# Patient Record
Sex: FEMALE | Race: WHITE | NOT HISPANIC OR LATINO | Employment: FULL TIME | URBAN - METROPOLITAN AREA
[De-identification: names, ages, dates, MRNs, and addresses within clinical notes are randomized per-mention and may not be internally consistent; named-entity substitution may affect disease eponyms.]

---

## 2017-01-19 RX ORDER — ZOLPIDEM TARTRATE 10 MG/1
TABLET ORAL
Qty: 30 TABLET | Refills: 0 | Status: CANCELLED | OUTPATIENT
Start: 2017-01-19

## 2017-01-20 ENCOUNTER — OFFICE VISIT (OUTPATIENT)
Dept: GASTROENTEROLOGY | Facility: CLINIC | Age: 62
End: 2017-01-20

## 2017-01-20 ENCOUNTER — OFFICE VISIT (OUTPATIENT)
Dept: FAMILY MEDICINE CLINIC | Facility: CLINIC | Age: 62
End: 2017-01-20

## 2017-01-20 VITALS
BODY MASS INDEX: 32.17 KG/M2 | SYSTOLIC BLOOD PRESSURE: 118 MMHG | WEIGHT: 200.2 LBS | DIASTOLIC BLOOD PRESSURE: 78 MMHG | HEIGHT: 66 IN

## 2017-01-20 VITALS
SYSTOLIC BLOOD PRESSURE: 124 MMHG | BODY MASS INDEX: 31.86 KG/M2 | WEIGHT: 203 LBS | HEIGHT: 67 IN | OXYGEN SATURATION: 98 % | HEART RATE: 72 BPM | DIASTOLIC BLOOD PRESSURE: 78 MMHG

## 2017-01-20 DIAGNOSIS — R14.0 BLOATING: ICD-10-CM

## 2017-01-20 DIAGNOSIS — I10 BENIGN ESSENTIAL HTN: Primary | ICD-10-CM

## 2017-01-20 DIAGNOSIS — F34.1 DYSTHYMIA: ICD-10-CM

## 2017-01-20 DIAGNOSIS — E78.2 MIXED HYPERLIPIDEMIA: ICD-10-CM

## 2017-01-20 DIAGNOSIS — I25.10 CHRONIC CORONARY ARTERY DISEASE: ICD-10-CM

## 2017-01-20 DIAGNOSIS — R60.0 PEDAL EDEMA: ICD-10-CM

## 2017-01-20 DIAGNOSIS — F51.01 PRIMARY INSOMNIA: ICD-10-CM

## 2017-01-20 DIAGNOSIS — Z86.19 HISTORY OF SEPSIS: ICD-10-CM

## 2017-01-20 DIAGNOSIS — K59.1 FUNCTIONAL DIARRHEA: ICD-10-CM

## 2017-01-20 DIAGNOSIS — R10.9 ABDOMINAL CRAMPING: ICD-10-CM

## 2017-01-20 DIAGNOSIS — K59.1 FUNCTIONAL DIARRHEA: Primary | ICD-10-CM

## 2017-01-20 DIAGNOSIS — Z87.19 HISTORY OF COLITIS: ICD-10-CM

## 2017-01-20 LAB
ALBUMIN SERPL-MCNC: 4.4 G/DL (ref 3.5–5.2)
ALBUMIN/GLOB SERPL: 1.9 G/DL
ALP SERPL-CCNC: 77 U/L (ref 39–117)
ALT SERPL-CCNC: 29 U/L (ref 1–33)
AST SERPL-CCNC: 22 U/L (ref 1–32)
BILIRUB SERPL-MCNC: 0.4 MG/DL (ref 0.1–1.2)
BUN SERPL-MCNC: 17 MG/DL (ref 8–23)
BUN/CREAT SERPL: 22.7 (ref 7–25)
CALCIUM SERPL-MCNC: 9.9 MG/DL (ref 8.6–10.5)
CHLORIDE SERPL-SCNC: 100 MMOL/L (ref 98–107)
CO2 SERPL-SCNC: 30.9 MMOL/L (ref 22–29)
CREAT SERPL-MCNC: 0.75 MG/DL (ref 0.57–1)
GLOBULIN SER CALC-MCNC: 2.3 GM/DL
GLUCOSE SERPL-MCNC: 84 MG/DL (ref 65–99)
POTASSIUM SERPL-SCNC: 4.7 MMOL/L (ref 3.5–5.2)
PROT SERPL-MCNC: 6.7 G/DL (ref 6–8.5)
SODIUM SERPL-SCNC: 144 MMOL/L (ref 136–145)

## 2017-01-20 PROCEDURE — 99214 OFFICE O/P EST MOD 30 MIN: CPT | Performed by: INTERNAL MEDICINE

## 2017-01-20 PROCEDURE — 99214 OFFICE O/P EST MOD 30 MIN: CPT | Performed by: FAMILY MEDICINE

## 2017-01-20 RX ORDER — HYOSCYAMINE SULFATE 0.125 MG
0.12 TABLET ORAL EVERY 6 HOURS PRN
Qty: 120 TABLET | Refills: 3 | Status: SHIPPED | OUTPATIENT
Start: 2017-01-20 | End: 2017-01-20 | Stop reason: SDUPTHER

## 2017-01-20 RX ORDER — ROSUVASTATIN CALCIUM 20 MG/1
20 TABLET, COATED ORAL DAILY
Qty: 30 TABLET | Refills: 5 | Status: SHIPPED | OUTPATIENT
Start: 2017-01-20 | End: 2017-01-20 | Stop reason: SDUPTHER

## 2017-01-20 RX ORDER — RAMIPRIL 10 MG/1
10 CAPSULE ORAL DAILY
Qty: 30 CAPSULE | Refills: 5 | Status: SHIPPED | OUTPATIENT
Start: 2017-01-20 | End: 2017-08-31 | Stop reason: SDUPTHER

## 2017-01-20 RX ORDER — RANITIDINE 150 MG/1
150 CAPSULE ORAL 2 TIMES DAILY PRN
Qty: 60 CAPSULE | Refills: 3 | Status: SHIPPED | OUTPATIENT
Start: 2017-01-20 | End: 2017-01-20 | Stop reason: SDUPTHER

## 2017-01-20 RX ORDER — FUROSEMIDE 40 MG/1
40 TABLET ORAL DAILY
Qty: 30 TABLET | Refills: 5 | Status: SHIPPED | OUTPATIENT
Start: 2017-01-20 | End: 2017-02-09 | Stop reason: SDUPTHER

## 2017-01-20 RX ORDER — NEBIVOLOL 5 MG/1
5 TABLET ORAL DAILY
Qty: 30 TABLET | Refills: 5 | Status: SHIPPED | OUTPATIENT
Start: 2017-01-20 | End: 2017-08-23 | Stop reason: SDUPTHER

## 2017-01-20 RX ORDER — ROSUVASTATIN CALCIUM 20 MG/1
TABLET, COATED ORAL
Qty: 90 TABLET | Refills: 5 | Status: SHIPPED | OUTPATIENT
Start: 2017-01-20 | End: 2017-05-04

## 2017-01-20 RX ORDER — ZOLPIDEM TARTRATE 10 MG/1
5 TABLET ORAL NIGHTLY PRN
Qty: 30 TABLET | Refills: 2 | Status: SHIPPED | OUTPATIENT
Start: 2017-01-20 | End: 2017-08-31 | Stop reason: SDUPTHER

## 2017-01-20 RX ORDER — POTASSIUM CHLORIDE 1500 MG/1
20 TABLET, FILM COATED, EXTENDED RELEASE ORAL DAILY
Qty: 30 TABLET | Refills: 1 | Status: SHIPPED | OUTPATIENT
Start: 2017-01-20 | End: 2017-01-20 | Stop reason: SDUPTHER

## 2017-01-20 RX ORDER — POTASSIUM CHLORIDE 1500 MG/1
TABLET, FILM COATED, EXTENDED RELEASE ORAL
Qty: 90 TABLET | Refills: 1 | Status: SHIPPED | OUTPATIENT
Start: 2017-01-20 | End: 2017-09-08 | Stop reason: SDUPTHER

## 2017-01-20 RX ORDER — BUPROPION HYDROCHLORIDE 300 MG/1
300 TABLET ORAL DAILY
Qty: 30 TABLET | Refills: 11 | Status: SHIPPED | OUTPATIENT
Start: 2017-01-20 | End: 2018-04-17 | Stop reason: SDUPTHER

## 2017-01-20 NOTE — MR AVS SNAPSHOT
Barbara Tran   1/20/2017 3:30 PM   Office Visit    Dept Phone:  [phone]   Encounter #:  [id number]    Provider:  Medina Guillen MD   Department:  [facility]                Your Full Care Plan              Today's Medication Changes          These changes are accurate as of: 1/20/17  4:21 PM.  If you have any questions, ask your nurse or doctor.               New Medication(s)Ordered:     hyoscyamine 0.125 MG tablet   Commonly known as:  ANASPAZ,LEVSIN   Take 1 tablet by mouth Every 6 (Six) Hours As Needed for cramping.   Started by:  Medina Guillen MD       ranitidine 150 MG capsule   Commonly known as:  ZANTAC   Take 1 capsule by mouth 2 (Two) Times a Day As Needed for indigestion or heartburn.   Started by:  Medina Guillen MD         Medication(s)that have changed:     rosuvastatin 20 MG tablet   Commonly known as:  CRESTOR   TAKE 1 TABLET BY MOUTH DAILY   What changed:  See the new instructions.   Changed by:  Millicent Ace MD       zolpidem 10 MG tablet   Commonly known as:  AMBIEN   Take 0.5 tablets by mouth At Night As Needed for sleep.   What changed:  reasons to take this   Changed by:  Millicent Ace MD         Stop taking medication(s)listed here:     NITROSTAT 0.4 MG SL tablet   Generic drug:  nitroglycerin   Stopped by:  Millicent Ace MD           trimethoprim-polymyxin b 08638-1.1 UNIT/ML-% ophthalmic solution   Commonly known as:  POLYTRIM   Stopped by:  Millicent Ace MD                Where to Get Your Medications      These medications were sent to [facility] [id number] [place] [address] [address] [place] [address] - [phone]  - [phone] [address] [address]     Phone:  [phone]     buPROPion  MG 24 hr tablet    furosemide 40 MG tablet    hyoscyamine 0.125 MG tablet    nebivolol 5 MG tablet    potassium chloride ER 20 MEQ tablet controlled-release  ER tablet    ramipril 10 MG capsule    ranitidine 150 MG capsule    rosuvastatin 20 MG tablet         You can get these medications from any pharmacy     Bring a paper prescription for each of these medications     zolpidem 10 MG tablet                  Your Updated Medication List          This list is accurate as of: 1/20/17  4:21 PM.  Always use your most recent med list.                aspirin 81 MG tablet       buPROPion  MG 24 hr tablet   Commonly known as:  WELLBUTRIN XL   Take 1 tablet by mouth Daily.       calcium carbonate 600 MG tablet   Commonly known as:  OS-SHAHNAZ       fish oil 1200 MG capsule capsule       furosemide 40 MG tablet   Commonly known as:  LASIX   Take 1 tablet by mouth Daily.       hyoscyamine 0.125 MG tablet   Commonly known as:  ANASPAZ,LEVSIN   Take 1 tablet by mouth Every 6 (Six) Hours As Needed for cramping.       nebivolol 5 MG tablet   Commonly known as:  BYSTOLIC   Take 1 tablet by mouth Daily.       potassium chloride ER 20 MEQ tablet controlled-release ER tablet   Commonly known as:  K-TAB   TAKE 1 TABLET BY MOUTH DAILY       ramipril 10 MG capsule   Commonly known as:  ALTACE   Take 1 capsule by mouth Daily.       ranitidine 150 MG capsule   Commonly known as:  ZANTAC   Take 1 capsule by mouth 2 (Two) Times a Day As Needed for indigestion or heartburn.       rosuvastatin 20 MG tablet   Commonly known as:  CRESTOR   TAKE 1 TABLET BY MOUTH DAILY       saccharomyces boulardii 250 MG capsule   Commonly known as:  FLORASTOR   Take 1 capsule by mouth 2 (Two) Times a Day.       SYNTHROID 50 MCG tablet   Generic drug:  levothyroxine       zolpidem 10 MG tablet   Commonly known as:  AMBIEN   Take 0.5 tablets by mouth At Night As Needed for sleep.               You Were Diagnosed With        Codes Comments    Functional diarrhea    -  Primary ICD-10-CM: K59.1  ICD-9-CM: 564.5     Bloating     ICD-10-CM: R14.0  ICD-9-CM: 787.3     History of colitis     ICD-10-CM: Z87.19  ICD-9-CM:  "V12.79     Abdominal cramping     ICD-10-CM: R10.9  ICD-9-CM: 789.00       Instructions     None    Patient Instructions History      Upcoming Appointments     Visit Type Date Time Department    OFFICE VISIT 1/20/2017  8:00 AM [facility]    FOLLOW UP 1/20/2017  3:30 PM [facility]      MyChart Signup     Our records indicate that you have an active [employer] account.    You can view your After Visit Summary by going to [facility] and logging in with your [employer] username and password.  If you don't have a [employer] username and password but a parent or guardian has access to your record, the parent or guardian should login with their own [employer] username and password and access your record to view the After Visit Summary.    If you have questions, you can email [email or web address] or call [phone] to talk to our [employer] staff.  Remember, [employer] is NOT to be used for urgent needs.  For medical emergencies, dial 911.               Other Info from Your Visit           Allergies     Levofloxacin Allergy Itching    Codeine      lightheaded    Morphine  Itching    Morphine And Related        Reason for Visit     Follow-up     Diarrhea     Abdominal Pain           Vital Signs     Blood Pressure Height Weight Body Mass Index Smoking Status       118/78 66\" (167.6 cm) 200 lb 3.2 oz (90.8 kg) 32.31 kg/m2 Never Smoker       Problems and Diagnoses Noted     Functional diarrhea    -  Primary    Abdominal bloating        History of colitis        Abdominal cramping            "

## 2017-01-20 NOTE — PROGRESS NOTES
"Chief Complaint   Patient presents with   • Follow-up   • Diarrhea   • Abdominal Pain     Subjective     Abdominal Pain   Associated symptoms include diarrhea. Pertinent negatives include no dysuria, frequency or nausea.     Barbara Tran is a 61 y.o. female who presents for follow up of diarrhea. She was hospitalized 10/20 to 10/25/2016. CT scan with moderate to severe pan colitis.  Stool studies negative except for fecal lactoferrin.  She completed flagyl and omnicef.  She was doing better but still with flares of increased stools, bloating, and cramping.  I performed her colonoscopy 12/19/16 with normal R and L biopsies. Unfortunately she did get a UTI following the procedure.  She was treated and has improved.  Earlier this month she got gastroenteritis from her grandson. Vomiting and diarrhea that lasted 2-3 days and then resolved.   She is having some discomfort after eating-- feels cramps after certain foods-- \"world war 3\" in her abdomen with cramping pain, bloating and discomfort.  This leads to about 3-4 loose BMs.  This happens 1-2 times per week.  She is having no nausea, vomiting, or weight loss.  She is back to work.  Also dyspepsia with lemon, tomatoes, etc which she has been avoiding      Past Medical History   Diagnosis Date   • Allergic 13     codeine, morphine, levaquin   • Arthritis l5 years or so ago   • Coronary artery disease    • Coronary artery disease 2005 stent   • Disease of thyroid gland    • Hyperlipidemia    • Hypertension    • Hypertension since 2005   • Hypothyroidism since 2012   • Osteopenia last few years       Social History     Social History   • Marital status: Single     Spouse name: N/A   • Number of children: N/A   • Years of education: N/A     Social History Main Topics   • Smoking status: Never Smoker   • Smokeless tobacco: None      Comment: N/A   • Alcohol use Yes      Comment: rarely   • Drug use: No   • Sexual activity: No     Other Topics Concern   • None     Social " History Narrative         Current Outpatient Prescriptions:   •  aspirin 81 MG tablet, Take 81 mg by mouth Daily., Disp: , Rfl:   •  buPROPion XL (WELLBUTRIN XL) 300 MG 24 hr tablet, Take 1 tablet by mouth Daily., Disp: 30 tablet, Rfl: 11  •  calcium carbonate (OS-SHAHNAZ) 600 MG tablet, Take 600 mg by mouth 2 (Two) Times a Day With Meals., Disp: , Rfl:   •  furosemide (LASIX) 40 MG tablet, Take 1 tablet by mouth Daily., Disp: 30 tablet, Rfl: 5  •  hyoscyamine (ANASPAZ,LEVSIN) 0.125 MG tablet, Take 1 tablet by mouth Every 6 (Six) Hours As Needed for cramping., Disp: 120 tablet, Rfl: 3  •  levothyroxine (SYNTHROID) 50 MCG tablet, Take 50 mcg by mouth Daily., Disp: , Rfl:   •  nebivolol (BYSTOLIC) 5 MG tablet, Take 1 tablet by mouth Daily., Disp: 30 tablet, Rfl: 5  •  Omega-3 Fatty Acids (FISH OIL) 1200 MG capsule capsule, Take 2 capsules by mouth Daily., Disp: , Rfl:   •  potassium chloride ER (K-TAB) 20 MEQ tablet controlled-release ER tablet, TAKE 1 TABLET BY MOUTH DAILY, Disp: 90 tablet, Rfl: 1  •  ramipril (ALTACE) 10 MG capsule, Take 1 capsule by mouth Daily., Disp: 30 capsule, Rfl: 5  •  ranitidine (ZANTAC) 150 MG capsule, Take 1 capsule by mouth 2 (Two) Times a Day As Needed for indigestion or heartburn., Disp: 60 capsule, Rfl: 3  •  rosuvastatin (CRESTOR) 20 MG tablet, TAKE 1 TABLET BY MOUTH DAILY, Disp: 90 tablet, Rfl: 5  •  saccharomyces boulardii (FLORASTOR) 250 MG capsule, Take 1 capsule by mouth 2 (Two) Times a Day., Disp: 60 capsule, Rfl: 1  •  zolpidem (AMBIEN) 10 MG tablet, Take 0.5 tablets by mouth At Night As Needed for sleep., Disp: 30 tablet, Rfl: 2    Review of Systems   Constitutional: Negative for activity change, appetite change and fatigue.   HENT: Negative for sore throat and trouble swallowing.    Eyes: Negative.    Respiratory: Negative.    Cardiovascular: Negative.    Gastrointestinal: Positive for abdominal pain and diarrhea. Negative for abdominal distention, blood in stool and nausea.    Endocrine: Negative for cold intolerance and heat intolerance.   Genitourinary: Negative for difficulty urinating, dysuria and frequency.   Musculoskeletal: Negative for back pain.   Skin: Negative.    Hematological: Negative for adenopathy. Does not bruise/bleed easily.   All other systems reviewed and are negative.      Objective   Vitals:    01/20/17 1549   BP: 118/78         Physical Exam:  Constitutional:    Alert, cooperative, in no acute distress, appears stated age   Eyes:            Lids and lashes normal, conjunctivae and sclerae normal, no   icterus   Ears, nose, mouth and throat:   Normal appearance of external ears and nose   Respiratory:     Clear to auscultation, respirations regular, even and                   unlabored    Cardiovascular:    Regular rhythm and normal rate, normal S1 and S2, no            murmur, palpable distal pulses, trace lower extremity edema   Gastrointestinal:    Soft, non-distended, non-tender to palpation, no guarding, no rebound tenderness, normal bowel sounds, no palpable masses or organomegaly, rectal exam deferred   Musculoskeletal:   Normal gait and station, no atrophy, no tenderness to palpation, normal digits and nails   Skin:   Normal color, no bleeding, bruising, rashes or lesions   Psychiatric:  Judgement and insight: normal   Orientation to person, place and time: normal   Mood and affect: normal       WBC   Date Value Ref Range Status   10/25/2016 5.79 4.50 - 10.70 10*3/mm3 Final   07/21/2016 7.25 4.50 - 10.70 10*3/mm3 Final     RBC   Date Value Ref Range Status   10/25/2016 4.11 3.90 - 5.20 10*6/mm3 Final   07/21/2016 4.80 3.90 - 5.20 10*6/mm3 Final     HEMOGLOBIN   Date Value Ref Range Status   10/25/2016 12.8 11.9 - 15.5 g/dL Final   07/21/2016 14.6 11.9 - 15.5 g/dL Final     HEMATOCRIT   Date Value Ref Range Status   10/25/2016 37.7 35.6 - 45.5 % Final   07/21/2016 44.9 35.6 - 45.5 % Final     MCV   Date Value Ref Range Status   10/25/2016 91.7 80.5 - 98.2  fL Final   07/21/2016 93.5 80.5 - 98.2 fL Final     MCH   Date Value Ref Range Status   10/25/2016 31.1 26.9 - 32.0 pg Final   07/21/2016 30.4 26.9 - 32.7 pg Final     MCHC   Date Value Ref Range Status   10/25/2016 34.0 32.4 - 36.3 g/dL Final   07/21/2016 32.5 32.4 - 36.3 g/dL Final     RDW   Date Value Ref Range Status   10/25/2016 12.9 11.7 - 13.0 % Final   07/21/2016 12.7 11.7 - 13.0 % Final     RDW-SD   Date Value Ref Range Status   10/25/2016 43.2 37.0 - 54.0 fl Final     MPV   Date Value Ref Range Status   10/25/2016 10.7 6.0 - 12.0 fL Final     PLATELETS   Date Value Ref Range Status   10/25/2016 236 140 - 500 10*3/mm3 Final   07/21/2016 292 140 - 500 10*3/mm3 Final     NEUTROPHIL %   Date Value Ref Range Status   10/25/2016 52.8 42.7 - 76.0 % Final     NEUTROPHIL REL %   Date Value Ref Range Status   07/21/2016 59.4 42.7 - 76.0 % Final     LYMPHOCYTE %   Date Value Ref Range Status   10/25/2016 35.1 19.6 - 45.3 % Final     LYMPHOCYTE REL %   Date Value Ref Range Status   07/21/2016 31.9 19.6 - 45.3 % Final     MONOCYTE %   Date Value Ref Range Status   10/25/2016 6.7 5.0 - 12.0 % Final     MONOCYTE REL %   Date Value Ref Range Status   07/21/2016 5.4 5.0 - 12.0 % Final     EOSINOPHIL %   Date Value Ref Range Status   10/25/2016 4.0 0.3 - 6.2 % Final     EOSINOPHIL REL %   Date Value Ref Range Status   07/21/2016 3.0 0.3 - 6.2 % Final     BASOPHIL %   Date Value Ref Range Status   10/25/2016 0.7 0.0 - 1.5 % Final     BASOPHIL REL %   Date Value Ref Range Status   07/21/2016 0.3 0.0 - 1.5 % Final     IMMATURE GRANS %   Date Value Ref Range Status   10/25/2016 0.7 (H) 0.0 - 0.5 % Final     NEUTROPHILS, ABSOLUTE   Date Value Ref Range Status   10/25/2016 3.06 1.90 - 8.10 10*3/mm3 Final     NEUTROPHILS ABSOLUTE   Date Value Ref Range Status   07/21/2016 4.31 1.90 - 8.10 10*3/mm3 Final     LYMPHOCYTES, ABSOLUTE   Date Value Ref Range Status   10/25/2016 2.03 0.90 - 4.80 10*3/mm3 Final     LYMPHOCYTES ABSOLUTE  "  Date Value Ref Range Status   07/21/2016 2.31 0.90 - 4.80 10*3/mm3 Final     MONOCYTES, ABSOLUTE   Date Value Ref Range Status   10/25/2016 0.39 0.20 - 1.20 10*3/mm3 Final     MONOCYTES ABSOLUTE   Date Value Ref Range Status   07/21/2016 0.39 0.20 - 1.20 10*3/mm3 Final     EOSINOPHILS, ABSOLUTE   Date Value Ref Range Status   10/25/2016 0.23 0.00 - 0.70 10*3/mm3 Final     EOSINOPHILS ABSOLUTE   Date Value Ref Range Status   07/21/2016 0.22 0.00 - 0.70 10*3/mm3 Final     BASOPHILS, ABSOLUTE   Date Value Ref Range Status   10/25/2016 0.04 0.00 - 0.20 10*3/mm3 Final     BASOPHILS ABSOLUTE   Date Value Ref Range Status   07/21/2016 0.02 0.00 - 0.20 10*3/mm3 Final     IMMATURE GRANS, ABSOLUTE   Date Value Ref Range Status   10/25/2016 0.04 (H) 0.00 - 0.03 10*3/mm3 Final     NRBC   Date Value Ref Range Status   12/22/2014 CANCELED       Comment:     Result canceled by the ancillary       Lab Results   Component Value Date    GLUCOSE 106 (H) 10/25/2016    BUN 8 10/25/2016    CREATININE 0.56 (L) 10/25/2016    EGFRIFNONA 109 10/25/2016    EGFRIFAFRI 80 07/21/2016    BCR 14.3 10/25/2016    CO2 24.0 10/25/2016    CALCIUM 8.4 (L) 10/25/2016    PROTENTOTREF 6.9 07/21/2016    ALBUMIN 3.60 10/20/2016    LABIL2 1.4 10/20/2016    AST 17 10/20/2016    ALT 19 10/20/2016     10/20/16 CT abd  IMPRESSION:  1. Moderate to severe pancolitis, no obstruction, perforation or  abscess. Follow-up to complete resolution is recommended.   2. Postcholecystectomy, mild intra and extrahepatic biliary ductal  dilatation.   3. Small hiatal hernia. 7 x 2.4 cm epigastric hernia contains  mesenteric fat.    12/19/16 path  Final Diagnosis   1. \"RIGHT COLON BIOPSY\":  BENIGN COLONIC MUCOSAL BIOPSIES -  NO SIGNIFICANT HISTOLOGIC ABNORMALITY.      2. \"LEFT COLON BIOPSY\":  BENIGN COLONIC MUCOSAL BIOPSIES -   NO SIGNIFICANT HISTOLOGIC ABNORMALITY.   FEW SMALL MUCOSAL LYMPHOID AGGREGATES PRESENT - NON-SPECIFIC.          Assessment/Plan    1)diarrhea: remains " with intermittent occasional flares. Suspect post infectious IBD given reassuring colonoscopy.  2)bloating: most like post infectious vs intestinal bacterial dysbiosis following antibiotic tx.  She is on florastor  3)abnormal CT colon: mod-severe pan colitis; resolved on recent colonoscopy      Plan:  To start levsin for cramps, diarrhea  Zantac for dyspepsia with acidic foods  Trial of enteragam for post infectious IBD  Follow up with nutritionist re: liz Jimenez was seen today for follow-up, diarrhea and abdominal pain.    Diagnoses and all orders for this visit:    Functional diarrhea  -     hyoscyamine (ANASPAZ,LEVSIN) 0.125 MG tablet; Take 1 tablet by mouth Every 6 (Six) Hours As Needed for cramping.    Bloating  -     ranitidine (ZANTAC) 150 MG capsule; Take 1 capsule by mouth 2 (Two) Times a Day As Needed for indigestion or heartburn.    History of colitis    Abdominal cramping  -     hyoscyamine (ANASPAZ,LEVSIN) 0.125 MG tablet; Take 1 tablet by mouth Every 6 (Six) Hours As Needed for cramping.  -     ranitidine (ZANTAC) 150 MG capsule; Take 1 capsule by mouth 2 (Two) Times a Day As Needed for indigestion or heartburn.

## 2017-01-20 NOTE — PATIENT INSTRUCTIONS
Start the levsin and zantac as needed    Trial of enteragam    Work with nutritionist on your diet    For any additional questions, concerns or changes to your condition after today's office visit please contact the office at [phone].

## 2017-01-20 NOTE — PROGRESS NOTES
"Vitals:    01/20/17 0808   BP: 124/78   Pulse: 72   SpO2: 98%     Social History   Substance Use Topics   • Smoking status: Never Smoker   • Smokeless tobacco: None      Comment: N/A   • Alcohol use Yes      Comment: rarely       Subjective   Barbara Tran is a 61 y.o. female  is here for follow-up of hypertension. She is not exercising much after her abd issues and sepsis and is adherent to a low-salt diet. Patient denies chest pain and dyspnea. Cardiovascular risk factors: hypertension and personal hx of CAD. Use of agents associated with hypertension: none. History of target organ damage: prior coronary revascularization. She is compliant with meds.     She has also had an URI and an episode of conjunctivitis. Also had a recent UTI that was \"god awful\". AND she got an episode of GE due to her grandson.     Abdominal Pain   This is a recurrent problem. The current episode started 1 to 4 weeks ago. The onset quality is gradual. The problem occurs daily. The most recent episode lasted 1 days. The problem has been waxing and waning. The pain is located in the generalized abdominal region. The pain is at a severity of 5/10. The quality of the pain is aching and cramping. The abdominal pain radiates to the LUQ, RUQ and epigastric region. Associated symptoms include anorexia, arthralgias, belching, flatus and nausea. Pertinent negatives include no constipation, diarrhea, dysuria, fever, frequency, headaches, hematochezia, hematuria, melena, myalgias, vomiting or weight loss. The pain is aggravated by eating. The pain is relieved by being still and certain positions. Prior diagnostic workup includes CT scan and lower endoscopy.        The following portions of the patient's history were reviewed and updated as appropriate: allergies, current medications, past social history and problem list.    Review of Systems   Constitutional: Negative for activity change, appetite change, chills, fatigue, fever, unexpected weight " change and weight loss.   HENT: Negative for congestion, ear pain, hearing loss, nosebleeds, rhinorrhea and sore throat.    Eyes: Negative for pain, redness and visual disturbance.   Respiratory: Negative for cough, shortness of breath and wheezing.    Cardiovascular: Negative for chest pain, palpitations and leg swelling.   Gastrointestinal: Positive for abdominal pain, anorexia, flatus and nausea. Negative for blood in stool, constipation, diarrhea, hematochezia, melena and vomiting.        Working on this with Dr. Guillen. Still with pain   Endocrine: Negative for cold intolerance and heat intolerance.   Genitourinary: Negative for difficulty urinating, dysuria, frequency, hematuria, pelvic pain, urgency and vaginal discharge.   Musculoskeletal: Positive for arthralgias. Negative for back pain, joint swelling and myalgias.   Skin: Negative for rash and wound.   Neurological: Negative for dizziness, weakness, numbness and headaches.   Hematological: Does not bruise/bleed easily.   Psychiatric/Behavioral: Positive for dysphoric mood (Probably related to her recent health setups. ). Negative for sleep disturbance and suicidal ideas. The patient is not nervous/anxious.        Objective   Physical Exam   Constitutional: She is oriented to person, place, and time. Vital signs are normal. She appears well-developed and well-nourished. No distress.   HENT:   Head: Normocephalic.   Cardiovascular: Normal rate, regular rhythm and normal heart sounds.    Pulmonary/Chest: Effort normal and breath sounds normal.   Musculoskeletal: She exhibits edema.   Neurological: She is alert and oriented to person, place, and time. Gait normal.   Psychiatric: She has a normal mood and affect. Her behavior is normal. Judgment and thought content normal.   Vitals reviewed.      Assessment/Plan   Problem List Items Addressed This Visit        Cardiovascular and Mediastinum    Benign essential HTN - Primary     Hypertension is  unchanged.  Continue current treatment regimen.  Blood pressure will be reassessed at the next regular appointment.         Relevant Medications    furosemide (LASIX) 40 MG tablet    nebivolol (BYSTOLIC) 5 MG tablet    ramipril (ALTACE) 10 MG capsule    Other Relevant Orders    Comprehensive Metabolic Panel    HLD (hyperlipidemia)    Relevant Medications    rosuvastatin (CRESTOR) 20 MG tablet    Chronic coronary artery disease    Relevant Medications    nebivolol (BYSTOLIC) 5 MG tablet       Other    Dysthymia    Relevant Medications    buPROPion XL (WELLBUTRIN XL) 300 MG 24 hr tablet    zolpidem (AMBIEN) 10 MG tablet    Pedal edema     Persistent and needs the lasix to control         Relevant Medications    furosemide (LASIX) 40 MG tablet    potassium chloride ER (K-TAB) 20 MEQ tablet controlled-release ER tablet    Other Relevant Orders    Comprehensive Metabolic Panel    History of sepsis    Primary insomnia    Relevant Medications    zolpidem (AMBIEN) 10 MG tablet          Current Outpatient Prescriptions:   •  aspirin 81 MG tablet, Take 81 mg by mouth Daily., Disp: , Rfl:   •  buPROPion XL (WELLBUTRIN XL) 300 MG 24 hr tablet, Take 1 tablet by mouth Daily., Disp: 30 tablet, Rfl: 11  •  calcium carbonate (OS-SHAHNAZ) 600 MG tablet, Take 600 mg by mouth 2 (Two) Times a Day With Meals., Disp: , Rfl:   •  furosemide (LASIX) 40 MG tablet, Take 1 tablet by mouth Daily., Disp: 30 tablet, Rfl: 5  •  levothyroxine (SYNTHROID) 50 MCG tablet, Take 50 mcg by mouth Daily., Disp: , Rfl:   •  nebivolol (BYSTOLIC) 5 MG tablet, Take 1 tablet by mouth Daily., Disp: 30 tablet, Rfl: 5  •  Omega-3 Fatty Acids (FISH OIL) 1200 MG capsule capsule, Take 2 capsules by mouth Daily., Disp: , Rfl:   •  potassium chloride ER (K-TAB) 20 MEQ tablet controlled-release ER tablet, Take 1 tablet by mouth Daily., Disp: 30 tablet, Rfl: 1  •  ramipril (ALTACE) 10 MG capsule, Take 1 capsule by mouth Daily., Disp: 30 capsule, Rfl: 5  •  rosuvastatin  (CRESTOR) 20 MG tablet, Take 1 tablet by mouth Daily., Disp: 30 tablet, Rfl: 5  •  saccharomyces boulardii (FLORASTOR) 250 MG capsule, Take 1 capsule by mouth 2 (Two) Times a Day., Disp: 60 capsule, Rfl: 1  •  zolpidem (AMBIEN) 10 MG tablet, Take 0.5 tablets by mouth At Night As Needed for sleep., Disp: 30 tablet, Rfl: 2     No meds are keeping weight on her. She is still losing hair.

## 2017-01-20 NOTE — MR AVS SNAPSHOT
Barbara Tran   1/20/2017 8:00 AM   Office Visit    Provider:  Millicent Ace MD   Department:  [facility] PRIMARY CARE   Dept Phone:  [phone]                Your Full Care Plan              Today's Medication Changes          These changes are accurate as of: 1/20/17  8:58 AM.  If you have any questions, ask your nurse or doctor.               Medication(s)that have changed:     potassium chloride ER 20 MEQ tablet controlled-release ER tablet   Commonly known as:  K-TAB   Take 1 tablet by mouth Daily.   What changed:  See the new instructions.   Changed by:  Millicent Ace MD       zolpidem 10 MG tablet   Commonly known as:  AMBIEN   Take 0.5 tablets by mouth At Night As Needed for sleep.   What changed:  reasons to take this   Changed by:  Millicent Ace MD         Stop taking medication(s)listed here:     NITROSTAT 0.4 MG SL tablet   Generic drug:  nitroglycerin   Stopped by:  Millicent Ace MD           trimethoprim-polymyxin b 98626-4.1 UNIT/ML-% ophthalmic solution   Commonly known as:  POLYTRIM   Stopped by:  Millicent Ace MD                Where to Get Your Medications      These medications were sent to [facility] [address] [place] [address] [address] [place] [address] [phone] [facility] [phone] [address] [address]     Phone:  [phone]     buPROPion  MG 24 hr tablet    furosemide 40 MG tablet    nebivolol 5 MG tablet    potassium chloride ER 20 MEQ tablet controlled-release ER tablet    ramipril 10 MG capsule    rosuvastatin 20 MG tablet         You can get these medications from any pharmacy     Bring a paper prescription for each of these medications     zolpidem 10 MG tablet                  Your Updated Medication List          This list is accurate as of: 1/20/17  8:58 AM.  Always use your most recent med list.                aspirin 81 MG tablet       buPROPion  MG  24 hr tablet   Commonly known as:  WELLBUTRIN XL   Take 1 tablet by mouth Daily.       calcium carbonate 600 MG tablet   Commonly known as:  OS-SHAHNAZ       fish oil 1200 MG capsule capsule       furosemide 40 MG tablet   Commonly known as:  LASIX   Take 1 tablet by mouth Daily.       nebivolol 5 MG tablet   Commonly known as:  BYSTOLIC   Take 1 tablet by mouth Daily.       potassium chloride ER 20 MEQ tablet controlled-release ER tablet   Commonly known as:  K-TAB   Take 1 tablet by mouth Daily.       ramipril 10 MG capsule   Commonly known as:  ALTACE   Take 1 capsule by mouth Daily.       rosuvastatin 20 MG tablet   Commonly known as:  CRESTOR   Take 1 tablet by mouth Daily.       saccharomyces boulardii 250 MG capsule   Commonly known as:  FLORASTOR   Take 1 capsule by mouth 2 (Two) Times a Day.       SYNTHROID 50 MCG tablet   Generic drug:  levothyroxine       zolpidem 10 MG tablet   Commonly known as:  AMBIEN   Take 0.5 tablets by mouth At Night As Needed for sleep.               We Performed the Following     Comprehensive Metabolic Panel       You Were Diagnosed With        Codes Comments    Benign essential HTN    -  Primary ICD-10-CM: I10  ICD-9-CM: 401.1     Mixed hyperlipidemia     ICD-10-CM: E78.2  ICD-9-CM: 272.2     History of sepsis     ICD-10-CM: Z86.19  ICD-9-CM: V12.09     Pedal edema     ICD-10-CM: R60.0  ICD-9-CM: 782.3     Chronic coronary artery disease     ICD-10-CM: I25.10  ICD-9-CM: 414.00     Dysthymia     ICD-10-CM: F34.1  ICD-9-CM: 300.4     Primary insomnia     ICD-10-CM: F51.01  ICD-9-CM: 307.42       Instructions     None    Patient Instructions History      [employer] Signup     Our records indicate that you have an active [employer] account.    You can view your After Visit Summary by going to [employer] and logging in with your [employer] username and password.  If you don't have a [employer] username and password but a parent or guardian has access to your record,  "the parent or guardian should login with their own [employer] username and password and access your record to view the After Visit Summary.    If you have questions, you can email [email or web address] or call [phone] to talk to our [employer] staff.  Remember, [employer] is NOT to be used for urgent needs.  For medical emergencies, dial 911.               Other Info from Your Visit           Your Appointments     Jan 20, 2017  3:30 PM EST   Follow Up with Medina Guillen MD   [facility] (--)    [address]  [facility] [id number] [phone]           Arrive 15 minutes prior to appointment.              Allergies     Levofloxacin Allergy Itching    Codeine      lightheaded    Morphine  Itching    Morphine And Related        Reason for Visit     Hypertension     Med Refill           Vital Signs     Blood Pressure Pulse Height Weight Oxygen Saturation Body Mass Index    124/78 72 67\" (170.2 cm) 203 lb (92.1 kg) 98% 31.79 kg/m2    Smoking Status                   Never Smoker           Problems and Diagnoses Noted     Benign essential HTN    Heart disease due to blocked artery    Mood problem    Personal history of sepsis    High cholesterol or triglycerides    Pedal edema    Difficulty falling or staying asleep      No Longer an Issue     Accumulation of fluid in tissues      "

## 2017-01-20 NOTE — ASSESSMENT & PLAN NOTE
Hypertension is unchanged.  Continue current treatment regimen.  Blood pressure will be reassessed at the next regular appointment.

## 2017-01-26 RX ORDER — RANITIDINE 150 MG/1
CAPSULE ORAL
Qty: 180 CAPSULE | Refills: 0 | Status: SHIPPED | OUTPATIENT
Start: 2017-01-26 | End: 2017-09-08

## 2017-01-26 RX ORDER — HYOSCYAMINE SULFATE 0.125 MG
TABLET ORAL
Qty: 360 TABLET | Refills: 0 | Status: SHIPPED | OUTPATIENT
Start: 2017-01-26 | End: 2017-09-08

## 2017-02-01 ENCOUNTER — APPOINTMENT (OUTPATIENT)
Dept: WOMENS IMAGING | Facility: HOSPITAL | Age: 62
End: 2017-02-01

## 2017-02-01 PROCEDURE — 77063 BREAST TOMOSYNTHESIS BI: CPT | Performed by: RADIOLOGY

## 2017-02-01 PROCEDURE — 77067 SCR MAMMO BI INCL CAD: CPT | Performed by: RADIOLOGY

## 2017-02-03 ENCOUNTER — RESULTS ENCOUNTER (OUTPATIENT)
Dept: GASTROENTEROLOGY | Facility: CLINIC | Age: 62
End: 2017-02-03

## 2017-02-03 ENCOUNTER — TELEPHONE (OUTPATIENT)
Dept: GASTROENTEROLOGY | Facility: CLINIC | Age: 62
End: 2017-02-03

## 2017-02-03 DIAGNOSIS — R19.7 DIARRHEA OF PRESUMED INFECTIOUS ORIGIN: ICD-10-CM

## 2017-02-03 DIAGNOSIS — R19.7 DIARRHEA OF PRESUMED INFECTIOUS ORIGIN: Primary | ICD-10-CM

## 2017-02-03 NOTE — TELEPHONE ENCOUNTER
----- Message from [place] sent at 2/3/2017  8:32 AM EST -----  Regarding: PT CALLED  Contact: [phone]   PT CALLED COMPLAINING ABOUT UNCONTROLLABLE DIARRHEA, PT DONT KNOW WHAT TO DO.

## 2017-02-03 NOTE — TELEPHONE ENCOUNTER
Pt called and advised per  Dr Guillen that she is ordering stool studies for her to do as soon as she can .  Advised the stool kit would be at the  with her name on it.  Also advised if symptoms get worse to go to ER.  Pt verb understanding and states she will  the kit today.

## 2017-02-03 NOTE — TELEPHONE ENCOUNTER
I am ordering stool studies for her as well-- please have her come in for that as soon as she can and to ER if she gets worse.

## 2017-02-03 NOTE — TELEPHONE ENCOUNTER
Called pt and pt reports that she is having lots of cramping and diarrhea.  Pt states it started yesterday.  Pt states she had 5 very soft stools and this am she has had 4 very watery stools so far. Pt denies a fever and chills.  Pt states she did try the enteragam and it has not helped. Pt is taking the zantac but did not  the hyoscyamine .  She states she did not know it was there.  Recommended to pt that she get the hyoscyamine from her pharmacy. Pt has no appetite and is eating very little.  She tried to eat a piece of toast and it is making her nauseated.  Encouraged pt to drink plenty of fluids.  Also advised would send message to Dr Guillen and in the meantime if symptoms worsen to seek medical attn.   Pt verb understanding.

## 2017-02-06 ENCOUNTER — TELEPHONE (OUTPATIENT)
Dept: GASTROENTEROLOGY | Facility: CLINIC | Age: 62
End: 2017-02-06

## 2017-02-06 NOTE — TELEPHONE ENCOUNTER
Call to pt.  She reports is having 10-12 diarrhea stools/day over weekend.  T 100.2 on Saturday.  Reports has taken Imodium x2 which does help for period of time.  Reports had discomfort/pain under rib cage - ranks between 3 and 8.  States has baseline discomfort at all times of 2 to 3.  Taking Hyoscyamine with some relief.  States has very poor appetite.  States has not completed stool kit.  Advise pt to obtain stool samples as ordered and that will send message to Dr Guillen.  Pt verb understanding.

## 2017-02-06 NOTE — TELEPHONE ENCOUNTER
----- Message from [place] Mari sent at 2/6/2017 10:04 AM EST -----  Regarding: PT CALLED COMPLANING   Contact: [phone]   PT CALLED COMPLAINING OF FEELING NAUSEA, NOT BEING ABLE TO DIGESTIVE FOOD.

## 2017-02-06 NOTE — TELEPHONE ENCOUNTER
Call to pt. Advise per Dr Guillen that she needs to complete the stool studies ASAP or go to the ER.  Pt verb understanding.

## 2017-02-09 DIAGNOSIS — R60.0 PEDAL EDEMA: ICD-10-CM

## 2017-02-09 RX ORDER — FUROSEMIDE 40 MG/1
40 TABLET ORAL DAILY
Qty: 30 TABLET | Refills: 5 | Status: SHIPPED | OUTPATIENT
Start: 2017-02-09 | End: 2017-09-08 | Stop reason: SDUPTHER

## 2017-05-04 ENCOUNTER — OFFICE VISIT (OUTPATIENT)
Dept: FAMILY MEDICINE CLINIC | Facility: CLINIC | Age: 62
End: 2017-05-04

## 2017-05-04 VITALS
WEIGHT: 211 LBS | SYSTOLIC BLOOD PRESSURE: 120 MMHG | OXYGEN SATURATION: 98 % | HEART RATE: 63 BPM | DIASTOLIC BLOOD PRESSURE: 80 MMHG | HEIGHT: 66 IN | BODY MASS INDEX: 33.91 KG/M2 | TEMPERATURE: 98.2 F

## 2017-05-04 DIAGNOSIS — J02.9 SORE THROAT: Primary | ICD-10-CM

## 2017-05-04 DIAGNOSIS — J02.9 PHARYNGITIS, UNSPECIFIED ETIOLOGY: ICD-10-CM

## 2017-05-04 LAB
EXPIRATION DATE: NORMAL
INTERNAL CONTROL: NORMAL
Lab: NORMAL
S PYO AG THROAT QL: NEGATIVE

## 2017-05-04 PROCEDURE — 99213 OFFICE O/P EST LOW 20 MIN: CPT | Performed by: NURSE PRACTITIONER

## 2017-05-04 PROCEDURE — 87880 STREP A ASSAY W/OPTIC: CPT | Performed by: NURSE PRACTITIONER

## 2017-05-07 LAB
BACTERIA SPEC RESP CULT: NORMAL
BACTERIA SPEC RESP CULT: NORMAL

## 2017-07-23 DIAGNOSIS — Z95.5 HISTORY OF CORONARY ARTERY STENT PLACEMENT: ICD-10-CM

## 2017-07-24 RX ORDER — ROSUVASTATIN CALCIUM 20 MG/1
TABLET, COATED ORAL
Qty: 90 TABLET | Refills: 1 | Status: SHIPPED | OUTPATIENT
Start: 2017-07-24 | End: 2018-02-19 | Stop reason: SDUPTHER

## 2017-07-31 DIAGNOSIS — F32.A DEPRESSION: ICD-10-CM

## 2017-07-31 RX ORDER — BUPROPION HYDROCHLORIDE 300 MG/1
TABLET ORAL
Qty: 90 TABLET | Refills: 0 | Status: SHIPPED | OUTPATIENT
Start: 2017-07-31 | End: 2017-09-08 | Stop reason: SDUPTHER

## 2017-08-07 DIAGNOSIS — R60.0 PEDAL EDEMA: ICD-10-CM

## 2017-08-07 RX ORDER — POTASSIUM CHLORIDE 1500 MG/1
TABLET, FILM COATED, EXTENDED RELEASE ORAL
Qty: 90 TABLET | Refills: 0 | Status: SHIPPED | OUTPATIENT
Start: 2017-08-07 | End: 2017-09-08 | Stop reason: SDUPTHER

## 2017-08-23 DIAGNOSIS — I25.10 CHRONIC CORONARY ARTERY DISEASE: ICD-10-CM

## 2017-08-24 RX ORDER — NEBIVOLOL HYDROCHLORIDE 5 MG/1
TABLET ORAL
Qty: 30 TABLET | Refills: 0 | Status: SHIPPED | OUTPATIENT
Start: 2017-08-24 | End: 2017-09-08 | Stop reason: SDUPTHER

## 2017-08-29 DIAGNOSIS — I10 BENIGN ESSENTIAL HTN: ICD-10-CM

## 2017-08-29 RX ORDER — RAMIPRIL 10 MG/1
CAPSULE ORAL
Qty: 30 CAPSULE | Refills: 0 | Status: CANCELLED | OUTPATIENT
Start: 2017-08-29

## 2017-08-31 ENCOUNTER — TELEPHONE (OUTPATIENT)
Dept: FAMILY MEDICINE CLINIC | Facility: CLINIC | Age: 62
End: 2017-08-31

## 2017-08-31 DIAGNOSIS — F51.01 PRIMARY INSOMNIA: ICD-10-CM

## 2017-08-31 DIAGNOSIS — I10 BENIGN ESSENTIAL HTN: ICD-10-CM

## 2017-08-31 RX ORDER — ZOLPIDEM TARTRATE 10 MG/1
5 TABLET ORAL NIGHTLY PRN
Qty: 30 TABLET | Refills: 1 | OUTPATIENT
Start: 2017-08-31 | End: 2018-05-23 | Stop reason: SDUPTHER

## 2017-08-31 RX ORDER — RAMIPRIL 10 MG/1
10 CAPSULE ORAL DAILY
Qty: 30 CAPSULE | Refills: 0 | Status: SHIPPED | OUTPATIENT
Start: 2017-08-31 | End: 2017-09-08 | Stop reason: SDUPTHER

## 2017-09-08 ENCOUNTER — OFFICE VISIT (OUTPATIENT)
Dept: FAMILY MEDICINE CLINIC | Facility: CLINIC | Age: 62
End: 2017-09-08

## 2017-09-08 VITALS
SYSTOLIC BLOOD PRESSURE: 124 MMHG | DIASTOLIC BLOOD PRESSURE: 80 MMHG | OXYGEN SATURATION: 99 % | HEART RATE: 77 BPM | HEIGHT: 66 IN | WEIGHT: 203 LBS | BODY MASS INDEX: 32.62 KG/M2 | RESPIRATION RATE: 16 BRPM

## 2017-09-08 DIAGNOSIS — I10 BENIGN ESSENTIAL HTN: Primary | ICD-10-CM

## 2017-09-08 DIAGNOSIS — I25.10 CHRONIC CORONARY ARTERY DISEASE: ICD-10-CM

## 2017-09-08 DIAGNOSIS — N30.90 CYSTITIS: ICD-10-CM

## 2017-09-08 DIAGNOSIS — R60.0 PEDAL EDEMA: ICD-10-CM

## 2017-09-08 PROCEDURE — 99214 OFFICE O/P EST MOD 30 MIN: CPT | Performed by: FAMILY MEDICINE

## 2017-09-08 RX ORDER — NEBIVOLOL 5 MG/1
5 TABLET ORAL DAILY
Qty: 30 TABLET | Refills: 5 | Status: SHIPPED | OUTPATIENT
Start: 2017-09-08 | End: 2018-03-17 | Stop reason: SDUPTHER

## 2017-09-08 RX ORDER — FUROSEMIDE 40 MG/1
40 TABLET ORAL DAILY
Qty: 30 TABLET | Refills: 5 | Status: SHIPPED | OUTPATIENT
Start: 2017-09-08 | End: 2019-10-25 | Stop reason: SDUPTHER

## 2017-09-08 RX ORDER — LANOLIN ALCOHOL/MO/W.PET/CERES
1000 CREAM (GRAM) TOPICAL DAILY
COMMUNITY
End: 2018-09-21

## 2017-09-08 RX ORDER — LEVOTHYROXINE SODIUM 0.05 MG/1
50 TABLET ORAL DAILY
COMMUNITY
Start: 2017-05-24 | End: 2018-04-17 | Stop reason: SDUPTHER

## 2017-09-08 RX ORDER — POTASSIUM CHLORIDE 1500 MG/1
20 TABLET, FILM COATED, EXTENDED RELEASE ORAL DAILY
Qty: 30 TABLET | Refills: 11 | Status: SHIPPED | OUTPATIENT
Start: 2017-09-08 | End: 2018-08-15 | Stop reason: SDUPTHER

## 2017-09-08 RX ORDER — RAMIPRIL 10 MG/1
10 CAPSULE ORAL DAILY
Qty: 30 CAPSULE | Refills: 5 | Status: SHIPPED | OUTPATIENT
Start: 2017-09-08 | End: 2018-03-28 | Stop reason: SDUPTHER

## 2017-09-08 NOTE — PROGRESS NOTES
Barbara Tran is a 62 y.o. female.  Seen 09/08/2017    Assessment/Plan   Problem List Items Addressed This Visit        Cardiovascular and Mediastinum    Benign essential HTN - Primary    Relevant Medications    ramipril (ALTACE) 10 MG capsule    nebivolol (BYSTOLIC) 5 MG tablet    furosemide (LASIX) 40 MG tablet    Chronic coronary artery disease    Overview     Cardiac Catheterization 1/21/2015 Patent proximal LAD stent from 2005 with origin of septal  at LAD stent around 80% which is unchanged from prior cardiac cath, normal LV systolic function     Drug Eluting Stent Placement 3/22/2005 3.0 x 13 mm Cypher stent to distal LAD with adjuvant balloon angioplasty to the second septal  @ [facility]              Relevant Medications    nebivolol (BYSTOLIC) 5 MG tablet       Other    Pedal edema    Relevant Medications    furosemide (LASIX) 40 MG tablet    potassium chloride ER (K-TAB) 20 MEQ tablet controlled-release ER tablet      Other Visit Diagnoses     Cystitis        Second episode in nine months. If becomes recurrent may need to consider topical estrogen which we discussed.              Return in about 6 months (around 3/8/2018).  Patient Instructions   Let me know of any further episodes of UTIs      Subjective     Chief Complaint   Patient presents with   • Urinary Tract Infection     Social History   Substance Use Topics   • Smoking status: Never Smoker   • Smokeless tobacco: None      Comment: N/A   • Alcohol use Yes      Comment: rarely       History of Present Illness     Had a UTI and went to [facility] and was placed on Augmentin which is tearing up her stomach. She has taken five doses.     Patient is here for follow-up of hypertension. She is not exercising and is adherent to a low-salt diet. Patient does check BP occasionally.. Patient denies chest pain and dyspnea. Cardiovascular risk factors: dyslipidemia, hypertension and obesity (BMI >= 30 kg/m2). Use of agents associated with  "hypertension: none. History of target organ damage: none. She is compliant with meds.     The following portions of the patient's history were reviewed and updated as appropriate:PMHroutine: Social history , Allergies, Current Medications, Active Problem List and Health Maintenance    Review of Systems   Constitutional: Negative for activity change, appetite change, chills, fatigue, fever and unexpected weight change.   HENT: Negative for congestion, ear pain, hearing loss, nosebleeds, rhinorrhea and sore throat.    Eyes: Negative for pain, redness and visual disturbance.   Respiratory: Negative for cough, shortness of breath and wheezing.    Cardiovascular: Negative for chest pain, palpitations and leg swelling.   Gastrointestinal: Negative for abdominal pain, blood in stool, constipation, diarrhea, nausea and vomiting.   Endocrine: Negative for cold intolerance and heat intolerance.   Genitourinary: Negative for difficulty urinating, dysuria, frequency, hematuria, pelvic pain, urgency and vaginal discharge.   Musculoskeletal: Negative for arthralgias, back pain and joint swelling.   Skin: Negative for rash and wound.   Neurological: Negative for dizziness, weakness, numbness and headaches.   Hematological: Does not bruise/bleed easily.   Psychiatric/Behavioral: Negative for dysphoric mood, sleep disturbance and suicidal ideas. The patient is not nervous/anxious.        Objective   Vitals:    09/08/17 1136   BP: 124/80   Pulse: 77   Resp: 16   SpO2: 99%   Weight: 203 lb (92.1 kg)   Height: 66\" (167.6 cm)     Body mass index is 32.77 kg/(m^2).  Physical Exam   Constitutional: She is oriented to person, place, and time. Vital signs are normal. She appears well-developed and well-nourished. No distress.   HENT:   Head: Normocephalic.   Cardiovascular: Normal rate, regular rhythm and normal heart sounds.    Pulmonary/Chest: Effort normal and breath sounds normal.   Neurological: She is alert and oriented to person, " place, and time. Gait normal.   Psychiatric: She has a normal mood and affect. Her behavior is normal. Judgment and thought content normal.   Vitals reviewed.

## 2017-11-03 DIAGNOSIS — F32.A DEPRESSION: ICD-10-CM

## 2017-11-03 RX ORDER — BUPROPION HYDROCHLORIDE 300 MG/1
TABLET ORAL
Qty: 90 TABLET | Refills: 1 | Status: SHIPPED | OUTPATIENT
Start: 2017-11-03 | End: 2018-04-17

## 2018-02-19 DIAGNOSIS — Z95.5 HISTORY OF CORONARY ARTERY STENT PLACEMENT: ICD-10-CM

## 2018-02-19 RX ORDER — ROSUVASTATIN CALCIUM 20 MG/1
20 TABLET, COATED ORAL DAILY
Qty: 90 TABLET | Refills: 1 | Status: SHIPPED | OUTPATIENT
Start: 2018-02-19 | End: 2018-04-17 | Stop reason: SDUPTHER

## 2018-03-13 ENCOUNTER — APPOINTMENT (OUTPATIENT)
Dept: WOMENS IMAGING | Facility: HOSPITAL | Age: 63
End: 2018-03-13

## 2018-03-13 PROCEDURE — 77063 BREAST TOMOSYNTHESIS BI: CPT | Performed by: RADIOLOGY

## 2018-03-13 PROCEDURE — 77067 SCR MAMMO BI INCL CAD: CPT | Performed by: RADIOLOGY

## 2018-03-17 DIAGNOSIS — I25.10 CHRONIC CORONARY ARTERY DISEASE: ICD-10-CM

## 2018-03-19 RX ORDER — NEBIVOLOL HYDROCHLORIDE 5 MG/1
TABLET ORAL
Qty: 30 TABLET | Refills: 0 | Status: SHIPPED | OUTPATIENT
Start: 2018-03-19 | End: 2018-04-17 | Stop reason: SDUPTHER

## 2018-03-28 DIAGNOSIS — I10 BENIGN ESSENTIAL HTN: ICD-10-CM

## 2018-03-28 RX ORDER — RAMIPRIL 10 MG/1
CAPSULE ORAL
Qty: 30 CAPSULE | Refills: 1 | Status: SHIPPED | OUTPATIENT
Start: 2018-03-28 | End: 2018-04-17 | Stop reason: SDUPTHER

## 2018-04-12 DIAGNOSIS — I10 BENIGN ESSENTIAL HTN: Primary | ICD-10-CM

## 2018-04-12 DIAGNOSIS — E78.2 MIXED HYPERLIPIDEMIA: ICD-10-CM

## 2018-04-12 DIAGNOSIS — E03.9 HYPOTHYROIDISM, UNSPECIFIED TYPE: ICD-10-CM

## 2018-04-13 ENCOUNTER — LAB (OUTPATIENT)
Dept: FAMILY MEDICINE CLINIC | Facility: CLINIC | Age: 63
End: 2018-04-13

## 2018-04-13 DIAGNOSIS — E03.9 HYPOTHYROIDISM, UNSPECIFIED TYPE: ICD-10-CM

## 2018-04-13 DIAGNOSIS — E78.2 MIXED HYPERLIPIDEMIA: ICD-10-CM

## 2018-04-13 DIAGNOSIS — I10 BENIGN ESSENTIAL HTN: ICD-10-CM

## 2018-04-13 LAB
ALBUMIN SERPL-MCNC: 4.3 G/DL (ref 3.5–5.2)
ALBUMIN/GLOB SERPL: 1.8 G/DL
ALP SERPL-CCNC: 70 U/L (ref 39–117)
ALT SERPL-CCNC: 24 U/L (ref 1–33)
AST SERPL-CCNC: 21 U/L (ref 1–32)
BASOPHILS # BLD AUTO: 0.04 10*3/MM3 (ref 0–0.2)
BASOPHILS NFR BLD AUTO: 0.6 % (ref 0–1.5)
BILIRUB SERPL-MCNC: 0.4 MG/DL (ref 0.1–1.2)
BUN SERPL-MCNC: 17 MG/DL (ref 8–23)
BUN/CREAT SERPL: 21.5 (ref 7–25)
CALCIUM SERPL-MCNC: 9.3 MG/DL (ref 8.6–10.5)
CHLORIDE SERPL-SCNC: 97 MMOL/L (ref 98–107)
CHOLEST SERPL-MCNC: 162 MG/DL (ref 0–200)
CO2 SERPL-SCNC: 26.3 MMOL/L (ref 22–29)
CREAT SERPL-MCNC: 0.79 MG/DL (ref 0.57–1)
EOSINOPHIL # BLD AUTO: 0.33 10*3/MM3 (ref 0–0.7)
EOSINOPHIL NFR BLD AUTO: 4.7 % (ref 0.3–6.2)
ERYTHROCYTE [DISTWIDTH] IN BLOOD BY AUTOMATED COUNT: 13 % (ref 11.7–13)
GFR SERPLBLD CREATININE-BSD FMLA CKD-EPI: 73 ML/MIN/1.73
GFR SERPLBLD CREATININE-BSD FMLA CKD-EPI: 89 ML/MIN/1.73
GLOBULIN SER CALC-MCNC: 2.4 GM/DL
GLUCOSE SERPL-MCNC: 99 MG/DL (ref 65–99)
HCT VFR BLD AUTO: 46.1 % (ref 35.6–45.5)
HDLC SERPL-MCNC: 57 MG/DL (ref 40–60)
HGB BLD-MCNC: 14.2 G/DL (ref 11.9–15.5)
IMM GRANULOCYTES # BLD: 0 10*3/MM3 (ref 0–0.03)
IMM GRANULOCYTES NFR BLD: 0 % (ref 0–0.5)
LDLC SERPL CALC-MCNC: 83 MG/DL (ref 0–100)
LDLC/HDLC SERPL: 1.46 {RATIO}
LYMPHOCYTES # BLD AUTO: 2.49 10*3/MM3 (ref 0.9–4.8)
LYMPHOCYTES NFR BLD AUTO: 35.4 % (ref 19.6–45.3)
MCH RBC QN AUTO: 30 PG (ref 26.9–32)
MCHC RBC AUTO-ENTMCNC: 30.8 G/DL (ref 32.4–36.3)
MCV RBC AUTO: 97.3 FL (ref 80.5–98.2)
MONOCYTES # BLD AUTO: 0.38 10*3/MM3 (ref 0.2–1.2)
MONOCYTES NFR BLD AUTO: 5.4 % (ref 5–12)
NEUTROPHILS # BLD AUTO: 3.79 10*3/MM3 (ref 1.9–8.1)
NEUTROPHILS NFR BLD AUTO: 53.9 % (ref 42.7–76)
PLATELET # BLD AUTO: 280 10*3/MM3 (ref 140–500)
POTASSIUM SERPL-SCNC: 4.7 MMOL/L (ref 3.5–5.2)
PROT SERPL-MCNC: 6.7 G/DL (ref 6–8.5)
RBC # BLD AUTO: 4.74 10*6/MM3 (ref 3.9–5.2)
SODIUM SERPL-SCNC: 137 MMOL/L (ref 136–145)
TRIGL SERPL-MCNC: 108 MG/DL (ref 0–150)
TSH SERPL DL<=0.005 MIU/L-ACNC: 3.32 MIU/ML (ref 0.27–4.2)
VLDLC SERPL CALC-MCNC: 21.6 MG/DL (ref 5–40)
WBC # BLD AUTO: 7.03 10*3/MM3 (ref 4.5–10.7)

## 2018-04-17 ENCOUNTER — TELEPHONE (OUTPATIENT)
Dept: FAMILY MEDICINE CLINIC | Facility: CLINIC | Age: 63
End: 2018-04-17

## 2018-04-17 ENCOUNTER — OFFICE VISIT (OUTPATIENT)
Dept: FAMILY MEDICINE CLINIC | Facility: CLINIC | Age: 63
End: 2018-04-17

## 2018-04-17 VITALS
DIASTOLIC BLOOD PRESSURE: 62 MMHG | HEART RATE: 62 BPM | HEIGHT: 66 IN | SYSTOLIC BLOOD PRESSURE: 114 MMHG | TEMPERATURE: 97.9 F

## 2018-04-17 DIAGNOSIS — I10 BENIGN ESSENTIAL HTN: Primary | ICD-10-CM

## 2018-04-17 DIAGNOSIS — E78.2 MIXED HYPERLIPIDEMIA: ICD-10-CM

## 2018-04-17 DIAGNOSIS — R26.89 BALANCE PROBLEMS: ICD-10-CM

## 2018-04-17 DIAGNOSIS — Z95.5 HISTORY OF CORONARY ARTERY STENT PLACEMENT: ICD-10-CM

## 2018-04-17 DIAGNOSIS — F34.1 DYSTHYMIA: ICD-10-CM

## 2018-04-17 DIAGNOSIS — I25.10 CHRONIC CORONARY ARTERY DISEASE: ICD-10-CM

## 2018-04-17 DIAGNOSIS — G89.29 CHRONIC PAIN OF RIGHT KNEE: ICD-10-CM

## 2018-04-17 DIAGNOSIS — M25.561 CHRONIC PAIN OF RIGHT KNEE: ICD-10-CM

## 2018-04-17 DIAGNOSIS — Z00.00 MEDICARE ANNUAL WELLNESS VISIT, INITIAL: ICD-10-CM

## 2018-04-17 DIAGNOSIS — R26.89 BALANCE DISORDER: ICD-10-CM

## 2018-04-17 LAB
Lab: NORMAL
SPECIMEN STATUS: NORMAL

## 2018-04-17 PROCEDURE — 99213 OFFICE O/P EST LOW 20 MIN: CPT | Performed by: FAMILY MEDICINE

## 2018-04-17 PROCEDURE — G0402 INITIAL PREVENTIVE EXAM: HCPCS | Performed by: FAMILY MEDICINE

## 2018-04-17 PROCEDURE — 90670 PCV13 VACCINE IM: CPT | Performed by: FAMILY MEDICINE

## 2018-04-17 PROCEDURE — G0009 ADMIN PNEUMOCOCCAL VACCINE: HCPCS | Performed by: FAMILY MEDICINE

## 2018-04-17 RX ORDER — RAMIPRIL 10 MG/1
10 CAPSULE ORAL DAILY
Qty: 30 CAPSULE | Refills: 5 | Status: SHIPPED | OUTPATIENT
Start: 2018-04-17 | End: 2018-09-21 | Stop reason: SDUPTHER

## 2018-04-17 RX ORDER — LEVOTHYROXINE SODIUM 0.05 MG/1
50 TABLET ORAL
COMMUNITY
Start: 2017-11-28 | End: 2018-09-21 | Stop reason: SDUPTHER

## 2018-04-17 RX ORDER — BUPROPION HYDROCHLORIDE 300 MG/1
300 TABLET ORAL DAILY
Qty: 30 TABLET | Refills: 11 | Status: SHIPPED | OUTPATIENT
Start: 2018-04-17 | End: 2018-07-28 | Stop reason: SDUPTHER

## 2018-04-17 RX ORDER — NEBIVOLOL HYDROCHLORIDE 5 MG/1
TABLET ORAL
Qty: 15 TABLET | Refills: 0 | Status: SHIPPED | OUTPATIENT
Start: 2018-04-17 | End: 2018-04-17 | Stop reason: SDUPTHER

## 2018-04-17 RX ORDER — NEBIVOLOL 2.5 MG/1
5 TABLET ORAL DAILY
Qty: 30 TABLET | Refills: 11 | Status: SHIPPED | OUTPATIENT
Start: 2018-04-17 | End: 2018-04-18 | Stop reason: SDUPTHER

## 2018-04-17 RX ORDER — ROSUVASTATIN CALCIUM 20 MG/1
20 TABLET, COATED ORAL DAILY
Qty: 30 TABLET | Refills: 5 | Status: SHIPPED | OUTPATIENT
Start: 2018-04-17 | End: 2018-09-21 | Stop reason: SDUPTHER

## 2018-04-17 NOTE — TELEPHONE ENCOUNTER
Lab has been added per [facility].    ----- Message from Millicent Ace MD sent at 4/17/2018 11:43 AM EDT -----  Regarding: Add Hep C to labs

## 2018-04-17 NOTE — TELEPHONE ENCOUNTER
Please, call pt and schedule an apt in order to have further refills. Giving 15 days supply for her Bystolic

## 2018-04-17 NOTE — PROGRESS NOTES
QUICK REFERENCE INFORMATION:  The ABCs of the Annual Wellness Visit    Subsequent Medicare Wellness Visit    HEALTH RISK ASSESSMENT    1955    Recent Hospitalizations:  No hospitalization(s) within the last year..    Current Medical Providers:  Patient Care Team:  Millicent Ace MD as PCP - General (Family Medicine)  Kin Patel MD as Consulting Physician (Orthopedic Surgery)  Bere Beckford MD as Consulting Physician (Cardiology)  Patrizia Page MD as Consulting Physician (Ophthalmology)    Smoking Status:  History   Smoking Status   • Never Smoker   Smokeless Tobacco   • Not on file     Comment: N/A       Alcohol Consumption:  History   Alcohol Use   • Yes     Comment: rarely       Depression Screen:   PHQ-2/PHQ-9 Depression Screening 4/17/2018   Little interest or pleasure in doing things 1   Feeling down, depressed, or hopeless 1   Trouble falling or staying asleep, or sleeping too much 0   Feeling tired or having little energy 3   Poor appetite or overeating 0   Feeling bad about yourself - or that you are a failure or have let yourself or your family down 0   Trouble concentrating on things, such as reading the newspaper or watching television 0   Moving or speaking so slowly that other people could have noticed. Or the opposite - being so fidgety or restless that you have been moving around a lot more than usual 0   Thoughts that you would be better off dead, or of hurting yourself in some way 0   Total Score 5   If you checked off any problems, how difficult have these problems made it for you to do your work, take care of things at home, or get along with other people? Not difficult at all       Health Habits and Functional and Cognitive Screening:  Functional & Cognitive Status 4/17/2018   Do you have difficulty preparing food and eating? No   Do you have difficulty bathing yourself, getting dressed or grooming yourself? No   Do you have difficulty using the toilet? No   Do you have  difficulty moving around from place to place? Yes   Do you have trouble with steps or getting out of a bed or a chair? Yes   In the past year have you fallen or experienced a near fall? Yes   Current Diet Well Balanced Diet   Dental Exam Up to date   Eye Exam Up to date   Exercise (times per week) 0 times per week   Current Exercise Activities Include None   Do you need help using the phone?  No   Are you deaf or do you have serious difficulty hearing?  No   Do you need help with transportation? No   Do you need help shopping? No   Do you need help preparing meals?  No   Do you need help with housework?  No   Do you need help with laundry? No   Do you need help taking your medications? No   Do you need help managing money? No   Do you ever drive or ride in a car without wearing a seat belt? No   Have you felt unusual stress, anger or loneliness in the last month? No   Who do you live with? Alone   If you need help, do you have trouble finding someone available to you? No   Have you been bothered in the last four weeks by sexual problems? No   Do you have difficulty concentrating, remembering or making decisions? No       Health Habits  Current Diet: Well Balanced Diet  Dental Exam: Up to date  Eye Exam: Up to date  Exercise (times per week): 0 times per week  Current Exercise Activities Include: None  Does the patient have evidence of cognitive impairment? No    Aspirin use counseling: Taking ASA appropriately as indicated      Recent Lab Results:  CMP:  Lab Results   Component Value Date    GLU 99 04/13/2018    BUN 17 04/13/2018    CREATININE 0.79 04/13/2018    EGFRIFNONA 73 04/13/2018    EGFRIFAFRI 89 04/13/2018    BCR 21.5 04/13/2018     04/13/2018    K 4.7 04/13/2018    CO2 26.3 04/13/2018    CALCIUM 9.3 04/13/2018    PROTENTOTREF 6.7 04/13/2018    ALBUMIN 4.30 04/13/2018    LABGLOBREF 2.4 04/13/2018    LABIL2 1.8 04/13/2018    BILITOT 0.4 04/13/2018    ALKPHOS 70 04/13/2018    AST 21 04/13/2018    ALT 24  04/13/2018     Lipid Panel:  Lab Results   Component Value Date    TRIG 108 04/13/2018    HDL 57 04/13/2018    VLDL 21.6 04/13/2018    LDLHDL 1.46 04/13/2018     Lab Results   Component Value Date    LDL 83 04/13/2018      HbA1c:       Visual Acuity:  No exam data present    Age-appropriate Screening Schedule:  Refer to the list below for future screening recommendations based on patient's age, sex and/or medical conditions. Orders for these recommended tests are listed in the plan section. The patient has been provided with a written plan.    Health Maintenance   Topic Date Due   • TDAP/TD VACCINES (1 - Tdap) 01/24/1972   • ZOSTER VACCINE  01/28/2016   • DXA SCAN  01/20/2017   • PNEUMOCOCCAL VACCINES (65+ LOW/MEDIUM RISK) (1 of 2 - PCV13) 01/24/2018   • INFLUENZA VACCINE  08/01/2018   • LIPID PANEL  04/13/2019   • MAMMOGRAM  03/15/2020   • COLONOSCOPY  12/19/2026        Subjective   History of Present Illness    Barbara Tran is a 63 y.o. female who presents for an Subsequent Wellness Visit.  HPI    She is lightheaded in the am sometimes, she continues to have knee problems on the left. She has pain there with no relief from advil, tylenol or injections -- nothing has been helpful. Not bone on bone. She thinks this may be part of her balance issues. She is considering seeing another orthopedist. Seeing Dr. Patel.     She did have some concerns that the crestor might be a contributor to her knee pain. Discussed myalgias vs arthralgias.     The following portions of the patient's history were reviewed and updated as appropriate: allergies, current medications, past medical history, past social history, past surgical history and problem list.    Outpatient Medications Prior to Visit   Medication Sig Dispense Refill   • aspirin 81 MG tablet Take 81 mg by mouth Daily.     • calcium carbonate (OS-SHAHNAZ) 600 MG tablet Take 600 mg by mouth 2 (Two) Times a Day With Meals.     • furosemide (LASIX) 40 MG tablet Take 1 tablet  by mouth Daily. 30 tablet 5   • Omega-3 Fatty Acids (FISH OIL) 1200 MG capsule capsule Take 2 capsules by mouth Daily.     • potassium chloride ER (K-TAB) 20 MEQ tablet controlled-release ER tablet Take 1 tablet by mouth Daily. 30 tablet 11   • saccharomyces boulardii (FLORASTOR) 250 MG capsule Take 1 capsule by mouth 2 (Two) Times a Day. 60 capsule 1   • vitamin B-12 (CYANOCOBALAMIN) 1000 MCG tablet Take 1,000 mcg by mouth Daily.     • zolpidem (AMBIEN) 10 MG tablet Take 0.5 tablets by mouth At Night As Needed for Sleep. 30 tablet 1   • buPROPion XL (WELLBUTRIN XL) 300 MG 24 hr tablet Take 1 tablet by mouth Daily. 30 tablet 11   • buPROPion XL (WELLBUTRIN XL) 300 MG 24 hr tablet TAKE 1 TABLET BY MOUTH EVERY MORNING 90 tablet 1   • BYSTOLIC 5 MG tablet TAKE 1 TABLET BY MOUTH EVERY DAY 15 tablet 0   • ramipril (ALTACE) 10 MG capsule TAKE ONE CAPSULE BY MOUTH EVERY DAY 30 capsule 1   • rosuvastatin (CRESTOR) 20 MG tablet Take 1 tablet by mouth Daily. 90 tablet 1   • levothyroxine (SYNTHROID, LEVOTHROID) 50 MCG tablet Take 50 mcg by mouth Daily.       No facility-administered medications prior to visit.        Patient Active Problem List   Diagnosis   • Benign essential HTN   • Cervical pain   • Dysthymia   • Pedal edema   • HLD (hyperlipidemia)   • Simple obesity   • Goiter, non-toxic   • Chronic pain of right knee   • Chronic coronary artery disease   • Pancolitis   • Hypothyroidism   • History of sepsis   • Primary insomnia   • Balance problems       Advance Care Planning:  has an advance directive - a copy HAS NOT been provided. Have asked the patient to send this to us to add to record.    Identification of Risk Factors:  Risk factors include: weight , inactivity and polypharmacy.    Review of Systems   Constitutional: Negative for activity change, appetite change, chills, fatigue, fever and unexpected weight change.   HENT: Negative for congestion, ear pain, hearing loss, nosebleeds, rhinorrhea and sore throat.   "  Eyes: Negative for pain, redness and visual disturbance.   Respiratory: Negative for cough, shortness of breath and wheezing.    Cardiovascular: Negative for chest pain, palpitations and leg swelling.   Gastrointestinal: Negative for abdominal pain, blood in stool, constipation, diarrhea, nausea and vomiting.   Endocrine: Negative for cold intolerance and heat intolerance.   Genitourinary: Negative for difficulty urinating, dysuria, frequency, hematuria, pelvic pain, urgency and vaginal discharge.   Musculoskeletal: Positive for arthralgias, gait problem and joint swelling. Negative for back pain.   Skin: Negative for rash and wound.   Neurological: Positive for dizziness (Morning lightheadedness) and headaches. Negative for weakness and numbness.   Hematological: Does not bruise/bleed easily.   Psychiatric/Behavioral: Negative for dysphoric mood, sleep disturbance and suicidal ideas. The patient is not nervous/anxious.        Compared to one year ago, the patient feels her physical health is worse with her knee. She tried to use a bicycle and that made her knee more painful.  Compared to one year ago, the patient feels her mental health is the same.    Objective     Physical Exam   Constitutional: She is oriented to person, place, and time. Vital signs are normal. She appears well-developed and well-nourished. No distress.   HENT:   Head: Normocephalic.   Cardiovascular: Normal rate, regular rhythm and normal heart sounds.    Pulmonary/Chest: Effort normal and breath sounds normal.   Neurological: She is alert and oriented to person, place, and time. Gait normal.   Psychiatric: She has a normal mood and affect. Her behavior is normal. Judgment and thought content normal.   Vitals reviewed.      Vitals:    04/17/18 1046   BP: 114/62   Pulse: 62   Temp: 97.9 °F (36.6 °C)   Height: 167.6 cm (65.98\")       There is no height or weight on file to calculate BMI.  The BMI is above average; BMI management plan is " completed.Patient's There is no height or weight on file to calculate BMI. BMI is above normal parameters. Follow-up plan includes:  discussed difficulty with knee and exercising. Recommend a second opinion.        Assessment/Plan   Patient Self-Management and Personalized Health Advice  The patient has been provided with information about: exercise and preventive services including:   · Advance directive, Pneumococcal vaccine .    Visit Diagnoses:  Problem List Items Addressed This Visit        Cardiovascular and Mediastinum    Benign essential HTN - Primary    Current Assessment & Plan     [facility] 4/18/2018  BP is controlled well. She will recheck in six months. She will reduce her bystolic to 5 mg daily to see if this helps the morning lightheadedness         Relevant Medications    nebivolol (BYSTOLIC) 2.5 MG tablet    ramipril (ALTACE) 10 MG capsule    Chronic coronary artery disease    Overview     Cardiac Catheterization 1/21/2015 Patent proximal LAD stent from 2005 with origin of septal  at LAD stent around 80% which is unchanged from prior cardiac cath, normal LV systolic function     Drug Eluting Stent Placement 3/22/2005 3.0 x 13 mm Cypher stent to distal LAD with adjuvant balloon angioplasty to the second septal  @ [facility]              Relevant Medications    nebivolol (BYSTOLIC) 2.5 MG tablet    HLD (hyperlipidemia)    Current Assessment & Plan     [facility] 4/18/2018 Patient has been on hyperlipidemia medications for some time.  She is doing fine with that and labs are stable            Relevant Medications    rosuvastatin (CRESTOR) 20 MG tablet       Musculoskeletal and Integument    Chronic pain of right knee    Current Assessment & Plan     [facility] 4/18/2018  Recommend second opinion re this            Other    Balance problems    Current Assessment & Plan     [facility] 4/18/2018  Would like to try some PT for this         Dysthymia    Relevant Medications    buPROPion XL (WELLBUTRIN  XL) 300 MG 24 hr tablet      Other Visit Diagnoses     Medicare annual wellness visit, initial        History of coronary artery stent placement        Relevant Medications    rosuvastatin (CRESTOR) 20 MG tablet    Balance disorder        Relevant Orders    Ambulatory Referral to Physical Therapy Evaluate and treat, Vestibular          Orders Placed This Encounter   Procedures   • Pneumococcal Conjugate Vaccine 13-Valent All   • Ambulatory Referral to Physical Therapy Evaluate and treat, Vestibular     Referral Priority:   Routine     Referral Type:   Therapy     Referral Reason:   Specialty Services Required     Requested Specialty:   Physical Therapy     Number of Visits Requested:   1       Outpatient Encounter Prescriptions as of 4/17/2018   Medication Sig Dispense Refill   • aspirin 81 MG tablet Take 81 mg by mouth Daily.     • buPROPion XL (WELLBUTRIN XL) 300 MG 24 hr tablet Take 1 tablet by mouth Daily. 30 tablet 11   • calcium carbonate (OS-SHAHNAZ) 600 MG tablet Take 600 mg by mouth 2 (Two) Times a Day With Meals.     • furosemide (LASIX) 40 MG tablet Take 1 tablet by mouth Daily. 30 tablet 5   • levothyroxine (SYNTHROID) 50 MCG tablet Take 50 mcg by mouth.     • nebivolol (BYSTOLIC) 2.5 MG tablet Take 2 tablets by mouth Daily. 30 tablet 11   • Omega-3 Fatty Acids (FISH OIL) 1200 MG capsule capsule Take 2 capsules by mouth Daily.     • potassium chloride ER (K-TAB) 20 MEQ tablet controlled-release ER tablet Take 1 tablet by mouth Daily. 30 tablet 11   • ramipril (ALTACE) 10 MG capsule Take 1 capsule by mouth Daily. 30 capsule 5   • rosuvastatin (CRESTOR) 20 MG tablet Take 1 tablet by mouth Daily. 30 tablet 5   • saccharomyces boulardii (FLORASTOR) 250 MG capsule Take 1 capsule by mouth 2 (Two) Times a Day. 60 capsule 1   • vitamin B-12 (CYANOCOBALAMIN) 1000 MCG tablet Take 1,000 mcg by mouth Daily.     • zolpidem (AMBIEN) 10 MG tablet Take 0.5 tablets by mouth At Night As Needed for Sleep. 30 tablet 1   •  [DISCONTINUED] buPROPion XL (WELLBUTRIN XL) 300 MG 24 hr tablet Take 1 tablet by mouth Daily. 30 tablet 11   • [DISCONTINUED] buPROPion XL (WELLBUTRIN XL) 300 MG 24 hr tablet TAKE 1 TABLET BY MOUTH EVERY MORNING 90 tablet 1   • [DISCONTINUED] BYSTOLIC 5 MG tablet TAKE 1 TABLET BY MOUTH EVERY DAY 15 tablet 0   • [DISCONTINUED] ramipril (ALTACE) 10 MG capsule TAKE ONE CAPSULE BY MOUTH EVERY DAY 30 capsule 1   • [DISCONTINUED] rosuvastatin (CRESTOR) 20 MG tablet Take 1 tablet by mouth Daily. 90 tablet 1   • [DISCONTINUED] BYSTOLIC 5 MG tablet TAKE 1 TABLET BY MOUTH EVERY DAY 30 tablet 0   • [DISCONTINUED] levothyroxine (SYNTHROID, LEVOTHROID) 50 MCG tablet Take 50 mcg by mouth Daily.       No facility-administered encounter medications on file as of 4/17/2018.        Reviewed use of high risk medication in the elderly: yes  Reviewed for potential of harmful drug interactions in the elderly: yes    Follow Up:  No Follow-up on file.     An After Visit Summary and PPPS with all of these plans were given to the patient.

## 2018-04-18 ENCOUNTER — TELEPHONE (OUTPATIENT)
Dept: FAMILY MEDICINE CLINIC | Facility: CLINIC | Age: 63
End: 2018-04-18

## 2018-04-18 DIAGNOSIS — I25.10 CHRONIC CORONARY ARTERY DISEASE: ICD-10-CM

## 2018-04-18 LAB — HCV AB S/CO SERPL IA: <0.1 S/CO RATIO (ref 0–0.9)

## 2018-04-18 RX ORDER — NEBIVOLOL 5 MG/1
5 TABLET ORAL DAILY
Qty: 30 TABLET | Refills: 5 | Status: SHIPPED | OUTPATIENT
Start: 2018-04-18 | End: 2018-05-16 | Stop reason: DRUGHIGH

## 2018-04-18 RX ORDER — NEBIVOLOL HYDROCHLORIDE 5 MG/1
TABLET ORAL
Qty: 30 TABLET | Refills: 0 | OUTPATIENT
Start: 2018-04-18

## 2018-04-18 NOTE — TELEPHONE ENCOUNTER
Lmtc,     Patient left a VM stating that her medications were entered in as generic. She asked for a call back regarding this.

## 2018-04-18 NOTE — ASSESSMENT & PLAN NOTE
Aileen 4/18/2018 Patient has been on hyperlipidemia medications for some time.  She is doing fine with that and labs are stable

## 2018-04-18 NOTE — ASSESSMENT & PLAN NOTE
Aileen 4/18/2018  BP is controlled well. She will recheck in six months. She will reduce her bystolic to 5 mg daily to see if this helps the morning lightheadedness

## 2018-05-15 DIAGNOSIS — I25.10 CHRONIC CORONARY ARTERY DISEASE: ICD-10-CM

## 2018-05-16 ENCOUNTER — TELEPHONE (OUTPATIENT)
Dept: FAMILY MEDICINE CLINIC | Facility: CLINIC | Age: 63
End: 2018-05-16

## 2018-05-16 RX ORDER — NEBIVOLOL 2.5 MG/1
2.5 TABLET ORAL DAILY
Qty: 30 TABLET | Refills: 0 | Status: SHIPPED | OUTPATIENT
Start: 2018-05-16 | End: 2018-05-25 | Stop reason: DRUGHIGH

## 2018-05-16 RX ORDER — NEBIVOLOL HYDROCHLORIDE 5 MG/1
TABLET ORAL
Qty: 90 TABLET | Refills: 0 | Status: SHIPPED | OUTPATIENT
Start: 2018-05-16 | End: 2018-05-16 | Stop reason: DRUGHIGH

## 2018-05-16 NOTE — TELEPHONE ENCOUNTER
Spoke with patient. She is now stating that shes already been cutting in half, the pill crumbles and its a waste of money. Patient states that even cutting in half does nothing. She still gets dizzy. Per patient, either change the medicine or stop messing with the mgs. Please advise.     ----- Message from Millicent Ace MD sent at 5/16/2018 10:31 AM EDT -----  She is correct but I think my note is in error. We were going to 1/2 the dose, so if she was taking 5 mg daily we need to reduce to 2.5mg.   ----- Message -----  From: Surendra Harper MA  Sent: 5/16/2018   9:17 AM  To: Millicent Ace MD        ----- Message -----  From: Dia Nur  Sent: 5/16/2018   8:33 AM  To: Surendra Harper MA    Patient called has questions about Bystolic prescription. Sent to wrong pharmacy, chart said 5mg but patient states she thought Dr. Ace was going to lower dosage.  Call back number [phone]

## 2018-05-23 DIAGNOSIS — F51.01 PRIMARY INSOMNIA: ICD-10-CM

## 2018-05-24 ENCOUNTER — TELEPHONE (OUTPATIENT)
Dept: FAMILY MEDICINE CLINIC | Facility: CLINIC | Age: 63
End: 2018-05-24

## 2018-05-24 RX ORDER — ZOLPIDEM TARTRATE 10 MG/1
TABLET ORAL
Qty: 30 TABLET | Refills: 1 | Status: SHIPPED | OUTPATIENT
Start: 2018-05-24 | End: 2018-07-23 | Stop reason: SDUPTHER

## 2018-05-24 NOTE — TELEPHONE ENCOUNTER
----- Message from Dia Nur sent at 5/24/2018 11:28 AM EDT -----  Patient of Dr. Ace was to try Bystolic 2.5mg for a couple of weeks, to see if it helped the dizziness, patient noticed no change so she is wanting to go back to the 5mg, but prescription was called in for the 2.5mg, asking to have 5mg called to [facility] [phone] call back number for patient [phone]

## 2018-05-25 RX ORDER — NEBIVOLOL 5 MG/1
5 TABLET ORAL DAILY
Qty: 30 TABLET | Refills: 2 | Status: SHIPPED | OUTPATIENT
Start: 2018-05-25 | End: 2018-08-07 | Stop reason: SDUPTHER

## 2018-07-23 DIAGNOSIS — F51.01 PRIMARY INSOMNIA: ICD-10-CM

## 2018-07-23 RX ORDER — ZOLPIDEM TARTRATE 10 MG/1
TABLET ORAL
Qty: 30 TABLET | Refills: 1 | Status: SHIPPED | OUTPATIENT
Start: 2018-07-23 | End: 2018-09-27 | Stop reason: SDUPTHER

## 2018-07-28 DIAGNOSIS — F34.1 DYSTHYMIA: ICD-10-CM

## 2018-07-30 RX ORDER — BUPROPION HYDROCHLORIDE 300 MG/1
TABLET ORAL
Qty: 30 TABLET | Refills: 0 | Status: SHIPPED | OUTPATIENT
Start: 2018-07-30 | End: 2018-09-04 | Stop reason: SDUPTHER

## 2018-08-07 RX ORDER — NEBIVOLOL HYDROCHLORIDE 5 MG/1
TABLET ORAL
Qty: 30 TABLET | Refills: 1 | Status: SHIPPED | OUTPATIENT
Start: 2018-08-07 | End: 2018-09-21 | Stop reason: SDUPTHER

## 2018-08-10 ENCOUNTER — TELEPHONE (OUTPATIENT)
Dept: FAMILY MEDICINE CLINIC | Facility: CLINIC | Age: 63
End: 2018-08-10

## 2018-08-10 NOTE — TELEPHONE ENCOUNTER
Patient states that she is having tingling that goes down the back of her left leg.     She also has tingling in the finger tips of her right hand. This started today.     She also states that she has SOB that is present when she stands and she has a hx of cardiac problems. She was advised to go to the ER.

## 2018-08-15 DIAGNOSIS — R60.0 PEDAL EDEMA: ICD-10-CM

## 2018-08-15 RX ORDER — POTASSIUM CHLORIDE 1500 MG/1
20 TABLET, FILM COATED, EXTENDED RELEASE ORAL DAILY
Qty: 30 TABLET | Refills: 11 | Status: SHIPPED | OUTPATIENT
Start: 2018-08-15 | End: 2018-09-21

## 2018-08-30 PROBLEM — I50.32 CHRONIC DIASTOLIC CONGESTIVE HEART FAILURE: Status: ACTIVE | Noted: 2018-08-30

## 2018-09-04 DIAGNOSIS — F34.1 DYSTHYMIA: ICD-10-CM

## 2018-09-05 RX ORDER — BUPROPION HYDROCHLORIDE 300 MG/1
TABLET ORAL
Qty: 30 TABLET | Refills: 0 | Status: SHIPPED | OUTPATIENT
Start: 2018-09-05 | End: 2018-09-21 | Stop reason: SDUPTHER

## 2018-09-21 ENCOUNTER — OFFICE VISIT (OUTPATIENT)
Dept: FAMILY MEDICINE CLINIC | Facility: CLINIC | Age: 63
End: 2018-09-21

## 2018-09-21 VITALS
DIASTOLIC BLOOD PRESSURE: 66 MMHG | OXYGEN SATURATION: 99 % | WEIGHT: 201 LBS | HEART RATE: 61 BPM | HEIGHT: 66 IN | BODY MASS INDEX: 32.3 KG/M2 | RESPIRATION RATE: 16 BRPM | SYSTOLIC BLOOD PRESSURE: 112 MMHG

## 2018-09-21 DIAGNOSIS — R60.0 PEDAL EDEMA: ICD-10-CM

## 2018-09-21 DIAGNOSIS — I50.32 CHRONIC DIASTOLIC CONGESTIVE HEART FAILURE (HCC): ICD-10-CM

## 2018-09-21 DIAGNOSIS — F34.1 DYSTHYMIA: ICD-10-CM

## 2018-09-21 DIAGNOSIS — I10 BENIGN ESSENTIAL HTN: Primary | ICD-10-CM

## 2018-09-21 DIAGNOSIS — Z95.5 HISTORY OF CORONARY ARTERY STENT PLACEMENT: ICD-10-CM

## 2018-09-21 DIAGNOSIS — G47.62 CRAMP IN LOWER EXTREMITY ASSOCIATED WITH SLEEP: ICD-10-CM

## 2018-09-21 PROBLEM — I07.1 TRICUSPID VALVE REGURGITATION: Status: ACTIVE | Noted: 2018-09-21

## 2018-09-21 PROBLEM — I37.1 PULMONIC VALVE REGURGITATION: Status: ACTIVE | Noted: 2018-09-21

## 2018-09-21 PROCEDURE — 90662 IIV NO PRSV INCREASED AG IM: CPT | Performed by: FAMILY MEDICINE

## 2018-09-21 PROCEDURE — 99213 OFFICE O/P EST LOW 20 MIN: CPT | Performed by: FAMILY MEDICINE

## 2018-09-21 PROCEDURE — G0008 ADMIN INFLUENZA VIRUS VAC: HCPCS | Performed by: FAMILY MEDICINE

## 2018-09-21 RX ORDER — RAMIPRIL 2.5 MG/1
2.5 CAPSULE ORAL DAILY
Qty: 30 CAPSULE | Refills: 5 | Status: SHIPPED | OUTPATIENT
Start: 2018-09-21 | End: 2019-03-15 | Stop reason: SDUPTHER

## 2018-09-21 RX ORDER — LEVOTHYROXINE SODIUM 0.05 MG/1
50 TABLET ORAL DAILY
Qty: 30 TABLET | Refills: 4 | Status: CANCELLED | OUTPATIENT
Start: 2018-09-21

## 2018-09-21 RX ORDER — FUROSEMIDE 40 MG/1
40 TABLET ORAL DAILY
Qty: 30 TABLET | Refills: 5 | Status: CANCELLED | OUTPATIENT
Start: 2018-09-21

## 2018-09-21 RX ORDER — BUPROPION HYDROCHLORIDE 300 MG/1
300 TABLET ORAL DAILY
Qty: 30 TABLET | Refills: 12 | Status: SHIPPED | OUTPATIENT
Start: 2018-09-21 | End: 2019-03-15 | Stop reason: SDUPTHER

## 2018-09-21 RX ORDER — LEVOTHYROXINE SODIUM 0.05 MG/1
50 TABLET ORAL DAILY
Qty: 30 TABLET | Refills: 3 | Status: CANCELLED | OUTPATIENT
Start: 2018-09-21

## 2018-09-21 RX ORDER — ROSUVASTATIN CALCIUM 20 MG/1
20 TABLET, COATED ORAL DAILY
Qty: 30 TABLET | Refills: 5 | Status: SHIPPED | OUTPATIENT
Start: 2018-09-21 | End: 2019-01-16 | Stop reason: SDUPTHER

## 2018-09-21 RX ORDER — NEBIVOLOL 5 MG/1
5 TABLET ORAL DAILY
Qty: 30 TABLET | Refills: 5 | Status: SHIPPED | OUTPATIENT
Start: 2018-09-21 | End: 2019-12-16 | Stop reason: SDUPTHER

## 2018-09-21 RX ORDER — SPIRONOLACTONE 25 MG/1
25 TABLET ORAL DAILY
COMMUNITY
End: 2018-09-26 | Stop reason: SDUPTHER

## 2018-09-21 RX ORDER — LEVOTHYROXINE SODIUM 0.05 MG/1
50 TABLET ORAL DAILY
COMMUNITY
End: 2018-09-26 | Stop reason: SDUPTHER

## 2018-09-21 NOTE — ASSESSMENT & PLAN NOTE
Aileen 9/21/2018  Patient is undergoing education with her cardiologist and being actively monitored

## 2018-09-21 NOTE — ASSESSMENT & PLAN NOTE
Aileen 9/21/2018  BP is controlled well. She will recheck in six months. She will continue present meds.

## 2018-09-21 NOTE — PROGRESS NOTES
Problem List Items Addressed This Visit        Cardiovascular and Mediastinum    Benign essential HTN - Primary    Current Assessment & Plan     [facility] 9/21/2018  BP is controlled well. She will recheck in six months. She will continue present meds.            Relevant Medications    spironolactone (ALDACTONE) 25 MG tablet    nebivolol (BYSTOLIC) 5 MG tablet    ramipril (ALTACE) 2.5 MG capsule    Chronic diastolic congestive heart failure (CMS/HCC)    Overview     OVERVIEW:  Noted 8/30/2018           Current Assessment & Plan     [facility] 9/21/2018  Patient is undergoing education with her cardiologist and being actively monitored         Relevant Medications    nebivolol (BYSTOLIC) 5 MG tablet       Other    Dysthymia    Current Assessment & Plan     [facility] 9/21/2018  We talked about trying a two week drug holiday with the welbutrin if it is not working as well.          Relevant Medications    buPROPion XL (WELLBUTRIN XL) 300 MG 24 hr tablet    Pedal edema    Current Assessment & Plan     9/21/2018 Better since on new medications for diastolic failure but still present.            Other Visit Diagnoses     History of coronary artery stent placement        Relevant Medications    rosuvastatin (CRESTOR) 20 MG tablet    Cramp in lower extremity associated with sleep        Discussed stretching before sleep             Return in about 6 months (around 3/21/2019).    Barbara Tran is a 63 y.o. female being seen in our office today for Hypertension                 She  reports that she has never smoked. She does not have any smokeless tobacco history on file. She reports that she drinks alcohol. She reports that she does not use drugs.             HPI She was in the hospital with diastolic CHF and is a heart failure education area. She is doing better but still with a lot of fluid. She is having some cramping now in her muscles and this began over the last 2-3 weeks prob related to her medications. Had it before  but worse lately.               The following portions of the patient's history were reviewed and updated as appropriate:PMHroutine: Social history , Past Medical History, Allergies, Current Medications, Active Problem List and Health Maintenance            Review of Systems   Constitutional: Positive for fatigue. Negative for activity change, appetite change, chills, fever and unexpected weight change.   HENT: Negative for congestion, ear pain, hearing loss, nosebleeds, rhinorrhea and sore throat.    Eyes: Negative for pain, redness and visual disturbance.   Respiratory: Positive for shortness of breath. Negative for cough and wheezing.    Cardiovascular: Negative for chest pain, palpitations and leg swelling.   Gastrointestinal: Negative for abdominal pain, blood in stool, constipation, diarrhea, nausea and vomiting.   Endocrine: Negative for cold intolerance and heat intolerance.   Genitourinary: Negative for difficulty urinating, dysuria, frequency, hematuria, pelvic pain, urgency and vaginal discharge.   Musculoskeletal: Positive for joint swelling. Negative for arthralgias and back pain.   Skin: Negative for rash and wound.   Neurological: Negative for dizziness, weakness, numbness and headaches.   Hematological: Does not bruise/bleed easily.   Psychiatric/Behavioral: Negative for dysphoric mood, sleep disturbance and suicidal ideas. The patient is not nervous/anxious.                  BP Readings from Last 1 Encounters:   09/21/18 112/66     Wt Readings from Last 3 Encounters:   09/21/18 91.2 kg (201 lb)   09/08/17 92.1 kg (203 lb)   05/04/17 95.7 kg (211 lb)   Body mass index is 32.44 kg/m².                 Physical Exam   Constitutional: She is oriented to person, place, and time. Vital signs are normal. She appears well-developed and well-nourished. No distress.   HENT:   Head: Normocephalic.   Cardiovascular: Normal rate, regular rhythm and normal heart sounds.    Pulmonary/Chest: Effort normal and breath  sounds normal.   Neurological: She is alert and oriented to person, place, and time. Gait normal.   Psychiatric: She has a normal mood and affect. Her behavior is normal. Judgment and thought content normal.   Vitals reviewed.

## 2018-09-21 NOTE — ASSESSMENT & PLAN NOTE
Aileen 9/21/2018  We talked about trying a two week drug holiday with the welbutrin if it is not working as well.

## 2018-09-26 DIAGNOSIS — F51.01 PRIMARY INSOMNIA: ICD-10-CM

## 2018-09-26 RX ORDER — LEVOTHYROXINE SODIUM 50 MCG
50 TABLET ORAL DAILY
Qty: 30 TABLET | Refills: 5 | Status: SHIPPED | OUTPATIENT
Start: 2018-09-26 | End: 2019-03-15 | Stop reason: SDUPTHER

## 2018-09-26 RX ORDER — SPIRONOLACTONE 25 MG/1
25 TABLET ORAL DAILY
Qty: 30 TABLET | Refills: 5 | Status: SHIPPED | OUTPATIENT
Start: 2018-09-26 | End: 2018-09-26 | Stop reason: SDUPTHER

## 2018-09-26 RX ORDER — LEVOTHYROXINE SODIUM 50 MCG
50 TABLET ORAL DAILY
Qty: 30 TABLET | Refills: 5 | Status: SHIPPED | OUTPATIENT
Start: 2018-09-26 | End: 2018-09-26 | Stop reason: SDUPTHER

## 2018-09-26 RX ORDER — SPIRONOLACTONE 25 MG/1
25 TABLET ORAL DAILY
Qty: 30 TABLET | Refills: 5 | Status: SHIPPED | OUTPATIENT
Start: 2018-09-26 | End: 2019-12-14 | Stop reason: SDUPTHER

## 2018-09-26 RX ORDER — ZOLPIDEM TARTRATE 10 MG/1
TABLET ORAL
Qty: 30 TABLET | Refills: 1 | Status: CANCELLED
Start: 2018-09-26

## 2018-09-27 DIAGNOSIS — F51.01 PRIMARY INSOMNIA: ICD-10-CM

## 2018-09-27 RX ORDER — ZOLPIDEM TARTRATE 10 MG/1
TABLET ORAL
Qty: 30 TABLET | Refills: 5 | Status: SHIPPED | OUTPATIENT
Start: 2018-09-27 | End: 2019-04-21 | Stop reason: SDUPTHER

## 2019-01-16 DIAGNOSIS — Z95.5 HISTORY OF CORONARY ARTERY STENT PLACEMENT: ICD-10-CM

## 2019-01-16 RX ORDER — ROSUVASTATIN CALCIUM 20 MG/1
20 TABLET, COATED ORAL DAILY
Qty: 30 TABLET | Refills: 2 | Status: SHIPPED | OUTPATIENT
Start: 2019-01-16 | End: 2019-03-15 | Stop reason: SDUPTHER

## 2019-03-15 ENCOUNTER — OFFICE VISIT (OUTPATIENT)
Dept: FAMILY MEDICINE CLINIC | Facility: CLINIC | Age: 64
End: 2019-03-15

## 2019-03-15 VITALS
RESPIRATION RATE: 16 BRPM | DIASTOLIC BLOOD PRESSURE: 70 MMHG | HEART RATE: 60 BPM | SYSTOLIC BLOOD PRESSURE: 114 MMHG | OXYGEN SATURATION: 99 % | WEIGHT: 210 LBS | HEIGHT: 66 IN | BODY MASS INDEX: 33.75 KG/M2

## 2019-03-15 DIAGNOSIS — M25.50 MULTIPLE JOINT PAIN: ICD-10-CM

## 2019-03-15 DIAGNOSIS — E03.9 HYPOTHYROIDISM, UNSPECIFIED TYPE: ICD-10-CM

## 2019-03-15 DIAGNOSIS — I10 BENIGN ESSENTIAL HTN: Primary | ICD-10-CM

## 2019-03-15 DIAGNOSIS — I50.32 CHRONIC DIASTOLIC CONGESTIVE HEART FAILURE (HCC): ICD-10-CM

## 2019-03-15 DIAGNOSIS — F34.1 DYSTHYMIA: ICD-10-CM

## 2019-03-15 DIAGNOSIS — Z95.5 HISTORY OF CORONARY ARTERY STENT PLACEMENT: ICD-10-CM

## 2019-03-15 DIAGNOSIS — E78.2 MIXED HYPERLIPIDEMIA: ICD-10-CM

## 2019-03-15 PROCEDURE — 99214 OFFICE O/P EST MOD 30 MIN: CPT | Performed by: FAMILY MEDICINE

## 2019-03-15 RX ORDER — EZETIMIBE 10 MG/1
10 TABLET ORAL DAILY
COMMUNITY
Start: 2019-03-06 | End: 2019-09-16

## 2019-03-15 RX ORDER — LEVOTHYROXINE SODIUM 50 MCG
50 TABLET ORAL DAILY
Qty: 30 TABLET | Refills: 5 | Status: SHIPPED | OUTPATIENT
Start: 2019-03-15 | End: 2019-12-16 | Stop reason: SDUPTHER

## 2019-03-15 RX ORDER — BUPROPION HYDROCHLORIDE 300 MG/1
300 TABLET ORAL DAILY
Qty: 30 TABLET | Refills: 12 | Status: SHIPPED | OUTPATIENT
Start: 2019-03-15 | End: 2019-09-10 | Stop reason: SDUPTHER

## 2019-03-15 RX ORDER — ROSUVASTATIN CALCIUM 20 MG/1
20 TABLET, COATED ORAL DAILY
Qty: 30 TABLET | Refills: 2 | Status: SHIPPED | OUTPATIENT
Start: 2019-03-15 | End: 2019-12-16 | Stop reason: SDUPTHER

## 2019-03-15 RX ORDER — RAMIPRIL 2.5 MG/1
2.5 CAPSULE ORAL DAILY
Qty: 30 CAPSULE | Refills: 5 | Status: SHIPPED | OUTPATIENT
Start: 2019-03-15 | End: 2019-12-16 | Stop reason: SDUPTHER

## 2019-03-15 NOTE — PROGRESS NOTES
Problem List Items Addressed This Visit        Cardiovascular and Mediastinum    Benign essential HTN - Primary    Current Assessment & Plan     [facility] 3/15/2019  BP is controlled well. She will recheck in six months. She will continue present meds.            Relevant Medications    ramipril (ALTACE) 2.5 MG capsule    Chronic diastolic congestive heart failure (CMS/HCC)    Overview     OVERVIEW:  Noted 8/30/2018           Current Assessment & Plan     [facility]  She is followed by [facility] for this.          HLD (hyperlipidemia)    Current Assessment & Plan     [facility] 3/15/2019  Labs done in December. She stopped the crestor in Dec. Because of things she had read on line. But she is taking the Zetia. She was having some leg cramps. We discussed the difference in effectiveness between the two medications and I recommended staying on the crestor.          Relevant Medications    ezetimibe (ZETIA) 10 MG tablet       Endocrine    Hypothyroidism    Current Assessment & Plan     [facility] 3/15/2019  She had normal TSH in Dec at [facility]         Relevant Medications    SYNTHROID 50 MCG tablet       Other    Dysthymia    Relevant Medications    buPROPion XL (WELLBUTRIN XL) 300 MG 24 hr tablet      Other Visit Diagnoses     Multiple joint pain        Relevant Orders    Ambulatory Referral to Orthopedic Surgery         Return in about 6 months (around 9/15/2019) for yearly Medicare Exam.  Patient Instructions   Consider resuming Crestor.     Barbara Tran is a 64 y.o. female being seen in our office today for Hypertension                 She  reports that  has never smoked. she has never used smokeless tobacco. She reports that she drinks alcohol. She reports that she does not use drugs.             HPI When she stands in the showroom or walks for a period of time she says the entire leg (all the way around) goes numb. Comes and goes but comes on slowly. She has never fallen with it. She is careful  though. Denies back pain. If she goes up and down steps she feels a band sensation with either leg, it feels like it is catching in her groin. She was seeing an ortho a long time ago for her left knee. (probably five years ago).              The following portions of the patient's history were reviewed and updated as appropriate:PMHroutine: Social history , Allergies, Current Medications, Active Problem List and Health Maintenance            Review of Systems   Constitutional: Positive for fatigue. Negative for activity change, appetite change, chills, fever and unexpected weight change.   HENT: Negative for congestion, ear pain, hearing loss, nosebleeds, rhinorrhea and sore throat.    Eyes: Positive for discharge and itching. Negative for pain, redness and visual disturbance.   Respiratory: Negative for cough, shortness of breath and wheezing.    Cardiovascular: Positive for leg swelling. Negative for chest pain and palpitations.   Gastrointestinal: Negative for abdominal pain, blood in stool, constipation, diarrhea, nausea and vomiting.   Endocrine: Negative for cold intolerance and heat intolerance.   Genitourinary: Negative for difficulty urinating, dysuria, frequency, hematuria, pelvic pain, urgency and vaginal discharge.   Musculoskeletal: Positive for arthralgias and joint swelling. Negative for back pain.   Skin: Negative for rash and wound.   Neurological: Negative for dizziness, weakness, numbness and headaches.   Hematological: Does not bruise/bleed easily.   Psychiatric/Behavioral: Negative for dysphoric mood, sleep disturbance and suicidal ideas. The patient is nervous/anxious.                 BP Readings from Last 1 Encounters:   03/15/19 114/70     Wt Readings from Last 3 Encounters:   03/15/19 95.3 kg (210 lb)   09/21/18 91.2 kg (201 lb)   09/08/17 92.1 kg (203 lb)   Body mass index is 33.89 kg/m².             Physical Exam   Constitutional: She is oriented to person, place, and time. Vital signs are  normal. She appears well-developed and well-nourished. No distress.   HENT:   Head: Normocephalic.   Cardiovascular: Normal rate, regular rhythm and normal heart sounds.   Pulmonary/Chest: Effort normal and breath sounds normal.   Neurological: She is alert and oriented to person, place, and time. Gait normal.   Psychiatric: She has a normal mood and affect. Her behavior is normal. Judgment and thought content normal.   Vitals reviewed.        No visits with results within 1 Month(s) from this visit.   Latest known visit with results is:   Lab on 04/13/2018   Component Date Value Ref Range Status   • Glucose 04/13/2018 99  65 - 99 mg/dL Final   • BUN 04/13/2018 17  8 - 23 mg/dL Final   • Creatinine 04/13/2018 0.79  0.57 - 1.00 mg/dL Final   • eGFR Non  Am 04/13/2018 73  >60 mL/min/1.73 Final   • eGFR African Am 04/13/2018 89  >60 mL/min/1.73 Final   • BUN/Creatinine Ratio 04/13/2018 21.5  7.0 - 25.0 Final   • Sodium 04/13/2018 137  136 - 145 mmol/L Final   • Potassium 04/13/2018 4.7  3.5 - 5.2 mmol/L Final   • Chloride 04/13/2018 97* 98 - 107 mmol/L Final   • Total CO2 04/13/2018 26.3  22.0 - 29.0 mmol/L Final   • Calcium 04/13/2018 9.3  8.6 - 10.5 mg/dL Final   • Total Protein 04/13/2018 6.7  6.0 - 8.5 g/dL Final   • Albumin 04/13/2018 4.30  3.50 - 5.20 g/dL Final   • Globulin 04/13/2018 2.4  gm/dL Final   • A/G Ratio 04/13/2018 1.8  g/dL Final   • Total Bilirubin 04/13/2018 0.4  0.1 - 1.2 mg/dL Final   • Alkaline Phosphatase 04/13/2018 70  39 - 117 U/L Final   • AST (SGOT) 04/13/2018 21  1 - 32 U/L Final   • ALT (SGPT) 04/13/2018 24  1 - 33 U/L Final   • Total Cholesterol 04/13/2018 162  0 - 200 mg/dL Final   • Triglycerides 04/13/2018 108  0 - 150 mg/dL Final   • HDL Cholesterol 04/13/2018 57  40 - 60 mg/dL Final   • VLDL Cholesterol 04/13/2018 21.6  5 - 40 mg/dL Final   • LDL Cholesterol  04/13/2018 83  0 - 100 mg/dL Final   • LDL/HDL Ratio 04/13/2018 1.46   Final   • WBC 04/13/2018 7.03  4.50 - 10.70  10*3/mm3 Final   • RBC 04/13/2018 4.74  3.90 - 5.20 10*6/mm3 Final   • Hemoglobin 04/13/2018 14.2  11.9 - 15.5 g/dL Final   • Hematocrit 04/13/2018 46.1* 35.6 - 45.5 % Final   • MCV 04/13/2018 97.3  80.5 - 98.2 fL Final   • MCH 04/13/2018 30.0  26.9 - 32.0 pg Final   • MCHC 04/13/2018 30.8* 32.4 - 36.3 g/dL Final   • RDW 04/13/2018 13.0  11.7 - 13.0 % Final   • Platelets 04/13/2018 280  140 - 500 10*3/mm3 Final   • Neutrophil Rel % 04/13/2018 53.9  42.7 - 76.0 % Final   • Lymphocyte Rel % 04/13/2018 35.4  19.6 - 45.3 % Final   • Monocyte Rel % 04/13/2018 5.4  5.0 - 12.0 % Final   • Eosinophil Rel % 04/13/2018 4.7  0.3 - 6.2 % Final   • Basophil Rel % 04/13/2018 0.6  0.0 - 1.5 % Final   • Neutrophils Absolute 04/13/2018 3.79  1.90 - 8.10 10*3/mm3 Final   • Lymphocytes Absolute 04/13/2018 2.49  0.90 - 4.80 10*3/mm3 Final   • Monocytes Absolute 04/13/2018 0.38  0.20 - 1.20 10*3/mm3 Final   • Eosinophils Absolute 04/13/2018 0.33  0.00 - 0.70 10*3/mm3 Final   • Basophils Absolute 04/13/2018 0.04  0.00 - 0.20 10*3/mm3 Final   • Immature Granulocyte Rel % 04/13/2018 0.0  0.0 - 0.5 % Final   • Immature Grans Absolute 04/13/2018 0.00  0.00 - 0.03 10*3/mm3 Final   • TSH 04/13/2018 3.320  0.270 - 4.200 mIU/mL Final   • Please note 04/13/2018 Comment   Final    Comment: We have received your request for additional testing or test  verification.  You will be notified if we are unable to process  your request.     • Specimen Status 04/13/2018 Comment   Final    Comment: Written Authorization  Written Authorization  Written Authorization Received.  Authorization received from WRITTEN REQUEST 04-  Logged by Ace Brewster     • Hep C Virus Ab 04/13/2018 <0.1  0.0 - 0.9 s/co ratio Final    Comment:                                   Negative:     < 0.8                               Indeterminate: 0.8 - 0.9                                    Positive:     > 0.9   The CDC recommends that a positive HCV antibody result   be followed  up with a HCV Nucleic Acid Amplification   test ([id number]).

## 2019-03-15 NOTE — ASSESSMENT & PLAN NOTE
Aileen 3/15/2019  Labs done in December. She stopped the crestor in Dec. Because of things she had read on line. But she is taking the Zetia. She was having some leg cramps. We discussed the difference in effectiveness between the two medications and I recommended staying on the crestor.

## 2019-03-15 NOTE — ASSESSMENT & PLAN NOTE
Aileen 3/15/2019  BP is controlled well. She will recheck in six months. She will continue present meds.

## 2019-03-20 RX ORDER — LEVOTHYROXINE SODIUM 50 MCG
TABLET ORAL
Qty: 30 TABLET | Refills: 3 | Status: SHIPPED | OUTPATIENT
Start: 2019-03-20 | End: 2019-09-16 | Stop reason: SDUPTHER

## 2019-04-21 DIAGNOSIS — F51.01 PRIMARY INSOMNIA: ICD-10-CM

## 2019-04-22 RX ORDER — ZOLPIDEM TARTRATE 10 MG/1
TABLET ORAL
Qty: 30 TABLET | Refills: 2 | Status: SHIPPED | OUTPATIENT
Start: 2019-04-22 | End: 2019-07-21 | Stop reason: SDUPTHER

## 2019-05-30 ENCOUNTER — OFFICE VISIT (OUTPATIENT)
Dept: ORTHOPEDIC SURGERY | Facility: CLINIC | Age: 64
End: 2019-05-30

## 2019-05-30 VITALS — TEMPERATURE: 98.2 F | WEIGHT: 209 LBS | BODY MASS INDEX: 33.59 KG/M2 | HEIGHT: 66 IN

## 2019-05-30 DIAGNOSIS — M25.551 BILATERAL HIP PAIN: ICD-10-CM

## 2019-05-30 DIAGNOSIS — M25.562 CHRONIC PAIN OF BOTH KNEES: Primary | ICD-10-CM

## 2019-05-30 DIAGNOSIS — M25.552 BILATERAL HIP PAIN: ICD-10-CM

## 2019-05-30 DIAGNOSIS — M25.561 CHRONIC PAIN OF BOTH KNEES: Primary | ICD-10-CM

## 2019-05-30 DIAGNOSIS — G89.29 CHRONIC PAIN OF BOTH KNEES: Primary | ICD-10-CM

## 2019-05-30 PROCEDURE — 73562 X-RAY EXAM OF KNEE 3: CPT | Performed by: ORTHOPAEDIC SURGERY

## 2019-05-30 PROCEDURE — 99204 OFFICE O/P NEW MOD 45 MIN: CPT | Performed by: ORTHOPAEDIC SURGERY

## 2019-05-30 PROCEDURE — 73521 X-RAY EXAM HIPS BI 2 VIEWS: CPT | Performed by: ORTHOPAEDIC SURGERY

## 2019-05-30 NOTE — PROGRESS NOTES
Patient: Barbara Tran  YOB: 1955 64 y.o. female  Medical Record Number: [id number]    Chief Complaints:   Chief Complaint   Patient presents with   • Left Knee - Establish Care   • Right Knee - Establish Care       History of Present Illness:Barbara Tran is a 64 y.o. female who presents with bilateral knee and right hip pain which are severe in nature she has an aching stabbing pain.  She has progressively drifted in the valgus and has noticed this is worsened over the years.  Pain limits her basic activities.  She feels physically she has a hard time performing basic activities and still tries to work as a .  She has had injections in the past without much relief.  She takes anti-inflammatories sparingly as recommended by her primary care physician.    Allergies:   Allergies   Allergen Reactions   • Codeine Dizziness     lightheaded   • Levofloxacin Itching   • Morphine Itching       Medications:   Current Outpatient Medications   Medication Sig Dispense Refill   • aspirin 81 MG tablet Take 81 mg by mouth Daily.     • buPROPion XL (WELLBUTRIN XL) 300 MG 24 hr tablet Take 1 tablet by mouth Daily. 30 tablet 12   • calcium carbonate (OS-SHAHNAZ) 600 MG tablet Take 600 mg by mouth 2 (Two) Times a Day With Meals.     • furosemide (LASIX) 40 MG tablet Take 1 tablet by mouth Daily. (Patient taking differently: Take 40 mg by mouth Daily. TAKING THE NAME BRAND) 30 tablet 5   • nebivolol (BYSTOLIC) 5 MG tablet Take 1 tablet by mouth Daily. 30 tablet 5   • Omega-3 Fatty Acids (FISH OIL) 1200 MG capsule capsule Take 2 capsules by mouth Daily.     • ramipril (ALTACE) 2.5 MG capsule Take 1 capsule by mouth Daily. 30 capsule 5   • rosuvastatin (CRESTOR) 20 MG tablet Take 1 tablet by mouth Daily. 30 tablet 2   • saccharomyces boulardii (FLORASTOR) 250 MG capsule Take 1 capsule by mouth 2 (Two) Times a Day. 60 capsule 1   • spironolactone (ALDACTONE) 25 MG tablet Take 1 tablet by mouth Daily. (Patient taking  "differently: Take 12.5 mg by mouth Daily.) 30 tablet 5   • SYNTHROID 50 MCG tablet Take 1 tablet by mouth Daily. 30 tablet 5   • SYNTHROID 50 MCG tablet TAKE 1 TABLET BY MOUTH EVERY DAY 30 tablet 3   • zolpidem (AMBIEN) 10 MG tablet TAKE 1 TABLET BY MOUTH EVERYDAY AT BEDTIME 30 tablet 2   • ezetimibe (ZETIA) 10 MG tablet Take 10 mg by mouth Daily.       No current facility-administered medications for this visit.          The following portions of the patient's history were reviewed and updated as appropriate: allergies, current medications, past family history, past medical history, past social history, past surgical history and problem list.    Review of Systems:   A 14 point review of systems was performed. All systems negative except pertinent positives/negative listed in HPI above    Physical Exam:   Vitals:    05/30/19 0817   Temp: 98.2 °F (36.8 °C)   TempSrc: Temporal   Weight: 94.8 kg (209 lb)   Height: 167.6 cm (66\")       General: A and O x 3, ASA, NAD    SCLERA:    Normal    DENTITION:   Normal  Knee:  bilateral    ALIGNMENT:     Valgus      GAIT:    Antalgic    SKIN:    No abnormality    RANGE OF MOTION:   0  -  115   DEG    STRENGTH:   4  / 5    LIGAMENTS:    No varus / valgus instability.   Negative  Lachman.    MENISCUS:     Negative   Arnaldo       DISTAL PULSES:    Paplable    DISTAL SENSATION :   Intact    LYMPHATICS:     No   lymphadenopathy    OTHER:          - Positive  Trace  effusion      - Crepitance with ROM   Right hip shows mild pain with flexion internal rotation.  She has a significant Trendelenburg gait with weakness of both hip musculature       Radiology:  Xrays 3views both knees (ap,lateral, sunrise) were ordered and reviewed for evaluation of knee pain demonstrating advanced valgus osteoarthritis with bone on bone articulation, subchondral cysts, and periarticular osteophytes. There are no previous films for comparision.    Xrays 2 views - hip -  (AP bilateral hips and lateral) " were ordered and reviewed for evaluation of bilateral hip pain which demonstrates - right hip advanced OA - bone on bone articulation, subchondral cysts, and periarticular. Left hip shows mild OA.There are no previous films for comparision.    Assessment/Plan: Bilateral Knee OA, right hip OA. Will need TKAs and right STEFANY. Had log discussion about surgery, will send to PT for Prehab and she will call when ready to proceed.  She will need medical clearance prior to proceeding      Aleks Roy MD  5/30/2019

## 2019-07-21 DIAGNOSIS — F51.01 PRIMARY INSOMNIA: ICD-10-CM

## 2019-07-22 DIAGNOSIS — F51.01 PRIMARY INSOMNIA: ICD-10-CM

## 2019-07-22 RX ORDER — ZOLPIDEM TARTRATE 10 MG/1
TABLET ORAL
Qty: 30 TABLET | Refills: 2 | Status: CANCELLED | OUTPATIENT
Start: 2019-07-22

## 2019-07-22 RX ORDER — ZOLPIDEM TARTRATE 10 MG/1
TABLET ORAL
Qty: 30 TABLET | Refills: 2 | Status: SHIPPED | OUTPATIENT
Start: 2019-07-22 | End: 2019-10-25 | Stop reason: SDUPTHER

## 2019-09-10 DIAGNOSIS — F34.1 DYSTHYMIA: ICD-10-CM

## 2019-09-10 RX ORDER — BUPROPION HYDROCHLORIDE 300 MG/1
300 TABLET ORAL DAILY
Qty: 30 TABLET | Refills: 12 | Status: SHIPPED | OUTPATIENT
Start: 2019-09-10 | End: 2019-10-04 | Stop reason: SDUPTHER

## 2019-09-16 ENCOUNTER — OFFICE VISIT (OUTPATIENT)
Dept: FAMILY MEDICINE CLINIC | Facility: CLINIC | Age: 64
End: 2019-09-16

## 2019-09-16 VITALS
DIASTOLIC BLOOD PRESSURE: 70 MMHG | HEART RATE: 57 BPM | RESPIRATION RATE: 14 BRPM | OXYGEN SATURATION: 98 % | HEIGHT: 66 IN | WEIGHT: 205.9 LBS | SYSTOLIC BLOOD PRESSURE: 128 MMHG | BODY MASS INDEX: 33.09 KG/M2

## 2019-09-16 DIAGNOSIS — Z23 ENCOUNTER FOR IMMUNIZATION: ICD-10-CM

## 2019-09-16 DIAGNOSIS — I10 BENIGN ESSENTIAL HTN: ICD-10-CM

## 2019-09-16 DIAGNOSIS — Z00.00 MEDICARE ANNUAL WELLNESS VISIT, INITIAL: Primary | ICD-10-CM

## 2019-09-16 PROCEDURE — G0009 ADMIN PNEUMOCOCCAL VACCINE: HCPCS | Performed by: FAMILY MEDICINE

## 2019-09-16 PROCEDURE — G0008 ADMIN INFLUENZA VIRUS VAC: HCPCS | Performed by: FAMILY MEDICINE

## 2019-09-16 PROCEDURE — G0438 PPPS, INITIAL VISIT: HCPCS | Performed by: FAMILY MEDICINE

## 2019-09-16 PROCEDURE — 90653 IIV ADJUVANT VACCINE IM: CPT | Performed by: FAMILY MEDICINE

## 2019-09-16 PROCEDURE — 90670 PCV13 VACCINE IM: CPT | Performed by: FAMILY MEDICINE

## 2019-09-16 NOTE — PROGRESS NOTES
QUICK REFERENCE INFORMATION:  The ABCs of the Annual Wellness Visit     Subjective   History of Present Illness    Barbara Tran is a 64 y.o. female who presents for a Subsequent Wellness Visit.  HPI    HEALTH RISK ASSESSMENT    1955    Recent Hospitalizations:  No hospitalization(s) within the last year..    Current Medical Providers:  Patient Care Team:  Millicent Ace MD as PCP - General (Family Medicine)  Consuelo Zuniga APRN as PCP - Claims Attributed  Kin Patel MD as Consulting Physician (Orthopedic Surgery)  Bere Beckford MD as Consulting Physician (Cardiology)  Patrizia Page MD as Consulting Physician (Ophthalmology)    Smoking Status:  Social History     Tobacco Use   Smoking Status Never Smoker   Smokeless Tobacco Never Used   Tobacco Comment    N/A       Alcohol Consumption:  Social History     Substance and Sexual Activity   Alcohol Use Yes    Comment: rarely     Fall Risk Screen:  JOSEADI Fall Risk Assessment was completed, and patient is at LOW risk for falls.Assessment completed on:9/16/2019  Depression Screen:   PHQ-2/PHQ-9 Depression Screening 9/16/2019   Little interest or pleasure in doing things 0   Feeling down, depressed, or hopeless 0   Total Score 0     Health Habits and Functional and Cognitive Screening:  Functional & Cognitive Status 9/16/2019   Do you have difficulty preparing food and eating? No   Do you have difficulty bathing yourself, getting dressed or grooming yourself? No   Do you have difficulty using the toilet? No   Do you have difficulty moving around from place to place? No   Do you have trouble with steps or getting out of a bed or a chair? No   Current Diet Well Balanced Diet   Dental Exam Up to date   Eye Exam Up to date   Exercise (times per week) 0 times per week   Current Exercise Activities Include None   Do you need help using the phone?  No   Are you deaf or do you have serious difficulty hearing?  No   Do you need help with  transportation? No   Do you need help shopping? No   Do you need help preparing meals?  No   Do you need help with housework?  No   Do you need help with laundry? No   Do you need help taking your medications? No   Do you need help managing money? No   Do you ever drive or ride in a car without wearing a seat belt? No   Have you felt unusual stress, anger or loneliness in the last month? No   Who do you live with? Child   If you need help, do you have trouble finding someone available to you? No   Have you been bothered in the last four weeks by sexual problems? No   Do you have difficulty concentrating, remembering or making decisions? No     Health Habits  Current Diet: Well Balanced Diet  Dental Exam: Up to date  Eye Exam: Up to date  Exercise (times per week): 0 times per week  Current Exercise Activities Include: None    Does the patient have evidence of cognitive impairment? No     Aspirin use counseling: Taking ASA appropriately as indicated    Recent Lab Results:  CMP:  Lab Results   Component Value Date     (H) 07/30/2019    BUN 16 07/30/2019    CREATININE 0.7 07/30/2019    EGFRIFNONA 73 04/13/2018    EGFRIFAFRI 89 04/13/2018    BCR 22.9 07/30/2019     07/30/2019    K 4.3 07/30/2019    CO2 25 07/30/2019    CALCIUM 9.1 07/30/2019    PROTENTOTREF 6.7 04/13/2018    ALBUMIN 4.1 06/19/2019    LABGLOBREF 2.4 04/13/2018    LABIL2 1.6 06/19/2019    BILITOT 0.3 06/19/2019    ALKPHOS 64 06/19/2019    AST 24 06/19/2019    ALT 24 06/19/2019     Lipid Panel:  Lab Results   Component Value Date    TRIG 80 06/19/2019    HDL 53 06/19/2019    VLDL 16 06/19/2019    LDLHDL 1.46 04/13/2018     Age-appropriate Screening Schedule:  Refer to the list below for future screening recommendations based on patient's age, sex and/or medical conditions. Orders for these recommended tests are listed in the plan section. The patient has been provided with a written plan.    Health Maintenance   Topic Date Due   • TDAP/TD  VACCINES (1 - Tdap) 01/24/1972   • PNEUMOCOCCAL VACCINES (65+ LOW/MEDIUM RISK) (2 of 2 - PPSV23) 04/17/2019   • INFLUENZA VACCINE  08/01/2019   • DXA SCAN  02/27/2020 (Originally 1/20/2017)   • ZOSTER VACCINE (1 of 2) 03/23/2020 (Originally 1/24/2003)   • MAMMOGRAM  03/15/2020   • LIPID PANEL  05/20/2020   • COLONOSCOPY  12/19/2026        The following portions of the patient's history were reviewed and updated as appropriate: allergies, current medications, past family history, past medical history, past social history, past surgical history and problem list.    Outpatient Medications Prior to Visit   Medication Sig Dispense Refill   • aspirin 81 MG tablet Take 81 mg by mouth Daily.     • buPROPion XL (WELLBUTRIN XL) 300 MG 24 hr tablet Take 1 tablet by mouth Daily. 30 tablet 12   • calcium carbonate (OS-SHAHNAZ) 600 MG tablet Take 600 mg by mouth 2 (Two) Times a Day With Meals.     • furosemide (LASIX) 40 MG tablet Take 1 tablet by mouth Daily. (Patient taking differently: Take 40 mg by mouth Daily. TAKING THE NAME BRAND) 30 tablet 5   • nebivolol (BYSTOLIC) 5 MG tablet Take 1 tablet by mouth Daily. 30 tablet 5   • Omega-3 Fatty Acids (FISH OIL) 1200 MG capsule capsule Take 2 capsules by mouth Daily.     • ramipril (ALTACE) 2.5 MG capsule Take 1 capsule by mouth Daily. 30 capsule 5   • rosuvastatin (CRESTOR) 20 MG tablet Take 1 tablet by mouth Daily. 30 tablet 2   • saccharomyces boulardii (FLORASTOR) 250 MG capsule Take 1 capsule by mouth 2 (Two) Times a Day. 60 capsule 1   • spironolactone (ALDACTONE) 25 MG tablet Take 1 tablet by mouth Daily. (Patient taking differently: Take 12.5 mg by mouth Daily.) 30 tablet 5   • SYNTHROID 50 MCG tablet Take 1 tablet by mouth Daily. 30 tablet 5   • zolpidem (AMBIEN) 10 MG tablet TAKE 1 TABLET BY MOUTH EVERYDAY AT BEDTIME 30 tablet 2   • ezetimibe (ZETIA) 10 MG tablet Take 10 mg by mouth Daily.     • SYNTHROID 50 MCG tablet TAKE 1 TABLET BY MOUTH EVERY DAY 30 tablet 3     No  facility-administered medications prior to visit.        Patient Active Problem List   Diagnosis   • Benign essential HTN   • Cervical pain   • Dysthymia   • Pedal edema   • HLD (hyperlipidemia)   • Simple obesity   • Goiter, non-toxic   • Chronic pain of left knee   • Chronic coronary artery disease   • Hypothyroidism   • History of sepsis   • Primary insomnia   • Balance problems   • Chronic diastolic congestive heart failure (CMS/HCC)   • S/P drug eluting coronary stent placement   • Pulmonic valve regurgitation   • Tricuspid valve regurgitation       Advance Care Planning:  Patient has an advance directive - a copy has not been provided. Have asked the patient to send this to us to add to record    Identification of Risk Factors:  Risk factors include: weight , cardiovascular risk and chronic pain from her knee which is keeping her from exercising. She went to [facility] and she did not care for him. She would like a second opinion on that.     Review of Systems   Constitutional: Negative for activity change, appetite change, chills, fatigue, fever and unexpected weight change.   HENT: Negative for congestion, ear pain, hearing loss, nosebleeds, rhinorrhea and sore throat.    Eyes: Negative for pain, redness and visual disturbance.   Respiratory: Negative for cough, shortness of breath and wheezing.    Cardiovascular: Positive for leg swelling. Negative for chest pain and palpitations.   Gastrointestinal: Negative for abdominal pain, blood in stool, constipation, diarrhea, nausea and vomiting.   Endocrine: Negative for cold intolerance and heat intolerance.   Genitourinary: Negative for difficulty urinating, dysuria, frequency, hematuria, pelvic pain, urgency and vaginal discharge.   Musculoskeletal: Positive for arthralgias, joint swelling and myalgias. Negative for back pain.   Skin: Negative for rash and wound.   Neurological: Negative for dizziness, weakness, numbness and headaches.   Hematological: Does not  "bruise/bleed easily.   Psychiatric/Behavioral: Negative for dysphoric mood, sleep disturbance and suicidal ideas. The patient is not nervous/anxious.      Compared to one year ago, the patient feels her physical health is worse due to her knees and her mental health is the same.    Objective     Vitals:    09/16/19 0909   BP: 128/70   Pulse: 57   Resp: 14   SpO2: 98%   Weight: 93.4 kg (205 lb 14.4 oz)   Height: 167.6 cm (66\")     Physical Exam   Constitutional: She is oriented to person, place, and time. Vital signs are normal. She appears well-developed and well-nourished. No distress.   HENT:   Head: Normocephalic.   Cardiovascular: Normal rate, regular rhythm and normal heart sounds.   Pulmonary/Chest: Effort normal and breath sounds normal.   Musculoskeletal: She exhibits edema.   Neurological: She is alert and oriented to person, place, and time. Gait normal.   Psychiatric: She has a normal mood and affect. Her behavior is normal. Judgment and thought content normal.   Vitals reviewed.      Patient's Body mass index is 33.23 kg/m². BMI is above normal parameters. Recommendations include: educational material.    Assessment/Plan   Patient Self-Management and Personalized Health Advice  The patient has been provided with information about:  Advance Directive Discussion  Cardiovascular risk  Immunizations Discussed/Encouraged (specific immunizations; Pneumococcal 23 )  Inactivity/Sedentary    Visit Diagnoses:  Problem List Items Addressed This Visit        Cardiovascular and Mediastinum    Benign essential HTN    Current Assessment & Plan     RODRIGUEcarmen 9/16/2019  BP is controlled well. She will recheck in six months. She will continue present meds.              Other Visit Diagnoses     Medicare annual wellness visit, initial    -  Primary    Encounter for immunization        Relevant Medications    pneumococcal conj. 13-valent (PREVNAR-13) vaccine 0.5 mL          Orders Placed This Encounter   Procedures   • Fluad " Tri 65yr+       Outpatient Encounter Medications as of 9/16/2019   Medication Sig Dispense Refill   • aspirin 81 MG tablet Take 81 mg by mouth Daily.     • buPROPion XL (WELLBUTRIN XL) 300 MG 24 hr tablet Take 1 tablet by mouth Daily. 30 tablet 12   • calcium carbonate (OS-SHAHNAZ) 600 MG tablet Take 600 mg by mouth 2 (Two) Times a Day With Meals.     • furosemide (LASIX) 40 MG tablet Take 1 tablet by mouth Daily. (Patient taking differently: Take 40 mg by mouth Daily. TAKING THE NAME BRAND) 30 tablet 5   • nebivolol (BYSTOLIC) 5 MG tablet Take 1 tablet by mouth Daily. 30 tablet 5   • Omega-3 Fatty Acids (FISH OIL) 1200 MG capsule capsule Take 2 capsules by mouth Daily.     • ramipril (ALTACE) 2.5 MG capsule Take 1 capsule by mouth Daily. 30 capsule 5   • rosuvastatin (CRESTOR) 20 MG tablet Take 1 tablet by mouth Daily. 30 tablet 2   • saccharomyces boulardii (FLORASTOR) 250 MG capsule Take 1 capsule by mouth 2 (Two) Times a Day. 60 capsule 1   • spironolactone (ALDACTONE) 25 MG tablet Take 1 tablet by mouth Daily. (Patient taking differently: Take 12.5 mg by mouth Daily.) 30 tablet 5   • SYNTHROID 50 MCG tablet Take 1 tablet by mouth Daily. 30 tablet 5   • zolpidem (AMBIEN) 10 MG tablet TAKE 1 TABLET BY MOUTH EVERYDAY AT BEDTIME 30 tablet 2   • [DISCONTINUED] ezetimibe (ZETIA) 10 MG tablet Take 10 mg by mouth Daily.     • [DISCONTINUED] SYNTHROID 50 MCG tablet TAKE 1 TABLET BY MOUTH EVERY DAY 30 tablet 3     Facility-Administered Encounter Medications as of 9/16/2019   Medication Dose Route Frequency Provider Last Rate Last Dose   • pneumococcal conj. 13-valent (PREVNAR-13) vaccine 0.5 mL  0.5 mL Intramuscular Once Millicent Ace MD           Current medication list contains high risk medications. No harmful drug interactions have been identified.  Plan of action none    Follow Up:  Return in about 6 months (around 3/16/2020).     An After Visit Summary and PPPS with all of these plans were given to the patient.

## 2019-09-16 NOTE — ASSESSMENT & PLAN NOTE
Aileen 9/16/2019  BP is controlled well. She will recheck in six months. She will continue present meds.

## 2019-10-04 DIAGNOSIS — F34.1 DYSTHYMIA: ICD-10-CM

## 2019-10-04 RX ORDER — BUPROPION HYDROCHLORIDE 300 MG/1
300 TABLET ORAL DAILY
Qty: 90 TABLET | Refills: 2 | Status: SHIPPED | OUTPATIENT
Start: 2019-10-04 | End: 2019-12-16 | Stop reason: SDUPTHER

## 2019-10-25 DIAGNOSIS — F51.01 PRIMARY INSOMNIA: ICD-10-CM

## 2019-10-25 DIAGNOSIS — R60.0 PEDAL EDEMA: ICD-10-CM

## 2019-10-25 RX ORDER — ZOLPIDEM TARTRATE 10 MG/1
TABLET ORAL
Qty: 30 TABLET | Refills: 2 | Status: SHIPPED | OUTPATIENT
Start: 2019-10-25 | End: 2019-10-25 | Stop reason: SDUPTHER

## 2019-10-28 RX ORDER — ZOLPIDEM TARTRATE 10 MG/1
10 TABLET ORAL
Qty: 30 TABLET | Refills: 2 | Status: SHIPPED | OUTPATIENT
Start: 2019-10-28 | End: 2019-12-16 | Stop reason: SDUPTHER

## 2019-10-28 RX ORDER — FUROSEMIDE 40 MG/1
40 TABLET ORAL DAILY
Qty: 30 TABLET | Refills: 5 | Status: SHIPPED | OUTPATIENT
Start: 2019-10-28 | End: 2019-11-13 | Stop reason: ALTCHOICE

## 2019-11-13 DIAGNOSIS — R60.0 PEDAL EDEMA: ICD-10-CM

## 2019-11-13 RX ORDER — FUROSEMIDE 40 MG/1
40 TABLET ORAL DAILY
Qty: 90 TABLET | Refills: 1 | Status: SHIPPED | OUTPATIENT
Start: 2019-11-13 | End: 2019-12-16 | Stop reason: SDUPTHER

## 2019-12-16 DIAGNOSIS — E03.9 HYPOTHYROIDISM, UNSPECIFIED TYPE: ICD-10-CM

## 2019-12-16 DIAGNOSIS — F51.01 PRIMARY INSOMNIA: ICD-10-CM

## 2019-12-16 DIAGNOSIS — Z95.5 HISTORY OF CORONARY ARTERY STENT PLACEMENT: ICD-10-CM

## 2019-12-16 DIAGNOSIS — F34.1 DYSTHYMIA: ICD-10-CM

## 2019-12-16 DIAGNOSIS — I10 BENIGN ESSENTIAL HTN: ICD-10-CM

## 2019-12-16 DIAGNOSIS — R60.0 PEDAL EDEMA: Primary | ICD-10-CM

## 2019-12-16 DIAGNOSIS — R60.0 PEDAL EDEMA: ICD-10-CM

## 2019-12-16 RX ORDER — ZOLPIDEM TARTRATE 10 MG/1
10 TABLET ORAL
Qty: 30 TABLET | Refills: 2 | Status: SHIPPED | OUTPATIENT
Start: 2019-12-16 | End: 2020-04-28 | Stop reason: SDUPTHER

## 2019-12-16 RX ORDER — FUROSEMIDE 40 MG/1
40 TABLET ORAL DAILY
Qty: 90 TABLET | Refills: 1 | Status: SHIPPED | OUTPATIENT
Start: 2019-12-16 | End: 2019-12-16 | Stop reason: SDUPTHER

## 2019-12-16 RX ORDER — LEVOTHYROXINE SODIUM 50 MCG
50 TABLET ORAL DAILY
Qty: 30 TABLET | Refills: 5 | Status: SHIPPED | OUTPATIENT
Start: 2019-12-16 | End: 2019-12-16 | Stop reason: SDUPTHER

## 2019-12-16 RX ORDER — RAMIPRIL 2.5 MG/1
2.5 CAPSULE ORAL DAILY
Qty: 30 CAPSULE | Refills: 5 | Status: SHIPPED | OUTPATIENT
Start: 2019-12-16 | End: 2019-12-16 | Stop reason: SDUPTHER

## 2019-12-16 RX ORDER — ROSUVASTATIN CALCIUM 20 MG/1
20 TABLET, COATED ORAL DAILY
Qty: 30 TABLET | Refills: 2 | Status: SHIPPED | OUTPATIENT
Start: 2019-12-16 | End: 2020-04-28

## 2019-12-16 RX ORDER — RAMIPRIL 2.5 MG/1
2.5 CAPSULE ORAL DAILY
Qty: 30 CAPSULE | Refills: 5 | Status: SHIPPED | OUTPATIENT
Start: 2019-12-16 | End: 2020-11-19 | Stop reason: SDUPTHER

## 2019-12-16 RX ORDER — BUPROPION HYDROCHLORIDE 300 MG/1
300 TABLET ORAL DAILY
Qty: 90 TABLET | Refills: 2 | Status: SHIPPED | OUTPATIENT
Start: 2019-12-16 | End: 2020-11-22 | Stop reason: SDUPTHER

## 2019-12-16 RX ORDER — ZOLPIDEM TARTRATE 10 MG/1
10 TABLET ORAL
Qty: 30 TABLET | Refills: 2 | Status: CANCELLED | OUTPATIENT
Start: 2019-12-16

## 2019-12-16 RX ORDER — FUROSEMIDE 40 MG/1
40 TABLET ORAL DAILY
Qty: 90 TABLET | Refills: 1 | Status: SHIPPED | OUTPATIENT
Start: 2019-12-16 | End: 2020-06-03 | Stop reason: SDUPTHER

## 2019-12-16 RX ORDER — SPIRONOLACTONE 25 MG/1
25 TABLET ORAL DAILY
Qty: 90 TABLET | Refills: 1 | Status: SHIPPED | OUTPATIENT
Start: 2019-12-16 | End: 2019-12-16 | Stop reason: SDUPTHER

## 2019-12-16 RX ORDER — BUPROPION HYDROCHLORIDE 300 MG/1
300 TABLET ORAL DAILY
Qty: 90 TABLET | Refills: 2 | Status: SHIPPED | OUTPATIENT
Start: 2019-12-16 | End: 2019-12-16 | Stop reason: SDUPTHER

## 2019-12-16 RX ORDER — LEVOTHYROXINE SODIUM 50 MCG
50 TABLET ORAL DAILY
Qty: 30 TABLET | Refills: 5 | Status: SHIPPED | OUTPATIENT
Start: 2019-12-16 | End: 2020-10-19

## 2019-12-16 RX ORDER — SPIRONOLACTONE 25 MG/1
25 TABLET ORAL DAILY
Qty: 90 TABLET | Refills: 1 | Status: SHIPPED | OUTPATIENT
Start: 2019-12-16 | End: 2020-11-23

## 2019-12-16 RX ORDER — SPIRONOLACTONE 25 MG/1
TABLET ORAL
Qty: 90 TABLET | Refills: 1 | Status: SHIPPED | OUTPATIENT
Start: 2019-12-16 | End: 2019-12-16 | Stop reason: SDUPTHER

## 2019-12-16 RX ORDER — ROSUVASTATIN CALCIUM 20 MG/1
20 TABLET, COATED ORAL DAILY
Qty: 30 TABLET | Refills: 2 | Status: SHIPPED | OUTPATIENT
Start: 2019-12-16 | End: 2019-12-16 | Stop reason: SDUPTHER

## 2019-12-16 RX ORDER — NEBIVOLOL 5 MG/1
5 TABLET ORAL DAILY
Qty: 30 TABLET | Refills: 5 | Status: SHIPPED | OUTPATIENT
Start: 2019-12-16 | End: 2020-02-14 | Stop reason: SDUPTHER

## 2020-01-06 ENCOUNTER — TELEPHONE (OUTPATIENT)
Dept: FAMILY MEDICINE CLINIC | Facility: CLINIC | Age: 65
End: 2020-01-06

## 2020-01-07 ENCOUNTER — TELEPHONE (OUTPATIENT)
Dept: FAMILY MEDICINE CLINIC | Facility: CLINIC | Age: 65
End: 2020-01-07

## 2020-01-07 NOTE — TELEPHONE ENCOUNTER
----- Message from Millicent Ace MD sent at 1/6/2020 12:18 PM EST -----  Regarding: FW: Non-Urgent Medical Question  Contact: [phone]  Do we have any samples we can give her?   ----- Message -----  From: Barbara Tran  Sent: 1/3/2020   8:46 AM EST  To: Millicent Ace MD  Subject: Non-Urgent Medical Question                      This is not urgent.  I would like to see if I could get samples of Bystolic 5mg. Dr. Mcgrath has given me sample before.    Also, I had DEXA scan on 12- at [facility].  This was requested by Dr. Bong Olivares.  I would like for Dr. Mcgrath to review this report and let me know what plan I need to follow.  I don't know if I need to make an appointment.  I would like to talk to someone about this.    Thank you for attention to this matter.

## 2020-02-04 ENCOUNTER — TELEPHONE (OUTPATIENT)
Dept: FAMILY MEDICINE CLINIC | Facility: CLINIC | Age: 65
End: 2020-02-04

## 2020-02-04 NOTE — TELEPHONE ENCOUNTER
PT called, states she spoke with her insurance company ([employer]) today. They're requesting PA's for the following medications:  · rosuvastatin (CRESTOR) 20 MG tablet.  · zolpidem (AMBIEN) 10 MG tablet.  · LASIX 40 MG tablet.    Confirmed Pharmacy: [facility]/pharmacy #[id number] [facility] [address] [place] [address] [phone] [facility] [phone] FX.    Please call Barbara with an update: [phone]

## 2020-02-04 NOTE — TELEPHONE ENCOUNTER
Prashant from [facility] needs pre auth, for theLASIX 40 MG tablet, please contact prashant at  [phone].      PHARMACY [facility]

## 2020-02-05 ENCOUNTER — TELEPHONE (OUTPATIENT)
Dept: FAMILY MEDICINE CLINIC | Facility: CLINIC | Age: 65
End: 2020-02-05

## 2020-02-05 NOTE — TELEPHONE ENCOUNTER
Pt is aware that she should be fine going forward.    [facility] and we don't have any and she is almost out.   Ketoconazole Pregnancy And Lactation Text: This medication is Pregnancy Category C and it isn't know if it is safe during pregnancy. It is also excreted in breast milk and breast feeding isn't recommended.

## 2020-02-05 NOTE — TELEPHONE ENCOUNTER
Amy called insurance and the only medicine that needed a pa was ambien which has been approved but I wasn't given a time frame of how many times it's approved.    Also the pharmacy is running the pt's script for Lasix wrong.     They will pay 30 for 30 and they pharmacy was running it as 60 for 30 days.    And no pa is needed for crestor.    Calling the pharmacy to let them know.

## 2020-02-05 NOTE — TELEPHONE ENCOUNTER
[employer] ins called to ask clinial questions in regards to zolpidem (AMBIEN) 10 MG tablet stated that the have Clinical questions.    [place] Rep Adrian: [phone]

## 2020-02-14 DIAGNOSIS — I10 BENIGN ESSENTIAL HTN: ICD-10-CM

## 2020-02-14 RX ORDER — NEBIVOLOL 5 MG/1
5 TABLET ORAL DAILY
Qty: 30 TABLET | Refills: 5 | Status: SHIPPED | OUTPATIENT
Start: 2020-02-14 | End: 2020-07-16 | Stop reason: SDUPTHER

## 2020-04-27 DIAGNOSIS — F51.01 PRIMARY INSOMNIA: ICD-10-CM

## 2020-04-27 DIAGNOSIS — Z95.5 HISTORY OF CORONARY ARTERY STENT PLACEMENT: ICD-10-CM

## 2020-04-28 RX ORDER — ZOLPIDEM TARTRATE 10 MG/1
10 TABLET ORAL
Qty: 30 TABLET | Refills: 2 | Status: SHIPPED | OUTPATIENT
Start: 2020-04-28 | End: 2020-07-28

## 2020-04-28 RX ORDER — ROSUVASTATIN CALCIUM 20 MG/1
TABLET, COATED ORAL
Qty: 30 TABLET | Refills: 2 | Status: SHIPPED | OUTPATIENT
Start: 2020-04-28 | End: 2020-05-26

## 2020-05-23 DIAGNOSIS — Z95.5 HISTORY OF CORONARY ARTERY STENT PLACEMENT: ICD-10-CM

## 2020-05-26 RX ORDER — ROSUVASTATIN CALCIUM 20 MG/1
TABLET, COATED ORAL
Qty: 30 TABLET | Refills: 1 | Status: SHIPPED | OUTPATIENT
Start: 2020-05-26 | End: 2020-07-30

## 2020-06-03 DIAGNOSIS — R60.0 PEDAL EDEMA: ICD-10-CM

## 2020-06-04 RX ORDER — FUROSEMIDE 40 MG/1
40 TABLET ORAL DAILY
Qty: 30 TABLET | Refills: 0 | Status: SHIPPED | OUTPATIENT
Start: 2020-06-04 | End: 2020-11-22 | Stop reason: SDUPTHER

## 2020-06-10 ENCOUNTER — TELEPHONE (OUTPATIENT)
Dept: FAMILY MEDICINE CLINIC | Facility: CLINIC | Age: 65
End: 2020-06-10

## 2020-06-10 NOTE — TELEPHONE ENCOUNTER
PT CALLED IN LETTING ME KNOW THAT SHE HAS A MYCHART VISIT SCHEDULED FOR TOMORROW WITH ANAYELI FELIX.  PT WANTS TO COME INTO THE OFFICE TO SEE  BUT AT THIS TIME ANAYELI IS NOT SEEING PTS IN OFFICE.  PT WANTS TO KNOW IF AN APPOINTMENT CAN BE MADE FOR HER TO SEE MARGIE FELIX ONCE SHE IS SEEING PTS IN THE OFFICE.    PT CALL BACK  [phone]

## 2020-07-16 ENCOUNTER — OFFICE VISIT (OUTPATIENT)
Dept: FAMILY MEDICINE CLINIC | Facility: CLINIC | Age: 65
End: 2020-07-16

## 2020-07-16 VITALS
HEIGHT: 66 IN | SYSTOLIC BLOOD PRESSURE: 122 MMHG | DIASTOLIC BLOOD PRESSURE: 84 MMHG | OXYGEN SATURATION: 100 % | HEART RATE: 54 BPM | BODY MASS INDEX: 34.23 KG/M2 | RESPIRATION RATE: 16 BRPM | WEIGHT: 213 LBS

## 2020-07-16 DIAGNOSIS — I10 BENIGN ESSENTIAL HTN: ICD-10-CM

## 2020-07-16 DIAGNOSIS — R26.89 BALANCE DISORDER: Primary | ICD-10-CM

## 2020-07-16 DIAGNOSIS — M81.0 AGE-RELATED OSTEOPOROSIS WITHOUT CURRENT PATHOLOGICAL FRACTURE: ICD-10-CM

## 2020-07-16 PROCEDURE — 99214 OFFICE O/P EST MOD 30 MIN: CPT | Performed by: FAMILY MEDICINE

## 2020-07-16 RX ORDER — NEBIVOLOL 10 MG/1
10 TABLET ORAL DAILY
Qty: 90 TABLET | Refills: 1 | Status: SHIPPED | OUTPATIENT
Start: 2020-07-16 | End: 2020-12-14 | Stop reason: SDUPTHER

## 2020-07-16 RX ORDER — IBANDRONATE SODIUM 150 MG/1
150 TABLET, FILM COATED ORAL
Qty: 3 TABLET | Refills: 3 | Status: SHIPPED | OUTPATIENT
Start: 2020-07-16 | End: 2020-12-14 | Stop reason: SDUPTHER

## 2020-07-16 NOTE — ASSESSMENT & PLAN NOTE
Aileen 7/16/2020   Recommend being measured for Jobst stockings and a prescription was given to her to that effect.

## 2020-07-16 NOTE — PROGRESS NOTES
ASSESSMENT AND PLAN     Problem List Items Addressed This Visit        Cardiovascular and Mediastinum    Benign essential HTN    Relevant Medications    nebivolol (BYSTOLIC) 10 MG tablet       Musculoskeletal and Integument    Osteoporosis    Relevant Medications    ibandronate (Boniva) 150 MG tablet      Other Visit Diagnoses     Balance disorder    -  Primary    Relevant Orders    Ambulatory Referral to Physical Therapy (balance training)        Return in about 6 months (around 1/16/2021).  Patient was given instructions and counseling regarding her condition or for health maintenance advice. Please see specific information pulled into the AVS if appropriate.        SUBJECTIVE   Barbara Tran is a 65 y.o. female being seen today for Foot Swelling; Balance Issues; and Foot Pain               Social History  She  reports that she has never smoked. She has never used smokeless tobacco. She reports that she drinks alcohol. She reports that she does not use drugs.    History of the Present Illness   HPI She is having some tingling with her swollen legs. Is interested in having Jobst stockings to help with her edema.    She had a bone density that was ordered by her endocrinologist last December and that has never been addressed.  She does have osteoporosis on the DEXA scan and I would recommend beginning Boniva for that.  Repeat DEXA in 2 years.    She complains of worsening balance over the last months to years.  She would be interested in doing some physical therapy to try to help with that.  Significant Past History  The following portions of the patient's history were reviewed and updated as appropriate:PMHroutine: Social history , Allergies, Current Medications, Active Problem List and Health Maintenance    Review of Systems   Constitutional: Negative for activity change, appetite change, chills, fatigue, fever and unexpected weight change.   HENT: Negative for congestion, ear pain, hearing loss, nosebleeds,  rhinorrhea and sore throat.    Eyes: Negative for pain, redness and visual disturbance.   Respiratory: Negative for cough, shortness of breath and wheezing.    Cardiovascular: Negative for chest pain, palpitations and leg swelling.   Gastrointestinal: Negative for abdominal pain, blood in stool, constipation, diarrhea, nausea and vomiting.   Endocrine: Negative for cold intolerance and heat intolerance.   Genitourinary: Negative for difficulty urinating, dysuria, frequency, hematuria, pelvic pain, urgency and vaginal discharge.   Musculoskeletal: Negative for arthralgias, back pain and joint swelling.   Skin: Negative for rash and wound.   Neurological: Negative for dizziness, weakness, numbness and headaches.   Hematological: Does not bruise/bleed easily.   Psychiatric/Behavioral: Negative for dysphoric mood, sleep disturbance and suicidal ideas. The patient is not nervous/anxious.    I have reviewed the ROS as documented by the MA. Millicent Ace MD      OBJECTIVE  Vital Signs          BP Readings from Last 1 Encounters:   07/16/20 122/84     Wt Readings from Last 3 Encounters:   07/16/20 96.6 kg (213 lb)   09/16/19 93.4 kg (205 lb 14.4 oz)   05/30/19 94.8 kg (209 lb)   Body mass index is 34.38 kg/m².     Physical Exam   Constitutional: She is oriented to person, place, and time. Vital signs are normal. She appears well-developed and well-nourished. No distress.   HENT:   Head: Normocephalic.   Cardiovascular: Normal rate, regular rhythm and normal heart sounds.   Pulmonary/Chest: Effort normal and breath sounds normal.   Musculoskeletal: She exhibits edema (1-2+ pitting edema LEs ryan).   Neurological: She is alert and oriented to person, place, and time. Gait normal.   Psychiatric: She has a normal mood and affect. Her behavior is normal. Judgment and thought content normal.   Vitals reviewed.    Data Reviewed

## 2020-07-23 DIAGNOSIS — I10 BENIGN ESSENTIAL HTN: Primary | ICD-10-CM

## 2020-07-23 DIAGNOSIS — R53.83 FATIGUE, UNSPECIFIED TYPE: ICD-10-CM

## 2020-07-23 DIAGNOSIS — E03.9 HYPOTHYROIDISM, UNSPECIFIED TYPE: ICD-10-CM

## 2020-07-23 DIAGNOSIS — E78.2 MIXED HYPERLIPIDEMIA: ICD-10-CM

## 2020-07-24 ENCOUNTER — RESULTS ENCOUNTER (OUTPATIENT)
Dept: FAMILY MEDICINE CLINIC | Facility: CLINIC | Age: 65
End: 2020-07-24

## 2020-07-24 DIAGNOSIS — R53.83 FATIGUE, UNSPECIFIED TYPE: ICD-10-CM

## 2020-07-24 DIAGNOSIS — E03.9 HYPOTHYROIDISM, UNSPECIFIED TYPE: ICD-10-CM

## 2020-07-24 DIAGNOSIS — I10 BENIGN ESSENTIAL HTN: ICD-10-CM

## 2020-07-24 DIAGNOSIS — E78.2 MIXED HYPERLIPIDEMIA: ICD-10-CM

## 2020-07-24 LAB
ALBUMIN SERPL-MCNC: 4.1 G/DL (ref 3.5–5.2)
ALBUMIN/GLOB SERPL: 1.8 G/DL
ALP SERPL-CCNC: 65 U/L (ref 39–117)
ALT SERPL-CCNC: 21 U/L (ref 1–33)
AST SERPL-CCNC: 22 U/L (ref 1–32)
BILIRUB SERPL-MCNC: 0.3 MG/DL (ref 0–1.2)
BUN SERPL-MCNC: 19 MG/DL (ref 8–23)
BUN/CREAT SERPL: 21.8 (ref 7–25)
CALCIUM SERPL-MCNC: 9.6 MG/DL (ref 8.6–10.5)
CHLORIDE SERPL-SCNC: 104 MMOL/L (ref 98–107)
CHOLEST SERPL-MCNC: 153 MG/DL (ref 0–200)
CHOLEST/HDLC SERPL: 2.73 {RATIO}
CO2 SERPL-SCNC: 27.5 MMOL/L (ref 22–29)
CREAT SERPL-MCNC: 0.87 MG/DL (ref 0.57–1)
GLOBULIN SER CALC-MCNC: 2.3 GM/DL
GLUCOSE SERPL-MCNC: 112 MG/DL (ref 65–99)
HDLC SERPL-MCNC: 56 MG/DL (ref 40–60)
LDLC SERPL CALC-MCNC: 82 MG/DL (ref 0–100)
POTASSIUM SERPL-SCNC: 5.2 MMOL/L (ref 3.5–5.2)
PROT SERPL-MCNC: 6.4 G/DL (ref 6–8.5)
SODIUM SERPL-SCNC: 142 MMOL/L (ref 136–145)
TRIGL SERPL-MCNC: 77 MG/DL (ref 0–150)
TSH SERPL DL<=0.005 MIU/L-ACNC: 3.27 UIU/ML (ref 0.27–4.2)
VLDLC SERPL CALC-MCNC: 15.4 MG/DL

## 2020-07-25 LAB
BASOPHILS # BLD AUTO: 0.04 10*3/MM3 (ref 0–0.2)
BASOPHILS NFR BLD AUTO: 0.5 % (ref 0–1.5)
EOSINOPHIL # BLD AUTO: 0.37 10*3/MM3 (ref 0–0.4)
EOSINOPHIL NFR BLD AUTO: 4.9 % (ref 0.3–6.2)
ERYTHROCYTE [DISTWIDTH] IN BLOOD BY AUTOMATED COUNT: 12.3 % (ref 12.3–15.4)
HCT VFR BLD AUTO: 42.9 % (ref 34–46.6)
HGB BLD-MCNC: 14.5 G/DL (ref 12–15.9)
IMM GRANULOCYTES # BLD AUTO: 0.01 10*3/MM3 (ref 0–0.05)
IMM GRANULOCYTES NFR BLD AUTO: 0.1 % (ref 0–0.5)
LYMPHOCYTES # BLD AUTO: 2.97 10*3/MM3 (ref 0.7–3.1)
LYMPHOCYTES NFR BLD AUTO: 39.2 % (ref 19.6–45.3)
MCH RBC QN AUTO: 31.1 PG (ref 26.6–33)
MCHC RBC AUTO-ENTMCNC: 33.8 G/DL (ref 31.5–35.7)
MCV RBC AUTO: 92.1 FL (ref 79–97)
MONOCYTES # BLD AUTO: 0.43 10*3/MM3 (ref 0.1–0.9)
MONOCYTES NFR BLD AUTO: 5.7 % (ref 5–12)
NEUTROPHILS # BLD AUTO: 3.75 10*3/MM3 (ref 1.7–7)
NEUTROPHILS NFR BLD AUTO: 49.6 % (ref 42.7–76)
NRBC BLD AUTO-RTO: 0 /100 WBC (ref 0–0.2)
PLATELET # BLD AUTO: 290 10*3/MM3 (ref 140–450)
RBC # BLD AUTO: 4.66 10*6/MM3 (ref 3.77–5.28)
WBC # BLD AUTO: 7.57 10*3/MM3 (ref 3.4–10.8)

## 2020-07-27 PROBLEM — R73.9 HYPERGLYCEMIA: Status: ACTIVE | Noted: 2020-07-27

## 2020-07-27 LAB
HBA1C MFR BLD: 5.1 % (ref 4.8–5.6)
Lab: NORMAL
SPECIMEN STATUS: NORMAL

## 2020-07-28 DIAGNOSIS — F51.01 PRIMARY INSOMNIA: ICD-10-CM

## 2020-07-28 RX ORDER — ZOLPIDEM TARTRATE 10 MG/1
TABLET ORAL
Qty: 30 TABLET | Refills: 2 | Status: SHIPPED | OUTPATIENT
Start: 2020-07-28 | End: 2020-10-30

## 2020-07-29 DIAGNOSIS — Z95.5 HISTORY OF CORONARY ARTERY STENT PLACEMENT: ICD-10-CM

## 2020-07-30 RX ORDER — ROSUVASTATIN CALCIUM 20 MG/1
TABLET, COATED ORAL
Qty: 90 TABLET | Refills: 0 | Status: SHIPPED | OUTPATIENT
Start: 2020-07-30 | End: 2020-08-20

## 2020-08-06 ENCOUNTER — APPOINTMENT (OUTPATIENT)
Dept: PHYSICAL THERAPY | Facility: HOSPITAL | Age: 65
End: 2020-08-06

## 2020-08-20 DIAGNOSIS — Z95.5 HISTORY OF CORONARY ARTERY STENT PLACEMENT: ICD-10-CM

## 2020-08-20 RX ORDER — ROSUVASTATIN CALCIUM 20 MG/1
TABLET, COATED ORAL
Qty: 30 TABLET | Refills: 1 | Status: SHIPPED | OUTPATIENT
Start: 2020-08-20 | End: 2020-10-19

## 2020-10-07 ENCOUNTER — FLU SHOT (OUTPATIENT)
Dept: FAMILY MEDICINE CLINIC | Facility: CLINIC | Age: 65
End: 2020-10-07

## 2020-10-07 DIAGNOSIS — Z23 NEED FOR INFLUENZA VACCINATION: ICD-10-CM

## 2020-10-07 PROCEDURE — G0008 ADMIN INFLUENZA VIRUS VAC: HCPCS | Performed by: FAMILY MEDICINE

## 2020-10-07 PROCEDURE — 90694 VACC AIIV4 NO PRSRV 0.5ML IM: CPT | Performed by: FAMILY MEDICINE

## 2020-10-17 DIAGNOSIS — Z95.5 HISTORY OF CORONARY ARTERY STENT PLACEMENT: ICD-10-CM

## 2020-10-17 DIAGNOSIS — E03.9 HYPOTHYROIDISM, UNSPECIFIED TYPE: ICD-10-CM

## 2020-10-19 RX ORDER — ROSUVASTATIN CALCIUM 20 MG/1
TABLET, COATED ORAL
Qty: 90 TABLET | Refills: 1 | Status: SHIPPED | OUTPATIENT
Start: 2020-10-19 | End: 2020-12-14 | Stop reason: SDUPTHER

## 2020-10-19 RX ORDER — LEVOTHYROXINE SODIUM 50 MCG
TABLET ORAL
Qty: 90 TABLET | Refills: 1 | Status: SHIPPED | OUTPATIENT
Start: 2020-10-19 | End: 2020-12-14 | Stop reason: SDUPTHER

## 2020-10-29 DIAGNOSIS — F51.01 PRIMARY INSOMNIA: ICD-10-CM

## 2020-10-30 RX ORDER — ZOLPIDEM TARTRATE 10 MG/1
TABLET ORAL
Qty: 30 TABLET | Refills: 2 | Status: SHIPPED | OUTPATIENT
Start: 2020-10-30 | End: 2021-02-01

## 2020-11-19 DIAGNOSIS — I10 BENIGN ESSENTIAL HTN: ICD-10-CM

## 2020-11-19 RX ORDER — RAMIPRIL 2.5 MG/1
2.5 CAPSULE ORAL DAILY
Qty: 90 CAPSULE | Refills: 1 | Status: SHIPPED | OUTPATIENT
Start: 2020-11-19 | End: 2020-12-14 | Stop reason: SDUPTHER

## 2020-11-22 DIAGNOSIS — I10 BENIGN ESSENTIAL HTN: ICD-10-CM

## 2020-11-22 DIAGNOSIS — R60.0 PEDAL EDEMA: ICD-10-CM

## 2020-11-22 DIAGNOSIS — F34.1 DYSTHYMIA: ICD-10-CM

## 2020-11-23 RX ORDER — FUROSEMIDE 40 MG/1
40 TABLET ORAL DAILY
Qty: 30 TABLET | Refills: 0 | Status: SHIPPED | OUTPATIENT
Start: 2020-11-23 | End: 2020-12-14 | Stop reason: SDUPTHER

## 2020-11-23 RX ORDER — SPIRONOLACTONE 25 MG/1
25 TABLET ORAL DAILY
Qty: 30 TABLET | Refills: 0 | Status: SHIPPED | OUTPATIENT
Start: 2020-11-23 | End: 2020-12-14 | Stop reason: SDUPTHER

## 2020-11-23 RX ORDER — SPIRONOLACTONE 25 MG/1
TABLET ORAL
Qty: 90 TABLET | Refills: 0 | Status: SHIPPED | OUTPATIENT
Start: 2020-11-23 | End: 2020-11-23

## 2020-11-23 RX ORDER — BUPROPION HYDROCHLORIDE 300 MG/1
300 TABLET ORAL DAILY
Qty: 30 TABLET | Refills: 0 | Status: SHIPPED | OUTPATIENT
Start: 2020-11-23 | End: 2020-12-14 | Stop reason: SDUPTHER

## 2020-12-14 ENCOUNTER — TELEMEDICINE (OUTPATIENT)
Dept: FAMILY MEDICINE CLINIC | Facility: CLINIC | Age: 65
End: 2020-12-14

## 2020-12-14 VITALS — HEIGHT: 66 IN | BODY MASS INDEX: 34.23 KG/M2 | WEIGHT: 213 LBS

## 2020-12-14 DIAGNOSIS — I50.32 CHRONIC DIASTOLIC CONGESTIVE HEART FAILURE (HCC): ICD-10-CM

## 2020-12-14 DIAGNOSIS — M81.0 AGE-RELATED OSTEOPOROSIS WITHOUT CURRENT PATHOLOGICAL FRACTURE: ICD-10-CM

## 2020-12-14 DIAGNOSIS — R60.0 PEDAL EDEMA: ICD-10-CM

## 2020-12-14 DIAGNOSIS — Z95.5 HISTORY OF CORONARY ARTERY STENT PLACEMENT: ICD-10-CM

## 2020-12-14 DIAGNOSIS — F34.1 DYSTHYMIA: ICD-10-CM

## 2020-12-14 DIAGNOSIS — I10 BENIGN ESSENTIAL HTN: ICD-10-CM

## 2020-12-14 DIAGNOSIS — R41.3 MEMORY LOSS: Primary | ICD-10-CM

## 2020-12-14 DIAGNOSIS — E03.9 HYPOTHYROIDISM, UNSPECIFIED TYPE: ICD-10-CM

## 2020-12-14 PROCEDURE — 99214 OFFICE O/P EST MOD 30 MIN: CPT | Performed by: FAMILY MEDICINE

## 2020-12-14 RX ORDER — NEBIVOLOL 10 MG/1
10 TABLET ORAL DAILY
Qty: 90 TABLET | Refills: 1 | Status: SHIPPED | OUTPATIENT
Start: 2020-12-14 | End: 2021-12-09 | Stop reason: SDUPTHER

## 2020-12-14 RX ORDER — LEVOTHYROXINE SODIUM 50 MCG
50 TABLET ORAL DAILY
Qty: 90 TABLET | Refills: 1 | Status: SHIPPED | OUTPATIENT
Start: 2020-12-14 | End: 2021-08-20

## 2020-12-14 RX ORDER — BUPROPION HYDROCHLORIDE 300 MG/1
300 TABLET ORAL DAILY
Qty: 90 TABLET | Refills: 3 | Status: SHIPPED | OUTPATIENT
Start: 2020-12-14 | End: 2021-05-04

## 2020-12-14 RX ORDER — RAMIPRIL 2.5 MG/1
2.5 CAPSULE ORAL DAILY
Qty: 90 CAPSULE | Refills: 1 | Status: SHIPPED | OUTPATIENT
Start: 2020-12-14 | End: 2021-11-15

## 2020-12-14 RX ORDER — IBANDRONATE SODIUM 150 MG/1
150 TABLET, FILM COATED ORAL
Qty: 3 TABLET | Refills: 3 | Status: SHIPPED | OUTPATIENT
Start: 2020-12-14 | End: 2021-12-29

## 2020-12-14 RX ORDER — SPIRONOLACTONE 25 MG/1
25 TABLET ORAL DAILY
Qty: 90 TABLET | Refills: 1 | Status: SHIPPED | OUTPATIENT
Start: 2020-12-14 | End: 2021-12-27

## 2020-12-14 RX ORDER — ROSUVASTATIN CALCIUM 20 MG/1
20 TABLET, COATED ORAL DAILY
Qty: 90 TABLET | Refills: 1 | Status: SHIPPED | OUTPATIENT
Start: 2020-12-14 | End: 2022-04-14

## 2020-12-14 RX ORDER — FUROSEMIDE 40 MG/1
40 TABLET ORAL DAILY
Qty: 90 TABLET | Refills: 3 | Status: SHIPPED | OUTPATIENT
Start: 2020-12-14 | End: 2021-08-10 | Stop reason: SDUPTHER

## 2020-12-14 NOTE — ASSESSMENT & PLAN NOTE
Aileen 12/14/2020   Patient is followed by Dr. Beckford for this and will see her in 2 days.  She sounds like she could have some exacerbation of her symptoms.

## 2020-12-14 NOTE — PROGRESS NOTES
Assessment and Plan:     Problem List Items Addressed This Visit     Benign essential HTN    Overview     [facility] 12/14/2020   Patient will have blood pressure checked by her cardiologist on Wednesday.  She will let me know if there are any med adjustments to be made.         Relevant Medications    nebivolol (BYSTOLIC) 10 MG tablet    ramipril (ALTACE) 2.5 MG capsule    spironolactone (ALDACTONE) 25 MG tablet    Lasix 40 MG tablet    Chronic diastolic congestive heart failure (CMS/HCC)    Overview     OVERVIEW:  Noted 8/30/2018           Current Assessment & Plan     [facility] 12/14/2020   Patient is followed by Dr. Beckford for this and will see her in 2 days.  She sounds like she could have some exacerbation of her symptoms.         Relevant Medications    nebivolol (BYSTOLIC) 10 MG tablet    Dysthymia    Overview     [facility] 12/14/2020   Stable, needs refill.  Patient will be getting neuropsych testing and does not need a PHQ-9 therefore, at this time.         Relevant Medications    buPROPion XL (WELLBUTRIN XL) 300 MG 24 hr tablet    Hypothyroidism    Overview     [facility] 12/14/2020   Lab Results   Component Value Date    TSH 2.620 12/11/2020              Relevant Medications    nebivolol (BYSTOLIC) 10 MG tablet    Synthroid 50 MCG tablet    Osteoporosis    Overview     [facility] 12/14/2020   Patient's next DEXA scan is due in February 2021.         Relevant Medications    ibandronate (Boniva) 150 MG tablet    Pedal edema    Overview     [facility] 12/14/2020   Slightly worse.  Patient is wearing her compression hose.         Relevant Medications    Lasix 40 MG tablet      Other Visit Diagnoses     Memory loss    -  Primary    Relevant Orders    NeuroPsych Testing    History of coronary artery stent placement        Relevant Medications    rosuvastatin (CRESTOR) 20 MG tablet        No follow-ups on file.  Patient was given instructions and counseling regarding her condition or for health maintenance advice. Please  see specific information pulled into the AVS if appropriate.        Barbara Tran is a 65 y.o. female being seen today for Memory Loss and sensitivity  in feet   History of the Present Illness     She is really worried about her memory. She has trouble finding words when she is talking, that is the main thing. She feels like she does stupid stuff sometimes. More forgetful. She sometimes, only when she is tired, never in the morning, will have 3-4 seconds of forgetting where she is going when she is driving. She thinks she has more shortness of breath and is more tired. Feet are swelling more. She is wearing her compression hose. Needs refills on all her medications. And she is seeing Berennao Beckford Wednesday. She had her blood drawn at [facility] but in looking at it, it did not include a BNP.      Social History  She  reports that she has never smoked. She has never used smokeless tobacco. She reports current alcohol use. She reports that she does not use drugs.    Review of Systems   Constitutional: Negative for fatigue, fever and unexpected weight change.   Respiratory: Negative for cough and shortness of breath.    Cardiovascular: Negative for chest pain.   Neurological:        Memory issue's      Psychiatric/Behavioral: Negative for dysphoric mood. The patient is not nervous/anxious.    I have reviewed the ROS as documented by the MA. Millicent Ace MD        Vital Signs          BP Readings from Last 1 Encounters:   07/16/20 122/84     Wt Readings from Last 3 Encounters:   12/14/20 96.6 kg (213 lb)   07/16/20 96.6 kg (213 lb)   09/16/19 93.4 kg (205 lb 14.4 oz)   Body mass index is 34.38 kg/m².     Physical Exam  Vitals signs reviewed.   Constitutional:       Appearance: Normal appearance. She is well-developed.   Neurological:      Mental Status: She is alert.   Psychiatric:         Behavior: Behavior normal.         Thought Content: Thought content normal.         Judgment: Judgment normal.               Patient  chose to receive care through a telehealth visit.  She consents to use a video/audio connection for her medical care today.

## 2021-02-01 DIAGNOSIS — F51.01 PRIMARY INSOMNIA: ICD-10-CM

## 2021-02-01 RX ORDER — ZOLPIDEM TARTRATE 10 MG/1
TABLET ORAL
Qty: 30 TABLET | Refills: 2 | Status: SHIPPED | OUTPATIENT
Start: 2021-02-01 | End: 2021-03-24 | Stop reason: SDUPTHER

## 2021-03-08 ENCOUNTER — TELEMEDICINE (OUTPATIENT)
Dept: FAMILY MEDICINE CLINIC | Facility: CLINIC | Age: 66
End: 2021-03-08

## 2021-03-08 DIAGNOSIS — R43.0 LOSS OF SMELL: ICD-10-CM

## 2021-03-08 DIAGNOSIS — R05.9 COUGH: ICD-10-CM

## 2021-03-08 DIAGNOSIS — R09.81 NASAL CONGESTION: ICD-10-CM

## 2021-03-08 DIAGNOSIS — R43.2 LOSS OF TASTE: Primary | ICD-10-CM

## 2021-03-08 PROCEDURE — 99213 OFFICE O/P EST LOW 20 MIN: CPT | Performed by: NURSE PRACTITIONER

## 2021-03-08 RX ORDER — MAGNESIUM OXIDE 400 MG/1
800 TABLET ORAL 2 TIMES DAILY
COMMUNITY

## 2021-03-08 NOTE — PROGRESS NOTES
Subjective   Barbara Tran is a 66 y.o. female.     History of Present Illness   Patient presents via tele-visit for nasal congestion and loss of taste and smell. She denies any fever, but reports cough and mild shortness of breath. She is not taking anything for her symptoms.  She states that there is a possibility due to her job that she may have been exposed to Covid.     The following portions of the patient's history were reviewed and updated as appropriate: allergies, current medications, past family history, past medical history, past social history, past surgical history and problem list.    Review of Systems   Constitutional: Negative for appetite change, chills, fatigue and fever.   HENT: Positive for congestion, postnasal drip, sore throat and voice change. Negative for ear pain, rhinorrhea, sinus pressure and sneezing.    Eyes: Negative for redness and itching.   Respiratory: Positive for cough. Negative for chest tightness and shortness of breath.    Cardiovascular: Negative for chest pain, palpitations and leg swelling.   Musculoskeletal: Negative for myalgias.   Allergic/Immunologic: Negative.    Neurological: Negative for dizziness and headache.       Objective   Physical Exam  Constitutional:       Appearance: She is well-developed.   HENT:      Head: Normocephalic and atraumatic.   Eyes:      Pupils: Pupils are equal, round, and reactive to light.   Pulmonary:      Effort: Pulmonary effort is normal.   Neurological:      Mental Status: She is alert and oriented to person, place, and time.   Psychiatric:         Behavior: Behavior normal.         Thought Content: Thought content normal.         Judgment: Judgment normal.         There were no vitals filed for this visit.  There is no height or weight on file to calculate BMI.    Procedures    Assessment/Plan   Problems Addressed this Visit     None      Visit Diagnoses     Loss of taste    -  Primary    Relevant Orders    COVID-19,[facility] ROUTINE,  NP/OP SWAB IN TRANSPORT MEDIA OR ESWAB 72 HR TAT - Swab, Oropharynx    Loss of smell        Relevant Orders    COVID-19,[facility] ROUTINE, NP/OP SWAB IN TRANSPORT MEDIA OR ESWAB 72 HR TAT - Swab, Oropharynx    Cough        Relevant Orders    COVID-19,[facility] ROUTINE, NP/OP SWAB IN TRANSPORT MEDIA OR ESWAB 72 HR TAT - Swab, Oropharynx    Nasal congestion        Relevant Orders    COVID-19,[facility] ROUTINE, NP/OP SWAB IN TRANSPORT MEDIA OR ESWAB 72 HR TAT - Swab, Oropharynx      Diagnoses       Codes Comments    Loss of taste    -  Primary ICD-10-CM: R43.2  ICD-9-CM: 781.1     Loss of smell     ICD-10-CM: R43.0  ICD-9-CM: 781.1     Cough     ICD-10-CM: R05  ICD-9-CM: 786.2     Nasal congestion     ICD-10-CM: R09.81  ICD-9-CM: 478.19         You have chosen to receive care through a telehealth visit.  Do you consent to use a video/audio connection for your medical care today? Yes  Covid test ordered.   Patient advised to quarantine for 7 days from the onset of symptoms.  Patient advised to go to ER for severe symptoms.  Tylenol OTC for symptoms.          Return if symptoms worsen or fail to improve.       Patient Instructions   I will call you with your test results.  Return if symptoms worsen or fail to improve.  Per CDC recommendations, you must self-quarantine at home for 7 days from the onset of symptoms EVEN with a negative covid test.   If your symptoms worsen or become severe, you should go to an emergency room.  May take over the counter tylenol as directed for your symptoms.  It is okay to take over the counter cold medicine, however, you should avoid any with decongestant due to your blood pressure issues.     COVID-19  COVID-19 is a respiratory infection that is caused by a virus called severe acute respiratory syndrome coronavirus 2 (SARS-CoV-2). The disease is also known as coronavirus disease or novel coronavirus. In some people, the virus may not cause any symptoms. In others, it may cause a serious  infection. The infection can get worse quickly and can lead to complications, such as:  · Pneumonia, or infection of the lungs.  · Acute respiratory distress syndrome, or ARDS. This is a condition in which fluid buildup in the lungs prevents the lungs from filling with air and passing oxygen into the blood.  · Acute respiratory failure. This is a condition in which there is not enough oxygen passing from the lungs to the body or when carbon dioxide is not passing from the lungs out of the body.  · Sepsis or septic shock. This is a serious bodily reaction to an infection.  · Blood-clotting problems.  · Secondary infections due to bacteria or fungus.  · Organ failure. This is when your body's organs stop working.  The virus that causes COVID-19 is contagious. This means that it can spread from person to person through droplets from coughs and sneezes (respiratory secretions).  What are the causes?  This illness is caused by a virus. You may catch the virus by:  · Breathing in droplets from an infected person. Droplets can be spread by a person breathing, speaking, singing, coughing, or sneezing.  · Touching something, like a table or a doorknob, that was exposed to the virus (contaminated) and then touching your mouth, nose, or eyes.  What increases the risk?  Risk for infection  You are more likely to be infected with this virus if you:  · Are within 6 ft (2 m) of a person with COVID-19.  · Provide care for or live with a person who is infected with COVID-19.  · Spend time in crowded indoor spaces or live in shared housing.  Risk for serious illness  You are more likely to become seriously ill from the virus if you:  · Are 50 years of age or older. The higher your age, the more you are at risk for serious illness.  · Live in a nursing home or long-term care facility.  · Have cancer.  · Have a long-term (chronic) disease such as:  ? Chronic lung disease, including chronic obstructive pulmonary disease or asthma.  ? A  "long-term disease that lowers your body's ability to fight infection (immunocompromised).  ? Heart disease, including:  § Heart failure.  § Coronary artery disease, a condition in which the arteries that lead to the heart become narrow or blocked.  § A disease that makes the heart muscle thick, weak, or stiff (cardiomyopathy).  ? Diabetes.  ? Chronic kidney disease.  ? Sickle cell disease, a condition in which red blood cells have an abnormal \"sickle\" shape.  ? Liver disease.  · Are obese.  What are the signs or symptoms?  Symptoms of this condition can range from mild to severe. Symptoms may appear any time from 2 to 14 days after being exposed to the virus. They include:  · A fever or chills.  · A cough.  · Difficulty breathing or feeling short of breath.  · Feeling tired.  · Headaches, body aches, or muscle aches.  · Runny or stuffy (congested) nose.  · A sore throat.  · New loss of taste or smell.  Some people may also have stomach problems, such as nausea, vomiting, or diarrhea.  Other people may not have any symptoms of COVID-19.  How is this diagnosed?  This condition may be diagnosed based on:  · Your signs and symptoms, especially if:  ? You provide care for or live with a person who was diagnosed with COVID-19.  ? You were exposed to a person who was diagnosed with COVID-19.  · A physical exam.  · Lab tests, which may include:  ? Using a swab to take a sample of fluid from the back of your nose and throat (nasopharyngeal fluid), from your nose, or from your throat.  ? Testing a sample of saliva from your mouth.  ? Testing a sample of coughed-up mucus from your lungs (sputum).  ? Blood tests.  · Imaging tests, which may include X-rays, CT scan, or ultrasound.  How is this treated?  Your health care provider will talk with you about ways to treat your symptoms. For most people, the infection is mild and can be managed at home with rest, fluids, and over-the-counter medicines. There are currently no " prescription medicines that have been approved for treatment of people with mild cases of COVID-19. Some medicines that treat other diseases are being used on a trial basis to see if they are effective for treating mild cases of COVID-19.  Treatment for a serious infection usually takes place in a hospital intensive care unit (ICU). It may include one or more of the following treatments. These treatments are given until your symptoms improve.  · Receiving fluids and medicines through an IV.  ? Some medicines have been approved for the treatment of people with serious infection who are being treated in the hospital. In addition, certain medicines that treat other diseases are being used on a trial basis to see if they are effective for treating severe cases of COVID-19.  · Supplemental oxygen. Extra oxygen is given through a tube in the nose, a face mask, or a sanchez.  · Positioning you to lie on your stomach (prone position). This makes it easier for oxygen to get into the lungs.  · Continuous positive airway pressure (CPAP) or bi-level positive airway pressure (BPAP) machine. This treatment uses mild air pressure to keep the airways open. A tube that is connected to a motor delivers oxygen to the body.  · Ventilator. This treatment moves air into and out of the lungs by using a tube that is placed in your windpipe.  · Tracheostomy. This is a procedure to create a hole in the neck so that a breathing tube can be inserted.  · Extracorporeal membrane oxygenation (ECMO). This procedure gives the lungs a chance to recover by taking over the functions of the heart and lungs. It supplies oxygen to the body and removes carbon dioxide.  Follow these instructions at home:  Lifestyle  · If you are sick, stay home except to get medical care. Your health care provider will tell you how long to stay home. Call your health care provider before you go for medical care.  · Rest at home as told by your health care provider.  · Do not  use any products that contain nicotine or tobacco, such as cigarettes, e-cigarettes, and chewing tobacco. If you need help quitting, ask your health care provider.  · Return to your normal activities as told by your health care provider. Ask your health care provider what activities are safe for you.  General instructions  · Take over-the-counter and prescription medicines only as told by your health care provider.  · Drink enough fluid to keep your urine pale yellow.  · Keep all follow-up visits as told by your health care provider. This is important.  How is this prevented?         Some vaccines to prevent the virus that causes COVID-19 have been authorized for emergency use. This means that the vaccines are still being tested, but they have been deemed safe for use and have been effective in preventing COVID-19 in trials. These vaccines may not be available to everyone right away. The vaccines will likely be given to certain groups at higher risk first until there is enough supply available for everyone. Until the vaccines are available for everyone, it is important to continue to take steps to protect yourself and others from this virus.  To protect yourself:   Take precautions to avoid infection.  · Stay away from people who are sick.  · Wash your hands often with soap and water for at least 20 seconds. If soap and water are not available, use an alcohol-based hand .  · Avoid touching your mouth, face, eyes, or nose.  · Avoid going out in public. Follow guidance from your state and local health authorities.  · If you must go out in public, wear a cloth face covering or face mask. Make sure your mask covers your nose and mouth.  · Avoid crowded indoor spaces. Stay at least 6 ft (2 m) away from others.  · Disinfect objects and surfaces that are frequently touched every day. This may include:  ? Counters and tables.  ? Doorknobs and light switches.  ? Sinks and faucets.  ? Electronics such as phones,  remote controls, keyboards, computers, and tablets.  To protect others:  If you have symptoms of COVID-19, take steps to prevent the virus from spreading to others.  · If you think you have a COVID-19 infection, contact your health care provider right away. Tell your health care team that you think you may have a COVID-19 infection.  · Stay home. Leave your house only to seek medical care. Do not use public transport, if possible.  · Do not travel while you are sick.  · Wash your hands often with soap and water for at least 20 seconds. If soap and water are not available, use alcohol-based hand .  · Stay away from other members of your household. Let healthy household members care for children and pets, if possible. If you have to care for children or pets, wash your hands often and wear a mask. If possible, stay in your own room, separate from others. Use a different bathroom.  · Make sure that all people in your household wash their hands well and often.  · Cough or sneeze into a tissue or your sleeve or elbow. Do not cough or sneeze into your hand or into the air.  · Wear a cloth face covering or face mask. Make sure your mask covers your nose and mouth.  Where to find more information  · Centers for Disease Control and Prevention: www.cdc.gov/coronavirus  · World Health Organization: www.who.int/health-topics/coronavirus  Contact a health care provider if:  · You have symptoms of COVID-19.  · You have been exposed to someone who has COVID-19, even if you do not have symptoms.  Get help right away if:  · You have trouble breathing.  · You have pain or pressure in your chest.  · You have confusion.  · You have bluish lips and fingernails.  · You have difficulty waking from sleep.  · You have symptoms that get worse.  These symptoms may represent a serious problem that is an emergency. Do not wait to see if the symptoms will go away. Get medical help right away. Call your local emergency services (770 in  the [place] Do not drive yourself to the hospital. Let the emergency medical personnel know if you think you have COVID-19.  Summary  · COVID-19 is a respiratory infection that is caused by a virus. It is also known as coronavirus disease or novel coronavirus. It can cause serious infections, such as pneumonia, acute respiratory distress syndrome, acute respiratory failure, or sepsis.  · The virus that causes COVID-19 is contagious. This means that it can spread from person to person through droplets from breathing, speaking, singing, coughing, or sneezing.  · You are more likely to develop a serious illness if you are 50 years of age or older, have a weak immune system, live in a nursing home, or have a chronic disease.  · There is no approved medicine to treat mild cases of COVID-19. Your health care provider will talk with you about ways to treat your symptoms.  · Take steps to protect yourself and others from infection. Wash your hands often and disinfect objects and surfaces that are frequently touched every day. Stay away from people who are sick, and wear a mask if you are sick.  This information is not intended to replace advice given to you by your health care provider. Make sure you discuss any questions you have with your health care provider.  Document Revised: 12/15/2020 Document Reviewed: 12/15/2020  [employer] Patient Education © 2021 [employer]

## 2021-03-08 NOTE — PATIENT INSTRUCTIONS
I will call you with your test results.  Return if symptoms worsen or fail to improve.  Per CDC recommendations, you must self-quarantine at home for 7 days from the onset of symptoms EVEN with a negative covid test.   If your symptoms worsen or become severe, you should go to an emergency room.  May take over the counter tylenol as directed for your symptoms.  It is okay to take over the counter cold medicine, however, you should avoid any with decongestant due to your blood pressure issues.     COVID-19  COVID-19 is a respiratory infection that is caused by a virus called severe acute respiratory syndrome coronavirus 2 (SARS-CoV-2). The disease is also known as coronavirus disease or novel coronavirus. In some people, the virus may not cause any symptoms. In others, it may cause a serious infection. The infection can get worse quickly and can lead to complications, such as:  · Pneumonia, or infection of the lungs.  · Acute respiratory distress syndrome, or ARDS. This is a condition in which fluid buildup in the lungs prevents the lungs from filling with air and passing oxygen into the blood.  · Acute respiratory failure. This is a condition in which there is not enough oxygen passing from the lungs to the body or when carbon dioxide is not passing from the lungs out of the body.  · Sepsis or septic shock. This is a serious bodily reaction to an infection.  · Blood-clotting problems.  · Secondary infections due to bacteria or fungus.  · Organ failure. This is when your body's organs stop working.  The virus that causes COVID-19 is contagious. This means that it can spread from person to person through droplets from coughs and sneezes (respiratory secretions).  What are the causes?  This illness is caused by a virus. You may catch the virus by:  · Breathing in droplets from an infected person. Droplets can be spread by a person breathing, speaking, singing, coughing, or sneezing.  · Touching something, like a table  "or a doorknob, that was exposed to the virus (contaminated) and then touching your mouth, nose, or eyes.  What increases the risk?  Risk for infection  You are more likely to be infected with this virus if you:  · Are within 6 ft (2 m) of a person with COVID-19.  · Provide care for or live with a person who is infected with COVID-19.  · Spend time in crowded indoor spaces or live in shared housing.  Risk for serious illness  You are more likely to become seriously ill from the virus if you:  · Are 50 years of age or older. The higher your age, the more you are at risk for serious illness.  · Live in a nursing home or long-term care facility.  · Have cancer.  · Have a long-term (chronic) disease such as:  ? Chronic lung disease, including chronic obstructive pulmonary disease or asthma.  ? A long-term disease that lowers your body's ability to fight infection (immunocompromised).  ? Heart disease, including:  § Heart failure.  § Coronary artery disease, a condition in which the arteries that lead to the heart become narrow or blocked.  § A disease that makes the heart muscle thick, weak, or stiff (cardiomyopathy).  ? Diabetes.  ? Chronic kidney disease.  ? Sickle cell disease, a condition in which red blood cells have an abnormal \"sickle\" shape.  ? Liver disease.  · Are obese.  What are the signs or symptoms?  Symptoms of this condition can range from mild to severe. Symptoms may appear any time from 2 to 14 days after being exposed to the virus. They include:  · A fever or chills.  · A cough.  · Difficulty breathing or feeling short of breath.  · Feeling tired.  · Headaches, body aches, or muscle aches.  · Runny or stuffy (congested) nose.  · A sore throat.  · New loss of taste or smell.  Some people may also have stomach problems, such as nausea, vomiting, or diarrhea.  Other people may not have any symptoms of COVID-19.  How is this diagnosed?  This condition may be diagnosed based on:  · Your signs and symptoms, " especially if:  ? You provide care for or live with a person who was diagnosed with COVID-19.  ? You were exposed to a person who was diagnosed with COVID-19.  · A physical exam.  · Lab tests, which may include:  ? Using a swab to take a sample of fluid from the back of your nose and throat (nasopharyngeal fluid), from your nose, or from your throat.  ? Testing a sample of saliva from your mouth.  ? Testing a sample of coughed-up mucus from your lungs (sputum).  ? Blood tests.  · Imaging tests, which may include X-rays, CT scan, or ultrasound.  How is this treated?  Your health care provider will talk with you about ways to treat your symptoms. For most people, the infection is mild and can be managed at home with rest, fluids, and over-the-counter medicines. There are currently no prescription medicines that have been approved for treatment of people with mild cases of COVID-19. Some medicines that treat other diseases are being used on a trial basis to see if they are effective for treating mild cases of COVID-19.  Treatment for a serious infection usually takes place in a hospital intensive care unit (ICU). It may include one or more of the following treatments. These treatments are given until your symptoms improve.  · Receiving fluids and medicines through an IV.  ? Some medicines have been approved for the treatment of people with serious infection who are being treated in the hospital. In addition, certain medicines that treat other diseases are being used on a trial basis to see if they are effective for treating severe cases of COVID-19.  · Supplemental oxygen. Extra oxygen is given through a tube in the nose, a face mask, or a sanchez.  · Positioning you to lie on your stomach (prone position). This makes it easier for oxygen to get into the lungs.  · Continuous positive airway pressure (CPAP) or bi-level positive airway pressure (BPAP) machine. This treatment uses mild air pressure to keep the airways open.  A tube that is connected to a motor delivers oxygen to the body.  · Ventilator. This treatment moves air into and out of the lungs by using a tube that is placed in your windpipe.  · Tracheostomy. This is a procedure to create a hole in the neck so that a breathing tube can be inserted.  · Extracorporeal membrane oxygenation (ECMO). This procedure gives the lungs a chance to recover by taking over the functions of the heart and lungs. It supplies oxygen to the body and removes carbon dioxide.  Follow these instructions at home:  Lifestyle  · If you are sick, stay home except to get medical care. Your health care provider will tell you how long to stay home. Call your health care provider before you go for medical care.  · Rest at home as told by your health care provider.  · Do not use any products that contain nicotine or tobacco, such as cigarettes, e-cigarettes, and chewing tobacco. If you need help quitting, ask your health care provider.  · Return to your normal activities as told by your health care provider. Ask your health care provider what activities are safe for you.  General instructions  · Take over-the-counter and prescription medicines only as told by your health care provider.  · Drink enough fluid to keep your urine pale yellow.  · Keep all follow-up visits as told by your health care provider. This is important.  How is this prevented?         Some vaccines to prevent the virus that causes COVID-19 have been authorized for emergency use. This means that the vaccines are still being tested, but they have been deemed safe for use and have been effective in preventing COVID-19 in trials. These vaccines may not be available to everyone right away. The vaccines will likely be given to certain groups at higher risk first until there is enough supply available for everyone. Until the vaccines are available for everyone, it is important to continue to take steps to protect yourself and others from this  virus.  To protect yourself:   Take precautions to avoid infection.  · Stay away from people who are sick.  · Wash your hands often with soap and water for at least 20 seconds. If soap and water are not available, use an alcohol-based hand .  · Avoid touching your mouth, face, eyes, or nose.  · Avoid going out in public. Follow guidance from your state and local health authorities.  · If you must go out in public, wear a cloth face covering or face mask. Make sure your mask covers your nose and mouth.  · Avoid crowded indoor spaces. Stay at least 6 ft (2 m) away from others.  · Disinfect objects and surfaces that are frequently touched every day. This may include:  ? Counters and tables.  ? Doorknobs and light switches.  ? Sinks and faucets.  ? Electronics such as phones, remote controls, keyboards, computers, and tablets.  To protect others:  If you have symptoms of COVID-19, take steps to prevent the virus from spreading to others.  · If you think you have a COVID-19 infection, contact your health care provider right away. Tell your health care team that you think you may have a COVID-19 infection.  · Stay home. Leave your house only to seek medical care. Do not use public transport, if possible.  · Do not travel while you are sick.  · Wash your hands often with soap and water for at least 20 seconds. If soap and water are not available, use alcohol-based hand .  · Stay away from other members of your household. Let healthy household members care for children and pets, if possible. If you have to care for children or pets, wash your hands often and wear a mask. If possible, stay in your own room, separate from others. Use a different bathroom.  · Make sure that all people in your household wash their hands well and often.  · Cough or sneeze into a tissue or your sleeve or elbow. Do not cough or sneeze into your hand or into the air.  · Wear a cloth face covering or face mask. Make sure your mask  covers your nose and mouth.  Where to find more information  · Centers for Disease Control and Prevention: www.cdc.gov/coronavirus  · World Health Organization: www.who.int/health-topics/coronavirus  Contact a health care provider if:  · You have symptoms of COVID-19.  · You have been exposed to someone who has COVID-19, even if you do not have symptoms.  Get help right away if:  · You have trouble breathing.  · You have pain or pressure in your chest.  · You have confusion.  · You have bluish lips and fingernails.  · You have difficulty waking from sleep.  · You have symptoms that get worse.  These symptoms may represent a serious problem that is an emergency. Do not wait to see if the symptoms will go away. Get medical help right away. Call your local emergency services (911 in the [place] Do not drive yourself to the hospital. Let the emergency medical personnel know if you think you have COVID-19.  Summary  · COVID-19 is a respiratory infection that is caused by a virus. It is also known as coronavirus disease or novel coronavirus. It can cause serious infections, such as pneumonia, acute respiratory distress syndrome, acute respiratory failure, or sepsis.  · The virus that causes COVID-19 is contagious. This means that it can spread from person to person through droplets from breathing, speaking, singing, coughing, or sneezing.  · You are more likely to develop a serious illness if you are 50 years of age or older, have a weak immune system, live in a nursing home, or have a chronic disease.  · There is no approved medicine to treat mild cases of COVID-19. Your health care provider will talk with you about ways to treat your symptoms.  · Take steps to protect yourself and others from infection. Wash your hands often and disinfect objects and surfaces that are frequently touched every day. Stay away from people who are sick, and wear a mask if you are sick.  This information is not intended to replace advice  given to you by your health care provider. Make sure you discuss any questions you have with your health care provider.  Document Revised: 12/15/2020 Document Reviewed: 12/15/2020  Elsevier Patient Education © 2021 [employer]vier Inc.

## 2021-03-09 ENCOUNTER — TELEPHONE (OUTPATIENT)
Dept: FAMILY MEDICINE CLINIC | Facility: CLINIC | Age: 66
End: 2021-03-09

## 2021-03-09 LAB — SARS-COV-2 RNA RESP QL NAA+PROBE: DETECTED

## 2021-03-09 NOTE — TELEPHONE ENCOUNTER
PT IS REQUESTING A CALL BACK ASAP TO DISCUSS WHAT SHE NEEDS TO DO ABOUT BEING POSITIVE FOR COVID. SHE IS LIVING WITH HER CHILD AND GRANDCHILDREN.

## 2021-03-09 NOTE — TELEPHONE ENCOUNTER
Spoke to Zara MCKEON. She is going to call patient and let her know that patient should quarantine away from family if she is in a common area, she should wear a mask. Pt's family will need to quarantine for 10days due to exposure. If symptoms worsen or become severe, patient should go to ER.

## 2021-03-09 NOTE — TELEPHONE ENCOUNTER
Patients cardiologist called requesting to have the infusion for covid ordered for patient they cannot order it.  Can you please take care of this

## 2021-03-10 ENCOUNTER — TRANSCRIBE ORDERS (OUTPATIENT)
Dept: ADMINISTRATIVE | Facility: HOSPITAL | Age: 66
End: 2021-03-10

## 2021-03-10 DIAGNOSIS — U07.1 CLINICAL DIAGNOSIS OF SEVERE ACUTE RESPIRATORY SYNDROME CORONAVIRUS 2 (SARS-COV-2) DISEASE: Primary | ICD-10-CM

## 2021-03-10 DIAGNOSIS — U07.1 COVID-19: Primary | ICD-10-CM

## 2021-03-10 RX ORDER — SODIUM CHLORIDE 9 MG/ML
50 INJECTION, SOLUTION INTRAVENOUS ONCE
Status: CANCELLED | OUTPATIENT
Start: 2021-03-10

## 2021-03-10 RX ORDER — DIPHENHYDRAMINE HYDROCHLORIDE 50 MG/ML
50 INJECTION INTRAMUSCULAR; INTRAVENOUS AS NEEDED
Status: CANCELLED | OUTPATIENT
Start: 2021-03-10

## 2021-03-10 RX ORDER — METHYLPREDNISOLONE SODIUM SUCCINATE 125 MG/2ML
125 INJECTION, POWDER, LYOPHILIZED, FOR SOLUTION INTRAMUSCULAR; INTRAVENOUS AS NEEDED
Status: CANCELLED | OUTPATIENT
Start: 2021-03-10

## 2021-03-10 RX ORDER — EPINEPHRINE 1 MG/ML
0.3 INJECTION, SOLUTION, CONCENTRATE INTRAVENOUS AS NEEDED
Status: CANCELLED | OUTPATIENT
Start: 2021-03-10

## 2021-03-10 NOTE — PROGRESS NOTES
[facility]  Clinical Pharmacy Department     Pharmacy Consult/Review: COVID-19 Monoclonal Antibody    Barbara Tran is a 66 y.o. female presenting with mild to moderate COVID-19 symptoms and has tested positive for SARS-CoV-2.    COVID-19 Monoclonal Antibody Ordered bamlanivimab  Ordering/Consulting Provider: Dia VILLELA  Date of Confirmed SARS-CoV-2: 3/8/21  Date of Symptom Onset : 3/6/21    Allergies  Allergies as of 03/10/2021 - Reviewed 03/08/2021   Allergen Reaction Noted   • Codeine Dizziness 01/28/2016   • Levofloxacin Itching 10/23/2016   • Morphine Itching 10/21/2016       Microbiology  Microbiology Results (last 10 days)     Procedure Component Value - Date/Time    COVID-19,[facility] ROUTINE, NP/OP SWAB IN TRANSPORT MEDIA OR ESWAB 72 HR TAT - Swab, Oropharynx [[id number]]  (Abnormal) Collected: 03/08/21 1604    Lab Status: Final result Specimen: Swab from Oropharynx Updated: 03/09/21 1309     SARS-CoV-2, [facility] Detected     Comment: This nucleic acid amplification test was developed and its performance  characteristics determined by [email or web address]. Nucleic acid  amplification tests include RT-PCR and TMA. This test has not been  FDA cleared or approved. This test has been authorized by FDA under  an Emergency Use Authorization (EUA). This test is only authorized  for the duration of time the declaration that circumstances exist  justifying the authorization of the emergency use of in vitro  diagnostic tests for detection of SARS-CoV-2 virus and/or diagnosis  of COVID-19 infection under section 564(b)(1) of the Act, 21 U.S.C.  360bbb-3(b) (1), unless the authorization is terminated or revoked  sooner.  When diagnostic testing is negative, the possibility of a false  negative result should be considered in the context of a patient's  recent exposures and the presence of clinical signs and symptoms  consistent with COVID-19. An individual without symptoms of COVID-19  and who is not shedding  SARS-CoV-2 virus would expect to have a  negative (not detected) result in this assay.         Narrative:      Performed at:  [address] [place] [address] [facility]  [address] [place] [address] [place]  [id number]  : Jakub Knight MD, Phone:  [phone]          Vitals/Labs/I&O  [unfilled]  There were no vitals filed for this visit.    Assessment/Plan:    Patient is not hospitalized due to COVID-19 infection and does not require oxygen therapy or an increase in baseline oxygen flow rate due to COVID-19.   All inclusions, exclusions, and monitoring requirements listed below have been reviewed.    1. Patient has tested positive for SARS-CoV-2.  2. Patient is within 10 days of symptom onset.  3. Patient is not hospitalized due to COVID-19 infection.  4. Patient is not requiring oxygen therapy or an increase in baseline oxygen flow rate.   5. Patient is at high risk for progressing to severe COVID-19 and/or hospitalization as defined by having met the following criteria: age>65 and CVD/HTN  6. Patient has no known hypersensitivity to any ingredient of the monoclonal antibody.  7. Ordering provider has documented that they obtained verbal consent and discussion of FDA EUA Fact Sheet for Patients and Caregivers (physical copy will be provided at infusion site).    Thank you for involving pharmacy in this patient's care. Please contact pharmacy with any questions or concerns.                           Kian Prabhakar [facility]  Clinical Pharmacist  03/10/21 15:08 EST

## 2021-03-11 ENCOUNTER — HOSPITAL ENCOUNTER (OUTPATIENT)
Dept: INFUSION THERAPY | Facility: HOSPITAL | Age: 66
Discharge: HOME | End: 2021-03-11
Admitting: NURSE PRACTITIONER

## 2021-03-11 VITALS
DIASTOLIC BLOOD PRESSURE: 57 MMHG | SYSTOLIC BLOOD PRESSURE: 117 MMHG | HEART RATE: 57 BPM | TEMPERATURE: 97.7 F | RESPIRATION RATE: 18 BRPM | WEIGHT: 212 LBS | OXYGEN SATURATION: 96 % | BODY MASS INDEX: 34.22 KG/M2

## 2021-03-11 DIAGNOSIS — U07.1 COVID-19 VIRUS DETECTED: Primary | ICD-10-CM

## 2021-03-11 PROCEDURE — 25010000006 BAMLANIVIMAB 700 MG/20ML SOLUTION 20 ML VIAL: Performed by: NURSE PRACTITIONER

## 2021-03-11 PROCEDURE — M0239 BAMLANIVIMAB-XXXX INFUSION: HCPCS | Performed by: NURSE PRACTITIONER

## 2021-03-11 PROCEDURE — 96365 THER/PROPH/DIAG IV INF INIT: CPT

## 2021-03-11 RX ORDER — METHYLPREDNISOLONE SODIUM SUCCINATE 125 MG/2ML
125 INJECTION, POWDER, LYOPHILIZED, FOR SOLUTION INTRAMUSCULAR; INTRAVENOUS AS NEEDED
Status: CANCELLED | OUTPATIENT
Start: 2021-03-11

## 2021-03-11 RX ORDER — SODIUM CHLORIDE 9 MG/ML
50 INJECTION, SOLUTION INTRAVENOUS ONCE
Status: COMPLETED | OUTPATIENT
Start: 2021-03-11 | End: 2021-03-11

## 2021-03-11 RX ORDER — DIPHENHYDRAMINE HYDROCHLORIDE 50 MG/ML
50 INJECTION INTRAMUSCULAR; INTRAVENOUS AS NEEDED
Status: DISCONTINUED | OUTPATIENT
Start: 2021-03-11 | End: 2021-03-13 | Stop reason: HOSPADM

## 2021-03-11 RX ORDER — SODIUM CHLORIDE 9 MG/ML
50 INJECTION, SOLUTION INTRAVENOUS ONCE
Status: CANCELLED | OUTPATIENT
Start: 2021-03-11

## 2021-03-11 RX ORDER — EPINEPHRINE 1 MG/ML
0.3 INJECTION, SOLUTION, CONCENTRATE INTRAVENOUS AS NEEDED
Status: CANCELLED | OUTPATIENT
Start: 2021-03-11

## 2021-03-11 RX ORDER — METHYLPREDNISOLONE SODIUM SUCCINATE 125 MG/2ML
125 INJECTION, POWDER, LYOPHILIZED, FOR SOLUTION INTRAMUSCULAR; INTRAVENOUS AS NEEDED
Status: DISCONTINUED | OUTPATIENT
Start: 2021-03-11 | End: 2021-03-13 | Stop reason: HOSPADM

## 2021-03-11 RX ORDER — EPINEPHRINE 1 MG/ML
0.3 INJECTION, SOLUTION, CONCENTRATE INTRAVENOUS AS NEEDED
Status: DISCONTINUED | OUTPATIENT
Start: 2021-03-11 | End: 2021-03-13 | Stop reason: HOSPADM

## 2021-03-11 RX ORDER — DIPHENHYDRAMINE HYDROCHLORIDE 50 MG/ML
50 INJECTION INTRAMUSCULAR; INTRAVENOUS AS NEEDED
Status: CANCELLED | OUTPATIENT
Start: 2021-03-11

## 2021-03-11 RX ADMIN — SODIUM CHLORIDE 700 MG: 9 INJECTION, SOLUTION INTRAVENOUS at 09:02

## 2021-03-11 RX ADMIN — SODIUM CHLORIDE 263 ML/HR: 9 INJECTION, SOLUTION INTRAVENOUS at 09:02

## 2021-03-11 NOTE — PROGRESS NOTES
Patient provided with Fact Sheet for Patients, Parents and Caregivers Emergency Use Authorization (EUA) of [place] for Coronavirus Disease 2019 (COVID-19) form.    Reviewed and patient verbalized understanding.  Appropriate PPE worn during the care of the patient.  Advised patient not to receive Covid vaccine for 90 days.    Tolerated well. Monitored 1 hour post without incident. VSS. D/C'd per wheelchair.

## 2021-03-16 ENCOUNTER — BULK ORDERING (OUTPATIENT)
Dept: CASE MANAGEMENT | Facility: OTHER | Age: 66
End: 2021-03-16

## 2021-03-16 DIAGNOSIS — Z23 IMMUNIZATION DUE: ICD-10-CM

## 2021-03-24 DIAGNOSIS — F51.01 PRIMARY INSOMNIA: ICD-10-CM

## 2021-03-24 RX ORDER — ZOLPIDEM TARTRATE 10 MG/1
10 TABLET ORAL
Qty: 30 TABLET | Refills: 2 | Status: SHIPPED | OUTPATIENT
Start: 2021-03-24 | End: 2021-03-25 | Stop reason: DRUGHIGH

## 2021-03-25 DIAGNOSIS — F51.01 PRIMARY INSOMNIA: Primary | ICD-10-CM

## 2021-03-25 RX ORDER — ZOLPIDEM TARTRATE 5 MG/1
5 TABLET ORAL NIGHTLY PRN
Qty: 30 TABLET | Refills: 2 | Status: SHIPPED | OUTPATIENT
Start: 2021-03-25 | End: 2021-07-06

## 2021-03-25 NOTE — TELEPHONE ENCOUNTER
"Please call and inform her that Princess wants a letter stating it is \"safe\" to take Ambien 10 mg. At her age, I cannot do that because it is not good medical practice. Because she has tolerated the med, I've refilled it however, it is not safe for a woman of any age to take 10 mg of [place] and it gets worse as you age. I would be willing to drop her dose to 5 mg and send a letter in that case if they still need it.      "

## 2021-05-04 DIAGNOSIS — F34.1 DYSTHYMIA: ICD-10-CM

## 2021-05-04 RX ORDER — BUPROPION HYDROCHLORIDE 300 MG/1
TABLET ORAL
Qty: 90 TABLET | Refills: 0 | Status: SHIPPED | OUTPATIENT
Start: 2021-05-04 | End: 2021-08-02

## 2021-06-04 ENCOUNTER — TELEPHONE (OUTPATIENT)
Dept: FAMILY MEDICINE CLINIC | Facility: CLINIC | Age: 66
End: 2021-06-04

## 2021-06-04 NOTE — TELEPHONE ENCOUNTER
Caller: Barbara Tran    Relationship: Self    Best call back number [phone]    Who are you requesting to speak with (clinical staff, provider,  specific staff member): CLINICAL STAFF     What was the call regarding: PATIENT CALLED AND STATED SHE HAS BEEN EXPERIENCING SOB FOR SEVERAL WEEKS NOW. PATIENT CONTACTED CARDIO YESTERDAY AND WAS INSTRUCTED TO GO TO ER. PATIENT IS AT [facility] WITH MULTIPLE BLOOD CLOTS IN HER LUNGS THAT THEY BELIEVE WAS CAUSED BY COVID     Do you require a callback: YES

## 2021-06-08 ENCOUNTER — APPOINTMENT (OUTPATIENT)
Dept: VACCINE CLINIC | Facility: HOSPITAL | Age: 66
End: 2021-06-08

## 2021-06-21 ENCOUNTER — OFFICE VISIT (OUTPATIENT)
Dept: FAMILY MEDICINE CLINIC | Facility: CLINIC | Age: 66
End: 2021-06-21

## 2021-06-21 VITALS
OXYGEN SATURATION: 98 % | DIASTOLIC BLOOD PRESSURE: 78 MMHG | BODY MASS INDEX: 33.59 KG/M2 | RESPIRATION RATE: 18 BRPM | SYSTOLIC BLOOD PRESSURE: 132 MMHG | HEART RATE: 66 BPM | WEIGHT: 209 LBS | HEIGHT: 66 IN

## 2021-06-21 DIAGNOSIS — U09.9 POST-COVID SYNDROME: Primary | ICD-10-CM

## 2021-06-21 PROBLEM — I26.99 ACUTE PULMONARY EMBOLISM WITHOUT ACUTE COR PULMONALE: Status: ACTIVE | Noted: 2021-06-03

## 2021-06-21 PROCEDURE — 99214 OFFICE O/P EST MOD 30 MIN: CPT | Performed by: FAMILY MEDICINE

## 2021-06-21 RX ORDER — BENZONATATE 200 MG/1
200 CAPSULE ORAL 3 TIMES DAILY PRN
Qty: 42 CAPSULE | Refills: 0 | Status: SHIPPED | OUTPATIENT
Start: 2021-06-21 | End: 2021-07-01

## 2021-06-21 RX ORDER — TRAMADOL HYDROCHLORIDE 50 MG/1
50 TABLET ORAL EVERY 6 HOURS PRN
Qty: 30 TABLET | Refills: 0 | Status: SHIPPED | OUTPATIENT
Start: 2021-06-21 | End: 2021-10-08

## 2021-06-21 NOTE — PROGRESS NOTES
"Assessment and Plan     Problem List Items Addressed This Visit     None      Visit Diagnoses     Post-COVID syndrome    -  Primary    Relevant Medications    traMADol (ULTRAM) 50 MG tablet    benzonatate (TESSALON) 200 MG capsule    Other Relevant Orders    Ambulatory Referral to Pulmonology        Would like to send her to a \"long-haulers\" COVID clinic once I understand where to find one.    Patient was given instructions and counseling regarding her condition or for health maintenance advice. Please see specific information pulled into the AVS if appropriate.        Barbara is a 66 y.o. being seen today for  Hospital Follow Up Visit   Subjective   History of the Present Illness   Persistent fatigue, no taste and smell yet from onset of COVID symptoms in early March. HA persists. Went to ER with shortness of breath and was diagnosed with Post-covid extension of clots from COVID.     Using tylenol for the HA, not really helping much.   Social History  She  reports that she has never smoked. She has never used smokeless tobacco. She reports previous alcohol use of about 3.0 standard drinks of alcohol per week. She reports that she does not use drugs.  Objective   Vital Signs        BP Readings from Last 1 Encounters:   06/21/21 132/78     Wt Readings from Last 3 Encounters:   06/21/21 94.8 kg (209 lb)   03/11/21 96.2 kg (212 lb)   12/14/20 96.6 kg (213 lb)   Body mass index is 33.73 kg/m².     Physical Exam  Vitals reviewed.   Constitutional:       Appearance: Normal appearance. She is well-developed and normal weight. She is not ill-appearing or toxic-appearing.   Cardiovascular:      Rate and Rhythm: Normal rate and regular rhythm.      Heart sounds: Normal heart sounds.   Pulmonary:      Effort: Pulmonary effort is normal.      Breath sounds: Normal breath sounds.   Neurological:      Mental Status: She is alert.   Psychiatric:         Behavior: Behavior normal.         Thought Content: Thought content normal.       "   Judgment: Judgment normal.

## 2021-06-23 ENCOUNTER — IMMUNIZATION (OUTPATIENT)
Dept: VACCINE CLINIC | Facility: HOSPITAL | Age: 66
End: 2021-06-23

## 2021-06-23 PROCEDURE — 0001A: CPT | Performed by: INTERNAL MEDICINE

## 2021-06-23 PROCEDURE — 91300 HC SARSCOV02 VAC 30MCG/0.3ML IM: CPT | Performed by: INTERNAL MEDICINE

## 2021-07-03 DIAGNOSIS — F51.01 PRIMARY INSOMNIA: ICD-10-CM

## 2021-07-06 RX ORDER — ZOLPIDEM TARTRATE 5 MG/1
5 TABLET ORAL NIGHTLY PRN
Qty: 30 TABLET | Refills: 2 | Status: SHIPPED | OUTPATIENT
Start: 2021-07-06 | End: 2021-10-02 | Stop reason: SDUPTHER

## 2021-07-14 ENCOUNTER — IMMUNIZATION (OUTPATIENT)
Dept: VACCINE CLINIC | Facility: HOSPITAL | Age: 66
End: 2021-07-14

## 2021-07-14 PROCEDURE — 91300 HC SARSCOV02 VAC 30MCG/0.3ML IM: CPT | Performed by: INTERNAL MEDICINE

## 2021-07-14 PROCEDURE — 0002A: CPT | Performed by: INTERNAL MEDICINE

## 2021-08-02 DIAGNOSIS — F34.1 DYSTHYMIA: ICD-10-CM

## 2021-08-02 RX ORDER — BUPROPION HYDROCHLORIDE 300 MG/1
TABLET ORAL
Qty: 90 TABLET | Refills: 2 | Status: SHIPPED | OUTPATIENT
Start: 2021-08-02 | End: 2022-03-28

## 2021-08-04 ENCOUNTER — TELEPHONE (OUTPATIENT)
Dept: FAMILY MEDICINE CLINIC | Facility: CLINIC | Age: 66
End: 2021-08-04

## 2021-08-04 NOTE — TELEPHONE ENCOUNTER
Caller: Barbara Tran    Relationship: Self    Best call back number: [phone]     What is the best time to reach you: ANY TIME    Who are you requesting to speak with (clinical staff, provider,  specific staff member): [facility]    What was the call regarding: PATIENT WENT ON MYCHART AND TRIED TO SEND A PICTURE OF HER  INSURANCE CARD TO [facility], BUT SHE SAID IT IS NOT GOING THROUGH.

## 2021-08-09 ENCOUNTER — TELEPHONE (OUTPATIENT)
Dept: FAMILY MEDICINE CLINIC | Facility: CLINIC | Age: 66
End: 2021-08-09

## 2021-08-09 NOTE — TELEPHONE ENCOUNTER
SIDDHARTH FROM [employer] IS CALLING IN STATING THAT THE PATIENT NEEDS A PRIOR AUTH ON HER Lasix 40 MG tablet.  PATIENT HAS BEEN SPEAKING TO [facility] REGARDING THIS AND WANTS TO KNOW IF THE PRIOR AUTH IS READY.

## 2021-08-10 ENCOUNTER — TELEPHONE (OUTPATIENT)
Dept: FAMILY MEDICINE CLINIC | Facility: CLINIC | Age: 66
End: 2021-08-10

## 2021-08-10 DIAGNOSIS — R60.0 PEDAL EDEMA: ICD-10-CM

## 2021-08-10 RX ORDER — FUROSEMIDE 40 MG/1
40 TABLET ORAL DAILY
Qty: 90 TABLET | Refills: 3 | Status: SHIPPED | OUTPATIENT
Start: 2021-08-10 | End: 2022-03-10

## 2021-08-10 NOTE — TELEPHONE ENCOUNTER
Caller: TranBarbara    Relationship: Self    Best call back number: [phone]    What medications are you currently taking:   Current Outpatient Medications on File Prior to Visit   Medication Sig Dispense Refill   • apixaban (Eliquis) 2.5 MG tablet tablet      • aspirin 81 MG tablet Take 81 mg by mouth Daily.     • buPROPion XL (WELLBUTRIN XL) 300 MG 24 hr tablet TAKE 1 TABLET BY MOUTH EVERY DAY 90 tablet 2   • calcium carbonate (OS-SHAHNAZ) 600 MG tablet Take 600 mg by mouth 2 (Two) Times a Day With Meals.     • ibandronate (Boniva) 150 MG tablet Take 1 tablet by mouth Every 30 (Thirty) Days. 3 tablet 3   • Lasix 40 MG tablet Take 1 tablet by mouth Daily. 90 tablet 3   • magnesium oxide (MAG-OX) 400 MG tablet Take 800 mg by mouth 2 (two) times a day.     • nebivolol (BYSTOLIC) 10 MG tablet Take 1 tablet by mouth Daily. 90 tablet 1   • ramipril (ALTACE) 2.5 MG capsule Take 1 capsule by mouth Daily. 90 capsule 1   • rosuvastatin (CRESTOR) 20 MG tablet Take 1 tablet by mouth Daily. 90 tablet 1   • saccharomyces boulardii (FLORASTOR) 250 MG capsule Take 1 capsule by mouth 2 (Two) Times a Day. 60 capsule 1   • spironolactone (ALDACTONE) 25 MG tablet Take 1 tablet by mouth Daily. 90 tablet 1   • Synthroid 50 MCG tablet Take 1 tablet by mouth Daily. 90 tablet 1   • traMADol (ULTRAM) 50 MG tablet Take 1 tablet by mouth Every 6 (Six) Hours As Needed for Moderate Pain . 30 tablet 0   • zolpidem (AMBIEN) 5 MG tablet TAKE 1 TABLET BY MOUTH AT NIGHT AS NEEDED FOR SLEEP. 30 tablet 2     No current facility-administered medications on file prior to visit.      Which medication are you concerned about: Lasix 40 MG tablet    What are your concerns: PATIENT SAID THAT PHARMACIST SENT OVER PRIOR AUTHORIZATION PAPERWORK FOR LASIX. SHE ASKED FOR DR. FELIX TO FILL OUT PAPERWORK AND RETURN TODAY.

## 2021-08-20 DIAGNOSIS — E03.9 HYPOTHYROIDISM, UNSPECIFIED TYPE: ICD-10-CM

## 2021-08-20 RX ORDER — LEVOTHYROXINE SODIUM 50 MCG
TABLET ORAL
Qty: 90 TABLET | Refills: 1 | Status: SHIPPED | OUTPATIENT
Start: 2021-08-20 | End: 2021-12-29

## 2021-09-08 ENCOUNTER — TRANSCRIBE ORDERS (OUTPATIENT)
Dept: ADMINISTRATIVE | Facility: HOSPITAL | Age: 66
End: 2021-09-08

## 2021-09-08 DIAGNOSIS — R06.00 DYSPNEA, UNSPECIFIED TYPE: ICD-10-CM

## 2021-09-08 DIAGNOSIS — U09.9 POST-COVID SYNDROME: Primary | ICD-10-CM

## 2021-09-09 ENCOUNTER — TRANSCRIBE ORDERS (OUTPATIENT)
Dept: SLEEP MEDICINE | Facility: HOSPITAL | Age: 66
End: 2021-09-09

## 2021-09-09 ENCOUNTER — LAB (OUTPATIENT)
Dept: LAB | Facility: HOSPITAL | Age: 66
End: 2021-09-09

## 2021-09-09 ENCOUNTER — TRANSCRIBE ORDERS (OUTPATIENT)
Dept: ADMINISTRATIVE | Facility: HOSPITAL | Age: 66
End: 2021-09-09

## 2021-09-09 ENCOUNTER — TELEPHONE (OUTPATIENT)
Dept: FAMILY MEDICINE CLINIC | Facility: CLINIC | Age: 66
End: 2021-09-09

## 2021-09-09 DIAGNOSIS — Z01.818 PRE-OP EXAM: Primary | ICD-10-CM

## 2021-09-09 DIAGNOSIS — Z01.818 OTHER SPECIFIED PRE-OPERATIVE EXAMINATION: Primary | ICD-10-CM

## 2021-09-09 LAB — SARS-COV-2 ORF1AB RESP QL NAA+PROBE: NOT DETECTED

## 2021-09-09 PROCEDURE — U0004 COV-19 TEST NON-CDC HGH THRU: HCPCS | Performed by: INTERNAL MEDICINE

## 2021-09-09 PROCEDURE — U0005 INFEC AGEN DETEC AMPLI PROBE: HCPCS | Performed by: INTERNAL MEDICINE

## 2021-09-09 PROCEDURE — C9803 HOPD COVID-19 SPEC COLLECT: HCPCS | Performed by: INTERNAL MEDICINE

## 2021-09-09 NOTE — TELEPHONE ENCOUNTER
"Patient is requesting a covid test but has no symptoms, no exposure. She stated that she needs the test for clearance before a VQ lung scan ordered by Dr. Rodriguez (pulmonologist), originally scheduled for this morning (9/9) but will be rescheduled urgently if the patient tests negative. She stated that neither [facility] or [facility] will do the scan without a negative covid test. She has attempted to get tested at other sites and stated that she had been told there was a 3 week wait for an appointment. Patient stated that \"she would be in the hospital by then\"     Please advise if we can schedule this patient for a test. If not, please call patient and share alternative options. [phone]    "

## 2021-09-10 ENCOUNTER — HOSPITAL ENCOUNTER (OUTPATIENT)
Dept: CT IMAGING | Facility: HOSPITAL | Age: 66
Discharge: HOME | End: 2021-09-10
Admitting: INTERNAL MEDICINE

## 2021-09-10 DIAGNOSIS — U09.9 POST-COVID SYNDROME: ICD-10-CM

## 2021-09-10 PROCEDURE — 71250 CT THORAX DX C-: CPT

## 2021-09-15 ENCOUNTER — HOSPITAL ENCOUNTER (OUTPATIENT)
Dept: CARDIOLOGY | Facility: HOSPITAL | Age: 66
Discharge: HOME | End: 2021-09-15
Admitting: INTERNAL MEDICINE

## 2021-09-15 VITALS — BODY MASS INDEX: 33.59 KG/M2 | HEIGHT: 66 IN | WEIGHT: 209 LBS

## 2021-09-15 DIAGNOSIS — R06.00 DYSPNEA, UNSPECIFIED TYPE: ICD-10-CM

## 2021-09-15 LAB
AORTIC DIMENSIONLESS INDEX: 0.6 (DI)
BH CV ECHO MEAS - AI DEC SLOPE: 134 CM/SEC^2
BH CV ECHO MEAS - AI MAX PG: 59.3 MMHG
BH CV ECHO MEAS - AI MAX VEL: 385 CM/SEC
BH CV ECHO MEAS - AI P1/2T: 841.5 MSEC
BH CV ECHO MEAS - AO MAX PG (FULL): 3.4 MMHG
BH CV ECHO MEAS - AO MAX PG: 5.7 MMHG
BH CV ECHO MEAS - AO MEAN PG (FULL): 2 MMHG
BH CV ECHO MEAS - AO MEAN PG: 3 MMHG
BH CV ECHO MEAS - AO ROOT AREA (BSA CORRECTED): 1.5
BH CV ECHO MEAS - AO ROOT AREA: 7.5 CM^2
BH CV ECHO MEAS - AO ROOT DIAM: 3.1 CM
BH CV ECHO MEAS - AO V2 MAX: 119 CM/SEC
BH CV ECHO MEAS - AO V2 MEAN: 82.6 CM/SEC
BH CV ECHO MEAS - AO V2 VTI: 25.7 CM
BH CV ECHO MEAS - ASC AORTA: 2.8 CM
BH CV ECHO MEAS - AVA(I,A): 2 CM^2
BH CV ECHO MEAS - AVA(I,D): 2 CM^2
BH CV ECHO MEAS - AVA(V,A): 2 CM^2
BH CV ECHO MEAS - AVA(V,D): 2 CM^2
BH CV ECHO MEAS - BSA(HAYCOCK): 2.1 M^2
BH CV ECHO MEAS - BSA: 2 M^2
BH CV ECHO MEAS - BZI_BMI: 33.7 KILOGRAMS/M^2
BH CV ECHO MEAS - BZI_METRIC_HEIGHT: 167.6 CM
BH CV ECHO MEAS - BZI_METRIC_WEIGHT: 94.8 KG
BH CV ECHO MEAS - EDV(CUBED): 50.7 ML
BH CV ECHO MEAS - EDV(MOD-SP2): 69 ML
BH CV ECHO MEAS - EDV(MOD-SP4): 86 ML
BH CV ECHO MEAS - EDV(TEICH): 58.1 ML
BH CV ECHO MEAS - EF(CUBED): 65.3 %
BH CV ECHO MEAS - EF(MOD-BP): 51.4 %
BH CV ECHO MEAS - EF(MOD-SP2): 52.2 %
BH CV ECHO MEAS - EF(MOD-SP4): 48.8 %
BH CV ECHO MEAS - EF(TEICH): 57.7 %
BH CV ECHO MEAS - ESV(CUBED): 17.6 ML
BH CV ECHO MEAS - ESV(MOD-SP2): 33 ML
BH CV ECHO MEAS - ESV(MOD-SP4): 44 ML
BH CV ECHO MEAS - ESV(TEICH): 24.6 ML
BH CV ECHO MEAS - FS: 29.7 %
BH CV ECHO MEAS - IVS/LVPW: 1.1
BH CV ECHO MEAS - IVSD: 1 CM
BH CV ECHO MEAS - LAT PEAK E' VEL: 8.5 CM/SEC
BH CV ECHO MEAS - LV DIASTOLIC VOL/BSA (35-75): 42.2 ML/M^2
BH CV ECHO MEAS - LV MASS(C)D: 108.5 GRAMS
BH CV ECHO MEAS - LV MASS(C)DI: 53.2 GRAMS/M^2
BH CV ECHO MEAS - LV MAX PG: 2.3 MMHG
BH CV ECHO MEAS - LV MEAN PG: 1 MMHG
BH CV ECHO MEAS - LV SYSTOLIC VOL/BSA (12-30): 21.6 ML/M^2
BH CV ECHO MEAS - LV V1 MAX: 75.7 CM/SEC
BH CV ECHO MEAS - LV V1 MEAN: 48.6 CM/SEC
BH CV ECHO MEAS - LV V1 VTI: 16.1 CM
BH CV ECHO MEAS - LVIDD: 3.7 CM
BH CV ECHO MEAS - LVIDS: 2.6 CM
BH CV ECHO MEAS - LVLD AP2: 7.4 CM
BH CV ECHO MEAS - LVLD AP4: 7.1 CM
BH CV ECHO MEAS - LVLS AP2: 6.1 CM
BH CV ECHO MEAS - LVLS AP4: 6.3 CM
BH CV ECHO MEAS - LVOT AREA (M): 3.1 CM^2
BH CV ECHO MEAS - LVOT AREA: 3.1 CM^2
BH CV ECHO MEAS - LVOT DIAM: 2 CM
BH CV ECHO MEAS - LVPWD: 0.95 CM
BH CV ECHO MEAS - MED PEAK E' VEL: 6.3 CM/SEC
BH CV ECHO MEAS - MV A DUR: 0.14 SEC
BH CV ECHO MEAS - MV A MAX VEL: 81 CM/SEC
BH CV ECHO MEAS - MV DEC SLOPE: 145 CM/SEC^2
BH CV ECHO MEAS - MV DEC TIME: 290 SEC
BH CV ECHO MEAS - MV E MAX VEL: 44.6 CM/SEC
BH CV ECHO MEAS - MV E/A: 0.55
BH CV ECHO MEAS - MV MAX PG: 2.7 MMHG
BH CV ECHO MEAS - MV MEAN PG: 1 MMHG
BH CV ECHO MEAS - MV P1/2T MAX VEL: 50.5 CM/SEC
BH CV ECHO MEAS - MV P1/2T: 102 MSEC
BH CV ECHO MEAS - MV V2 MAX: 81.9 CM/SEC
BH CV ECHO MEAS - MV V2 MEAN: 41.8 CM/SEC
BH CV ECHO MEAS - MV V2 VTI: 23.8 CM
BH CV ECHO MEAS - MVA P1/2T LCG: 4.4 CM^2
BH CV ECHO MEAS - MVA(P1/2T): 2.2 CM^2
BH CV ECHO MEAS - MVA(VTI): 2.1 CM^2
BH CV ECHO MEAS - RAP SYSTOLE: 3 MMHG
BH CV ECHO MEAS - SI(AO): 95.2 ML/M^2
BH CV ECHO MEAS - SI(CUBED): 16.2 ML/M^2
BH CV ECHO MEAS - SI(LVOT): 24.8 ML/M^2
BH CV ECHO MEAS - SI(MOD-SP2): 17.7 ML/M^2
BH CV ECHO MEAS - SI(MOD-SP4): 20.6 ML/M^2
BH CV ECHO MEAS - SI(TEICH): 16.4 ML/M^2
BH CV ECHO MEAS - SV(AO): 194 ML
BH CV ECHO MEAS - SV(CUBED): 33.1 ML
BH CV ECHO MEAS - SV(LVOT): 50.6 ML
BH CV ECHO MEAS - SV(MOD-SP2): 36 ML
BH CV ECHO MEAS - SV(MOD-SP4): 42 ML
BH CV ECHO MEAS - SV(TEICH): 33.5 ML
BH CV ECHO MEAS - TAPSE (>1.6): 2.1 CM
BH CV ECHO MEASUREMENTS AVERAGE E/E' RATIO: 6.03
BH CV XLRA - RV BASE: 2.2 CM
BH CV XLRA - RV LENGTH: 6.5 CM
BH CV XLRA - RV MID: 1.8 CM
BH CV XLRA - TDI S': 8.7 CM/SEC
LEFT ATRIUM VOLUME INDEX: 19 ML/M2
LV EF 2D ECHO EST: 51 %
MAXIMAL PREDICTED HEART RATE: 152 BPM
STRESS TARGET HR: 129 BPM

## 2021-09-15 PROCEDURE — 93306 TTE W/DOPPLER COMPLETE: CPT

## 2021-09-15 PROCEDURE — 93306 TTE W/DOPPLER COMPLETE: CPT | Performed by: INTERNAL MEDICINE

## 2021-09-20 ENCOUNTER — TRANSCRIBE ORDERS (OUTPATIENT)
Dept: ADMINISTRATIVE | Facility: HOSPITAL | Age: 66
End: 2021-09-20

## 2021-09-20 DIAGNOSIS — R91.1 PULMONARY NODULE: Primary | ICD-10-CM

## 2021-10-02 DIAGNOSIS — F51.01 PRIMARY INSOMNIA: ICD-10-CM

## 2021-10-04 RX ORDER — ZOLPIDEM TARTRATE 5 MG/1
5 TABLET ORAL NIGHTLY PRN
Qty: 30 TABLET | Refills: 2 | Status: SHIPPED | OUTPATIENT
Start: 2021-10-04 | End: 2021-12-27 | Stop reason: SDUPTHER

## 2021-10-08 ENCOUNTER — OFFICE VISIT (OUTPATIENT)
Dept: FAMILY MEDICINE CLINIC | Facility: CLINIC | Age: 66
End: 2021-10-08

## 2021-10-08 VITALS
HEIGHT: 66 IN | OXYGEN SATURATION: 98 % | RESPIRATION RATE: 18 BRPM | BODY MASS INDEX: 35.2 KG/M2 | DIASTOLIC BLOOD PRESSURE: 70 MMHG | HEART RATE: 62 BPM | SYSTOLIC BLOOD PRESSURE: 128 MMHG | WEIGHT: 219 LBS

## 2021-10-08 DIAGNOSIS — E78.2 MIXED HYPERLIPIDEMIA: ICD-10-CM

## 2021-10-08 DIAGNOSIS — Z23 NEED FOR VACCINATION: Primary | ICD-10-CM

## 2021-10-08 PROBLEM — M85.80 OSTEOPENIA: Status: ACTIVE | Noted: 2021-08-30

## 2021-10-08 PROCEDURE — 99213 OFFICE O/P EST LOW 20 MIN: CPT | Performed by: FAMILY MEDICINE

## 2021-10-08 PROCEDURE — 90732 PPSV23 VACC 2 YRS+ SUBQ/IM: CPT | Performed by: FAMILY MEDICINE

## 2021-10-08 PROCEDURE — G0008 ADMIN INFLUENZA VIRUS VAC: HCPCS | Performed by: FAMILY MEDICINE

## 2021-10-08 PROCEDURE — 90662 IIV NO PRSV INCREASED AG IM: CPT | Performed by: FAMILY MEDICINE

## 2021-10-08 PROCEDURE — G0009 ADMIN PNEUMOCOCCAL VACCINE: HCPCS | Performed by: FAMILY MEDICINE

## 2021-10-08 NOTE — PROGRESS NOTES
Assessment and Plan     Problem List Items Addressed This Visit        Cardiac and Vasculature    HLD (hyperlipidemia)    Overview     Aileen 10/8/2021   It makes no sense that this patient's cholesterol suddenly skyrocketed when she is taking 20 mg of Crestor and her lipids on that medication have been good for some time.  She is now taking 40 mg.  She has gained a little bit of weight but no more than 10 pounds.  Will repeat labs in a couple of weeks.         Relevant Orders    Lipid Panel With LDL / HDL Ratio      Other Visit Diagnoses     Need for vaccination    -  Primary    Relevant Orders    Fluzone High-Dose 65+yrs (7480-5897) (Completed)    Pneumococcal Polysaccharide Vaccine 23-Valent (PPSV23) Greater Than or Equal To 1yo Subcutaneous / IM (Completed)        No follow-ups on file.    Patient was given instructions and counseling regarding her condition or for health maintenance advice. Please see specific information pulled into the AVS if appropriate.        Barbara is a 66 y.o. being seen today for  Hypertension   Subjective   History of the Present Illness   Answers for HPI/ROS submitted by the patient on 10/5/2021  Please describe your symptoms.: Flu shot, pneumonia shot, shingles shot, Get my cholesterol checked.  Very high 265 it's never been this high.  I had labs drawn on August 19, 2021  Have you had these symptoms before?: Yes  How long have you been having these symptoms?: Greater than 2 weeks  Please list any medications you are currently taking for this condition.: Bystolic  Please describe any probable cause for these symptoms. : Not sure  What is the primary reason for your visit?: Other  Social History  She  reports that she has never smoked. She has never used smokeless tobacco. She reports previous alcohol use of about 3.0 standard drinks of alcohol per week. She reports that she does not use drugs.  Objective   Vital Signs        BP Readings from Last 1 Encounters:   10/08/21 128/70      Wt Readings from Last 3 Encounters:   10/08/21 99.3 kg (219 lb)   09/15/21 94.8 kg (209 lb)   06/21/21 94.8 kg (209 lb)   Body mass index is 35.35 kg/m².     Physical Exam  Vitals reviewed.   Constitutional:       Appearance: Normal appearance. She is well-developed and normal weight.   Cardiovascular:      Rate and Rhythm: Normal rate and regular rhythm.      Heart sounds: Normal heart sounds.   Pulmonary:      Effort: Pulmonary effort is normal.      Breath sounds: Normal breath sounds.   Musculoskeletal:      Right lower leg: Edema present.      Left lower leg: Edema present.   Neurological:      Mental Status: She is alert.   Psychiatric:         Behavior: Behavior normal.         Thought Content: Thought content normal.         Judgment: Judgment normal.                 I spent 20 minutes caring for Barbara on this date of service. This time includes time spent by me in the following activities:reviewing tests, counseling and educating the patient/family/caregiver, ordering medications, tests, or procedures, documenting information in the medical record and independently interpreting results and communicating that information with the patient/family/caregiver

## 2021-10-26 ENCOUNTER — TELEPHONE (OUTPATIENT)
Dept: FAMILY MEDICINE CLINIC | Facility: CLINIC | Age: 66
End: 2021-10-26

## 2021-10-26 NOTE — TELEPHONE ENCOUNTER
Oh willie. Tell her she can cut her statin and 1/2 and we can fill it at that dose when she needs it again. Tell her I'm sorry I didn't realize she'd done them again.

## 2021-10-26 NOTE — TELEPHONE ENCOUNTER
Caller: TranBarbara    Relationship: Self    Best call back number: [phone]    What medications are you currently taking:   Current Outpatient Medications on File Prior to Visit   Medication Sig Dispense Refill   • apixaban (Eliquis) 2.5 MG tablet tablet      • aspirin 81 MG tablet Take 81 mg by mouth Daily.     • buPROPion XL (WELLBUTRIN XL) 300 MG 24 hr tablet TAKE 1 TABLET BY MOUTH EVERY DAY 90 tablet 2   • calcium carbonate (OS-SHAHNAZ) 600 MG tablet Take 600 mg by mouth 2 (Two) Times a Day With Meals.     • ibandronate (Boniva) 150 MG tablet Take 1 tablet by mouth Every 30 (Thirty) Days. 3 tablet 3   • Lasix 40 MG tablet Take 1 tablet by mouth Daily. 90 tablet 3   • magnesium oxide (MAG-OX) 400 MG tablet Take 800 mg by mouth 2 (two) times a day.     • nebivolol (BYSTOLIC) 10 MG tablet Take 1 tablet by mouth Daily. 90 tablet 1   • ramipril (ALTACE) 2.5 MG capsule Take 1 capsule by mouth Daily. 90 capsule 1   • rosuvastatin (CRESTOR) 20 MG tablet Take 1 tablet by mouth Daily. 90 tablet 1   • saccharomyces boulardii (FLORASTOR) 250 MG capsule Take 1 capsule by mouth 2 (Two) Times a Day. 60 capsule 1   • spironolactone (ALDACTONE) 25 MG tablet Take 1 tablet by mouth Daily. 90 tablet 1   • Synthroid 50 MCG tablet TAKE 1 TABLET BY MOUTH EVERY DAY 90 tablet 1   • zolpidem (AMBIEN) 5 MG tablet Take 1 tablet by mouth At Night As Needed for Sleep. 30 tablet 2     No current facility-administered medications on file prior to visit.          When did you start taking these medications: CRESTOR, YEARS    Which medication are you concerned about: CRESTOR    Who prescribed you this medication: ELVIRA    What are your concerns:KHALIF KEENE    How long have you had these concerns: COUPLE OF WEEKS     PATIENT WOULD LIKE TO KNOW IF SHE NEEDS TO MAKE ANOTHER OFFICE VISIT WITH HER PCP AND WOULD LIKE CRESTOR DOSAGE LOWERED IF POSSIBLE. HAVING SIDE EFFECTS: JOINT PAIN SEEMS WORSE ON HIGHER DOSE. PATIENT IS WONDERING IF  WORSENED JOINT PAIN COULD BE FROM COVID SHE HAD IN MARCH OS 2021

## 2021-11-15 DIAGNOSIS — I10 BENIGN ESSENTIAL HTN: ICD-10-CM

## 2021-11-15 RX ORDER — RAMIPRIL 2.5 MG/1
CAPSULE ORAL
Qty: 90 CAPSULE | Refills: 1 | Status: SHIPPED | OUTPATIENT
Start: 2021-11-15 | End: 2022-03-28

## 2021-12-09 DIAGNOSIS — I10 BENIGN ESSENTIAL HTN: ICD-10-CM

## 2021-12-09 RX ORDER — NEBIVOLOL 10 MG/1
10 TABLET ORAL DAILY
Qty: 90 TABLET | Refills: 1 | Status: SHIPPED | OUTPATIENT
Start: 2021-12-09 | End: 2022-04-01 | Stop reason: ALTCHOICE

## 2021-12-27 DIAGNOSIS — F51.01 PRIMARY INSOMNIA: ICD-10-CM

## 2021-12-27 DIAGNOSIS — I10 BENIGN ESSENTIAL HTN: ICD-10-CM

## 2021-12-27 RX ORDER — SPIRONOLACTONE 25 MG/1
TABLET ORAL
Qty: 90 TABLET | Refills: 1 | Status: SHIPPED | OUTPATIENT
Start: 2021-12-27 | End: 2022-12-14

## 2021-12-27 RX ORDER — ZOLPIDEM TARTRATE 5 MG/1
5 TABLET ORAL NIGHTLY PRN
Qty: 30 TABLET | Refills: 2 | Status: CANCELLED | OUTPATIENT
Start: 2021-12-27

## 2021-12-27 RX ORDER — ZOLPIDEM TARTRATE 5 MG/1
5 TABLET ORAL NIGHTLY PRN
Qty: 30 TABLET | Refills: 2 | Status: SHIPPED | OUTPATIENT
Start: 2021-12-27 | End: 2022-01-31 | Stop reason: SDUPTHER

## 2021-12-29 DIAGNOSIS — E03.9 HYPOTHYROIDISM, UNSPECIFIED TYPE: ICD-10-CM

## 2021-12-29 DIAGNOSIS — M81.0 AGE-RELATED OSTEOPOROSIS WITHOUT CURRENT PATHOLOGICAL FRACTURE: ICD-10-CM

## 2021-12-29 RX ORDER — LEVOTHYROXINE SODIUM 50 MCG
TABLET ORAL
Qty: 90 TABLET | Refills: 1 | Status: SHIPPED | OUTPATIENT
Start: 2021-12-29 | End: 2022-08-26 | Stop reason: SDUPTHER

## 2021-12-29 RX ORDER — IBANDRONATE SODIUM 150 MG/1
TABLET, FILM COATED ORAL
Qty: 3 TABLET | Refills: 3 | Status: SHIPPED | OUTPATIENT
Start: 2021-12-29 | End: 2023-02-06

## 2022-01-14 ENCOUNTER — IMMUNIZATION (OUTPATIENT)
Dept: FAMILY MEDICINE CLINIC | Facility: CLINIC | Age: 67
End: 2022-01-14

## 2022-01-14 DIAGNOSIS — Z23 NEED FOR VACCINATION: Primary | ICD-10-CM

## 2022-01-14 PROCEDURE — 91300 COVID-19 (PFIZER): CPT | Performed by: FAMILY MEDICINE

## 2022-01-14 PROCEDURE — 0004A COVID-19 (PFIZER): CPT | Performed by: FAMILY MEDICINE

## 2022-01-31 DIAGNOSIS — F51.01 PRIMARY INSOMNIA: ICD-10-CM

## 2022-02-02 RX ORDER — ZOLPIDEM TARTRATE 5 MG/1
5 TABLET ORAL NIGHTLY PRN
Qty: 30 TABLET | Refills: 2 | Status: SHIPPED | OUTPATIENT
Start: 2022-02-02 | End: 2022-05-10 | Stop reason: SDUPTHER

## 2022-02-14 DIAGNOSIS — M25.569 KNEE PAIN, UNSPECIFIED CHRONICITY, UNSPECIFIED LATERALITY: Primary | ICD-10-CM

## 2022-03-10 DIAGNOSIS — R60.0 PEDAL EDEMA: ICD-10-CM

## 2022-03-10 RX ORDER — FUROSEMIDE 40 MG/1
TABLET ORAL
Qty: 30 TABLET | Refills: 0 | Status: SHIPPED | OUTPATIENT
Start: 2022-03-10 | End: 2022-04-01 | Stop reason: SDUPTHER

## 2022-03-14 ENCOUNTER — APPOINTMENT (OUTPATIENT)
Dept: CT IMAGING | Facility: HOSPITAL | Age: 67
End: 2022-03-14

## 2022-03-28 DIAGNOSIS — F34.1 DYSTHYMIA: ICD-10-CM

## 2022-03-28 DIAGNOSIS — I10 BENIGN ESSENTIAL HTN: ICD-10-CM

## 2022-03-28 RX ORDER — RAMIPRIL 2.5 MG/1
CAPSULE ORAL
Qty: 30 CAPSULE | Refills: 0 | Status: SHIPPED | OUTPATIENT
Start: 2022-03-28 | End: 2022-06-09

## 2022-03-28 RX ORDER — BUPROPION HYDROCHLORIDE 300 MG/1
TABLET ORAL
Qty: 90 TABLET | Refills: 2 | Status: SHIPPED | OUTPATIENT
Start: 2022-03-28 | End: 2022-05-10 | Stop reason: SDUPTHER

## 2022-03-29 ENCOUNTER — HOSPITAL ENCOUNTER (OUTPATIENT)
Dept: CT IMAGING | Facility: HOSPITAL | Age: 67
Discharge: HOME | End: 2022-03-29
Admitting: INTERNAL MEDICINE

## 2022-03-29 DIAGNOSIS — R91.1 PULMONARY NODULE: ICD-10-CM

## 2022-03-29 PROCEDURE — 71250 CT THORAX DX C-: CPT

## 2022-03-31 DIAGNOSIS — I10 BENIGN ESSENTIAL HTN: ICD-10-CM

## 2022-03-31 RX ORDER — NEBIVOLOL HYDROCHLORIDE 10 MG/1
10 TABLET ORAL DAILY
Qty: 90 TABLET | Refills: 1 | Status: SHIPPED | OUTPATIENT
Start: 2022-03-31 | End: 2022-04-01 | Stop reason: ALTCHOICE

## 2022-04-01 ENCOUNTER — TRANSCRIBE ORDERS (OUTPATIENT)
Dept: ADMINISTRATIVE | Facility: HOSPITAL | Age: 67
End: 2022-04-01

## 2022-04-01 DIAGNOSIS — R60.0 PEDAL EDEMA: ICD-10-CM

## 2022-04-01 DIAGNOSIS — R91.8 PULMONARY NODULES: Primary | ICD-10-CM

## 2022-04-01 RX ORDER — NEBIVOLOL HYDROCHLORIDE 10 MG/1
10 TABLET ORAL DAILY
Qty: 90 TABLET | Refills: 1 | Status: SHIPPED | OUTPATIENT
Start: 2022-04-01 | End: 2022-05-10

## 2022-04-04 RX ORDER — FUROSEMIDE 40 MG/1
40 TABLET ORAL DAILY
Qty: 30 TABLET | Refills: 0 | Status: SHIPPED | OUTPATIENT
Start: 2022-04-04 | End: 2022-04-14

## 2022-04-11 RX ORDER — FUROSEMIDE 40 MG/1
40 TABLET ORAL 2 TIMES DAILY
Qty: 30 TABLET | Refills: 0 | Status: SHIPPED | OUTPATIENT
Start: 2022-04-11 | End: 2022-04-14

## 2022-04-14 ENCOUNTER — OFFICE VISIT (OUTPATIENT)
Dept: CARDIOLOGY | Facility: CLINIC | Age: 67
End: 2022-04-14

## 2022-04-14 VITALS
SYSTOLIC BLOOD PRESSURE: 110 MMHG | BODY MASS INDEX: 34.87 KG/M2 | HEIGHT: 66 IN | DIASTOLIC BLOOD PRESSURE: 88 MMHG | WEIGHT: 217 LBS | HEART RATE: 63 BPM

## 2022-04-14 DIAGNOSIS — E78.2 MIXED HYPERLIPIDEMIA: ICD-10-CM

## 2022-04-14 DIAGNOSIS — Z95.5 S/P DRUG ELUTING CORONARY STENT PLACEMENT: ICD-10-CM

## 2022-04-14 DIAGNOSIS — I25.10 CHRONIC CORONARY ARTERY DISEASE: Primary | ICD-10-CM

## 2022-04-14 DIAGNOSIS — I50.32 CHRONIC DIASTOLIC CONGESTIVE HEART FAILURE: ICD-10-CM

## 2022-04-14 PROCEDURE — 99204 OFFICE O/P NEW MOD 45 MIN: CPT | Performed by: INTERNAL MEDICINE

## 2022-04-14 PROCEDURE — 93000 ELECTROCARDIOGRAM COMPLETE: CPT | Performed by: INTERNAL MEDICINE

## 2022-04-14 RX ORDER — PREDNISOLONE ACETATE 10 MG/ML
SUSPENSION/ DROPS OPHTHALMIC
COMMUNITY
Start: 2022-03-28 | End: 2022-06-09

## 2022-04-14 RX ORDER — KETOROLAC TROMETHAMINE 5 MG/ML
SOLUTION OPHTHALMIC
COMMUNITY
Start: 2022-03-28 | End: 2022-06-09

## 2022-04-14 RX ORDER — BUMETANIDE 2 MG/1
2 TABLET ORAL 2 TIMES DAILY
Qty: 60 TABLET | Refills: 11 | Status: SHIPPED | OUTPATIENT
Start: 2022-04-14 | End: 2022-05-10

## 2022-04-14 RX ORDER — ROSUVASTATIN CALCIUM 40 MG/1
1 TABLET, COATED ORAL DAILY
COMMUNITY
Start: 2021-12-29 | End: 2022-06-27 | Stop reason: SDUPTHER

## 2022-04-14 NOTE — PROGRESS NOTES
Date of Office Visit: 2022  Encounter Provider: Lashay Leon MD  Place of Service: [facility] CARDIOLOGY  Patient Name: Barbara Tran  :1955      Patient ID:  Barbara Tran is a 67 y.o. female is here for CAD and CHF.           History of Present Illness    She has a history of CHF, hyperlipidemia, hypertension, DVT with pulmonary embolism in 2021, CAD with LAD stent done 2015.    She received a proximal LAD stent in .  Last cardiac catheterization done in  showed proximal patent LAD stent patent, 80% septal  stenosis unchanged from prior cath, normal left circumflex and RCA, normal left ventricular systolic function.    She had COVID-19 pneumonia 3/8/2021.  She then presented 6/3/2021 with acute short windedness and was diagnosed with bilateral acute pulmonary embolism without acute cor pulmonale.  She was started on Eliquis.  Echo done 9/15/2021 showed ejection fraction of 46-50% with moderate to severe septal hypokinesis, grade 1 diastolic dysfunction, normal right ventricular size and function with normal saline contrast today, no pericardial effusion and mild left atrial enlargement.  She has followed with Dr. Rodriguez for pulmonary nodules.    Her mom, dad, sister and brothers x2 of all had heart disease.  Her sister also has diabetes.  She is single, has 3 children, works as an , has never smoked, has tea and coffee, occasional alcohol and no drugs.    She has had over the past 6 months an increase in lower extremity edema, fluid retention, exertional dyspnea.  She has no chest pain or pressure and she has no orthopnea or PND but she says she just feels like she is holding water all the time and she is short winded.  She has some mild dizziness but has had no syncope.  She is taking her medications as directed without difficulty.  She is still on Eliquis at this point.  She has not had any other recent medication  changes.  She does not feel her heart racing or skipping.  She has no fevers, chills or cough.  Labs done 2/22/2022 show hemoglobin A1c 6.1, normal CMP with potassium of 4.5, normal CBC, elevated free T4 1.75, normal T3 total, normal TSH, triglycerides 89, total cholesterol 161, HDL 54, LDL 87.    Past Medical History:   Diagnosis Date   • Allergic 2015    codeine, morphine, levaquin   • Arthritis l5 years or so ago   • Cataract 6-9 months ago    Need surgery   • CHF (congestive heart failure) (HCC)    • Coronary artery disease 2005 stent   • COVID-19 virus detected 3/10/2021   • Deep vein thrombosis (HCC) 6-3-21    Pulmonary embolism   • Depression    • Disease of thyroid gland    • Headache Covid 3-6-21    Has continued   • Hyperlipidemia    • Hypertension since 2005   • Hypothyroidism since 2012   • Osteopenia last few years   • Pulmonary embolism (HCC) 6-3-21    From Covid         Past Surgical History:   Procedure Laterality Date   • CHOLECYSTECTOMY  1995   • COLONOSCOPY N/A 12/19/2016    Procedure: COLONOSCOPY WITH COLD BIOPSIES;  Surgeon: Medina Guillen MD;  Location: [facility] ENDOSCOPY;  Service:    • CORONARY STENT PLACEMENT  2005    LAD 80% blockage   • SUBTOTAL HYSTERECTOMY  2009   • TONSILLECTOMY  1965   • TUBAL ABDOMINAL LIGATION  1985       Current Outpatient Medications on File Prior to Visit   Medication Sig Dispense Refill   • apixaban (ELIQUIS) 2.5 MG tablet tablet 2.5 mg Every 12 (Twelve) Hours.     • aspirin 81 MG tablet Take 81 mg by mouth Daily.     • buPROPion XL (WELLBUTRIN XL) 300 MG 24 hr tablet TAKE 1 TABLET BY MOUTH EVERY DAY 90 tablet 2   • Bystolic 10 MG tablet Take 1 tablet by mouth Daily. 90 tablet 1   • calcium carbonate (OS-SHAHNAZ) 600 MG tablet Take 600 mg by mouth 2 (Two) Times a Day With Meals.     • ibandronate (BONIVA) 150 MG tablet TAKE 1 TABLET BY MOUTH EVERY 30 DAYS. 3 tablet 3   • ketorolac (ACULAR) 0.5 % ophthalmic solution PLEASE SEE ATTACHED FOR DETAILED DIRECTIONS     •  magnesium oxide (MAG-OX) 400 MG tablet Take 800 mg by mouth 2 (two) times a day.     • prednisoLONE acetate (PRED FORTE) 1 % ophthalmic suspension PLEASE SEE ATTACHED FOR DETAILED DIRECTIONS     • ramipril (ALTACE) 2.5 MG capsule TAKE 1 CAPSULE BY MOUTH EVERY DAY 30 capsule 0   • rosuvastatin (CRESTOR) 40 MG tablet Take 1 tablet by mouth Daily.     • saccharomyces boulardii (FLORASTOR) 250 MG capsule Take 1 capsule by mouth 2 (Two) Times a Day. 60 capsule 1   • spironolactone (ALDACTONE) 25 MG tablet TAKE 1 TABLET BY MOUTH EVERY DAY (Patient taking differently: 25 mg 2 (Two) Times a Day.) 90 tablet 1   • Synthroid 50 MCG tablet TAKE 1 TABLET BY MOUTH EVERY DAY 90 tablet 1   • zolpidem (AMBIEN) 5 MG tablet Take 1 tablet by mouth At Night As Needed for Sleep. 30 tablet 2   • [DISCONTINUED] furosemide (Lasix) 40 MG tablet Take 1 tablet by mouth 2 (Two) Times a Day for 90 days. 30 tablet 0   • [DISCONTINUED] Lasix 40 MG tablet Take 1 tablet by mouth Daily. 30 tablet 0   • [DISCONTINUED] rosuvastatin (CRESTOR) 20 MG tablet Take 1 tablet by mouth Daily. 90 tablet 1     No current facility-administered medications on file prior to visit.       Social History     Socioeconomic History   • Marital status: Single   Tobacco Use   • Smoking status: Never Smoker   • Smokeless tobacco: Never Used   • Tobacco comment: N/A   Substance and Sexual Activity   • Alcohol use: Not Currently     Alcohol/week: 3.0 standard drinks     Types: 2 Glasses of wine, 1 Cans of beer per week     Comment: rarely   • Drug use: No   • Sexual activity: Not Currently           ROS    Procedures    ECG 12 Lead    Date/Time: 4/14/2022 10:09 AM  Performed by: Lashay Leon MD  Authorized by: Lashay Leon MD   Comparison: not compared with previous ECG   Previous ECG: no previous ECG available  Rhythm: sinus rhythm  QRS axis: left    Clinical impression: non-specific ECG                Objective:      Vitals:    04/14/22 0943 04/14/22 0953  "  BP: 112/88 110/88   BP Location: Right arm Left arm   Pulse: 63    Weight: 98.4 kg (217 lb)    Height: 167.6 cm (66\")      Body mass index is 35.02 kg/m².    Vitals reviewed.   Constitutional:       General: Not in acute distress.     Appearance: Well-developed. Not diaphoretic.   Eyes:      General: No scleral icterus.     Conjunctiva/sclera: Conjunctivae normal.   HENT:      Head: Normocephalic and atraumatic.   Neck:      Thyroid: No thyromegaly.      Vascular: No carotid bruit or JVD.      Lymphadenopathy: No cervical adenopathy.   Pulmonary:      Effort: Pulmonary effort is normal. No respiratory distress.      Breath sounds: Normal breath sounds. No wheezing. No rhonchi. No rales.   Chest:      Chest wall: Not tender to palpatation.   Cardiovascular:      Normal rate. Regular rhythm.      Murmurs: There is no murmur.      No gallop.   Pulses:     Intact distal pulses.   Edema:     Peripheral edema present.     Ankle: bilateral 2+ edema of the ankle.     Feet: bilateral 2+ edema of the feet.  Abdominal:      General: Bowel sounds are normal. There is no distension or abdominal bruit.      Palpations: Abdomen is soft. There is no abdominal mass.      Tenderness: There is no abdominal tenderness.   Musculoskeletal:         General: No deformity.      Extremities: No clubbing present.     Cervical back: Neck supple. Skin:     General: Skin is warm and dry. There is no cyanosis.      Coloration: Skin is not pale.      Findings: No rash.   Neurological:      Mental Status: Alert and oriented to person, place, and time.      Cranial Nerves: No cranial nerve deficit.   Psychiatric:         Judgment: Judgment normal.         Lab Review:       Assessment:      Diagnosis Plan   1. Chronic coronary artery disease  Adult Transthoracic Echo Complete W/ Cont if Necessary Per Protocol    Stress Test With Myocardial Perfusion One Day   2. Chronic diastolic congestive heart failure (HCC)  Adult Transthoracic Echo Complete W/ " Cont if Necessary Per Protocol    Stress Test With Myocardial Perfusion One Day   3. Mixed hyperlipidemia     4. S/P drug eluting coronary stent placement  Adult Transthoracic Echo Complete W/ Cont if Necessary Per Protocol    Stress Test With Myocardial Perfusion One Day     1. Congestive heart failure with lower extremity edema and exertional dyspnea.  Set up nuclear perfusion study and repeat echocardiogram.  Her last echocardiogram did see a decrease in systolic function which was new but mild.  She has had increasing edema and dyspnea since that time.  2. CAD, history of LAD stent, last work-up was in 2015, cardiac catheterization showing patent stent.  3. COVID-19 infection 3/2021.  4. History of DVT and pulmonary embolism June 2021, on Eliquis until the end of June 2022.   5. Hyperlipidemia, on rosuvastatin.   6. Hypertension, goal <120/80.        Plan:     She recently had her Lasix increased to 40 twice daily but has had no improvement in diuresis with that.  We will try Bumex 2 mg twice daily instead to see if she diuresis better with this.  In addition, I would like to try Entresto 24/26 twice daily instead of Altace..  She just refilled her Bystolic so we will let her finish that prescription and then switch her to carvedilol 3.125 twice daily.  Advised her to decrease her spironolactone to 12.5 mg daily as she has high normal potassium.  Would have her see Glenna in 1 month.    Eventually, I think she would benefit from an SGLT2 inhibitor but would like to make the other changes first.

## 2022-05-04 ENCOUNTER — HOSPITAL ENCOUNTER (OUTPATIENT)
Dept: CARDIOLOGY | Facility: HOSPITAL | Age: 67
Discharge: HOME | End: 2022-05-04

## 2022-05-04 ENCOUNTER — TELEPHONE (OUTPATIENT)
Dept: CARDIOLOGY | Facility: CLINIC | Age: 67
End: 2022-05-04

## 2022-05-04 VITALS
HEART RATE: 72 BPM | OXYGEN SATURATION: 97 % | SYSTOLIC BLOOD PRESSURE: 110 MMHG | DIASTOLIC BLOOD PRESSURE: 70 MMHG | WEIGHT: 217 LBS | BODY MASS INDEX: 34.87 KG/M2 | HEIGHT: 66 IN

## 2022-05-04 DIAGNOSIS — I25.10 CHRONIC CORONARY ARTERY DISEASE: ICD-10-CM

## 2022-05-04 DIAGNOSIS — I50.32 CHRONIC DIASTOLIC CONGESTIVE HEART FAILURE: ICD-10-CM

## 2022-05-04 DIAGNOSIS — Z95.5 S/P DRUG ELUTING CORONARY STENT PLACEMENT: ICD-10-CM

## 2022-05-04 LAB
ASCENDING AORTA: 3.5 CM
BH CV ECHO MEAS - ACS: 1.87 CM
BH CV ECHO MEAS - AI P1/2T: 586.7 MSEC
BH CV ECHO MEAS - AO MAX PG: 6.9 MMHG
BH CV ECHO MEAS - AO MEAN PG: 4.3 MMHG
BH CV ECHO MEAS - AO ROOT DIAM: 3.3 CM
BH CV ECHO MEAS - AO V2 MAX: 131.7 CM/SEC
BH CV ECHO MEAS - AO V2 VTI: 30.8 CM
BH CV ECHO MEAS - AVA(I,D): 1.54 CM2
BH CV ECHO MEAS - EDV(CUBED): 46.6 ML
BH CV ECHO MEAS - EDV(MOD-SP2): 66 ML
BH CV ECHO MEAS - EDV(MOD-SP4): 83 ML
BH CV ECHO MEAS - EF(MOD-BP): 54.1 %
BH CV ECHO MEAS - EF(MOD-SP2): 50 %
BH CV ECHO MEAS - EF(MOD-SP4): 61.4 %
BH CV ECHO MEAS - ESV(CUBED): 11.7 ML
BH CV ECHO MEAS - ESV(MOD-SP2): 33 ML
BH CV ECHO MEAS - ESV(MOD-SP4): 32 ML
BH CV ECHO MEAS - FS: 36.9 %
BH CV ECHO MEAS - IVS/LVPW: 0.86 CM
BH CV ECHO MEAS - IVSD: 0.9 CM
BH CV ECHO MEAS - LAT PEAK E' VEL: 7.7 CM/SEC
BH CV ECHO MEAS - LV DIASTOLIC VOL/BSA (35-75): 40.1 CM2
BH CV ECHO MEAS - LV MASS(C)D: 103.3 GRAMS
BH CV ECHO MEAS - LV MAX PG: 2.25 MMHG
BH CV ECHO MEAS - LV MEAN PG: 1.25 MMHG
BH CV ECHO MEAS - LV SYSTOLIC VOL/BSA (12-30): 15.5 CM2
BH CV ECHO MEAS - LV V1 MAX: 75 CM/SEC
BH CV ECHO MEAS - LV V1 VTI: 17.1 CM
BH CV ECHO MEAS - LVIDD: 3.6 CM
BH CV ECHO MEAS - LVIDS: 2.27 CM
BH CV ECHO MEAS - LVOT AREA: 2.8 CM2
BH CV ECHO MEAS - LVOT DIAM: 1.88 CM
BH CV ECHO MEAS - LVPWD: 1.05 CM
BH CV ECHO MEAS - MED PEAK E' VEL: 5.4 CM/SEC
BH CV ECHO MEAS - MV A DUR: 0.09 SEC
BH CV ECHO MEAS - MV A MAX VEL: 78.8 CM/SEC
BH CV ECHO MEAS - MV DEC SLOPE: 308.9 CM/SEC2
BH CV ECHO MEAS - MV DEC TIME: 0.24 MSEC
BH CV ECHO MEAS - MV E MAX VEL: 53.6 CM/SEC
BH CV ECHO MEAS - MV E/A: 0.68
BH CV ECHO MEAS - MV MAX PG: 4.1 MMHG
BH CV ECHO MEAS - MV MEAN PG: 1.36 MMHG
BH CV ECHO MEAS - MV P1/2T: 65.9 MSEC
BH CV ECHO MEAS - MV V2 VTI: 25.2 CM
BH CV ECHO MEAS - MVA(P1/2T): 3.3 CM2
BH CV ECHO MEAS - MVA(VTI): 1.88 CM2
BH CV ECHO MEAS - PA ACC TIME: 0.08 SEC
BH CV ECHO MEAS - PA PR(ACCEL): 41 MMHG
BH CV ECHO MEAS - PA V2 MAX: 76 CM/SEC
BH CV ECHO MEAS - PULM A REVS DUR: 0.09 SEC
BH CV ECHO MEAS - PULM A REVS VEL: 46.3 CM/SEC
BH CV ECHO MEAS - PULM DIAS VEL: 30.8 CM/SEC
BH CV ECHO MEAS - PULM S/D: 2.33
BH CV ECHO MEAS - PULM SYS VEL: 72 CM/SEC
BH CV ECHO MEAS - QP/QS: 0.87
BH CV ECHO MEAS - RAP SYSTOLE: 3 MMHG
BH CV ECHO MEAS - RV MAX PG: 1.63 MMHG
BH CV ECHO MEAS - RV V1 MAX: 63.8 CM/SEC
BH CV ECHO MEAS - RV V1 VTI: 14.4 CM
BH CV ECHO MEAS - RVOT DIAM: 1.91 CM
BH CV ECHO MEAS - RVSP: 13.6 MMHG
BH CV ECHO MEAS - SI(MOD-SP2): 15.9 ML/M2
BH CV ECHO MEAS - SI(MOD-SP4): 24.6 ML/M2
BH CV ECHO MEAS - SV(LVOT): 47.3 ML
BH CV ECHO MEAS - SV(MOD-SP2): 33 ML
BH CV ECHO MEAS - SV(MOD-SP4): 51 ML
BH CV ECHO MEAS - SV(RVOT): 41 ML
BH CV ECHO MEAS - TAPSE (>1.6): 2.08 CM
BH CV ECHO MEAS - TR MAX PG: 10.6 MMHG
BH CV ECHO MEAS - TR MAX VEL: 162.9 CM/SEC
BH CV ECHO MEASUREMENTS AVERAGE E/E' RATIO: 8.18
BH CV XLRA - RV BASE: 2.7 CM
BH CV XLRA - RV LENGTH: 7.2 CM
BH CV XLRA - RV MID: 2.5 CM
BH CV XLRA - TDI S': 7.6 CM/SEC
LEFT ATRIUM VOLUME INDEX: 17 ML/M2
MAXIMAL PREDICTED HEART RATE: 151 BPM
SINUS: 3.1 CM
STJ: 3.1 CM
STRESS TARGET HR: 128 BPM

## 2022-05-04 PROCEDURE — 93016 CV STRESS TEST SUPVJ ONLY: CPT | Performed by: INTERNAL MEDICINE

## 2022-05-04 PROCEDURE — 93018 CV STRESS TEST I&R ONLY: CPT | Performed by: INTERNAL MEDICINE

## 2022-05-04 PROCEDURE — 93306 TTE W/DOPPLER COMPLETE: CPT | Performed by: INTERNAL MEDICINE

## 2022-05-04 PROCEDURE — 25010000002 PERFLUTREN (DEFINITY) 8.476 MG IN SODIUM CHLORIDE (PF) 0.9 % 10 ML INJECTION: Performed by: INTERNAL MEDICINE

## 2022-05-04 PROCEDURE — 25010000002 AMINOPHYLLINE PER 250 MG: Performed by: INTERNAL MEDICINE

## 2022-05-04 PROCEDURE — 93017 CV STRESS TEST TRACING ONLY: CPT

## 2022-05-04 PROCEDURE — 25010000002 REGADENOSON 0.4 MG/5ML SOLUTION: Performed by: INTERNAL MEDICINE

## 2022-05-04 PROCEDURE — 78452 HT MUSCLE IMAGE SPECT MULT: CPT

## 2022-05-04 PROCEDURE — 93306 TTE W/DOPPLER COMPLETE: CPT

## 2022-05-04 PROCEDURE — 78452 HT MUSCLE IMAGE SPECT MULT: CPT | Performed by: INTERNAL MEDICINE

## 2022-05-04 PROCEDURE — 0 TECHNETIUM TETROFOSMIN KIT: Performed by: INTERNAL MEDICINE

## 2022-05-04 PROCEDURE — A9502 TC99M TETROFOSMIN: HCPCS | Performed by: INTERNAL MEDICINE

## 2022-05-04 RX ORDER — AMINOPHYLLINE DIHYDRATE 25 MG/ML
125 INJECTION, SOLUTION INTRAVENOUS ONCE
Status: COMPLETED | OUTPATIENT
Start: 2022-05-04 | End: 2022-05-04

## 2022-05-04 RX ADMIN — TETROFOSMIN 1 DOSE: 1.38 INJECTION, POWDER, LYOPHILIZED, FOR SOLUTION INTRAVENOUS at 14:35

## 2022-05-04 RX ADMIN — PERFLUTREN 1.5 ML: 6.52 INJECTION, SUSPENSION INTRAVENOUS at 13:07

## 2022-05-04 RX ADMIN — TETROFOSMIN 1 DOSE: 1.38 INJECTION, POWDER, LYOPHILIZED, FOR SOLUTION INTRAVENOUS at 13:30

## 2022-05-04 RX ADMIN — REGADENOSON 0.4 MG: 0.08 INJECTION, SOLUTION INTRAVENOUS at 14:35

## 2022-05-04 RX ADMIN — AMINOPHYLLINE 125 MG: 25 INJECTION, SOLUTION INTRAVENOUS at 14:40

## 2022-05-04 NOTE — TELEPHONE ENCOUNTER
Reviewed results with Barbara Tran.  Patient's questions were answered and she verbalized understanding.    Thank you,  Destiny Warren RN  Triage Nurse [facility]

## 2022-05-06 LAB
BH CV NUCLEAR PRIOR STUDY: 2
BH CV REST NUCLEAR ISOTOPE DOSE: 10.9 MCI
BH CV STRESS BP STAGE 1: NORMAL
BH CV STRESS BP STAGE 2: NORMAL
BH CV STRESS COMMENTS STAGE 1: NORMAL
BH CV STRESS COMMENTS STAGE 2: NORMAL
BH CV STRESS DOSE REGADENOSON STAGE 1: 0.4
BH CV STRESS DURATION MIN STAGE 1: 0
BH CV STRESS DURATION SEC STAGE 1: 10
BH CV STRESS HR STAGE 1: 87
BH CV STRESS NUCLEAR ISOTOPE DOSE: 33.5 MCI
BH CV STRESS PROTOCOL 1: NORMAL
BH CV STRESS RECOVERY BP: NORMAL MMHG
BH CV STRESS RECOVERY HR: 63 BPM
BH CV STRESS STAGE 1: 1
LV EF NUC BP: 55 %
MAXIMAL PREDICTED HEART RATE: 151 BPM
PERCENT MAX PREDICTED HR: 57.62 %
STRESS BASELINE BP: NORMAL MMHG
STRESS BASELINE HR: 62 BPM
STRESS PERCENT HR: 68 %
STRESS POST EXERCISE DUR SEC: 10 SEC
STRESS POST PEAK BP: NORMAL MMHG
STRESS POST PEAK HR: 87 BPM
STRESS TARGET HR: 128 BPM

## 2022-05-09 ENCOUNTER — TELEPHONE (OUTPATIENT)
Dept: CARDIOLOGY | Facility: CLINIC | Age: 67
End: 2022-05-09

## 2022-05-09 DIAGNOSIS — I50.32 CHRONIC DIASTOLIC CONGESTIVE HEART FAILURE: Primary | ICD-10-CM

## 2022-05-09 NOTE — TELEPHONE ENCOUNTER
Notified pt of results. She verbalized understanding.     Dr. Leon,    She asked what her next step should be considering she still has the SOA and swelling.    Thank you,    Bindu Dowell RN  Triage [facility]

## 2022-05-09 NOTE — TELEPHONE ENCOUNTER
I left her a message to call back. I want to confirm her medications; per Dr. Leon's office note on 4/14/22, she should be on:  12.5 mg spironolactone daily  24/26 mg Entresto BID  3.125 mg carvedilol BID  2 mg bumetanide BID.     Also, she should have labs drawn with these medication changes. I put order in for BMP.     Thanks!  MANOHAR Traore

## 2022-05-10 DIAGNOSIS — F34.1 DYSTHYMIA: ICD-10-CM

## 2022-05-10 DIAGNOSIS — F51.01 PRIMARY INSOMNIA: ICD-10-CM

## 2022-05-10 RX ORDER — ZOLPIDEM TARTRATE 5 MG/1
5 TABLET ORAL NIGHTLY PRN
Qty: 30 TABLET | Refills: 2 | Status: SHIPPED | OUTPATIENT
Start: 2022-05-10 | End: 2022-08-15

## 2022-05-10 RX ORDER — CARVEDILOL 3.12 MG/1
3.12 TABLET ORAL 2 TIMES DAILY
Qty: 60 TABLET | Refills: 11 | Status: SHIPPED | OUTPATIENT
Start: 2022-05-10 | End: 2022-12-21

## 2022-05-10 RX ORDER — BUPROPION HYDROCHLORIDE 300 MG/1
300 TABLET ORAL DAILY
Qty: 30 TABLET | Refills: 0 | Status: SHIPPED | OUTPATIENT
Start: 2022-05-10 | End: 2022-08-26 | Stop reason: SDUPTHER

## 2022-05-10 RX ORDER — BUMETANIDE 2 MG/1
4 TABLET ORAL DAILY
Qty: 60 TABLET | Refills: 11
Start: 2022-05-10 | End: 2022-10-13 | Stop reason: SDUPTHER

## 2022-05-10 NOTE — TELEPHONE ENCOUNTER
I spoke with her. Swelling is a little better on 2 mg bumetanide BID but urine output has only increased slightly. She is going to try taking 4 mg bumetanide daily to see if urine output increases.     She is also going to switch from ramipril to Entresto 24/26 BID and switch from Bystolic to 3.125 mg BID.     She will make this change prior to getting labs drawn.     She sees Glenna Thomas on 5/17/22.      MANOHAR Traore

## 2022-05-10 NOTE — TELEPHONE ENCOUNTER
Dinorah,    Called and spoke with patient. She is on these medications:    Bystolic 10 mg daily  Ramipril 2.5 mg daily  Spironolactone 12.5 mg daily  Bumetanide 2 mg BID    She said she wanted to finish out the Bystolic before starting the Entresto. Once she stops the Bystolic she is also going to stop the ramipril. She has never heard of the carvedilol and has not ever picked it up from a pharmacy.    Do you still want her to have labs drawn? Does she need to be seen?    Thank you,    Bindu Dowell, RN  Triage [facility] [place]

## 2022-05-27 ENCOUNTER — TRANSCRIBE ORDERS (OUTPATIENT)
Dept: SLEEP MEDICINE | Facility: HOSPITAL | Age: 67
End: 2022-05-27

## 2022-05-27 DIAGNOSIS — G47.33 OSA (OBSTRUCTIVE SLEEP APNEA): Primary | ICD-10-CM

## 2022-06-01 ENCOUNTER — LAB (OUTPATIENT)
Dept: LAB | Facility: HOSPITAL | Age: 67
End: 2022-06-01

## 2022-06-01 LAB — SARS-COV-2 ORF1AB RESP QL NAA+PROBE: NOT DETECTED

## 2022-06-01 PROCEDURE — U0005 INFEC AGEN DETEC AMPLI PROBE: HCPCS | Performed by: INTERNAL MEDICINE

## 2022-06-01 PROCEDURE — C9803 HOPD COVID-19 SPEC COLLECT: HCPCS | Performed by: INTERNAL MEDICINE

## 2022-06-01 PROCEDURE — U0004 COV-19 TEST NON-CDC HGH THRU: HCPCS | Performed by: INTERNAL MEDICINE

## 2022-06-03 ENCOUNTER — HOSPITAL ENCOUNTER (OUTPATIENT)
Dept: SLEEP MEDICINE | Facility: HOSPITAL | Age: 67
Discharge: HOME | End: 2022-06-03
Admitting: INTERNAL MEDICINE

## 2022-06-03 VITALS — WEIGHT: 217 LBS | HEIGHT: 66 IN | BODY MASS INDEX: 34.87 KG/M2

## 2022-06-03 DIAGNOSIS — G47.33 OSA (OBSTRUCTIVE SLEEP APNEA): ICD-10-CM

## 2022-06-03 PROCEDURE — 95810 POLYSOM 6/> YRS 4/> PARAM: CPT

## 2022-06-09 ENCOUNTER — OFFICE VISIT (OUTPATIENT)
Dept: CARDIOLOGY | Facility: CLINIC | Age: 67
End: 2022-06-09

## 2022-06-09 VITALS
HEIGHT: 66 IN | WEIGHT: 218 LBS | BODY MASS INDEX: 35.03 KG/M2 | DIASTOLIC BLOOD PRESSURE: 72 MMHG | SYSTOLIC BLOOD PRESSURE: 90 MMHG | HEART RATE: 85 BPM

## 2022-06-09 DIAGNOSIS — Z86.711 HISTORY OF PULMONARY EMBOLISM: ICD-10-CM

## 2022-06-09 DIAGNOSIS — R06.02 SHORTNESS OF BREATH: ICD-10-CM

## 2022-06-09 DIAGNOSIS — R60.0 LOWER EXTREMITY EDEMA: ICD-10-CM

## 2022-06-09 DIAGNOSIS — U07.1 COVID-19 VIRUS DETECTED: ICD-10-CM

## 2022-06-09 DIAGNOSIS — I51.89 DIASTOLIC DYSFUNCTION: Primary | ICD-10-CM

## 2022-06-09 DIAGNOSIS — I25.10 CORONARY ARTERY DISEASE INVOLVING NATIVE CORONARY ARTERY OF NATIVE HEART WITHOUT ANGINA PECTORIS: ICD-10-CM

## 2022-06-09 DIAGNOSIS — I10 ESSENTIAL HYPERTENSION: ICD-10-CM

## 2022-06-09 DIAGNOSIS — E78.2 MIXED HYPERLIPIDEMIA: ICD-10-CM

## 2022-06-09 PROBLEM — I26.99 ACUTE PULMONARY EMBOLISM WITHOUT ACUTE COR PULMONALE (HCC): Status: RESOLVED | Noted: 2021-06-03 | Resolved: 2022-06-09

## 2022-06-09 PROCEDURE — 93000 ELECTROCARDIOGRAM COMPLETE: CPT | Performed by: NURSE PRACTITIONER

## 2022-06-09 PROCEDURE — 99214 OFFICE O/P EST MOD 30 MIN: CPT | Performed by: NURSE PRACTITIONER

## 2022-06-09 RX ORDER — RAMIPRIL 2.5 MG/1
2.5 CAPSULE ORAL DAILY
Qty: 90 CAPSULE | Refills: 3 | Status: SHIPPED | OUTPATIENT
Start: 2022-06-09 | End: 2022-12-21

## 2022-06-09 RX ORDER — SACUBITRIL AND VALSARTAN 49; 51 MG/1; MG/1
TABLET, FILM COATED ORAL
COMMUNITY
Start: 2022-05-23 | End: 2022-06-09

## 2022-06-09 RX ORDER — RAMIPRIL 2.5 MG/1
2.5 CAPSULE ORAL DAILY
Qty: 90 CAPSULE | Refills: 3 | Status: SHIPPED | OUTPATIENT
Start: 2022-06-09 | End: 2022-06-09 | Stop reason: SDUPTHER

## 2022-06-09 NOTE — PROGRESS NOTES
Date of Office Visit: 2022  Encounter Provider: MANOHAR Avendano  Place of Service: [facility] CARDIOLOGY  Patient Name: Barbara Tran  :1955  Primary Cardiologist: Dr. Lashay Leon     Chief Complaint   Patient presents with   • Coronary Artery Disease   • Follow-up   :     HPI: Barbara Tran is a pleasant 67 y.o. female who presents today for follow-up on medication changes, shortness of breath, and lower extremity edema.  She is a new patient to me and I have reviewed her medical records.    She has been diagnosed with hypertension, hyperlipidemia, and obesity.  She reports that her mom, dad, sister, and brother's x2 all had heart disease.  She has known pulmonary nodules and follows with Dr. Anoml Rodriguez.    In , she was diagnosed with coronary artery disease and underwent stent placement to the proximal LAD.  In , cardiac catheterization showed patent LAD stent, 80% septal  stenosis unchanged from prior cath, normal left main, normal left circumflex, normal RCA, normal LVEF.    In 2021, she had COVID-19 pneumonia.  In 2021, she presented to [facility] with acute shortness of breath and was diagnosed with bilateral acute pulmonary embolism without cor pulmonale per CTA.  She was started on apixaban.    In 2021, echocardiogram showed LVEF 46-50%, moderate to severe septal hypokinesis, and grade 1 diastolic dysfunction.    In 2022, she saw Dr. Leon for the first time for evaluation of lower extremity edema, exertional dyspnea, and fluid retention.  Her furosemide had recently been increased to 80 mg daily with no improvement.  Dr. Leon stopped the furosemide and changed her to bumetanide 2 mg daily.  She stopped her ramipril and started her on sacubitril/valsartan  1 tablet twice per day.  She stopped her nebivolol and started her on carvedilol.  Her spironolactone was decreased from 25 to 12.5  mg daily for high normal potassium.  She was recommended follow-up with me in 1 month.    She presents today for follow-up visit on medication management.  She has not noticed any change in her shortness of breath or lower extremity edema.  The sacubitril/valsartan is quite expensive on a monthly basis for her.  Her blood pressure is 90/72 and she is feeling lightheaded.  She follows a low-sodium diet.  She is due for repeat BMP today.  She denies chest pain, palpitations, or syncope.      Past Medical History:   Diagnosis Date   • Allergic 2015    codeine, morphine, levaquin   • Arthritis l5 years or so ago   • Cataract 6-9 months ago    Need surgery   • CHF (congestive heart failure) (HCC)    • Coronary artery disease 2005 stent   • COVID-19 virus detected 3/10/2021   • Deep vein thrombosis (HCC) 6-3-21    Pulmonary embolism   • Depression    • Disease of thyroid gland    • Headache Covid 3-6-21    Has continued   • History of pulmonary embolism 6/1/2021   • Hyperlipidemia    • Hypertension since 2005   • Hypothyroidism since 2012   • Osteopenia last few years   • Pulmonary embolism (HCC) 6-3-21    From Covid       Past Surgical History:   Procedure Laterality Date   • CHOLECYSTECTOMY  1995   • COLONOSCOPY N/A 12/19/2016    Procedure: COLONOSCOPY WITH COLD BIOPSIES;  Surgeon: Medina Guillen MD;  Location: [facility] ENDOSCOPY;  Service:    • CORONARY STENT PLACEMENT  2005    LAD 80% blockage   • SUBTOTAL HYSTERECTOMY  2009   • TONSILLECTOMY  1965   • TUBAL ABDOMINAL LIGATION  1985       Social History     Socioeconomic History   • Marital status: Single   Tobacco Use   • Smoking status: Never Smoker   • Smokeless tobacco: Never Used   • Tobacco comment: caffien use coffee daily tea occ   Vaping Use   • Vaping Use: Never used   Substance and Sexual Activity   • Alcohol use: Yes     Comment: 1-2 monthly socially   • Drug use: No   • Sexual activity: Defer       Family History   Problem Relation Age of Onset   • Heart  disease Mother            • Heart disease Father         deseased   • No Known Problems Maternal Aunt    • No Known Problems Maternal Uncle    • No Known Problems Paternal Aunt    • No Known Problems Paternal Uncle    • No Known Problems Maternal Grandmother    • No Known Problems Maternal Grandfather    • No Known Problems Paternal Grandmother    • No Known Problems Paternal Grandfather    • Diabetes Sister    • Heart disease Sister 69        open heart-bypass   • Heart disease Brother         open heart-bypass   • Cancer Brother         Ladder and Rodney   • Heart disease Brother 62        deseased   • Arthritis Son    • Celiac disease Neg Hx    • Cirrhosis Neg Hx    • Colon cancer Neg Hx    • Colon polyps Neg Hx    • Crohn's disease Neg Hx    • Cystic fibrosis Neg Hx    • Esophageal cancer Neg Hx    • Hemochromatosis Neg Hx    • Inflammatory bowel disease Neg Hx    • Irritable bowel syndrome Neg Hx    • Liver cancer Neg Hx    • Liver disease Neg Hx    • Rectal cancer Neg Hx    • Stomach cancer Neg Hx    • Ulcerative colitis Neg Hx    • Dante's disease Neg Hx    • Alcohol abuse Neg Hx    • Pancreatitis Neg Hx        The following portion of the patient's history were reviewed and updated as appropriate: past medical history, past surgical history, past social history, past family history, allergies, current medications, and problem list.    Review of Systems   Cardiovascular: Positive for dyspnea on exertion and leg swelling.   Respiratory: Positive for shortness of breath.    Neurological: Positive for dizziness and light-headedness.       Allergies   Allergen Reactions   • Codeine Dizziness     lightheaded   • Levofloxacin Itching   • Morphine Itching         Current Outpatient Medications:   •  apixaban (ELIQUIS) 2.5 MG tablet tablet, 2.5 mg Every 12 (Twelve) Hours., Disp: , Rfl:   •  aspirin 81 MG tablet, Take 81 mg by mouth Daily., Disp: , Rfl:   •  bumetanide (BUMEX) 2 MG tablet, Take 2 tablets by  "mouth Daily., Disp: 60 tablet, Rfl: 11  •  buPROPion XL (WELLBUTRIN XL) 300 MG 24 hr tablet, Take 1 tablet by mouth Daily., Disp: 30 tablet, Rfl: 0  •  calcium carbonate (OS-SHAHNAZ) 600 MG tablet, Take 600 mg by mouth 2 (Two) Times a Day With Meals., Disp: , Rfl:   •  carvedilol (COREG) 3.125 MG tablet, Take 1 tablet by mouth 2 (Two) Times a Day., Disp: 60 tablet, Rfl: 11  •  ibandronate (BONIVA) 150 MG tablet, TAKE 1 TABLET BY MOUTH EVERY 30 DAYS., Disp: 3 tablet, Rfl: 3  •  magnesium oxide (MAG-OX) 400 MG tablet, Take 800 mg by mouth 2 (two) times a day., Disp: , Rfl:   •  rosuvastatin (CRESTOR) 40 MG tablet, Take 1 tablet by mouth Daily., Disp: , Rfl:   •  saccharomyces boulardii (FLORASTOR) 250 MG capsule, Take 1 capsule by mouth 2 (Two) Times a Day., Disp: 60 capsule, Rfl: 1  •  sacubitril-valsartan (Entresto) 49-51 MG tablet, , Disp: , Rfl:   •  spironolactone (ALDACTONE) 25 MG tablet, TAKE 1 TABLET BY MOUTH EVERY DAY (Patient taking differently: 25 mg 2 (Two) Times a Day.), Disp: 90 tablet, Rfl: 1  •  Synthroid 50 MCG tablet, TAKE 1 TABLET BY MOUTH EVERY DAY, Disp: 90 tablet, Rfl: 1  •  zolpidem (AMBIEN) 5 MG tablet, Take 1 tablet by mouth At Night As Needed for Sleep., Disp: 30 tablet, Rfl: 2        Objective:     Vitals:    06/09/22 0802 06/09/22 0816   BP: 94/70 90/72   BP Location: Right arm Left arm   Pulse: 85    Weight: 98.9 kg (218 lb)    Height: 167.6 cm (66\")      Body mass index is 35.19 kg/m².    PHYSICAL EXAM:    Vitals Reviewed.   General Appearance: No acute distress, well developed and well nourished. Obese.   Eyes: Conjunctiva and lids: No erythema, swelling, or discharge. Sclera non-icteric. Glasses.   HENT: Atraumatic, normocephalic. External eyes, ears, and nose normal. No hearing loss noted. Mucous membranes normal. Lips not cyanotic. Neck supple with no tenderness. Wearing mask.   Respiratory: No signs of respiratory distress. Respiration rhythm and depth normal.   Clear to auscultation. No " rales, crackles, rhonchi, or wheezing auscultated.   Cardiovascular:  Jugular Venous Pressure: Normal  Heart Rate and Rhythm: Normal, Heart Sounds: Normal S1 and S2. No S3 or S4 noted.  Murmurs: No murmurs noted. No rubs, thrills, or gallops.   Lower Extremities: Bilateral nonpitting lower extremity edema noted.  Gastrointestinal:  Abdomen soft, non-distended, non-tender.    Musculoskeletal: Normal movement of extremities.  Skin and Nails: General appearance normal. No pallor, cyanosis, diaphoresis. Skin temperature normal. No clubbing of fingernails.   Psychiatric: Patient alert and oriented to person, place, and time. Speech and behavior appropriate. Normal mood and affect.       ECG 12 Lead    Date/Time: 6/9/2022 8:12 AM  Performed by: Glenna Thomas APRN  Authorized by: Glenna Thomas APRN   Comparison: compared with previous ECG from 4/14/2022  Similar to previous ECG  Rhythm: sinus rhythm  Rate: normal  BPM: 85  Conduction: conduction normal  ST Segments: ST segments normal  T Waves: T waves normal  QRS axis: normal  Other: no other findings    Clinical impression: normal ECG              Assessment:       Diagnosis Plan   1. Diastolic dysfunction  Basic Metabolic Panel   2. Shortness of breath     3. Lower extremity edema     4. Coronary artery disease involving native coronary artery of native heart without angina pectoris     5. COVID-19 virus detected     6. History of pulmonary embolism     7. Mixed hyperlipidemia     8. Essential hypertension            Plan:       1.  Diastolic Dysfunction: Grade 1 noted on recent echocardiogram.  Her echocardiogram in 2021 showed a mildly reduced LVEF which normalized.  She has a history of heart failure.  She recently saw Dr. Leon for shortness of breath and lower extremity edema.  Her furosemide 80 mg daily was changed to bumetanide 2 mg twice daily.  Her ramipril was changed to sacubitril/valsartan and nebivolol changed to carvedilol.  Her spironolactone  was decreased because of possible hyperkalemia.    Unfortunately, with these changes she has not noticed any difference with her shortness of breath or lower extremity edema.  I would have thought with these medication changes that she would be feeling better.  Dr. Leon noted that she had +2 pitting edema on her last examination and I appreciated nonpitting lower extremity edema.  I recommended a repeat BMP.  The sacubitril/valsartan is very expensive for her so since is not effective in management of her symptoms and because of low blood pressure, I recommended switching back to the ramipril.  We will continue with the bumetanide, carvedilol, and spironolactone.    2.  Shortness of Breath: Possibly not related to heart failure.  I recommended that she discuss with Dr. Anmol Rodriguez her pulmonologist.    3.  Lower Extremity Edema: She was experiencing edema on furosemide 80 mg daily and now bumetanide 2 mg daily.  She did not have an additional diuretic effect from the sacubitril/valsartan.  This may be venous insufficiency and from her weight.  I have recommended a low-sodium diet and she would benefit from leg elevation and compression stockings.    4.  Coronary Artery Disease: History of LAD stent placement.  Continue medications for secondary prevention.    5.  History of COVID-19 infection in March 2021.    6.  History of DVT and pulmonary embolism in June 2021.  She was recommended to stop her apixaban at the end of June 2022.    7.  Hyperlipidemia: On rosuvastatin.    8.  Hypertension: Goal BP less than 120/80 recommended.    9.  I recommended follow-up with Dr. Leon in 6 months.      As always, it has been a pleasure to participate in your patient's care. Thank you.       Sincerely,         MANOHAR Dolan  [facility] Cardiology      · Dictated utilizing Dragon Dictation  · COVID-19 Precautions - Patient was compliant in wearing a mask. When I saw the patient, I used  appropriate personal protective equipment (PPE) including mask and eye shield (standard procedure).  Additionally, I used gown and gloves if indicated.  Hand hygiene was completed before and after seeing the patient.  · I spent 30 minutes reviewing her medical records/testing/previous office notes/labs, face-to-face interaction with patient, physical examination, formulating the plan of care, and discussion of plan of care with patient.

## 2022-06-21 ENCOUNTER — IMMUNIZATION (OUTPATIENT)
Dept: FAMILY MEDICINE CLINIC | Facility: CLINIC | Age: 67
End: 2022-06-21

## 2022-06-21 DIAGNOSIS — Z23 NEED FOR VACCINATION: Primary | ICD-10-CM

## 2022-06-21 PROCEDURE — 91305 COVID-19 (PFIZER) 12+ YRS: CPT | Performed by: FAMILY MEDICINE

## 2022-06-21 PROCEDURE — 0054A COVID-19 (PFIZER) 12+ YRS: CPT | Performed by: FAMILY MEDICINE

## 2022-06-22 LAB
BUN SERPL-MCNC: 20 MG/DL (ref 8–27)
BUN/CREAT SERPL: 22 (ref 12–28)
CALCIUM SERPL-MCNC: 9.4 MG/DL (ref 8.7–10.3)
CHLORIDE SERPL-SCNC: 96 MMOL/L (ref 96–106)
CO2 SERPL-SCNC: 20 MMOL/L (ref 20–29)
CREAT SERPL-MCNC: 0.93 MG/DL (ref 0.57–1)
EGFRCR SERPLBLD CKD-EPI 2021: 67 ML/MIN/1.73
GLUCOSE SERPL-MCNC: 97 MG/DL (ref 65–99)
POTASSIUM SERPL-SCNC: 4.9 MMOL/L (ref 3.5–5.2)
SODIUM SERPL-SCNC: 136 MMOL/L (ref 134–144)

## 2022-06-22 NOTE — PROGRESS NOTES
Please call patient.  BMP is normal.  Continue current medication regimen.  Follow-up with Dr. Leon as scheduled.  Thank you

## 2022-06-27 RX ORDER — ROSUVASTATIN CALCIUM 40 MG/1
40 TABLET, COATED ORAL DAILY
Qty: 30 TABLET | Refills: 1 | Status: SHIPPED | OUTPATIENT
Start: 2022-06-27 | End: 2022-08-26 | Stop reason: SDUPTHER

## 2022-08-15 DIAGNOSIS — F51.01 PRIMARY INSOMNIA: ICD-10-CM

## 2022-08-15 RX ORDER — ZOLPIDEM TARTRATE 5 MG/1
5 TABLET ORAL NIGHTLY PRN
Qty: 30 TABLET | Refills: 0 | Status: SHIPPED | OUTPATIENT
Start: 2022-08-15 | End: 2022-09-14

## 2022-08-15 RX ORDER — ZOLPIDEM TARTRATE 5 MG/1
5 TABLET ORAL NIGHTLY PRN
Qty: 30 TABLET | Refills: 2 | OUTPATIENT
Start: 2022-08-15

## 2022-08-26 ENCOUNTER — OFFICE VISIT (OUTPATIENT)
Dept: FAMILY MEDICINE CLINIC | Facility: CLINIC | Age: 67
End: 2022-08-26

## 2022-08-26 VITALS
DIASTOLIC BLOOD PRESSURE: 76 MMHG | HEIGHT: 66 IN | BODY MASS INDEX: 34.07 KG/M2 | WEIGHT: 212 LBS | OXYGEN SATURATION: 98 % | RESPIRATION RATE: 16 BRPM | SYSTOLIC BLOOD PRESSURE: 116 MMHG | HEART RATE: 82 BPM

## 2022-08-26 DIAGNOSIS — I10 BENIGN ESSENTIAL HTN: ICD-10-CM

## 2022-08-26 DIAGNOSIS — F34.1 DYSTHYMIA: ICD-10-CM

## 2022-08-26 DIAGNOSIS — Z12.31 ENCOUNTER FOR SCREENING MAMMOGRAM FOR MALIGNANT NEOPLASM OF BREAST: ICD-10-CM

## 2022-08-26 DIAGNOSIS — E03.9 HYPOTHYROIDISM, UNSPECIFIED TYPE: ICD-10-CM

## 2022-08-26 DIAGNOSIS — E78.2 MIXED HYPERLIPIDEMIA: ICD-10-CM

## 2022-08-26 DIAGNOSIS — M65.342 TRIGGER RING FINGER OF LEFT HAND: ICD-10-CM

## 2022-08-26 DIAGNOSIS — R26.89 BALANCE PROBLEM: ICD-10-CM

## 2022-08-26 DIAGNOSIS — Z00.00 MEDICARE ANNUAL WELLNESS VISIT, SUBSEQUENT: Primary | ICD-10-CM

## 2022-08-26 PROBLEM — U07.1 COVID-19 VIRUS DETECTED: Status: RESOLVED | Noted: 2021-03-10 | Resolved: 2022-08-26

## 2022-08-26 PROCEDURE — G0439 PPPS, SUBSEQ VISIT: HCPCS | Performed by: FAMILY MEDICINE

## 2022-08-26 PROCEDURE — 1160F RVW MEDS BY RX/DR IN RCRD: CPT | Performed by: FAMILY MEDICINE

## 2022-08-26 PROCEDURE — 1170F FXNL STATUS ASSESSED: CPT | Performed by: FAMILY MEDICINE

## 2022-08-26 RX ORDER — FUROSEMIDE 40 MG/1
TABLET ORAL
COMMUNITY
Start: 2022-06-21 | End: 2022-12-21

## 2022-08-26 RX ORDER — LEVOTHYROXINE SODIUM 50 MCG
50 TABLET ORAL DAILY
Qty: 90 TABLET | Refills: 3 | Status: SHIPPED | OUTPATIENT
Start: 2022-08-26 | End: 2023-02-27 | Stop reason: SDUPTHER

## 2022-08-26 RX ORDER — BUPROPION HYDROCHLORIDE 300 MG/1
300 TABLET ORAL DAILY
Qty: 90 TABLET | Refills: 3 | Status: SHIPPED | OUTPATIENT
Start: 2022-08-26 | End: 2023-02-08 | Stop reason: SDUPTHER

## 2022-08-26 RX ORDER — ROSUVASTATIN CALCIUM 40 MG/1
40 TABLET, COATED ORAL DAILY
Qty: 90 TABLET | Refills: 1 | Status: ON HOLD | OUTPATIENT
Start: 2022-08-26 | End: 2022-12-29 | Stop reason: SDUPTHER

## 2022-08-26 NOTE — PROGRESS NOTES
"Subsequent Medicare Wellness Visit    Chief Complaint   Patient presents with   • Annual Exam      Subjective    History of Present Illness:  Barbara Tran is a 67 y.o. female who presents for a Subsequent Medicare Wellness Visit.    Compared to one year ago, the patient feels her physical health is worse with her balance and her mental health is the same.  Patient has not fallen but she feels a little unsteady.  We discussed physical therapy but she declined that for now however she is beginning her own exercise regimen which I think will help.  We also discussed using some free weights while standing which might help some.  Recent Hospitalizations:  She was not admitted to the hospital during the last year.     Current Medical Providers:  Patient Care Team:  Millicent Ace MD as PCP - General (Family Medicine)  Kin Patel MD as Consulting Physician (Orthopedic Surgery)  Bere Beckford MD as Consulting Physician (Cardiology)  Patrizia Page MD as Consulting Physician (Ophthalmology)  No opioid medication identified on active medication list. I have reviewed chart for other potential  high risk medication/s and harmful drug interactions in the elderly.  Aspirin is on active medication list. Aspirin use is indicated based on review of current medical condition/s. Benefits of this medication outweigh potential harm.     Advance Care Planning   Advance Directive is not on file.  ACP discussion was held with the patient during this visit. Patient has an advance directive (not in EMR), copy requested.  Objective    /76   Pulse 82   Resp 16   Ht 167.6 cm (66\")   Wt 96.2 kg (212 lb)   SpO2 98%   BMI 34.22 kg/m²   Body mass index is 34.22 kg/m².  BMI Readings from Last 1 Encounters:   08/26/22 34.22 kg/m²   BMI is above normal parameters. Recommendations include: educational material and exercise counseling Just started pilates.    Does the patient have evidence of cognitive impairment? " Yes  Physical Exam  Vitals reviewed.   Constitutional:       Appearance: Normal appearance. She is well-developed and normal weight.      Comments: Mask in place    Cardiovascular:      Rate and Rhythm: Normal rate and regular rhythm.      Heart sounds: Normal heart sounds.   Pulmonary:      Effort: Pulmonary effort is normal.      Breath sounds: Normal breath sounds.   Neurological:      Mental Status: She is alert.   Psychiatric:         Behavior: Behavior normal.         Thought Content: Thought content normal.         Judgment: Judgment normal.       LABS:COMPREHENSIVE METABOLIC PANEL (08/25/2022 08:35)  CBC AND DIFFERENTIAL (08/25/2022 08:35)  T4, FREE (08/25/2022 08:35)  TSH (08/25/2022 08:35)  VITAMIN D 25 HYDROXY (08/25/2022 08:35)  THYROID PEROXIDASE ANTIBODY (08/25/2022 08:35)  LIPID PANEL (08/25/2022 08:35)  HEMOGLOBIN A1C (08/25/2022 08:35)  HEALTH RISK ASSESSMENT  Smoking Status:  Social History     Tobacco Use   Smoking Status Never Smoker   Smokeless Tobacco Never Used   Tobacco Comment    caffien use coffee daily tea occ     Alcohol Consumption:  Social History     Substance and Sexual Activity   Alcohol Use Yes    Comment: 1-2 monthly socially     Fall Risk Screen:  STEADI Fall Risk Assessment was completed, and patient is at LOW risk for falls.Assessment completed on:8/26/2022  Depression Screening:  PHQ-2/PHQ-9 Depression Screening 8/26/2022   Retired PHQ-9 Total Score -   Retired Total Score -   Little Interest or Pleasure in Doing Things 0-->not at all   Feeling Down, Depressed or Hopeless 0-->not at all   PHQ-9: Brief Depression Severity Measure Score 0     Health Habits and Functional and Cognitive Screening:  Functional & Cognitive Status 8/26/2022   Do you have difficulty preparing food and eating? No   Do you have difficulty bathing yourself, getting dressed or grooming yourself? No   Do you have difficulty using the toilet? No   Do you have difficulty moving around from place to place? No    Do you have trouble with steps or getting out of a bed or a chair? No   Current Diet Well Balanced Diet   Dental Exam Up to date   Eye Exam Up to date   Exercise (times per week) 0 times per week   Current Exercises Include Walking;House Cleaning   Current Exercise Activities Include -   Do you need help using the phone?  No   Are you deaf or do you have serious difficulty hearing?  No   Do you need help with transportation? No   Do you need help shopping? No   Do you need help preparing meals?  No   Do you need help with housework?  No   Do you need help with laundry? No   Do you need help taking your medications? No   Do you need help managing money? No   Do you ever drive or ride in a car without wearing a seat belt? No   Have you felt unusual stress, anger or loneliness in the last month? No   Who do you live with? Child   If you need help, do you have trouble finding someone available to you? No   Have you been bothered in the last four weeks by sexual problems? No   Do you have difficulty concentrating, remembering or making decisions? No       Health Habits  Current Diet: Well Balanced Diet  Dental Exam: Up to date  Eye Exam: Up to date  Exercise (times per week): 0 times per week  Current Exercises Include: Walking, House Cleaning  Age-appropriate Screening Schedule:  Refer to the list below for future screening recommendations based on patient's age, sex and/or medical conditions. Orders for these recommended tests are listed in the plan section. The patient has been provided with a written plan.  Health Maintenance   Topic Date Due   • TDAP/TD VACCINES (1 - Tdap) Never done   • ZOSTER VACCINE (1 of 2) Never done   • MAMMOGRAM  03/15/2020   • INFLUENZA VACCINE  10/01/2022   • DXA SCAN  08/16/2023   • LIPID PANEL  08/25/2023          Assessment & Plan   CMS Preventative Services Quick Reference  Risk Factors Identified During Encounter  Cardiovascular Disease -- f/u with cards  Immunizations  Discussed/Encouraged (specific Immunizations; Shingrix  Inactivity/Sedentary -- just started pilates  Obesity/Overweight -- starting to exercise and monitoring intake  The above risks/problems have been discussed with the patient.  Follow up actions/plans if indicated are seen below in the Assessment/Plan Section.  Pertinent information has been shared with the patient in the After Visit Summary.  Patient Instructions    Problem List Items Addressed This Visit        Cardiac and Vasculature    Benign essential HTN    Overview     [facility] 8/26/2022  BP is controlled well. She will recheck in six months. She will continue present meds. Prescription is done by her cardiologist.           Relevant Medications    Lasix 40 MG tablet    HLD (hyperlipidemia)    Overview     [facility] 8/26/2022    Patient has been on hyperlipidemia medications for some time.  She is doing fine with that and labs are stable.          Relevant Medications    rosuvastatin (CRESTOR) 40 MG tablet       Endocrine and Metabolic    Hypothyroidism    Overview     [facility] 8/26/2022   Lab Results   Component Value Date    TSH 2.7 02/22/2022              Relevant Medications    Synthroid 50 MCG tablet       Mental Health    Dysthymia    Overview     [facility] 8/26/2022  Stable         Relevant Medications    buPROPion XL (WELLBUTRIN XL) 300 MG 24 hr tablet       Symptoms and Signs    Balance problem    Overview     [facility] 8/26/2022  Discussed PT. Not wanting to do that right now but discussed free weight use to improve. Pilates may help.             Other Visit Diagnoses     Medicare annual wellness visit, subsequent    -  Primary    Encounter for screening mammogram for malignant neoplasm of breast        Relevant Orders    Mammo Screening Bilateral With CAD    Trigger ring finger of left hand        Relevant Orders    Ambulatory Referral to Hand Surgery             Follow Up:   Return in about 1 year (around 8/26/2023) for yearly Medicare Exam, lab  with next visit.   An After Visit Summary and PPPS were given/sent to the patient.

## 2022-09-13 DIAGNOSIS — F51.01 PRIMARY INSOMNIA: ICD-10-CM

## 2022-09-14 ENCOUNTER — APPOINTMENT (OUTPATIENT)
Dept: WOMENS IMAGING | Facility: HOSPITAL | Age: 67
End: 2022-09-14

## 2022-09-14 PROCEDURE — 77063 BREAST TOMOSYNTHESIS BI: CPT | Performed by: RADIOLOGY

## 2022-09-14 PROCEDURE — 77067 SCR MAMMO BI INCL CAD: CPT | Performed by: RADIOLOGY

## 2022-09-14 RX ORDER — ZOLPIDEM TARTRATE 5 MG/1
5 TABLET ORAL NIGHTLY PRN
Qty: 30 TABLET | Refills: 0 | Status: SHIPPED | OUTPATIENT
Start: 2022-09-14 | End: 2022-10-13 | Stop reason: SDUPTHER

## 2022-10-13 DIAGNOSIS — F51.01 PRIMARY INSOMNIA: ICD-10-CM

## 2022-10-13 RX ORDER — ZOLPIDEM TARTRATE 5 MG/1
5 TABLET ORAL NIGHTLY PRN
Qty: 30 TABLET | Refills: 0 | Status: SHIPPED | OUTPATIENT
Start: 2022-10-13 | End: 2022-11-14

## 2022-10-13 RX ORDER — BUMETANIDE 2 MG/1
4 TABLET ORAL DAILY
Qty: 60 TABLET | Refills: 1 | Status: ON HOLD
Start: 2022-10-13 | End: 2022-12-29 | Stop reason: SDUPTHER

## 2022-11-13 DIAGNOSIS — F51.01 PRIMARY INSOMNIA: ICD-10-CM

## 2022-11-14 RX ORDER — ZOLPIDEM TARTRATE 5 MG/1
5 TABLET ORAL NIGHTLY PRN
Qty: 30 TABLET | Refills: 0 | Status: SHIPPED | OUTPATIENT
Start: 2022-11-14 | End: 2022-12-14

## 2022-12-05 ENCOUNTER — FLU SHOT (OUTPATIENT)
Dept: FAMILY MEDICINE CLINIC | Facility: CLINIC | Age: 67
End: 2022-12-05

## 2022-12-05 DIAGNOSIS — Z23 NEED FOR VACCINATION: Primary | ICD-10-CM

## 2022-12-05 PROCEDURE — 90662 IIV NO PRSV INCREASED AG IM: CPT | Performed by: FAMILY MEDICINE

## 2022-12-05 PROCEDURE — G0008 ADMIN INFLUENZA VIRUS VAC: HCPCS | Performed by: FAMILY MEDICINE

## 2022-12-13 DIAGNOSIS — I10 BENIGN ESSENTIAL HTN: ICD-10-CM

## 2022-12-13 DIAGNOSIS — F51.01 PRIMARY INSOMNIA: ICD-10-CM

## 2022-12-14 RX ORDER — SPIRONOLACTONE 25 MG/1
TABLET ORAL
Qty: 90 TABLET | Refills: 1 | Status: SHIPPED | OUTPATIENT
Start: 2022-12-14 | End: 2022-12-21 | Stop reason: SDUPTHER

## 2022-12-14 RX ORDER — ZOLPIDEM TARTRATE 5 MG/1
5 TABLET ORAL NIGHTLY PRN
Qty: 30 TABLET | Refills: 0 | Status: SHIPPED | OUTPATIENT
Start: 2022-12-14 | End: 2023-01-18 | Stop reason: SDUPTHER

## 2022-12-14 NOTE — TELEPHONE ENCOUNTER
Last office visit: 8/26/22    Next office visit: none scheduled    Last refill:   Zolpidem : 11/14/22  Spironolactone : 12/27/21

## 2022-12-21 ENCOUNTER — OFFICE VISIT (OUTPATIENT)
Dept: CARDIOLOGY | Facility: CLINIC | Age: 67
End: 2022-12-21

## 2022-12-21 VITALS
DIASTOLIC BLOOD PRESSURE: 70 MMHG | SYSTOLIC BLOOD PRESSURE: 120 MMHG | HEART RATE: 69 BPM | WEIGHT: 216 LBS | HEIGHT: 66 IN | BODY MASS INDEX: 34.72 KG/M2

## 2022-12-21 DIAGNOSIS — I37.1 NONRHEUMATIC PULMONARY VALVE INSUFFICIENCY: ICD-10-CM

## 2022-12-21 DIAGNOSIS — I25.10 CHRONIC CORONARY ARTERY DISEASE: ICD-10-CM

## 2022-12-21 DIAGNOSIS — E78.2 MIXED HYPERLIPIDEMIA: ICD-10-CM

## 2022-12-21 DIAGNOSIS — I10 BENIGN ESSENTIAL HTN: ICD-10-CM

## 2022-12-21 DIAGNOSIS — Z95.5 S/P DRUG ELUTING CORONARY STENT PLACEMENT: ICD-10-CM

## 2022-12-21 DIAGNOSIS — I50.32 CHRONIC DIASTOLIC CONGESTIVE HEART FAILURE: Primary | ICD-10-CM

## 2022-12-21 PROCEDURE — 99214 OFFICE O/P EST MOD 30 MIN: CPT | Performed by: INTERNAL MEDICINE

## 2022-12-21 PROCEDURE — 93000 ELECTROCARDIOGRAM COMPLETE: CPT | Performed by: INTERNAL MEDICINE

## 2022-12-21 RX ORDER — SPIRONOLACTONE 25 MG/1
25 TABLET ORAL DAILY
Qty: 90 TABLET | Refills: 1 | Status: SHIPPED | OUTPATIENT
Start: 2022-12-21 | End: 2023-01-09

## 2022-12-21 RX ORDER — METOPROLOL SUCCINATE 25 MG/1
12.5 TABLET, EXTENDED RELEASE ORAL NIGHTLY
Qty: 45 TABLET | Refills: 3 | Status: SHIPPED | OUTPATIENT
Start: 2022-12-21 | End: 2023-01-09

## 2022-12-21 NOTE — PROGRESS NOTES
Date of Office Visit: 2022  Encounter Provider: Lashay Leon MD  Place of Service: [facility] CARDIOLOGY  Patient Name: Barbara Tran  :1955      Patient ID:  Barbara Tran is a 67 y.o. female is here for  followup for CAD and CHF.        History of Present Illness    She has a history of HFpEF, hyperlipidemia, hypertension, DVT with pulmonary embolism in 2021, CAD with LAD stent done 2015.     She received a proximal LAD stent in .  Last cardiac catheterization done in  showed proximal patent LAD stent patent, 80% septal  stenosis unchanged from prior cath, normal left circumflex and RCA, normal left ventricular systolic function.     She had COVID-19 pneumonia 3/8/2021.  She then presented 6/3/2021 with acute short windedness and was diagnosed with bilateral acute pulmonary embolism without acute cor pulmonale.  She was started on Eliquis.  Echo done 9/15/2021 showed ejection fraction of 46-50% with moderate to severe septal hypokinesis, grade 1 diastolic dysfunction, normal right ventricular size and function with normal saline contrast today, no pericardial effusion and mild left atrial enlargement.  She has followed with Dr. Rodriguez for pulmonary nodules.     Her mom, dad, sister and brothers x2 of all had heart disease.  Her sister also has diabetes.  She is single, has 3 children, works as an , has never smoked, has tea and coffee, occasional alcohol and no drugs.    Labs in 2022 showed normal BMP.  Labs done 22 showed triglycerides 80, total cholesterol 165, HDL 58, LDL 91, VLDL 16, normal CMP, normal CBC, normal TSH and free T4 and hemoglobin A1c 5.7%.  Echo done 2022 shows ejection fraction 54% with grade 1 diastolic dysfunction, normal RVSP, no significant valvular abnormalities.  Stress nuclear perfusion study on 2022 showed no ischemia.    She has moderate fatigue.  She still has lower  extremity edema.  She is sedentary because of her knees.  She does not feel her heart pounding or racing.  She has no orthopnea or PND.  She does have exertional dyspnea but is fairly deconditioned.  She did have a sleep study done spring 2022 which revealed mild obstructive sleep apnea.  She has had no exertional chest tightness or pressure.  She stopped Entresto because of expense.  Her spironolactone was decreased due to hyperkalemia.  She just generally does not feel well.  She has had no syncope but has occasional balance issues and a spinning sensation in her head which sounds like vertigo.  She follows with Dr. Vargas for endocrinology.     Past Medical History:   Diagnosis Date   • Allergic 2015    codeine, morphine, levaquin   • Arthritis l5 years or so ago   • Cataract 6-9 months ago    Need surgery   • CHF (congestive heart failure) (HCC)    • Coronary artery disease 2005 stent   • COVID-19 virus detected 03/10/2021   • Deep vein thrombosis (HCC) 06/03/2021    Pulmonary embolism   • Depression    • Disease of thyroid gland    • Headache Covid 3-6-21    Has continued   • History of pulmonary embolism 06/01/2021   • Hyperlipidemia    • Hypertension since 2005   • Hypothyroidism since 2012   • Osteopenia last few years   • Pulmonary embolism (HCC) 06/03/2021    From Covid         Past Surgical History:   Procedure Laterality Date   • CHOLECYSTECTOMY  1995   • COLONOSCOPY N/A 12/19/2016    Procedure: COLONOSCOPY WITH COLD BIOPSIES;  Surgeon: Medina Guillen MD;  Location: [facility] ENDOSCOPY;  Service:    • CORONARY STENT PLACEMENT  2005    LAD 80% blockage   • SUBTOTAL HYSTERECTOMY  2009   • TONSILLECTOMY  1965   • TUBAL ABDOMINAL LIGATION  1985       Current Outpatient Medications on File Prior to Visit   Medication Sig Dispense Refill   • aspirin 81 MG tablet Take 81 mg by mouth Daily.     • bumetanide (BUMEX) 2 MG tablet Take 2 tablets by mouth Daily. (Patient taking differently: Take 2 mg by mouth Daily.)  60 tablet 1   • buPROPion XL (WELLBUTRIN XL) 300 MG 24 hr tablet Take 1 tablet by mouth Daily. 90 tablet 3   • calcium carbonate (OS-SHAHNAZ) 600 MG tablet Take 600 mg by mouth 2 (Two) Times a Day With Meals.     • ibandronate (BONIVA) 150 MG tablet TAKE 1 TABLET BY MOUTH EVERY 30 DAYS. 3 tablet 3   • magnesium oxide (MAG-OX) 400 MG tablet Take 800 mg by mouth 2 (two) times a day.     • rosuvastatin (CRESTOR) 40 MG tablet Take 1 tablet by mouth Daily. (Patient taking differently: Take 20 mg by mouth Daily.) 90 tablet 1   • saccharomyces boulardii (FLORASTOR) 250 MG capsule Take 1 capsule by mouth 2 (Two) Times a Day. (Patient taking differently: Take 250 mg by mouth Daily.) 60 capsule 1   • Synthroid 50 MCG tablet Take 1 tablet by mouth Daily. 90 tablet 3   • zolpidem (AMBIEN) 5 MG tablet TAKE 1 TABLET BY MOUTH AT NIGHT AS NEEDED FOR SLEEP. 30 tablet 0   • [DISCONTINUED] carvedilol (COREG) 3.125 MG tablet Take 1 tablet by mouth 2 (Two) Times a Day. 60 tablet 11   • [DISCONTINUED] ramipril (Altace) 2.5 MG capsule Take 1 capsule by mouth Daily. 90 capsule 3   • [DISCONTINUED] spironolactone (ALDACTONE) 25 MG tablet TAKE 1 TABLET BY MOUTH EVERY DAY (Patient taking differently: 12.5 mg.) 90 tablet 1   • [DISCONTINUED] Lasix 40 MG tablet        No current facility-administered medications on file prior to visit.       Social History     Socioeconomic History   • Marital status: Single   Tobacco Use   • Smoking status: Never   • Smokeless tobacco: Never   • Tobacco comments:     N/A   Vaping Use   • Vaping Use: Never used   Substance and Sexual Activity   • Alcohol use: Yes     Alcohol/week: 2.0 standard drinks     Types: 1 Glasses of wine, 1 Cans of beer per week     Comment: socially   • Drug use: Never   • Sexual activity: Not Currently     Birth control/protection: None           ROS    Procedures    ECG 12 Lead    Date/Time: 12/21/2022 8:29 AM  Performed by: Lashay Leon MD  Authorized by: Lashay Leon  "MD   Comparison: compared with previous ECG   Comparison to previous ECG: New t wave inversion  Rhythm: sinus rhythm  T inversion: aVF, V3 and V4  T flattening: V5 and V6    Clinical impression: abnormal EKG                Objective:      Vitals:    12/21/22 0810   BP: 120/70   Pulse: 69   Weight: 98 kg (216 lb)   Height: 167.6 cm (66\")     Body mass index is 34.86 kg/m².    Vitals reviewed.   Constitutional:       General: Not in acute distress.     Appearance: Well-developed. Obese. Not diaphoretic.   Eyes:      General: No scleral icterus.     Conjunctiva/sclera: Conjunctivae normal.   HENT:      Head: Normocephalic and atraumatic.   Neck:      Thyroid: No thyromegaly.      Vascular: No carotid bruit or JVD.      Lymphadenopathy: No cervical adenopathy.   Pulmonary:      Effort: Pulmonary effort is normal. No respiratory distress.      Breath sounds: Normal breath sounds. No wheezing. No rhonchi. No rales.   Chest:      Chest wall: Not tender to palpatation.   Cardiovascular:      Normal rate. Regular rhythm.      Murmurs: There is no murmur.      No gallop.   Pulses:     Intact distal pulses.   Edema:     Peripheral edema absent.   Abdominal:      General: Bowel sounds are normal. There is no distension or abdominal bruit.      Palpations: Abdomen is soft. There is no abdominal mass.      Tenderness: There is no abdominal tenderness.   Musculoskeletal:         General: No deformity.      Extremities: No clubbing present.     Cervical back: Neck supple. Skin:     General: Skin is warm and dry. There is no cyanosis.      Coloration: Skin is not pale.      Findings: No rash.   Neurological:      Mental Status: Alert and oriented to person, place, and time.      Cranial Nerves: No cranial nerve deficit.   Psychiatric:         Judgment: Judgment normal.         Lab Review:       Assessment:      Diagnosis Plan   1. Chronic coronary artery disease  Basic Metabolic Panel    Magnesium    proBNP      2. Chronic diastolic " congestive heart failure (HCC)  Basic Metabolic Panel    Magnesium    proBNP      3. Benign essential HTN  Basic Metabolic Panel    Magnesium    proBNP    spironolactone (ALDACTONE) 25 MG tablet      4. Nonrheumatic pulmonary valve insufficiency        5. S/P drug eluting coronary stent placement        6. Mixed hyperlipidemia          1. Chronic HFpEF, nonischemic stress nuclear perfusion study 5/2022 and echocardiogram showing normal ejection fraction 5/2022.  Still appears to have volume overload.  In addition, she has significant fatigue.  We will stop ramipril, stop carvedilol, try metoprolol 12.5 mg nightly, increase spironolactone to 25 mg in the morning and maintain Bumex 2 mg daily.  2. CAD, history of LAD stent, last work-up was in 2015, cardiac catheterization showing patent stent.  3. COVID-19 infection 3/2021.  4. History of DVT and pulmonary embolism June 2021, on Eliquis until the end of June 2022.   5. Hyperlipidemia, on rosuvastatin.   6. Hypertension, goal <120/80.      Plan:       Check labs the day before her visit in 2 weeks with Dinorah, medication changes as noted above.  I think she has chronic HFpEF and I am hoping to find the right medication commendation will helpful.  She may likely need to go back on Entresto or try an SGLT2 inhibitor.

## 2022-12-26 ENCOUNTER — APPOINTMENT (OUTPATIENT)
Dept: CT IMAGING | Facility: HOSPITAL | Age: 67
End: 2022-12-26
Payer: MEDICARE

## 2022-12-26 ENCOUNTER — APPOINTMENT (OUTPATIENT)
Dept: GENERAL RADIOLOGY | Facility: HOSPITAL | Age: 67
End: 2022-12-26
Payer: MEDICARE

## 2022-12-26 ENCOUNTER — HOSPITAL ENCOUNTER (OUTPATIENT)
Facility: HOSPITAL | Age: 67
Setting detail: OBSERVATION
Discharge: HOME HEALTH | End: 2022-12-29
Attending: EMERGENCY MEDICINE | Admitting: INTERNAL MEDICINE
Payer: MEDICARE
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
## 2022-12-26 DIAGNOSIS — E78.2 MIXED HYPERLIPIDEMIA: ICD-10-CM

## 2022-12-26 DIAGNOSIS — M25.551 ACUTE RIGHT HIP PAIN: Primary | ICD-10-CM

## 2022-12-26 LAB
ANION GAP SERPL CALCULATED.3IONS-SCNC: 8.9 MMOL/L (ref 5–15)
BASOPHILS # BLD AUTO: 0.05 10*3/MM3 (ref 0–0.2)
BASOPHILS NFR BLD AUTO: 0.6 % (ref 0–1.5)
BUN SERPL-MCNC: 18 MG/DL (ref 8–23)
BUN/CREAT SERPL: 25.7 (ref 7–25)
CALCIUM SPEC-SCNC: 8.6 MG/DL (ref 8.6–10.5)
CHLORIDE SERPL-SCNC: 105 MMOL/L (ref 98–107)
CO2 SERPL-SCNC: 25.1 MMOL/L (ref 22–29)
CREAT SERPL-MCNC: 0.7 MG/DL (ref 0.57–1)
DEPRECATED RDW RBC AUTO: 40.1 FL (ref 37–54)
EGFRCR SERPLBLD CKD-EPI 2021: 93.8 ML/MIN/1.73
EOSINOPHIL # BLD AUTO: 0.21 10*3/MM3 (ref 0–0.4)
EOSINOPHIL NFR BLD AUTO: 2.5 % (ref 0.3–6.2)
ERYTHROCYTE [DISTWIDTH] IN BLOOD BY AUTOMATED COUNT: 12.2 % (ref 12.3–15.4)
GLUCOSE SERPL-MCNC: 99 MG/DL (ref 65–99)
HCT VFR BLD AUTO: 43.2 % (ref 34–46.6)
HGB BLD-MCNC: 14.1 G/DL (ref 12–15.9)
IMM GRANULOCYTES # BLD AUTO: 0.02 10*3/MM3 (ref 0–0.05)
IMM GRANULOCYTES NFR BLD AUTO: 0.2 % (ref 0–0.5)
LYMPHOCYTES # BLD AUTO: 2.02 10*3/MM3 (ref 0.7–3.1)
LYMPHOCYTES NFR BLD AUTO: 24 % (ref 19.6–45.3)
MCH RBC QN AUTO: 29.7 PG (ref 26.6–33)
MCHC RBC AUTO-ENTMCNC: 32.6 G/DL (ref 31.5–35.7)
MCV RBC AUTO: 90.9 FL (ref 79–97)
MONOCYTES # BLD AUTO: 0.38 10*3/MM3 (ref 0.1–0.9)
MONOCYTES NFR BLD AUTO: 4.5 % (ref 5–12)
NEUTROPHILS NFR BLD AUTO: 5.73 10*3/MM3 (ref 1.7–7)
NEUTROPHILS NFR BLD AUTO: 68.2 % (ref 42.7–76)
NRBC BLD AUTO-RTO: 0 /100 WBC (ref 0–0.2)
PLATELET # BLD AUTO: 292 10*3/MM3 (ref 140–450)
PMV BLD AUTO: 9.9 FL (ref 6–12)
POTASSIUM SERPL-SCNC: 3.6 MMOL/L (ref 3.5–5.2)
RBC # BLD AUTO: 4.75 10*6/MM3 (ref 3.77–5.28)
SODIUM SERPL-SCNC: 139 MMOL/L (ref 136–145)
WBC NRBC COR # BLD: 8.41 10*3/MM3 (ref 3.4–10.8)

## 2022-12-26 PROCEDURE — 25010000002 FENTANYL CITRATE (PF) 50 MCG/ML SOLUTION: Performed by: EMERGENCY MEDICINE

## 2022-12-26 PROCEDURE — 73523 X-RAY EXAM HIPS BI 5/> VIEWS: CPT

## 2022-12-26 PROCEDURE — 25010000002 KETOROLAC TROMETHAMINE PER 15 MG: Performed by: EMERGENCY MEDICINE

## 2022-12-26 PROCEDURE — 99284 EMERGENCY DEPT VISIT MOD MDM: CPT

## 2022-12-26 PROCEDURE — 72192 CT PELVIS W/O DYE: CPT

## 2022-12-26 PROCEDURE — G0378 HOSPITAL OBSERVATION PER HR: HCPCS

## 2022-12-26 PROCEDURE — 85025 COMPLETE CBC W/AUTO DIFF WBC: CPT | Performed by: EMERGENCY MEDICINE

## 2022-12-26 PROCEDURE — 96375 TX/PRO/DX INJ NEW DRUG ADDON: CPT

## 2022-12-26 PROCEDURE — 80048 BASIC METABOLIC PNL TOTAL CA: CPT | Performed by: EMERGENCY MEDICINE

## 2022-12-26 PROCEDURE — 73552 X-RAY EXAM OF FEMUR 2/>: CPT

## 2022-12-26 PROCEDURE — 96374 THER/PROPH/DIAG INJ IV PUSH: CPT

## 2022-12-26 PROCEDURE — 72110 X-RAY EXAM L-2 SPINE 4/>VWS: CPT

## 2022-12-26 RX ORDER — FENTANYL CITRATE 50 UG/ML
50 INJECTION, SOLUTION INTRAMUSCULAR; INTRAVENOUS ONCE
Status: COMPLETED | OUTPATIENT
Start: 2022-12-26 | End: 2022-12-26

## 2022-12-26 RX ORDER — SODIUM CHLORIDE 9 MG/ML
40 INJECTION, SOLUTION INTRAVENOUS AS NEEDED
Status: DISCONTINUED | OUTPATIENT
Start: 2022-12-26 | End: 2022-12-30 | Stop reason: HOSPADM

## 2022-12-26 RX ORDER — ACETAMINOPHEN 500 MG
1000 TABLET ORAL EVERY 8 HOURS
Status: DISCONTINUED | OUTPATIENT
Start: 2022-12-27 | End: 2022-12-30 | Stop reason: HOSPADM

## 2022-12-26 RX ORDER — SODIUM CHLORIDE 0.9 % (FLUSH) 0.9 %
10 SYRINGE (ML) INJECTION AS NEEDED
Status: DISCONTINUED | OUTPATIENT
Start: 2022-12-26 | End: 2022-12-30 | Stop reason: HOSPADM

## 2022-12-26 RX ORDER — TRAMADOL HYDROCHLORIDE 50 MG/1
50 TABLET ORAL EVERY 6 HOURS PRN
Status: DISCONTINUED | OUTPATIENT
Start: 2022-12-26 | End: 2022-12-30 | Stop reason: HOSPADM

## 2022-12-26 RX ORDER — CHOLECALCIFEROL (VITAMIN D3) 125 MCG
5 CAPSULE ORAL NIGHTLY PRN
Status: DISCONTINUED | OUTPATIENT
Start: 2022-12-26 | End: 2022-12-30 | Stop reason: HOSPADM

## 2022-12-26 RX ORDER — TRAMADOL HYDROCHLORIDE 50 MG/1
50 TABLET ORAL ONCE
Status: COMPLETED | OUTPATIENT
Start: 2022-12-26 | End: 2022-12-26

## 2022-12-26 RX ORDER — ONDANSETRON 2 MG/ML
4 INJECTION INTRAMUSCULAR; INTRAVENOUS EVERY 6 HOURS PRN
Status: DISCONTINUED | OUTPATIENT
Start: 2022-12-26 | End: 2022-12-30 | Stop reason: HOSPADM

## 2022-12-26 RX ORDER — SODIUM CHLORIDE 0.9 % (FLUSH) 0.9 %
10 SYRINGE (ML) INJECTION EVERY 12 HOURS SCHEDULED
Status: DISCONTINUED | OUTPATIENT
Start: 2022-12-26 | End: 2022-12-30 | Stop reason: HOSPADM

## 2022-12-26 RX ORDER — KETOROLAC TROMETHAMINE 15 MG/ML
15 INJECTION, SOLUTION INTRAMUSCULAR; INTRAVENOUS ONCE
Status: COMPLETED | OUTPATIENT
Start: 2022-12-26 | End: 2022-12-26

## 2022-12-26 RX ORDER — ONDANSETRON 4 MG/1
4 TABLET, FILM COATED ORAL EVERY 6 HOURS PRN
Status: DISCONTINUED | OUTPATIENT
Start: 2022-12-26 | End: 2022-12-30 | Stop reason: HOSPADM

## 2022-12-26 RX ORDER — ACETAMINOPHEN 500 MG
1000 TABLET ORAL ONCE
Status: COMPLETED | OUTPATIENT
Start: 2022-12-26 | End: 2022-12-26

## 2022-12-26 RX ADMIN — KETOROLAC TROMETHAMINE 15 MG: 15 INJECTION, SOLUTION INTRAMUSCULAR; INTRAVENOUS at 18:13

## 2022-12-26 RX ADMIN — ACETAMINOPHEN 1000 MG: 500 TABLET, FILM COATED ORAL at 20:32

## 2022-12-26 RX ADMIN — TRAMADOL HYDROCHLORIDE 50 MG: 50 TABLET, COATED ORAL at 20:32

## 2022-12-26 RX ADMIN — FENTANYL CITRATE 50 MCG: 50 INJECTION, SOLUTION INTRAMUSCULAR; INTRAVENOUS at 18:30

## 2022-12-26 RX ADMIN — Medication 10 ML: at 22:50

## 2022-12-26 NOTE — ED PROVIDER NOTES
EMERGENCY DEPARTMENT ENCOUNTER    Room Number:  38/38  Date seen:  12/26/2022  PCP: Millicent Ace MD  Historian: Patient      HPI:  Chief Complaint: Fall, bilateral hip pain  A complete HPI/ROS/PMH/PSH/SH/FH are unobtainable due to: Nothing  Context: Barbara Tran is a 67 y.o. female who presents to the ED c/o bilateral hip pain after a fall prior to arrival.  Patient reports that she slipped on ice.  She asked believes that she landed on her left hip but that her right hip hyperextended under her when she fell.  She denies head trauma or loss of conscious.  She denies neck pain.  The pain is maximal in the right hip, less painful in the left hip.  She denies acute knee pain however she has chronic left knee pain.  She takes no anticoagulation.  She has a history of osteoporosis.            PAST MEDICAL HISTORY  Active Ambulatory Problems     Diagnosis Date Noted   • Benign essential HTN 01/28/2016   • Cervical pain 01/28/2016   • Dysthymia 01/28/2016   • Pedal edema 01/28/2016   • HLD (hyperlipidemia) 01/28/2016   • Goiter, non-toxic 01/28/2016   • Chronic pain of left knee 02/11/2015   • Chronic coronary artery disease 07/21/2016   • Hypothyroidism 10/21/2016   • History of sepsis 10/22/2016   • Primary insomnia 01/20/2017   • Chronic diastolic congestive heart failure (HCC) 08/30/2018   • S/P drug eluting coronary stent placement 09/21/2018   • Pulmonic valve regurgitation 09/21/2018   • Tricuspid valve regurgitation 09/21/2018   • Osteoporosis 12/31/2019   • Hyperglycemia 07/27/2020   • Osteopenia 08/30/2021   • History of pulmonary embolism 06/2021   • Balance problem 08/26/2022     Resolved Ambulatory Problems     Diagnosis Date Noted   • Simple obesity 01/28/2016   • Pain in radius 01/28/2016   • Pancolitis (HCC) 10/20/2016   • Localized edema 10/24/2016   • Balance problems 04/17/2018   • COVID-19 virus detected 03/10/2021   • Acute pulmonary embolism without acute cor pulmonale (HCC) 06/03/2021      Past Medical History:   Diagnosis Date   • Allergic    • Arthritis l5 years or so ago   • Cataract 6-9 months ago   • CHF (congestive heart failure) (HCC)    • Coronary artery disease 2005 stent   • Deep vein thrombosis (HCC) 2021   • Depression    • Disease of thyroid gland    • Headache Covid 3-6-21   • Hyperlipidemia    • Hypertension since    • Pulmonary embolism (HCC) 2021         PAST SURGICAL HISTORY  Past Surgical History:   Procedure Laterality Date   • CHOLECYSTECTOMY     • COLONOSCOPY N/A 2016    Procedure: COLONOSCOPY WITH COLD BIOPSIES;  Surgeon: Medina Guillen MD;  Location: [facility] ENDOSCOPY;  Service:    • CORONARY STENT PLACEMENT      LAD 80% blockage   • SUBTOTAL HYSTERECTOMY     • TONSILLECTOMY  1965   • TUBAL ABDOMINAL LIGATION           FAMILY HISTORY  Family History   Problem Relation Age of Onset   • Heart disease Mother            • Heart disease Father            • No Known Problems Maternal Aunt    • No Known Problems Maternal Uncle    • No Known Problems Paternal Aunt    • No Known Problems Paternal Uncle    • No Known Problems Maternal Grandmother    • No Known Problems Maternal Grandfather    • No Known Problems Paternal Grandmother    • No Known Problems Paternal Grandfather    • Diabetes Sister    • Heart disease Sister            • Heart disease Brother         open heart-bypass   • Cancer Brother         Ladder and Rodney   • Heart disease Brother 62        deseased   • Heart attack Brother            • Arthritis Son    • Celiac disease Neg Hx    • Cirrhosis Neg Hx    • Colon cancer Neg Hx    • Colon polyps Neg Hx    • Crohn's disease Neg Hx    • Cystic fibrosis Neg Hx    • Esophageal cancer Neg Hx    • Hemochromatosis Neg Hx    • Inflammatory bowel disease Neg Hx    • Irritable bowel syndrome Neg Hx    • Liver cancer Neg Hx    • Liver disease Neg Hx    • Rectal cancer Neg Hx    • Stomach cancer Neg Hx    •  Ulcerative colitis Neg Hx    • Dante's disease Neg Hx    • Alcohol abuse Neg Hx    • Pancreatitis Neg Hx          SOCIAL HISTORY  Social History     Socioeconomic History   • Marital status: Single   Tobacco Use   • Smoking status: Never   • Smokeless tobacco: Never   • Tobacco comments:     N/A   Vaping Use   • Vaping Use: Never used   Substance and Sexual Activity   • Alcohol use: Yes     Alcohol/week: 2.0 standard drinks     Types: 1 Glasses of wine, 1 Cans of beer per week     Comment: socially   • Drug use: Never   • Sexual activity: Not Currently     Birth control/protection: None         ALLERGIES  Codeine, Levofloxacin, and Morphine        REVIEW OF SYSTEMS  Review of Systems   Review of all 14 systems is negative other than stated in the HPI above.      PHYSICAL EXAM  ED Triage Vitals   Temp Heart Rate Resp BP SpO2   12/26/22 1726 12/26/22 1726 12/26/22 1726 12/26/22 1726 12/26/22 1726   98.4 °F (36.9 °C) 85 18 138/80 98 %      Temp src Heart Rate Source Patient Position BP Location FiO2 (%)   -- 12/26/22 1756 12/26/22 1756 12/26/22 1756 --    Monitor Lying Right arm        Physical Exam      GENERAL: Awake and alert, no acute distress  HENT: nares patent, no scalp hematoma  EYES: no scleral icterus, pupils 4 mm reactive bilaterally, EOMI  CV: regular rhythm, normal rate  RESPIRATORY: normal effort  ABDOMEN: soft, nondistended, nontender throughout  MUSCULOSKELETAL: no deformity.  There is pain with passive internal/external rotation of the right hip.  There is no limb length discrepancy of the lower extremities.  2+ DP and PT pulses bilateral lower extremities.  There is point tenderness over the right hip anteriorly and also over the right greater trochanter.  There is minimal point tenderness over the left greater trochanter.  No significant point tenderness over the knees bilaterally.  No knee effusion bilaterally.  There is no midline C or T-spine tenderness.  There is minimal lower L-spine tenderness  without step-off.  No point tenderness throughout bilateral clavicles or bilateral upper extremities.  NEURO: alert, moves all extremities, follows commands, cranial nerves II through XII grossly intact, speech fluent and clear  PSYCH:  calm, cooperative  SKIN: warm, dry, no lacerations or abrasions    Vital signs and nursing notes reviewed.          LAB RESULTS  Recent Results (from the past 24 hour(s))   CBC Auto Differential    Collection Time: 12/26/22  6:08 PM    Specimen: Blood   Result Value Ref Range    WBC 8.41 3.40 - 10.80 10*3/mm3    RBC 4.75 3.77 - 5.28 10*6/mm3    Hemoglobin 14.1 12.0 - 15.9 g/dL    Hematocrit 43.2 34.0 - 46.6 %    MCV 90.9 79.0 - 97.0 fL    MCH 29.7 26.6 - 33.0 pg    MCHC 32.6 31.5 - 35.7 g/dL    RDW 12.2 (L) 12.3 - 15.4 %    RDW-SD 40.1 37.0 - 54.0 fl    MPV 9.9 6.0 - 12.0 fL    Platelets 292 140 - 450 10*3/mm3    Neutrophil % 68.2 42.7 - 76.0 %    Lymphocyte % 24.0 19.6 - 45.3 %    Monocyte % 4.5 (L) 5.0 - 12.0 %    Eosinophil % 2.5 0.3 - 6.2 %    Basophil % 0.6 0.0 - 1.5 %    Immature Grans % 0.2 0.0 - 0.5 %    Neutrophils, Absolute 5.73 1.70 - 7.00 10*3/mm3    Lymphocytes, Absolute 2.02 0.70 - 3.10 10*3/mm3    Monocytes, Absolute 0.38 0.10 - 0.90 10*3/mm3    Eosinophils, Absolute 0.21 0.00 - 0.40 10*3/mm3    Basophils, Absolute 0.05 0.00 - 0.20 10*3/mm3    Immature Grans, Absolute 0.02 0.00 - 0.05 10*3/mm3    nRBC 0.0 0.0 - 0.2 /100 WBC   Basic Metabolic Panel    Collection Time: 12/26/22  8:01 PM    Specimen: Blood   Result Value Ref Range    Glucose 99 65 - 99 mg/dL    BUN 18 8 - 23 mg/dL    Creatinine 0.70 0.57 - 1.00 mg/dL    Sodium 139 136 - 145 mmol/L    Potassium 3.6 3.5 - 5.2 mmol/L    Chloride 105 98 - 107 mmol/L    CO2 25.1 22.0 - 29.0 mmol/L    Calcium 8.6 8.6 - 10.5 mg/dL    BUN/Creatinine Ratio 25.7 (H) 7.0 - 25.0    Anion Gap 8.9 5.0 - 15.0 mmol/L    eGFR 93.8 >60.0 mL/min/1.73       Ordered the above labs and reviewed the results.        RADIOLOGY  XR Femur 2 View  Right    Result Date: 12/26/2022  2 VIEWS RIGHT FEMUR  HISTORY: Right hip pain  COMPARISON: None available.  FINDINGS: No acute fracture or subluxation of the right femur is seen. There are tricompartmental degenerative changes of the right knee. There is no suprapatellar effusion.      No acute fracture or subluxation identified.  This report was finalized on 12/26/2022 9:29 PM by Dr. Mary Flores M.D.      CT Pelvis Without Contrast    Addendum Date: 12/26/2022    Images were further reviewed. There is some irregularity of the anteromedial right femoral head. Subchondral fracture is not excluded.  FINDINGS were discussed with Dr. Solorio at 9:30 PM.  This report was finalized on 12/26/2022 9:31 PM by Dr. Mary Flores M.D.      Result Date: 12/26/2022  CT OF THE BONY PELVIS  HISTORY: Bilateral hip pain  COMPARISON: 10/20/2016  TECHNIQUE: Axial CT imaging was obtained through the pelvis. Coronal and sagittal reformatted images were obtained.  FINDINGS: No acute fracture or subluxation of the bony pelvis or either hip is seen. There is some soft tissue stranding which is seen adjacent to the right hip, which could reflect some hemorrhage, although no organized hematoma is seen. Urinary bladder appears mildly distended. There is colonic diverticulosis. Small area of fat necrosis is noted within the left hemipelvis. There is no intrapelvic hematoma. Patient does appear to have some significant canal stenosis at L4-L5, as well as some neural foraminal narrowing on the right, with additional canal narrowing and neural foraminal narrowing is suspected at L5-S1. Full assessment is limited due to technique.      No acute fracture or subluxation is identified. There is some potential soft tissue stranding seen adjacent to the right hip, without organized hematoma. If symptoms persist, consideration for MRI is suggested.  Radiation dose reduction techniques were utilized, including automated exposure control and  exposure modulation based on body size.  This report was finalized on 12/26/2022 8:05 PM by Dr. Mary Flores M.D.      XR Spine Lumbar Complete 4+VW    Result Date: 12/26/2022  4 VIEWS LUMBAR SPINE  HISTORY: Back pain after a fall  COMPARISON: None available.  FINDINGS: No acute fracture or subluxation of the lumbar spine is seen. There is retrolisthesis of L5 on S1. There is anterolisthesis of L4 on L5. Intervertebral disc space narrowing is most significant at L5-S1.      No acute osseous findings.  This report was finalized on 12/26/2022 7:14 PM by Dr. Mary Flores M.D.      XR Hips Bilateral With or Without Pelvis 5 View    Result Date: 12/26/2022  AP VIEW THE PELVIS PLUS 2 VIEWS VIEWS BILATERAL HIPS  HISTORY: Bilateral hip pain  COMPARISON: None available  FINDINGS: No acute fracture or subluxation of the bony pelvis or either hip is seen. There are asymmetric degenerative changes involving the right hip, associated with severe joint space narrowing and osteophyte formation.      No acute fracture or subluxation identified.  This report was finalized on 12/26/2022 7:10 PM by Dr. Mary Flores M.D.        Ordered the above noted radiological studies. Reviewed by me in PACS.            PROCEDURES  Procedures              MEDICATIONS GIVEN IN ER  Medications   sodium chloride 0.9 % flush 10 mL (has no administration in time range)   sodium chloride 0.9 % flush 10 mL (has no administration in time range)   sodium chloride 0.9 % flush 10 mL (has no administration in time range)   sodium chloride 0.9 % infusion 40 mL (has no administration in time range)   traMADol (ULTRAM) tablet 50 mg (has no administration in time range)   melatonin tablet 5 mg (has no administration in time range)   ondansetron (ZOFRAN) tablet 4 mg (has no administration in time range)     Or   ondansetron (ZOFRAN) injection 4 mg (has no administration in time range)   acetaminophen (TYLENOL) tablet 1,000 mg (has no administration  in time range)   ketorolac (TORADOL) injection 15 mg (15 mg Intravenous Given 12/26/22 1813)   fentaNYL citrate (PF) (SUBLIMAZE) injection 50 mcg (50 mcg Intravenous Given 12/26/22 1830)   acetaminophen (TYLENOL) tablet 1,000 mg (1,000 mg Oral Given 12/26/22 2032)   traMADol (ULTRAM) tablet 50 mg (50 mg Oral Given 12/26/22 2032)                   MEDICAL DECISION MAKING, PROGRESS, and CONSULTS    All labs have been independently reviewed by me.  All radiology studies have been reviewed by me and I have also reviewed the radiology report.   EKG's independently viewed and interpreted by me.  Discussion below represents my analysis of pertinent findings related to patient's condition, differential diagnosis, treatment plan and final disposition.      - Shared decision making: Patient and family informed of the plan for admission for further diagnostic evaluation and for further pain management given patient's inability to walk since this injury today.  They are agreeable with plan.      Orders placed during this visit:  Orders Placed This Encounter   Procedures   • XR Hips Bilateral With or Without Pelvis 5 View   • XR Spine Lumbar Complete 4+VW   • CT Pelvis Without Contrast   • XR Femur 2 View Right   • Basic Metabolic Panel   • CBC Auto Differential   • Basic Metabolic Panel   • Magnesium   • Phosphorus   • Diet: Regular/House Diet; Texture: Regular Texture (IDDSI 7); Fluid Consistency: Thin (IDDSI 0)   • Vital Signs   • Intake & Output   • Weigh Patient   • Oral Care   • Saline Lock & Maintain IV Access   • Place Sequential Compression Device   • Maintain Sequential Compression Device   • Activity - Strict Bed Rest   • Place Sequential Compression Device   • Maintain Sequential Compression Device   • [facility] (on-call MD unless specified) Details   • Inpatient Orthopedic Surgery Consult   • Oxygen Therapy- Nasal Cannula; Titrate for SPO2: 90% - 95%   • Insert Peripheral IV   • Insert Peripheral IV   • Initiate  Observation Status   • CBC & Differential   • CBC & Differential           Differential diagnosis:    Femoral neck fracture  Intertrochanteric femur fracture  Pelvic fracture  Lumbar spine fracture  Lumbar radiculopathy  Distal femur fracture        Independent interpretation of labs, radiology studies, and discussions with consultants:  ED Course as of 12/26/22 2157   Mon Dec 26, 2022   1826 Patient did not have adequate pain relief from Toradol.  I ordered 50 mics of fentanyl IV. [JR]   2041 Discussed with Dr. Ash [facility], who agrees to bed. [JR]   2155 Plain films of patient's bilateral hips and lumbar spine were without evidence of acute fracture or malalignment to explain her pain.  I was concerned for possible occult fracture therefore proceeded with a CT pelvis without contrast.  This was initially interpreted as without evidence of acute fracture but perhaps some mild soft tissue stranding around the right hip.  Plain films of the right femur also negative for acute fracture.  The radiologist, Dr. Flores, then call me and said that on second look at the CT pelvis that she has concern for a subchondral femoral head fracture.  I relayed these findings to Dr. Ash who will consult orthopedics for further evaluation. [JR]      ED Course User Index  [JR] Victorino Solorio MD             I wore an N95 mask, face shield, and gloves during this patient encounter.  Patient also wearing a surgical mask.  Hand hygeine performed before and after seeing the patient.    DIAGNOSIS  Final diagnoses:   Acute right hip pain   Fall, initial encounter         DISPOSITION  ADMIT            Latest Documented Vital Signs:  As of 21:57 EST  BP- 146/94 HR- 80 Temp- 98.4 °F (36.9 °C) O2 sat- 99%              --    Please note that portions of this were completed with a voice recognition program.       Note Disclaimer: At [facility], we believe that sharing information builds trust and better relationships. You are  receiving this note because you are receiving care at [facility] or recently visited. It is possible you will see health information before a provider has talked with you about it. This kind of information can be easy to misunderstand. To help you fully understand what it means for your health, we urge you to discuss this note with your provider.           Victorino Solorio MD  12/26/22 5094

## 2022-12-26 NOTE — ED TRIAGE NOTES
Pt came from home for slip/fall outside on ice. C/o right hip pain with limited mobility. Pt did not hit head, no LOC, no thinners.     Pt and RN wearing mask upon triage.

## 2022-12-27 ENCOUNTER — APPOINTMENT (OUTPATIENT)
Dept: MRI IMAGING | Facility: HOSPITAL | Age: 67
End: 2022-12-27
Payer: MEDICARE

## 2022-12-27 LAB
ANION GAP SERPL CALCULATED.3IONS-SCNC: 10.1 MMOL/L (ref 5–15)
BASOPHILS # BLD AUTO: 0.05 10*3/MM3 (ref 0–0.2)
BASOPHILS NFR BLD AUTO: 0.6 % (ref 0–1.5)
BUN SERPL-MCNC: 21 MG/DL (ref 8–23)
BUN/CREAT SERPL: 23.6 (ref 7–25)
CALCIUM SPEC-SCNC: 9.1 MG/DL (ref 8.6–10.5)
CHLORIDE SERPL-SCNC: 98 MMOL/L (ref 98–107)
CO2 SERPL-SCNC: 28.9 MMOL/L (ref 22–29)
CREAT SERPL-MCNC: 0.89 MG/DL (ref 0.57–1)
DEPRECATED RDW RBC AUTO: 41.9 FL (ref 37–54)
EGFRCR SERPLBLD CKD-EPI 2021: 70.3 ML/MIN/1.73
EOSINOPHIL # BLD AUTO: 0.34 10*3/MM3 (ref 0–0.4)
EOSINOPHIL NFR BLD AUTO: 4.2 % (ref 0.3–6.2)
ERYTHROCYTE [DISTWIDTH] IN BLOOD BY AUTOMATED COUNT: 12.3 % (ref 12.3–15.4)
GLUCOSE SERPL-MCNC: 129 MG/DL (ref 65–99)
HCT VFR BLD AUTO: 43.1 % (ref 34–46.6)
HGB BLD-MCNC: 13.9 G/DL (ref 12–15.9)
IMM GRANULOCYTES # BLD AUTO: 0.02 10*3/MM3 (ref 0–0.05)
IMM GRANULOCYTES NFR BLD AUTO: 0.2 % (ref 0–0.5)
LYMPHOCYTES # BLD AUTO: 2.63 10*3/MM3 (ref 0.7–3.1)
LYMPHOCYTES NFR BLD AUTO: 32.6 % (ref 19.6–45.3)
MAGNESIUM SERPL-MCNC: 2.2 MG/DL (ref 1.6–2.4)
MCH RBC QN AUTO: 29.7 PG (ref 26.6–33)
MCHC RBC AUTO-ENTMCNC: 32.3 G/DL (ref 31.5–35.7)
MCV RBC AUTO: 92.1 FL (ref 79–97)
MONOCYTES # BLD AUTO: 0.45 10*3/MM3 (ref 0.1–0.9)
MONOCYTES NFR BLD AUTO: 5.6 % (ref 5–12)
NEUTROPHILS NFR BLD AUTO: 4.57 10*3/MM3 (ref 1.7–7)
NEUTROPHILS NFR BLD AUTO: 56.8 % (ref 42.7–76)
NRBC BLD AUTO-RTO: 0 /100 WBC (ref 0–0.2)
PHOSPHATE SERPL-MCNC: 4.5 MG/DL (ref 2.5–4.5)
PLATELET # BLD AUTO: 299 10*3/MM3 (ref 140–450)
PMV BLD AUTO: 9.8 FL (ref 6–12)
POTASSIUM SERPL-SCNC: 3.7 MMOL/L (ref 3.5–5.2)
RBC # BLD AUTO: 4.68 10*6/MM3 (ref 3.77–5.28)
SODIUM SERPL-SCNC: 137 MMOL/L (ref 136–145)
WBC NRBC COR # BLD: 8.06 10*3/MM3 (ref 3.4–10.8)

## 2022-12-27 PROCEDURE — 73721 MRI JNT OF LWR EXTRE W/O DYE: CPT

## 2022-12-27 PROCEDURE — 84100 ASSAY OF PHOSPHORUS: CPT | Performed by: STUDENT IN AN ORGANIZED HEALTH CARE EDUCATION/TRAINING PROGRAM

## 2022-12-27 PROCEDURE — G0378 HOSPITAL OBSERVATION PER HR: HCPCS

## 2022-12-27 PROCEDURE — 83735 ASSAY OF MAGNESIUM: CPT | Performed by: STUDENT IN AN ORGANIZED HEALTH CARE EDUCATION/TRAINING PROGRAM

## 2022-12-27 PROCEDURE — 36415 COLL VENOUS BLD VENIPUNCTURE: CPT | Performed by: STUDENT IN AN ORGANIZED HEALTH CARE EDUCATION/TRAINING PROGRAM

## 2022-12-27 PROCEDURE — 80048 BASIC METABOLIC PNL TOTAL CA: CPT | Performed by: STUDENT IN AN ORGANIZED HEALTH CARE EDUCATION/TRAINING PROGRAM

## 2022-12-27 PROCEDURE — 85025 COMPLETE CBC W/AUTO DIFF WBC: CPT | Performed by: STUDENT IN AN ORGANIZED HEALTH CARE EDUCATION/TRAINING PROGRAM

## 2022-12-27 RX ORDER — ZOLPIDEM TARTRATE 5 MG/1
5 TABLET ORAL NIGHTLY PRN
Status: DISCONTINUED | OUTPATIENT
Start: 2022-12-27 | End: 2022-12-30 | Stop reason: HOSPADM

## 2022-12-27 RX ORDER — ROSUVASTATIN CALCIUM 20 MG/1
20 TABLET, COATED ORAL DAILY
Status: DISCONTINUED | OUTPATIENT
Start: 2022-12-27 | End: 2022-12-30 | Stop reason: HOSPADM

## 2022-12-27 RX ORDER — BUMETANIDE 2 MG/1
2 TABLET ORAL DAILY
Status: DISCONTINUED | OUTPATIENT
Start: 2022-12-27 | End: 2022-12-30 | Stop reason: HOSPADM

## 2022-12-27 RX ORDER — METOPROLOL SUCCINATE 25 MG/1
12.5 TABLET, EXTENDED RELEASE ORAL NIGHTLY
Status: DISCONTINUED | OUTPATIENT
Start: 2022-12-27 | End: 2022-12-30 | Stop reason: HOSPADM

## 2022-12-27 RX ORDER — SACCHAROMYCES BOULARDII 250 MG
250 CAPSULE ORAL DAILY
Status: DISCONTINUED | OUTPATIENT
Start: 2022-12-27 | End: 2022-12-30 | Stop reason: HOSPADM

## 2022-12-27 RX ORDER — LEVOTHYROXINE SODIUM 0.05 MG/1
50 TABLET ORAL
Status: DISCONTINUED | OUTPATIENT
Start: 2022-12-27 | End: 2022-12-30 | Stop reason: HOSPADM

## 2022-12-27 RX ORDER — SPIRONOLACTONE 25 MG/1
25 TABLET ORAL DAILY
Status: DISCONTINUED | OUTPATIENT
Start: 2022-12-27 | End: 2022-12-30 | Stop reason: HOSPADM

## 2022-12-27 RX ORDER — BUPROPION HYDROCHLORIDE 300 MG/1
300 TABLET ORAL DAILY
Status: DISCONTINUED | OUTPATIENT
Start: 2022-12-27 | End: 2022-12-30 | Stop reason: HOSPADM

## 2022-12-27 RX ORDER — CALCIUM CARBONATE 500(1250)
500 TABLET ORAL 2 TIMES DAILY
Status: DISCONTINUED | OUTPATIENT
Start: 2022-12-27 | End: 2022-12-30 | Stop reason: HOSPADM

## 2022-12-27 RX ORDER — MAGNESIUM OXIDE 400 MG/1
800 TABLET ORAL 2 TIMES DAILY
Status: DISCONTINUED | OUTPATIENT
Start: 2022-12-27 | End: 2022-12-30 | Stop reason: HOSPADM

## 2022-12-27 RX ADMIN — TRAMADOL HYDROCHLORIDE 50 MG: 50 TABLET, COATED ORAL at 02:00

## 2022-12-27 RX ADMIN — MAGNESIUM OXIDE 400 MG (241.3 MG MAGNESIUM) TABLET 800 MG: TABLET at 23:30

## 2022-12-27 RX ADMIN — Medication 10 ML: at 10:00

## 2022-12-27 RX ADMIN — LEVOTHYROXINE SODIUM 50 MCG: 0.05 TABLET ORAL at 05:37

## 2022-12-27 RX ADMIN — METOPROLOL SUCCINATE 12.5 MG: 25 TABLET, EXTENDED RELEASE ORAL at 02:38

## 2022-12-27 RX ADMIN — ROSUVASTATIN CALCIUM 20 MG: 20 TABLET, FILM COATED ORAL at 11:18

## 2022-12-27 RX ADMIN — METOPROLOL SUCCINATE 12.5 MG: 25 TABLET, EXTENDED RELEASE ORAL at 23:30

## 2022-12-27 RX ADMIN — ACETAMINOPHEN 1000 MG: 500 TABLET, FILM COATED ORAL at 15:04

## 2022-12-27 RX ADMIN — Medication 250 MG: at 11:18

## 2022-12-27 RX ADMIN — TRAMADOL HYDROCHLORIDE 50 MG: 50 TABLET, COATED ORAL at 22:16

## 2022-12-27 RX ADMIN — SPIRONOLACTONE 25 MG: 25 TABLET ORAL at 10:00

## 2022-12-27 RX ADMIN — ACETAMINOPHEN 1000 MG: 500 TABLET, FILM COATED ORAL at 22:14

## 2022-12-27 RX ADMIN — BUMETANIDE 2 MG: 2 TABLET ORAL at 11:18

## 2022-12-27 RX ADMIN — TRAMADOL HYDROCHLORIDE 50 MG: 50 TABLET, COATED ORAL at 10:00

## 2022-12-27 RX ADMIN — TRAMADOL HYDROCHLORIDE 50 MG: 50 TABLET, COATED ORAL at 16:23

## 2022-12-27 RX ADMIN — Medication 10 ML: at 22:15

## 2022-12-27 RX ADMIN — MAGNESIUM OXIDE 400 MG (241.3 MG MAGNESIUM) TABLET 800 MG: TABLET at 11:18

## 2022-12-27 RX ADMIN — BUPROPION HYDROCHLORIDE 300 MG: 300 TABLET, EXTENDED RELEASE ORAL at 11:18

## 2022-12-27 RX ADMIN — Medication 500 MG: at 23:30

## 2022-12-27 RX ADMIN — ACETAMINOPHEN 1000 MG: 500 TABLET, FILM COATED ORAL at 05:37

## 2022-12-27 RX ADMIN — MAGNESIUM OXIDE 400 MG (241.3 MG MAGNESIUM) TABLET 800 MG: TABLET at 02:38

## 2022-12-27 RX ADMIN — ZOLPIDEM TARTRATE 5 MG: 5 TABLET ORAL at 22:14

## 2022-12-27 RX ADMIN — Medication 500 MG: at 11:18

## 2022-12-27 NOTE — H&P
Patient Name:  Barbara Tran  YOB: 1955  MRN:  [id number]  Admit Date:  12/26/2022  Patient Care Team:  Millicent Ace MD as PCP - General (Family Medicine)  Kin Patel MD as Consulting Physician (Orthopedic Surgery)  Bere Beckford MD as Consulting Physician (Cardiology)  Patrizia Page MD as Consulting Physician (Ophthalmology)      Subjective   History Present Illness     Chief Complaint   Patient presents with   • Fall   • Hip Pain     History of Present Illness   Mrs. Tran is a 67 year old female with history of hypertension, hyperlipidemia, hypothyroidism, CHF who presents to the emergency room after fall and complaint of hip pain.  Patient states she was outside and slipped on the ice, and fell more onto her left side but felt like her right hip got hyperextended underneath of her and has had increasing pain in her right hip and unable to bear weight on the right side.  She denies any other injury or trauma, no loss of consciousness and did not hit her head.  She denies any back pain, no numbness or tingling down her legs.  She is currently not on any anticoagulation.  History of osteoporosis.  In the emergency room patient's glucose 99, sodium 139, potassium 3.6, creatinine .70, BUN 18.  White blood cell count 8.4, hemoglobin 14.1, hematocrit 43.2, platelets 292.  CT of bony pelvis shows no acute fracture or subluxation there is some potential soft tissue stranding seen adjacent to the right hip without organized hematoma, consideration for MRI suggested.  X-ray of the right femur shows no acute fracture or subluxation.  ED provider was notified by radiologist that even though the read was negative for acute fracture there is some concern for a subchondral femoral head fracture.    Review of Systems   Constitutional: Negative for appetite change and fever.   HENT: Negative for nosebleeds and trouble swallowing.    Eyes: Negative for photophobia, redness and visual  disturbance.   Respiratory: Negative for cough, chest tightness, shortness of breath and wheezing.    Cardiovascular: Negative for chest pain, palpitations and leg swelling.   Gastrointestinal: Negative for abdominal distention, abdominal pain, nausea and vomiting.   Endocrine: Negative.    Genitourinary: Negative.    Musculoskeletal: Negative for gait problem and joint swelling.        Pain in both hips since fall just prior to arrival, right hip pain worse than left   Skin: Negative.    Neurological: Negative for dizziness, seizures, speech difficulty, light-headedness and headaches.   Hematological: Negative.    Psychiatric/Behavioral: Negative for behavioral problems and confusion.        Personal History     Past Medical History:   Diagnosis Date   • Allergic 2015    codeine, morphine, levaquin   • Arthritis l5 years or so ago   • Cataract 6-9 months ago    Need surgery   • CHF (congestive heart failure) (HCC)    • Coronary artery disease  stent   • COVID-19 virus detected 03/10/2021   • Deep vein thrombosis (HCC) 2021    Pulmonary embolism   • Depression    • Disease of thyroid gland    • Headache Covid 3    Has continued   • History of pulmonary embolism 2021   • Hyperlipidemia    • Hypertension since    • Hypothyroidism since    • Osteopenia last few years   • Pulmonary embolism (HCC) 2021    From Covid     Past Surgical History:   Procedure Laterality Date   • CHOLECYSTECTOMY     • COLONOSCOPY N/A 2016    Procedure: COLONOSCOPY WITH COLD BIOPSIES;  Surgeon: Medina Guillen MD;  Location: [facility] ENDOSCOPY;  Service:    • CORONARY STENT PLACEMENT      LAD 80% blockage   • SUBTOTAL HYSTERECTOMY     • TONSILLECTOMY  1965   • TUBAL ABDOMINAL LIGATION       Family History   Problem Relation Age of Onset   • Heart disease Mother            • Heart disease Father            • No Known Problems Maternal Aunt    • No Known Problems Maternal  Uncle    • No Known Problems Paternal Aunt    • No Known Problems Paternal Uncle    • No Known Problems Maternal Grandmother    • No Known Problems Maternal Grandfather    • No Known Problems Paternal Grandmother    • No Known Problems Paternal Grandfather    • Diabetes Sister    • Heart disease Sister            • Heart disease Brother         open heart-bypass   • Cancer Brother         Ladder and Rodney   • Heart disease Brother 62        deseased   • Heart attack Brother            • Arthritis Son    • Celiac disease Neg Hx    • Cirrhosis Neg Hx    • Colon cancer Neg Hx    • Colon polyps Neg Hx    • Crohn's disease Neg Hx    • Cystic fibrosis Neg Hx    • Esophageal cancer Neg Hx    • Hemochromatosis Neg Hx    • Inflammatory bowel disease Neg Hx    • Irritable bowel syndrome Neg Hx    • Liver cancer Neg Hx    • Liver disease Neg Hx    • Rectal cancer Neg Hx    • Stomach cancer Neg Hx    • Ulcerative colitis Neg Hx    • Dante's disease Neg Hx    • Alcohol abuse Neg Hx    • Pancreatitis Neg Hx      Social History     Tobacco Use   • Smoking status: Never   • Smokeless tobacco: Never   • Tobacco comments:     N/A   Vaping Use   • Vaping Use: Never used   Substance Use Topics   • Alcohol use: Yes     Alcohol/week: 2.0 standard drinks     Types: 1 Glasses of wine, 1 Cans of beer per week     Comment: socially   • Drug use: Never     No current facility-administered medications on file prior to encounter.     Current Outpatient Medications on File Prior to Encounter   Medication Sig Dispense Refill   • aspirin 81 MG tablet Take 81 mg by mouth Daily.     • bumetanide (BUMEX) 2 MG tablet Take 2 tablets by mouth Daily. (Patient taking differently: Take 2 mg by mouth Daily. 1, 2mg tablet once a day) 60 tablet 1   • buPROPion XL (WELLBUTRIN XL) 300 MG 24 hr tablet Take 1 tablet by mouth Daily. 90 tablet 3   • calcium carbonate (OS-SHAHNAZ) 600 MG tablet Take 600 mg by mouth 2 (Two) Times a Day With Meals.     •  ibandronate (BONIVA) 150 MG tablet TAKE 1 TABLET BY MOUTH EVERY 30 DAYS. 3 tablet 3   • magnesium oxide (MAG-OX) 400 MG tablet Take 800 mg by mouth 2 (two) times a day.     • metoprolol succinate XL (TOPROL-XL) 25 MG 24 hr tablet Take 0.5 tablets by mouth Every Night. 45 tablet 3   • rosuvastatin (CRESTOR) 40 MG tablet Take 1 tablet by mouth Daily. (Patient taking differently: Take 20 mg by mouth Daily.) 90 tablet 1   • saccharomyces boulardii (FLORASTOR) 250 MG capsule Take 1 capsule by mouth 2 (Two) Times a Day. (Patient taking differently: Take 250 mg by mouth Daily.) 60 capsule 1   • spironolactone (ALDACTONE) 25 MG tablet Take 1 tablet by mouth Daily. 90 tablet 1   • Synthroid 50 MCG tablet Take 1 tablet by mouth Daily. 90 tablet 3   • zolpidem (AMBIEN) 5 MG tablet TAKE 1 TABLET BY MOUTH AT NIGHT AS NEEDED FOR SLEEP. 30 tablet 0     Allergies   Allergen Reactions   • Codeine Dizziness     lightheaded   • Levofloxacin Itching   • Morphine Itching       Objective    Objective     Vital Signs  Temp:  [97.6 °F (36.4 °C)-98.4 °F (36.9 °C)] 97.6 °F (36.4 °C)  Heart Rate:  [65-85] 65  Resp:  [16-18] 16  BP: (128-146)/(80-94) 128/82  SpO2:  [97 %-99 %] 97 %  on   ;   Device (Oxygen Therapy): room air  Body mass index is 35.88 kg/m².    Physical Exam  Vitals and nursing note reviewed.   Constitutional:       General: She is not in acute distress.     Appearance: She is well-developed.   HENT:      Head: Normocephalic.   Neck:      Vascular: No JVD.   Cardiovascular:      Rate and Rhythm: Normal rate and regular rhythm.      Heart sounds: Normal heart sounds.   Pulmonary:      Effort: Pulmonary effort is normal.      Breath sounds: Normal breath sounds.      Comments: Lung sounds clear sats 97% on room air  Abdominal:      General: Bowel sounds are normal. There is no distension.      Palpations: Abdomen is soft.      Tenderness: There is no abdominal tenderness.   Musculoskeletal:      Cervical back: Normal range of  motion.      Right hip: Tenderness present. Decreased range of motion.      Left hip: Tenderness present.      Comments: Good pedal pulses and cap refill   Skin:     General: Skin is warm and dry.      Capillary Refill: Capillary refill takes less than 2 seconds.   Neurological:      General: No focal deficit present.      Mental Status: She is alert and oriented to person, place, and time.   Psychiatric:         Attention and Perception: Attention normal.         Mood and Affect: Mood normal.         Speech: Speech normal.         Behavior: Behavior normal.         Cognition and Memory: Cognition normal.         Judgment: Judgment normal.         Results Review:  I reviewed the patient's new clinical results.  I reviewed the patient's new imaging results and agree with the interpretation.  I reviewed the patient's other test results and agree with the interpretation  I personally viewed and interpreted the patient's EKG/Telemetry data  Discussed with ED provider.    Lab Results (last 24 hours)     Procedure Component Value Units Date/Time    CBC & Differential [[id number]]  (Abnormal) Collected: 12/26/22 1808    Specimen: Blood Updated: 12/26/22 1826    Narrative:      The following orders were created for panel order CBC & Differential.  Procedure                               Abnormality         Status                     ---------                               -----------         ------                     CBC Auto Differential[[id number]]        Abnormal            Final result                 Please view results for these tests on the individual orders.    CBC Auto Differential [[id number]]  (Abnormal) Collected: 12/26/22 1808    Specimen: Blood Updated: 12/26/22 1826     WBC 8.41 10*3/mm3      RBC 4.75 10*6/mm3      Hemoglobin 14.1 g/dL      Hematocrit 43.2 %      MCV 90.9 fL      MCH 29.7 pg      MCHC 32.6 g/dL      RDW 12.2 %      RDW-SD 40.1 fl      MPV 9.9 fL      Platelets 292 10*3/mm3      Neutrophil %  68.2 %      Lymphocyte % 24.0 %      Monocyte % 4.5 %      Eosinophil % 2.5 %      Basophil % 0.6 %      Immature Grans % 0.2 %      Neutrophils, Absolute 5.73 10*3/mm3      Lymphocytes, Absolute 2.02 10*3/mm3      Monocytes, Absolute 0.38 10*3/mm3      Eosinophils, Absolute 0.21 10*3/mm3      Basophils, Absolute 0.05 10*3/mm3      Immature Grans, Absolute 0.02 10*3/mm3      nRBC 0.0 /100 WBC     Basic Metabolic Panel [[id number]]  (Abnormal) Collected: 12/26/22 2001    Specimen: Blood Updated: 12/26/22 2028     Glucose 99 mg/dL      BUN 18 mg/dL      Creatinine 0.70 mg/dL      Sodium 139 mmol/L      Potassium 3.6 mmol/L      Comment: Slight hemolysis detected by analyzer. Results may be affected.        Chloride 105 mmol/L      CO2 25.1 mmol/L      Calcium 8.6 mg/dL      BUN/Creatinine Ratio 25.7     Anion Gap 8.9 mmol/L      eGFR 93.8 mL/min/1.73      Comment: National Kidney Foundation and American Society of Nephrology (ASN) Task Force recommended calculation based on the Chronic Kidney Disease Epidemiology Collaboration (CKD-EPI) equation refit without adjustment for race.       Narrative:      GFR Normal >60  Chronic Kidney Disease <60  Kidney Failure <15            Imaging Results (Last 24 Hours)     Procedure Component Value Units Date/Time    CT Pelvis Without Contrast [[id number]] Collected: 12/26/22 1954     Updated: 12/26/22 2134    Addenda:        Images were further reviewed. There is some irregularity of the  anteromedial right femoral head. Subchondral fracture is not excluded.     FINDINGS were discussed with Dr. Solorio at 9:30 PM.     This report was finalized on 12/26/2022 9:31 PM by Dr. Mary Flores M.D.     Signed: 12/26/22 2131 by Mary Flores MD    Narrative:      CT OF THE BONY PELVIS     HISTORY: Bilateral hip pain     COMPARISON: 10/20/2016     TECHNIQUE: Axial CT imaging was obtained through the pelvis. Coronal and  sagittal reformatted images were obtained.     FINDINGS:  No  acute fracture or subluxation of the bony pelvis or either hip is  seen. There is some soft tissue stranding which is seen adjacent to the  right hip, which could reflect some hemorrhage, although no organized  hematoma is seen. Urinary bladder appears mildly distended. There is  colonic diverticulosis. Small area of fat necrosis is noted within the  left hemipelvis. There is no intrapelvic hematoma. Patient does appear  to have some significant canal stenosis at L4-L5, as well as some neural  foraminal narrowing on the right, with additional canal narrowing and  neural foraminal narrowing is suspected at L5-S1. Full assessment is  limited due to technique.       Impression:      No acute fracture or subluxation is identified. There is some potential  soft tissue stranding seen adjacent to the right hip, without organized  hematoma. If symptoms persist, consideration for MRI is suggested.     Radiation dose reduction techniques were utilized, including automated  exposure control and exposure modulation based on body size.     This report was finalized on 12/26/2022 8:05 PM by Dr. Mary Flores M.D.       XR Femur 2 View Right [[id number]] Collected: 12/26/22 2124     Updated: 12/26/22 2133    Narrative:      2 VIEWS RIGHT FEMUR     HISTORY: Right hip pain     COMPARISON: None available.     FINDINGS:  No acute fracture or subluxation of the right femur is seen. There are  tricompartmental degenerative changes of the right knee. There is no  suprapatellar effusion.       Impression:      No acute fracture or subluxation identified.     This report was finalized on 12/26/2022 9:29 PM by Dr. Mary Flores M.D.       XR Spine Lumbar Complete 4+VW [[id number]] Collected: 12/26/22 1910     Updated: 12/26/22 1917    Narrative:      4 VIEWS LUMBAR SPINE     HISTORY: Back pain after a fall     COMPARISON: None available.     FINDINGS:  No acute fracture or subluxation of the lumbar spine is seen. There  is  retrolisthesis of L5 on S1. There is anterolisthesis of L4 on L5.  Intervertebral disc space narrowing is most significant at L5-S1.       Impression:      No acute osseous findings.     This report was finalized on 12/26/2022 7:14 PM by Dr. Mary Flores M.D.       XR Hips Bilateral With or Without Pelvis 5 View [[id number]] Collected: 12/26/22 1908     Updated: 12/26/22 1913    Narrative:      AP VIEW THE PELVIS PLUS 2 VIEWS VIEWS BILATERAL HIPS     HISTORY: Bilateral hip pain     COMPARISON: None available     FINDINGS:  No acute fracture or subluxation of the bony pelvis or either hip is  seen. There are asymmetric degenerative changes involving the right hip,  associated with severe joint space narrowing and osteophyte formation.       Impression:      No acute fracture or subluxation identified.     This report was finalized on 12/26/2022 7:10 PM by Dr. Mary Flores M.D.             Results for orders placed during the hospital encounter of 05/04/22    Adult Transthoracic Echo Complete W/ Cont if Necessary Per Protocol    Interpretation Summary  · Calculated left ventricular EF = 54.1% Estimated left ventricular EF was in agreement with the calculated left ventricular EF. Left ventricular systolic function is normal.  · Estimated right ventricular systolic pressure from tricuspid regurgitation is normal (<35 mmHg).  · Left ventricular diastolic function is consistent with (grade I) impaired relaxation.      No orders to display        Assessment/Plan     Active Hospital Problems    Diagnosis  POA   • **Acute right hip pain [M25.551]  Yes   • Chronic diastolic congestive heart failure (HCC) [I50.32]  Yes     OVERVIEW:  Noted 8/30/2018       • Hypothyroidism [E03.9]  Yes     Aileen 8/26/2022   Lab Results   Component Value Date    TSH 2.7 02/22/2022          • Benign essential HTN [I10]  Yes     Aileen 8/26/2022  BP is controlled well. She will recheck in six months. She will continue present  meds. Prescription is done by her cardiologist.       • HLD (hyperlipidemia) [E78.5]  Yes     Aileen 8/26/2022    Patient has been on hyperlipidemia medications for some time.  She is doing fine with that and labs are stable.        Mrs. Tran is a 67 year old female with history of hypertension, hyperlipidemia, hypothyroidism, CHF who presents to the emergency room after fall and complaint of hip pain.    Acute right hip pain  -Pain control overnight  -Neurovascular checks every 4 hours  -Orthopedic surgery consult  -MRI of right hip in a.m.  -PT to eval and treat when cleared with orthopedic surgery    CHF  -Chronic conditions stable at this time  -Continue home medications when med rec completed    Hypothyroidism/hypertension/hyperlipidemia  -Continue home medications when med rec completed  -Chronic conditions stable    · I discussed the patient's findings and my recommendations with patient.    VTE Prophylaxis - SCDs.  Code Status - Full code.       MANOHAR Hartmann  [facility]  12/27/22  00:57 EST

## 2022-12-27 NOTE — PLAN OF CARE
Goal Outcome Evaluation:    Patient is an admission from the ER. Patient reports a fall in which she slipped on ice. Patient reporting right hip pain. XRAY and CT AP of femur negative. Patient is alert and oriented on RA. Attempted to place patient on purwick to due to orders for bedrest. Patient reports inability to urinate with purwick. RN bladder scanned her noting 556. RN performed a straight cath with an output of 750. Placed patient in brief. SCDS on BLE. Consult to orthopedics placed. Awaiting MRI.

## 2022-12-27 NOTE — CONSULTS
Orthopaedic Surgery  Consult Note  Dr. JOHN Hines II  [phone]    HPI:  Patient is a 67 y.o. Not  or  female who presents with complaints of acute right hip pain after a fall yesterday.  Patient complains of sharp pains in the right groin and lateral hip worse with any activity.  She has been unable to mobilize due to the pain.  Yesterday she presented to the ER where x-rays and a CT scan of the hip demonstrated no acute fracture.  An x-ray was also performed of the femur that demonstrated no fracture.  There was apparently concern for possible subchondral fracture noted by the reading radiologist although the radiology official read demonstrates no fracture.  Her pain is better with bedrest immobilization and narcotic administration.  She has never injured this hip before.    MEDICAL HISTORY  Past Medical History:   Diagnosis Date   • Allergic 2015    codeine, morphine, levaquin   • Arthritis l5 years or so ago   • Cataract 6-9 months ago    Need surgery   • CHF (congestive heart failure) (HCC)    • Coronary artery disease 2005 stent   • COVID-19 virus detected 03/10/2021   • Deep vein thrombosis (HCC) 06/03/2021    Pulmonary embolism   • Depression    • Disease of thyroid gland    • Headache Covid 3-6-21    Has continued   • History of pulmonary embolism 06/01/2021   • Hyperlipidemia    • Hypertension since 2005   • Hypothyroidism since 2012   • Osteopenia last few years   • Pulmonary embolism (HCC) 06/03/2021    From Covid   ·   Past Surgical History:   Procedure Laterality Date   • CHOLECYSTECTOMY  1995   • COLONOSCOPY N/A 12/19/2016    Procedure: COLONOSCOPY WITH COLD BIOPSIES;  Surgeon: Medina Guillen MD;  Location: [facility] ENDOSCOPY;  Service:    • CORONARY STENT PLACEMENT  2005    LAD 80% blockage   • SUBTOTAL HYSTERECTOMY  2009   • TONSILLECTOMY  1965   • TUBAL ABDOMINAL LIGATION  1985   ·   Prior to Admission medications    Medication Sig Start Date End Date Taking? Authorizing  Provider   aspirin 81 MG tablet Take 81 mg by mouth Daily.   Yes Letty Quan MD   bumetanide (BUMEX) 2 MG tablet Take 2 tablets by mouth Daily.  Patient taking differently: Take 2 mg by mouth Daily. 1, 2mg tablet once a day 10/13/22  Yes Nedra Navas APRN   buPROPion XL (WELLBUTRIN XL) 300 MG 24 hr tablet Take 1 tablet by mouth Daily. 8/26/22  Yes Millicent Ace MD   calcium carbonate (OS-SHAHNAZ) 600 MG tablet Take 600 mg by mouth 2 (Two) Times a Day With Meals.   Yes Letty Quan MD   ibandronate (BONIVA) 150 MG tablet TAKE 1 TABLET BY MOUTH EVERY 30 DAYS. 12/29/21  Yes Millicent Ace MD   magnesium oxide (MAG-OX) 400 MG tablet Take 800 mg by mouth 2 (two) times a day.   Yes Letty Quan MD   metoprolol succinate XL (TOPROL-XL) 25 MG 24 hr tablet Take 0.5 tablets by mouth Every Night. 12/21/22  Yes Lashay Leon MD   rosuvastatin (CRESTOR) 40 MG tablet Take 1 tablet by mouth Daily.  Patient taking differently: Take 20 mg by mouth Daily. 8/26/22  Yes Millicent Ace MD   saccharomyces boulardii (FLORASTOR) 250 MG capsule Take 1 capsule by mouth 2 (Two) Times a Day.  Patient taking differently: Take 250 mg by mouth Daily. 11/15/16  Yes Medina Guillen MD   spironolactone (ALDACTONE) 25 MG tablet Take 1 tablet by mouth Daily. 12/21/22  Yes Lashay Leon MD   Synthroid 50 MCG tablet Take 1 tablet by mouth Daily. 8/26/22  Yes Millicent Ace MD   zolpidem (AMBIEN) 5 MG tablet TAKE 1 TABLET BY MOUTH AT NIGHT AS NEEDED FOR SLEEP. 12/14/22  Yes Millicent Ace MD   ·   Allergies   Allergen Reactions   • Codeine Dizziness     lightheaded   • Levofloxacin Itching   • Morphine Itching   ·   Most Recent Immunizations   Administered Date(s) Administered   • COVID-19 (PFIZER) PURPLE CAP 01/14/2022   • Covid-19 (Pfizer) Gray Cap 06/21/2022   • FLUAD TRI 65YR+ 09/16/2019   • Fluad Quad 65+ 10/07/2020   • Fluzone High Dose =>65 Years ([employer] ONLY) 09/21/2018   •  Fluzone High-Dose 65+yrs 12/05/2022   • Hepatitis A 04/24/2018   • Influenza, Unspecified 10/15/2016   • Pneumococcal Conjugate 13-Valent (PCV13) 09/16/2019   • Pneumococcal Polysaccharide (PPSV23) 10/08/2021   ·   Social History     Tobacco Use   • Smoking status: Never   • Smokeless tobacco: Never   • Tobacco comments:     N/A   Substance Use Topics   • Alcohol use: Yes     Alcohol/week: 2.0 standard drinks     Types: 1 Glasses of wine, 1 Cans of beer per week     Comment: socially   ·    Social History     Substance and Sexual Activity   Drug Use Never   ·     VITALS: /55 (BP Location: Left arm, Patient Position: Lying)   Pulse 65   Temp 97.8 °F (36.6 °C) (Oral)   Resp 16   Ht 170.2 cm (67\")   Wt 104 kg (229 lb 0.9 oz)   LMP  (LMP Unknown)   SpO2 98%   BMI 35.88 kg/m²  Body mass index is 35.88 kg/m².    PHYSICAL EXAM:   · CONSTITUTIONAL: No acute distress  · LUNGS: Equal chest rise, no shortness of air  · CARDIOVASCULAR: palpable peripheral pulses  · SKIN: no skin lesions in the area examined  · LYMPH: no lymphadenopathy in the area examined  · EXTREMITY: Right Lower Extremity  · Tenderness to Palpation: Tenderness to palpation at the Hip  · Gross Deformity: No Gross Deformity  · Pulses:  Brisk Capillary Refill  · Sensation: Intact to Saphenous, Sural, Deep Peroneal, Superficial Peroneal, and Tibial Nerves and grossly throughout extremity  · Motor: 5/5 EHL/FHL/TA/GS motor complexes   · Range of motion: Positive pain with passive logroll of the right hip    RADIOLOGY REVIEW:   XR Femur 2 View Right    Result Date: 12/26/2022  No acute fracture or subluxation identified.  This report was finalized on 12/26/2022 9:29 PM by Dr. Mary Flores M.D.      CT Pelvis Without Contrast    Addendum Date: 12/26/2022    Images were further reviewed. There is some irregularity of the anteromedial right femoral head. Subchondral fracture is not excluded.  FINDINGS were discussed with Dr. Solorio at 9:30 PM.   This report was finalized on 12/26/2022 9:31 PM by Dr. Mary Flores M.D.      Result Date: 12/26/2022  No acute fracture or subluxation is identified. There is some potential soft tissue stranding seen adjacent to the right hip, without organized hematoma. If symptoms persist, consideration for MRI is suggested.  Radiation dose reduction techniques were utilized, including automated exposure control and exposure modulation based on body size.  This report was finalized on 12/26/2022 8:05 PM by Dr. Mary Flores M.D.      XR Spine Lumbar Complete 4+VW    Result Date: 12/26/2022  No acute osseous findings.  This report was finalized on 12/26/2022 7:14 PM by Dr. Mary Flores M.D.      XR Hips Bilateral With or Without Pelvis 5 View    Result Date: 12/26/2022  No acute fracture or subluxation identified.  This report was finalized on 12/26/2022 7:10 PM by Dr. Mary Flores M.D.        LABS:   Results for the past 24 hours:   Recent Results (from the past 24 hour(s))   CBC Auto Differential    Collection Time: 12/26/22  6:08 PM    Specimen: Blood   Result Value Ref Range    WBC 8.41 3.40 - 10.80 10*3/mm3    RBC 4.75 3.77 - 5.28 10*6/mm3    Hemoglobin 14.1 12.0 - 15.9 g/dL    Hematocrit 43.2 34.0 - 46.6 %    MCV 90.9 79.0 - 97.0 fL    MCH 29.7 26.6 - 33.0 pg    MCHC 32.6 31.5 - 35.7 g/dL    RDW 12.2 (L) 12.3 - 15.4 %    RDW-SD 40.1 37.0 - 54.0 fl    MPV 9.9 6.0 - 12.0 fL    Platelets 292 140 - 450 10*3/mm3    Neutrophil % 68.2 42.7 - 76.0 %    Lymphocyte % 24.0 19.6 - 45.3 %    Monocyte % 4.5 (L) 5.0 - 12.0 %    Eosinophil % 2.5 0.3 - 6.2 %    Basophil % 0.6 0.0 - 1.5 %    Immature Grans % 0.2 0.0 - 0.5 %    Neutrophils, Absolute 5.73 1.70 - 7.00 10*3/mm3    Lymphocytes, Absolute 2.02 0.70 - 3.10 10*3/mm3    Monocytes, Absolute 0.38 0.10 - 0.90 10*3/mm3    Eosinophils, Absolute 0.21 0.00 - 0.40 10*3/mm3    Basophils, Absolute 0.05 0.00 - 0.20 10*3/mm3    Immature Grans, Absolute 0.02 0.00 - 0.05 10*3/mm3     nRBC 0.0 0.0 - 0.2 /100 WBC   Basic Metabolic Panel    Collection Time: 12/26/22  8:01 PM    Specimen: Blood   Result Value Ref Range    Glucose 99 65 - 99 mg/dL    BUN 18 8 - 23 mg/dL    Creatinine 0.70 0.57 - 1.00 mg/dL    Sodium 139 136 - 145 mmol/L    Potassium 3.6 3.5 - 5.2 mmol/L    Chloride 105 98 - 107 mmol/L    CO2 25.1 22.0 - 29.0 mmol/L    Calcium 8.6 8.6 - 10.5 mg/dL    BUN/Creatinine Ratio 25.7 (H) 7.0 - 25.0    Anion Gap 8.9 5.0 - 15.0 mmol/L    eGFR 93.8 >60.0 mL/min/1.73       IMPRESSION:  Patient is a 67 y.o. Not  or  female with right hip pain    PLAN:   · Admited to: Rosita Ash MD  · Disposition: She certainly has more pain in the hip than I would expect from a simple bruise or soft tissue contusion.  An MRI of the right hip has been ordered and I very much agree with this.  Hopefully will not demonstrate any acute fractures and this may be treated nonoperatively.  I did discuss with her that if there is a subchondral femoral head fracture this may require surgical intervention.  We will make further determination based on the results of the MRI.    R \"Kelton\" Donny SALVADOR MD  Orthopaedic Surgery  [facility]  [phone] [facility]  [phone] - [facility]

## 2022-12-27 NOTE — PROGRESS NOTES
MRI with no acute findings.  She does have some gluteal tearing which is a nonoperative issue.  Recommend physical therapy.  She may eventually require hip replacement surgery for her hip osteoarthritis but for now she can be managed conservatively.  She may follow-up in the office in 3 to 4 weeks to check her progress.

## 2022-12-27 NOTE — ED NOTES
Nursing report ED to floor  Barbara Tran  67 y.o.  female    HPI :   Chief Complaint   Patient presents with    Fall    Hip Pain       Admitting doctor:   Rosita Ash MD    Admitting diagnosis:   The primary encounter diagnosis was Acute right hip pain. A diagnosis of Fall, initial encounter was also pertinent to this visit.    Code status:   Current Code Status       Date Active Code Status Order ID Comments User Context       Not on file            Allergies:   Codeine, Levofloxacin, and Morphine    Isolation:   No active isolations    Intake and Output  No intake or output data in the 24 hours ending 12/26/22 2146    Weight:       12/26/22 1727   Weight: 98 kg (216 lb)       Most recent vitals:   Vitals:    12/26/22 1726 12/26/22 1727 12/26/22 1756   BP: 138/80  146/94   BP Location:   Right arm   Patient Position:   Lying   Pulse: 85  80   Resp: 18  16   Temp: 98.4 °F (36.9 °C)     SpO2: 98%  99%   Weight:  98 kg (216 lb)    Height:  167.6 cm (66\")        Active LDAs/IV Access:   Lines, Drains & Airways       Active LDAs       Name Placement date Placement time Site Days    Peripheral IV 12/26/22 1812 Right Antecubital 12/26/22 1812  Antecubital  less than 1                    Labs (abnormal labs have a star):   Labs Reviewed   BASIC METABOLIC PANEL - Abnormal; Notable for the following components:       Result Value    BUN/Creatinine Ratio 25.7 (*)     All other components within normal limits    Narrative:     GFR Normal >60  Chronic Kidney Disease <60  Kidney Failure <15     CBC WITH AUTO DIFFERENTIAL - Abnormal; Notable for the following components:    RDW 12.2 (*)     Monocyte % 4.5 (*)     All other components within normal limits   CBC AND DIFFERENTIAL    Narrative:     The following orders were created for panel order CBC & Differential.  Procedure                               Abnormality         Status                     ---------                               -----------         ------                      CBC Auto Differential[[id number]]        Abnormal            Final result                 Please view results for these tests on the individual orders.       EKG:   No orders to display       Meds given in ED:   Medications   sodium chloride 0.9 % flush 10 mL (has no administration in time range)   sodium chloride 0.9 % flush 10 mL (has no administration in time range)   sodium chloride 0.9 % flush 10 mL (has no administration in time range)   sodium chloride 0.9 % infusion 40 mL (has no administration in time range)   traMADol (ULTRAM) tablet 50 mg (has no administration in time range)   melatonin tablet 5 mg (has no administration in time range)   ondansetron (ZOFRAN) tablet 4 mg (has no administration in time range)     Or   ondansetron (ZOFRAN) injection 4 mg (has no administration in time range)   acetaminophen (TYLENOL) tablet 1,000 mg (has no administration in time range)   ketorolac (TORADOL) injection 15 mg (15 mg Intravenous Given 12/26/22 1813)   fentaNYL citrate (PF) (SUBLIMAZE) injection 50 mcg (50 mcg Intravenous Given 12/26/22 1830)   acetaminophen (TYLENOL) tablet 1,000 mg (1,000 mg Oral Given 12/26/22 2032)   traMADol (ULTRAM) tablet 50 mg (50 mg Oral Given 12/26/22 2032)       Imaging results:  XR Femur 2 View Right    Result Date: 12/26/2022  No acute fracture or subluxation identified.  This report was finalized on 12/26/2022 9:29 PM by Dr. Mary Flores M.D.      CT Pelvis Without Contrast    Addendum Date: 12/26/2022    Images were further reviewed. There is some irregularity of the anteromedial right femoral head. Subchondral fracture is not excluded.  FINDINGS were discussed with Dr. Solorio at 9:30 PM.  This report was finalized on 12/26/2022 9:31 PM by Dr. Mary Flores M.D.      Result Date: 12/26/2022  No acute fracture or subluxation is identified. There is some potential soft tissue stranding seen adjacent to the right hip, without organized hematoma. If symptoms  persist, consideration for MRI is suggested.  Radiation dose reduction techniques were utilized, including automated exposure control and exposure modulation based on body size.  This report was finalized on 12/26/2022 8:05 PM by Dr. Mary Flores M.D.      XR Spine Lumbar Complete 4+VW    Result Date: 12/26/2022  No acute osseous findings.  This report was finalized on 12/26/2022 7:14 PM by Dr. Mary Flores M.D.      XR Hips Bilateral With or Without Pelvis 5 View    Result Date: 12/26/2022  No acute fracture or subluxation identified.  This report was finalized on 12/26/2022 7:10 PM by Dr. Mary Flores M.D.       Ambulatory status:   - up with assist x 2    Social issues:   Social History     Socioeconomic History    Marital status: Single   Tobacco Use    Smoking status: Never    Smokeless tobacco: Never    Tobacco comments:     N/A   Vaping Use    Vaping Use: Never used   Substance and Sexual Activity    Alcohol use: Yes     Alcohol/week: 2.0 standard drinks     Types: 1 Glasses of wine, 1 Cans of beer per week     Comment: socially    Drug use: Never    Sexual activity: Not Currently     Birth control/protection: None       NIH Stroke Scale:         Autumn Lester RN  12/26/22 21:46 EST

## 2022-12-27 NOTE — ED NOTES
This nurse reported off to MELLY Hernandze. Pt's FM is wearing a fragrance that this nurse is allergic to. Care handed off to MELLY Hernandez. Pt and FM updated on status.

## 2022-12-28 LAB
ANION GAP SERPL CALCULATED.3IONS-SCNC: 7 MMOL/L (ref 5–15)
BASOPHILS # BLD AUTO: 0.04 10*3/MM3 (ref 0–0.2)
BASOPHILS NFR BLD AUTO: 0.5 % (ref 0–1.5)
BUN SERPL-MCNC: 18 MG/DL (ref 8–23)
BUN/CREAT SERPL: 25 (ref 7–25)
CALCIUM SPEC-SCNC: 8.7 MG/DL (ref 8.6–10.5)
CHLORIDE SERPL-SCNC: 101 MMOL/L (ref 98–107)
CO2 SERPL-SCNC: 28 MMOL/L (ref 22–29)
CREAT SERPL-MCNC: 0.72 MG/DL (ref 0.57–1)
DEPRECATED RDW RBC AUTO: 38.7 FL (ref 37–54)
EGFRCR SERPLBLD CKD-EPI 2021: 90.6 ML/MIN/1.73
EOSINOPHIL # BLD AUTO: 0.41 10*3/MM3 (ref 0–0.4)
EOSINOPHIL NFR BLD AUTO: 5.5 % (ref 0.3–6.2)
ERYTHROCYTE [DISTWIDTH] IN BLOOD BY AUTOMATED COUNT: 12 % (ref 12.3–15.4)
GLUCOSE SERPL-MCNC: 103 MG/DL (ref 65–99)
HCT VFR BLD AUTO: 37.4 % (ref 34–46.6)
HGB BLD-MCNC: 13 G/DL (ref 12–15.9)
IMM GRANULOCYTES # BLD AUTO: 0.02 10*3/MM3 (ref 0–0.05)
IMM GRANULOCYTES NFR BLD AUTO: 0.3 % (ref 0–0.5)
LYMPHOCYTES # BLD AUTO: 3.04 10*3/MM3 (ref 0.7–3.1)
LYMPHOCYTES NFR BLD AUTO: 40.8 % (ref 19.6–45.3)
MAGNESIUM SERPL-MCNC: 2.3 MG/DL (ref 1.6–2.4)
MCH RBC QN AUTO: 30.5 PG (ref 26.6–33)
MCHC RBC AUTO-ENTMCNC: 34.8 G/DL (ref 31.5–35.7)
MCV RBC AUTO: 87.8 FL (ref 79–97)
MONOCYTES # BLD AUTO: 0.46 10*3/MM3 (ref 0.1–0.9)
MONOCYTES NFR BLD AUTO: 6.2 % (ref 5–12)
NEUTROPHILS NFR BLD AUTO: 3.48 10*3/MM3 (ref 1.7–7)
NEUTROPHILS NFR BLD AUTO: 46.7 % (ref 42.7–76)
NRBC BLD AUTO-RTO: 0 /100 WBC (ref 0–0.2)
PHOSPHATE SERPL-MCNC: 4 MG/DL (ref 2.5–4.5)
PLATELET # BLD AUTO: 256 10*3/MM3 (ref 140–450)
PMV BLD AUTO: 9.8 FL (ref 6–12)
POTASSIUM SERPL-SCNC: 3.9 MMOL/L (ref 3.5–5.2)
RBC # BLD AUTO: 4.26 10*6/MM3 (ref 3.77–5.28)
SODIUM SERPL-SCNC: 136 MMOL/L (ref 136–145)
WBC NRBC COR # BLD: 7.45 10*3/MM3 (ref 3.4–10.8)

## 2022-12-28 PROCEDURE — 97166 OT EVAL MOD COMPLEX 45 MIN: CPT

## 2022-12-28 PROCEDURE — 83735 ASSAY OF MAGNESIUM: CPT | Performed by: STUDENT IN AN ORGANIZED HEALTH CARE EDUCATION/TRAINING PROGRAM

## 2022-12-28 PROCEDURE — 97530 THERAPEUTIC ACTIVITIES: CPT

## 2022-12-28 PROCEDURE — 36415 COLL VENOUS BLD VENIPUNCTURE: CPT | Performed by: STUDENT IN AN ORGANIZED HEALTH CARE EDUCATION/TRAINING PROGRAM

## 2022-12-28 PROCEDURE — G0378 HOSPITAL OBSERVATION PER HR: HCPCS

## 2022-12-28 PROCEDURE — 84100 ASSAY OF PHOSPHORUS: CPT | Performed by: STUDENT IN AN ORGANIZED HEALTH CARE EDUCATION/TRAINING PROGRAM

## 2022-12-28 PROCEDURE — 97161 PT EVAL LOW COMPLEX 20 MIN: CPT

## 2022-12-28 PROCEDURE — 97535 SELF CARE MNGMENT TRAINING: CPT

## 2022-12-28 PROCEDURE — 85025 COMPLETE CBC W/AUTO DIFF WBC: CPT | Performed by: STUDENT IN AN ORGANIZED HEALTH CARE EDUCATION/TRAINING PROGRAM

## 2022-12-28 PROCEDURE — 80048 BASIC METABOLIC PNL TOTAL CA: CPT | Performed by: STUDENT IN AN ORGANIZED HEALTH CARE EDUCATION/TRAINING PROGRAM

## 2022-12-28 RX ADMIN — MAGNESIUM OXIDE 400 MG (241.3 MG MAGNESIUM) TABLET 800 MG: TABLET at 22:16

## 2022-12-28 RX ADMIN — BUMETANIDE 2 MG: 2 TABLET ORAL at 10:25

## 2022-12-28 RX ADMIN — ZOLPIDEM TARTRATE 5 MG: 5 TABLET ORAL at 22:16

## 2022-12-28 RX ADMIN — TRAMADOL HYDROCHLORIDE 50 MG: 50 TABLET, COATED ORAL at 05:33

## 2022-12-28 RX ADMIN — SPIRONOLACTONE 25 MG: 25 TABLET ORAL at 08:22

## 2022-12-28 RX ADMIN — ACETAMINOPHEN 1000 MG: 500 TABLET, FILM COATED ORAL at 22:17

## 2022-12-28 RX ADMIN — TRAMADOL HYDROCHLORIDE 50 MG: 50 TABLET, COATED ORAL at 14:14

## 2022-12-28 RX ADMIN — Medication 10 ML: at 22:19

## 2022-12-28 RX ADMIN — LEVOTHYROXINE SODIUM 50 MCG: 0.05 TABLET ORAL at 05:32

## 2022-12-28 RX ADMIN — ACETAMINOPHEN 1000 MG: 500 TABLET, FILM COATED ORAL at 05:33

## 2022-12-28 RX ADMIN — METOPROLOL SUCCINATE 12.5 MG: 25 TABLET, EXTENDED RELEASE ORAL at 22:17

## 2022-12-28 RX ADMIN — MAGNESIUM OXIDE 400 MG (241.3 MG MAGNESIUM) TABLET 800 MG: TABLET at 08:22

## 2022-12-28 RX ADMIN — Medication 250 MG: at 08:22

## 2022-12-28 RX ADMIN — Medication 500 MG: at 08:22

## 2022-12-28 RX ADMIN — BUPROPION HYDROCHLORIDE 300 MG: 300 TABLET, EXTENDED RELEASE ORAL at 08:22

## 2022-12-28 RX ADMIN — ACETAMINOPHEN 1000 MG: 500 TABLET, FILM COATED ORAL at 14:14

## 2022-12-28 RX ADMIN — ROSUVASTATIN CALCIUM 20 MG: 20 TABLET, FILM COATED ORAL at 08:22

## 2022-12-28 RX ADMIN — Medication 500 MG: at 22:16

## 2022-12-28 RX ADMIN — TRAMADOL HYDROCHLORIDE 50 MG: 50 TABLET, COATED ORAL at 22:16

## 2022-12-28 NOTE — PLAN OF CARE
Problem: Adult Inpatient Plan of Care  Goal: Plan of Care Review  Outcome: Ongoing, Progressing  Goal: Patient-Specific Goal (Individualized)  Outcome: Ongoing, Progressing  Goal: Absence of Hospital-Acquired Illness or Injury  Outcome: Ongoing, Progressing  Intervention: Identify and Manage Fall Risk  Recent Flowsheet Documentation  Taken 12/28/2022 0040 by Denton Coburn RN  Safety Promotion/Fall Prevention:   activity supervised   assistive device/personal items within reach   safety round/check completed  Taken 12/27/2022 2240 by Denton Coburn RN  Safety Promotion/Fall Prevention:   activity supervised   assistive device/personal items within reach   safety round/check completed  Taken 12/27/2022 2040 by Denton Coburn RN  Safety Promotion/Fall Prevention:   activity supervised   assistive device/personal items within reach   safety round/check completed  Intervention: Prevent Skin Injury  Recent Flowsheet Documentation  Taken 12/27/2022 2040 by Denton Coburn RN  Skin Protection: adhesive use limited  Intervention: Prevent and Manage VTE (Venous Thromboembolism) Risk  Recent Flowsheet Documentation  Taken 12/28/2022 0040 by Denton Coburn RN  VTE Prevention/Management:   bilateral   sequential compression devices on  Taken 12/27/2022 2040 by Denton Coburn RN  VTE Prevention/Management:   bilateral   sequential compression devices on  Range of Motion: active ROM (range of motion) encouraged  Intervention: Prevent Infection  Recent Flowsheet Documentation  Taken 12/28/2022 0040 by Denton Coburn RN  Infection Prevention: rest/sleep promoted  Taken 12/27/2022 2240 by Denton Coburn RN  Infection Prevention: rest/sleep promoted  Taken 12/27/2022 2040 by Denton Coburn RN  Infection Prevention: rest/sleep promoted  Goal: Optimal Comfort and Wellbeing  Outcome: Ongoing, Progressing  Intervention: Monitor Pain and Promote Comfort  Recent Flowsheet Documentation  Taken 12/27/2022 2040 by  Denton Coburn RN  Pain Management Interventions: see MAR  Intervention: Provide Person-Centered Care  Recent Flowsheet Documentation  Taken 12/27/2022 2040 by Denton Coburn RN  Trust Relationship/Rapport:   care explained   choices provided   questions answered   questions encouraged  Goal: Readiness for Transition of Care  Outcome: Ongoing, Progressing     Problem: Fall Injury Risk  Goal: Absence of Fall and Fall-Related Injury  Outcome: Ongoing, Progressing  Intervention: Identify and Manage Contributors  Recent Flowsheet Documentation  Taken 12/28/2022 0040 by Denton Coburn RN  Medication Review/Management: medications reviewed  Taken 12/27/2022 2240 by Denton Coburn RN  Medication Review/Management: medications reviewed  Taken 12/27/2022 2040 by Denton Coburn RN  Medication Review/Management: medications reviewed  Intervention: Promote Injury-Free Environment  Recent Flowsheet Documentation  Taken 12/28/2022 0040 by Denton Coburn RN  Safety Promotion/Fall Prevention:   activity supervised   assistive device/personal items within reach   safety round/check completed  Taken 12/27/2022 2240 by Denton Coburn RN  Safety Promotion/Fall Prevention:   activity supervised   assistive device/personal items within reach   safety round/check completed  Taken 12/27/2022 2040 by Denton Coburn RN  Safety Promotion/Fall Prevention:   activity supervised   assistive device/personal items within reach   safety round/check completed     Problem: Skin Injury Risk Increased  Goal: Skin Health and Integrity  Outcome: Ongoing, Progressing  Intervention: Optimize Skin Protection  Recent Flowsheet Documentation  Taken 12/27/2022 2040 by Denton Coburn RN  Pressure Reduction Techniques: frequent weight shift encouraged  Pressure Reduction Devices: alternating pressure pump (ADD)  Skin Protection: adhesive use limited   Goal Outcome Evaluation:

## 2022-12-28 NOTE — THERAPY EVALUATION
Patient Name: Barbara Tran  : 1955    MRN: [id number]                              Today's Date: 2022       Admit Date: 2022    Visit Dx:     ICD-10-CM ICD-9-CM   1. Acute right hip pain  M25.551 719.45   2. Fall, initial encounter  W19.XXXA E888.9     Patient Active Problem List   Diagnosis   • Benign essential HTN   • Cervical pain   • Dysthymia   • Pedal edema   • HLD (hyperlipidemia)   • Goiter, non-toxic   • Chronic pain of left knee   • Chronic coronary artery disease   • Hypothyroidism   • History of sepsis   • Primary insomnia   • Chronic diastolic congestive heart failure (HCC)   • S/P drug eluting coronary stent placement   • Pulmonic valve regurgitation   • Tricuspid valve regurgitation   • Osteoporosis   • Hyperglycemia   • Osteopenia   • History of pulmonary embolism   • Balance problem   • Acute right hip pain     Past Medical History:   Diagnosis Date   • Allergic 2015    codeine, morphine, levaquin   • Arthritis l5 years or so ago   • Cataract 6-9 months ago    Need surgery   • CHF (congestive heart failure) (HCC)    • Coronary artery disease 2005 stent   • COVID-19 virus detected 03/10/2021   • Deep vein thrombosis (HCC) 2021    Pulmonary embolism   • Depression    • Disease of thyroid gland    • Headache Covid 3-6-21    Has continued   • History of pulmonary embolism 2021   • Hyperlipidemia    • Hypertension since    • Hypothyroidism since    • Osteopenia last few years   • Pulmonary embolism (HCC) 2021    From Covid     Past Surgical History:   Procedure Laterality Date   • CHOLECYSTECTOMY     • COLONOSCOPY N/A 2016    Procedure: COLONOSCOPY WITH COLD BIOPSIES;  Surgeon: Medina Guillen MD;  Location: [facility];  Service:    • CORONARY STENT PLACEMENT      LAD 80% blockage   • SUBTOTAL HYSTERECTOMY     • TONSILLECTOMY  1965   • TUBAL ABDOMINAL LIGATION        General Information     Row Name 22 1535          OT Time  and Intention    Document Type evaluation  -     Mode of Treatment occupational therapy;individual therapy  -     Row Name 12/28/22 1535          General Information    Patient Profile Reviewed yes  -MW     Prior Level of Function independent:;driving;ADL's;work;community mobility;all household mobility;transfer  no AD  -     Existing Precautions/Restrictions fall  -     Barriers to Rehab none identified  -     Row Name 12/28/22 1535          Living Environment    People in Home child(francesco), adult  daughter is a nurse, 12hr shifts  -     Row Name 12/28/22 1535          Cognition    Orientation Status (Cognition) oriented x 4  -MW     Row Name 12/28/22 1535          Safety Issues, Functional Mobility    Impairments Affecting Function (Mobility) endurance/activity tolerance;strength;pain  -           User Key  (r) = Recorded By, (t) = Taken By, (c) = Cosigned By    Initials Name Provider Type    Kenyatta Pollard OT Occupational Therapist                 Mobility/ADL's     Row Name 12/28/22 1536          Bed Mobility    Bed Mobility supine-sit;sit-supine  -     Supine-Sit [place] (Bed Mobility) not tested  -     Sit-Supine [place] (Bed Mobility) minimum assist (75% patient effort)  -     Assistive Device (Bed Mobility) bed rails  -     Row Name 12/28/22 1536          Transfers    Transfers sit-stand transfer;toilet transfer;stand-sit transfer  -     Row Name 12/28/22 1536          Sit-Stand Transfer    Sit-Stand [place] (Transfers) contact guard  -     Assistive Device (Sit-Stand Transfers) walker, front-wheeled  -     Row Name 12/28/22 1536          Stand-Sit Transfer    Stand-Sit [place] (Transfers) contact guard  -     Assistive Device (Stand-Sit Transfers) walker, front-wheeled  -     Row Name 12/28/22 1536          Toilet Transfer    Type (Toilet Transfer) sit-stand;stand-sit  -     [place] Level (Toilet Transfer) contact guard  -     Assistive  Device (Toilet Transfer) commode;grab bars/safety frame;walker, front-wheeled  -     Comment, (Toilet Transfer) getting onto commode with nurse aide upon entry  -MW     Row Name 12/28/22 1536          Functional Mobility    Functional Mobility- Ind. Level contact guard assist  -     Functional Mobility- Device walker, front-wheeled  -     Functional Mobility- Comment slow pace, CGA mostly, no buckling noted, Rwx use to BR commode and returning to EOB  -MW     Row Name 12/28/22 1536          Activities of Daily Living    BADL Assessment/Intervention lower body dressing;toileting;feeding  -MW     Row Name 12/28/22 1536          Lower Body Dressing Assessment/Training    [place] Level (Lower Body Dressing) socks;minimum assist (75% patient effort)  -     Position (Lower Body Dressing) edge of bed sitting  -     Comment, (Lower Body Dressing) no brief donned for toileting; assist due to pain  -MW     Row Name 12/28/22 1536          Toileting Assessment/Training    Comment, (Toileting) hygiene s/up with SBA while seated on commode  -MW     Row Name 12/28/22 1536          Self-Feeding Assessment/Training    [place] Level (Feeding) independent;scoop food and bring to mouth;liquids to mouth  -           User Key  (r) = Recorded By, (t) = Taken By, (c) = Cosigned By    Initials Name Provider Type    Kenyatta Pollard OT Occupational Therapist               Obj/Interventions     [facility] Name 12/28/22 1538          Sensory Assessment (Somatosensory)    Sensory Assessment (Somatosensory) UE sensation intact  -MW     Row Name 12/28/22 1538          Vision Assessment/Intervention    Visual Impairment/Limitations WFL  -[facility] Name 12/28/22 1538          Range of Motion Comprehensive    General Range of Motion bilateral upper extremity ROM WNL  -[facility] Name 12/28/22 1538          Strength Comprehensive (MMT)    General Manual Muscle Testing (MMT) Assessment upper extremity strength deficits identified   -     Comment, General Manual Muscle Testing (MMT) Assessment generalized weakness  -     Row Name 12/28/22 1538          Balance    Balance Assessment sitting static balance;sit to stand dynamic balance;sitting dynamic balance;standing static balance;standing dynamic balance  -     Static Sitting Balance supervision  -     Dynamic Sitting Balance standby assist  -     Position, Sitting Balance sitting edge of bed;supported  -     Sit to Stand Dynamic Balance contact guard  -     Static Standing Balance contact guard  -MW     Dynamic Standing Balance contact guard  -MW     Position/Device Used, Standing Balance supported;walker, front-wheeled  -     Balance Interventions sitting;standing;sit to stand;supported;dynamic;minimal challenge;static;occupation based/functional task  -     Comment, Balance no overt LOBs  -           User Key  (r) = Recorded By, (t) = Taken By, (c) = Cosigned By    Initials Name Provider Type    Kenyatta Pollard, VALENCIA Occupational Therapist               Goals/Plan     Row Name 12/28/22 1543          Transfer Goal 1 (OT)    Activity/Assistive Device (Transfer Goal 1, OT) transfers, all  -MW     [place] Level/Cues Needed (Transfer Goal 1, OT) modified independence  -MW     Time Frame (Transfer Goal 1, OT) short term goal (STG);2 weeks  -MW     Progress/Outcome (Transfer Goal 1, OT) goal ongoing  -     Row Name 12/28/22 1543          Dressing Goal 1 (OT)    Activity/Device (Dressing Goal 1, OT) dressing skills, all  -MW     [place]/Cues Needed (Dressing Goal 1, OT) modified independence  -MW     Time Frame (Dressing Goal 1, OT) short term goal (STG);2 weeks  -     Progress/Outcome (Dressing Goal 1, OT) goal ongoing  -     Row Name 12/28/22 1543          Toileting Goal 1 (OT)    Activity/Device (Toileting Goal 1, OT) toileting skills, all  -MW     [place] Level/Cues Needed (Toileting Goal 1, OT) independent  -MW     Time Frame (Toileting Goal 1, OT)  short term goal (STG);2 weeks  -MW     Progress/Outcome (Toileting Goal 1, OT) goal ongoing  -MW     Row Name 12/28/22 1543          Grooming Goal 1 (OT)    Activity/Device (Grooming Goal 1, OT) grooming skills, all  -MW     [place] (Grooming Goal 1, OT) independent  -MW     Time Frame (Grooming Goal 1, OT) short term goal (STG);2 weeks  -MW     Progress/Outcome (Grooming Goal 1, OT) goal ongoing  -MW     Row Name 12/28/22 1544          Therapy Assessment/Plan (OT)    Planned Therapy Interventions (OT) activity tolerance training;functional balance retraining;BADL retraining;neuromuscular control/coordination retraining;occupation/activity based interventions;ROM/therapeutic exercise;transfer/mobility retraining;patient/caregiver education/training;strengthening exercise  -MW           User Key  (r) = Recorded By, (t) = Taken By, (c) = Cosigned By    Initials Name Provider Type    MW Kenyatta Drake, VALENCIA Occupational Therapist               Clinical Impression     Row Name 12/28/22 9790          Pain Assessment    Pretreatment Pain Rating 8/10  -MW     Posttreatment Pain Rating 8/10  -MW     Pain Location - Side/Orientation Right  -MW     Pain Location - hip  -MW     Pain Intervention(s) Rest;Repositioned;Elevated  -MW     Row Name 12/28/22 8267          Plan of Care Review    Plan of Care Reviewed With patient  -MW     Progress no change  -MW     Outcome Evaluation Pt. is a 67 year old Female admitted after a fall  with c/o Right hip pain. per ortho, conservative mgment plan. Pt seen this date for OT NIECY nichols&Ox4, reports (I) at baseline working, driving, no AD, lives with her daughter who is a nurse. Pt up on way to BR with nurse aide upon arrival, CGA for transfer, SBA for hygiene while seated, LBD min A due to pain. Pt reports pain 8/10 pain this date, CGA for mobility within room and to BR with Rwx support, slow pace but no LOBs. Pt will continue to benefit from skilled OT to address functional deficits,  interested in HHOT, will continue to follow.  -     Row Name 12/28/22 1539          Therapy Assessment/Plan (OT)    Rehab Potential (OT) good, to achieve stated therapy goals  -     Criteria for Skilled Therapeutic Interventions Met (OT) yes;skilled treatment is necessary;meets criteria  -     Therapy Frequency (OT) 5 times/wk  -MW     Row Name 12/28/22 1539          Therapy Plan Review/Discharge Plan (OT)    Anticipated Discharge Disposition (OT) home;home with home health  -     Row Name 12/28/22 1539          Vital Signs    O2 Delivery Pre Treatment room air  -MW     Pre Patient Position Standing  -MW     Intra Patient Position Standing  -MW     Post Patient Position Supine  -MW     Row Name 12/28/22 1539          Positioning and Restraints    Pre-Treatment Position standing in room  -MW     Post Treatment Position bed  -MW     In Bed notified nsg;fowlers;call light within reach;encouraged to call for assist;side rails up x2;legs elevated;SCD pump applied  -MW           User Key  (r) = Recorded By, (t) = Taken By, (c) = Cosigned By    Initials Name Provider Type    MW Kenyatta Drake, OT Occupational Therapist               Outcome Measures     Row Name 12/28/22 1544          How much help from another is currently needed...    Putting on and taking off regular lower body clothing? 3  -MW     Bathing (including washing, rinsing, and drying) 3  -MW     Toileting (which includes using toilet bed pan or urinal) 3  -MW     Putting on and taking off regular upper body clothing 4  -MW     Taking care of personal grooming (such as brushing teeth) 4  -MW     Eating meals 4  -MW     AM-PAC 6 Clicks Score (OT) 21  -MW     Row Name 12/28/22 1059 12/28/22 0822       How much help from another person do you currently need...    Turning from your back to your side while in flat bed without using bedrails? 3  -MS 4  -GRETCHEN    Moving from lying on back to sitting on the side of a flat bed without bedrails? 3  -MS 3  -GRETCHEN     Moving to and from a bed to a chair (including a wheelchair)? 3  -MS 2  -GRETCHEN    Standing up from a chair using your arms (e.g., wheelchair, bedside chair)? 3  -MS 2  -GRETCHEN    Climbing 3-5 steps with a railing? 2  -MS 2  -GRETCHEN    To walk in hospital room? 3  -MS 2  -GRETCHEN    AM-PAC 6 Clicks Score (PT) 17  -MS 15  -GRETCHEN    Highest level of mobility 5 --> Static standing  -MS 4 --> Transferred to chair/commode  -GRETCHEN    Row Name 12/28/22 1544          Modified [place] Scale    Modified [place] Scale 2 - Slight disability.  Unable to carry out all previous activities but able to look after own affairs without assistance.  -     Row Name 12/28/22 1544 12/28/22 1059       Functional Assessment    Outcome Measure Options AM-PAC 6 Clicks Daily Activity (OT);Modified Kieran  -MW AM-PAC 6 Clicks Basic Mobility (PT)  -MS          User Key  (r) = Recorded By, (t) = Taken By, (c) = Cosigned By    Initials Name Provider Type    Bob Preston, PT Physical Therapist    Park Carlton, RN Registered Nurse    Kenyatta Pollard, OT Occupational Therapist                Occupational Therapy Education     Title: PT OT SLP Therapies (In Progress)     Topic: Occupational Therapy (Done)     Point: ADL training (Done)     Description:   Instruct learner(s) on proper safety adaptation and remediation techniques during self care or transfers.   Instruct in proper use of assistive devices.              Learning Progress Summary           Patient Acceptance, E, VU by  at 12/28/2022 5283    Comment: role of OT, d/c rec, goals                   Point: Home exercise program (Done)     Description:   Instruct learner(s) on appropriate technique for monitoring, assisting and/or progressing therapeutic exercises/activities.              Learning Progress Summary           Patient Acceptance, E, VU by  at 12/28/2022 4312    Comment: role of OT, d/c rec, goals                   Point: Precautions (Done)     Description:   Instruct learner(s)  on prescribed precautions during self-care and functional transfers.              Learning Progress Summary           Patient Acceptance, E, VU by  at 12/28/2022 1545    Comment: role of OT, d/c rec, goals                   Point: Body mechanics (Done)     Description:   Instruct learner(s) on proper positioning and spine alignment during self-care, functional mobility activities and/or exercises.              Learning Progress Summary           Patient Acceptance, E, VU by  at 12/28/2022 1545    Comment: role of OT, d/c rec, goals                               User Key     Initials Effective Dates Name Provider Type Discipline    BETHEL 08/20/21 -  Kenyatta Drake OT Occupational Therapist OT              OT Recommendation and Plan  Planned Therapy Interventions (OT): activity tolerance training, functional balance retraining, BADL retraining, neuromuscular control/coordination retraining, occupation/activity based interventions, ROM/therapeutic exercise, transfer/mobility retraining, patient/caregiver education/training, strengthening exercise  Therapy Frequency (OT): 5 times/wk  Plan of Care Review  Plan of Care Reviewed With: patient  Progress: no change  Outcome Evaluation: Pt. is a 67 year old Female admitted after a fall  with c/o Right hip pain. per ortho, conservative mgment plan. Pt seen this date for OT NIECY nichols&Ox4, reports (I) at baseline working, driving, no AD, lives with her daughter who is a nurse. Pt up on way to BR with nurse aide upon arrival, CGA for transfer, SBA for hygiene while seated, LBD min A due to pain. Pt reports pain 8/10 pain this date, CGA for mobility within room and to BR with Rwx support, slow pace but no LOBs. Pt will continue to benefit from skilled OT to address functional deficits, interested in HHOT, will continue to follow.     Time Calculation:    Time Calculation- OT     Row Name 12/28/22 1545             Time Calculation- OT    OT Start Time 1445  -      OT Stop Time  1505  -MW      OT Time Calculation (min) 20 min  -MW      Total Timed Code Minutes- OT 13 minute(s)  -MW      OT Received On 12/28/22  -MW      OT - Next Appointment 12/29/22  -MW      OT Goal Re-Cert Due Date 01/11/23  -MW         Timed Charges    40870 - OT Self Care/Mgmt Minutes 13  -MW         Untimed Charges    OT Eval/Re-eval Minutes 7  -MW         Total Minutes    Timed Charges Total Minutes 13  -MW      Untimed Charges Total Minutes 7  -MW       Total Minutes 20  -MW            User Key  (r) = Recorded By, (t) = Taken By, (c) = Cosigned By    Initials Name Provider Type    MW Kenyatta Drake OT Occupational Therapist              Therapy Charges for Today     Code Description Service Date Service Provider Modifiers Qty    09273487593 HC OT SELF CARE/MGMT/TRAIN EA 15 MIN 12/28/2022 Kenyatta Drake OT GO 1    43959099281 HC OT EVAL MOD COMPLEXITY 2 12/28/2022 Kenyatta Drake OT GO 1               Kenyatta Drake OT  12/28/2022

## 2022-12-28 NOTE — PLAN OF CARE
Goal Outcome Evaluation:  Plan of Care Reviewed With: patient        Progress: improving  Outcome Evaluation: A&Ox4. VSS. Pain controlled. Working with PT and OT to determine safe discharge, SNF vs HH. Assist x1 and walker. BRP. BM today. Education provided.

## 2022-12-28 NOTE — PLAN OF CARE
Goal Outcome Evaluation:  Plan of Care Reviewed With: patient        Progress: no change  Outcome Evaluation: Pt. is a 67 year old Female admitted after a fall  with c/o Right hip pain. per ortho, conservative mgment plan. Pt seen this date for OT NIECY nichols&Liza, reports (I) at baseline working, driving, no AD, lives with her daughter who is a nurse. Pt up on way to BR with nurse aide upon arrival, CGA for transfer, SBA for hygiene while seated, LBD min A due to pain. Pt reports pain 8/10 pain this date, CGA for mobility within room and to BR with Rwx support, slow pace but no LOBs. Pt will continue to benefit from skilled OT to address functional deficits, interested in HHOT, will continue to follow.

## 2022-12-28 NOTE — DISCHARGE PLACEMENT REQUEST
Olive Tran (67 y.o. Female)     Date of Birth   1955    Social Security Number       Address   [address] [address]    Home Phone   [phone]    MRN   [id number]       Sikhism   Amish    Marital Status   Single                            Admission Date   12/26/22    Admission Type   Emergency    Admitting Provider   Rosita Ash MD    Attending Provider   Micheal Berger MD    Department, Room/Bed   [address], P784/1       Discharge Date       Discharge Disposition       Discharge Destination                               Attending Provider: Micheal Berger MD    Allergies: Codeine, Levofloxacin, Morphine    Isolation: None   Infection: None   Code Status: Not on file    Ht: 170.2 cm (67\")   Wt: 104 kg (229 lb 0.9 oz)    Admission Cmt: None   Principal Problem: Acute right hip pain [M25.551]                 Active Insurance as of 12/26/2022     Primary Coverage     Payor Plan Insurance Group Employer/Plan Group    MEDICARE MEDICARE A & B      Payor Plan Address Payor Plan Phone Number Payor Plan Fax Number Effective Dates    [address] [phone]  1/1/2018 - None Entered    [facility] [id number]       Subscriber Name Subscriber Birth Date Member ID       OLIVE TRAN 1955 [id number]           Secondary Coverage     Payor Plan Insurance Group Employer/Plan Group    [employer]                Payor Plan Address Payor Plan Phone Number Payor Plan Fax Number Effective Dates    [address] [address]   3/1/2018 - None Entered    [facility] [id number]       Subscriber Name Subscriber Birth Date Member ID       OLIVE TRAN 1955 [id number]                 Emergency Contacts      (Rel.) Home Phone Work Phone Mobile Phone    RenukaÁngela (Daughter) [phone] -- --    Mack Tran -- -- [phone]

## 2022-12-28 NOTE — PROGRESS NOTES
Name: Barbara Tran ADMIT: 2022   : 1955  PCP: Millicent Ace MD    MRN: [id number] LOS: 0 days   AGE/SEX: 67 y.o. female  ROOM: [facility]     Subjective   Subjective   No acute events ovenright. is experiencing a lot of pain when she tries to ambulate and feels very unsteady on her feet.    Review of Systems   Constitutional: Negative for chills and fever.   Respiratory: Negative for chest tightness and shortness of breath.    Cardiovascular: Negative for chest pain and palpitations.   Gastrointestinal: Negative for abdominal pain and constipation.   Musculoskeletal: Positive for gait problem.        Objective   Objective   Vital Signs  Temp:  [97.4 °F (36.3 °C)-98.3 °F (36.8 °C)] 97.4 °F (36.3 °C)  Heart Rate:  [63-79] 79  Resp:  [16] 16  BP: (102-131)/(62-74) 112/74  SpO2:  [94 %-97 %] 95 %  on   ;   Device (Oxygen Therapy): room air  Body mass index is 35.88 kg/m².  Physical Exam    General: Alert and oriented x3, no acute distress  HEENT: Normocephalic, atraumatic  CV: Regular rate and rhythm, no murmurs rubs or gallops  Lungs: Clear to auscultation bilaterally, no crackles or wheezes  Abdomen: Soft, nontender, nondistended  Neuro: CN II-XII intact, no FND appreciated     Results Review     I reviewed the patient's new clinical results.  Results from last 7 days   Lab Units 22  04522  1808   WBC 10*3/mm3 7.45 8.06 8.41   HEMOGLOBIN g/dL 13.0 13.9 14.1   PLATELETS 10*3/mm3 256 299 292     Results from last 7 days   Lab Units 22  17022   SODIUM mmol/L 136 137 139   POTASSIUM mmol/L 3.9 3.7 3.6   CHLORIDE mmol/L 101 98 105   CO2 mmol/L 28.0 28.9 25.1   BUN mg/dL 18 21 18   CREATININE mg/dL 0.72 0.89 0.70   GLUCOSE mg/dL 103* 129* 99   Estimated Creatinine Clearance: 91.5 mL/min (by C-G formula based on SCr of 0.72 mg/dL).    Results from last 7 days   Lab Units 22  0450 22  1709 22   CALCIUM mg/dL 8.7 9.1 8.6    MAGNESIUM mg/dL 2.3 2.2  --    PHOSPHORUS mg/dL 4.0 4.5  --        COVID19   Date Value Ref Range Status   06/01/2022 Not Detected Not Detected - Ref. Range Final   09/09/2021 Not Detected Not Detected - Ref. Range Final     No results found for: HGBA1C, POCGLU    MRI Hip Right Without Contrast  Narrative: MRI RIGHT HIP WITHOUT CONTRAST     HISTORY: Increasing right hip pain after a fall yesterday.     TECHNIQUE: MRI right hip includes coronal T1, STIR as well as axial T1,  STIR sequences through both hips and sagittal PD weighted sequence of  the right hip.     COMPARISON: X-rays right femur 12/26/2022, CT pelvis without contrast  12/26/2022, x-rays of the pelvis and right hip 12/26/2022     FINDINGS: There is muscular edema throughout the right gluteus medius  and minimus muscles. There is a partial tear involving the right gluteus  medius muscle at its insertion at the right greater trochanter with mild  retraction. Fluid signal is adjacent to the right greater trochanter  extending deep to the right iliotibial tract and measuring 8 cm in  craniocaudal dimension, 4 cm AP x 3 cm transverse.     There is mild edema deep to the left iliotibial tract and adjacent left  greater trochanter. There are osteoarthritic changes at both hip joints  though greater on the right. There is marginal osteophyte formation and  subchondral sclerosis without fracture or osteonecrosis. Mild  degenerative subchondral cyst formation is present within the right  acetabular roof. There is no evidence for acetabular or pubic ramus  fracture. There is chronic bilateral hamstring origin tendinopathy with  chronic origin interstitial tearing.     Impression: 1. Right gluteus medius and minimus muscular strains with partial right  gluteus medius insertional tear and adjacent fluid signal tracking deep  to the iliotibial tract and adjacent to the right greater trochanter. No  hip fracture or osteonecrosis.  2. Osteoarthritis of both hips,  greater on the right.  3. Chronic bilateral hamstring origin tendinopathy and interstitial  tearing.     This report was finalized on 12/27/2022 10:28 AM by Dr. Robby Dorantes M.D.       Scheduled Medications  acetaminophen, 1,000 mg, Oral, Q8H  bumetanide, 2 mg, Oral, Daily  buPROPion XL, 300 mg, Oral, Daily  calcium carbonate (oyster shell), 500 mg, Oral, BID  levothyroxine, 50 mcg, Oral, Q AM  magnesium oxide, 800 mg, Oral, BID  metoprolol succinate XL, 12.5 mg, Oral, Nightly  rosuvastatin, 20 mg, Oral, Daily  saccharomyces boulardii, 250 mg, Oral, Daily  sodium chloride, 10 mL, Intravenous, Q12H  spironolactone, 25 mg, Oral, Daily    Infusions   Diet  Diet: Regular/House Diet; Texture: Regular Texture (IDDSI 7); Fluid Consistency: Thin (IDDSI 0)       Assessment/Plan     Active Hospital Problems    Diagnosis  POA   • **Acute right hip pain [M25.551]  Yes   • Chronic diastolic congestive heart failure (HCC) [I50.32]  Yes   • Hypothyroidism [E03.9]  Yes   • Benign essential HTN [I10]  Yes   • HLD (hyperlipidemia) [E78.5]  Yes      Resolved Hospital Problems   No resolved problems to display.       67 y.o. female admitted with Acute right hip pain.    Right hip pain  Right gluteus medius tear  Secondary to mechanical fall   MRI of the right hip without any fractures, did show tear of the right gluteus medius  PT/OT  Orthopedic surgery consulted, non operative management recommended      Hypothyroidism  Continue levothyroxine    Hypertension  Hyperlipidemia  Congestive heart failure  Continue Bumex,, Toprol-XL 12.5 nightly, rosuvastatin, spironolactone    · SCDs for DVT prophylaxis.  · Full code.  · Discussed with patient and nursing staff.  · Anticipate discharge home with HH vs SNU facility tomorrow.      Micheal Berger MD  [facility]  12/28/22  12:48 EST

## 2022-12-28 NOTE — PLAN OF CARE
Goal Outcome Evaluation:  Plan of Care Reviewed With: patient           Outcome Evaluation: Pt. is a 67 year old Female admitted after a fall  with c/o Right hip pain.  Pt. reports that prior to her fall she was independent with functional mobility and used no A.D. for ambulation.  Pt. currently presents with decreased strength, decreased balance, and decreased tolerance to functional activity.  This AM, pt. able to ambulate 30 feet, CGA x 1, with use of Rwx. Pt. requires CGA x 1 for bed mobility and CGA x 1 for sit <-> stand transfers.  Slow pace throughout due to Right hip pain and overall fatigue/weakness.  Pt. will benefit from skilled inpt. P.T. to address her functional deficits and to assist pt. in regaining her maximum level of independence with functional mobility. Spoke with nursing about possible O.T. consult as well.    Patient was wearing a face mask during this therapy encounter. Therapist used appropriate personal protective equipment including eye protection, mask, and gloves.  Mask used was standard procedure mask. Appropriate PPE was worn during the entire therapy session. Hand hygiene was completed before and after therapy session. Patient is not in enhanced droplet precautions.

## 2022-12-28 NOTE — THERAPY EVALUATION
Patient Name: Barbara Tran  : 1955    MRN: [id number]                              Today's Date: 2022       Admit Date: 2022    Visit Dx:     ICD-10-CM ICD-9-CM   1. Acute right hip pain  M25.551 719.45   2. Fall, initial encounter  W19.XXXA E888.9     Patient Active Problem List   Diagnosis   • Benign essential HTN   • Cervical pain   • Dysthymia   • Pedal edema   • HLD (hyperlipidemia)   • Goiter, non-toxic   • Chronic pain of left knee   • Chronic coronary artery disease   • Hypothyroidism   • History of sepsis   • Primary insomnia   • Chronic diastolic congestive heart failure (HCC)   • S/P drug eluting coronary stent placement   • Pulmonic valve regurgitation   • Tricuspid valve regurgitation   • Osteoporosis   • Hyperglycemia   • Osteopenia   • History of pulmonary embolism   • Balance problem   • Acute right hip pain     Past Medical History:   Diagnosis Date   • Allergic 2015    codeine, morphine, levaquin   • Arthritis l5 years or so ago   • Cataract 6-9 months ago    Need surgery   • CHF (congestive heart failure) (HCC)    • Coronary artery disease 2005 stent   • COVID-19 virus detected 03/10/2021   • Deep vein thrombosis (HCC) 2021    Pulmonary embolism   • Depression    • Disease of thyroid gland    • Headache Covid 3-6-21    Has continued   • History of pulmonary embolism 2021   • Hyperlipidemia    • Hypertension since    • Hypothyroidism since    • Osteopenia last few years   • Pulmonary embolism (HCC) 2021    From Covid     Past Surgical History:   Procedure Laterality Date   • CHOLECYSTECTOMY     • COLONOSCOPY N/A 2016    Procedure: COLONOSCOPY WITH COLD BIOPSIES;  Surgeon: Medina Guillen MD;  Location: [facility] ENDOSCOPY;  Service:    • CORONARY STENT PLACEMENT      LAD 80% blockage   • SUBTOTAL HYSTERECTOMY     • TONSILLECTOMY  1965   • TUBAL ABDOMINAL LIGATION        General Information     Row Name 22 1056           Physical Therapy Time and Intention    Document Type evaluation  Pt. admitted after a fall  with Right hip pain.  -MS     Mode of Treatment physical therapy;individual therapy  -MS     Row Name 12/28/22 1056          General Information    Patient Profile Reviewed yes  -MS     Prior Level of Function independent:  No use of A.D. just prior to admission/fall  -MS     Existing Precautions/Restrictions fall   Exit alarm  -MS     Barriers to Rehab none identified  -MS     Row Name 12/28/22 1056          Cognition    Orientation Status (Cognition) oriented x 3  -MS     Row Name 12/28/22 1056          Safety Issues, Functional Mobility    Comment, Safety Issues/Impairments (Mobility) Gait belt used for safety.  -MS           User Key  (r) = Recorded By, (t) = Taken By, (c) = Cosigned By    Initials Name Provider Type    MS Timur Bob FREDDY, PT Physical Therapist               Mobility     Row Name 12/28/22 1057          Bed Mobility    Bed Mobility supine-sit;sit-supine  -MS     Supine-Sit [place] (Bed Mobility) contact guard  -MS     Row Name 12/28/22 1057          Sit-Stand Transfer    Sit-Stand [place] (Transfers) contact guard  -MS     Assistive Device (Sit-Stand Transfers) walker, front-wheeled  -MS     Row Name 12/28/22 1057          Gait/Stairs (Locomotion)    [place] Level (Gait) contact guard  -MS     Assistive Device (Gait) walker, front-wheeled  -MS     Distance in Feet (Gait) 30 feet  -MS     Deviations/Abnormal Patterns (Gait) cher decreased;antalgic;stride length decreased  -MS     [place] Level (Stairs) contact guard;2 person assist  -MS     Handrail Location (Stairs) right side (ascending)  -MS     Number of Steps (Stairs) 4  -MS     Ascending Technique (Stairs) step-to-step  -MS     Descending Technique (Stairs) step-to-step  -MS     Comment, (Gait/Stairs) Slow pace due to pain and overall fatigue/weakness.  -MS           User Key  (r) = Recorded By, (t) = Taken By, (c) = Cosigned  By    Initials Name Provider Type    Bob Preston FREDDY, PT Physical Therapist               Obj/Interventions    No documentation.                Goals/Plan     Row Name 12/28/22 1059          Bed Mobility Goal 1 (PT)    Activity/Assistive Device (Bed Mobility Goal 1, PT) bed mobility activities, all  -MS     [place] Level/Cues Needed (Bed Mobility Goal 1, PT) standby assist  -MS     Time Frame (Bed Mobility Goal 1, PT) long term goal (LTG);1 week  -MS     Row Name 12/28/22 1059          Transfer Goal 1 (PT)    Activity/Assistive Device (Transfer Goal 1, PT) transfers, all;walker, rolling  -MS     [place] Level/Cues Needed (Transfer Goal 1, PT) standby assist  -MS     Time Frame (Transfer Goal 1, PT) long term goal (LTG);1 week  -MS     Row Name 12/28/22 1059          Gait Training Goal 1 (PT)    Activity/Assistive Device (Gait Training Goal 1, PT) gait (walking locomotion);walker, rolling  -MS     [place] Level (Gait Training Goal 1, PT) standby assist  -MS     Distance (Gait Training Goal 1, PT) 100 feet  -MS     Time Frame (Gait Training Goal 1, PT) long term goal (LTG);1 week  -MS     Row Name 12/28/22 1059          Stairs Goal 1 (PT)    Activity/Assistive Device (Stairs Goal 1, PT) stairs, all skills  -MS     [place] Level/Cues Needed (Stairs Goal 1, PT) standby assist;contact guard required  -MS     Number of Stairs (Stairs Goal 1, PT) 4  -MS     Time Frame (Stairs Goal 1, PT) long term goal (LTG);1 week  -MS     Row Name 12/28/22 1059          Therapy Assessment/Plan (PT)    Planned Therapy Interventions (PT) balance training;bed mobility training;gait training;home exercise program;patient/family education;postural re-education;transfer training;strengthening;stair training  -MS           User Key  (r) = Recorded By, (t) = Taken By, (c) = Cosigned By    Initials Name Provider Type    Bob Preston FREDDY, PT Physical Therapist               Clinical Impression     Row Name 12/28/22  1058          Pain    Pretreatment Pain Rating 9/10  -MS     Posttreatment Pain Rating 9/10  -MS     Pain Location - Side/Orientation Right  -MS     Pain Location - hip;flank  -MS     Pain Intervention(s) Medication (See MAR);Elevated;Nursing Notified;Repositioned  -MS     Row Name 12/28/22 1058          Plan of Care Review    Plan of Care Reviewed With patient  -MS     Row Name 12/28/22 1058          Therapy Assessment/Plan (PT)    Rehab Potential (PT) good, to achieve stated therapy goals  -MS     Criteria for Skilled Interventions Met (PT) skilled treatment is necessary  -MS     Therapy Frequency (PT) 6 times/wk  -MS     Row Name 12/28/22 1058          Positioning and Restraints    Pre-Treatment Position in bed  -MS     Post Treatment Position chair  -MS     In Chair notified nsg;reclined;sitting;call light within reach;encouraged to call for assist;exit alarm on  -MS           User Key  (r) = Recorded By, (t) = Taken By, (c) = Cosigned By    Initials Name Provider Type    Bob Preston, PT Physical Therapist               Outcome Measures     Row Name 12/28/22 1059 12/28/22 0822       How much help from another person do you currently need...    Turning from your back to your side while in flat bed without using bedrails? 3  -MS 4  -GRETCHEN    Moving from lying on back to sitting on the side of a flat bed without bedrails? 3  -MS 3  -GRETCHEN    Moving to and from a bed to a chair (including a wheelchair)? 3  -MS 2  -GRETCHEN    Standing up from a chair using your arms (e.g., wheelchair, bedside chair)? 3  -MS 2  -GRETCHEN    Climbing 3-5 steps with a railing? 2  -MS 2  -GRETCHEN    To walk in hospital room? 3  -MS 2  -GRETCHEN    AM-PAC 6 Clicks Score (PT) 17  -MS 15  -GRETCHEN    Highest level of mobility 5 --> Static standing  -MS 4 --> Transferred to chair/commode  -GRETCHEN    Row Name 12/28/22 1059          Functional Assessment    Outcome Measure Options AM-PAC 6 Clicks Basic Mobility (PT)  -MS           User Key  (r) = Recorded By, (t) = Taken  By, (c) = Cosigned By    Initials Name Provider Type    MS DingBob, PT Physical Therapist    Park Carlton RN Registered Nurse                             Physical Therapy Education     Title: PT OT SLP Therapies (In Progress)     Topic: Physical Therapy (In Progress)     Point: Mobility training (Done)     Learning Progress Summary           Patient Acceptance, E,D, VU,NR by MS at 12/28/2022 1100                   Point: Home exercise program (Not Started)     Learner Progress:  Not documented in this visit.          Point: Body mechanics (Done)     Learning Progress Summary           Patient Acceptance, E,D, VU,NR by MS at 12/28/2022 1100                   Point: Precautions (Done)     Learning Progress Summary           Patient Acceptance, E,D, VU,NR by MS at 12/28/2022 1100                               User Key     Initials Effective Dates Name Provider Type Discipline    MS 06/16/21 -  Timur Bob HAYES, PT Physical Therapist PT              PT Recommendation and Plan  Planned Therapy Interventions (PT): balance training, bed mobility training, gait training, home exercise program, patient/family education, postural re-education, transfer training, strengthening, stair training  Plan of Care Reviewed With: patient  Outcome Evaluation: Pt. is a 67 year old Female admitted after a fall  with c/o Right hip pain.  Pt. reports that prior to her fall she was independent with functional mobility and used no A.D. for ambulation.  Pt. currently presents with decreased strength, decreased balance, and decreased tolerance to functional activity.  This AM, pt. able to ambulate 30 feet, CGA x 1, with use of Rwx. Pt. requires CGA x 1 for bed mobility and CGA x 1 for sit <-> stand transfers.  Slow pace throughout due to Right hip pain and overall fatigue/weakness.  Pt. will benefit from skilled inpt. P.T. to address her functional deficits and to assist pt. in regaining her maximum level of independence with  functional mobility. Spoke with nursing about possible O.T. consult as well.     Time Calculation:    PT Charges     Row Name 12/28/22 1104             Time Calculation    Start Time 0900  -MS      Stop Time 0938  -MS      Time Calculation (min) 38 min  -MS      PT Received On 12/28/22  -MS      PT - Next Appointment 12/29/22  -MS      PT Goal Re-Cert Due Date 01/04/23  -MS         Time Calculation- PT    Total Timed Code Minutes- PT 30 minute(s)  -MS            User Key  (r) = Recorded By, (t) = Taken By, (c) = Cosigned By    Initials Name Provider Type    Bob Preston, PT Physical Therapist              Therapy Charges for Today     Code Description Service Date Service Provider Modifiers Qty    99210763762 HC PT EVAL LOW COMPLEXITY 2 12/28/2022 Bob Ding, PT GP 1    70350989128 HC PT THERAPEUTIC ACT EA 15 MIN 12/28/2022 Bob Ding, PT GP 2          PT G-Codes  Outcome Measure Options: AM-PAC 6 Clicks Basic Mobility (PT)  AM-PAC 6 Clicks Score (PT): 17  PT Discharge Summary  Anticipated Discharge Disposition (PT): home with assist, home with home health    Bob Ding, PT  12/28/2022

## 2022-12-28 NOTE — CASE MANAGEMENT/SOCIAL WORK
Discharge Planning Assessment  [facility]     Patient Name: Barbara Tran  MRN: [id number]  Today's Date: 12/28/2022    Admit Date: 12/26/2022    Plan: Home with family support and [facility].   Discharge Needs Assessment     Row Name 12/28/22 164       Living Environment    People in Home child(francesco), adult    Name(s) of People in Home Daughter/Ángela.    Current Living Arrangements home    Primary Care Provided by self    Provides Primary Care For no one    Family Caregiver if Needed child(francesco), adult    Quality of Family Relationships helpful;involved;supportive    Able to Return to Prior Arrangements yes       Resource/Environmental Concerns    Transportation Concerns none       Transition Planning    Patient/Family Anticipates Transition to home with family;home with help/services    Patient/Family Anticipated Services at Transition     Transportation Anticipated family or friend will provide;health plan transportation       Discharge Needs Assessment    Readmission Within the Last 30 Days no previous admission in last 30 days    Equipment Currently Used at Home none    Discharge Facility/Level of Care Needs home with home health               Discharge Plan     Row Name 12/28/22 9081       Plan    Plan Home with family support and [facility].    Patient/Family in Agreement with Plan yes    Plan Comments Spoke with the patient, verified current information and explained the role of the CCP. Patient lives with her daughter/Ángela and said she has family support. She's IADL and has no history with DME/RH/HH. Physical & Occupational Therapy Jasmine noted. Patient plans to d/c home with family support and HH. She requests [facility]. Referral sent in Epic. Called and left a message for [facility]. Patient also asked if she could possibly have a hospital bed. CCP called and left a message for [facility] to discuss. Await her call back. CCP will follow.              Continued Care and  Services - Admitted Since 12/26/2022     Durable Medical Equipment     Service Provider Request Status Selected Services Address Phone Fax Patient Preferred    [facility] Pending - Request Sent N/A [address], [facility] [id number] [phone] --          Home Medical Care     Service Provider Request Status Selected Services Address Phone Fax Patient Preferred    [facility] Pending - Request Sent N/A [address], [facility] [address] [phone] --              Expected Discharge Date and Time     Expected Discharge Date Expected Discharge Time    Dec 29, 2022          Demographic Summary     Row Name 12/28/22 1642       General Information    Admission Type observation    Reason for Consult discharge planning    Preferred Language English       Contact Information    Permission Granted to Share Info With ;family/designee               Functional Status     Row Name 12/28/22 1642       Functional Status    Usual Activity Tolerance good       Functional Status, IADL    Medications independent    Meal Preparation independent    Housekeeping independent    Laundry independent    Shopping independent       Mental Status Summary    Recent Changes in Mental Status/Cognitive Functioning no changes               Psychosocial     Row Name 12/28/22 1642       Coping/Stress    Patient Personal Strengths able to adapt    Sources of Support adult child(francesco)    Reaction to Health Status accepting    Understanding of Condition and Treatment adequate understanding of medical condition;adequate understanding of treatment       Developmental Stage (Eriksson's)    Developmental Stage Stage 8 (63 years-death/Late Adulthood) Integrity vs. Megan MAZARIEGOS, RN

## 2022-12-29 ENCOUNTER — HOME HEALTH ADMISSION (OUTPATIENT)
Dept: HOME HEALTH SERVICES | Facility: HOME HEALTHCARE | Age: 67
End: 2022-12-29
Payer: MEDICARE

## 2022-12-29 ENCOUNTER — APPOINTMENT (OUTPATIENT)
Dept: GENERAL RADIOLOGY | Facility: HOSPITAL | Age: 67
End: 2022-12-29
Payer: MEDICARE

## 2022-12-29 VITALS
DIASTOLIC BLOOD PRESSURE: 81 MMHG | BODY MASS INDEX: 35.95 KG/M2 | HEART RATE: 78 BPM | HEIGHT: 67 IN | RESPIRATION RATE: 16 BRPM | SYSTOLIC BLOOD PRESSURE: 131 MMHG | TEMPERATURE: 98 F | WEIGHT: 229.06 LBS | OXYGEN SATURATION: 97 %

## 2022-12-29 LAB
ANION GAP SERPL CALCULATED.3IONS-SCNC: 7.2 MMOL/L (ref 5–15)
BASOPHILS # BLD AUTO: 0.04 10*3/MM3 (ref 0–0.2)
BASOPHILS NFR BLD AUTO: 0.5 % (ref 0–1.5)
BUN SERPL-MCNC: 22 MG/DL (ref 8–23)
BUN/CREAT SERPL: 33.3 (ref 7–25)
CALCIUM SPEC-SCNC: 9.2 MG/DL (ref 8.6–10.5)
CHLORIDE SERPL-SCNC: 102 MMOL/L (ref 98–107)
CO2 SERPL-SCNC: 29.8 MMOL/L (ref 22–29)
CREAT SERPL-MCNC: 0.66 MG/DL (ref 0.57–1)
DEPRECATED RDW RBC AUTO: 41.4 FL (ref 37–54)
EGFRCR SERPLBLD CKD-EPI 2021: 95.1 ML/MIN/1.73
EOSINOPHIL # BLD AUTO: 0.51 10*3/MM3 (ref 0–0.4)
EOSINOPHIL NFR BLD AUTO: 6.7 % (ref 0.3–6.2)
ERYTHROCYTE [DISTWIDTH] IN BLOOD BY AUTOMATED COUNT: 12.5 % (ref 12.3–15.4)
GLUCOSE SERPL-MCNC: 104 MG/DL (ref 65–99)
HCT VFR BLD AUTO: 42.3 % (ref 34–46.6)
HGB BLD-MCNC: 13.5 G/DL (ref 12–15.9)
IMM GRANULOCYTES # BLD AUTO: 0.01 10*3/MM3 (ref 0–0.05)
IMM GRANULOCYTES NFR BLD AUTO: 0.1 % (ref 0–0.5)
LYMPHOCYTES # BLD AUTO: 3.06 10*3/MM3 (ref 0.7–3.1)
LYMPHOCYTES NFR BLD AUTO: 40.5 % (ref 19.6–45.3)
MAGNESIUM SERPL-MCNC: 2.3 MG/DL (ref 1.6–2.4)
MCH RBC QN AUTO: 29.2 PG (ref 26.6–33)
MCHC RBC AUTO-ENTMCNC: 31.9 G/DL (ref 31.5–35.7)
MCV RBC AUTO: 91.4 FL (ref 79–97)
MONOCYTES # BLD AUTO: 0.5 10*3/MM3 (ref 0.1–0.9)
MONOCYTES NFR BLD AUTO: 6.6 % (ref 5–12)
NEUTROPHILS NFR BLD AUTO: 3.44 10*3/MM3 (ref 1.7–7)
NEUTROPHILS NFR BLD AUTO: 45.6 % (ref 42.7–76)
NRBC BLD AUTO-RTO: 0 /100 WBC (ref 0–0.2)
PHOSPHATE SERPL-MCNC: 4.7 MG/DL (ref 2.5–4.5)
PLATELET # BLD AUTO: 289 10*3/MM3 (ref 140–450)
PMV BLD AUTO: 9.7 FL (ref 6–12)
POTASSIUM SERPL-SCNC: 4.4 MMOL/L (ref 3.5–5.2)
RBC # BLD AUTO: 4.63 10*6/MM3 (ref 3.77–5.28)
SODIUM SERPL-SCNC: 139 MMOL/L (ref 136–145)
WBC NRBC COR # BLD: 7.56 10*3/MM3 (ref 3.4–10.8)

## 2022-12-29 PROCEDURE — 84100 ASSAY OF PHOSPHORUS: CPT | Performed by: STUDENT IN AN ORGANIZED HEALTH CARE EDUCATION/TRAINING PROGRAM

## 2022-12-29 PROCEDURE — G0378 HOSPITAL OBSERVATION PER HR: HCPCS

## 2022-12-29 PROCEDURE — 85025 COMPLETE CBC W/AUTO DIFF WBC: CPT | Performed by: STUDENT IN AN ORGANIZED HEALTH CARE EDUCATION/TRAINING PROGRAM

## 2022-12-29 PROCEDURE — 97530 THERAPEUTIC ACTIVITIES: CPT

## 2022-12-29 PROCEDURE — 80048 BASIC METABOLIC PNL TOTAL CA: CPT | Performed by: STUDENT IN AN ORGANIZED HEALTH CARE EDUCATION/TRAINING PROGRAM

## 2022-12-29 PROCEDURE — 97535 SELF CARE MNGMENT TRAINING: CPT

## 2022-12-29 PROCEDURE — 73030 X-RAY EXAM OF SHOULDER: CPT

## 2022-12-29 PROCEDURE — 83735 ASSAY OF MAGNESIUM: CPT | Performed by: STUDENT IN AN ORGANIZED HEALTH CARE EDUCATION/TRAINING PROGRAM

## 2022-12-29 PROCEDURE — 36415 COLL VENOUS BLD VENIPUNCTURE: CPT | Performed by: STUDENT IN AN ORGANIZED HEALTH CARE EDUCATION/TRAINING PROGRAM

## 2022-12-29 RX ORDER — TRAMADOL HYDROCHLORIDE 50 MG/1
50 TABLET ORAL EVERY 6 HOURS PRN
Qty: 15 TABLET | Refills: 0 | Status: SHIPPED | OUTPATIENT
Start: 2022-12-29 | End: 2023-01-27 | Stop reason: SDUPTHER

## 2022-12-29 RX ORDER — BUMETANIDE 2 MG/1
2 TABLET ORAL DAILY
Start: 2022-12-29 | End: 2023-03-10

## 2022-12-29 RX ORDER — SACCHAROMYCES BOULARDII 250 MG
250 CAPSULE ORAL DAILY
Start: 2022-12-29 | End: 2023-01-27

## 2022-12-29 RX ORDER — ACETAMINOPHEN 500 MG
1000 TABLET ORAL 3 TIMES DAILY PRN
Start: 2022-12-29 | End: 2023-03-14 | Stop reason: ALTCHOICE

## 2022-12-29 RX ORDER — ROSUVASTATIN CALCIUM 40 MG/1
20 TABLET, COATED ORAL DAILY
Start: 2022-12-29 | End: 2023-02-10 | Stop reason: SDUPTHER

## 2022-12-29 RX ADMIN — ROSUVASTATIN CALCIUM 20 MG: 20 TABLET, FILM COATED ORAL at 08:12

## 2022-12-29 RX ADMIN — BUMETANIDE 2 MG: 2 TABLET ORAL at 11:46

## 2022-12-29 RX ADMIN — ACETAMINOPHEN 1000 MG: 500 TABLET, FILM COATED ORAL at 14:29

## 2022-12-29 RX ADMIN — LEVOTHYROXINE SODIUM 50 MCG: 0.05 TABLET ORAL at 05:25

## 2022-12-29 RX ADMIN — Medication 500 MG: at 08:12

## 2022-12-29 RX ADMIN — MAGNESIUM OXIDE 400 MG (241.3 MG MAGNESIUM) TABLET 800 MG: TABLET at 08:12

## 2022-12-29 RX ADMIN — Medication 250 MG: at 08:12

## 2022-12-29 RX ADMIN — BUPROPION HYDROCHLORIDE 300 MG: 300 TABLET, EXTENDED RELEASE ORAL at 08:12

## 2022-12-29 RX ADMIN — TRAMADOL HYDROCHLORIDE 50 MG: 50 TABLET, COATED ORAL at 18:00

## 2022-12-29 RX ADMIN — SPIRONOLACTONE 25 MG: 25 TABLET ORAL at 11:46

## 2022-12-29 RX ADMIN — TRAMADOL HYDROCHLORIDE 50 MG: 50 TABLET, COATED ORAL at 11:46

## 2022-12-29 RX ADMIN — ACETAMINOPHEN 1000 MG: 500 TABLET, FILM COATED ORAL at 05:25

## 2022-12-29 RX ADMIN — TRAMADOL HYDROCHLORIDE 50 MG: 50 TABLET, COATED ORAL at 05:25

## 2022-12-29 NOTE — THERAPY TREATMENT NOTE
Patient Name: Barbara Tran  : 1955    MRN: [id number]                              Today's Date: 2022       Admit Date: 2022    Visit Dx:     ICD-10-CM ICD-9-CM   1. Acute right hip pain  M25.551 719.45   2. Fall, initial encounter  W19.XXXA E888.9     Patient Active Problem List   Diagnosis   • Benign essential HTN   • Cervical pain   • Dysthymia   • Pedal edema   • HLD (hyperlipidemia)   • Goiter, non-toxic   • Chronic pain of left knee   • Chronic coronary artery disease   • Hypothyroidism   • History of sepsis   • Primary insomnia   • Chronic diastolic congestive heart failure (HCC)   • S/P drug eluting coronary stent placement   • Pulmonic valve regurgitation   • Tricuspid valve regurgitation   • Osteoporosis   • Hyperglycemia   • Osteopenia   • History of pulmonary embolism   • Balance problem   • Acute right hip pain     Past Medical History:   Diagnosis Date   • Allergic 2015    codeine, morphine, levaquin   • Arthritis l5 years or so ago   • Cataract 6-9 months ago    Need surgery   • CHF (congestive heart failure) (HCC)    • Coronary artery disease 2005 stent   • COVID-19 virus detected 03/10/2021   • Deep vein thrombosis (HCC) 2021    Pulmonary embolism   • Depression    • Disease of thyroid gland    • Headache Covid 3-6-21    Has continued   • History of pulmonary embolism 2021   • Hyperlipidemia    • Hypertension since    • Hypothyroidism since    • Osteopenia last few years   • Pulmonary embolism (HCC) 2021    From Covid     Past Surgical History:   Procedure Laterality Date   • CHOLECYSTECTOMY     • COLONOSCOPY N/A 2016    Procedure: COLONOSCOPY WITH COLD BIOPSIES;  Surgeon: Medina Guillen MD;  Location: [facility] ENDOSCOPY;  Service:    • CORONARY STENT PLACEMENT      LAD 80% blockage   • SUBTOTAL HYSTERECTOMY     • TONSILLECTOMY  1965   • TUBAL ABDOMINAL LIGATION        General Information     Row Name 22 1138           Physical Therapy Time and Intention    Document Type therapy note (daily note)  -MS     Mode of Treatment physical therapy;individual therapy  -MS     Row Name 12/29/22 1138          General Information    Patient Profile Reviewed yes  -MS     Existing Precautions/Restrictions --  Exit alarm  -MS     Barriers to Rehab none identified  -MS     Row Name 12/29/22 1138          Cognition    Orientation Status (Cognition) oriented x 3  -MS     Row Name 12/29/22 1138          Safety Issues, Functional Mobility    Comment, Safety Issues/Impairments (Mobility) Gait belt used for safety.  -MS           User Key  (r) = Recorded By, (t) = Taken By, (c) = Cosigned By    Initials Name Provider Type    Bob Preston, PT Physical Therapist               Mobility     Row Name 12/29/22 1139          Bed Mobility    Comment, (Bed Mobility) Up in chair this AM.  -MS     Row Name 12/29/22 1139          Sit-Stand Transfer    Sit-Stand [place] (Transfers) standby assist  -MS     Assistive Device (Sit-Stand Transfers) walker, front-wheeled  -MS     Row Name 12/29/22 1139          Gait/Stairs (Locomotion)    [place] Level (Gait) standby assist  -MS     Assistive Device (Gait) walker, front-wheeled  -MS     Distance in Feet (Gait) 60 feet  -MS     Deviations/Abnormal Patterns (Gait) cher decreased;antalgic  -MS     Comment, (Gait/Stairs) Continued slow cher but improved compared to yesterday.  -MS           User Key  (r) = Recorded By, (t) = Taken By, (c) = Cosigned By    Initials Name Provider Type    Bob Preston, PT Physical Therapist               Obj/Interventions    No documentation.                Goals/Plan    No documentation.                Clinical Impression     Row Name 12/29/22 1140          Pain    Pretreatment Pain Rating 7/10  -MS     Posttreatment Pain Rating 7/10  -MS     Pain Location - Side/Orientation Right  -MS     Pain Location - hip  -MS     Pain Intervention(s) Medication (See  MAR);Elevated;Nursing Notified;Repositioned  -MS     Row Name 12/29/22 1140          Positioning and Restraints    Pre-Treatment Position sitting in chair/recliner  -MS     Post Treatment Position chair  -MS     In Chair notified nsg;reclined;sitting;call light within reach;encouraged to call for assist;exit alarm on  -MS           User Key  (r) = Recorded By, (t) = Taken By, (c) = Cosigned By    Initials Name Provider Type    MS Bob Ding, PT Physical Therapist               Outcome Measures     Row Name 12/29/22 1140 12/29/22 0814       How much help from another person do you currently need...    Turning from your back to your side while in flat bed without using bedrails? 4  -MS 3  -EK    Moving from lying on back to sitting on the side of a flat bed without bedrails? 3  -MS 3  -EK    Moving to and from a bed to a chair (including a wheelchair)? 3  -MS 3  -EK    Standing up from a chair using your arms (e.g., wheelchair, bedside chair)? 3  -MS 3  -EK    Climbing 3-5 steps with a railing? 3  -MS 2  -EK    To walk in hospital room? 3  -MS 3  -EK    AM-PAC 6 Clicks Score (PT) 19  -MS 17  -EK    Highest level of mobility 6 --> Walked 10 steps or more  -MS 5 --> Static standing  -EK    Row Name 12/29/22 1140 12/29/22 0940       Functional Assessment    Outcome Measure Options AM-PAC 6 Clicks Basic Mobility (PT)  -MS AM-PAC 6 Clicks Daily Activity (OT)  -MW          User Key  (r) = Recorded By, (t) = Taken By, (c) = Cosigned By    Initials Name Provider Type    Bob Preston, PT Physical Therapist    Laly Menjivar RN Registered Nurse    Kenyatta Pollard OT Occupational Therapist                             Physical Therapy Education     Title: PT OT SLP Therapies (In Progress)     Topic: Physical Therapy (In Progress)     Point: Mobility training (Done)     Learning Progress Summary           Patient Acceptance, E,D, VU,NR by MS at 12/29/2022 1140    Acceptance, E,D, VU,NR by MS at 12/28/2022 1100                    Point: Home exercise program (Not Started)     Learner Progress:  Not documented in this visit.          Point: Body mechanics (Done)     Learning Progress Summary           Patient Acceptance, E,D, VU,NR by MS at 12/29/2022 1140    Acceptance, E,D, VU,NR by MS at 12/28/2022 1100                   Point: Precautions (Done)     Learning Progress Summary           Patient Acceptance, E,D, VU,NR by MS at 12/29/2022 1140    Acceptance, E,D, VU,NR by MS at 12/28/2022 1100                               User Key     Initials Effective Dates Name Provider Type Discipline    MS 06/16/21 -  Bob Ding, PT Physical Therapist PT              PT Recommendation and Plan  Planned Therapy Interventions (PT): balance training, bed mobility training, gait training, home exercise program, patient/family education, postural re-education, transfer training, strengthening, stair training  Plan of Care Reviewed With: patient  Outcome Evaluation: Upon entering room, pt. sitting up in chair, awake/alert, and agreeable to work with P.T. this date despite continue c/o pain.  Pt. able to ambulate 60 feet, SBA x 1, with use of Rwx this AM. Pt. requires SBA x 1 for sit <-> stand transfers.  Gait cher remains slow but improved compared to yesterday's P.T. session.  Encouraged pt. to continue ambulation every 1-2 hours once she discharges home as well.  Will continue to progress functional mobility as tolerated.     Time Calculation:    PT Charges     Row Name 12/29/22 1145             Time Calculation    Start Time 0935  -MS      Stop Time 0950  -MS      Time Calculation (min) 15 min  -MS      PT Received On 12/29/22  -MS      PT - Next Appointment 12/30/22  -MS         Time Calculation- PT    Total Timed Code Minutes- PT 14 minute(s)  -MS            User Key  (r) = Recorded By, (t) = Taken By, (c) = Cosigned By    Initials Name Provider Type    MS Bob Ding, PT Physical Therapist              Therapy Charges  for Today     Code Description Service Date Service Provider Modifiers Qty    69171782583 HC PT EVAL LOW COMPLEXITY 2 12/28/2022 Bob Ding, PT GP 1    34267745749 HC PT THERAPEUTIC ACT EA 15 MIN 12/28/2022 Bob Ding, PT GP 2    75718799158 HC PT THERAPEUTIC ACT EA 15 MIN 12/29/2022 Bob Ding, PT GP 1          PT G-Codes  Outcome Measure Options: AM-PAC 6 Clicks Basic Mobility (PT)  AM-PAC 6 Clicks Score (PT): 19  AM-PAC 6 Clicks Score (OT): 24  Modified [place] Scale: 2 - Slight disability.  Unable to carry out all previous activities but able to look after own affairs without assistance.  PT Discharge Summary  Anticipated Discharge Disposition (PT): home with assist, home with home health    Bob Ding, PT  12/29/2022

## 2022-12-29 NOTE — PROGRESS NOTES
Patient is discharging today . Spoke to patient who is agreeable to home health and has discussed has never had home health in past. Face sheet information is correct . Please note patient has a dog for information for the visiting therapists. Orders in Epic for home health PT and OT . Thank you!

## 2022-12-29 NOTE — THERAPY DISCHARGE NOTE
Acute Care - Occupational Therapy Discharge  [facility]    Patient Name: Barbara Tran  : 1955    MRN: [id number]                              Today's Date: 2022       Admit Date: 2022    Visit Dx:     ICD-10-CM ICD-9-CM   1. Acute right hip pain  M25.551 719.45   2. Fall, initial encounter  W19.XXXA E888.9     Patient Active Problem List   Diagnosis   • Benign essential HTN   • Cervical pain   • Dysthymia   • Pedal edema   • HLD (hyperlipidemia)   • Goiter, non-toxic   • Chronic pain of left knee   • Chronic coronary artery disease   • Hypothyroidism   • History of sepsis   • Primary insomnia   • Chronic diastolic congestive heart failure (HCC)   • S/P drug eluting coronary stent placement   • Pulmonic valve regurgitation   • Tricuspid valve regurgitation   • Osteoporosis   • Hyperglycemia   • Osteopenia   • History of pulmonary embolism   • Balance problem   • Acute right hip pain     Past Medical History:   Diagnosis Date   • Allergic 2015    codeine, morphine, levaquin   • Arthritis l5 years or so ago   • Cataract 6-9 months ago    Need surgery   • CHF (congestive heart failure) (HCC)    • Coronary artery disease 2005 stent   • COVID-19 virus detected 03/10/2021   • Deep vein thrombosis (HCC) 2021    Pulmonary embolism   • Depression    • Disease of thyroid gland    • Headache Covid 3    Has continued   • History of pulmonary embolism 2021   • Hyperlipidemia    • Hypertension since    • Hypothyroidism since    • Osteopenia last few years   • Pulmonary embolism (HCC) 2021    From Covid     Past Surgical History:   Procedure Laterality Date   • CHOLECYSTECTOMY     • COLONOSCOPY N/A 2016    Procedure: COLONOSCOPY WITH COLD BIOPSIES;  Surgeon: Medina Guillen MD;  Location: [facility] ENDOSCOPY;  Service:    • CORONARY STENT PLACEMENT      LAD 80% blockage   • SUBTOTAL HYSTERECTOMY     • TONSILLECTOMY  1965   • TUBAL ABDOMINAL LIGATION         General Information     Row Name 12/29/22 09          OT Time and Intention    Document Type therapy note (daily note)  -     Mode of Treatment individual therapy;occupational therapy  -     Row Name 12/29/22 0934          General Information    Patient Profile Reviewed yes  -     Existing Precautions/Restrictions fall  -     Row Name 12/29/22 0934          Cognition    Orientation Status (Cognition) oriented x 4  -MW     Row Name 12/29/22 0934          Safety Issues, Functional Mobility    Impairments Affecting Function (Mobility) endurance/activity tolerance;strength;pain  -           User Key  (r) = Recorded By, (t) = Taken By, (c) = Cosigned By    Initials Name Provider Type    MW Kenyatta Drake, VALENCIA Occupational Therapist               Mobility/ADL's     Row Name 12/29/22 0934          Bed Mobility    Bed Mobility supine-sit  -     Supine-Sit [place] (Bed Mobility) standby assist  -     Sit-Supine [place] (Bed Mobility) not tested  -     Assistive Device (Bed Mobility) bed rails  -     Row Name 12/29/22 0934          Transfers    Transfers sit-stand transfer;stand-sit transfer;toilet transfer  -     Row Name 12/29/22 0934          Sit-Stand Transfer    Sit-Stand [place] (Transfers) standby assist  -     Assistive Device (Sit-Stand Transfers) walker, front-wheeled  -     Row Name 12/29/22 0934          Stand-Sit Transfer    Stand-Sit [place] (Transfers) standby assist  -     Assistive Device (Stand-Sit Transfers) walker, front-wheeled  -     Row Name 12/29/22 0934          Toilet Transfer    Type (Toilet Transfer) sit-stand;stand-sit  -     [place] Level (Toilet Transfer) standby assist  -     Assistive Device (Toilet Transfer) commode;grab bars/safety frame;walker, front-wheeled  -MW     Row Name 12/29/22 0934          Functional Mobility    Functional Mobility- Ind. Level standby assist  -     Functional Mobility- Device walker, front-wheeled  -      Functional Mobility- Comment SBA with mobility, no buckling noted, slow pace, to BR commode and to sinkside with Rwx  -     Row Name 12/29/22 0934          Activities of Daily Living    BADL Assessment/Intervention upper body dressing;lower body dressing;toileting;grooming  -     Row Name 12/29/22 0934          Lower Body Dressing Assessment/Training    [place] Level (Lower Body Dressing) socks;independent  -MW     Position (Lower Body Dressing) sitting up in bed  -     Row Name 12/29/22 0934          Toileting Assessment/Training    [place] Level (Toileting) toileting skills;standby assist;supervision  -     Assistive Devices (Toileting) commode  -MW     Position (Toileting) supported sitting;supported standing  -     Row Name 12/29/22 0934          Upper Body Dressing Assessment/Training    [place] Level (Upper Body Dressing) upper body dressing skills;pajama/robe;set up  -     Position (Upper Body Dressing) supported standing  -     Row Name 12/29/22 0934          Grooming Assessment/Training    [place] Level (Grooming) grooming skills;wash face, hands;standby assist  -     Position (Grooming) supported standing;sink side  -           User Key  (r) = Recorded By, (t) = Taken By, (c) = Cosigned By    Initials Name Provider Type    Kenyatta Pollard OT Occupational Therapist               Obj/Interventions     Row Name 12/29/22 0937          Balance    Balance Assessment sitting static balance;sitting dynamic balance;sit to stand dynamic balance;standing static balance;standing dynamic balance  -     Static Sitting Balance modified independence  -     Dynamic Sitting Balance supervision  -     Position, Sitting Balance sitting edge of bed;sitting in chair  -     Sit to Stand Dynamic Balance standby assist  -     Static Standing Balance standby assist  -     Dynamic Standing Balance standby assist  -     Position/Device Used, Standing Balance  supported;walker, front-wheeled  -MW     Balance Interventions occupation based/functional task;sitting;standing;sit to stand;supported;static;dynamic;minimal challenge  -           User Key  (r) = Recorded By, (t) = Taken By, (c) = Cosigned By    Initials Name Provider Type    Kenyatta Pollard, [place] Occupational Therapist               Goals/Plan    No documentation.                Clinical Impression     Row Name 12/29/22 0937          Pain Assessment    Pretreatment Pain Rating 5/10  -MW     Posttreatment Pain Rating 5/10  -MW     Pain Location - Side/Orientation Right  -MW     Pain Location - hip  -MW     Pre/Posttreatment Pain Comment pt reports L shoulder pain, RN aware  -     Row Name 12/29/22 0937          Plan of Care Review    Plan of Care Reviewed With patient  -MW     Progress improving  -MW     Outcome Evaluation Pt seen this date for OT tx session in AM, pt agreeable and reports moderate pain in R hip and minimal pain in L shoulder, RN aware. Pt completed bed mob SBA/SPV, SBA for STS and mobility to BR commode and to sinkside with RWx, SBA for toileting and g/h tasks, (I) with LBD and s/up for UBD. Pt will have support from her daughter at d/c and plans for HHOT. Acute care OT to sign off.  -     Row Name 12/29/22 0937          Therapy Plan Review/Discharge Plan (OT)    Anticipated Discharge Disposition (OT) home;home with home health  -     Row Name 12/29/22 0937          Vital Signs    O2 Delivery Pre Treatment room air  -MW     O2 Delivery Intra Treatment room air  -MW     O2 Delivery Post Treatment room air  -MW     Pre Patient Position Supine  -MW     Intra Patient Position Standing  -MW     Post Patient Position Sitting  -     Row Name 12/29/22 0937          Positioning and Restraints    Pre-Treatment Position in bed  -MW     Post Treatment Position chair  -MW     In Chair notified nsg;reclined;call light within reach;encouraged to call for assist;exit alarm on  -           User Key   (r) = Recorded By, (t) = Taken By, (c) = Cosigned By    Initials Name Provider Type    Kenyatta Pollard OT Occupational Therapist               Outcome Measures     Row Name 12/29/22 0940          How much help from another is currently needed...    Putting on and taking off regular lower body clothing? 4  -MW     Bathing (including washing, rinsing, and drying) 4  -MW     Toileting (which includes using toilet bed pan or urinal) 4  -MW     Putting on and taking off regular upper body clothing 4  -MW     Taking care of personal grooming (such as brushing teeth) 4  -MW     Eating meals 4  -MW     AM-PAC 6 Clicks Score (OT) 24  -MW     Row Name 12/29/22 0940          Functional Assessment    Outcome Measure Options AM-PAC 6 Clicks Daily Activity (OT)  -MW           User Key  (r) = Recorded By, (t) = Taken By, (c) = Cosigned By    Initials Name Provider Type    Kenyatta Pollard OT Occupational Therapist              Occupational Therapy Education     Title: PT OT SLP Therapies (In Progress)     Topic: Occupational Therapy (Done)     Point: ADL training (Done)     Description:   Instruct learner(s) on proper safety adaptation and remediation techniques during self care or transfers.   Instruct in proper use of assistive devices.              Learning Progress Summary           Patient Acceptance, E, VU by  at 12/28/2022 6908    Comment: role of OT, d/c rec, goals                   Point: Home exercise program (Done)     Description:   Instruct learner(s) on appropriate technique for monitoring, assisting and/or progressing therapeutic exercises/activities.              Learning Progress Summary           Patient Acceptance, E, VU by  at 12/28/2022 9858    Comment: role of OT, d/c rec, goals                   Point: Precautions (Done)     Description:   Instruct learner(s) on prescribed precautions during self-care and functional transfers.              Learning Progress Summary           Patient  Acceptance, E, VU by  at 12/28/2022 1545    Comment: role of OT, d/c rec, goals                   Point: Body mechanics (Done)     Description:   Instruct learner(s) on proper positioning and spine alignment during self-care, functional mobility activities and/or exercises.              Learning Progress Summary           Patient Acceptance, E, VU by  at 12/28/2022 1545    Comment: role of OT, d/c rec, goals                               User Key     Initials Effective Dates Name Provider Type Discipline    BETHEL 08/20/21 -  Kenyatta Drake OT Occupational Therapist OT              OT Recommendation and Plan  Planned Therapy Interventions (OT): activity tolerance training, functional balance retraining, BADL retraining, neuromuscular control/coordination retraining, occupation/activity based interventions, ROM/therapeutic exercise, transfer/mobility retraining, patient/caregiver education/training, strengthening exercise  Therapy Frequency (OT): 5 times/wk  Plan of Care Review  Plan of Care Reviewed With: patient  Progress: improving  Outcome Evaluation: Pt seen this date for OT tx session in AM, pt agreeable and reports moderate pain in R hip and minimal pain in L shoulder, RN aware. Pt completed bed mob SBA/SPV, SBA for STS and mobility to BR commode and to sinkside with RWx, SBA for toileting and g/h tasks, (I) with LBD and s/up for UBD. Pt will have support from her daughter at d/c and plans for HHOT. Acute care OT to sign off.  Plan of Care Reviewed With: patient  Outcome Evaluation: Pt seen this date for OT tx session in AM, pt agreeable and reports moderate pain in R hip and minimal pain in L shoulder, RN aware. Pt completed bed mob SBA/SPV, SBA for STS and mobility to BR commode and to sinkside with RWx, SBA for toileting and g/h tasks, (I) with LBD and s/up for UBD. Pt will have support from her daughter at d/c and plans for HHOT. Acute care OT to sign off.     Time Calculation:    Time Calculation- OT      Row Name 12/29/22 0943             Time Calculation- OT    OT Start Time 0859  -MW      OT Stop Time 0922  -MW      OT Time Calculation (min) 23 min  -MW      Total Timed Code Minutes- OT 23 minute(s)  -MW      OT Received On 12/29/22  -MW         Timed Charges    26492 - OT Self Care/Mgmt Minutes 23  -MW         Total Minutes    Timed Charges Total Minutes 23  -MW       Total Minutes 23  -MW            User Key  (r) = Recorded By, (t) = Taken By, (c) = Cosigned By    Initials Name Provider Type     Kenyatta Drake OT Occupational Therapist              Therapy Charges for Today     Code Description Service Date Service Provider Modifiers Qty    52265314835 HC OT SELF CARE/MGMT/TRAIN EA 15 MIN 12/28/2022 Kenyatta Drake OT GO 1    46087751073 HC OT EVAL MOD COMPLEXITY 2 12/28/2022 Kenyatta Drake OT GO 1    81083383269 HC OT SELF CARE/MGMT/TRAIN EA 15 MIN 12/29/2022 Kenyatta Drake OT GO 2             OT Discharge Summary  Anticipated Discharge Disposition (OT): home, home with home health    Kenyatta Drake OT  12/29/2022

## 2022-12-29 NOTE — PLAN OF CARE
Problem: Adult Inpatient Plan of Care  Goal: Plan of Care Review  Outcome: Ongoing, Progressing  Goal: Patient-Specific Goal (Individualized)  Outcome: Ongoing, Progressing  Goal: Absence of Hospital-Acquired Illness or Injury  Outcome: Ongoing, Progressing  Intervention: Identify and Manage Fall Risk  Recent Flowsheet Documentation  Taken 12/29/2022 0215 by Denton Coburn RN  Safety Promotion/Fall Prevention:   activity supervised   assistive device/personal items within reach   safety round/check completed  Taken 12/29/2022 0020 by Denton Coburn RN  Safety Promotion/Fall Prevention:   activity supervised   assistive device/personal items within reach   safety round/check completed  Taken 12/28/2022 2220 by Denton Coburn RN  Safety Promotion/Fall Prevention:   activity supervised   assistive device/personal items within reach   safety round/check completed  Taken 12/28/2022 2020 by Denton Coburn RN  Safety Promotion/Fall Prevention:   activity supervised   assistive device/personal items within reach   safety round/check completed  Intervention: Prevent Skin Injury  Recent Flowsheet Documentation  Taken 12/28/2022 2020 by Denton Coburn RN  Skin Protection:   adhesive use limited   incontinence pads utilized  Intervention: Prevent and Manage VTE (Venous Thromboembolism) Risk  Recent Flowsheet Documentation  Taken 12/29/2022 0020 by Denton Coburn RN  VTE Prevention/Management:   bilateral   sequential compression devices on  Taken 12/28/2022 2020 by Denton Coburn RN  VTE Prevention/Management:   bilateral   sequential compression devices on  Range of Motion: active ROM (range of motion) encouraged  Intervention: Prevent Infection  Recent Flowsheet Documentation  Taken 12/29/2022 0215 by Denton Coburn RN  Infection Prevention: rest/sleep promoted  Taken 12/29/2022 0020 by Denton Coburn RN  Infection Prevention: rest/sleep promoted  Taken 12/28/2022 2220 by Denton Coburn  RN  Infection Prevention: rest/sleep promoted  Taken 12/28/2022 2020 by Denton Coburn RN  Infection Prevention: rest/sleep promoted  Goal: Optimal Comfort and Wellbeing  Outcome: Ongoing, Progressing  Intervention: Monitor Pain and Promote Comfort  Recent Flowsheet Documentation  Taken 12/28/2022 2020 by Denton Coburn RN  Pain Management Interventions: see MAR  Intervention: Provide Person-Centered Care  Recent Flowsheet Documentation  Taken 12/28/2022 2020 by Denton Coburn RN  Trust Relationship/Rapport:   care explained   choices provided   questions answered   questions encouraged  Goal: Readiness for Transition of Care  Outcome: Ongoing, Progressing     Problem: Fall Injury Risk  Goal: Absence of Fall and Fall-Related Injury  Outcome: Ongoing, Progressing  Intervention: Identify and Manage Contributors  Recent Flowsheet Documentation  Taken 12/29/2022 0215 by Denton Coburn RN  Medication Review/Management: medications reviewed  Taken 12/29/2022 0020 by Denton Coburn RN  Medication Review/Management: medications reviewed  Taken 12/28/2022 2220 by Denton Coburn RN  Medication Review/Management: medications reviewed  Taken 12/28/2022 2020 by Denton Coburn RN  Medication Review/Management: medications reviewed  Intervention: Promote Injury-Free Environment  Recent Flowsheet Documentation  Taken 12/29/2022 0215 by Denton Coburn RN  Safety Promotion/Fall Prevention:   activity supervised   assistive device/personal items within reach   safety round/check completed  Taken 12/29/2022 0020 by Denton Coburn RN  Safety Promotion/Fall Prevention:   activity supervised   assistive device/personal items within reach   safety round/check completed  Taken 12/28/2022 2220 by Denton Coburn RN  Safety Promotion/Fall Prevention:   activity supervised   assistive device/personal items within reach   safety round/check completed  Taken 12/28/2022 2020 by Denton Coburn RN  Safety  Promotion/Fall Prevention:   activity supervised   assistive device/personal items within reach   safety round/check completed     Problem: Skin Injury Risk Increased  Goal: Skin Health and Integrity  Outcome: Ongoing, Progressing  Intervention: Optimize Skin Protection  Recent Flowsheet Documentation  Taken 12/28/2022 2020 by Denton Coburn, RN  Pressure Reduction Techniques: frequent weight shift encouraged  Pressure Reduction Devices: alternating pressure pump (ADD)  Skin Protection:   adhesive use limited   incontinence pads utilized   Goal Outcome Evaluation:

## 2022-12-29 NOTE — PLAN OF CARE
Goal Outcome Evaluation:  Plan of Care Reviewed With: patient   Vss, nvi, pain controlled with PRN PO meds, voiding per brp, ambulating assist x1, plans to d/c today pending ride availability from daughter, educated on bp meds and monitoring.      Progress: improving

## 2022-12-29 NOTE — PROGRESS NOTES
Continued Stay Note  [facility]     Patient Name: Barbara Tran  MRN: [id number]  Today's Date: 12/29/2022    Admit Date: 12/26/2022    Plan: Home with family support and Jain .   Discharge Plan     Row Name 12/29/22 0938       Plan    Plan Comments Hospital bed is required due to immobility related to gluteal tearing. The patient requires frequent positioning of the body in ways not feasible in an ordinary bed.                        Discharge Codes    No documentation.               Expected Discharge Date and Time     Expected Discharge Date Expected Discharge Time    Dec 29, 2022             Lashay MAZARIEGOS RN

## 2022-12-29 NOTE — PLAN OF CARE
Goal Outcome Evaluation:  Plan of Care Reviewed With: patient           Outcome Evaluation: Upon entering room, pt. sitting up in chair, awake/alert, and agreeable to work with P.T. this date despite continue c/o pain.  Pt. able to ambulate 60 feet, SBA x 1, with use of Rwx this AM. Pt. requires SBA x 1 for sit <-> stand transfers.  Gait cher remains slow but improved compared to yesterday's P.T. session.  Encouraged pt. to continue ambulation every 1-2 hours once she discharges home as well.  Will continue to progress functional mobility as tolerated.    Patient was wearing a face mask during this therapy encounter. Therapist used appropriate personal protective equipment including eye protection, mask, and gloves.  Mask used was standard procedure mask. Appropriate PPE was worn during the entire therapy session. Hand hygiene was completed before and after therapy session. Patient is not in enhanced droplet precautions.

## 2022-12-29 NOTE — DISCHARGE SUMMARY
NAME: Barbara Tran ADMIT: 2022   : 1955  PCP: Millicent Ace MD    MRN: [id number] LOS: 0 days   AGE/SEX: 67 y.o. female  ROOM: P784/1     Date of Admission:  2022  Date of Discharge:  2022    PCP: Millicent Ace MD    CHIEF COMPLAINT  Fall and Hip Pain      DISCHARGE DIAGNOSIS  Active Hospital Problems    Diagnosis  POA   • **Acute right hip pain [M25.551]  Yes   • Chronic diastolic congestive heart failure (HCC) [I50.32]  Yes   • Hypothyroidism [E03.9]  Yes   • Benign essential HTN [I10]  Yes   • HLD (hyperlipidemia) [E78.5]  Yes      Resolved Hospital Problems   No resolved problems to display.       SECONDARY DIAGNOSES  Past Medical History:   Diagnosis Date   • Allergic 2015    codeine, morphine, levaquin   • Arthritis l5 years or so ago   • Cataract 6-9 months ago    Need surgery   • CHF (congestive heart failure) (HCC)    • Coronary artery disease  stent   • COVID-19 virus detected 03/10/2021   • Deep vein thrombosis (HCC) 2021    Pulmonary embolism   • Depression    • Disease of thyroid gland    • Headache Covid 3--21    Has continued   • History of pulmonary embolism 2021   • Hyperlipidemia    • Hypertension since    • Hypothyroidism since    • Osteopenia last few years   • Pulmonary embolism (HCC) 2021    From Covid       CONSULTS       HOSPITAL COURSE  Patient is a 67 y.o. female presented to [facility] complaining of a fall on ice landing on her hip.  She had pain and so was admitted for observation.  Imaging including x-ray as well as MRI was negative for fracture.  She did have right gluteal muscle strain and partial tear.  She worked with physical therapy and wishes for discharge today with home health.  Given short course of tramadol for moderate pain on top of her Tylenol.  Reviewed [place].  She is having some mild left shoulder pain, obtaining x-ray prior to discharge but unlikely there is going to be any surgical  need but she can follow-up with orthopedics as an outpatient as needed.    DIAGNOSTICS    MRI Hip Right Without Contrast [[id number]] Bryant as Reviewed   Order Status: Completed Collected: 12/27/22 1018    Updated: 12/27/22 1032   Narrative:     MRI RIGHT HIP WITHOUT CONTRAST       HISTORY: Increasing right hip pain after a fall yesterday.       TECHNIQUE: MRI right hip includes coronal T1, STIR as well as axial T1,   STIR sequences through both hips and sagittal PD weighted sequence of   the right hip.       COMPARISON: X-rays right femur 12/26/2022, CT pelvis without contrast   12/26/2022, x-rays of the pelvis and right hip 12/26/2022       FINDINGS: There is muscular edema throughout the right gluteus medius   and minimus muscles. There is a partial tear involving the right gluteus   medius muscle at its insertion at the right greater trochanter with mild   retraction. Fluid signal is adjacent to the right greater trochanter   extending deep to the right iliotibial tract and measuring 8 cm in   craniocaudal dimension, 4 cm AP x 3 cm transverse.       There is mild edema deep to the left iliotibial tract and adjacent left   greater trochanter. There are osteoarthritic changes at both hip joints   though greater on the right. There is marginal osteophyte formation and   subchondral sclerosis without fracture or osteonecrosis. Mild   degenerative subchondral cyst formation is present within the right   acetabular roof. There is no evidence for acetabular or pubic ramus   fracture. There is chronic bilateral hamstring origin tendinopathy with   chronic origin interstitial tearing.       Impression:     1. Right gluteus medius and minimus muscular strains with partial right   gluteus medius insertional tear and adjacent fluid signal tracking deep   to the iliotibial tract and adjacent to the right greater trochanter. No   hip fracture or osteonecrosis.   2. Osteoarthritis of both hips, greater on the right.   3. Chronic  bilateral hamstring origin tendinopathy and interstitial   tearing.       This report was finalized on 12/27/2022 10:28 AM by Dr. Robby Dorantes M.D.        XR Femur 2 View Right [[id number]] Bryant as Reviewed   Order Status: Completed Collected: 12/26/22 2124    Updated: 12/26/22 2133   Narrative:     2 VIEWS RIGHT FEMUR       HISTORY: Right hip pain       COMPARISON: None available.       FINDINGS:   No acute fracture or subluxation of the right femur is seen. There are   tricompartmental degenerative changes of the right knee. There is no   suprapatellar effusion.       Impression:     No acute fracture or subluxation identified.       This report was finalized on 12/26/2022 9:29 PM by Dr. Mary Flores M.D.        CT Pelvis Without Contrast [[id number]] Bryant as Reviewed   Order Status: Completed Collected: 12/26/22 1954    Updated: 12/26/22 2134   Addenda:       Images were further reviewed. There is some irregularity of the anteromedial right femoral head. Subchondral fracture is not excluded.    FINDINGS were discussed with Dr. Solorio at 9:30 PM.    This report was finalized on 12/26/2022 9:31 PM by Dr. Mary Flores M.D.    Signed: 12/26/22 2131 by Mary Flores MD   Narrative:     CT OF THE BONY PELVIS       HISTORY: Bilateral hip pain       COMPARISON: 10/20/2016       TECHNIQUE: Axial CT imaging was obtained through the pelvis. Coronal and   sagittal reformatted images were obtained.       FINDINGS:   No acute fracture or subluxation of the bony pelvis or either hip is   seen. There is some soft tissue stranding which is seen adjacent to the   right hip, which could reflect some hemorrhage, although no organized   hematoma is seen. Urinary bladder appears mildly distended. There is   colonic diverticulosis. Small area of fat necrosis is noted within the   left hemipelvis. There is no intrapelvic hematoma. Patient does appear   to have some significant canal stenosis at L4-L5, as well as  some neural   foraminal narrowing on the right, with additional canal narrowing and   neural foraminal narrowing is suspected at L5-S1. Full assessment is   limited due to technique.       Impression:     No acute fracture or subluxation is identified. There is some potential   soft tissue stranding seen adjacent to the right hip, without organized   hematoma. If symptoms persist, consideration for MRI is suggested.       Radiation dose reduction techniques were utilized, including automated   exposure control and exposure modulation based on body size.       This report was finalized on 12/26/2022 8:05 PM by Dr. Mary Flores M.D.        XR Hips Bilateral With or Without Pelvis 5 View [[id number]] Bryant as Reviewed   Order Status: Completed Collected: 12/26/22 1908    Updated: 12/26/22 1913   Narrative:     AP VIEW THE PELVIS PLUS 2 VIEWS VIEWS BILATERAL HIPS       HISTORY: Bilateral hip pain       COMPARISON: None available       FINDINGS:   No acute fracture or subluxation of the bony pelvis or either hip is   seen. There are asymmetric degenerative changes involving the right hip,   associated with severe joint space narrowing and osteophyte formation.       Impression:     No acute fracture or subluxation identified.       This report was finalized on 12/26/2022 7:10 PM by Dr. Mary Flores M.D.        XR Spine Lumbar Complete 4+VW [[id number]] Bryant as Reviewed   Order Status: Completed Collected: 12/26/22 1910    Updated: 12/26/22 1917   Narrative:     4 VIEWS LUMBAR SPINE       HISTORY: Back pain after a fall       COMPARISON: None available.       FINDINGS:   No acute fracture or subluxation of the lumbar spine is seen. There is   retrolisthesis of L5 on S1. There is anterolisthesis of L4 on L5.   Intervertebral disc space narrowing is most significant at L5-S1.       Impression:     No acute osseous findings.        12/29/2022 0744 12/29/2022 0844 Basic Metabolic Panel [[id number]]    (Abnormal)    Blood    Final result Component Value Units   Glucose 104 High  mg/dL   BUN 22 mg/dL   Creatinine 0.66 mg/dL   Sodium 139 mmol/L   Potassium 4.4 mmol/L   Chloride 102 mmol/L   CO2 29.8 High  mmol/L   Calcium 9.2 mg/dL   BUN/Creatinine Ratio 33.3 High     Anion Gap 7.2 mmol/L   eGFR 95.1  mL/min/1.73          12/29/2022 0453 12/29/2022 0536 Magnesium [[id number]]   Blood    Final result Component Value Units   Magnesium 2.3 mg/dL          12/29/2022 0453 12/29/2022 0536 Phosphorus [[id number]]   (Abnormal)   Blood    Final result Component Value Units   Phosphorus 4.7 High  mg/dL          12/29/2022 0453 12/29/2022 0518 CBC Auto Differential [[id number]]   (Abnormal)   Blood    Final result Component Value Units   WBC 7.56 10*3/mm3   RBC 4.63 10*6/mm3   Hemoglobin 13.5 g/dL   Hematocrit 42.3 %   MCV 91.4 fL   MCH 29.2 pg   MCHC 31.9 g/dL   RDW 12.5 %   RDW-SD 41.4 fl   MPV 9.7 fL   Platelets 289 10*3/mm3   Neutrophil % 45.6 %   Lymphocyte % 40.5 %   Monocyte % 6.6 %   Eosinophil % 6.7 High  %   Basophil % 0.5 %   Immature Grans % 0.1 %          PHYSICAL EXAM  Objective    Alert  nad  No resp distress  Pleasant, appropriate     CONDITION ON DISCHARGE  Stable.      DISCHARGE DISPOSITION   Home or Self Care      DISCHARGE MEDICATIONS       Your medication list      START taking these medications      Instructions Last Dose Given Next Dose Due   acetaminophen 500 MG tablet  Commonly known as: TYLENOL      Take 2 tablets by mouth 3 (Three) Times a Day As Needed for Mild Pain.       traMADol 50 MG tablet  Commonly known as: ULTRAM      Take 1 tablet by mouth Every 6 (Six) Hours As Needed for Moderate Pain.          CONTINUE taking these medications      Instructions Last Dose Given Next Dose Due   aspirin 81 MG tablet      Take 81 mg by mouth Daily.       bumetanide 2 MG tablet  Commonly known as: BUMEX      Take 1 tablet by mouth Daily. 1, 2mg tablet once a day       buPROPion  MG 24 hr tablet  Commonly known as:  WELLBUTRIN XL      Take 1 tablet by mouth Daily.       calcium carbonate 600 MG tablet  Commonly known as: OS-SHAHNAZ      Take 600 mg by mouth 2 (Two) Times a Day With Meals.       ibandronate 150 MG tablet  Commonly known as: BONIVA      TAKE 1 TABLET BY MOUTH EVERY 30 DAYS.       magnesium oxide 400 MG tablet  Commonly known as: MAG-OX      Take 800 mg by mouth 2 (two) times a day.       metoprolol succinate XL 25 MG 24 hr tablet  Commonly known as: TOPROL-XL      Take 0.5 tablets by mouth Every Night.       rosuvastatin 40 MG tablet  Commonly known as: CRESTOR      Take 0.5 tablets by mouth Daily.       saccharomyces boulardii 250 MG capsule  Commonly known as: FLORASTOR      Take 1 capsule by mouth Daily.       spironolactone 25 MG tablet  Commonly known as: ALDACTONE      Take 1 tablet by mouth Daily.       Synthroid 50 MCG tablet  Generic drug: levothyroxine      Take 1 tablet by mouth Daily.       zolpidem 5 MG tablet  Commonly known as: AMBIEN      TAKE 1 TABLET BY MOUTH AT NIGHT AS NEEDED FOR SLEEP.             Where to Get Your Medications      These medications were sent to [facility]/pharmacy #[id number] - [place], [place] - [address] [place] [address] [place] [address] - [address] [place] [address]    Phone: [phone]   · traMADol 50 MG tablet     Information about where to get these medications is not yet available    Ask your nurse or doctor about these medications  · acetaminophen 500 MG tablet  · bumetanide 2 MG tablet  · rosuvastatin 40 MG tablet  · saccharomyces boulardii 250 MG capsule          Future Appointments   Date Time Provider Department Center   1/10/2023  3:00 PM Nedra Navas APRN [facility]   3/13/2023  8:00 AM [facility] CT 1  [facility]     Additional Instructions for the Follow-ups that You Need to Schedule     Ambulatory Referral to Home Health (Hospital)   As directed      Face to Face Visit Date: 12/29/2022    Follow-up provider for Plan of  Care?: I treated the patient in an acute care facility and will not continue treatment after discharge.    Follow-up provider: ANAYELI FELIX [[id number]]    Reason/Clinical Findings: debility, muscle strain    Describe mobility limitations that make leaving home difficult: debility, muscle strain    Nursing/Therapeutic Services Requested: Physical Therapy Occupational Therapy    PT orders: Therapeutic exercise Strengthening    Occupational orders: Activities of daily living Strengthening Home safety assessment         Discharge Follow-up with Specialty: PCP in 1-2 weeks, Orthopedics in 4 weeks if needed   As directed      Specialty: PCP in 1-2 weeks, Orthopedics in 4 weeks if needed            Contact information for follow-up providers     Anayeli Felix MD .    Specialty: Family Medicine  Contact information:  [address] [facility] [address] [phone]                   Contact information for after-discharge care     Home Medical Care     [facility] .    Service: Home Health Services  Contact information:  [address] [facility] [address]  [facility] [address] [phone]                             TEST  RESULTS PENDING AT DISCHARGE         Rocky Chavez MD  [facility]  12/29/22  12:13 EST      Time: greater than 32 minutes on discharge  It was a pleasure taking care of this patient while in the hospital.

## 2022-12-29 NOTE — PROGRESS NOTES
Ms. Tran was hospitalized from 12/26-12/29/22 and is excused from work. She will not be able to work for another week after discharge today. This can be clarified by her Primary Care depending how she is doing in the coming week and may need to be extended.     Electronically signed by Rocky Chavez MD, 12/29/22, 12:12 PM EST.

## 2022-12-29 NOTE — PLAN OF CARE
Goal Outcome Evaluation:  Plan of Care Reviewed With: patient        Progress: improving  Outcome Evaluation: Pt seen this date for OT tx session in AM, pt agreeable and reports moderate pain in R hip and minimal pain in L shoulder, RN aware. Pt completed bed mob SBA/SPV, SBA for STS and mobility to BR commode and to sinkside with RWx, SBA for toileting and g/h tasks, (I) with LBD and s/up for UBD. Pt will have support from her daughter at d/c and plans for HHOT. Acute care OT to sign off.

## 2022-12-29 NOTE — CASE MANAGEMENT/SOCIAL WORK
Continued Stay Note  [facility]     Patient Name: Barbara Tran  MRN: [id number]  Today's Date: 12/29/2022    Admit Date: 12/26/2022    Plan: Home with family support and Baptism .   Discharge Plan     Row Name 12/29/22 0926       Plan    Plan Home with family support and Baptism .    Patient/Family in Agreement with Plan yes    Plan Comments Spoke with the patient who would still like a hospital bed after d/c, and she would like [facility] to supply it. Spoke with Kuldip/[facility] who said she is unsure whether insurance will cover the cost of the hospital bed rental. Per Kuldip, the cost is $165/month to rent. Updated the patient who's agreeable with the cost, wants to continue with the hospital bed rental and is willing to private pay if needed. Updated Kuldip who will be in contact with the patient to discuss payment and delivery. Updated MELLY Paul who will discuss with A.               Discharge Codes    No documentation.               Expected Discharge Date and Time     Expected Discharge Date Expected Discharge Time    Dec 29, 2022             Lashay MAZARIEGOS, RN

## 2022-12-30 ENCOUNTER — HOME CARE VISIT (OUTPATIENT)
Dept: HOME HEALTH SERVICES | Facility: HOME HEALTHCARE | Age: 67
End: 2022-12-30
Payer: MEDICARE

## 2022-12-30 ENCOUNTER — READMISSION MANAGEMENT (OUTPATIENT)
Dept: CALL CENTER | Facility: HOSPITAL | Age: 67
End: 2022-12-30

## 2022-12-30 PROCEDURE — G0151 HHCP-SERV OF PT,EA 15 MIN: HCPCS

## 2022-12-30 NOTE — CASE MANAGEMENT/SOCIAL WORK
Case Management Discharge Note      Final Note: [facility] and DME provided by [facility].         Selected Continued Care - Discharged on 12/29/2022 Admission date: 12/26/2022 - Discharge disposition: Home or Self Care    Destination    No services have been selected for the patient.              Durable Medical Equipment Coordination complete.    Service Provider Selected Services Address Phone Fax Patient Preferred    [facility] [address], [facility] [address] [phone] --          Dialysis/Infusion    No services have been selected for the patient.              Home Medical Care Coordination complete.    Service Provider Selected Services Address Phone Fax Patient Preferred     [facility] [address] [place] [address] [address] [phone] --          Therapy    No services have been selected for the patient.              Community Resources    No services have been selected for the patient.              Community & DME    No services have been selected for the patient.                       Final Discharge Disposition Code: 06 - home with home health care

## 2022-12-30 NOTE — OUTREACH NOTE
Prep Survey    Flowsheet Row Responses   [facility] patient discharged from? [place]   Is LACE score < 7 ? Yes   Eligibility [facility]   Date of Admission 12/26/22   Date of Discharge 12/29/22   Discharge Disposition Home-Health Care Sv   Discharge diagnosis right gluteal muscle strain and partial tear.   Does the patient have one of the following disease processes/diagnoses(primary or secondary)? Other   Does the patient have Home health ordered? Yes   What is the Home health agency?   [facility]   Is there a DME ordered? Yes   What DME was ordered? Hospital bed--[facility]   Prep survey completed? Yes          FLORENCE CHURCHILL - Registered Nurse

## 2022-12-31 VITALS
SYSTOLIC BLOOD PRESSURE: 122 MMHG | TEMPERATURE: 98.4 F | HEART RATE: 67 BPM | OXYGEN SATURATION: 97 % | DIASTOLIC BLOOD PRESSURE: 88 MMHG

## 2022-12-31 NOTE — HOME HEALTH
Patient fell on the ice on her driveway and had to crawl to her basement where she called EMS.  She was scanned for possible hip or pelvic fracture and fracture was ruled out.  The MRI results were as follows:  1. Right gluteus medius and minimus muscular strains with partial right gluteus medius insertional tear and adjacent fluid signal tracking deep to the iliotibial tract and adjacent to the right greater trochanter. No hip fracture or osteonecrosis. 2. Osteoarthritis of both hips, greater on the right. 3. 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 Her PMH includes CHF and left knee DJD.  She was working full time as a  and was not using an AD prior.  She was independent with her ADLs, including driving.  Her bedroom and shower are on the 3rd floor (currently staying on the 1st floor with just a 1/2 bath and a hospital bed).  She has a daughter and 2 grandchildren living with her.   Assessment:  Patient has high pain and very guarded movements. Her transfers are decent.  Skilled PT needed to work on pain manangement, right hip ROM and strengthening (conservatively) and progressive mobility to be able to get back upstairs and to her bedroom and shower.  OT needed to eval for any ADL assist and for any left shoulder ROM interventions.   Plan for next visit:  Review and amend right LE HEP.  Pain management teaching.  Try to get her gait more reciprocal/normal.

## 2023-01-02 ENCOUNTER — TRANSITIONAL CARE MANAGEMENT TELEPHONE ENCOUNTER (OUTPATIENT)
Dept: CALL CENTER | Facility: HOSPITAL | Age: 68
End: 2023-01-02
Payer: MEDICARE

## 2023-01-02 NOTE — OUTREACH NOTE
Call Center TCM Note    Flowsheet Row Responses   [facility] patient discharged from? [place]   Does the patient have one of the following disease processes/diagnoses(primary or secondary)? Other   TCM attempt successful? Yes  [No verbal release]   Call start time 1536   Call end time 1540   Discharge diagnosis right gluteal muscle strain and partial tear   Person spoke with today (if not patient) and relationship Ángela-daughter    Meds reviewed with patient/caregiver? Yes   Does the patient have all medications ordered at discharge? Yes   Is the patient taking all medications as directed (includes completed medication regime)? Yes   Does the patient have an appointment with their PCP within 7 days of discharge? No   Nursing Interventions PCP office requested to make appointment - message sent   What is the Home health agency?  [facility]   Has home health visited the patient within 72 hours of discharge? Yes   What DME was ordered? Hospital bed--[facility]   Has all DME been delivered? Yes   Psychosocial issues? No   Did the patient receive a copy of their discharge instructions? Yes   Nursing interventions Reviewed instructions with patient   What is the patient's perception of their health status since discharge? Improving   Is the patient/caregiver able to teach back signs and symptoms related to disease process for when to call PCP? Yes   Is the patient/caregiver able to teach back signs and symptoms related to disease process for when to call 911? Yes   Is the patient/caregiver able to teach back the hierarchy of who to call/visit for symptoms/problems? PCP, Specialist, Home health nurse, Urgent Care, ED, 911 Yes   If the patient is a current smoker, are they able to teach back resources for cessation? Not a smoker   TCM call completed? Yes   Call end time 1540   Would this patient benefit from a Referral to Amb Social Work? No   Is the patient interested in additional calls from an ambulatory ?   NOTE:  applies to high risk patients requiring additional follow-up. Rae Stokes RN    1/2/2023, 15:41 EST

## 2023-01-03 ENCOUNTER — HOME CARE VISIT (OUTPATIENT)
Dept: HOME HEALTH SERVICES | Facility: HOME HEALTHCARE | Age: 68
End: 2023-01-03
Payer: MEDICARE

## 2023-01-03 VITALS
HEART RATE: 67 BPM | RESPIRATION RATE: 18 BRPM | OXYGEN SATURATION: 98 % | SYSTOLIC BLOOD PRESSURE: 114 MMHG | DIASTOLIC BLOOD PRESSURE: 64 MMHG | TEMPERATURE: 97.3 F

## 2023-01-03 PROCEDURE — G0151 HHCP-SERV OF PT,EA 15 MIN: HCPCS

## 2023-01-03 PROCEDURE — G0152 HHCP-SERV OF OT,EA 15 MIN: HCPCS

## 2023-01-03 NOTE — HOME HEALTH
CURRENT SITUATION: Pt fell on ice outside her home. EMS was called and she was taken to hospital. All fractures were ruled out via X-rays. She had a MRI and the results are as follows: 1. Right gluteus medius and minimus muscular strains with partial right gluteus medius insertional tear and adjacent fluid signal tracking deep to the iliotibial tract and adjacent to the right greater trochanter. No hip fracture or osteonecrosis. 2. Osteoarthritis of both hips, greater on the right. 3. 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 She is also c/o L-shoulder pain since the fall. She reports she fell directly onto her L-side (shoulder and hip.)  PMHx: CHF, L-knee DJD.  SUBJECTIVE:  Agreeable to OT evaluation. \"My arm hurts when I reach forward, right here.\" [Rubbing her biceps, anterior shoulder areas.]  SOCIAL & ENVIRONMENTAL SITUATION: Pt lives w/daughter and 2 grandkids (13 and 11.) Home has 3 levels + a walk-out basement. Her primary BR/BA are on the 3rd level (attic converted into living space.) She is currently staying on main level w/hospital bed and access to a 1/2 bath. She was working FT as a  prior to injury. She was Independent w/all ADLs, working, driving.   PATIENT'S &/OR CAREGIVER'S GOAL: \"Get over this and back to my life.\"  INTERVENTIONS: OT Eval, ADL training, Home Safety, Therapeutic Exercise/Activity, Transfer/Mobility training, Monitor vitals including SPO2 via pulse-oximeter (notifiy MD if resting O2 <90% on room air), Patient/CG education, Falls risk prevention, Recommendations on AE, DME, AD, environmental adaptations.  ASSESSMENT, MEDICAL NECESSITY, PLAN: Skilled OT warranted at 1w3. She agrees. She is currently not managing steps. Hopefully she will be able to manage them in the upcoming week(s) and OT will then assess her mobilities in her BR/BA. Currently, she is in need of skilled interventions for her LUE rehab and to address ADLs when appropriate.    PLAN FOR NEXT VISIT: if able to go up steps to her BR/BA, assess her skills in those rooms. F/u on MAGDALENA AGUAYO issued this visit.

## 2023-01-04 VITALS
TEMPERATURE: 98 F | HEART RATE: 63 BPM | DIASTOLIC BLOOD PRESSURE: 80 MMHG | OXYGEN SATURATION: 97 % | SYSTOLIC BLOOD PRESSURE: 120 MMHG

## 2023-01-04 NOTE — HOME HEALTH
Patient has been using her hospital bed, but it feels \"uncomfortable.\"  She states she is getting around a little better.  Her pain has decreased, but she will be running out of her pain pills soon.  She is going to call her PCP about possible refill.  Skilled PT still needed for guided Ther ex and mobility to help progress her back to walking reciprocally and to navigate her steps.     Plan for next visit  Gentle manual therapy to mobilize her posterior and lateral hip musculature  Progress HEP as tolerated.    Strive for reciprocal gait.

## 2023-01-05 ENCOUNTER — HOME CARE VISIT (OUTPATIENT)
Dept: HOME HEALTH SERVICES | Facility: HOME HEALTHCARE | Age: 68
End: 2023-01-05
Payer: MEDICARE

## 2023-01-05 PROCEDURE — G0151 HHCP-SERV OF PT,EA 15 MIN: HCPCS

## 2023-01-06 VITALS
HEART RATE: 60 BPM | TEMPERATURE: 98 F | SYSTOLIC BLOOD PRESSURE: 126 MMHG | OXYGEN SATURATION: 97 % | DIASTOLIC BLOOD PRESSURE: 80 MMHG

## 2023-01-06 NOTE — HOME HEALTH
She is still trying to get her PCP to prescibe a few more pain pills.  She continues to maintain use of the walker and still not trying to go up the steps.  She seems to be more comfortable doing things on her own downstairs (light meal prep).  Skilled PT still needed for progressive HEP teaching and mobility training.     Plan for next visit  Progress standing HEP as tolerated.   Attempt step again if pain allows.

## 2023-01-09 ENCOUNTER — TELEPHONE (OUTPATIENT)
Dept: CARDIOLOGY | Facility: CLINIC | Age: 68
End: 2023-01-09
Payer: MEDICARE

## 2023-01-09 ENCOUNTER — TELEPHONE (OUTPATIENT)
Dept: FAMILY MEDICINE CLINIC | Facility: CLINIC | Age: 68
End: 2023-01-09
Payer: MEDICARE

## 2023-01-09 NOTE — TELEPHONE ENCOUNTER
Patient asking to speak with Dr Ace regarding hospital followup and when she will be able to return to work.

## 2023-01-09 NOTE — TELEPHONE ENCOUNTER
Caller: Barbara Tran    Relationship: Self    Best call back number: [phone]    What is the best time to reach you: ANYTIME    Who are you requesting to speak with (clinical staff, provider,  specific staff member): CLINICAL        What was the call regarding: PT FELL 12/26/2022 AND CAN NOT COME TO  APPT 01/10/2023, APPT CANCELLED. SHE HAS CALLED AND LEFT MESSAGES AND SENT MY CHART MESSAGE AND HAS NOT HEARD BACK FROM ANYONE.. IT WAS MEDICATION F/U PT WAS PUT ON METOPROLOL AND CHANGED DOSE OF SPIRONALACTONE. PT DOES NOT SEE ANY DIFFERENCE. DR AHMADI CAN REVIEW LABS FROM 12/29/2022 HOSPITAL STAY. DOES PT NEED TO CONTINUE TO STAY ON MEDICATION? PT CAN DO VIRTUAL VISIT IF NECESSARY.PLEASE CALL AND ADVISE.      Do you require a callback: YES

## 2023-01-09 NOTE — TELEPHONE ENCOUNTER
Thank you for calling patient relations for this patient.     Stop spironolactone. Stop Toprol.   Start 24-26 mg Entresto BID.  I sent it to her pharmacy. If someone can stop by for her, I will give her samples.     I would like to see her in one month.     Thanks!  MANOHAR Traore

## 2023-01-09 NOTE — TELEPHONE ENCOUNTER
Please call her. I have not received any phone or [employer] messages.  Dr. Leon switched her medications due to fatigue.  She stopped carvedilol, ramipril, and Lasix.  She started 12.5 mg Toprol nightly, 25 mg spironolactone daily, and bumetanide.  Please call to confirm her medications.  How is her weight and leg swelling?  How was her blood pressure?  Her labs are fine; Dr. Leon stated in her last office note that she may need to go back on Entresto.  We could stop spironolactone and try low-dose Entresto twice daily if she is agreeable to this.  Thanks,  Dinorah

## 2023-01-09 NOTE — TELEPHONE ENCOUNTER
Left VM for patient relations letting them know the situation. Told them to call us back if there were any further questions.     Angelina Silva RN  Triage [facility] [place]

## 2023-01-09 NOTE — TELEPHONE ENCOUNTER
Spoke to patient. She is taking 25mg of spironolactone and 2mg of Bumex daily. She is suppose to be taking 12.5mg of Toprol nightly, but she has been out for the last week. She denies any weight gain or swelling. She said her BPs have been in the 110s-120s/60s-80s. I did not call in a refill for her Toprol just yet because I wanted to make sure that you didn't want to make any changes first.     She also began to tell me that no one is managing her hip injury at this time. She said ortho signed off since they did not perform surgery and told her to FU with her PCP in 1 week. However, her PCP is giving her a hard time trying to get her in to be seen. Stating that she does not need to be seen until the end of January. She said she works full time and she needs someone to let her know her restrictions and when she can return to work. I am going to talk to patient relations about this patient to see if we can get something figured out for her.     Angelina Silva RN  Triage [facility] [place]

## 2023-01-09 NOTE — TELEPHONE ENCOUNTER
Notified patient of recommendations. Patient verbalized understanding. Samples placed up front for patient. 1 month FU scheduled.     Angelina Silva RN  [facility]

## 2023-01-10 ENCOUNTER — HOME CARE VISIT (OUTPATIENT)
Dept: HOME HEALTH SERVICES | Facility: HOME HEALTHCARE | Age: 68
End: 2023-01-10
Payer: MEDICARE

## 2023-01-10 PROCEDURE — G0180 MD CERTIFICATION HHA PATIENT: HCPCS | Performed by: FAMILY MEDICINE

## 2023-01-10 PROCEDURE — G0151 HHCP-SERV OF PT,EA 15 MIN: HCPCS

## 2023-01-10 NOTE — TELEPHONE ENCOUNTER
CONY for patient to call back to discuss her concerns. I have tentatively put her down for an appointment with Dr. Ace this Friday at 1:45.

## 2023-01-11 ENCOUNTER — TELEPHONE (OUTPATIENT)
Dept: FAMILY MEDICINE CLINIC | Facility: CLINIC | Age: 68
End: 2023-01-11
Payer: MEDICARE

## 2023-01-11 NOTE — TELEPHONE ENCOUNTER
Patient is on the schedule to see Dr. Ace on Friday.  Patient daughter that lives with her tested positive for flu yesterday.  Can patient keep her appointment as schedule on Friday.

## 2023-01-12 ENCOUNTER — HOME CARE VISIT (OUTPATIENT)
Dept: HOME HEALTH SERVICES | Facility: HOME HEALTHCARE | Age: 68
End: 2023-01-12
Payer: MEDICARE

## 2023-01-12 VITALS
OXYGEN SATURATION: 96 % | SYSTOLIC BLOOD PRESSURE: 128 MMHG | DIASTOLIC BLOOD PRESSURE: 70 MMHG | TEMPERATURE: 97.9 F | HEART RATE: 76 BPM

## 2023-01-12 NOTE — HOME HEALTH
She is frustrated that no doctor has taken the lead to manage her care and she is somewhat up in the air about what to tell her employer when she can return to work.  I offerred a best guess that it may be 3 to 4 weeks at minimum, but it ultimately depends on how she progresses and when she is able to navigate steps comfortably and safely.  She has a tentative PCP appointment later this month.  She did state she is starting to do some walking without the walker (using furniture/environment).  She states no increase in pain over the last few days.  I reviewed her MRI results (from the hospital) with her and advised that her muscle tear looks to be on the mild to moderate end of the spectrum vs a major or full tear.  Skilled PT still needed to pursue full right sided WB and improve functional strength to be able to do the steps and return to work    Plan for next visit  Continue to progress standing HEP for right LE stability and strength  Step navigation if able.

## 2023-01-17 ENCOUNTER — TELEPHONE (OUTPATIENT)
Dept: CARDIOLOGY | Facility: CLINIC | Age: 68
End: 2023-01-17
Payer: MEDICARE

## 2023-01-17 ENCOUNTER — HOME CARE VISIT (OUTPATIENT)
Dept: HOME HEALTH SERVICES | Facility: HOME HEALTHCARE | Age: 68
End: 2023-01-17
Payer: MEDICARE

## 2023-01-17 PROCEDURE — G0152 HHCP-SERV OF OT,EA 15 MIN: HCPCS

## 2023-01-17 PROCEDURE — G0151 HHCP-SERV OF PT,EA 15 MIN: HCPCS

## 2023-01-17 NOTE — TELEPHONE ENCOUNTER
Patient called and stated that she had some medication changes at the last office visit (1/10/23) and she started the Entresto.  Since starting the Entresto she has been having some swelling in her feet, lightheadedness, and she feels out of sorts.  She stated that she had Home health PT/OT come out and her HR was about 111.  She is concerned and requested a phone call back.      I have called her and she did not answer.  I left her a message and advised her that she should call back and ask for triage nurse.    I have let Ángela Roy RN know about this and she is going to follow up on this in the AM if the patient has not called back.    CB: [phone]    Thanks,  Christine

## 2023-01-18 ENCOUNTER — TELEPHONE (OUTPATIENT)
Dept: FAMILY MEDICINE CLINIC | Facility: CLINIC | Age: 68
End: 2023-01-18

## 2023-01-18 VITALS
TEMPERATURE: 97.6 F | DIASTOLIC BLOOD PRESSURE: 72 MMHG | HEART RATE: 110 BPM | SYSTOLIC BLOOD PRESSURE: 112 MMHG | OXYGEN SATURATION: 96 %

## 2023-01-18 VITALS
DIASTOLIC BLOOD PRESSURE: 80 MMHG | HEART RATE: 80 BPM | SYSTOLIC BLOOD PRESSURE: 118 MMHG | TEMPERATURE: 98.1 F | OXYGEN SATURATION: 99 %

## 2023-01-18 DIAGNOSIS — R60.0 EDEMA LEG: ICD-10-CM

## 2023-01-18 DIAGNOSIS — F51.01 PRIMARY INSOMNIA: ICD-10-CM

## 2023-01-18 DIAGNOSIS — I50.32 CHRONIC DIASTOLIC CONGESTIVE HEART FAILURE: Primary | ICD-10-CM

## 2023-01-18 RX ORDER — ZOLPIDEM TARTRATE 5 MG/1
5 TABLET ORAL NIGHTLY PRN
Qty: 30 TABLET | Refills: 0 | Status: SHIPPED | OUTPATIENT
Start: 2023-01-18 | End: 2023-02-10 | Stop reason: SDUPTHER

## 2023-01-18 RX ORDER — ZOLPIDEM TARTRATE 5 MG/1
5 TABLET ORAL NIGHTLY PRN
Qty: 30 TABLET | Refills: 0 | OUTPATIENT
Start: 2023-01-18

## 2023-01-18 NOTE — HOME HEALTH
"Patient had a \"tough time yesterday\" with complaint of some dizziness.  Both her LEs have increased swelling.  Review of her meds and she is to talk to her PCP about her Entrestro as a possible issue for this.  She does feel her right hip is slightly better.  She is still not doing the steps yet.     Plan for next visit  Attempt outside steps (weather permitting)  Progress standing HEP as tolerated."

## 2023-01-18 NOTE — TELEPHONE ENCOUNTER
Attempted to call patient, no answer.  Left a voicemail for patient to call back.  Will continue to try to reach patient.    Triage:  see RM last message.  It would be best if she can get these labs done today.    Ángela Roy RN  [facility] Triage  01/18/23 09:34 EST

## 2023-01-18 NOTE — TELEPHONE ENCOUNTER
Patient cannot go up and down steps yet.  She has 6 steps to enter her house and she doesn't think she can get out of the house.  She has PT/OT following, but no home health nursing services.  Do you have any ideas?    Ángela Roy RN  [facility] Triage  01/18/23 09:53 EST

## 2023-01-18 NOTE — TELEPHONE ENCOUNTER
Patient is current with [facility] getting PT/OT services.  I called [facility] and spoke with Destiny.  She was able to get skilled nursing services set up for this patient for CHF management and I was able to give verbal orders for labs.  They are going to be able to get labs tomorrow.  Destiny read back lab orders for verification.    Tried to call the patient to let her know we are going to be able to send out [facility] nurse out for labs and eval, left  for her to call back.    Ángela Roy RN  [facility] Triage  01/18/23 14:20 EST

## 2023-01-18 NOTE — HOME HEALTH
"SUBJECTIVE: \"I made it up the 1st section of steps, not the whole way. Hopefully by next week I'll be going all the way up.\"  FALLS: none reported  MEDICATION CHANGES: none reported.  PLAN FOR NEXT VISIT: OT d/c after going upstairs to assess transfers in bathroom."

## 2023-01-18 NOTE — TELEPHONE ENCOUNTER
She needs to get labs checked and depending on the labs, we can determine her follow-up.  See if she can do that today.

## 2023-01-18 NOTE — TELEPHONE ENCOUNTER
"Patient returned call.  I was able to get more information.      She sustained a fall on 12/26 and spent 4 days in the hospital with torn muscles and PT/OT have been coming to her house.  She is ambulating with a walker currently.  She denies SOA at rest and denies with exertion.      She started Entresto 24/26 mg BID on 1/9.  Since Saturday she has had some increased BLE swelling in feet and ankles that is not better in the AM and not better with elevation.  Her scale is on the 3rd floor of her house and she has not been able to climb stairs yet.  She is going to see if a family member can get that down on the first floor for her.  So she has not been weighing herself daily.  PT/OT was there yesterday her HR was 111 before they started working.  She reports her HR is usually 60-70s.  She reports her BP have been mostly \"good,\" I can see PT/OT have documented one yesterday at 112/72.  Patient reports she had one BP the other day 104/60.  She denies palpitations/fluttering/heart racing.  She c/o very mild intermittent chest tightness.  She is having lightheadedness with standing.      Meds reviewed with pt:  Entresto 24/26 mg BID  bumex 2 mg daily    Per chart review:  LOV 12/21/22 with RM pt with hx of HFpEF, CAD with stent LAD, hx of DVT/PE, HTN, HLD.    Recommendations?    Ángela Roy RN  [facility] Triage  01/18/23 09:02 EST            "

## 2023-01-18 NOTE — TELEPHONE ENCOUNTER
Caller: MANUEL    Relationship: [facility]    Best call back number: [phone] OK TO LEAVE A MESSAGE    What orders are you requesting (i.e. lab or imaging): PT 2X NEXT WEEK, OT 1X     In what timeframe would the patient need to come in: ASAP    Where will you receive your lab/imaging services: [facility]

## 2023-01-19 ENCOUNTER — HOME CARE VISIT (OUTPATIENT)
Dept: HOME HEALTH SERVICES | Facility: HOME HEALTHCARE | Age: 68
End: 2023-01-19
Payer: MEDICARE

## 2023-01-19 ENCOUNTER — TELEPHONE (OUTPATIENT)
Dept: CARDIOLOGY | Facility: CLINIC | Age: 68
End: 2023-01-19
Payer: MEDICARE

## 2023-01-19 ENCOUNTER — LAB REQUISITION (OUTPATIENT)
Dept: LAB | Facility: HOSPITAL | Age: 68
End: 2023-01-19
Payer: MEDICARE

## 2023-01-19 DIAGNOSIS — Z00.00 ENCOUNTER FOR GENERAL ADULT MEDICAL EXAMINATION WITHOUT ABNORMAL FINDINGS: ICD-10-CM

## 2023-01-19 LAB
ANION GAP SERPL CALCULATED.3IONS-SCNC: 8.8 MMOL/L (ref 5–15)
BUN SERPL-MCNC: 15 MG/DL (ref 8–23)
BUN/CREAT SERPL: 20.8 (ref 7–25)
CALCIUM SPEC-SCNC: 9 MG/DL (ref 8.6–10.5)
CHLORIDE SERPL-SCNC: 98 MMOL/L (ref 98–107)
CO2 SERPL-SCNC: 29.2 MMOL/L (ref 22–29)
CREAT SERPL-MCNC: 0.72 MG/DL (ref 0.57–1)
EGFRCR SERPLBLD CKD-EPI 2021: 90.6 ML/MIN/1.73
GLUCOSE SERPL-MCNC: 110 MG/DL (ref 65–99)
HCT VFR BLD AUTO: 41.2 % (ref 34–46.6)
HGB BLD-MCNC: 13.6 G/DL (ref 12–15.9)
MAGNESIUM SERPL-MCNC: 2.3 MG/DL (ref 1.6–2.4)
NT-PROBNP SERPL-MCNC: 74.2 PG/ML (ref 0–900)
POTASSIUM SERPL-SCNC: 3.7 MMOL/L (ref 3.5–5.2)
SODIUM SERPL-SCNC: 136 MMOL/L (ref 136–145)

## 2023-01-19 PROCEDURE — G0151 HHCP-SERV OF PT,EA 15 MIN: HCPCS

## 2023-01-19 PROCEDURE — 83735 ASSAY OF MAGNESIUM: CPT | Performed by: INTERNAL MEDICINE

## 2023-01-19 PROCEDURE — 80048 BASIC METABOLIC PNL TOTAL CA: CPT | Performed by: INTERNAL MEDICINE

## 2023-01-19 PROCEDURE — 83880 ASSAY OF NATRIURETIC PEPTIDE: CPT | Performed by: INTERNAL MEDICINE

## 2023-01-19 PROCEDURE — G0299 HHS/HOSPICE OF RN EA 15 MIN: HCPCS

## 2023-01-19 PROCEDURE — 85014 HEMATOCRIT: CPT | Performed by: INTERNAL MEDICINE

## 2023-01-19 PROCEDURE — 85018 HEMOGLOBIN: CPT | Performed by: INTERNAL MEDICINE

## 2023-01-19 NOTE — TELEPHONE ENCOUNTER
Notified patient of results. Patient verbalized understanding.    Angelina Silva RN  Triage [facility]

## 2023-01-19 NOTE — TELEPHONE ENCOUNTER
Attempted to call patient, no answer.  Left a voicemail for patient to call back.  Will continue to try to reach patient.    Triage:  If pt calls back, could you just let her know we have HH nurse coming out some point today to draw labs.    Ángela Roy RN  [facility] Triage  01/19/23 09:01 EST

## 2023-01-19 NOTE — TELEPHONE ENCOUNTER
I tried to call Barbara Tran but there was no answer.  Left a voicemail asking patient to call back.  Will continue to try to reach pt.    Thank you,    Andressa Boyce RN  Triage [facility]  01/19/23 15:38 EST

## 2023-01-19 NOTE — TELEPHONE ENCOUNTER
Patient returned call.  I let her know about HH nurse coming out today.  She is agreeable.  I let her know we will call her when we receive lab results and be able to give recs at that time based on those results.    Ángela Roy RN  [facility] Triage  01/19/23 09:10 EST

## 2023-01-20 VITALS
HEART RATE: 88 BPM | DIASTOLIC BLOOD PRESSURE: 76 MMHG | OXYGEN SATURATION: 97 % | SYSTOLIC BLOOD PRESSURE: 124 MMHG | TEMPERATURE: 97.7 F

## 2023-01-20 NOTE — HOME HEALTH
She has pain in her right groin area now, mild at rest but can go up to 5/6 at times.  She has done the first level of steps one other time since Tuesday.  Her PCP appointment is next week. I advised her to get established with an orthopedic MD since it seems her hip and knee arthritis will be the biggest barriers from now on.  Her gluteal tear seems to be healing ok with little complaint of pain in the area.  Skilled PT still working on better step navigation and progressing her mobility with the appropriate AD.      Plan for next visit (added 2 visits to her POC)  Step navigation progression  HEP review and amend and start to finalize.

## 2023-01-21 VITALS
TEMPERATURE: 98.6 F | SYSTOLIC BLOOD PRESSURE: 122 MMHG | HEART RATE: 78 BPM | RESPIRATION RATE: 16 BRPM | OXYGEN SATURATION: 98 % | DIASTOLIC BLOOD PRESSURE: 80 MMHG

## 2023-01-24 ENCOUNTER — HOME CARE VISIT (OUTPATIENT)
Dept: HOME HEALTH SERVICES | Facility: HOME HEALTHCARE | Age: 68
End: 2023-01-24
Payer: MEDICARE

## 2023-01-24 PROCEDURE — G0151 HHCP-SERV OF PT,EA 15 MIN: HCPCS

## 2023-01-24 PROCEDURE — G0300 HHS/HOSPICE OF LPN EA 15 MIN: HCPCS

## 2023-01-25 VITALS
TEMPERATURE: 98.3 F | SYSTOLIC BLOOD PRESSURE: 122 MMHG | HEART RATE: 78 BPM | OXYGEN SATURATION: 98 % | DIASTOLIC BLOOD PRESSURE: 78 MMHG | RESPIRATION RATE: 18 BRPM

## 2023-01-25 VITALS
HEART RATE: 71 BPM | OXYGEN SATURATION: 95 % | TEMPERATURE: 98 F | DIASTOLIC BLOOD PRESSURE: 80 MMHG | SYSTOLIC BLOOD PRESSURE: 130 MMHG

## 2023-01-25 NOTE — HOME HEALTH
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  She lives in her home, she has 2 daughters that are nurses.  Carlos has some bilateral feet swellling, she is agreeable to d/c.  Form is signed and in folder.

## 2023-01-25 NOTE — HOME HEALTH
She is going to see her PCP on Friday.  I advised her to talk to her about getting an orthopedic doctor involved due to her hip and knee DJD, which I believe is her biggest mobility issue now vs the muscle strain/tear she suffered from her fall.  She did walk up her steps to her 2nd floor to get in and out of her shower, with the daughter's assist this weekend. She also mentioned some episodes of SOA while up (none seen today) that she is going to talk to her PCP about.  Skilled PT still needed for HEP guidance and safer mobility.     Plan for next visit  Finalize HEP  Finalize step navigation,

## 2023-01-26 ENCOUNTER — HOME CARE VISIT (OUTPATIENT)
Dept: HOME HEALTH SERVICES | Facility: HOME HEALTHCARE | Age: 68
End: 2023-01-26
Payer: MEDICARE

## 2023-01-26 VITALS
RESPIRATION RATE: 18 BRPM | SYSTOLIC BLOOD PRESSURE: 122 MMHG | DIASTOLIC BLOOD PRESSURE: 80 MMHG | TEMPERATURE: 98.9 F | HEART RATE: 81 BPM | OXYGEN SATURATION: 95 %

## 2023-01-26 VITALS
DIASTOLIC BLOOD PRESSURE: 80 MMHG | OXYGEN SATURATION: 97 % | TEMPERATURE: 97.9 F | SYSTOLIC BLOOD PRESSURE: 122 MMHG | HEART RATE: 71 BPM

## 2023-01-26 PROCEDURE — G0151 HHCP-SERV OF PT,EA 15 MIN: HCPCS

## 2023-01-26 PROCEDURE — G0152 HHCP-SERV OF OT,EA 15 MIN: HCPCS

## 2023-01-26 PROCEDURE — G0493 RN CARE EA 15 MIN HH/HOSPICE: HCPCS

## 2023-01-26 NOTE — Clinical Note
Home PT discharge on 1/26/23.   She made good progress with her mobility and her right LE seems to be healing from the gluteal tear.  She still is going to be affected by her pre-existing right hip OA. She would benefit from continued physical therapy to continue to work on right LE strengthening, progressing off the walker and upright ADL endurance to return to work.  She will need and outpatient therapy referral.  Thank you

## 2023-01-26 NOTE — PROGRESS NOTES
Assessment and Plan     Patient Instructions    Problem List Items Addressed This Visit    None  Visit Diagnoses     Right hip pain    -  Primary    Disc appropriate use tramadol-for function, not pain. Recommend using up to twice daily, before PT and at night.     Relevant Medications    traMADol (ULTRAM) 50 MG tablet    Other Relevant Orders    Ambulatory Referral to Physical Therapy Evaluate and treat (Completed)    Need for vaccination        Relevant Orders    COVID-19 Bivalent Booster (Pfizer) 12+yrs (Completed)             Return for recheck after PT.          Barbara is a 68 y.o. being seen today for  Hyperlipidemia and Hypertension   Subjective   History of the Present Illness   Answers for HPI/ROS submitted by the patient on 1/23/2023  Please describe your symptoms.: Torn gluteus medius and glutes minimus, hamstring and knee, Plan of care for recovery, Out patient PT, Returning to work - restrictions - time line  How long have you been having these symptoms?: Greater than 2 weeks  Please list any medications you are currently taking for this condition.: None, Had prescription from hospital for three days of Tramadol for pain.  Please describe any probable cause for these symptoms. : Fall on the ice     Discussed compression hose. She is having some increased edema.     Patient admitted on 12/26 following a fall and d/c'd on 12/29. She had right glutea muscle partial tear. She does not have a f/u with Dr. Hines. She only saw him one time. She was not given a f/u with him. She is using some tylenol and aleve, neither of which is helping. She was told by PT that she was really tense. Tramadol did help.   Social History  She  reports that she has never smoked. She has never used smokeless tobacco. She reports current alcohol use of about 2.0 standard drinks per week. She reports that she does not use drugs.  Objective   Vital Signs        BP Readings from Last 1 Encounters:   01/27/23 128/86     Wt Readings from  Last 3 Encounters:   01/27/23 96.2 kg (212 lb)   12/26/22 104 kg (229 lb 0.9 oz)   12/21/22 98 kg (216 lb)   Body mass index is 33.2 kg/m².     Physical Exam  Vitals reviewed.   Constitutional:       Appearance: Normal appearance. She is well-developed and normal weight.      Comments: Mask in place    Cardiovascular:      Rate and Rhythm: Normal rate and regular rhythm.      Heart sounds: Normal heart sounds.   Pulmonary:      Effort: Pulmonary effort is normal.      Breath sounds: Normal breath sounds.   Neurological:      Mental Status: She is alert.   Psychiatric:         Behavior: Behavior normal.         Thought Content: Thought content normal.         Judgment: Judgment normal.                 I spent 30 minutes caring for Barbara on this date of service. This time includes time spent by me in the following activities:preparing for the visit, reviewing tests, performing a medically appropriate examination and/or evaluation , counseling and educating the patient/family/caregiver, ordering medications, tests, or procedures and documenting information in the medical record   Patient was given instructions and counseling regarding her condition or for health maintenance advice. Please see specific information pulled into the AVS if appropriate.

## 2023-01-27 ENCOUNTER — OFFICE VISIT (OUTPATIENT)
Dept: FAMILY MEDICINE CLINIC | Facility: CLINIC | Age: 68
End: 2023-01-27
Payer: MEDICARE

## 2023-01-27 VITALS
SYSTOLIC BLOOD PRESSURE: 128 MMHG | WEIGHT: 212 LBS | HEIGHT: 67 IN | RESPIRATION RATE: 18 BRPM | BODY MASS INDEX: 33.27 KG/M2 | HEART RATE: 97 BPM | OXYGEN SATURATION: 98 % | DIASTOLIC BLOOD PRESSURE: 86 MMHG

## 2023-01-27 VITALS
TEMPERATURE: 97.7 F | OXYGEN SATURATION: 97 % | SYSTOLIC BLOOD PRESSURE: 120 MMHG | HEART RATE: 77 BPM | DIASTOLIC BLOOD PRESSURE: 82 MMHG

## 2023-01-27 DIAGNOSIS — M25.551 RIGHT HIP PAIN: Primary | ICD-10-CM

## 2023-01-27 DIAGNOSIS — Z23 NEED FOR VACCINATION: ICD-10-CM

## 2023-01-27 PROCEDURE — 91312 COVID-19 (PFIZER) BIVALENT BOOSTER 12+YRS: CPT | Performed by: FAMILY MEDICINE

## 2023-01-27 PROCEDURE — 99214 OFFICE O/P EST MOD 30 MIN: CPT | Performed by: FAMILY MEDICINE

## 2023-01-27 PROCEDURE — 0124A COVID-19 (PFIZER) BIVALENT BOOSTER 12+YRS: CPT | Performed by: FAMILY MEDICINE

## 2023-01-27 RX ORDER — TRAMADOL HYDROCHLORIDE 50 MG/1
50 TABLET ORAL EVERY 6 HOURS PRN
Qty: 60 TABLET | Refills: 0 | Status: SHIPPED | OUTPATIENT
Start: 2023-01-27 | End: 2023-02-27 | Stop reason: SDUPTHER

## 2023-01-27 NOTE — HOME HEALTH
Patient will be discussing outpt PT with Dr. Ace tomorrow.  She feels comfortable getting out of the house now, still using her walker.  She has been upstairs again, but not up to the 3rd floor until a better rail is put in. She is in agreement with HH discharge today.

## 2023-01-27 NOTE — HOME HEALTH
"SUBJECTIVE: agreeable to agency D/C today. \"I have had 2 showers since you were here last. It went well both times. My daughter was w/me the whole time.\"  FALLS: none reported.  MEDICATION CHANGES: none reported"

## 2023-02-04 DIAGNOSIS — M81.0 AGE-RELATED OSTEOPOROSIS WITHOUT CURRENT PATHOLOGICAL FRACTURE: ICD-10-CM

## 2023-02-06 RX ORDER — IBANDRONATE SODIUM 150 MG/1
TABLET, FILM COATED ORAL
Qty: 3 TABLET | Refills: 3 | Status: SHIPPED | OUTPATIENT
Start: 2023-02-06 | End: 2023-03-20 | Stop reason: SDUPTHER

## 2023-02-10 DIAGNOSIS — E78.2 MIXED HYPERLIPIDEMIA: ICD-10-CM

## 2023-02-10 DIAGNOSIS — F34.1 DYSTHYMIA: ICD-10-CM

## 2023-02-10 DIAGNOSIS — F51.01 PRIMARY INSOMNIA: ICD-10-CM

## 2023-02-10 NOTE — TELEPHONE ENCOUNTER
Caller: MarcBarbara    Relationship: Self    Best call back number: [phone]    Requested Prescriptions:   Requested Prescriptions     Pending Prescriptions Disp Refills   • buPROPion XL (WELLBUTRIN XL) 300 MG 24 hr tablet 90 tablet 3     Sig: Take 1 tablet by mouth Daily.   • rosuvastatin (CRESTOR) 40 MG tablet 90 tablet      Sig: Take 0.5 tablets by mouth Daily.   • zolpidem (AMBIEN) 5 MG tablet 30 tablet 0     Sig: Take 1 tablet by mouth At Night As Needed for Sleep.        Pharmacy where request should be sent: [facility] #[id number] [place] [id number] [facility] AT [facility] [phone] [facility] [phone] FX     Additional details provided by patient: PATIENT IS COMPLETELY OUT OF HER BUPROPION. PATIENT NEEDS THESE MEDICATIONS, SHE STATES THAT HER INSURANCE LOCKED THESE MEDICATIONS AND SHE COULD NOT REFILL THEM ON THE [facility] BECAUSE [facility] HAD ALREADY FILLED THEM. PATIENT STATES THAT HER INSURANCE NO LONGER GOES THROUGH [facility]. PLEASE WRITE NEW PRESCRIPTIONS FOR THE PATIENT AND SEND THEM TO [facility].     Does the patient have less than a 3 day supply:  [x] Yes  [] No    Would you like a call back once the refill request has been completed: [x] Yes [] No    If the office needs to give you a call back, can they leave a voicemail: [x] Yes [] No    Margaret Paige Rep   02/10/23 11:00 EST

## 2023-02-13 RX ORDER — ZOLPIDEM TARTRATE 5 MG/1
5 TABLET ORAL NIGHTLY PRN
Qty: 30 TABLET | Refills: 0 | Status: SHIPPED | OUTPATIENT
Start: 2023-02-13 | End: 2023-03-20 | Stop reason: SDUPTHER

## 2023-02-13 RX ORDER — BUPROPION HYDROCHLORIDE 300 MG/1
300 TABLET ORAL DAILY
Qty: 90 TABLET | Refills: 3 | Status: SHIPPED | OUTPATIENT
Start: 2023-02-13

## 2023-02-13 RX ORDER — ROSUVASTATIN CALCIUM 40 MG/1
20 TABLET, COATED ORAL DAILY
Qty: 90 TABLET | Refills: 2
Start: 2023-02-13

## 2023-02-16 ENCOUNTER — OFFICE VISIT (OUTPATIENT)
Dept: CARDIOLOGY | Facility: CLINIC | Age: 68
End: 2023-02-16
Payer: MEDICARE

## 2023-02-16 VITALS
SYSTOLIC BLOOD PRESSURE: 136 MMHG | HEART RATE: 83 BPM | HEIGHT: 67 IN | BODY MASS INDEX: 32.65 KG/M2 | DIASTOLIC BLOOD PRESSURE: 78 MMHG | WEIGHT: 208 LBS

## 2023-02-16 DIAGNOSIS — Z95.5 S/P DRUG ELUTING CORONARY STENT PLACEMENT: ICD-10-CM

## 2023-02-16 DIAGNOSIS — Z86.711 HISTORY OF PULMONARY EMBOLISM: ICD-10-CM

## 2023-02-16 DIAGNOSIS — I10 PRIMARY HYPERTENSION: ICD-10-CM

## 2023-02-16 DIAGNOSIS — I50.32 CHRONIC DIASTOLIC CONGESTIVE HEART FAILURE: Primary | ICD-10-CM

## 2023-02-16 DIAGNOSIS — I25.10 CHRONIC CORONARY ARTERY DISEASE: ICD-10-CM

## 2023-02-16 DIAGNOSIS — E78.2 MIXED HYPERLIPIDEMIA: ICD-10-CM

## 2023-02-16 DIAGNOSIS — M25.551 ACUTE RIGHT HIP PAIN: ICD-10-CM

## 2023-02-16 DIAGNOSIS — E03.9 HYPOTHYROIDISM, UNSPECIFIED TYPE: ICD-10-CM

## 2023-02-16 PROCEDURE — 99214 OFFICE O/P EST MOD 30 MIN: CPT | Performed by: NURSE PRACTITIONER

## 2023-02-16 PROCEDURE — 93000 ELECTROCARDIOGRAM COMPLETE: CPT | Performed by: NURSE PRACTITIONER

## 2023-02-27 DIAGNOSIS — M25.551 RIGHT HIP PAIN: ICD-10-CM

## 2023-02-27 DIAGNOSIS — E03.9 HYPOTHYROIDISM, UNSPECIFIED TYPE: ICD-10-CM

## 2023-02-28 RX ORDER — TRAMADOL HYDROCHLORIDE 50 MG/1
50 TABLET ORAL EVERY 6 HOURS PRN
Qty: 60 TABLET | Refills: 0 | Status: SHIPPED | OUTPATIENT
Start: 2023-02-28 | End: 2023-04-04

## 2023-02-28 RX ORDER — LEVOTHYROXINE SODIUM 50 MCG
50 TABLET ORAL DAILY
Qty: 90 TABLET | Refills: 3 | Status: SHIPPED | OUTPATIENT
Start: 2023-02-28

## 2023-02-28 NOTE — TELEPHONE ENCOUNTER
Last office visit: 1/27/23    Next office visit: none scheduled    Last refill:   Tramadol-1/27/23  Synthroid-8/26/22

## 2023-03-09 ENCOUNTER — TELEPHONE (OUTPATIENT)
Dept: CARDIOLOGY | Facility: CLINIC | Age: 68
End: 2023-03-09

## 2023-03-09 DIAGNOSIS — M79.89 LEG SWELLING: ICD-10-CM

## 2023-03-09 DIAGNOSIS — I50.32 CHRONIC DIASTOLIC CONGESTIVE HEART FAILURE: Primary | ICD-10-CM

## 2023-03-09 DIAGNOSIS — R06.09 DYSPNEA ON EXERTION: ICD-10-CM

## 2023-03-09 NOTE — TELEPHONE ENCOUNTER
Caller: Barbara Tran    Relationship to patient: Self    Best call back number: [phone]    Patient is needing: PT IS UNABLE TO MAKE APPOINTMENT ON 3.16.23 AND CANCELED. SHE STATED THAT SHE IS UNABLE TO TAKE TIME OFF WORK RIGHT NOW BUT NEEDED TO DISCUSS HER MEDICATIONS. REQUESTING A CALL BACK.

## 2023-03-09 NOTE — TELEPHONE ENCOUNTER
Can you call and get more information about her swelling?  Dr. Leon had started her on Entresto which she said did not help her swelling.  I started her on Jardiance to see if it would help her swelling.  Has there been any improvement?  Is it worse?  Is it the same?  What is her weight?    We should probably get some labs to see how her kidney function is doing with these medication changes.  I have ordered them.     Thanks!  MANOHAR Traore

## 2023-03-10 ENCOUNTER — LAB (OUTPATIENT)
Dept: LAB | Facility: HOSPITAL | Age: 68
End: 2023-03-10
Payer: MEDICARE

## 2023-03-10 DIAGNOSIS — R06.09 DYSPNEA ON EXERTION: ICD-10-CM

## 2023-03-10 DIAGNOSIS — M79.89 LEG SWELLING: ICD-10-CM

## 2023-03-10 DIAGNOSIS — R60.0 EDEMA LEG: ICD-10-CM

## 2023-03-10 DIAGNOSIS — I25.10 CHRONIC CORONARY ARTERY DISEASE: ICD-10-CM

## 2023-03-10 DIAGNOSIS — I10 BENIGN ESSENTIAL HTN: ICD-10-CM

## 2023-03-10 DIAGNOSIS — I50.32 CHRONIC DIASTOLIC CONGESTIVE HEART FAILURE: ICD-10-CM

## 2023-03-10 LAB
ANION GAP SERPL CALCULATED.3IONS-SCNC: 10 MMOL/L (ref 5–15)
BUN SERPL-MCNC: 15 MG/DL (ref 8–23)
BUN/CREAT SERPL: 19.5 (ref 7–25)
CALCIUM SPEC-SCNC: 9.2 MG/DL (ref 8.6–10.5)
CHLORIDE SERPL-SCNC: 100 MMOL/L (ref 98–107)
CO2 SERPL-SCNC: 30 MMOL/L (ref 22–29)
CREAT SERPL-MCNC: 0.77 MG/DL (ref 0.57–1)
EGFRCR SERPLBLD CKD-EPI 2021: 83.1 ML/MIN/1.73
GLUCOSE SERPL-MCNC: 124 MG/DL (ref 65–99)
HCT VFR BLD AUTO: 43.9 % (ref 34–46.6)
HGB BLD-MCNC: 14.6 G/DL (ref 12–15.9)
MAGNESIUM SERPL-MCNC: 2.4 MG/DL (ref 1.6–2.4)
NT-PROBNP SERPL-MCNC: 52.8 PG/ML (ref 0–900)
POTASSIUM SERPL-SCNC: 4.3 MMOL/L (ref 3.5–5.2)
SODIUM SERPL-SCNC: 140 MMOL/L (ref 136–145)

## 2023-03-10 PROCEDURE — 36415 COLL VENOUS BLD VENIPUNCTURE: CPT

## 2023-03-10 PROCEDURE — 85014 HEMATOCRIT: CPT

## 2023-03-10 PROCEDURE — 83735 ASSAY OF MAGNESIUM: CPT

## 2023-03-10 PROCEDURE — 85018 HEMOGLOBIN: CPT

## 2023-03-10 PROCEDURE — 83880 ASSAY OF NATRIURETIC PEPTIDE: CPT

## 2023-03-10 PROCEDURE — 80048 BASIC METABOLIC PNL TOTAL CA: CPT

## 2023-03-10 RX ORDER — BUMETANIDE 2 MG/1
2 TABLET ORAL 2 TIMES DAILY
Start: 2023-03-10

## 2023-03-10 NOTE — TELEPHONE ENCOUNTER
Yes, she needs labs.   She can increase bumex to 2 mg BID.   She really needs to be seen. Please have her reschedule.     Thanks!  MANOHAR Traore

## 2023-03-10 NOTE — TELEPHONE ENCOUNTER
Patient states that the swelling is worse now. She said it is about 4+ edema in her legs. Her weight has been staying between 208-211. She said her legs and feet are always swollen despite elevating them, which is new for her.     I made her aware that she needs to get labs work done as well.     Angelina Silva RN  Triage [facility] [place]

## 2023-03-10 NOTE — TELEPHONE ENCOUNTER
Dinorah,    Pt called back. Went over your instructions. Pt verbalized understanding. She is going to have her labs drawn and she will come to a f/u appt with you on Tuesday in .    Thank you,    Bindu Dowell RN  [facility] [place]  03/10/23 13:35 EST

## 2023-03-10 NOTE — TELEPHONE ENCOUNTER
Called and left VM. Will continue to try to reach patient.     Angelina Silva RN  Triage [facility] [place]

## 2023-03-11 ENCOUNTER — TELEPHONE (OUTPATIENT)
Dept: CARDIOLOGY | Facility: CLINIC | Age: 68
End: 2023-03-11
Payer: MEDICARE

## 2023-03-13 ENCOUNTER — HOSPITAL ENCOUNTER (OUTPATIENT)
Dept: CT IMAGING | Facility: HOSPITAL | Age: 68
Discharge: HOME | End: 2023-03-13
Admitting: INTERNAL MEDICINE
Payer: MEDICARE

## 2023-03-13 DIAGNOSIS — R91.8 PULMONARY NODULES: ICD-10-CM

## 2023-03-13 PROCEDURE — 71250 CT THORAX DX C-: CPT

## 2023-03-13 NOTE — TELEPHONE ENCOUNTER
Labs are stable - pls call.   
Notified patient of results, patient verbalizes understanding.  She was concerned about her BG and CO2 elevation.  She was not fasting.  I encouraged her to follow up with her PCP.  She is aware of her follow up appt tomorrow with MM.    Ángela Roy RN  [facility] Triage  03/13/23 08:35 EDT    
DISCHARGE

## 2023-03-14 ENCOUNTER — OFFICE VISIT (OUTPATIENT)
Dept: CARDIOLOGY | Facility: CLINIC | Age: 68
End: 2023-03-14
Payer: MEDICARE

## 2023-03-14 VITALS
HEART RATE: 85 BPM | WEIGHT: 215.1 LBS | DIASTOLIC BLOOD PRESSURE: 72 MMHG | OXYGEN SATURATION: 99 % | HEIGHT: 67 IN | SYSTOLIC BLOOD PRESSURE: 108 MMHG | BODY MASS INDEX: 33.76 KG/M2

## 2023-03-14 DIAGNOSIS — Z95.5 S/P DRUG ELUTING CORONARY STENT PLACEMENT: ICD-10-CM

## 2023-03-14 DIAGNOSIS — Z86.711 HISTORY OF PULMONARY EMBOLISM: ICD-10-CM

## 2023-03-14 DIAGNOSIS — I25.10 CHRONIC CORONARY ARTERY DISEASE: ICD-10-CM

## 2023-03-14 DIAGNOSIS — R60.0 PEDAL EDEMA: ICD-10-CM

## 2023-03-14 DIAGNOSIS — I10 BENIGN ESSENTIAL HTN: ICD-10-CM

## 2023-03-14 DIAGNOSIS — E78.2 MIXED HYPERLIPIDEMIA: ICD-10-CM

## 2023-03-14 DIAGNOSIS — M79.89 LEG SWELLING: Primary | ICD-10-CM

## 2023-03-14 DIAGNOSIS — I50.32 CHRONIC DIASTOLIC CONGESTIVE HEART FAILURE: ICD-10-CM

## 2023-03-14 DIAGNOSIS — R26.89 BALANCE PROBLEM: ICD-10-CM

## 2023-03-14 PROCEDURE — 99214 OFFICE O/P EST MOD 30 MIN: CPT | Performed by: NURSE PRACTITIONER

## 2023-03-14 PROCEDURE — 3078F DIAST BP <80 MM HG: CPT | Performed by: NURSE PRACTITIONER

## 2023-03-14 PROCEDURE — 1159F MED LIST DOCD IN RCRD: CPT | Performed by: NURSE PRACTITIONER

## 2023-03-14 PROCEDURE — 93000 ELECTROCARDIOGRAM COMPLETE: CPT | Performed by: NURSE PRACTITIONER

## 2023-03-14 PROCEDURE — 3074F SYST BP LT 130 MM HG: CPT | Performed by: NURSE PRACTITIONER

## 2023-03-14 PROCEDURE — 1160F RVW MEDS BY RX/DR IN RCRD: CPT | Performed by: NURSE PRACTITIONER

## 2023-03-14 NOTE — PROGRESS NOTES
"      [facility]  [address]  [place] [address]  Phone: [phone]  Fax: [phone]  Patient Name: Barbara Tran  :1955  Age: 68 y.o.  Primary Cardiologist: Lashay Leon MD  Encounter Provider:  MANOHAR Martell    History of Present Illness     Barbara Tran is a 68 y.o.  female whose medical history includes hypertension, hyperlipidemia, history of DVT and PE in 2021, and pulmonary nodules followed by Dr. Rodriguez.  She is followed in our office by Dr. Leon for HFpEF and CAD s/p stent to the LAD. I have reviewed the past medical records in preparation of today's visit.     Follow-up:  She is here for 1 month follow-up of medication changes.  At last visit I started her on 2 mg bumetanide daily and 10 mg Jardiance daily to see if her leg swelling would improve.  There is been no improvement in her leg swelling and her weight has been stable from 208 to 211 pounds.  She has chronic but stable dyspnea with exertion; she is starting to be able to move more.  She denies chest pain, orthopnea, palpitations, or syncope.  She states she previously had a sleep evaluation in 2022 and was told she only has mild DAVID; she does not qualify for device.    Data Review     The following data was reviewed by MANOHAR Martell on 23:    Vital Signs:   /72 (BP Location: Left arm, Patient Position: Sitting, Cuff Size: Large Adult)   Pulse 85   Ht 170.2 cm (67\")   Wt 97.6 kg (215 lb 1.6 oz)   SpO2 99%   BMI 33.69 kg/m²       Weight:  Wt Readings from Last 3 Encounters:   23 97.6 kg (215 lb 1.6 oz)   23 94.3 kg (208 lb)   23 96.2 kg (212 lb)     Body mass index is 33.69 kg/m².    Below is a summary of pertinent cardiology findings:  • She is single, has 3 children, works as an , has never smoked, drinks tea and coffee daily, has occasional alcohol but uses no " drugs.  Family history includes heart disease in her mom, dad, sister, and 2 brothers; she also has a sister with diabetes.  • She had hyperkalemia with spironolactone and this was decreased.  • 2005 she had a stent placed to the proximal LAD.  • 2015 she had a cardiac catheterization showing a patent stent of the proximal LAD, and 80% septal  stenosis which was unchanged, normal left circumflex and RCA, and normal LV SF.  • March 2021 she had COVID-19 pneumonia.  • June 2021 she presented with acute short windedness and diagnosed with bilateral PE without right heart strain; she also had DVT.  She was started on apixaban.  • September 2021 echocardiogram showed EF 46 to 50% with moderate to severe septal hypokinesis, grade 1 LV diastolic function, normal RV size and function, normal saline contrast study and mild left atrial enlargement.  • May 2022 echocardiogram showed EF 54%, grade 1 LV diastolic dysfunction, normal RVSP, and no significant valvular disease.  She had an nonischemic stress nuclear perfusion study as well.  • December 2022 spironolactone and metoprolol stopped in favor of 24-26 mg Entresto twice daily for HFpEF and leg swelling.  February 2023 Jardiance and bumetanide added.    Labs:  • 01/19/2023:  cr 0.7, K 3.7, otherwise unremarkable CMP, proBNP 74, Hgb 13.6, Plt 41.2      ECG 12 Lead    Date/Time: 3/14/2023 2:17 PM  Performed by: Nedra Navas APRN  Authorized by: Nedra Navas, MANOHAR   Comparison: compared with previous ECG from 2/16/2023  Similar to previous ECG  Rhythm: sinus rhythm  Rate: normal  BPM: 82  T inversion: III and aVF  T flattening: V3, V4, V5 and V6  Other findings: T wave abnormality    Clinical impression: non-specific ECG            Medications     Allergies as of 03/14/2023 - Reviewed 03/14/2023   Allergen Reaction Noted   • Codeine Dizziness 01/28/2016   • Levofloxacin Itching 10/23/2016   • Morphine Itching 10/21/2016         Current  Outpatient Medications:   •  aspirin 81 MG tablet, Take 1 tablet by mouth Daily., Disp: , Rfl:   •  bumetanide (BUMEX) 2 MG tablet, Take 1 tablet by mouth 2 (Two) Times a Day. 1, 2mg tablet once a day, Disp: , Rfl:   •  buPROPion XL (WELLBUTRIN XL) 300 MG 24 hr tablet, Take 1 tablet by mouth Daily., Disp: 90 tablet, Rfl: 3  •  calcium carbonate (OS-SHAHNAZ) 600 MG tablet, Take 1 tablet by mouth 2 (Two) Times a Day With Meals., Disp: , Rfl:   •  empagliflozin (JARDIANCE) 10 MG tablet tablet, Take 1 tablet by mouth Daily., Disp: 30 tablet, Rfl: 0  •  ibandronate (BONIVA) 150 MG tablet, TAKE 1 TABLET BY MOUTH EVERY 30 DAYS., Disp: 3 tablet, Rfl: 3  •  magnesium oxide (MAG-OX) 400 MG tablet, Take 2 tablets by mouth 2 (two) times a day., Disp: , Rfl:   •  rosuvastatin (CRESTOR) 40 MG tablet, Take 0.5 tablets by mouth Daily., Disp: 90 tablet, Rfl: 2  •  sacubitril-valsartan (ENTRESTO) 24-26 MG tablet, Take 1 tablet by mouth 2 (Two) Times a Day., Disp: 60 tablet, Rfl: 0  •  Synthroid 50 MCG tablet, Take 1 tablet by mouth Daily., Disp: 90 tablet, Rfl: 3  •  traMADol (ULTRAM) 50 MG tablet, Take 1 tablet by mouth Every 6 (Six) Hours As Needed for Moderate Pain., Disp: 60 tablet, Rfl: 0  •  zolpidem (AMBIEN) 5 MG tablet, Take 1 tablet by mouth At Night As Needed for Sleep., Disp: 30 tablet, Rfl: 0     Past History, Review of Systems, Exam     Past Medical History:   Diagnosis Date   • Allergic 2015   • Arthritis l5 years or so ago   • Cataract 6-9 months ago   • CHF (congestive heart failure) (HCC)    • Coronary artery disease 2005 stent   • COVID-19 virus detected 03/10/2021   • Deep vein thrombosis (HCC) 06/03/2021   • Depression    • Disease of thyroid gland    • Headache Covid 3-6-21   • History of pulmonary embolism 06/01/2021   • Hyperlipidemia    • Hypertension since 2005   • Hypothyroidism since 2012   • Obesity    • Osteopenia last few years   • Pulmonary embolism (HCC) 06/03/2021       Past Surgical History:   has a past  surgical history that includes Coronary stent placement (2005); Colonoscopy (N/A, 12/19/2016); Cholecystectomy (1995); Subtotal Hysterectomy (2009); Tonsillectomy (1965); Tubal ligation (1985); Cardiac catheterization (2015, 2018); and Eye surgery (April 2022).     Social History     Socioeconomic History   • Marital status: Single   Tobacco Use   • Smoking status: Never   • Smokeless tobacco: Never   • Tobacco comments:     N/A   Vaping Use   • Vaping Use: Never used   Substance and Sexual Activity   • Alcohol use: Yes     Alcohol/week: 2.0 standard drinks     Types: 1 Glasses of wine, 1 Cans of beer per week     Comment: socially   • Drug use: Never   • Sexual activity: Not Currently     Birth control/protection: None       Review of Systems   Constitutional: Positive for malaise/fatigue.   Cardiovascular: Positive for dyspnea on exertion and leg swelling. Negative for chest pain, claudication, cyanosis, irregular heartbeat, near-syncope, orthopnea, palpitations, paroxysmal nocturnal dyspnea and syncope.       Vitals reviewed.   Constitutional:       Appearance: Not in distress.   Eyes:      Conjunctiva/sclera: Conjunctivae normal.      Pupils: Pupils are equal, round, and reactive to light.   HENT:      Head: Normocephalic.      Nose: Nose normal.    Mouth/Throat:      Pharynx: Oropharynx is clear.   Neck:      Vascular: JVD normal.   Pulmonary:      Effort: Pulmonary effort is normal.      Breath sounds: Normal breath sounds. No wheezing. No rhonchi. No rales.   Cardiovascular:      Normal rate. Regular rhythm. Normal S1. Normal S2.      Murmurs: There is no murmur.   Pulses:     Intact distal pulses.   Edema:     Ankle: bilateral 2+ edema of the ankle.  Abdominal:      General: Bowel sounds are normal. There is no distension.      Palpations: Abdomen is soft.      Tenderness: There is no abdominal tenderness.   Musculoskeletal: Normal range of motion.      Cervical back: Normal range of motion and neck supple.  Skin:     General: Skin is warm and dry.   Neurological:      Mental Status: Alert and oriented to person, place and time.   Psychiatric:         Attention and Perception: Attention normal.         Mood and Affect: Mood normal.         Speech: Speech normal.         Behavior: Behavior is cooperative.          Assessment and Plan     Assessment:  1. Leg swelling    2. Chronic diastolic congestive heart failure (HCC)    3. S/P drug eluting coronary stent placement    4. Chronic coronary artery disease    5. Benign essential HTN    6. Mixed hyperlipidemia    7. History of pulmonary embolism    8. Pedal edema    9. Balance problem         1. Chronic HFpEF: Previous echos have shown LV diastolic dysfunction.  Her last echo was in May 2022 and LV diastolic function was grade 1.  She has been having leg swelling and short windedness; no improvement with the addition of 24-26 Entresto twice daily, 2 mg bumetanide twice daily, and 10 mg Jardiance daily.  Metoprolol and spironolactone have been stopped due to low blood pressure.  2. Chronic coronary artery disease: She has stent to the LAD placed in 2005 and her last cardiac catheterization in 2015 was stable showing patent LAD stent and a 80% septal  stenosis which was unchanged.  Her medical therapy includes 40 mg Crestor daily and 81 mg aspirin daily.  Metoprolol previously stopped due to hypotension.  3. History of PE and DVT: This occurred in June 2021 and was not associated with right heart strain.  She was started on 5 mg apixaban at that time.  This was following COVID-19 infection in March 2021.  No recent issues.  4. Hypertension: She previously had hyperkalemia on spironolactone.  Controlled on 24-26 mg Entresto twice daily, 10 mg Jardiance, and 2 mg Bumex daily.  5. Hyperlipidemia: No recent labs to review; will follow.  She is treated with 40 mg Crestor daily.  6. Hypothyroidism: No recent labs to review; will follow.  She is treated with  Synthroid.  7. Acute hip pain: This occurred after she fell on the ice in December 2022 and sustained right gluteal muscle tear and hamstring injury.  She is able to be more active.  8. Leg swelling: There has been no improvement with up titration of her medications.  She does report that she has a sister with history of lymphedema.    Ms. Tran is a patient of Dr. Leon with coronary artery disease of the LAD with a stent and known coronary artery disease of the septal .  She also has chronic HFpEF.  She has been having short windedness and leg swelling which was not improved with the addition of 24-26 mg Entresto twice daily, 10 mg Jardiance daily, and 2 mg bumetanide twice daily.  Her lungs are clear and her weight is stable.  I discussed her case with Dr. Leon; we will refer her to the lymphedema clinic.  She is also interested in vascular screening, I will provide her with this information.  I am going to have her see Dr. Leon in 6 months.    Return in about 6 months (around 9/14/2023) for Follow-up with Dr. Leon.  Orders Placed This Encounter   Procedures   • Ambulatory Referral to Lymphedema Clinic   • ECG 12 Lead      No orders of the defined types were placed in this encounter.        Thank you for the opportunity to participate in this patient's care.    MANOHAR Traore    This office note has been dictated.

## 2023-03-20 DIAGNOSIS — M81.0 AGE-RELATED OSTEOPOROSIS WITHOUT CURRENT PATHOLOGICAL FRACTURE: ICD-10-CM

## 2023-03-20 DIAGNOSIS — F51.01 PRIMARY INSOMNIA: ICD-10-CM

## 2023-03-20 RX ORDER — ZOLPIDEM TARTRATE 5 MG/1
5 TABLET ORAL NIGHTLY PRN
Qty: 30 TABLET | Refills: 0 | Status: SHIPPED | OUTPATIENT
Start: 2023-03-20

## 2023-03-20 RX ORDER — IBANDRONATE SODIUM 150 MG/1
150 TABLET, FILM COATED ORAL
Qty: 3 TABLET | Refills: 3 | Status: SHIPPED | OUTPATIENT
Start: 2023-03-20

## 2023-04-03 DIAGNOSIS — M25.551 RIGHT HIP PAIN: ICD-10-CM

## 2023-04-04 RX ORDER — TRAMADOL HYDROCHLORIDE 50 MG/1
TABLET ORAL
Qty: 60 TABLET | Refills: 0 | Status: SHIPPED | OUTPATIENT
Start: 2023-04-04

## 2023-04-18 DIAGNOSIS — F51.01 PRIMARY INSOMNIA: ICD-10-CM

## 2023-04-18 RX ORDER — ZOLPIDEM TARTRATE 5 MG/1
5 TABLET ORAL NIGHTLY PRN
Qty: 30 TABLET | Refills: 1 | Status: SHIPPED | OUTPATIENT
Start: 2023-04-18

## 2023-04-18 RX ORDER — BUMETANIDE 2 MG/1
2 TABLET ORAL 2 TIMES DAILY
Qty: 180 TABLET | Refills: 3
Start: 2023-04-18 | End: 2023-04-21 | Stop reason: SDUPTHER

## 2023-04-18 NOTE — TELEPHONE ENCOUNTER
Patient is requesting Bumetanide 2 mg to be sent to [facility] on [address]

## 2023-04-24 RX ORDER — BUMETANIDE 2 MG/1
2 TABLET ORAL 2 TIMES DAILY
Qty: 180 TABLET | Refills: 3 | Status: SHIPPED | OUTPATIENT
Start: 2023-04-24

## 2023-04-24 RX ORDER — BUMETANIDE 2 MG/1
2 TABLET ORAL 2 TIMES DAILY
Qty: 180 TABLET | Refills: 3 | Status: SHIPPED | OUTPATIENT
Start: 2023-04-24 | End: 2023-04-24 | Stop reason: SDUPTHER

## 2023-05-04 ENCOUNTER — TELEPHONE (OUTPATIENT)
Dept: CARDIOLOGY | Facility: CLINIC | Age: 68
End: 2023-05-04

## 2023-05-04 NOTE — TELEPHONE ENCOUNTER
Caller: Barbara Tran    Relationship: Self    Best call back number: [phone]    What is the best time to reach you: ANY    Who are you requesting to speak with (clinical staff, provider,  specific staff member): CLINICAL    Do you know the name of the person who called: PATIENT    What was the call regarding: PATIENT IS NEEDING TO  SAMPLES FOR:  JARDIANCE 10MG  ENTRESTO 24-26    Do you require a callback: YES, PLEASE CALL THE PATIENT WHEN HER SAMPLES ARE READY FOR .

## 2023-05-04 NOTE — TELEPHONE ENCOUNTER
I spoke to the pt. We do have samples at the [facility] for the pt to  at the [facility] location.

## 2023-05-18 ENCOUNTER — TRANSCRIBE ORDERS (OUTPATIENT)
Dept: ADMINISTRATIVE | Facility: HOSPITAL | Age: 68
End: 2023-05-18
Payer: MEDICARE

## 2023-05-18 DIAGNOSIS — F51.01 PRIMARY INSOMNIA: ICD-10-CM

## 2023-05-18 DIAGNOSIS — Z13.6 ENCOUNTER FOR SCREENING FOR VASCULAR DISEASE: ICD-10-CM

## 2023-05-18 NOTE — TELEPHONE ENCOUNTER
Last OV: 1/27/23    Last Refill: 4/18/23   Rn did call the pt again.  Rn noted pt has upcoming appt on 4-15-19.  If pt wants oxygen d/cd for nocturnal use, Rn suggested an onpo to be done on R/a.  This can be arranged prior to the appt or during the appt.  Pt to call back if she wants to pursue this now.

## 2023-05-19 RX ORDER — ZOLPIDEM TARTRATE 5 MG/1
5 TABLET ORAL NIGHTLY PRN
Qty: 30 TABLET | Refills: 0 | Status: SHIPPED | OUTPATIENT
Start: 2023-05-19

## 2023-05-25 DIAGNOSIS — M25.551 RIGHT HIP PAIN: ICD-10-CM

## 2023-05-26 ENCOUNTER — HOSPITAL ENCOUNTER (OUTPATIENT)
Dept: PHYSICAL THERAPY | Facility: HOSPITAL | Age: 68
Setting detail: THERAPIES SERIES
Discharge: HOME | End: 2023-05-26
Payer: MEDICARE

## 2023-05-26 DIAGNOSIS — M79.89 LEG SWELLING: ICD-10-CM

## 2023-05-26 DIAGNOSIS — I50.32 CHRONIC DIASTOLIC HEART FAILURE: ICD-10-CM

## 2023-05-26 DIAGNOSIS — I89.0 LYMPHEDEMA: Primary | ICD-10-CM

## 2023-05-26 PROCEDURE — 97162 PT EVAL MOD COMPLEX 30 MIN: CPT

## 2023-05-26 RX ORDER — TRAMADOL HYDROCHLORIDE 50 MG/1
50 TABLET ORAL EVERY 6 HOURS PRN
Qty: 60 TABLET | Refills: 0 | Status: SHIPPED | OUTPATIENT
Start: 2023-05-26

## 2023-05-26 NOTE — THERAPY EVALUATION
Physical Therapy Lymphedema Initial Evaluation  [facility]     Patient Name: Barbara Tran  : 1955  MRN: [id number]  Today's Date: 2023      Visit Date: 2023    Visit Dx:    ICD-10-CM ICD-9-CM   1. Lymphedema  I89.0 457.1   2. Chronic diastolic heart failure  I50.32 428.32   3. Leg swelling  M79.89 729.81       Patient Active Problem List   Diagnosis   • Benign essential HTN   • Cervical pain   • Dysthymia   • Pedal edema   • HLD (hyperlipidemia)   • Goiter, non-toxic   • Chronic pain of left knee   • Chronic coronary artery disease   • Hypothyroidism   • History of sepsis   • Primary insomnia   • Chronic diastolic congestive heart failure (HCC)   • S/P drug eluting coronary stent placement   • Pulmonic valve regurgitation   • Tricuspid valve regurgitation   • Osteoporosis   • Hyperglycemia   • Osteopenia   • History of pulmonary embolism   • Balance problem   • Acute right hip pain        Past Medical History:   Diagnosis Date   • Allergic 2015    codeine, morphine, levaquin   • Arthritis l5 years or so ago   • Cataract 6-9 months ago    Need surgery   • CHF (congestive heart failure)    • Coronary artery disease 2005 stent   • COVID-19 virus detected 03/10/2021   • Deep vein thrombosis 2021    Pulmonary embolism   • Depression    • Disease of thyroid gland    • Headache Covid 3    Has continued   • History of pulmonary embolism 2021   • Hyperlipidemia    • Hypertension since    • Hypothyroidism since    • Obesity    • Osteopenia last few years   • Pulmonary embolism 2021    From Covid        Past Surgical History:   Procedure Laterality Date   • CARDIAC CATHETERIZATION  ,    • CHOLECYSTECTOMY     • COLONOSCOPY N/A 2016    Procedure: COLONOSCOPY WITH COLD BIOPSIES;  Surgeon: Medina Guillen MD;  Location: [facility] ENDOSCOPY;  Service:    • CORONARY STENT PLACEMENT      LAD 80% blockage   • EYE SURGERY  2022    Cataract/ left eye   •  SUBTOTAL HYSTERECTOMY  2009   • TONSILLECTOMY  1965   • TUBAL ABDOMINAL LIGATION  1985       Visit Dx:    ICD-10-CM ICD-9-CM   1. Lymphedema  I89.0 457.1   2. Chronic diastolic heart failure  I50.32 428.32   3. Leg swelling  M79.89 729.81        Patient History     Row Name 05/26/23 1500 05/25/23 1427          History    Chief Complaint -- Balance Problems;Difficulty Walking;Falls/history of falls;Fatigue/poor endurance;Joint stiffness;Joint swelling;Muscle tenderness;Muscle weakness;Swelling (P)    -patient     Type of Pain -- Foot pain;Hip pain;Knee pain;Lower Extremity / Leg (P)    -patient     Date Current Problem(s) Began -- 05/01/19 (P)    -patient     Brief Description of Current Complaint Pt reports swelling began in 9278-0341, noticed that legs began retaining fluid.  At first, swelling would decrease at night, but swelling has progressed over time to the point that there is minimal change even with elevation.  Swelling is present from foot to knee bilaterally, worst from mid calf down.  She denies infection, wounds, and weeping.  Has tried compression stockings, but cut in at ankles and slid down, has not been able to wear.  She did have PE with hospitalization in 6/3/21 which MD thinks was related to COVID-19 infection previously that year.  Referred to our clinic for treatment.  -KA Swelling that never completely goes away.  Pain and tingling in my left foot (P)    -patient     Patient/Caregiver Goals -- Relieve pain;Return to prior level of function;Improve mobility;Improve strength;Know what to do to help the symptoms;Decrease swelling (P)    -patient     Hand Dominance -- right-handed (P)    -patient     Occupation/sports/leisure activities -- Interior Design  Enjoy sewing, reading & knitting (P)    -patient     Patient seeing anyone else for problem(s)? -- No (P)    -patient     Are you or can you be pregnant -- No (P)    -patient        Pain     Pain Location Leg;Foot  -KA --     Pain at Present 6   -KA --     Pain at Best 3  -KA --     Pain at Worst 9  -KA --     Pain Frequency Constant/continuous  -KA --     Pain Description Tightness;Other (Comment)  Fullness  -KA --     What Performance Factors Make the Current Problem(s) WORSE? Sitting in a car  -KA --     What Performance Factors Make the Current Problem(s) BETTER? elevating legs  -KA --     Is your sleep disturbed? --  Not due to edema  -KA --     Difficulties at work? Difficulty sitting and standing prolonged, walking prolonged  -KA --     Difficulties with ADL's? Difficulty sitting and standing prolonged, walking prolonged, driving prolonged in that it increased swelling, difficulty navigating stairs  -KA --        Fall Risk Assessment    Any falls in the past year: Yes  -KA Yes (P)    -patient     Number of falls reported in the last 12 months 1  -KA --     Factors that contributed to the fall: Slippery surface  -KA Slippery surface (P)    -patient        Services    Prior Rehab/Home Health Experiences Yes  -KA Yes (P)    -patient     Are you currently receiving Home Health services No  -KA No (P)    -patient     Do you plan to receive Home Health services in the near future No  -KA No (P)    -patient        Daily Activities    Primary Language English  -KA English (P)    -patient     Are you able to read Yes  -KA Yes (P)    -patient     Are you able to write Yes  -KA Yes (P)    -patient     How does patient learn best? Listening;Reading;Demonstration  -KA Listening;Reading;Demonstration (P)    -patient     Teaching needs identified Management of Condition  -KA --     Patient is concerned about/has problems with Climbing Stairs;Performing home management (household chores, shopping, care of dependents);Performing job responsibilities/community activities (work, school,;Sitting;Standing;Walking  -KA --     Does patient have problems with the following? None  -KA --     Pt Participated in POC and Goals Yes  -KA --        Safety    Are you being hurt,  "hit, or frightened by anyone at home or in your life? No  -KA No (P)    -patient     Are you being neglected by a caregiver No  -KA No (P)    -patient     Have you had any of the following issues with N/A  -KA N/A (P)    -patient           User Key  (r) = Recorded By, (t) = Taken By, (c) = Cosigned By    Initials Name Provider Type    Karen Moser, PT Physical Therapist    patient Barbara Tran --                 Lymphedema     Row Name 05/26/23 1600             Subjective Pain    Able to rate subjective pain? yes  -KA      Pre-Treatment Pain Level 6  -KA         Subjective Comments    Subjective Comments Swelling in legs, has progressively worsened since 2821-2550  -KA         Lymphedema Assessment    Lymphedema Classification RLE:;LLE:;secondary;stage 2 (Spontaneously Irreversible)  -KA      Infections or Cellulitis? no  -KA      Lymphedema Precautions CHF  -KA      Lymphedema Assessment Comments Mild-mod edema present in BLE below knees, worse from mid calf down  -KA         LLIS - Physical Concerns    The amount of pain associated with my lymphedema is: 2  -KA      The amount of limb heaviness associated with my lymphedema is: 3  -KA      The amount of skin tightness associated with my lymphedema is: 3  -KA      The size of my swollen limb(s) seems: 3  -KA      Lymphedema affects the movement of my swollen limb(s): 3  -KA      The strength in my swollen limb(s) is: 1  -KA         LLIS - Psychosocial Concerns    Lymphedema affects my body image (i.e., \"how I think I look\"). 4  -KA      Lymphedema affects my socializing with others. 3  -KA      Lymphedema affects my intimate relations with spouse or partner (rate 0 if not applicable 1  -KA      Lymphedema \"gets me down\" (i.e., depression, frustration, or anger) 2  -KA      I must rely on others for help due to my lymphedema. 2  -KA      I know what to do to manage my lymphedema 3  -KA         LLIS - Functional Concerns    Lymphedema affects my ability to " perform self-care activities (i.e. eating, dressing, hygiene) 2  -KA      Lymphedema affects my ability to perform routine home or work-related activities. 3  -KA      Lymphedema affects my performance of preferred leisure activities. 3  -KA      Lymphedema affects proper fit of clothing/shoes 3  -KA      Lymphedema affects my sleep 3  -KA         Posture/Observations    Posture/Observations Comments Forward head, rounded shoulders, ambulates with RW and decreased speed, decreased heel-toe gait mechanics bilaterally  -KA         General ROM    GENERAL ROM COMMENTS Pt is able to reach her feet in sitting, BLE ROM grossly WFLs  -KA         MMT (Manual Muscle Testing)    General MMT Comments BLE strength 4/5 except L hip flexion 4-/5 and R hip flexion 3/5  -KA         Lymphedema Edema Assessment    Ptting Edema Category By grade out of 4  -KA      Pitting Edema + 3/4  -KA      Stemmer Sign bilateral:;positive  -KA      [place] Hump bilateral:;positive  -KA      Edema Assessment Comment Min-mod 3+ pitting edema affecting BLE from knee down, worst from mid calf down  -KA         Skin Changes/Observations    Location/Assessment Lower Extremity  -KA      Lower Extremity Conditions bilateral:;intact;clean  -KA      Lower Extremity Color/Pigment bilateral:;normal  -KA         Lymphedema Sensation    Lymphedema Sensation Reports RLE:;LLE:;normal  -KA      Lymphedema Sensation Tests light touch  -KA      Lymphedema Light Touch RLE:;LLE:;WNL  -KA         Lymphedema Measurements    Measurement Type(s) Circumferential  -KA      Circumferential Areas Lower extremities  -KA         BLE Circumferential (cm)    Measurement Location 1 Knee  -KA      Left 1 41.3 cm  -KA      Right 1 40.8 cm  -KA      Measurement Location 2 10 cm below knee  -KA      Left 2 39.9 cm  -KA      Right 2 41.7 cm  -KA      Measurement Location 3 20 cm below knee  -KA      Left 3 30.9 cm  -KA      Right 3 32.6 cm  -KA      Measurement Location 4 30 cm below  knee  -KA      Left 4 26.8 cm  -KA      Right 4 27.1 cm  -KA      Measurement Location 5 Ankle  -KA      Left 5 26.7 cm  -KA      Right 5 26.5 cm  -KA      Measurement Location 6 Midfoot  -KA      Left 6 24.6 cm  -KA      Right 6 24 cm  -KA      LLE Circumferential Total 190.2 cm  -KA      RLE Circumferential Total 192.7 cm  -KA         Lymphedema Life Impact Scale Totals    A.  Total Q1 - Q17 (Do not include Q18) 44  -KA      B.  Total number of questions answered (Q1-Q17) 17  -KA      C. Divide A by B 2.59  -KA      D. Multiple C by 25 64.75  -KA            User Key  (r) = Recorded By, (t) = Taken By, (c) = Cosigned By    Initials Name Provider Type    Karen Moser, STUART Physical Therapist                                Therapy Education  Education Details: Pt was educated regarding components of complete decongestive therapy including skin care, manual lymph drainage, compression bandaging, and decongestive exercise.  Expectations were discussed regarding patient responsibilities during Phase I (intensive skilled therapy) and Phase 2 (self management Phase) of CDT.  Pt was educated regarding skin care including use of low pH lotion as well as performing ankle pumps to improve circulation.  Given: Symptoms/condition management  Program: New  How Provided: Verbal  Provided to: Patient  Level of Understanding: Verbalized       OP Exercises     Row Name 05/26/23 1600             Subjective Comments    Subjective Comments Swelling in legs, has progressively worsened since 2228-5162  -KA         Subjective Pain    Able to rate subjective pain? yes  -KA      Pre-Treatment Pain Level 6  -KA            User Key  (r) = Recorded By, (t) = Taken By, (c) = Cosigned By    Initials Name Provider Type    Karen Moser, PT Physical Therapist                             PT OP Goals     Row Name 05/26/23 1600          PT Short Term Goals    STG Date to Achieve 06/23/23  -KA     STG 1 Patient to demonstrate awareness of  condition and precautions for improved prevention, management, care of symptoms, and ease of transition to self care of condition.  -     STG 1 Progress New  -     STG 2 Patient/family independent with self-wrapping techniques of compression bandages as indicated for improved self-management of condition.  -KA     STG 2 Progress New  -KA     STG 3 Patient demonstrates decreased net edema of Bilateral Lower Extremity>/= 5-10 cm for decreased edema symptoms, decreased risk of infection, and improved skin care.  -     STG 3 Progress New  -        Long Term Goals    LTG Date to Achieve 07/07/23  -     LTG 1 Patient/family independent with self-care techniques for self-management of condition.  -KA     LTG 1 Progress New  -     LTG 2 Patient/family independent with compression garments as indicated for self-management of condition.  -     LTG 2 Progress New  -        Time Calculation    PT Goal Re-Cert Due Date 08/24/23  -           User Key  (r) = Recorded By, (t) = Taken By, (c) = Cosigned By    Initials Name Provider Type    Karen Moser, PT Physical Therapist                 PT Assessment/Plan     Row Name 05/26/23 1635          PT Assessment    Functional Limitations Impaired gait;Limitations in functional capacity and performance;Performance in leisure activities;Performance in work activities  -     Impairments Balance;Edema;Endurance;Gait;Impaired lymphatic circulation;Pain;Muscle strength;Posture  -     Assessment Comments 68 y.o. female who presents with bilateral Lower Extremity lymphedema.  Presentation is consistent with secondary Stage 2. Lymphedema is present from feet to knees, worst below mid calf and is characterized by +3 pitting edema, +Stemmer sign B, +[place] hump B.  Impairments include gait deviations, edema, LE weakness.  Pt is limited with ability to walk, stand, sit, and drive for prolonged periods due to lymphedema.  Score on Lymphedema Life Impact Scale is 64.75.   She has tried compression garments unsuccessfully in past due to poor fit at ankle and elevates legs at night.  Patient is a good candidate for complete decongestive therapy to reduce infection risk, improve skin condition, and promote self-management of condition.  Following education, patient agrees to participate in Phase I (skilled care) and Phase II (self-management) of CDT.  -     Rehab Potential Good  -     Patient/caregiver participated in establishment of treatment plan and goals Yes  -KA     Patient would benefit from skilled therapy intervention Yes  -KA        PT Plan    PT Frequency 2x/week;3x/week  -KA     Predicted Duration of Therapy Intervention (PT) 4 weeks; Estimated 12-13 total visits  -KA     Planned CPT's? PT EVAL MOD COMPLELITY: 37588;PT RE-EVAL: 56779;PT THER PROC EA 15 MIN: 58636;PT THER ACT EA 15 MIN: 13534;PT MANUAL THERAPY EA 15 MIN: 51897;PT NEUROMUSC RE-EDUCATION EA 15 MIN: 34600;PT GAIT TRAINING EA 15 MIN: 13037;PT SELF CARE/HOME MGMT/TRAIN EA 15: 21467  -     Physical Therapy Interventions (Optional Details) bandaging;home exercise program;manual lymphatic drainage;manual therapy techniques;patient/family education  -     PT Plan Comments Proceed with CDT, light bandaging.  -           User Key  (r) = Recorded By, (t) = Taken By, (c) = Cosigned By    Initials Name Provider Type    Karen Moser, PT Physical Therapist                             Time Calculation:   Start Time: 1525  Stop Time: 1620  Time Calculation (min): 55 min  Untimed Charges  PT Eval/Re-eval Minutes: 55  Total Minutes  Untimed Charges Total Minutes: 55   Total Minutes: 55   Therapy Charges for Today     Code Description Service Date Service Provider Modifiers Qty    [id number]  PT EVAL MOD COMPLEXITY 4 5/26/2023 Karen Cho, PT GP 1                    Karen Cho PT  5/26/2023

## 2023-06-14 DIAGNOSIS — F51.01 PRIMARY INSOMNIA: ICD-10-CM

## 2023-06-16 RX ORDER — ZOLPIDEM TARTRATE 10 MG/1
5 TABLET ORAL NIGHTLY PRN
Qty: 15 TABLET | Refills: 0 | Status: SHIPPED | OUTPATIENT
Start: 2023-06-16 | End: 2023-07-16

## 2023-07-17 ENCOUNTER — TELEPHONE (OUTPATIENT)
Dept: CARDIOLOGY | Facility: CLINIC | Age: 68
End: 2023-07-17

## 2023-07-17 NOTE — TELEPHONE ENCOUNTER
Caller: Barbara Tran    Relationship: Self    Best call back number: [phone]    What is the best time to reach you: ANY    Who are you requesting to speak with (clinical staff, provider,  specific staff member): CLINICAL     Do you know the name of the person who called: LUCI VIVAR    What was the call regarding: VASCULAR TESTS    Is it okay if the provider responds through MyChart:

## 2023-08-10 ENCOUNTER — TELEPHONE (OUTPATIENT)
Dept: CARDIOLOGY | Facility: CLINIC | Age: 68
End: 2023-08-10
Payer: MEDICARE

## 2023-08-10 NOTE — TELEPHONE ENCOUNTER
Caller: OLIVE    Relationship: SELF    Best call back number: [phone]    What is the best time to reach you: ANY        What was the call regarding: PT NEEDS SAMPLES OF ENTRESTO 24-26 MG TABLETS AND JARDIANCE 10MG--- AT [facility].  SHE ONLY HAS TWO DAYS LEFT OF MEDICATION.

## 2023-08-15 DIAGNOSIS — F51.01 PRIMARY INSOMNIA: ICD-10-CM

## 2023-08-15 RX ORDER — ZOLPIDEM TARTRATE 5 MG/1
TABLET ORAL
Qty: 30 TABLET | Refills: 0 | Status: SHIPPED | OUTPATIENT
Start: 2023-08-15

## 2023-09-05 DIAGNOSIS — M25.551 RIGHT HIP PAIN: ICD-10-CM

## 2023-09-05 RX ORDER — TRAMADOL HYDROCHLORIDE 50 MG/1
50 TABLET ORAL EVERY 6 HOURS PRN
Qty: 60 TABLET | Refills: 0 | Status: SHIPPED | OUTPATIENT
Start: 2023-09-05

## 2023-09-06 ENCOUNTER — TELEPHONE (OUTPATIENT)
Dept: CARDIOLOGY | Facility: CLINIC | Age: 68
End: 2023-09-06
Payer: MEDICARE

## 2023-09-06 NOTE — TELEPHONE ENCOUNTER
Caller: OLIVE MYERS    Relationship:SELF    Callback number: [phone]   Is it ok to leave a message: [x] Yes [] No    Requested medication for samples: ENTRESTO 24-26 MG    How much medication does the patient currently have left: 3 DAYS    Who will be picking up the samples: OLIVE MYERS    Do you need information about patient financial assistance for this medication: [] Yes [x] No    Additional details provided: PATIENT STATES THAT SHE PICKS THE ENTRESTO SAMPLES UP AT [facility]. PLEASE CONTACT PATIENT TO ADVISE WHEN SHE CAN PICK THEM UP.

## 2023-09-19 ENCOUNTER — OFFICE VISIT (OUTPATIENT)
Dept: CARDIOLOGY | Facility: CLINIC | Age: 68
End: 2023-09-19
Payer: MEDICARE

## 2023-09-19 VITALS
SYSTOLIC BLOOD PRESSURE: 108 MMHG | DIASTOLIC BLOOD PRESSURE: 76 MMHG | HEIGHT: 66 IN | HEART RATE: 85 BPM | BODY MASS INDEX: 33.8 KG/M2 | WEIGHT: 210.3 LBS | OXYGEN SATURATION: 98 %

## 2023-09-19 DIAGNOSIS — E78.2 MIXED HYPERLIPIDEMIA: ICD-10-CM

## 2023-09-19 DIAGNOSIS — I37.1 NONRHEUMATIC PULMONARY VALVE INSUFFICIENCY: ICD-10-CM

## 2023-09-19 DIAGNOSIS — I65.23 BILATERAL CAROTID ARTERY STENOSIS: ICD-10-CM

## 2023-09-19 DIAGNOSIS — I25.10 CHRONIC CORONARY ARTERY DISEASE: Primary | ICD-10-CM

## 2023-09-19 DIAGNOSIS — I50.32 CHRONIC DIASTOLIC CONGESTIVE HEART FAILURE: ICD-10-CM

## 2023-09-19 DIAGNOSIS — I10 BENIGN ESSENTIAL HTN: ICD-10-CM

## 2023-09-19 DIAGNOSIS — I36.1 NONRHEUMATIC TRICUSPID VALVE REGURGITATION: ICD-10-CM

## 2023-09-19 DIAGNOSIS — Z95.5 S/P DRUG ELUTING CORONARY STENT PLACEMENT: ICD-10-CM

## 2023-09-19 RX ORDER — TORSEMIDE 20 MG/1
20 TABLET ORAL DAILY
Qty: 30 TABLET | Refills: 1 | Status: SHIPPED | OUTPATIENT
Start: 2023-09-19

## 2023-09-19 RX ORDER — ROSUVASTATIN CALCIUM 40 MG/1
40 TABLET, COATED ORAL NIGHTLY
Qty: 90 TABLET | Refills: 3 | Status: SHIPPED
Start: 2023-09-19

## 2023-09-19 NOTE — PROGRESS NOTES
Date of Office Visit: 2023  Encounter Provider: Lashay Leon MD  Place of Service: [facility] CARDIOLOGY  Patient Name: Barbara Tran  :1955      Patient ID:  Barbara Tran is a 68 y.o. female is here for  followup for CAD.         History of Present Illness    She has a history of HFpEF, hyperlipidemia, hypertension, DVT with pulmonary embolism in 2021, CAD with LAD stent done 2015.     She received a proximal LAD stent in .  Last cardiac catheterization done in  showed proximal patent LAD stent patent, 80% septal  stenosis unchanged from prior cath, normal left circumflex and RCA, normal left ventricular systolic function.     She had COVID-19 pneumonia 3/8/2021.  She then presented 6/3/2021 with acute short windedness and was diagnosed with bilateral acute pulmonary embolism without acute cor pulmonale.  She was started on Eliquis.  Echo done 9/15/2021 showed ejection fraction of 46-50% with moderate to severe septal hypokinesis, grade 1 diastolic dysfunction, normal right ventricular size and function with normal saline contrast today, no pericardial effusion and mild left atrial enlargement.  She has followed with Dr. Rodriguez for pulmonary nodules.     Her mom, dad, sister and brothers x2 of all had heart disease.  Her sister also has diabetes.  She is single, has 3 children, works as an , has never smoked, has tea and coffee, occasional alcohol and no drugs.     Her spironolactone was decreased due to hyperkalemia.  She follows with Dr. Vargas for endocrinology.     Echo done 2022 shows ejection fraction 54% with normal RVSP, grade 1 diastolic dysfunction, no significant valvular abnormalities.  She had nonischemic stress nuclear perfusion study done 2022.  Vascular screening done 2023 showed mild stenosis of bilateral internal carotid arteries with otherwise normal vascular screening.  CT chest without  contrast on 3/13/2023 showed stable micronodules.  I did review the CT chest images which shows scattered calcification in the aortic arch, calcification throughout the LAD-stent in the LAD, calcification in the RCA and circumflex.    Labs on 3/10/2023 show normal proBNP, normal BMP.  Labs on 4/28/2023 showed triglycerides 90, total cholesterol 175, HDL 61, LDL 96, VLDL 18, normal microalbumin, normal vitamin D, normal TSH, normal T3, normal free T4, normal hemoglobin A1c, normal CBC and normal CMP.    She went to the lymphedema clinic but never got any follow-up on that.  She has continued lower extremity edema but no orthopnea or PND.  She has no exertional chest tightness or pressure.  She does not feel her heart racing or skipping.  Her energy level is low.  Sometimes she has dizziness but she has had no syncope.  She is taking her medications as directed without difficulty.    Past Medical History:   Diagnosis Date    Allergic 2015    codeine, morphine, levaquin    Arthritis l5 years or so ago    Cataract 6-9 months ago    Need surgery    CHF (congestive heart failure)     Coronary artery disease 2005 stent    COVID-19 virus detected 03/10/2021    Deep vein thrombosis 06/03/2021    Pulmonary embolism    Depression     Disease of thyroid gland     Headache Covid 3-6-21    Has continued    History of pulmonary embolism 06/01/2021    Hyperlipidemia     Hypertension since 2005    Hypothyroidism since 2012    Obesity     Osteopenia last few years    Pulmonary embolism 06/03/2021    From Covid         Past Surgical History:   Procedure Laterality Date    CARDIAC CATHETERIZATION  2015, 2018    CHOLECYSTECTOMY  1995    COLONOSCOPY N/A 12/19/2016    Procedure: COLONOSCOPY WITH COLD BIOPSIES;  Surgeon: Medina Guillen MD;  Location: [facility] ENDOSCOPY;  Service:     CORONARY STENT PLACEMENT  2005    LAD 80% blockage    EYE SURGERY  April 2022    Cataract/ left eye    SUBTOTAL HYSTERECTOMY  2009    TONSILLECTOMY  1965     TUBAL ABDOMINAL LIGATION  1985       Current Outpatient Medications on File Prior to Visit   Medication Sig Dispense Refill    aspirin 81 MG tablet Take 1 tablet by mouth Daily.      buPROPion XL (WELLBUTRIN XL) 300 MG 24 hr tablet Take 1 tablet by mouth Daily. 90 tablet 3    calcium carbonate (OS-SHAHNAZ) 600 MG tablet Take 1 tablet by mouth 2 (Two) Times a Day With Meals.      empagliflozin (JARDIANCE) 10 MG tablet tablet Take 1 tablet by mouth Daily. 30 tablet 0    ibandronate (BONIVA) 150 MG tablet Take 1 tablet by mouth Every 30 (Thirty) Days. 3 tablet 3    magnesium oxide (MAG-OX) 400 MG tablet Take 2 tablets by mouth 2 (two) times a day.      sacubitril-valsartan (ENTRESTO) 24-26 MG tablet Take 1 tablet by mouth 2 (Two) Times a Day. 28 tablet 0    Synthroid 50 MCG tablet Take 1 tablet by mouth Daily. 90 tablet 3    traMADol (ULTRAM) 50 MG tablet Take 1 tablet by mouth Every 6 (Six) Hours As Needed for Moderate Pain. 60 tablet 0    zolpidem (AMBIEN) 5 MG tablet TAKE 1 TABLET BY MOUTH AT NIGHT AS NEEDED FOR SLEEP 30 tablet 0    [DISCONTINUED] bumetanide (BUMEX) 2 MG tablet Take 1 tablet by mouth 2 (Two) Times a Day. (Patient taking differently: Take 1 tablet by mouth Daily.) 180 tablet 3    [DISCONTINUED] rosuvastatin (CRESTOR) 40 MG tablet Take 0.5 tablets by mouth Daily. 90 tablet 2     No current facility-administered medications on file prior to visit.       Social History     Socioeconomic History    Marital status: Single   Tobacco Use    Smoking status: Never     Passive exposure: Never    Smokeless tobacco: Never    Tobacco comments:     N/A   Vaping Use    Vaping Use: Never used   Substance and Sexual Activity    Alcohol use: Yes     Alcohol/week: 2.0 standard drinks     Types: 1 Glasses of wine, 1 Cans of beer per week     Comment: socially    Drug use: Never    Sexual activity: Not Currently     Birth control/protection: None           ROS    Procedures    ECG 12 Lead    Date/Time: 9/19/2023 9:01  "AM  Performed by: Lashay Leon MD  Authorized by: Lashay Leon MD   Comparison: compared with previous ECG   Similar to previous ECG  Rhythm: sinus rhythm  T flattening: all    Clinical impression: non-specific ECG            Objective:      Vitals:    09/19/23 0850 09/19/23 0852   BP: 110/78 108/76   BP Location: Left arm Right arm   Patient Position: Sitting Sitting   Cuff Size: Large Adult Large Adult   Pulse: 85    SpO2: 98%    Weight: 95.4 kg (210 lb 4.8 oz)    Height: 167.6 cm (66\")      Body mass index is 33.94 kg/m².    Vitals reviewed.   Constitutional:       General: Not in acute distress.     Appearance: Well-developed. Not diaphoretic.   Eyes:      General: No scleral icterus.     Conjunctiva/sclera: Conjunctivae normal.   HENT:      Head: Normocephalic and atraumatic.   Neck:      Thyroid: No thyromegaly.      Vascular: No carotid bruit or JVD.      Lymphadenopathy: No cervical adenopathy.   Pulmonary:      Effort: Pulmonary effort is normal. No respiratory distress.      Breath sounds: Normal breath sounds. No wheezing. No rhonchi. No rales.   Chest:      Chest wall: Not tender to palpatation.   Cardiovascular:      Normal rate. Regular rhythm.      Murmurs: There is no murmur.      No gallop.  No rub.   Pulses:     Intact distal pulses.      Carotid: 2+ bilaterally.     Radial: 2+ bilaterally.     Dorsalis pedis: 2+ bilaterally.     Posterior tibial: 2+ bilaterally.  Edema:     Peripheral edema present.     Ankle: bilateral 2+ edema of the ankle.     Feet: bilateral 2+ edema of the feet.  Abdominal:      General: Bowel sounds are normal. There is no distension or abdominal bruit.      Palpations: Abdomen is soft. There is no abdominal mass.      Tenderness: There is no abdominal tenderness.   Musculoskeletal:         General: No deformity.      Extremities: No clubbing present.     Cervical back: Neck supple. Skin:     General: Skin is warm and dry. There is no cyanosis.      " Coloration: Skin is not pale.      Findings: No rash.   Neurological:      Mental Status: Alert and oriented to person, place, and time.      Cranial Nerves: No cranial nerve deficit.   Psychiatric:         Judgment: Judgment normal.       Lab Review:       Assessment:      Diagnosis Plan   1. Chronic coronary artery disease        2. S/P drug eluting coronary stent placement        3. Benign essential HTN        4. Chronic diastolic congestive heart failure        5. Mixed hyperlipidemia  rosuvastatin (CRESTOR) 40 MG tablet      6. Nonrheumatic pulmonary valve insufficiency        7. Nonrheumatic tricuspid valve regurgitation        8. Bilateral carotid artery stenosis  Duplex Carotid Ultrasound CAR        Chronic HFpEF, nonischemic stress nuclear perfusion study 5/2022 and echocardiogram showing normal ejection fraction 5/2022.  She is on Bumex  CAD, history of LAD stent, last work-up was in 2015, cardiac catheterization showing patent stent.  COVID-19 infection 3/2021.  History of DVT and pulmonary embolism June 2021, on Eliquis until the end of June 2022.   Hyperlipidemia, on rosuvastatin.   Hypertension, goal <120/80.   Bilateral carotid artery stenosis.  Foot and ankle edema, BRYAN, looks like lymphedema, will try to reach the lymphedema clinic as she really has not gotten any therapy for this from them.  She was seen by them May 2023.     Plan:       She feels no different on Bumex since she did on Lasix.  Try torsemide 20 mg daily.  Set up carotid duplex study to be done in 1 year and increase rosuvastatin to 40 mg nightly for CAD and carotid stenosis.  See Dinorah in 3 months.

## 2023-09-20 DIAGNOSIS — F51.01 PRIMARY INSOMNIA: ICD-10-CM

## 2023-09-21 RX ORDER — ZOLPIDEM TARTRATE 5 MG/1
5 TABLET ORAL NIGHTLY PRN
Qty: 30 TABLET | Refills: 0 | Status: SHIPPED | OUTPATIENT
Start: 2023-09-21

## 2023-09-25 ENCOUNTER — TELEPHONE (OUTPATIENT)
Dept: CARDIOLOGY | Facility: CLINIC | Age: 68
End: 2023-09-25

## 2023-09-25 ENCOUNTER — OFFICE VISIT (OUTPATIENT)
Dept: FAMILY MEDICINE CLINIC | Facility: CLINIC | Age: 68
End: 2023-09-25

## 2023-09-25 VITALS
SYSTOLIC BLOOD PRESSURE: 122 MMHG | HEIGHT: 66 IN | HEART RATE: 79 BPM | RESPIRATION RATE: 18 BRPM | BODY MASS INDEX: 33.43 KG/M2 | DIASTOLIC BLOOD PRESSURE: 72 MMHG | WEIGHT: 208 LBS | OXYGEN SATURATION: 97 %

## 2023-09-25 DIAGNOSIS — Z12.31 ENCOUNTER FOR SCREENING MAMMOGRAM FOR MALIGNANT NEOPLASM OF BREAST: ICD-10-CM

## 2023-09-25 DIAGNOSIS — Z78.0 POSTMENOPAUSAL: ICD-10-CM

## 2023-09-25 DIAGNOSIS — Z00.00 MEDICARE ANNUAL WELLNESS VISIT, SUBSEQUENT: Primary | ICD-10-CM

## 2023-09-25 DIAGNOSIS — R60.0 FLUID COLLECTION (EDEMA) IN THE ARMS, LEGS, HANDS AND FEET: ICD-10-CM

## 2023-09-25 NOTE — PROGRESS NOTES
"Subsequent Medicare Wellness Visit     Barbara Tran is a 68 y.o. female who presents for a Subsequent Medicare Wellness Visit.    Compared to one year ago, the patient feels her physical health is worse -- hurts more and still swells, had to have her medications increased recently and her mental health is worse.  Recent Hospitalizations:  This patient has had a [facility] admission record on file within the last 365 days.  Current Medical Providers:  Patient Care Team:  Millicent Ace MD as PCP - General (Family Medicine)  Kin Patel MD as Consulting Physician (Orthopedic Surgery)  Bere Beckford MD as Consulting Physician (Cardiology)  Patrizia Page MD as Consulting Physician (Ophthalmology)  Opioid medication/s are on active medication list.  and I have evaluated her active treatment plan and pain score trends (see table).  I have reviewed the chart for potential of high risk medication and harmful drug interactions in the elderly.  Aspirin is on active medication list. Aspirin use is indicated based on review of current medical condition/s. Pros and cons of this therapy have been discussed today. Benefits of this medication outweigh potential harm.  Patient has been encouraged to continue taking this medication.  .    Advance Care Planning   Advance Directive is not on file.  ACP discussion was held with the patient during this visit. Patient has an advance directive (not in EMR), copy requested.     Estimated body mass index is 33.57 kg/m² as calculated from the following:    Height as of this encounter: 167.6 cm (66\").    Weight as of this encounter: 94.3 kg (208 lb).     Does the patient have evidence of cognitive impairment? No  HEALTH RISK ASSESSMENT  Smoking Status:  Social History     Tobacco Use   Smoking Status Never    Passive exposure: Never   Smokeless Tobacco Never   Tobacco Comments    N/A     Alcohol Consumption:  Social History     Substance and Sexual Activity   Alcohol " Use Yes    Alcohol/week: 2.0 standard drinks    Types: 1 Glasses of wine, 1 Cans of beer per week    Comment: socially     Fall Risk Screen:  JAREN Fall Risk Assessment was completed, and patient is at LOW risk for falls.Assessment completed on:2023  Depression Screenin/25/2023    10:34 AM   PHQ-2/PHQ-9 Depression Screening   Little Interest or Pleasure in Doing Things 0-->not at all   Feeling Down, Depressed or Hopeless 0-->not at all   PHQ-9: Brief Depression Severity Measure Score 0     Health Habits and Functional and Cognitive Screenin/21/2023     4:24 PM   Functional & Cognitive Status   Do you have difficulty preparing food and eating? No   Do you have difficulty bathing yourself, getting dressed or grooming yourself? No   Do you have difficulty using the toilet? No   Do you have difficulty moving around from place to place? No   Do you have trouble with steps or getting out of a bed or a chair? YesIt's painful   Current Diet Well Balanced Diet   Dental Exam Up to date   Eye Exam Up to date   Exercise (times per week) 0 times per week   Current Exercises Include No Regular Exercise   Do you need help using the phone?  No   Are you deaf or do you have serious difficulty hearing?  No   Do you need help to go to places out of walking distance? No   Do you need help shopping? No   Do you need help preparing meals?  No   Do you need help with housework?  YesLoses energy & gets SOA   Do you need help with laundry? No   Do you need help taking your medications? No   Do you need help managing money? No   Do you ever drive or ride in a car without wearing a seat belt? No   Have you felt unusual stress, anger or loneliness in the last month? YesIs doing a lot of taking her granddaughter to events. Enjoys but very tiring.    Who do you live with? Other   If you need help, do you have trouble finding someone available to you? No   Have you been bothered in the last four weeks by sexual problems?  No   Do you have difficulty concentrating, remembering or making decisions? No       Health Habits  Current Diet: Well Balanced Diet  Dental Exam: Up to date  Eye Exam: Up to date  Exercise (times per week): 0 times per week  Current Exercises Include: No Regular Exercise  Age-appropriate Screening Schedule:  Refer to the list below for future screening recommendations based on patient's age, sex and/or medical conditions. Orders for these recommended tests are listed in the plan section. The patient has been provided with a written plan.  Health Maintenance   Topic Date Due    DXA SCAN  Never done    TDAP/TD VACCINES (1 - Tdap) Never done    ZOSTER VACCINE (1 of 2) Never done    DIABETIC FOOT EXAM  Never done    COVID-19 Vaccine (6 - Pfizer series) 05/27/2023    INFLUENZA VACCINE  10/01/2023    HEMOGLOBIN A1C  10/28/2023    BMI FOLLOWUP  01/27/2024    LIPID PANEL  04/28/2024    URINE MICROALBUMIN  04/28/2024    DIABETIC EYE EXAM  06/23/2024    MAMMOGRAM  09/14/2024    ANNUAL WELLNESS VISIT  09/25/2024    COLORECTAL CANCER SCREENING  12/19/2026    HEPATITIS C SCREENING  Completed    Pneumococcal Vaccine 65+  Completed        CMS Preventative Services Quick Reference  Risk Factors Identified During Encounter  Chronic Pain:  tramadol   Immunizations Discussed/Encouraged: Shingrix  The above risks/problems have been discussed with the patient.  Follow up actions/plans if indicated are seen below in the Assessment/Plan Section.  Pertinent information has been shared with the patient in the After Visit Summary.  Patient Instructions        Follow Up: Medicare visit in one year  An After Visit Summary and PPPS were given/sent to the patient.    Patient has multiple medical problems which are significant and separately identifiable that require additional work above and beyond the Medicare Wellness Visit. These are not trivial or insignificant and are billed with a 25-modifier  Problem Visit:  Barbara is a 68 y.o. also being  seen today for swelling in RLE.   Subjective   History of the Present Illness   Patient notes an area of her right lower leg near the ankle that is chronically swollen for the last several months.  She did injure the area when a floor sample fell on the anterior part of her leg but that was about a year and a half ago and it has not bothered her until more recently.  Social History  She  reports that she has never smoked. She has never been exposed to tobacco smoke. She has never used smokeless tobacco. She reports current alcohol use of about 2.0 standard drinks per week. She reports that she does not use drugs.  Objective   Vital Signs        BP Readings from Last 1 Encounters:   09/25/23 122/72     Wt Readings from Last 3 Encounters:   09/25/23 94.3 kg (208 lb)   09/19/23 95.4 kg (210 lb 4.8 oz)   03/14/23 97.6 kg (215 lb 1.6 oz)   Body mass index is 33.57 kg/m².     Physical Exam  Vitals reviewed.   Constitutional:       Appearance: Normal appearance. She is well-developed.   Cardiovascular:      Rate and Rhythm: Normal rate and regular rhythm.      Heart sounds: Murmur (2/6 LUSB) heard.   Musculoskeletal:        Legs:       Comments: Scared area on lower shin tilting toward ankle with a 7 x 4 cm area of fluctulence in the indicated blue area.    Neurological:      Mental Status: She is alert.   Psychiatric:         Behavior: Behavior normal.         Thought Content: Thought content normal.         Judgment: Judgment normal.         Assessment and Plan       Problem List Items Addressed This Visit    None  Visit Diagnoses       Postmenopausal    -  Primary    Relevant Orders    DEXA Bone Density Axial    Encounter for screening mammogram for malignant neoplasm of breast        Relevant Orders    Mammo Screening Bilateral With CAD    Fluid collection (edema) in the arms, legs, hands and feet        Relevant Orders    US Nonvascular Extremity Limited                 Patient was given instructions and counseling  regarding her condition or for health maintenance advice. Please see specific information pulled into the AVS if appropriate.      Patient has been erroneously marked as diabetic. Based on the available clinical information, she does not have diabetes and should therefore be excluded from diabetic health maintenance and quality measures for the remainder of the reporting period.    No

## 2023-10-06 ENCOUNTER — PATIENT ROUNDING (BHMG ONLY) (OUTPATIENT)
Dept: CARDIOLOGY | Facility: CLINIC | Age: 68
End: 2023-10-06
Payer: MEDICARE

## 2023-10-06 NOTE — PROGRESS NOTES
Dung Jimenez TUCKER Tran      My name is Erickson Rodriguez RN here at [facility].     I wanted to officially welcome you to our practice and ask about your recent visit.      Please tell me what things went well.      We strive to protect your personal information and privacy.  Do you feel like your privacy was protected?    We are always looking for ways to improve your wait times (from arrival to discharge), and the ease of moving through your visit. Do you feel like your visit was easily navigated and your wait times were appropriate?     We're always looking for ways to make our patients' experiences even better. Do you have any recommendations on ways we can improve?      Overall, were you satisfied with your first visit to our practice?     I appreciate you taking time to answer my questions today, and I hope you will take the time to fill out your survey when you receive it.  Please let me know if there is anything else I can do for you.    Thank you, and have a great day.    Erickson Rodriguez RN

## 2023-10-10 ENCOUNTER — HOSPITAL ENCOUNTER (OUTPATIENT)
Facility: HOSPITAL | Age: 68
Discharge: HOME | End: 2023-10-10
Admitting: FAMILY MEDICINE
Payer: MEDICARE

## 2023-10-10 DIAGNOSIS — R60.0 FLUID COLLECTION (EDEMA) IN THE ARMS, LEGS, HANDS AND FEET: ICD-10-CM

## 2023-10-10 PROCEDURE — 76882 US LMTD JT/FCL EVL NVASC XTR: CPT

## 2023-10-11 ENCOUNTER — TELEPHONE (OUTPATIENT)
Dept: FAMILY MEDICINE CLINIC | Facility: CLINIC | Age: 68
End: 2023-10-11
Payer: MEDICARE

## 2023-10-15 NOTE — TELEPHONE ENCOUNTER
Attempted to call patient, no answer.  Left a voicemail for patient to call back.  Will continue to try to reach patient.    Ángela Roy RN  [facility] Triage  01/18/23 08:23 EST     Breast Surgery  [facility]  Department of Surgery      REFERRING PROVIDER: No referring provider defined for this encounter.    Chief Complaint: Follow-up (6 month follow up AL .)      Subjective:     Patient ID: Sarah Alejandro is a 76 y.o. female who presented originally with calcifications of the upper outer quadrant of right breast seen on mammography (22).      Breast biopsy (22) showed atypical lobular hyperplasia negative for carcinoma.     Patient does routinely do self breast exams.  Patient has not noted a change on breast exam.  Patient denies nipple discharge. Patient has to previous breast biopsy. Patient denies a personal history of breast cancer.     Findings at that time were the following:   Calcification size: 4.7 x 4.4 cm   Tumor grade: NA     Patient elected to proceed with watchful waiting.     Interval History 10/10/23:  Patient presents for  6 month follow up imaging. Screening mammogram today read as BIRADS 0. Patient denies new complaints such as pain, palpable masses, or skin changes. States overall she is doing well.       GYN History:  Age of menarche was 10. Age of menopause was 52. Patient takes hormonal therapy. Patient is . Age of first live birth was 20. Patient did not breast feed.        Past Medical History:   Diagnosis Date    Abnormal Pap smear of cervix     Asthma     Encounter for blood transfusion     Graves disease     ESSENTIAL TREMORS    History of back surgery     Hormone replacement therapy (HRT)     Hyperlipidemia     Hypertension     Infection of spine     Menopause     Osteomyelitis     Tremor due to disorder of central nervous system     Graves disease     Past Surgical History:   Procedure Laterality Date    AUGMENTATION OF BREAST Bilateral     BACK SURGERY      BALLOON SINUPLASTY OF PARANASAL SINUS Bilateral 2022    Procedure: SINUPLASTY, USING BALLOON  frontal ans sphenoid;  Surgeon: Samantha Ridley MD;  Location:  [facility] OR;  Service: ENT;  Laterality: Bilateral;    BREAST BIOPSY Right 08/29/2022    atypical lobular hyperplasia    CARDIAC VALVE REPLACEMENT  05/2016    - aortic valve stenosis / COW  VALVE    FUNCTIONAL ENDOSCOPIC SINUS SURGERY (FESS) USING COMPUTER-ASSISTED NAVIGATION N/A 08/23/2022    Procedure: FESS, USING COMPUTER-ASSISTED NAVIGATION - MAXILLARY, ETHMOIDS, FRONTAL;  Surgeon: Samantha Ridley MD;  Location: [facility] OR;  Service: ENT;  Laterality: N/A;    HIP REPLACEMENT ARTHROPLASTY Left 2016    INSERTION OF MIDURETHRAL SLING N/A 11/01/2021    Procedure: SLING, MIDURETHRAL;  Surgeon: Hodan Goldberg MD;  Location: [facility] OR;  Service: OB/GYN;  Laterality: N/A;    JOINT REPLACEMENT      LUMBAR FUSION      NASAL ENDOSCOPY N/A 08/23/2022    Procedure: ENDOSCOPY, NOSE - SPHENOIDOTOMY;  Surgeon: Samantha Ridley MD;  Location: [facility] OR;  Service: ENT;  Laterality: N/A;     Current Outpatient Medications on File Prior to Visit   Medication Sig Dispense Refill    ALPRAZolam (XANAX) 0.5 MG tablet Take 1 tablet (0.5 mg total) by mouth daily as needed (severe anxiety). 30 tablet 0    ASCORBATE CALCIUM, VITAMIN C, ORAL Take 500 mg by mouth.      aspirin (ECOTRIN) 81 MG EC tablet Take 81 mg by mouth once daily.      azithromycin (Z-BEATRIZ) 250 MG tablet TAKE 2 TABLETS BY MOUTH TODAY, THEN TAKE 1 TABLET DAILY FOR 4 DAYS      azithromycin (ZITHROMAX) 500 MG tablet Take 500 mg by mouth once. for one dose      cetirizine-pseudoephedrine 5-120 mg Tb12 cetirizine 5 mg-pseudoephedrine  mg tablet,extended release,12hr   TK 1 T PO BID      cholecalciferol, vitamin D3, (VITAMIN D3) 50 mcg (2,000 unit) Tab Take 2,000 Units by mouth.      ciclopirox 0.77 % Gel Apply topically once daily. To thickened discolored toenails. (Patient taking differently: Apply topically daily as needed. To thickened discolored toenails.) 30 g 1    clotrimazole (LOTRIMIN) 1 % cream Apply topically 2 (two) times daily. To affected toenails. 45 g 1     CMPD testosterone proprionate 2% in vanicream Apply topically. [facility]      COMPOUND HORMONE REPLACEMENT Take by mouth once daily. ESTROGEN CREAM -- [facility]      conjugated estrogens (PREMARIN) vaginal cream Place 0.5 g vaginally once daily. 1 applicator 5    EPINEPHrine (EPIPEN) 0.3 mg/0.3 mL AtIn epinephrine 0.3 mg/0.3 mL injection, auto-injector      estradioL (ESTRACE) 1 MG tablet Take 1 tablet (1 mg total) by mouth every morning. 90 tablet 3    MAGNESIUM CARBONATE ORAL Take 1 tablet by mouth once daily. 200MG      methIMAzole (TAPAZOLE) 5 MG Tab TAKE 1/2 TABLET BY MOUTH DAILY. Needs appointment for future refills. 90 tablet 0    omalizumab (XOLAIR) 75 mg/0.5 mL injection       PENNSAID 20 mg/gram /actuation(2 %) sopm APPLY 1 APPLICATION TOPICALLY 2 (TWO) TIMES DAILY AS NEEDED.::NOT COVERED:: 112 g 10    predniSONE (DELTASONE) 10 MG tablet Take by mouth.      progesterone (PROMETRIUM) 200 MG capsule Take 1 capsule by mouth 30-60 minutes before bed every night 90 capsule 3    RENOVA 0.02 % Crea RENOVA 0.02 % TOPICAL CREAM APPLY TO AFFECTED AREA EVERY DAY AT NIGHT 40 g 0    rosuvastatin (CRESTOR) 40 MG Tab Take 1 tablet (40 mg total) by mouth once daily. 90 tablet 3    sars-cov-2, covid-19 omicron, (MODERNA COVID BIVAL,18Y UP,-PF) 50 mcg/0.5 mL injection Inject into the muscle. 0.5 mL 0    sulfamethoxazole-trimethoprim 800-160mg (BACTRIM DS) 800-160 mg Tab Take 1 tablet by mouth once daily.      urea 20 % Crea Apply 1 application topically once daily. To dry skin on the feet. 75 g 10    valACYclovir (VALTREX) 1000 MG tablet valacyclovir 1 gram tablet      XIIDRA 5 % Dpet       zolpidem (AMBIEN CR) 6.25 MG CR tablet Take 1 tablet (6.25 mg total) by mouth nightly as needed for Insomnia. 30 tablet 2    propranoloL (INDERAL LA) 120 MG 24 hr capsule Take 1 capsule (120 mg total) by mouth once daily. for 10 days 10 capsule 0    topiramate (TOPAMAX) 25 MG tablet Take 1 tablet (25 mg total) by  "mouth once daily for 7 days, THEN 2 tablets (50 mg total) once daily. 67 tablet 3     No current facility-administered medications on file prior to visit.     Social History     Socioeconomic History    Marital status:    Tobacco Use    Smoking status: Former     Current packs/day: 0.00     Types: Cigarettes     Quit date:      Years since quittin.8    Smokeless tobacco: Never   Substance and Sexual Activity    Alcohol use: Not Currently     Alcohol/week: 2.0 standard drinks of alcohol     Types: 1 Glasses of wine, 1 Shots of liquor per week     Comment: weekends     Drug use: Never    Sexual activity: Yes     Partners: Male     Birth control/protection: Post-menopausal     Comment:       Family History   Problem Relation Age of Onset    Colon cancer Father     Leukemia Mother     Hypothyroidism Sister     Breast cancer Neg Hx     Ovarian cancer Neg Hx     Stroke Neg Hx     Diabetes Neg Hx         Review of Systems   Constitutional:  Negative for chills and fever.   Respiratory:  Negative for cough and shortness of breath.    Cardiovascular:  Negative for chest pain and palpitations.   Gastrointestinal:  Negative for abdominal pain, nausea and vomiting.   Genitourinary:  Negative for dysuria.   Neurological:  Negative for dizziness and light-headedness.     Objective:   BP (!) 147/64   Pulse 60   Ht 5' 4" (1.626 m)   Wt 54 kg (119 lb)   LMP  (LMP Unknown) Comment:    1996  SpO2 100%   BMI 20.43 kg/m²     Physical Exam   Vitals reviewed.  Constitutional: She is oriented to person, place, and time.   HENT:   Head: Normocephalic.   Cardiovascular:  Normal rate, regular rhythm and normal pulses.            Pulmonary/Chest: Effort normal. Right breast exhibits no inverted nipple, no mass, no nipple discharge, no skin change and no tenderness. Left breast exhibits no inverted nipple, no mass, no nipple discharge, no skin change and no tenderness.   Abdominal: Soft. Normal appearance. There " is no abdominal tenderness.   Neurological: She is alert and oriented to person, place, and time.   Skin: Skin is warm and dry.     Psychiatric: Her behavior is normal. Mood normal.       Radiology review: Images personally reviewed by me in the clinic.     10/10/23 Bilateral Screening Mammo:    Findings:  This procedure was performed using tomosynthesis. Computer-aided detection was utilized in the interpretation of this examination.  The breasts are heterogeneously dense, which may obscure small masses. Bilateral calcified saline implants are present.      Right  There are amorphous calcifications in a regional distribution seen in the upper region of the right breast.      Left  There is no evidence of suspicious masses, calcifications, or other abnormal findings in the left breast.     Impression:  Right  Calcifications: Right breast calcifications at the upper position.  These were previously biopsied with results of atypia.  As continued imaging surveillance is desired clinically, and the calcifications are not well-visualized without magnification views additional imaging is recommended.  Assessment: 0 - Incomplete. Special Views: Magnification View is recommended.      Left  There is no mammographic evidence of malignancy in the left breast.     BI-RADS Category:   Overall: 0 - Incomplete: Needs Additional Imaging Evaluation        Recommendation:  Diagnostic mammogram including magnification views is recommended.    10/10/23 Right Diagnostic Mammo:    Findings:  This procedure was performed using tomosynthesis. Computer-aided detection was utilized in the interpretation of this examination.  The right breast is heterogeneously dense, which may obscure small masses.     There are 44 cm amorphous calcifications in a regional distribution seen in the upper region of the right breast. Compared to the previous study, there are no significant changes.      There is no evidence of suspicious masses,  calcifications, or other abnormal findings in the right breast.     Impression:  No significant change in the appearance of right breast calcifications at site of biopsy-proven ADH.     BI-RADS Category:   Overall: 2 - Benign        Recommendation:  Routine screening mammogram in 1 year, or as clinically indicated.  However, if further surveillance of right breast ADH is clinically warranted, diagnostic mammogram should be performed to allow for magnification views of these calcifications.        Assessment:       1. Atypical lobular hyperplasia of right breast        Plan:     We discussed the options for management of atypia. We discussed with atypia, there is an increase in the risk of breast cancer overall.  We discussed that there a options for reducing that risk with chemoprevention using Tamoxifen.  She is not interested at this time.  We also discussed active surveillance vs excisional biopsy. We felt these were somewhat symmetric and many of the calcifications were there in priors.    The span is stable at 4.7cm which would be a significant cosmetic defect for her breast size if we were to perform an excisional biopsy for this low risk area.   We discussed chemoprevention and she is not interested.    We agreed that continued close surveillance would be acceptable to her with a low risk that this area could be harboring an early breast cancer, <15%.    Screening mammogram today read as BIRADS 0. Radiology recommended diagnostic with magnification views. Radiology were able to accommodate same day additional imaging which was BIRADS 2. Given two years of stability with work up, ok to resume annual screening mammogram. Next due in 1 year. Patient can follow up with the breast clinic if she prefers, but otherwise PRN.         Total time spent with the patient: 30 minutes. 20 minutes of face to face consultation and 10 minutes of chart review and coordination of care.

## 2023-10-18 DIAGNOSIS — F51.01 PRIMARY INSOMNIA: ICD-10-CM

## 2023-10-23 RX ORDER — ZOLPIDEM TARTRATE 5 MG/1
5 TABLET ORAL NIGHTLY PRN
Qty: 30 TABLET | Refills: 0 | Status: SHIPPED | OUTPATIENT
Start: 2023-10-23

## 2023-11-07 DIAGNOSIS — M25.551 RIGHT HIP PAIN: ICD-10-CM

## 2023-11-07 DIAGNOSIS — F34.1 DYSTHYMIA: ICD-10-CM

## 2023-11-07 DIAGNOSIS — E78.2 MIXED HYPERLIPIDEMIA: ICD-10-CM

## 2023-11-07 RX ORDER — TORSEMIDE 20 MG/1
20 TABLET ORAL DAILY
Qty: 30 TABLET | Refills: 11 | Status: SHIPPED | OUTPATIENT
Start: 2023-11-07

## 2023-11-07 RX ORDER — ROSUVASTATIN CALCIUM 40 MG/1
40 TABLET, COATED ORAL NIGHTLY
Qty: 90 TABLET | Refills: 3
Start: 2023-11-07 | End: 2023-11-12 | Stop reason: SDUPTHER

## 2023-11-07 RX ORDER — TRAMADOL HYDROCHLORIDE 50 MG/1
50 TABLET ORAL EVERY 6 HOURS PRN
Qty: 60 TABLET | Refills: 0 | Status: SHIPPED | OUTPATIENT
Start: 2023-11-07

## 2023-11-07 RX ORDER — BUPROPION HYDROCHLORIDE 300 MG/1
300 TABLET ORAL DAILY
Qty: 90 TABLET | Refills: 3 | Status: SHIPPED | OUTPATIENT
Start: 2023-11-07

## 2023-11-07 NOTE — TELEPHONE ENCOUNTER
Failed protocol    NOV-09/19/23-RM  LOV-12/19/23-MM    Plan:       She feels no different on Bumex since she did on Lasix.  Try torsemide 20 mg daily.  Set up carotid duplex study to be done in 1 year and increase rosuvastatin to 40 mg nightly for CAD and carotid stenosis.  See Dinorah in 3 months.

## 2023-11-12 DIAGNOSIS — E78.2 MIXED HYPERLIPIDEMIA: ICD-10-CM

## 2023-11-12 DIAGNOSIS — F51.01 PRIMARY INSOMNIA: ICD-10-CM

## 2023-11-13 RX ORDER — ROSUVASTATIN CALCIUM 40 MG/1
40 TABLET, COATED ORAL NIGHTLY
Qty: 90 TABLET | Refills: 3
Start: 2023-11-13 | End: 2023-11-14 | Stop reason: SDUPTHER

## 2023-11-14 DIAGNOSIS — E78.2 MIXED HYPERLIPIDEMIA: ICD-10-CM

## 2023-11-15 ENCOUNTER — PATIENT MESSAGE (OUTPATIENT)
Dept: CARDIOLOGY | Facility: CLINIC | Age: 68
End: 2023-11-15
Payer: MEDICARE

## 2023-11-15 RX ORDER — ROSUVASTATIN CALCIUM 40 MG/1
40 TABLET, COATED ORAL NIGHTLY
Qty: 90 TABLET | Refills: 3 | Status: SHIPPED | OUTPATIENT
Start: 2023-11-15

## 2023-11-20 RX ORDER — ZOLPIDEM TARTRATE 5 MG/1
5 TABLET ORAL NIGHTLY PRN
Qty: 30 TABLET | Refills: 0 | Status: SHIPPED | OUTPATIENT
Start: 2023-11-20

## 2023-12-03 DIAGNOSIS — M25.551 RIGHT HIP PAIN: ICD-10-CM

## 2023-12-03 DIAGNOSIS — E03.9 HYPOTHYROIDISM, UNSPECIFIED TYPE: ICD-10-CM

## 2023-12-04 RX ORDER — LEVOTHYROXINE SODIUM 50 MCG
50 TABLET ORAL DAILY
Qty: 90 TABLET | Refills: 2 | Status: SHIPPED | OUTPATIENT
Start: 2023-12-04

## 2023-12-04 RX ORDER — TRAMADOL HYDROCHLORIDE 50 MG/1
50 TABLET ORAL EVERY 6 HOURS PRN
Qty: 60 TABLET | Refills: 0 | Status: SHIPPED | OUTPATIENT
Start: 2023-12-04

## 2023-12-19 ENCOUNTER — OFFICE VISIT (OUTPATIENT)
Dept: CARDIOLOGY | Facility: CLINIC | Age: 68
End: 2023-12-19
Payer: MEDICARE

## 2023-12-19 VITALS
SYSTOLIC BLOOD PRESSURE: 125 MMHG | DIASTOLIC BLOOD PRESSURE: 80 MMHG | HEART RATE: 66 BPM | HEIGHT: 66 IN | WEIGHT: 209 LBS | BODY MASS INDEX: 33.59 KG/M2

## 2023-12-19 DIAGNOSIS — I89.0 LYMPHEDEMA: ICD-10-CM

## 2023-12-19 DIAGNOSIS — I25.10 CHRONIC CORONARY ARTERY DISEASE: ICD-10-CM

## 2023-12-19 DIAGNOSIS — E78.2 MIXED HYPERLIPIDEMIA: ICD-10-CM

## 2023-12-19 DIAGNOSIS — I50.32 CHRONIC DIASTOLIC CONGESTIVE HEART FAILURE: Primary | ICD-10-CM

## 2023-12-19 DIAGNOSIS — E03.9 HYPOTHYROIDISM, UNSPECIFIED TYPE: ICD-10-CM

## 2023-12-19 DIAGNOSIS — R07.2 PRECORDIAL PAIN: ICD-10-CM

## 2023-12-19 DIAGNOSIS — I10 BENIGN ESSENTIAL HTN: ICD-10-CM

## 2023-12-19 DIAGNOSIS — R60.0 PEDAL EDEMA: ICD-10-CM

## 2023-12-19 DIAGNOSIS — F51.01 PRIMARY INSOMNIA: ICD-10-CM

## 2023-12-19 RX ORDER — ZOLPIDEM TARTRATE 5 MG/1
5 TABLET ORAL NIGHTLY PRN
Qty: 30 TABLET | Refills: 0 | Status: SHIPPED | OUTPATIENT
Start: 2023-12-19

## 2023-12-19 RX ORDER — CYCLOSPORINE 0.5 MG/ML
1 EMULSION OPHTHALMIC 2 TIMES DAILY
COMMUNITY
Start: 2023-12-06

## 2023-12-19 NOTE — PROGRESS NOTES
"      [facility]  [address]  [facility] [address]  Phone: [phone]  Fax: [phone]  Patient Name: Barbara Tran  :1955  Age: 68 y.o.  Primary Cardiologist: Lashay Leon MD  Encounter Provider:  MANOHAR Martell    History of Present Illness     Barbara Tran is a 68 y.o.  female whose medical history includes hypertension, hyperlipidemia, history of DVT and PE in 2021, and pulmonary nodules followed by Dr. Rodriguez.  She is followed in our office by Dr. Leon for HFpEF and CAD s/p stent to the LAD. I have reviewed the past medical records in preparation of today's visit.     Follow-up:  She is here for 3-month follow-up.  At last visit she was changed from Bumex to torsemide for leg swelling.  She states the leg swelling is about the same.  She has not had luck with the [facility] lymphedema clinic as she works and she cannot fit that into her schedule.  She currently has a cold;; she is feeling fatigued and has a cough.  She continues to feel tired and has some dizziness with she gets up too fast.  She also has a very fleeting chest pain that occurs about twice a week and occurs with rest or activity.  It spreads across the front of her chest the last about 2 minutes.  She has no shortness of breath or palpitations.  She states she previously had a sleep evaluation in 2022 and was told she only has mild DAVID; she does not qualify for device.    Data Review     The following data was reviewed by MANOHAR Martell on 23:    Vital Signs:   /80   Pulse 66   Ht 167.6 cm (66\")   Wt 94.8 kg (209 lb)   BMI 33.73 kg/m²       Weight:  Wt Readings from Last 3 Encounters:   23 94.8 kg (209 lb)   23 94.3 kg (208 lb)   23 95.4 kg (210 lb 4.8 oz)     Body mass index is 33.73 kg/m².    Below is a summary of pertinent cardiology findings:  She is single, has 3 children, works as " an , has never smoked, drinks tea and coffee daily, has occasional alcohol but uses no drugs.  Family history includes heart disease in her mom, dad, sister, and 2 brothers; she also has a sister with diabetes.  She had hyperkalemia with spironolactone and this was decreased.  2005 she had a stent placed to the proximal LAD.  2015 she had a cardiac catheterization showing a patent stent of the proximal LAD, and 80% septal  stenosis which was unchanged, normal left circumflex and RCA, and normal LV SF.  March 2021 she had COVID-19 pneumonia.  June 2021 she presented with acute short windedness and diagnosed with bilateral PE without right heart strain; she also had DVT.  She was started on apixaban.  September 2021 echocardiogram showed EF 46 to 50% with moderate to severe septal hypokinesis, grade 1 LV diastolic function, normal RV size and function, normal saline contrast study and mild left atrial enlargement.  May 2022 echocardiogram showed EF 54%, grade 1 LV diastolic dysfunction, normal RVSP, and no significant valvular disease.  She had an nonischemic stress nuclear perfusion study as well.  December 2022 spironolactone and metoprolol stopped in favor of 24-26 mg Entresto twice daily for HFpEF and leg swelling.  February 2023 Jardiance and bumetanide added.  July 2023 vascular screening showed mild carotid artery stenosis bilaterally, normal abdominal aorta size, and normal ABIs.    Labs:  01/19/2023:  cr 0.7, K 3.7, otherwise unremarkable CMP, proBNP 74, Hgb 13.6, Plt 412  12/07/2023:  cr 0.7, K, 4.7, otherwise unremarkable CMP, chol 137, HDL 69, LDL 53, Trig 71, hgb 15.6, plt 304, hgb A1c 5.5, TSH 2.760      ECG 12 Lead    Date/Time: 12/19/2023 1:04 PM  Performed by: Nedra Navas APRN    Authorized by: Nedra Navas APRN  Comparison: compared with previous ECG from 9/19/2023  Similar to previous ECG  Rhythm: sinus rhythm  Rate: normal  BPM: 66  QRS  axis: left          Medications     Allergies as of 12/19/2023 - Reviewed 12/19/2023   Allergen Reaction Noted    Codeine Dizziness 01/28/2016    Levofloxacin Itching 10/23/2016    Morphine Itching 10/21/2016         Current Outpatient Medications:     aspirin 81 MG tablet, Take 1 tablet by mouth Daily., Disp: , Rfl:     buPROPion XL (WELLBUTRIN XL) 300 MG 24 hr tablet, Take 1 tablet by mouth Daily., Disp: 90 tablet, Rfl: 3    calcium carbonate (OS-SHAHNAZ) 600 MG tablet, Take 1 tablet by mouth 2 (Two) Times a Day With Meals., Disp: , Rfl:     empagliflozin (JARDIANCE) 10 MG tablet tablet, Take 1 tablet by mouth Daily., Disp: 30 tablet, Rfl: 0    ibandronate (BONIVA) 150 MG tablet, Take 1 tablet by mouth Every 30 (Thirty) Days., Disp: 3 tablet, Rfl: 3    magnesium oxide (MAG-OX) 400 MG tablet, Take 2 tablets by mouth 2 (two) times a day., Disp: , Rfl:     Restasis 0.05 % ophthalmic emulsion, Administer 1 drop to both eyes 2 (Two) Times a Day., Disp: , Rfl:     rosuvastatin (CRESTOR) 40 MG tablet, Take 1 tablet by mouth Every Night., Disp: 90 tablet, Rfl: 3    sacubitril-valsartan (ENTRESTO) 24-26 MG tablet, Take 1 tablet by mouth 2 (Two) Times a Day., Disp: 28 tablet, Rfl: 0    Synthroid 50 MCG tablet, Take 1 tablet by mouth Daily., Disp: 90 tablet, Rfl: 2    torsemide (DEMADEX) 20 MG tablet, Take 1 tablet by mouth Daily., Disp: 30 tablet, Rfl: 11    traMADol (ULTRAM) 50 MG tablet, Take 1 tablet by mouth Every 6 (Six) Hours As Needed for Moderate Pain., Disp: 60 tablet, Rfl: 0    zolpidem (AMBIEN) 5 MG tablet, TAKE 1 TABLET BY MOUTH AT NIGHT AS NEEDED FOR SLEEP, Disp: 30 tablet, Rfl: 0     Past History, Review of Systems, Exam     Past Medical History:   Diagnosis Date    Allergic 2015    Arthritis l5 years or so ago    Cataract 6-9 months ago    CHF (congestive heart failure)     Coronary artery disease 2005 stent    COVID-19 virus detected 03/10/2021    Deep vein thrombosis 06/03/2021    Depression     Disease of  thyroid gland     Headache Covid 3-6-21    History of pulmonary embolism 06/01/2021    HL (hearing loss) Last 4-6 months    Hyperlipidemia     Hypertension since 2005    Hypothyroidism since 2012    Obesity     Osteopenia last few years    Pulmonary embolism 06/03/2021       Past Surgical History:   has a past surgical history that includes Coronary stent placement (2005); Colonoscopy (N/A, 12/19/2016); Cholecystectomy (1995); Subtotal Hysterectomy (2009); Tonsillectomy (1965); Tubal ligation (1985); Cardiac catheterization (2015, 2018); Eye surgery (April 2022); and Cataract extraction.     Social History     Socioeconomic History    Marital status: Single   Tobacco Use    Smoking status: Never     Passive exposure: Never    Smokeless tobacco: Never    Tobacco comments:     N/A   Vaping Use    Vaping Use: Never used   Substance and Sexual Activity    Alcohol use: Yes     Alcohol/week: 2.0 standard drinks of alcohol     Types: 1 Glasses of wine, 1 Cans of beer per week     Comment: socially    Drug use: Never    Sexual activity: Not Currently     Partners: Female     Birth control/protection: None       Review of Systems   Constitutional: Positive for malaise/fatigue.   Cardiovascular:  Positive for chest pain and leg swelling. Negative for claudication, cyanosis, dyspnea on exertion, irregular heartbeat, near-syncope, orthopnea, palpitations, paroxysmal nocturnal dyspnea and syncope.   Respiratory:  Positive for cough.        Vitals reviewed.   Constitutional:       Appearance: Not in distress.   Eyes:      Conjunctiva/sclera: Conjunctivae normal.      Pupils: Pupils are equal, round, and reactive to light.   HENT:      Head: Normocephalic.      Nose: Nose normal.    Mouth/Throat:      Pharynx: Oropharynx is clear.   Neck:      Vascular: JVD normal.   Pulmonary:      Effort: Pulmonary effort is normal.      Breath sounds: Wheezing present. No rhonchi. No rales.   Cardiovascular:      Normal rate. Regular rhythm.  Normal S1. Normal S2.       Murmurs: There is no murmur.   Pulses:     Intact distal pulses.   Edema:     Ankle: bilateral 1+ edema of the ankle.  Abdominal:      General: Bowel sounds are normal. There is no distension.      Palpations: Abdomen is soft.      Tenderness: There is no abdominal tenderness.   Musculoskeletal: Normal range of motion.      Cervical back: Normal range of motion and neck supple. Skin:     General: Skin is warm and dry.   Neurological:      Mental Status: Alert and oriented to person, place and time.   Psychiatric:         Attention and Perception: Attention normal.         Mood and Affect: Mood normal.         Speech: Speech normal.         Behavior: Behavior is cooperative.          Assessment and Plan     Assessment:  1. Chronic diastolic congestive heart failure    2. Chronic coronary artery disease    3. Benign essential HTN    4. Mixed hyperlipidemia    5. Hypothyroidism, unspecified type    6. Pedal edema    7. Lymphedema    8. Precordial pain           Chronic HFpEF: Previous echos have shown LV diastolic dysfunction.  Her last echo was in May 2022 and LV diastolic function was grade 1.  She has been having leg swelling and short windedness; no improvement with the addition of 24-26 Entresto twice daily, torsemide, and 10 mg Jardiance daily.  Metoprolol and spironolactone have been stopped due to low blood pressure.  Her leg swelling looks better per my exam on torsemide.  Chronic coronary artery disease: She has stent to the LAD placed in 2005 and her last cardiac catheterization in 2015 was stable showing patent LAD stent and a 80% septal  stenosis which was unchanged.  Her medical therapy includes 40 mg Crestor daily and 81 mg aspirin daily.  Metoprolol previously stopped due to hypotension.  She is having some chest tightness.  History of PE and DVT: This occurred in June 2021 and was not associated with right heart strain.  She was started on 5 mg apixaban at that  time.  This was following COVID-19 infection in March 2021.  No recent issues.  Hypertension: She previously had hyperkalemia on spironolactone.  Controlled on 24-26 mg Entresto twice daily, 10 mg Jardiance, and torsemide.  Hyperlipidemia: She is treated with 40 mg Crestor daily.  Lipids at goal when checked in December 2023.  Hypothyroidism: No recent labs to review; will follow.  She is treated with Synthroid.  Acute hip pain: This occurred after she fell on the ice in December 2022 and sustained right gluteal muscle tear and hamstring injury.  She is still recovering from this; recovery has been slow but she is improving.  Leg swelling: She feels there is no improvement when transition from Lasix to Bumex, and now to torsemide.  Per my exam her leg swelling looks better.    Ms. Tran is a patient of Dr. Leon with coronary artery disease of the LAD with a stent and known coronary artery disease of the septal .  She also has chronic HFpEF.  She has been having short windedness and leg swelling which was not improved with the addition of 24-26 mg Entresto twice daily, 10 mg Jardiance daily, and torsemide.  I am going to check a stress test and echo given her report of chest tightness.  I will refer her to out of system lymphedema clinic with the goal that hours will work with her work schedule.  I will have her see Dr. Leon in 3 to 4 months.    Return 3-4 months, for Follow-up, Dr. Leon.  Orders Placed This Encounter   Procedures    Ambulatory Referral to Lymphedema Clinic    Stress Test With Myocardial Perfusion One Day    ECG 12 Lead    Adult Transthoracic Echo Complete W/ Cont if Necessary Per Protocol      No orders of the defined types were placed in this encounter.        Thank you for the opportunity to participate in this patient's care.    MANOHAR Traore    This office note has been dictated.

## 2023-12-29 ENCOUNTER — TELEPHONE (OUTPATIENT)
Dept: CARDIOLOGY | Facility: CLINIC | Age: 68
End: 2023-12-29
Payer: MEDICARE

## 2023-12-29 NOTE — TELEPHONE ENCOUNTER
LVM with patient telling them the details of their Nuclear test.  Do not eat, drink, smoke (this includes e-cigarettes), chew tobacco or gum 4 hours prior to the test.  No caffeine or chocolate 24 hours prior to your test. This includes coffee, tea, soda, all decaffeinated beverages, cappuccino flavorings or any energy drinks. Also, told them to look at the list of medications on [place] or the list we gave them in office to make sure they do not have to hold anything for 24 hours.

## 2024-01-02 ENCOUNTER — HOSPITAL ENCOUNTER (OUTPATIENT)
Dept: CARDIOLOGY | Facility: HOSPITAL | Age: 69
Discharge: HOME | End: 2024-01-02
Payer: MEDICARE

## 2024-01-02 ENCOUNTER — TELEPHONE (OUTPATIENT)
Dept: CARDIOLOGY | Facility: CLINIC | Age: 69
End: 2024-01-02
Payer: MEDICARE

## 2024-01-02 VITALS
DIASTOLIC BLOOD PRESSURE: 70 MMHG | BODY MASS INDEX: 33.59 KG/M2 | HEIGHT: 66 IN | SYSTOLIC BLOOD PRESSURE: 106 MMHG | WEIGHT: 209 LBS | HEART RATE: 68 BPM

## 2024-01-02 DIAGNOSIS — I50.32 CHRONIC DIASTOLIC CONGESTIVE HEART FAILURE: ICD-10-CM

## 2024-01-02 DIAGNOSIS — I25.10 CHRONIC CORONARY ARTERY DISEASE: ICD-10-CM

## 2024-01-02 DIAGNOSIS — R07.2 PRECORDIAL PAIN: ICD-10-CM

## 2024-01-02 LAB
AORTIC ARCH: 2.7 CM
ASCENDING AORTA: 2.9 CM
BH CV ECHO MEAS - ACS: 1.99 CM
BH CV ECHO MEAS - AI P1/2T: 1034 MSEC
BH CV ECHO MEAS - AO MAX PG: 6.2 MMHG
BH CV ECHO MEAS - AO MEAN PG: 3 MMHG
BH CV ECHO MEAS - AO ROOT DIAM: 3.6 CM
BH CV ECHO MEAS - AO V2 MAX: 124 CM/SEC
BH CV ECHO MEAS - AO V2 VTI: 25.9 CM
BH CV ECHO MEAS - AVA(I,D): 1.67 CM2
BH CV ECHO MEAS - EDV(CUBED): 50.7 ML
BH CV ECHO MEAS - EDV(MOD-SP2): 82 ML
BH CV ECHO MEAS - EDV(MOD-SP4): 73 ML
BH CV ECHO MEAS - EF(MOD-BP): 56.5 %
BH CV ECHO MEAS - EF(MOD-SP2): 56.1 %
BH CV ECHO MEAS - EF(MOD-SP4): 52.1 %
BH CV ECHO MEAS - ESV(CUBED): 14.7 ML
BH CV ECHO MEAS - ESV(MOD-SP2): 36 ML
BH CV ECHO MEAS - ESV(MOD-SP4): 35 ML
BH CV ECHO MEAS - FS: 33.8 %
BH CV ECHO MEAS - IVS/LVPW: 1 CM
BH CV ECHO MEAS - IVSD: 1.1 CM
BH CV ECHO MEAS - LAT PEAK E' VEL: 5.3 CM/SEC
BH CV ECHO MEAS - LV DIASTOLIC VOL/BSA (35-75): 35.8 CM2
BH CV ECHO MEAS - LV MASS(C)D: 129.3 GRAMS
BH CV ECHO MEAS - LV MAX PG: 1.72 MMHG
BH CV ECHO MEAS - LV MEAN PG: 1 MMHG
BH CV ECHO MEAS - LV SYSTOLIC VOL/BSA (12-30): 17.2 CM2
BH CV ECHO MEAS - LV V1 MAX: 65.6 CM/SEC
BH CV ECHO MEAS - LV V1 VTI: 13.2 CM
BH CV ECHO MEAS - LVIDD: 3.7 CM
BH CV ECHO MEAS - LVIDS: 2.45 CM
BH CV ECHO MEAS - LVOT AREA: 3.3 CM2
BH CV ECHO MEAS - LVOT DIAM: 2.04 CM
BH CV ECHO MEAS - LVPWD: 1.1 CM
BH CV ECHO MEAS - MED PEAK E' VEL: 5.5 CM/SEC
BH CV ECHO MEAS - MR MAX PG: 24.7 MMHG
BH CV ECHO MEAS - MR MAX VEL: 248.4 CM/SEC
BH CV ECHO MEAS - MV A DUR: 0.11 SEC
BH CV ECHO MEAS - MV A MAX VEL: 83.6 CM/SEC
BH CV ECHO MEAS - MV DEC SLOPE: 273.7 CM/SEC2
BH CV ECHO MEAS - MV DEC TIME: 0.3 SEC
BH CV ECHO MEAS - MV E MAX VEL: 42.8 CM/SEC
BH CV ECHO MEAS - MV E/A: 0.51
BH CV ECHO MEAS - MV MAX PG: 4.3 MMHG
BH CV ECHO MEAS - MV MEAN PG: 1.74 MMHG
BH CV ECHO MEAS - MV P1/2T: 59.6 MSEC
BH CV ECHO MEAS - MV V2 VTI: 19.6 CM
BH CV ECHO MEAS - MVA(P1/2T): 3.7 CM2
BH CV ECHO MEAS - MVA(VTI): 2.2 CM2
BH CV ECHO MEAS - PA ACC TIME: 0.07 SEC
BH CV ECHO MEAS - PA V2 MAX: 74.7 CM/SEC
BH CV ECHO MEAS - PI END-D VEL: 107.2 CM/SEC
BH CV ECHO MEAS - PULM A REVS DUR: 0.14 SEC
BH CV ECHO MEAS - PULM A REVS VEL: 33.4 CM/SEC
BH CV ECHO MEAS - PULM DIAS VEL: 37.7 CM/SEC
BH CV ECHO MEAS - PULM S/D: 1.64
BH CV ECHO MEAS - PULM SYS VEL: 61.7 CM/SEC
BH CV ECHO MEAS - QP/QS: 0.87
BH CV ECHO MEAS - RAP SYSTOLE: 3 MMHG
BH CV ECHO MEAS - RV MAX PG: 1.59 MMHG
BH CV ECHO MEAS - RV V1 MAX: 63 CM/SEC
BH CV ECHO MEAS - RV V1 VTI: 11 CM
BH CV ECHO MEAS - RVOT DIAM: 2.08 CM
BH CV ECHO MEAS - RVSP: 21.6 MMHG
BH CV ECHO MEAS - SI(MOD-SP2): 22.6 ML/M2
BH CV ECHO MEAS - SI(MOD-SP4): 18.6 ML/M2
BH CV ECHO MEAS - SUP REN AO DIAM: 1.9 CM
BH CV ECHO MEAS - SV(LVOT): 43.1 ML
BH CV ECHO MEAS - SV(MOD-SP2): 46 ML
BH CV ECHO MEAS - SV(MOD-SP4): 38 ML
BH CV ECHO MEAS - SV(RVOT): 37.5 ML
BH CV ECHO MEAS - TAPSE (>1.6): 1.95 CM
BH CV ECHO MEAS - TR MAX PG: 18.6 MMHG
BH CV ECHO MEAS - TR MAX VEL: 215.8 CM/SEC
BH CV ECHO MEASUREMENTS AVERAGE E/E' RATIO: 7.93
BH CV NUCLEAR PRIOR STUDY: 1
BH CV REST NUCLEAR ISOTOPE DOSE: 11 MCI
BH CV STRESS BP STAGE 1: NORMAL
BH CV STRESS COMMENTS STAGE 1: NORMAL
BH CV STRESS DOSE REGADENOSON STAGE 1: 0.4
BH CV STRESS DURATION MIN STAGE 1: 0
BH CV STRESS DURATION SEC STAGE 1: 10
BH CV STRESS HR STAGE 1: 105
BH CV STRESS NUCLEAR ISOTOPE DOSE: 34.9 MCI
BH CV STRESS PROTOCOL 1: NORMAL
BH CV STRESS RECOVERY BP: NORMAL MMHG
BH CV STRESS RECOVERY HR: 96 BPM
BH CV STRESS STAGE 1: 1
BH CV XLRA - RV BASE: 2.8 CM
BH CV XLRA - RV LENGTH: 7.1 CM
BH CV XLRA - RV MID: 2.44 CM
BH CV XLRA - TDI S': 12.5 CM/SEC
LEFT ATRIUM VOLUME INDEX: 12.9 ML/M2
LV EF NUC BP: 50 %
MAXIMAL PREDICTED HEART RATE: 150 BPM
PERCENT MAX PREDICTED HR: 70 %
SINUS: 3.2 CM
STJ: 2.7 CM
STRESS BASELINE BP: NORMAL MMHG
STRESS BASELINE HR: 70 BPM
STRESS PERCENT HR: 82 %
STRESS POST EXERCISE DUR SEC: 10 SEC
STRESS POST PEAK BP: NORMAL MMHG
STRESS POST PEAK HR: 105 BPM
STRESS TARGET HR: 128 BPM

## 2024-01-02 PROCEDURE — 93306 TTE W/DOPPLER COMPLETE: CPT

## 2024-01-02 PROCEDURE — 78452 HT MUSCLE IMAGE SPECT MULT: CPT

## 2024-01-02 PROCEDURE — 0 TECHNETIUM TETROFOSMIN KIT: Performed by: NURSE PRACTITIONER

## 2024-01-02 PROCEDURE — 25010000002 REGADENOSON 0.4 MG/5ML SOLUTION: Performed by: NURSE PRACTITIONER

## 2024-01-02 PROCEDURE — 93017 CV STRESS TEST TRACING ONLY: CPT

## 2024-01-02 PROCEDURE — A9502 TC99M TETROFOSMIN: HCPCS | Performed by: NURSE PRACTITIONER

## 2024-01-02 RX ORDER — REGADENOSON 0.08 MG/ML
0.4 INJECTION, SOLUTION INTRAVENOUS
Status: COMPLETED | OUTPATIENT
Start: 2024-01-02 | End: 2024-01-02

## 2024-01-02 RX ADMIN — TETROFOSMIN 1 DOSE: 1.38 INJECTION, POWDER, LYOPHILIZED, FOR SOLUTION INTRAVENOUS at 08:56

## 2024-01-02 RX ADMIN — TETROFOSMIN 1 DOSE: 1.38 INJECTION, POWDER, LYOPHILIZED, FOR SOLUTION INTRAVENOUS at 09:54

## 2024-01-02 RX ADMIN — REGADENOSON 0.4 MG: 0.08 INJECTION, SOLUTION INTRAVENOUS at 09:54

## 2024-01-03 NOTE — TELEPHONE ENCOUNTER
Barbara Tran returned call.  Reviewed results with patient.  Patient is asking for Dinorah to call her to review tests more in depth.  Patient has a few questions she would like to discuss with Dinorah.    Please let me know how you would like to proceed.    Thank you,  Destiny RAMIREZ RN  Triage Nurse [facility] [place]   08:51 EST

## 2024-01-03 NOTE — TELEPHONE ENCOUNTER
Left voicemail for Barbara Tran requesting callback.    HUB: please transfer to Triage if patient returns call     Thank you,  Destiny RAMIREZ RN  Triage Nurse [facility]   08:43 EST

## 2024-01-04 NOTE — TELEPHONE ENCOUNTER
I called but had to leave a detailed message. I asked her to call back for additional questions. If she calls back, please let her know that I will not be able to return her call until after 3:30 on most afternoons.     Thanks!  MANOHAR Traore

## 2024-01-21 DIAGNOSIS — F51.01 PRIMARY INSOMNIA: ICD-10-CM

## 2024-01-21 DIAGNOSIS — M25.551 RIGHT HIP PAIN: ICD-10-CM

## 2024-01-21 DIAGNOSIS — M81.0 AGE-RELATED OSTEOPOROSIS WITHOUT CURRENT PATHOLOGICAL FRACTURE: ICD-10-CM

## 2024-01-22 RX ORDER — TRAMADOL HYDROCHLORIDE 50 MG/1
50 TABLET ORAL EVERY 6 HOURS PRN
Qty: 60 TABLET | Refills: 0 | Status: SHIPPED | OUTPATIENT
Start: 2024-01-22

## 2024-01-22 RX ORDER — IBANDRONATE SODIUM 150 MG/1
150 TABLET, FILM COATED ORAL
Qty: 3 TABLET | Refills: 3 | Status: SHIPPED | OUTPATIENT
Start: 2024-01-22

## 2024-01-22 RX ORDER — ZOLPIDEM TARTRATE 5 MG/1
5 TABLET ORAL NIGHTLY PRN
Qty: 30 TABLET | Refills: 0 | Status: SHIPPED | OUTPATIENT
Start: 2024-01-22

## 2024-02-07 ENCOUNTER — TELEPHONE (OUTPATIENT)
Dept: FAMILY MEDICINE CLINIC | Facility: CLINIC | Age: 69
End: 2024-02-07
Payer: MEDICARE

## 2024-02-07 DIAGNOSIS — E78.2 MIXED HYPERLIPIDEMIA: ICD-10-CM

## 2024-02-07 RX ORDER — ROSUVASTATIN CALCIUM 40 MG/1
40 TABLET, COATED ORAL NIGHTLY
Qty: 90 TABLET | Refills: 3 | Status: SHIPPED | OUTPATIENT
Start: 2024-02-07

## 2024-02-07 NOTE — TELEPHONE ENCOUNTER
Patient was sent to the Lymphedema clinic by her cardiologist and they are wanting her to get an US of bilateral legs and feet due to the swelling and pain.  Patient asking if you can order these please advise

## 2024-02-07 NOTE — TELEPHONE ENCOUNTER
Failed protocol    NOV-04/16/24-RM  LOV-12/19/23-MM    Ms. Tran is a patient of Dr. Leon with coronary artery disease of the LAD with a stent and known coronary artery disease of the septal .  She also has chronic HFpEF.  She has been having short windedness and leg swelling which was not improved with the addition of 24-26 mg Entresto twice daily, 10 mg Jardiance daily, and torsemide.  I am going to check a stress test and echo given her report of chest tightness.  I will refer her to out of system lymphedema clinic with the goal that hours will work with her work schedule.  I will have her see Dr. Leon in 3 to 4 months.       LABS: 12/07/23    Christine

## 2024-02-09 DIAGNOSIS — R22.41 LOCALIZED SWELLING OF RIGHT LOWER EXTREMITY: Primary | ICD-10-CM

## 2024-02-21 DIAGNOSIS — M25.551 RIGHT HIP PAIN: ICD-10-CM

## 2024-02-21 DIAGNOSIS — F51.01 PRIMARY INSOMNIA: ICD-10-CM

## 2024-02-22 ENCOUNTER — HOSPITAL ENCOUNTER (OUTPATIENT)
Dept: PET IMAGING | Facility: HOSPITAL | Age: 69
Discharge: HOME | End: 2024-02-22
Payer: MEDICARE

## 2024-02-22 ENCOUNTER — APPOINTMENT (OUTPATIENT)
Dept: WOMENS IMAGING | Facility: HOSPITAL | Age: 69
End: 2024-02-22
Payer: MEDICARE

## 2024-02-22 DIAGNOSIS — Z78.0 POSTMENOPAUSAL: ICD-10-CM

## 2024-02-22 PROCEDURE — 77067 SCR MAMMO BI INCL CAD: CPT | Performed by: RADIOLOGY

## 2024-02-22 PROCEDURE — 77063 BREAST TOMOSYNTHESIS BI: CPT | Performed by: RADIOLOGY

## 2024-02-22 PROCEDURE — 77080 DXA BONE DENSITY AXIAL: CPT

## 2024-02-22 RX ORDER — ZOLPIDEM TARTRATE 5 MG/1
5 TABLET ORAL NIGHTLY PRN
Qty: 30 TABLET | Refills: 0 | Status: SHIPPED | OUTPATIENT
Start: 2024-02-22

## 2024-02-22 RX ORDER — TRAMADOL HYDROCHLORIDE 50 MG/1
50 TABLET ORAL EVERY 6 HOURS PRN
Qty: 60 TABLET | Refills: 0 | Status: SHIPPED | OUTPATIENT
Start: 2024-02-22

## 2024-02-27 ENCOUNTER — HOSPITAL ENCOUNTER (OUTPATIENT)
Dept: CARDIOLOGY | Facility: HOSPITAL | Age: 69
Discharge: HOME | End: 2024-02-27
Admitting: FAMILY MEDICINE
Payer: MEDICARE

## 2024-02-27 DIAGNOSIS — R22.41 LOCALIZED SWELLING OF RIGHT LOWER EXTREMITY: ICD-10-CM

## 2024-02-27 LAB
BH CV LOWER VASCULAR LEFT COMMON FEMORAL AUGMENT: NORMAL
BH CV LOWER VASCULAR LEFT COMMON FEMORAL COMPETENT: NORMAL
BH CV LOWER VASCULAR LEFT COMMON FEMORAL COMPRESS: NORMAL
BH CV LOWER VASCULAR LEFT COMMON FEMORAL PHASIC: NORMAL
BH CV LOWER VASCULAR LEFT COMMON FEMORAL SPONT: NORMAL
BH CV LOWER VASCULAR RIGHT COMMON FEMORAL AUGMENT: NORMAL
BH CV LOWER VASCULAR RIGHT COMMON FEMORAL COMPETENT: NORMAL
BH CV LOWER VASCULAR RIGHT COMMON FEMORAL COMPRESS: NORMAL
BH CV LOWER VASCULAR RIGHT COMMON FEMORAL PHASIC: NORMAL
BH CV LOWER VASCULAR RIGHT COMMON FEMORAL SPONT: NORMAL
BH CV LOWER VASCULAR RIGHT DISTAL FEMORAL COMPRESS: NORMAL
BH CV LOWER VASCULAR RIGHT GASTRONEMIUS COMPRESS: NORMAL
BH CV LOWER VASCULAR RIGHT GREATER SAPH AK COMPRESS: NORMAL
BH CV LOWER VASCULAR RIGHT GREATER SAPH BK COMPRESS: NORMAL
BH CV LOWER VASCULAR RIGHT LESSER SAPH COMPRESS: NORMAL
BH CV LOWER VASCULAR RIGHT MID FEMORAL AUGMENT: NORMAL
BH CV LOWER VASCULAR RIGHT MID FEMORAL COMPETENT: NORMAL
BH CV LOWER VASCULAR RIGHT MID FEMORAL COMPRESS: NORMAL
BH CV LOWER VASCULAR RIGHT MID FEMORAL PHASIC: NORMAL
BH CV LOWER VASCULAR RIGHT MID FEMORAL SPONT: NORMAL
BH CV LOWER VASCULAR RIGHT PERONEAL COMPRESS: NORMAL
BH CV LOWER VASCULAR RIGHT POPLITEAL AUGMENT: NORMAL
BH CV LOWER VASCULAR RIGHT POPLITEAL COMPETENT: NORMAL
BH CV LOWER VASCULAR RIGHT POPLITEAL COMPRESS: NORMAL
BH CV LOWER VASCULAR RIGHT POPLITEAL PHASIC: NORMAL
BH CV LOWER VASCULAR RIGHT POPLITEAL SPONT: NORMAL
BH CV LOWER VASCULAR RIGHT POSTERIOR TIBIAL COMPRESS: NORMAL
BH CV LOWER VASCULAR RIGHT PROFUNDA FEMORAL COMPRESS: NORMAL
BH CV LOWER VASCULAR RIGHT PROXIMAL FEMORAL COMPRESS: NORMAL
BH CV LOWER VASCULAR RIGHT SAPHENOFEMORAL JUNCTION COMPRESS: NORMAL
BH CV VAS POP FLUID COLLECTED: 1

## 2024-02-27 PROCEDURE — 93971 EXTREMITY STUDY: CPT

## 2024-02-29 ENCOUNTER — TELEPHONE (OUTPATIENT)
Dept: CARDIOLOGY | Facility: CLINIC | Age: 69
End: 2024-02-29
Payer: MEDICARE

## 2024-02-29 NOTE — TELEPHONE ENCOUNTER
Yamileth, called and left V/M  regarding this pt's evaluation for medical care. When I called Yamileth back she stated someone had already called and took care of it this morning of 2/29/24

## 2024-03-21 DIAGNOSIS — F51.01 PRIMARY INSOMNIA: ICD-10-CM

## 2024-03-21 DIAGNOSIS — M25.551 RIGHT HIP PAIN: ICD-10-CM

## 2024-03-21 RX ORDER — TRAMADOL HYDROCHLORIDE 50 MG/1
50 TABLET ORAL EVERY 6 HOURS PRN
Qty: 60 TABLET | Refills: 0 | Status: SHIPPED | OUTPATIENT
Start: 2024-03-21

## 2024-03-21 RX ORDER — ZOLPIDEM TARTRATE 5 MG/1
5 TABLET ORAL NIGHTLY PRN
Qty: 30 TABLET | Refills: 0 | Status: SHIPPED | OUTPATIENT
Start: 2024-03-21

## 2024-04-15 ENCOUNTER — TELEPHONE (OUTPATIENT)
Dept: CARDIOLOGY | Facility: CLINIC | Age: 69
End: 2024-04-15
Payer: MEDICARE

## 2024-04-15 NOTE — TELEPHONE ENCOUNTER
Caller: Barbara Tran    Relationship: Self    Best call back number:[phone] YOU CAN LEAVE VM  What is the best time to reach you: ANYTIME    Who are you requesting to speak with (clinical staff, provider,  specific staff member): CLINICAL        What was the call regarding: PT IS COMPLETELY OUT OF JARDIANCE 10 MG & ENTRESTO 24-26. SHE HAS NOT HEARD FROM ANY ONE REGARDING MESSAGE SHE SENT.  SHE WOULD LIKE TO  AT [facility].  PLEASE CONTACT PT    Is it okay if the provider responds through MyChart: NO

## 2024-04-16 ENCOUNTER — OFFICE VISIT (OUTPATIENT)
Dept: CARDIOLOGY | Facility: CLINIC | Age: 69
End: 2024-04-16
Payer: MEDICARE

## 2024-04-16 VITALS
HEART RATE: 85 BPM | SYSTOLIC BLOOD PRESSURE: 110 MMHG | DIASTOLIC BLOOD PRESSURE: 70 MMHG | WEIGHT: 212 LBS | BODY MASS INDEX: 34.07 KG/M2 | HEIGHT: 66 IN

## 2024-04-16 DIAGNOSIS — I50.32 CHRONIC DIASTOLIC CONGESTIVE HEART FAILURE: ICD-10-CM

## 2024-04-16 DIAGNOSIS — E78.2 MIXED HYPERLIPIDEMIA: ICD-10-CM

## 2024-04-16 DIAGNOSIS — I10 BENIGN ESSENTIAL HTN: ICD-10-CM

## 2024-04-16 DIAGNOSIS — I25.10 CHRONIC CORONARY ARTERY DISEASE: Primary | ICD-10-CM

## 2024-04-16 DIAGNOSIS — I20.0 UNSTABLE ANGINA: ICD-10-CM

## 2024-04-16 PROCEDURE — 93000 ELECTROCARDIOGRAM COMPLETE: CPT | Performed by: INTERNAL MEDICINE

## 2024-04-16 PROCEDURE — 3074F SYST BP LT 130 MM HG: CPT | Performed by: INTERNAL MEDICINE

## 2024-04-16 PROCEDURE — 1160F RVW MEDS BY RX/DR IN RCRD: CPT | Performed by: INTERNAL MEDICINE

## 2024-04-16 PROCEDURE — 1159F MED LIST DOCD IN RCRD: CPT | Performed by: INTERNAL MEDICINE

## 2024-04-16 PROCEDURE — 99214 OFFICE O/P EST MOD 30 MIN: CPT | Performed by: INTERNAL MEDICINE

## 2024-04-16 PROCEDURE — 3078F DIAST BP <80 MM HG: CPT | Performed by: INTERNAL MEDICINE

## 2024-04-16 NOTE — TELEPHONE ENCOUNTER
This patient was seen in clinic today and was given samples.      Jardiance 10 mg 2 boxes of 7 pills  [id number]  1/2026    Entresto 24/26 mg 2 bottles of 28 pills  [id number]  8/2026    Victoriaia

## 2024-04-16 NOTE — H&P (VIEW-ONLY)
Date of Office Visit: 2023  Encounter Provider: Lashay Leon MD  Place of Service: [facility]  Patient Name: Barbara Tran  :1955      Patient ID:  Barbara Tran is a 69 y.o. female is here for  followup for CAD.         History of Present Illness    She has a history of HFpEF, hyperlipidemia, hypertension, DVT with pulmonary embolism in 2021, CAD with LAD stent done 2015.     She received a proximal LAD stent in .  Last cardiac catheterization done in  showed proximal patent LAD stent patent, 80% septal  stenosis unchanged from prior cath, normal left circumflex and RCA, normal left ventricular systolic function.     She had COVID-19 pneumonia 3/8/2021.  She then presented 6/3/2021 with acute short windedness and was diagnosed with bilateral acute pulmonary embolism without acute cor pulmonale.  She was started on Eliquis.  Echo done 9/15/2021 showed ejection fraction of 46-50% with moderate to severe septal hypokinesis, grade 1 diastolic dysfunction, normal right ventricular size and function with normal saline contrast today, no pericardial effusion and mild left atrial enlargement.  She has followed with Dr. Rodriguez for pulmonary nodules.     Her mom, dad, sister and brothers x2 of all had heart disease-her mom, dad, sister and one of her brothers have already  with coronary artery disease and her living brother has had 5 vessel bypass.  Her sister also hae diabetes.  She is single, has 3 children, works as an , has never smoked, has tea and coffee, occasional alcohol and no drugs.      Her spironolactone was decreased due to hyperkalemia.  She follows with Dr. Vargas for endocrinology.     Echo done 2022 shows ejection fraction 54% with normal RVSP, grade 1 diastolic dysfunction, no significant valvular abnormalities.  She had nonischemic stress nuclear perfusion study done 2022.  CT chest  without contrast on 3/13/2023 showed stable micronodules.  I did review the CT chest images which shows scattered calcification in the aortic arch, calcification throughout the LAD-stent in the LAD, calcification in the RCA and circumflex.    Labs done 12/7/2023 show normal hemoglobin A1c, triglycerides 71, total cholesterol 137, HDL 69, LDL 53, VLDL 14, normal vitamin D, normal TSH, normal free T4, unremarkable CBC, unremarkable CMP.Venous duplex studies of lower extremities showed normal right lower extremity venous duplex, she had mild stenosis of bilateral carotid arteries with otherwise normal vascular screening done 7/14/2023.  She had a nonischemic stress nuclear perfusion study done 1/2/2024, echocardiogram done 1/2/2024 showed ejection fraction 56/60% with normal diastolic function and no valvular abnormalities, normal RVSP.    She has been noticing progressive exertional dyspnea.  She also has had precordial chest pain which is exertional and nonexertional with radiation to her jaw.  Her energy level is poor.  She just says she really cannot do anything without being short winded.  She has had no dizziness, syncope or falls and she has no orthopnea or PND.  She does not feel her heart racing or skipping.  She is concerned that her heart disease has worsened.        Past Medical History:   Diagnosis Date    Allergic 2015    codeine, morphine, levaquin    Arthritis l5 years or so ago    Cataract 6-9 months ago    Need surgery    CHF (congestive heart failure)     Coronary artery disease 2005 stent    COVID-19 virus detected 03/10/2021    Deep vein thrombosis 06/03/2021    Pulmonary embolism    Depression     Disease of thyroid gland     Headache Covid 3-6-21    Has continued    History of pulmonary embolism 06/01/2021    HL (hearing loss) Last 4-6 months    Hyperlipidemia     Hypertension since 2005    Hypothyroidism since 2012    Obesity     Osteopenia last few years    Pulmonary embolism 06/03/2021    From  Covid         Past Surgical History:   Procedure Laterality Date    CARDIAC CATHETERIZATION  2015, 2018    CATARACT EXTRACTION      CHOLECYSTECTOMY  1995    COLONOSCOPY N/A 12/19/2016    Procedure: COLONOSCOPY WITH COLD BIOPSIES;  Surgeon: Medina Guillen MD;  Location: [facility] ENDOSCOPY;  Service:     CORONARY STENT PLACEMENT  2005    LAD 80% blockage    EYE SURGERY  April 2022    Cataract/ left eye    SUBTOTAL HYSTERECTOMY  2009    TONSILLECTOMY  1965    TUBAL ABDOMINAL LIGATION  1985       Current Outpatient Medications on File Prior to Visit   Medication Sig Dispense Refill    aspirin 81 MG tablet Take 1 tablet by mouth Daily.      buPROPion XL (WELLBUTRIN XL) 300 MG 24 hr tablet Take 1 tablet by mouth Daily. 90 tablet 3    calcium carbonate (OS-SHAHNAZ) 600 MG tablet Take 1 tablet by mouth 2 (Two) Times a Day With Meals.      empagliflozin (JARDIANCE) 10 MG tablet tablet Take 1 tablet by mouth Daily. 30 tablet 0    ibandronate (BONIVA) 150 MG tablet Take 1 tablet by mouth Every 30 (Thirty) Days. 3 tablet 3    magnesium oxide (MAG-OX) 400 MG tablet Take 2 tablets by mouth 2 (two) times a day.      Restasis 0.05 % ophthalmic emulsion Administer 1 drop to both eyes 2 (Two) Times a Day.      rosuvastatin (CRESTOR) 40 MG tablet Take 1 tablet by mouth Every Night. 90 tablet 3    sacubitril-valsartan (ENTRESTO) 24-26 MG tablet Take 1 tablet by mouth 2 (Two) Times a Day. 28 tablet 0    Synthroid 50 MCG tablet Take 1 tablet by mouth Daily. 90 tablet 2    torsemide (DEMADEX) 20 MG tablet Take 1 tablet by mouth Daily. 30 tablet 11    traMADol (ULTRAM) 50 MG tablet Take 1 tablet by mouth Every 6 (Six) Hours As Needed for Moderate Pain. 60 tablet 0    zolpidem (AMBIEN) 5 MG tablet Take 1 tablet by mouth At Night As Needed for Sleep. 30 tablet 0     No current facility-administered medications on file prior to visit.       Social History     Socioeconomic History    Marital status: Single   Tobacco Use    Smoking status: Never     " Passive exposure: Never    Smokeless tobacco: Never    Tobacco comments:     N/A   Vaping Use    Vaping status: Never Used   Substance and Sexual Activity    Alcohol use: Yes     Alcohol/week: 2.0 standard drinks of alcohol     Types: 1 Glasses of wine, 1 Cans of beer per week     Comment: socially    Drug use: Never    Sexual activity: Not Currently     Partners: Female     Birth control/protection: None           ROS    Procedures    ECG 12 Lead    Date/Time: 4/16/2024 12:36 PM  Performed by: Lashay Leon MD    Authorized by: Lashay Leon MD  Comparison: compared with previous ECG   Comparison to previous ECG: Slight st changes  Rhythm: sinus rhythm  ST Depression: II, III and aVF  T inversion: V5, V3 and V4    Clinical impression: abnormal EKG              Objective:      Vitals:    04/16/24 1206   BP: 110/70   Pulse: 85   Weight: 96.2 kg (212 lb)   Height: 167.6 cm (66\")     Body mass index is 34.22 kg/m².    Vitals reviewed.   Constitutional:       General: Not in acute distress.     Appearance: Well-developed. Not diaphoretic.   Eyes:      General: No scleral icterus.     Conjunctiva/sclera: Conjunctivae normal.   HENT:      Head: Normocephalic and atraumatic.   Neck:      Thyroid: No thyromegaly.      Vascular: No carotid bruit or JVD.      Lymphadenopathy: No cervical adenopathy.   Pulmonary:      Effort: Pulmonary effort is normal. No respiratory distress.      Breath sounds: Normal breath sounds. No wheezing. No rhonchi. No rales.   Chest:      Chest wall: Not tender to palpatation.   Cardiovascular:      Normal rate. Regular rhythm.      Murmurs: There is no murmur.      No gallop.  No rub.   Pulses:     Intact distal pulses.      Carotid: 2+ bilaterally.     Radial: 2+ bilaterally.     Dorsalis pedis: 2+ bilaterally.     Posterior tibial: 2+ bilaterally.  Edema:     Peripheral edema present.     Ankle: bilateral 2+ edema of the ankle.     Feet: bilateral 2+ edema of the " feet.  Abdominal:      General: Bowel sounds are normal. There is no distension or abdominal bruit.      Palpations: Abdomen is soft. There is no abdominal mass.      Tenderness: There is no abdominal tenderness.   Musculoskeletal:         General: No deformity.      Extremities: No clubbing present.     Cervical back: Neck supple. Skin:     General: Skin is warm and dry. There is no cyanosis.      Coloration: Skin is not pale.      Findings: No rash.   Neurological:      Mental Status: Alert and oriented to person, place, and time.      Cranial Nerves: No cranial nerve deficit.   Psychiatric:         Judgment: Judgment normal.         Lab Review:       Assessment:      Diagnosis Plan   1. Chronic coronary artery disease  Lipoprotein A (LPA)    Apolipoprotein B    proBNP    High Sensitivity Troponin T    D-dimer, Quantitative    Comprehensive Metabolic Panel    CBC & Differential    Uric Acid    Case Request Cath Lab: Coronary angiography      2. Mixed hyperlipidemia  Lipoprotein A (LPA)    Apolipoprotein B    proBNP    High Sensitivity Troponin T    D-dimer, Quantitative    Comprehensive Metabolic Panel    CBC & Differential    Uric Acid    Case Request Cath Lab: Coronary angiography      3. Chronic diastolic congestive heart failure  Lipoprotein A (LPA)    Apolipoprotein B    proBNP    High Sensitivity Troponin T    D-dimer, Quantitative    Comprehensive Metabolic Panel    CBC & Differential    Uric Acid    Case Request Cath Lab: Coronary angiography      4. Benign essential HTN        5. Unstable angina  Lipoprotein A (LPA)    Apolipoprotein B    proBNP    High Sensitivity Troponin T    D-dimer, Quantitative    Comprehensive Metabolic Panel    CBC & Differential    Uric Acid    Case Request Cath Lab: Coronary angiography        Chronic HFpEF, nonischemic stress nuclear perfusion study 5/2022 and echocardiogram showing normal ejection fraction 5/2022.  She is on Bumex  CAD, history of LAD stent, last work-up was  in 2015, cardiac catheterization showing patent stent.  Exertional dyspnea with slight changes in her ECG, set up cardiac catheterization as her symptoms sound cardiac in nature despite normal stress nuclear perfusion study done 1/2024.  COVID-19 infection 3/2021.  History of DVT and pulmonary embolism June 2021, on Eliquis until the end of June 2022.   Hyperlipidemia, on rosuvastatin.   Hypertension, goal <120/80.   Bilateral carotid artery stenosis.  Foot and ankle edema, BRYAN, looks like lymphedema, will try to reach the lymphedema clinic as she really has not gotten any therapy for this from them.  She was seen by them May 2023.     Plan:       Progressive exertional dyspnea with chest tightness and fatigue, set up cardiac catheterization.  Check laboratory values as well.  No medication changes, see me in 6 weeks.

## 2024-04-16 NOTE — PROGRESS NOTES
Date of Office Visit: 2023  Encounter Provider: Lashay Leon MD  Place of Service: [facility] CARDIOLOGY  Patient Name: Barbara Tran  :1955      Patient ID:  Barbara Tran is a 69 y.o. female is here for  followup for CAD.         History of Present Illness    She has a history of HFpEF, hyperlipidemia, hypertension, DVT with pulmonary embolism in 2021, CAD with LAD stent done 2015.     She received a proximal LAD stent in .  Last cardiac catheterization done in  showed proximal patent LAD stent patent, 80% septal  stenosis unchanged from prior cath, normal left circumflex and RCA, normal left ventricular systolic function.     She had COVID-19 pneumonia 3/8/2021.  She then presented 6/3/2021 with acute short windedness and was diagnosed with bilateral acute pulmonary embolism without acute cor pulmonale.  She was started on Eliquis.  Echo done 9/15/2021 showed ejection fraction of 46-50% with moderate to severe septal hypokinesis, grade 1 diastolic dysfunction, normal right ventricular size and function with normal saline contrast today, no pericardial effusion and mild left atrial enlargement.  She has followed with Dr. Rodriguez for pulmonary nodules.     Her mom, dad, sister and brothers x2 of all had heart disease-her mom, dad, sister and one of her brothers have already  with coronary artery disease and her living brother has had 5 vessel bypass.  Her sister also hae diabetes.  She is single, has 3 children, works as an , has never smoked, has tea and coffee, occasional alcohol and no drugs.      Her spironolactone was decreased due to hyperkalemia.  She follows with Dr. Vargas for endocrinology.     Echo done 2022 shows ejection fraction 54% with normal RVSP, grade 1 diastolic dysfunction, no significant valvular abnormalities.  She had nonischemic stress nuclear perfusion study done 2022.  CT chest  without contrast on 3/13/2023 showed stable micronodules.  I did review the CT chest images which shows scattered calcification in the aortic arch, calcification throughout the LAD-stent in the LAD, calcification in the RCA and circumflex.    Labs done 12/7/2023 show normal hemoglobin A1c, triglycerides 71, total cholesterol 137, HDL 69, LDL 53, VLDL 14, normal vitamin D, normal TSH, normal free T4, unremarkable CBC, unremarkable CMP.Venous duplex studies of lower extremities showed normal right lower extremity venous duplex, she had mild stenosis of bilateral carotid arteries with otherwise normal vascular screening done 7/14/2023.  She had a nonischemic stress nuclear perfusion study done 1/2/2024, echocardiogram done 1/2/2024 showed ejection fraction 56/60% with normal diastolic function and no valvular abnormalities, normal RVSP.    She has been noticing progressive exertional dyspnea.  She also has had precordial chest pain which is exertional and nonexertional with radiation to her jaw.  Her energy level is poor.  She just says she really cannot do anything without being short winded.  She has had no dizziness, syncope or falls and she has no orthopnea or PND.  She does not feel her heart racing or skipping.  She is concerned that her heart disease has worsened.        Past Medical History:   Diagnosis Date    Allergic 2015    codeine, morphine, levaquin    Arthritis l5 years or so ago    Cataract 6-9 months ago    Need surgery    CHF (congestive heart failure)     Coronary artery disease 2005 stent    COVID-19 virus detected 03/10/2021    Deep vein thrombosis 06/03/2021    Pulmonary embolism    Depression     Disease of thyroid gland     Headache Covid 3-6-21    Has continued    History of pulmonary embolism 06/01/2021    HL (hearing loss) Last 4-6 months    Hyperlipidemia     Hypertension since 2005    Hypothyroidism since 2012    Obesity     Osteopenia last few years    Pulmonary embolism 06/03/2021    From  Covid         Past Surgical History:   Procedure Laterality Date    CARDIAC CATHETERIZATION  2015, 2018    CATARACT EXTRACTION      CHOLECYSTECTOMY  1995    COLONOSCOPY N/A 12/19/2016    Procedure: COLONOSCOPY WITH COLD BIOPSIES;  Surgeon: Medina Guillen MD;  Location: [facility] ENDOSCOPY;  Service:     CORONARY STENT PLACEMENT  2005    LAD 80% blockage    EYE SURGERY  April 2022    Cataract/ left eye    SUBTOTAL HYSTERECTOMY  2009    TONSILLECTOMY  1965    TUBAL ABDOMINAL LIGATION  1985       Current Outpatient Medications on File Prior to Visit   Medication Sig Dispense Refill    aspirin 81 MG tablet Take 1 tablet by mouth Daily.      buPROPion XL (WELLBUTRIN XL) 300 MG 24 hr tablet Take 1 tablet by mouth Daily. 90 tablet 3    calcium carbonate (OS-SHAHNAZ) 600 MG tablet Take 1 tablet by mouth 2 (Two) Times a Day With Meals.      empagliflozin (JARDIANCE) 10 MG tablet tablet Take 1 tablet by mouth Daily. 30 tablet 0    ibandronate (BONIVA) 150 MG tablet Take 1 tablet by mouth Every 30 (Thirty) Days. 3 tablet 3    magnesium oxide (MAG-OX) 400 MG tablet Take 2 tablets by mouth 2 (two) times a day.      Restasis 0.05 % ophthalmic emulsion Administer 1 drop to both eyes 2 (Two) Times a Day.      rosuvastatin (CRESTOR) 40 MG tablet Take 1 tablet by mouth Every Night. 90 tablet 3    sacubitril-valsartan (ENTRESTO) 24-26 MG tablet Take 1 tablet by mouth 2 (Two) Times a Day. 28 tablet 0    Synthroid 50 MCG tablet Take 1 tablet by mouth Daily. 90 tablet 2    torsemide (DEMADEX) 20 MG tablet Take 1 tablet by mouth Daily. 30 tablet 11    traMADol (ULTRAM) 50 MG tablet Take 1 tablet by mouth Every 6 (Six) Hours As Needed for Moderate Pain. 60 tablet 0    zolpidem (AMBIEN) 5 MG tablet Take 1 tablet by mouth At Night As Needed for Sleep. 30 tablet 0     No current facility-administered medications on file prior to visit.       Social History     Socioeconomic History    Marital status: Single   Tobacco Use    Smoking status: Never     " Passive exposure: Never    Smokeless tobacco: Never    Tobacco comments:     N/A   Vaping Use    Vaping status: Never Used   Substance and Sexual Activity    Alcohol use: Yes     Alcohol/week: 2.0 standard drinks of alcohol     Types: 1 Glasses of wine, 1 Cans of beer per week     Comment: socially    Drug use: Never    Sexual activity: Not Currently     Partners: Female     Birth control/protection: None           ROS    Procedures    ECG 12 Lead    Date/Time: 4/16/2024 12:36 PM  Performed by: Lashay Leon MD    Authorized by: Lashay Leon MD  Comparison: compared with previous ECG   Comparison to previous ECG: Slight st changes  Rhythm: sinus rhythm  ST Depression: II, III and aVF  T inversion: V5, V3 and V4    Clinical impression: abnormal EKG              Objective:      Vitals:    04/16/24 1206   BP: 110/70   Pulse: 85   Weight: 96.2 kg (212 lb)   Height: 167.6 cm (66\")     Body mass index is 34.22 kg/m².    Vitals reviewed.   Constitutional:       General: Not in acute distress.     Appearance: Well-developed. Not diaphoretic.   Eyes:      General: No scleral icterus.     Conjunctiva/sclera: Conjunctivae normal.   HENT:      Head: Normocephalic and atraumatic.   Neck:      Thyroid: No thyromegaly.      Vascular: No carotid bruit or JVD.      Lymphadenopathy: No cervical adenopathy.   Pulmonary:      Effort: Pulmonary effort is normal. No respiratory distress.      Breath sounds: Normal breath sounds. No wheezing. No rhonchi. No rales.   Chest:      Chest wall: Not tender to palpatation.   Cardiovascular:      Normal rate. Regular rhythm.      Murmurs: There is no murmur.      No gallop.  No rub.   Pulses:     Intact distal pulses.      Carotid: 2+ bilaterally.     Radial: 2+ bilaterally.     Dorsalis pedis: 2+ bilaterally.     Posterior tibial: 2+ bilaterally.  Edema:     Peripheral edema present.     Ankle: bilateral 2+ edema of the ankle.     Feet: bilateral 2+ edema of the " feet.  Abdominal:      General: Bowel sounds are normal. There is no distension or abdominal bruit.      Palpations: Abdomen is soft. There is no abdominal mass.      Tenderness: There is no abdominal tenderness.   Musculoskeletal:         General: No deformity.      Extremities: No clubbing present.     Cervical back: Neck supple. Skin:     General: Skin is warm and dry. There is no cyanosis.      Coloration: Skin is not pale.      Findings: No rash.   Neurological:      Mental Status: Alert and oriented to person, place, and time.      Cranial Nerves: No cranial nerve deficit.   Psychiatric:         Judgment: Judgment normal.         Lab Review:       Assessment:      Diagnosis Plan   1. Chronic coronary artery disease  Lipoprotein A (LPA)    Apolipoprotein B    proBNP    High Sensitivity Troponin T    D-dimer, Quantitative    Comprehensive Metabolic Panel    CBC & Differential    Uric Acid    Case Request Cath Lab: Coronary angiography      2. Mixed hyperlipidemia  Lipoprotein A (LPA)    Apolipoprotein B    proBNP    High Sensitivity Troponin T    D-dimer, Quantitative    Comprehensive Metabolic Panel    CBC & Differential    Uric Acid    Case Request Cath Lab: Coronary angiography      3. Chronic diastolic congestive heart failure  Lipoprotein A (LPA)    Apolipoprotein B    proBNP    High Sensitivity Troponin T    D-dimer, Quantitative    Comprehensive Metabolic Panel    CBC & Differential    Uric Acid    Case Request Cath Lab: Coronary angiography      4. Benign essential HTN        5. Unstable angina  Lipoprotein A (LPA)    Apolipoprotein B    proBNP    High Sensitivity Troponin T    D-dimer, Quantitative    Comprehensive Metabolic Panel    CBC & Differential    Uric Acid    Case Request Cath Lab: Coronary angiography        Chronic HFpEF, nonischemic stress nuclear perfusion study 5/2022 and echocardiogram showing normal ejection fraction 5/2022.  She is on Bumex  CAD, history of LAD stent, last work-up was  in 2015, cardiac catheterization showing patent stent.  Exertional dyspnea with slight changes in her ECG, set up cardiac catheterization as her symptoms sound cardiac in nature despite normal stress nuclear perfusion study done 1/2024.  COVID-19 infection 3/2021.  History of DVT and pulmonary embolism June 2021, on Eliquis until the end of June 2022.   Hyperlipidemia, on rosuvastatin.   Hypertension, goal <120/80.   Bilateral carotid artery stenosis.  Foot and ankle edema, BRYAN, looks like lymphedema, will try to reach the lymphedema clinic as she really has not gotten any therapy for this from them.  She was seen by them May 2023.     Plan:       Progressive exertional dyspnea with chest tightness and fatigue, set up cardiac catheterization.  Check laboratory values as well.  No medication changes, see me in 6 weeks.

## 2024-04-17 ENCOUNTER — TELEPHONE (OUTPATIENT)
Dept: CARDIOLOGY | Facility: CLINIC | Age: 69
End: 2024-04-17
Payer: MEDICARE

## 2024-04-17 NOTE — TELEPHONE ENCOUNTER
Patient called and left a voicemail stating she had seen Dr. Loen earlier that morning and had forgotten to mention that she has been experiencing some dizziness when standing that causes her to have to hold onto something. She has not fallen or passed out but it does occur 2-3 times a day but it doesn't come back for 3-4 days.

## 2024-04-17 NOTE — TELEPHONE ENCOUNTER
Reviewed recommendations with patient, verbalized understanding, will call with any further questions or complaints.    Haley Haskins RN  Triage Nurse  04/17/24 11:59 EDT

## 2024-04-17 NOTE — TELEPHONE ENCOUNTER
Please call her, no medication changes for now but be very careful when she changes positions.  Her blood pressure might be getting too low and may need to decrease medications but I do not want to do this until after we do a heart catheterization.

## 2024-04-18 ENCOUNTER — TELEPHONE (OUTPATIENT)
Dept: CARDIOLOGY | Facility: CLINIC | Age: 69
End: 2024-04-18

## 2024-04-18 NOTE — TELEPHONE ENCOUNTER
Caller: Barbara Tran    Relationship: Self    Best call back number:[phone]    What is the best time to reach you: ANY      Do you know the name of the person who called: AMBER ABOUT SCHEDULING FOR HEART CATH    What was the call regarding: PATIENT HAS BEEN CALLING TO GET SCHEDULED, SHE WAS PUT ON HOLD FOR A LONG TIME THIS MORNING AND STILL HASN'T HEARD BACK ABOUT SCHEDULING. PLEASE CALL PATIENT.  PATIENT IS FRUSTRATED BECAUSE SHE HAS CALLED SEVERAL TIME YESTERDAY AND TODAY.

## 2024-04-19 PROBLEM — I20.0 UNSTABLE ANGINA: Status: ACTIVE | Noted: 2024-04-16

## 2024-04-22 DIAGNOSIS — F51.01 PRIMARY INSOMNIA: ICD-10-CM

## 2024-04-23 ENCOUNTER — TELEPHONE (OUTPATIENT)
Dept: FAMILY MEDICINE CLINIC | Facility: CLINIC | Age: 69
End: 2024-04-23
Payer: MEDICARE

## 2024-04-23 RX ORDER — ZOLPIDEM TARTRATE 5 MG/1
5 TABLET ORAL NIGHTLY PRN
Qty: 30 TABLET | Refills: 0 | Status: SHIPPED | OUTPATIENT
Start: 2024-04-23

## 2024-04-23 NOTE — TELEPHONE ENCOUNTER
Caller: Barbara Tran    Relationship: Self    Best call back number: [phone]    What is the best time to reach you: ANYTIME     Who are you requesting to speak with (clinical staff, provider,  specific staff member): CLINICAL     What was the call regarding: PATIENT STATES THAT SHE IS NEEDING A PRIOR AUTHORIZATION FOR HER ZOLPIDEM PRESCRIPTION.     PLEASE ADVISE

## 2024-04-24 ENCOUNTER — LAB (OUTPATIENT)
Dept: LAB | Facility: HOSPITAL | Age: 69
End: 2024-04-24
Payer: MEDICARE

## 2024-04-24 ENCOUNTER — TELEPHONE (OUTPATIENT)
Dept: CARDIOLOGY | Facility: CLINIC | Age: 69
End: 2024-04-24
Payer: MEDICARE

## 2024-04-24 DIAGNOSIS — I20.0 UNSTABLE ANGINA: ICD-10-CM

## 2024-04-24 DIAGNOSIS — E78.2 MIXED HYPERLIPIDEMIA: ICD-10-CM

## 2024-04-24 DIAGNOSIS — I50.32 CHRONIC DIASTOLIC CONGESTIVE HEART FAILURE: ICD-10-CM

## 2024-04-24 DIAGNOSIS — I25.10 CHRONIC CORONARY ARTERY DISEASE: ICD-10-CM

## 2024-04-24 LAB
ALBUMIN SERPL-MCNC: 4.4 G/DL (ref 3.5–5.2)
ALBUMIN/GLOB SERPL: 1.9 G/DL
ALP SERPL-CCNC: 56 U/L (ref 39–117)
ALT SERPL W P-5'-P-CCNC: 22 U/L (ref 1–33)
ANION GAP SERPL CALCULATED.3IONS-SCNC: 8 MMOL/L (ref 5–15)
AST SERPL-CCNC: 18 U/L (ref 1–32)
BASOPHILS # BLD AUTO: 0.04 10*3/MM3 (ref 0–0.2)
BASOPHILS NFR BLD AUTO: 0.6 % (ref 0–1.5)
BILIRUB SERPL-MCNC: 0.4 MG/DL (ref 0–1.2)
BUN SERPL-MCNC: 20 MG/DL (ref 8–23)
BUN/CREAT SERPL: 25.6 (ref 7–25)
CALCIUM SPEC-SCNC: 9.2 MG/DL (ref 8.6–10.5)
CHLORIDE SERPL-SCNC: 104 MMOL/L (ref 98–107)
CO2 SERPL-SCNC: 28 MMOL/L (ref 22–29)
CREAT SERPL-MCNC: 0.78 MG/DL (ref 0.57–1)
D DIMER PPP FEU-MCNC: 0.47 MCGFEU/ML (ref 0–0.71)
DEPRECATED RDW RBC AUTO: 41.1 FL (ref 37–54)
EGFRCR SERPLBLD CKD-EPI 2021: 81.3 ML/MIN/1.73
EOSINOPHIL # BLD AUTO: 0.34 10*3/MM3 (ref 0–0.4)
EOSINOPHIL NFR BLD AUTO: 5.2 % (ref 0.3–6.2)
ERYTHROCYTE [DISTWIDTH] IN BLOOD BY AUTOMATED COUNT: 12.2 % (ref 12.3–15.4)
GLOBULIN UR ELPH-MCNC: 2.3 GM/DL
GLUCOSE SERPL-MCNC: 92 MG/DL (ref 65–99)
HCT VFR BLD AUTO: 49.3 % (ref 34–46.6)
HGB BLD-MCNC: 15.9 G/DL (ref 12–15.9)
IMM GRANULOCYTES # BLD AUTO: 0.01 10*3/MM3 (ref 0–0.05)
IMM GRANULOCYTES NFR BLD AUTO: 0.2 % (ref 0–0.5)
LYMPHOCYTES # BLD AUTO: 2.22 10*3/MM3 (ref 0.7–3.1)
LYMPHOCYTES NFR BLD AUTO: 34 % (ref 19.6–45.3)
MCH RBC QN AUTO: 29.7 PG (ref 26.6–33)
MCHC RBC AUTO-ENTMCNC: 32.3 G/DL (ref 31.5–35.7)
MCV RBC AUTO: 92 FL (ref 79–97)
MONOCYTES # BLD AUTO: 0.35 10*3/MM3 (ref 0.1–0.9)
MONOCYTES NFR BLD AUTO: 5.4 % (ref 5–12)
NEUTROPHILS NFR BLD AUTO: 3.57 10*3/MM3 (ref 1.7–7)
NEUTROPHILS NFR BLD AUTO: 54.6 % (ref 42.7–76)
NRBC BLD AUTO-RTO: 0 /100 WBC (ref 0–0.2)
NT-PROBNP SERPL-MCNC: 59.7 PG/ML (ref 0–900)
PLATELET # BLD AUTO: 307 10*3/MM3 (ref 140–450)
PMV BLD AUTO: 10.5 FL (ref 6–12)
POTASSIUM SERPL-SCNC: 4.4 MMOL/L (ref 3.5–5.2)
PROT SERPL-MCNC: 6.7 G/DL (ref 6–8.5)
RBC # BLD AUTO: 5.36 10*6/MM3 (ref 3.77–5.28)
SODIUM SERPL-SCNC: 140 MMOL/L (ref 136–145)
TROPONIN T SERPL HS-MCNC: 9 NG/L
URATE SERPL-MCNC: 5 MG/DL (ref 2.4–5.7)
WBC NRBC COR # BLD AUTO: 6.53 10*3/MM3 (ref 3.4–10.8)

## 2024-04-24 PROCEDURE — 83880 ASSAY OF NATRIURETIC PEPTIDE: CPT

## 2024-04-24 PROCEDURE — 84484 ASSAY OF TROPONIN QUANT: CPT

## 2024-04-24 PROCEDURE — 80053 COMPREHEN METABOLIC PANEL: CPT

## 2024-04-24 PROCEDURE — 36415 COLL VENOUS BLD VENIPUNCTURE: CPT

## 2024-04-24 PROCEDURE — 82172 ASSAY OF APOLIPOPROTEIN: CPT

## 2024-04-24 PROCEDURE — 85379 FIBRIN DEGRADATION QUANT: CPT

## 2024-04-24 PROCEDURE — 85025 COMPLETE CBC W/AUTO DIFF WBC: CPT

## 2024-04-24 PROCEDURE — 84550 ASSAY OF BLOOD/URIC ACID: CPT

## 2024-04-24 PROCEDURE — 83695 ASSAY OF LIPOPROTEIN(A): CPT

## 2024-04-24 NOTE — TELEPHONE ENCOUNTER
Results called to pt.  Instructed to call with any further questions or concerns.  Verbalized understanding.    Haley Haskins RN  Triage Nurse, [facility]  04/24/24 12:34 EDT

## 2024-04-25 LAB — LPA SERPL-SCNC: 27 NMOL/L

## 2024-04-27 LAB — APO B SERPL-MCNC: 72 MG/DL

## 2024-04-29 ENCOUNTER — TELEPHONE (OUTPATIENT)
Dept: CARDIOLOGY | Facility: CLINIC | Age: 69
End: 2024-04-29
Payer: MEDICARE

## 2024-04-29 ENCOUNTER — HOSPITAL ENCOUNTER (OUTPATIENT)
Facility: HOSPITAL | Age: 69
Setting detail: HOSPITAL OUTPATIENT SURGERY
Discharge: HOME | End: 2024-04-29
Attending: INTERNAL MEDICINE | Admitting: INTERNAL MEDICINE
Payer: MEDICARE

## 2024-04-29 VITALS
BODY MASS INDEX: 33.75 KG/M2 | RESPIRATION RATE: 16 BRPM | WEIGHT: 210 LBS | HEIGHT: 66 IN | OXYGEN SATURATION: 97 % | SYSTOLIC BLOOD PRESSURE: 103 MMHG | HEART RATE: 70 BPM | TEMPERATURE: 98 F | DIASTOLIC BLOOD PRESSURE: 69 MMHG

## 2024-04-29 DIAGNOSIS — E78.2 MIXED HYPERLIPIDEMIA: ICD-10-CM

## 2024-04-29 DIAGNOSIS — I20.0 UNSTABLE ANGINA: ICD-10-CM

## 2024-04-29 DIAGNOSIS — I50.32 CHRONIC DIASTOLIC CONGESTIVE HEART FAILURE: ICD-10-CM

## 2024-04-29 DIAGNOSIS — I25.10 CHRONIC CORONARY ARTERY DISEASE: ICD-10-CM

## 2024-04-29 PROCEDURE — 25010000002 MIDAZOLAM PER 1 MG: Performed by: INTERNAL MEDICINE

## 2024-04-29 PROCEDURE — C1769 GUIDE WIRE: HCPCS | Performed by: INTERNAL MEDICINE

## 2024-04-29 PROCEDURE — 93458 L HRT ARTERY/VENTRICLE ANGIO: CPT | Performed by: INTERNAL MEDICINE

## 2024-04-29 PROCEDURE — 25810000003 SODIUM CHLORIDE 0.9 % SOLUTION: Performed by: INTERNAL MEDICINE

## 2024-04-29 PROCEDURE — 25010000002 HEPARIN (PORCINE) PER 1000 UNITS: Performed by: INTERNAL MEDICINE

## 2024-04-29 PROCEDURE — C1894 INTRO/SHEATH, NON-LASER: HCPCS | Performed by: INTERNAL MEDICINE

## 2024-04-29 PROCEDURE — 25010000002 FENTANYL CITRATE (PF) 50 MCG/ML SOLUTION: Performed by: INTERNAL MEDICINE

## 2024-04-29 PROCEDURE — 25510000001 IOPAMIDOL PER 1 ML: Performed by: INTERNAL MEDICINE

## 2024-04-29 RX ORDER — FENTANYL CITRATE 50 UG/ML
INJECTION, SOLUTION INTRAMUSCULAR; INTRAVENOUS
Status: DISCONTINUED | OUTPATIENT
Start: 2024-04-29 | End: 2024-04-29 | Stop reason: HOSPADM

## 2024-04-29 RX ORDER — SODIUM CHLORIDE 0.9 % (FLUSH) 0.9 %
10 SYRINGE (ML) INJECTION EVERY 12 HOURS SCHEDULED
Status: DISCONTINUED | OUTPATIENT
Start: 2024-04-29 | End: 2024-04-29 | Stop reason: HOSPADM

## 2024-04-29 RX ORDER — SODIUM CHLORIDE 9 MG/ML
75 INJECTION, SOLUTION INTRAVENOUS CONTINUOUS
Status: DISCONTINUED | OUTPATIENT
Start: 2024-04-30 | End: 2024-04-29 | Stop reason: HOSPADM

## 2024-04-29 RX ORDER — HEPARIN SODIUM 1000 [USP'U]/ML
INJECTION, SOLUTION INTRAVENOUS; SUBCUTANEOUS
Status: DISCONTINUED | OUTPATIENT
Start: 2024-04-29 | End: 2024-04-29 | Stop reason: HOSPADM

## 2024-04-29 RX ORDER — SODIUM CHLORIDE 0.9 % (FLUSH) 0.9 %
10 SYRINGE (ML) INJECTION AS NEEDED
Status: DISCONTINUED | OUTPATIENT
Start: 2024-04-29 | End: 2024-04-29 | Stop reason: HOSPADM

## 2024-04-29 RX ORDER — LIDOCAINE HYDROCHLORIDE 20 MG/ML
INJECTION, SOLUTION INFILTRATION; PERINEURAL
Status: DISCONTINUED | OUTPATIENT
Start: 2024-04-29 | End: 2024-04-29 | Stop reason: HOSPADM

## 2024-04-29 RX ORDER — MIDAZOLAM HYDROCHLORIDE 1 MG/ML
INJECTION INTRAMUSCULAR; INTRAVENOUS
Status: DISCONTINUED | OUTPATIENT
Start: 2024-04-29 | End: 2024-04-29 | Stop reason: HOSPADM

## 2024-04-29 RX ORDER — ACETAMINOPHEN 325 MG/1
650 TABLET ORAL EVERY 4 HOURS PRN
Status: DISCONTINUED | OUTPATIENT
Start: 2024-04-29 | End: 2024-04-29 | Stop reason: HOSPADM

## 2024-04-29 RX ORDER — SODIUM CHLORIDE 9 MG/ML
40 INJECTION, SOLUTION INTRAVENOUS AS NEEDED
Status: DISCONTINUED | OUTPATIENT
Start: 2024-04-29 | End: 2024-04-29 | Stop reason: HOSPADM

## 2024-04-29 RX ORDER — VERAPAMIL HYDROCHLORIDE 2.5 MG/ML
INJECTION, SOLUTION INTRAVENOUS
Status: DISCONTINUED | OUTPATIENT
Start: 2024-04-29 | End: 2024-04-29 | Stop reason: HOSPADM

## 2024-04-29 RX ADMIN — SODIUM CHLORIDE 75 ML/HR: 9 INJECTION, SOLUTION INTRAVENOUS at 10:49

## 2024-04-29 NOTE — TELEPHONE ENCOUNTER
Called and left VM, will continue to try to reach pt.    HUB- please put patient straight through to triage    Haley Haskins, RN  Triage RN  04/29/24 08:51 EDT     no

## 2024-04-29 NOTE — Clinical Note
Hemostasis started on the right radial artery. Manual pressure applied to vessel. Manual pressure was held by  RT(R). Manual pressure was held for 5 min. Hemostasis achieved successfully. Closure device additional comment: tensaplast

## 2024-04-29 NOTE — TELEPHONE ENCOUNTER
Pt called back in to triage.  Results and recommendations reviewed with pt.  Instructed to call with any further questions or concerns.  Verbalized understanding.    Haley Haskins RN  Triage Nurse, [facility] [place]  04/29/24 09:28 EDT,

## 2024-04-29 NOTE — DISCHARGE INSTRUCTIONS
[facility]  [address] [place] [address]    Coronary Angiogram (Radial/Ulnar Approach) After Care    Refer to this sheet in the next few weeks. These instructions provide you with information on caring for yourself after your procedure. Your caregiver may also give you more specific instructions. Your treatment has been planned according to current medical practices, but problems sometimes occur. Call your caregiver if you have any problems or questions after your procedure.    Home Care Instructions:  You may shower the day after the procedure. Remove the bandage (dressing) and gently wash the site with plain soap and water. Gently pat the site dry. You may apply a band aid daily for 2 days if desired.    Do not apply powder or lotion to the site.  Do not submerge the affected site in water for 3 to 5 days or until the site is completely healed.   Do not lift, push or pull anything over 5 pounds for 5 days after your procedure or as directed by your physician.  As a reference, a gallon of milk weighs 8 pounds.   Inspect the site at least twice daily. You may notice some bruising at the site and it may be tender for 1 to 2 weeks.     Increase your fluid intake for the next 2 days.    Keep arm elevated for 24 hours. For the remainder of the day, keep your arm in “Pledge of Allegiance” position when up and about.     You may drive 24 hours after the procedure unless otherwise instructed by your caregiver.  Do not operate machinery or power tools for 24 hours.  A responsible adult should be with you for the first 24 hours after you arrive home. Do not make any important legal decisions or sign legal papers for 24 hours.  Do not drink alcohol for 24 hours.    Metformin or any medications containing Metformin should not be taken for 48 hours after your procedure.      Call Your Doctor if:   You have unusual pain at the radial/ulnar (wrist) site.  You have redness, warmth, swelling, or pain at the  radial/ulnar (wrist) site.  You have drainage (other than a small amount of blood on the dressing).  `You have chills or a fever > 101.  Your arm becomes pale or dark, cool, tingly, or numb.  You develop chest pain, shortness of breath, feel faint or pass out.    You have heavy bleeding from the site, hold pressure on the site for 20 minutes.  If the bleeding stops, apply a fresh bandage and call your cardiologist.  However, if you        continue to have bleeding, call 911 and continue to apply pressure to the site.   You have any symptoms of a stroke.  Remember BE FAST  B-balance. Sudden trouble walking or loss of balance.  E-eyes.  Sudden changes in how you see or a sudden onset of a very bad headache.   F-face. Sudden weakness or loss of feeling of the face or facial droop on one side.   A-arms Sudden weakness or numbness in one arm.  One arm drifts down if they are both held out in front of you. This happens suddenly and usually on one side of the body.   S-speech.  Sudden trouble speaking, slurred speech or trouble understanding what are saying.   T-time  Time to call emergency services.  Write down the symptoms and the time they started.

## 2024-04-29 NOTE — TELEPHONE ENCOUNTER
Her LP(a) and apolipoprotein B are both normal, please call and let her know, no medication changes.

## 2024-05-08 DIAGNOSIS — M25.551 RIGHT HIP PAIN: ICD-10-CM

## 2024-05-09 RX ORDER — TRAMADOL HYDROCHLORIDE 50 MG/1
50 TABLET ORAL EVERY 6 HOURS PRN
Qty: 60 TABLET | Refills: 0 | Status: SHIPPED | OUTPATIENT
Start: 2024-05-09

## 2024-05-17 DIAGNOSIS — M25.551 RIGHT HIP PAIN: ICD-10-CM

## 2024-05-17 RX ORDER — TRAMADOL HYDROCHLORIDE 50 MG/1
50 TABLET ORAL EVERY 6 HOURS PRN
Qty: 60 TABLET | Refills: 0 | OUTPATIENT
Start: 2024-05-17

## 2024-05-22 DIAGNOSIS — F51.01 PRIMARY INSOMNIA: ICD-10-CM

## 2024-05-23 RX ORDER — ZOLPIDEM TARTRATE 5 MG/1
5 TABLET ORAL NIGHTLY PRN
Qty: 30 TABLET | Refills: 0 | Status: SHIPPED | OUTPATIENT
Start: 2024-05-23

## 2024-05-30 DIAGNOSIS — F51.01 PRIMARY INSOMNIA: ICD-10-CM

## 2024-05-30 DIAGNOSIS — M25.551 RIGHT HIP PAIN: ICD-10-CM

## 2024-05-31 DIAGNOSIS — E03.9 HYPOTHYROIDISM, UNSPECIFIED TYPE: ICD-10-CM

## 2024-05-31 RX ORDER — LEVOTHYROXINE SODIUM 50 MCG
50 TABLET ORAL DAILY
Qty: 90 TABLET | Refills: 2 | Status: SHIPPED | OUTPATIENT
Start: 2024-05-31

## 2024-05-31 RX ORDER — TORSEMIDE 20 MG/1
20 TABLET ORAL DAILY
Qty: 30 TABLET | Refills: 1 | Status: SHIPPED | OUTPATIENT
Start: 2024-05-31

## 2024-05-31 RX ORDER — ZOLPIDEM TARTRATE 5 MG/1
5 TABLET ORAL NIGHTLY PRN
Qty: 30 TABLET | Refills: 0 | OUTPATIENT
Start: 2024-05-31

## 2024-06-03 DIAGNOSIS — E03.9 HYPOTHYROIDISM, UNSPECIFIED TYPE: ICD-10-CM

## 2024-06-03 DIAGNOSIS — M25.551 RIGHT HIP PAIN: ICD-10-CM

## 2024-06-03 DIAGNOSIS — F51.01 PRIMARY INSOMNIA: ICD-10-CM

## 2024-06-03 RX ORDER — TRAMADOL HYDROCHLORIDE 50 MG/1
50 TABLET ORAL EVERY 6 HOURS PRN
Qty: 60 TABLET | Refills: 0 | OUTPATIENT
Start: 2024-06-03

## 2024-06-03 RX ORDER — ZOLPIDEM TARTRATE 5 MG/1
5 TABLET ORAL NIGHTLY PRN
Qty: 30 TABLET | Refills: 0 | OUTPATIENT
Start: 2024-06-03

## 2024-06-04 DIAGNOSIS — M25.551 RIGHT HIP PAIN: ICD-10-CM

## 2024-06-04 RX ORDER — LEVOTHYROXINE SODIUM 0.05 MG/1
50 TABLET ORAL DAILY
Qty: 90 TABLET | Refills: 2 | OUTPATIENT
Start: 2024-06-04

## 2024-06-04 RX ORDER — LEVOTHYROXINE SODIUM 0.05 MG/1
50 TABLET ORAL DAILY
Qty: 90 TABLET | Refills: 2 | Status: SHIPPED | OUTPATIENT
Start: 2024-06-04

## 2024-06-04 RX ORDER — TRAMADOL HYDROCHLORIDE 50 MG/1
50 TABLET ORAL EVERY 6 HOURS PRN
Qty: 60 TABLET | Refills: 0 | Status: CANCELLED | OUTPATIENT
Start: 2024-06-04

## 2024-06-05 RX ORDER — TRAMADOL HYDROCHLORIDE 50 MG/1
50 TABLET ORAL EVERY 6 HOURS PRN
Qty: 60 TABLET | Refills: 0 | Status: SHIPPED | OUTPATIENT
Start: 2024-06-05

## 2024-06-21 DIAGNOSIS — F51.01 PRIMARY INSOMNIA: ICD-10-CM

## 2024-06-24 DIAGNOSIS — F51.01 PRIMARY INSOMNIA: ICD-10-CM

## 2024-06-24 RX ORDER — ZOLPIDEM TARTRATE 5 MG/1
5 TABLET ORAL NIGHTLY PRN
Qty: 30 TABLET | Refills: 0 | Status: SHIPPED | OUTPATIENT
Start: 2024-06-24

## 2024-06-24 RX ORDER — ZOLPIDEM TARTRATE 5 MG/1
5 TABLET ORAL NIGHTLY PRN
Qty: 30 TABLET | Refills: 0 | OUTPATIENT
Start: 2024-06-24

## 2024-07-05 ENCOUNTER — TELEPHONE (OUTPATIENT)
Dept: CARDIOLOGY | Facility: CLINIC | Age: 69
End: 2024-07-05
Payer: MEDICARE

## 2024-07-05 NOTE — TELEPHONE ENCOUNTER
Caller Name: Barbara Tran TUCKER      Relationship: Self      Best Contact Number: [phone] YOU CAN LEAVE VM      Patient is requesting samples of ENTRESTO 24-26 & JARDIANCE 10 MG      How many days of medication do you have left? 2 DAY        Additional Information:PT WOULD LIKE TO  AT [facility] BUT CAN GO TO [facility]

## 2024-07-23 DIAGNOSIS — F51.01 PRIMARY INSOMNIA: ICD-10-CM

## 2024-07-23 DIAGNOSIS — M25.551 RIGHT HIP PAIN: ICD-10-CM

## 2024-07-23 RX ORDER — TRAMADOL HYDROCHLORIDE 50 MG/1
50 TABLET ORAL EVERY 6 HOURS PRN
Qty: 60 TABLET | Refills: 0 | Status: SHIPPED | OUTPATIENT
Start: 2024-07-23

## 2024-07-23 RX ORDER — ZOLPIDEM TARTRATE 5 MG/1
5 TABLET ORAL NIGHTLY PRN
Qty: 30 TABLET | Refills: 0 | Status: SHIPPED | OUTPATIENT
Start: 2024-07-23

## 2024-07-24 ENCOUNTER — TELEPHONE (OUTPATIENT)
Dept: CARDIOLOGY | Facility: CLINIC | Age: 69
End: 2024-07-24
Payer: MEDICARE

## 2024-07-24 NOTE — TELEPHONE ENCOUNTER
Caller: OLIVE MYERS    Relationship:PATIENT    Callback number: [phone]    Is it ok to leave a message: [x] Yes [] No    Requested medication for samples: ENTRESTO 24-26 AND JARDIANCE 10MG    How much medication does the patient currently have left: A FEW DAYS    Who will be picking up the samples: PATIENT    Additional details provided: PT IS REQUESTING SAMPLES FOR ENTRESTO AND JARDIANCE AND STATES IF POSSIBLE SHE WOULD LIKE TO PICK THEM UP AT [facility] IF POSSIBLE BUT CAN COME TO [facility], PLEASE ADVISE

## 2024-07-26 NOTE — TELEPHONE ENCOUNTER
I called and spoke with Sara.  The samples are at .  I called to let patient know and she verbalizes understanding and will be there to get them soon.    Ángela Roy RN  [facility] Triage  07/26/24 11:17 EDT

## 2024-07-30 ENCOUNTER — OFFICE VISIT (OUTPATIENT)
Dept: CARDIOLOGY | Facility: CLINIC | Age: 69
End: 2024-07-30
Payer: MEDICARE

## 2024-07-30 VITALS
WEIGHT: 219 LBS | DIASTOLIC BLOOD PRESSURE: 60 MMHG | SYSTOLIC BLOOD PRESSURE: 104 MMHG | HEIGHT: 66 IN | HEART RATE: 81 BPM | BODY MASS INDEX: 35.2 KG/M2

## 2024-07-30 DIAGNOSIS — I50.32 CHRONIC DIASTOLIC CONGESTIVE HEART FAILURE: Primary | ICD-10-CM

## 2024-07-30 DIAGNOSIS — I10 BENIGN ESSENTIAL HTN: ICD-10-CM

## 2024-07-30 DIAGNOSIS — I25.10 CHRONIC CORONARY ARTERY DISEASE: ICD-10-CM

## 2024-07-30 DIAGNOSIS — E78.2 MIXED HYPERLIPIDEMIA: ICD-10-CM

## 2024-07-30 DIAGNOSIS — Z95.5 S/P DRUG ELUTING CORONARY STENT PLACEMENT: ICD-10-CM

## 2024-07-30 PROCEDURE — 3078F DIAST BP <80 MM HG: CPT | Performed by: INTERNAL MEDICINE

## 2024-07-30 PROCEDURE — 1160F RVW MEDS BY RX/DR IN RCRD: CPT | Performed by: INTERNAL MEDICINE

## 2024-07-30 PROCEDURE — 99214 OFFICE O/P EST MOD 30 MIN: CPT | Performed by: INTERNAL MEDICINE

## 2024-07-30 PROCEDURE — 1159F MED LIST DOCD IN RCRD: CPT | Performed by: INTERNAL MEDICINE

## 2024-07-30 PROCEDURE — 3074F SYST BP LT 130 MM HG: CPT | Performed by: INTERNAL MEDICINE

## 2024-07-30 PROCEDURE — 93000 ELECTROCARDIOGRAM COMPLETE: CPT | Performed by: INTERNAL MEDICINE

## 2024-07-30 RX ORDER — POTASSIUM CHLORIDE 1500 MG/1
20 TABLET, EXTENDED RELEASE ORAL DAILY
Qty: 90 TABLET | Refills: 3 | Status: SHIPPED | OUTPATIENT
Start: 2024-07-30

## 2024-07-30 NOTE — PROGRESS NOTES
Date of Office Visit: 2024  Encounter Provider: Lashay Leon MD  Place of Service: [facility] CARDIOLOGY  Patient Name: Barbara Tran  :1955      Patient ID:  Barbara Tran is a 69 y.o. female is here for  followup for CAD and HFpEF.        History of Present Illness    She has a history of HFpEF, hyperlipidemia, hypertension, DVT with pulmonary embolism in 2021, CAD with LAD stent done 2015.     She received a proximal LAD stent in .  Last cardiac catheterization done in  showed proximal patent LAD stent patent, 80% septal  stenosis unchanged from prior cath, normal left circumflex and RCA, normal left ventricular systolic function.     She had COVID-19 pneumonia 3/8/2021.  She then presented 6/3/2021 with acute short windedness and was diagnosed with bilateral acute pulmonary embolism without acute cor pulmonale.  She was started on Eliquis.  Echo done 9/15/2021 showed ejection fraction of 46-50% with moderate to severe septal hypokinesis, grade 1 diastolic dysfunction, normal right ventricular size and function with normal saline contrast today, no pericardial effusion and mild left atrial enlargement.  She has followed with Dr. Rodriguez for pulmonary nodules.     Her mom, dad, sister and brothers x2 of all had heart disease-her mom, dad, sister and one of her brothers have already  with coronary artery disease and her living brother has had 5 vessel bypass.  Her sister also hae diabetes.  She is single, has 3 children, works as an , has never smoked, has tea and coffee, occasional alcohol and no drugs.      Her spironolactone was decreased due to hyperkalemia.  She follows with Dr. Vargas for endocrinology.      Echo done 2022 shows ejection fraction 54% with normal RVSP, grade 1 diastolic dysfunction, no significant valvular abnormalities.  She had nonischemic stress nuclear perfusion study done 2022.  CT  chest without contrast on 3/13/2023 showed stable micronodules.  I did review the CT chest images which shows scattered calcification in the aortic arch, calcification throughout the LAD-stent in the LAD, calcification in the RCA and circumflex.     Venous duplex studies of lower extremities showed normal right lower extremity venous duplex, she had mild stenosis of bilateral carotid arteries with otherwise normal vascular screening done 7/14/2023.  She had a nonischemic stress nuclear perfusion study done 1/2/2024, echocardiogram done 1/2/2024 showed ejection fraction 56/60% with normal diastolic function and no valvular abnormalities, normal RVSP.     Cardiac catheterization done 4/29/2024 for exertional dyspnea showed 20% proximal disease, patent mid LAD stent, 80% origin second  stenosis, normal circumflex, 20% proximal RCA stenosis with 40% mid vessel stenosis, risk factor modification recommended.    She has lower extremity edema and has gained about 15 pounds of her weight.  She does not believe the torsemide is helping.  She is not exercising.  She is very tired all the time.  She has no orthopnea or PND.  She has mild exertional dyspnea.  She has no chest pain or pressure.  She does not feel heart racing or skipping.  Ultrasound of the thyroid done 7/25/2024 shows a right lower pole nodule.  She sees Dr. Olivares.     Past Medical History:   Diagnosis Date    Allergic 2015    codeine, morphine, levaquin    Arthritis l5 years or so ago    Cataract 6-9 months ago    Need surgery    CHF (congestive heart failure)     Coronary artery disease 2005 stent    COVID-19 virus detected 03/10/2021    Deep vein thrombosis 06/03/2021    Pulmonary embolism    Depression     Disease of thyroid gland     Headache Covid 3-6-21    Has continued    History of pulmonary embolism 06/01/2021    HL (hearing loss) Last 4-6 months    Hyperlipidemia     Hypertension since 2005    Hypothyroidism since 2012    Obesity      Osteopenia last few years    Pulmonary embolism 06/03/2021    From Covid         Past Surgical History:   Procedure Laterality Date    BUNIONECTOMY Bilateral     HAMMERT TOE REPAIR/BUNIONECTOMY    CARDIAC CATHETERIZATION  2015, 2018    CARDIAC CATHETERIZATION N/A 4/29/2024    Procedure: Coronary angiography;  Surgeon: Cong Rivera MD;  Location:  [facility];  Service: Cardiovascular;  Laterality: N/A;    CARDIAC CATHETERIZATION N/A 4/29/2024    Procedure: Left Heart Cath;  Surgeon: Cong Rivera MD;  Location:  [facility];  Service: Cardiovascular;  Laterality: N/A;    CARDIAC CATHETERIZATION N/A 4/29/2024    Procedure: Left ventriculography;  Surgeon: Cong Rivera MD;  Location:  [facility];  Service: Cardiovascular;  Laterality: N/A;    CATARACT EXTRACTION      CHOLECYSTECTOMY  1995    COLONOSCOPY N/A 12/19/2016    Procedure: COLONOSCOPY WITH COLD BIOPSIES;  Surgeon: Medina Guillen MD;  Location:  [facility] ENDOSCOPY;  Service:     CORONARY STENT PLACEMENT  2005    LAD 80% blockage    GANGLION CYST EXCISION      R WRIST    SUBTOTAL HYSTERECTOMY  2009    TONSILLECTOMY  1965    TUBAL ABDOMINAL LIGATION  1985       Current Outpatient Medications on File Prior to Visit   Medication Sig Dispense Refill    aspirin 81 MG tablet Take 1 tablet by mouth Daily.      buPROPion XL (WELLBUTRIN XL) 300 MG 24 hr tablet Take 1 tablet by mouth Daily. 90 tablet 3    calcium carbonate (OS-SHAHNAZ) 600 MG tablet Take 1 tablet by mouth 2 (Two) Times a Day With Meals.      empagliflozin (JARDIANCE) 10 MG tablet tablet Take 1 tablet by mouth Daily. 14 tablet 0    ibandronate (BONIVA) 150 MG tablet Take 1 tablet by mouth Every 30 (Thirty) Days. 3 tablet 3    levothyroxine (Synthroid) 50 MCG tablet Take 1 tablet by mouth Daily. 90 tablet 2    magnesium oxide (MAG-OX) 400 MG tablet Take 2 tablets by mouth 2 (two) times a day.      rosuvastatin (CRESTOR) 40 MG tablet Take 1 tablet by  "mouth Every Night. 90 tablet 3    sacubitril-valsartan (ENTRESTO) 24-26 MG tablet Take 1 tablet by mouth 2 (Two) Times a Day. 28 tablet 0    torsemide (DEMADEX) 20 MG tablet Take 1 tablet by mouth Daily. 30 tablet 1    traMADol (ULTRAM) 50 MG tablet Take 1 tablet by mouth Every 6 (Six) Hours As Needed for Moderate Pain. 60 tablet 0    zolpidem (AMBIEN) 5 MG tablet Take 1 tablet by mouth At Night As Needed for Sleep. 30 tablet 0    Restasis 0.05 % ophthalmic emulsion Administer 1 drop to both eyes 2 (Two) Times a Day. (Patient not taking: Reported on 7/30/2024)       No current facility-administered medications on file prior to visit.       Social History     Socioeconomic History    Marital status: Single   Tobacco Use    Smoking status: Never     Passive exposure: Never    Smokeless tobacco: Never    Tobacco comments:     N/A   Vaping Use    Vaping status: Never Used   Substance and Sexual Activity    Alcohol use: Yes     Alcohol/week: 2.0 standard drinks of alcohol     Types: 1 Glasses of wine, 1 Cans of beer per week     Comment: socially    Drug use: Never    Sexual activity: Not Currently     Partners: Female     Birth control/protection: None             Procedures    ECG 12 Lead    Date/Time: 7/30/2024 1:17 PM  Performed by: Lashay Leon MD    Authorized by: Lashay Leon MD  Comparison: compared with previous ECG   Similar to previous ECG  Rhythm: sinus rhythm  T flattening: all    Clinical impression: non-specific ECG            Objective:      Vitals:    07/30/24 1312   BP: 104/60   Pulse: 81   Weight: 99.3 kg (219 lb)   Height: 167.6 cm (66\")     Body mass index is 35.35 kg/m².    Vitals reviewed.   Constitutional:       General: Not in acute distress.     Appearance: Not diaphoretic.   Neck:      Vascular: No carotid bruit or JVD.   Pulmonary:      Effort: Pulmonary effort is normal.      Breath sounds: Normal breath sounds.   Cardiovascular:      Normal rate. Regular rhythm.      " Murmurs: There is no murmur.      No gallop.  No rub.   Pulses:     Intact distal pulses.      Carotid: 2+ bilaterally.     Radial: 2+ bilaterally.     Dorsalis pedis: 2+ bilaterally.     Posterior tibial: 2+ bilaterally.  Edema:     Peripheral edema present.     Ankle: bilateral 2+ edema of the ankle.     Feet: bilateral 2+ edema of the feet.  Neurological:      Cranial Nerves: No cranial nerve deficit.         Lab Review:       Assessment:      Diagnosis Plan   1. Chronic coronary artery disease        2. Benign essential HTN        3. Mixed hyperlipidemia        4. S/P drug eluting coronary stent placement          Chronic HFpEF, is volume overloaded with leg edema, increase torsemide to 40 mg in the morning and start KCl 20 mEq daily.  CAD- h/o LAD stent.  Patent stent on cardiac catheterization 4/2024.  COVID-19 infection 3/2021.  History of DVT and pulmonary embolism June 2021, on Eliquis until the end of June 2022.   Hyperlipidemia, on rosuvastatin.   Hypertension, goal <120/80.   Bilateral carotid artery stenosis.  Poor sleep, see PCP  Sedentary lifestyle, advised exercise daily, she would benefit from pool therapy  Severe fatigue, may be due to meds, may be due to low blood pressure.  I do not want to change her medications though because she has benefited from her current medications.     Plan:       See Laly in 4 weeks.  If she is still volume overloaded, consider addition of low-dose spironolactone with once weekly Zaroxolyn 30 minutes prior to torsemide.  Needs to exercise to improve her energy level and improve her sleep.

## 2024-08-01 ENCOUNTER — APPOINTMENT (OUTPATIENT)
Dept: GENERAL RADIOLOGY | Facility: HOSPITAL | Age: 69
End: 2024-08-01
Payer: MEDICARE

## 2024-08-01 ENCOUNTER — HOSPITAL ENCOUNTER (EMERGENCY)
Facility: HOSPITAL | Age: 69
Discharge: HOME | End: 2024-08-01
Attending: STUDENT IN AN ORGANIZED HEALTH CARE EDUCATION/TRAINING PROGRAM
Payer: MEDICARE

## 2024-08-01 VITALS
TEMPERATURE: 97 F | SYSTOLIC BLOOD PRESSURE: 117 MMHG | OXYGEN SATURATION: 94 % | HEIGHT: 66 IN | BODY MASS INDEX: 33.75 KG/M2 | DIASTOLIC BLOOD PRESSURE: 55 MMHG | WEIGHT: 210 LBS | HEART RATE: 93 BPM | RESPIRATION RATE: 18 BRPM

## 2024-08-01 DIAGNOSIS — M79.672 LEFT FOOT PAIN: Primary | ICD-10-CM

## 2024-08-01 PROCEDURE — 73630 X-RAY EXAM OF FOOT: CPT

## 2024-08-01 PROCEDURE — 99283 EMERGENCY DEPT VISIT LOW MDM: CPT

## 2024-08-01 PROCEDURE — 73610 X-RAY EXAM OF ANKLE: CPT

## 2024-08-01 RX ORDER — HYDROCODONE BITARTRATE AND ACETAMINOPHEN 7.5; 325 MG/1; MG/1
1 TABLET ORAL ONCE
Status: COMPLETED | OUTPATIENT
Start: 2024-08-01 | End: 2024-08-01

## 2024-08-01 RX ORDER — HYDROCODONE BITARTRATE AND ACETAMINOPHEN 10; 325 MG/1; MG/1
.5-1 TABLET ORAL EVERY 6 HOURS PRN
Qty: 10 TABLET | Refills: 0 | Status: SHIPPED | OUTPATIENT
Start: 2024-08-01

## 2024-08-01 RX ADMIN — HYDROCODONE BITARTRATE AND ACETAMINOPHEN 1 TABLET: 7.5; 325 TABLET ORAL at 22:51

## 2024-08-02 NOTE — ED PROVIDER NOTES
EMERGENCY DEPARTMENT ENCOUNTER    Room Number:  31/31  PCP: Millicent Ace MD  History obtained from: Patient      HPI:  Chief Complaint: Left foot pain  A complete HPI/ROS/PMH/PSH/SH/FH are unobtainable due to:   Context: Barbara Tran is a 69 y.o. female who presents to the ED c/o foot pain.  Stepped on the edge of a step wrong dented.  Currently has pain over the plantar surface of the foot and around the ankle.  No other recent illness, fever, chills.  Difficulty with weightbearing.            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    Chronic pain of left knee 02/11/2015    Chronic coronary artery disease 07/21/2016    Hypothyroidism 10/21/2016    History of sepsis 10/22/2016    Primary insomnia 01/20/2017    Chronic diastolic congestive heart failure 08/30/2018    S/P drug eluting coronary stent placement 09/21/2018    Pulmonic valve regurgitation 09/21/2018    Tricuspid valve regurgitation 09/21/2018    Osteoporosis 12/31/2019    Hyperglycemia 07/27/2020    History of pulmonary embolism 06/2021    Balance problem 08/26/2022    Unstable angina 04/16/2024     Resolved Ambulatory Problems     Diagnosis Date Noted    Simple obesity 01/28/2016    Pain in radius 01/28/2016    Pancolitis 10/20/2016    Localized edema 10/24/2016    Balance problems 04/17/2018    COVID-19 virus detected 03/10/2021    Acute pulmonary embolism without acute cor pulmonale 06/03/2021     Past Medical History:   Diagnosis Date    Allergic 2015    Arthritis l5 years or so ago    Cataract 6-9 months ago    CHF (congestive heart failure)     Coronary artery disease 2005 stent    Deep vein thrombosis 06/03/2021    Depression     Disease of thyroid gland     Headache Covid 3-6-21    HL (hearing loss) Last 4-6 months     Hyperlipidemia     Hypertension since     Obesity     Osteopenia last few years    Pulmonary embolism 2021         PAST SURGICAL HISTORY  Past Surgical History:   Procedure Laterality Date    BUNIONECTOMY Bilateral     HAMMERT TOE REPAIR/BUNIONECTOMY    CARDIAC CATHETERIZATION  ,     CARDIAC CATHETERIZATION N/A 2024    Procedure: Coronary angiography;  Surgeon: Cong Rivera MD;  Location:  [facility];  Service: Cardiovascular;  Laterality: N/A;    CARDIAC CATHETERIZATION N/A 2024    Procedure: Left Heart Cath;  Surgeon: Cong Rivera MD;  Location:  [facility];  Service: Cardiovascular;  Laterality: N/A;    CARDIAC CATHETERIZATION N/A 2024    Procedure: Left ventriculography;  Surgeon: Cong Rivera MD;  Location:  [facility];  Service: Cardiovascular;  Laterality: N/A;    CATARACT EXTRACTION      CHOLECYSTECTOMY      COLONOSCOPY N/A 2016    Procedure: COLONOSCOPY WITH COLD BIOPSIES;  Surgeon: Medina Guillen MD;  Location: [facility] ENDOSCOPY;  Service:     CORONARY STENT PLACEMENT      LAD 80% blockage    GANGLION CYST EXCISION      R WRIST    SUBTOTAL HYSTERECTOMY  2009    TONSILLECTOMY  1965    TUBAL ABDOMINAL LIGATION  1985         FAMILY HISTORY  Family History   Problem Relation Age of Onset    Heart disease Mother             Heart disease Father             No Known Problems Maternal Aunt     No Known Problems Maternal Uncle     No Known Problems Paternal Aunt     No Known Problems Paternal Uncle     No Known Problems Maternal Grandmother     No Known Problems Maternal Grandfather     No Known Problems Paternal Grandmother     No Known Problems Paternal Grandfather     Diabetes Sister     Heart disease Sister 69            Heart disease Brother         open heart-bypass    Cancer Brother         Bladder/Rodney    Arthritis Brother     Heart disease Brother 62             Heart attack Brother             Arthritis Son     Celiac disease Neg Hx     Cirrhosis Neg Hx     Colon cancer Neg Hx     Colon polyps Neg Hx     Crohn's disease Neg Hx     Cystic fibrosis Neg Hx     Esophageal cancer Neg Hx     Hemochromatosis Neg Hx     Inflammatory bowel disease Neg Hx     Irritable bowel syndrome Neg Hx     Liver cancer Neg Hx     Liver disease Neg Hx     Rectal cancer Neg Hx     Stomach cancer Neg Hx     Ulcerative colitis Neg Hx     Dante's disease Neg Hx     Alcohol abuse Neg Hx     Pancreatitis Neg Hx          SOCIAL HISTORY  Social History     Socioeconomic History    Marital status: Single   Tobacco Use    Smoking status: Never     Passive exposure: Never    Smokeless tobacco: Never    Tobacco comments:     N/A   Vaping Use    Vaping status: Never Used   Substance and Sexual Activity    Alcohol use: Yes     Alcohol/week: 2.0 standard drinks of alcohol     Types: 1 Glasses of wine, 1 Cans of beer per week     Comment: socially    Drug use: Never    Sexual activity: Not Currently     Partners: Female     Birth control/protection: None         ALLERGIES  Codeine, Levofloxacin, and Morphine        REVIEW OF SYSTEMS    As per HPI      PHYSICAL EXAM  ED Triage Vitals   Temp Heart Rate Resp BP SpO2   24   97 °F (36.1 °C) 93 18 117/55 94 %      Temp src Heart Rate Source Patient Position BP Location FiO2 (%)   24 --   Tympanic Monitor Lying Right arm        Physical Exam  Constitutional:       General: She is not in acute distress.  HENT:      Head: Normocephalic and atraumatic.   Cardiovascular:      Rate and Rhythm: Normal rate and regular rhythm.   Pulmonary:      Effort: Pulmonary effort is normal. No respiratory distress.   Abdominal:      General: There is no distension.      Palpations: Abdomen is soft.      Tenderness: There is no abdominal tenderness.    Musculoskeletal:         General: Swelling and tenderness present. No deformity.      Comments: Swelling and tenderness over the foot and left ankle without obvious deformity, good pulses, previous deformity of the first and second digits from prior surgery, patient reports this is baseline.   Skin:     General: Skin is warm and dry.   Neurological:      Mental Status: She is alert. Mental status is at baseline.           Vital signs and nursing notes reviewed.          LAB RESULTS  No results found for this or any previous visit (from the past 24 hour(s)).    Ordered the above labs and reviewed the results.        RADIOLOGY  XR Foot 3+ View Left, XR Ankle 3+ View Left    Result Date: 8/1/2024  XR ANKLE 3+ VW LEFT-, XR FOOT 3+ VW LEFT-  Clinical: Pain, injury  FINDINGS: There is considerable soft tissue swelling about the ankle without osteochondral defect or fracture. Tibiotalar alignment is appropriate. Ankle joint narrowing seen. No foot fracture or dislocation is demonstrated. There is soft tissue swelling. Orthopedic screw and wire demonstrated at the base of the first metatarsal. There is first tarsometatarsal joint narrowing. The wire is broken, this likely an old finding. There is considerable first metatarsophalangeal joint degeneration with lateral subluxation. There is interphalangeal joint narrowing seen. There is considerable tarsometatarsal joint degeneration.  CONCLUSION: No fracture or dislocation is demonstrated. There is soft tissue swelling with joint degeneration as described above.  This report was finalized on 8/1/2024 9:49 PM by Dr. Markel Holman M.D on Workstation: [employer]       Ordered the above noted radiological studies. Reviewed by me in PACS.                  MEDICATIONS GIVEN IN ER  Medications - No data to display            MEDICAL DECISION MAKING, PROGRESS, and CONSULTS    MDM: Patient presented emergency department with left foot pain, otherwise well-appearing, vitals otherwise  stable.  Imaging reassuring for acute fracture.  While placed in postop shoe, patient has a walker at home.  Given podiatry follow-up.  Given return precautions and discharged home.    [place] reviewed    All labs have been independently reviewed by me.  All radiology studies have been reviewed by me and I have also reviewed the radiology report.   EKG's independently viewed and interpreted by me.  Discussion below represents my analysis of pertinent findings related to patient's condition, differential diagnosis, treatment plan and final disposition.      Additional sources:  - Discussed/ obtained information from independent historians:      - External (non-ED) record review:     - Chronic or social conditions impacting care: Heart failure    - Shared decision making:        Orders placed during this visit:  Orders Placed This Encounter   Procedures    [facility] DME 10.  Post Op Shoe    XR Foot 3+ View Left    XR Ankle 3+ View Left    Obtain & Apply The Following- Lower extremity; Post-op shoe         Additional orders considered but not ordered:  Considered CT of the left foot however low suspicion for significant acute ligamentous injury        Differential diagnosis includes but is not limited to:    Hall fracture, Lisfranc injury, metatarsal fracture, ligamentous injury      Independent interpretation of labs, radiology studies, and discussions with consultants:  ED Course as of 08/01/24 2230   Thu Aug 01, 2024   2148 X-ray left foot interpreted myself:  No obvious fracture, previous fixation [FS]      ED Course User Index  [FS] Denton Bah MD           DIAGNOSIS  Final diagnoses:   Left foot pain         DISPOSITION  Discharged home        Latest Documented Vital Signs:  As of 22:30 EDT  BP- 117/55 HR- 93 Temp- 97 °F (36.1 °C) (Tympanic) O2 sat- 94%              --    Please note that portions of this were completed with a voice recognition program.       Note Disclaimer: At [facility], we  believe that sharing information builds trust and better relationships. You are receiving this note because you are receiving care at [facility] or recently visited. It is possible you will see health information before a provider has talked with you about it. This kind of information can be easy to misunderstand. To help you fully understand what it means for your health, we urge you to discuss this note with your provider.             Denton Bah MD  08/01/24 [id number]

## 2024-08-05 ENCOUNTER — TELEPHONE (OUTPATIENT)
Dept: FAMILY MEDICINE CLINIC | Facility: CLINIC | Age: 69
End: 2024-08-05
Payer: MEDICARE

## 2024-08-05 DIAGNOSIS — M79.672 LEFT FOOT PAIN: Primary | ICD-10-CM

## 2024-08-05 NOTE — TELEPHONE ENCOUNTER
Caller: Barbara Tran    Relationship to patient: Self    Best call back number:      Patient is needing: PATIENT WENT TO ER THURSDAY NIGHT FOR PULLING SOMETHING IN HER FOOT.    SHE STATES SHE CANNOT PUT ANY WEIGHT ON HER FOOT AND WANTS A REFERRAL TO SOMEONE TO EVALUATE IT.

## 2024-08-11 DIAGNOSIS — M25.551 RIGHT HIP PAIN: ICD-10-CM

## 2024-08-11 DIAGNOSIS — F34.1 DYSTHYMIA: ICD-10-CM

## 2024-08-11 DIAGNOSIS — E78.2 MIXED HYPERLIPIDEMIA: ICD-10-CM

## 2024-08-11 DIAGNOSIS — M81.0 AGE-RELATED OSTEOPOROSIS WITHOUT CURRENT PATHOLOGICAL FRACTURE: ICD-10-CM

## 2024-08-12 RX ORDER — BUPROPION HYDROCHLORIDE 300 MG/1
300 TABLET ORAL DAILY
Qty: 90 TABLET | Refills: 3 | Status: SHIPPED | OUTPATIENT
Start: 2024-08-12

## 2024-08-12 RX ORDER — ROSUVASTATIN CALCIUM 40 MG/1
40 TABLET, COATED ORAL NIGHTLY
Qty: 90 TABLET | Refills: 3 | Status: SHIPPED | OUTPATIENT
Start: 2024-08-12

## 2024-08-12 RX ORDER — IBANDRONATE SODIUM 150 MG/1
150 TABLET, FILM COATED ORAL
Qty: 3 TABLET | Refills: 3 | Status: SHIPPED | OUTPATIENT
Start: 2024-08-12

## 2024-08-12 RX ORDER — TRAMADOL HYDROCHLORIDE 50 MG/1
50 TABLET ORAL EVERY 6 HOURS PRN
Qty: 60 TABLET | Refills: 0 | OUTPATIENT
Start: 2024-08-12

## 2024-08-15 DIAGNOSIS — M25.551 RIGHT HIP PAIN: ICD-10-CM

## 2024-08-15 RX ORDER — TRAMADOL HYDROCHLORIDE 50 MG/1
50 TABLET ORAL EVERY 6 HOURS PRN
Qty: 60 TABLET | OUTPATIENT
Start: 2024-08-15

## 2024-08-16 DIAGNOSIS — M25.551 RIGHT HIP PAIN: ICD-10-CM

## 2024-08-16 RX ORDER — TRAMADOL HYDROCHLORIDE 50 MG/1
50 TABLET ORAL EVERY 6 HOURS PRN
Qty: 60 TABLET | Refills: 0 | Status: SHIPPED | OUTPATIENT
Start: 2024-08-16

## 2024-08-22 DIAGNOSIS — F51.01 PRIMARY INSOMNIA: ICD-10-CM

## 2024-08-23 ENCOUNTER — TELEPHONE (OUTPATIENT)
Dept: FAMILY MEDICINE CLINIC | Facility: CLINIC | Age: 69
End: 2024-08-23

## 2024-08-23 RX ORDER — ZOLPIDEM TARTRATE 5 MG/1
5 TABLET ORAL NIGHTLY PRN
Qty: 30 TABLET | Refills: 0 | Status: SHIPPED | OUTPATIENT
Start: 2024-08-23

## 2024-08-23 RX ORDER — ZOLPIDEM TARTRATE 5 MG/1
5 TABLET ORAL NIGHTLY PRN
Qty: 30 TABLET | OUTPATIENT
Start: 2024-08-23

## 2024-08-28 ENCOUNTER — OFFICE VISIT (OUTPATIENT)
Dept: CARDIOLOGY | Facility: CLINIC | Age: 69
End: 2024-08-28
Payer: MEDICARE

## 2024-08-28 VITALS
WEIGHT: 210 LBS | OXYGEN SATURATION: 99 % | HEIGHT: 66 IN | BODY MASS INDEX: 33.75 KG/M2 | HEART RATE: 100 BPM | DIASTOLIC BLOOD PRESSURE: 76 MMHG | SYSTOLIC BLOOD PRESSURE: 116 MMHG

## 2024-08-28 DIAGNOSIS — I25.10 CHRONIC CORONARY ARTERY DISEASE: ICD-10-CM

## 2024-08-28 DIAGNOSIS — E78.2 MIXED HYPERLIPIDEMIA: ICD-10-CM

## 2024-08-28 DIAGNOSIS — Z95.5 S/P DRUG ELUTING CORONARY STENT PLACEMENT: ICD-10-CM

## 2024-08-28 DIAGNOSIS — I50.32 CHRONIC DIASTOLIC CONGESTIVE HEART FAILURE: Primary | ICD-10-CM

## 2024-08-28 DIAGNOSIS — I10 BENIGN ESSENTIAL HTN: ICD-10-CM

## 2024-08-28 RX ORDER — SPIRONOLACTONE 25 MG/1
25 TABLET ORAL DAILY
Qty: 90 TABLET | Refills: 3 | Status: SHIPPED | OUTPATIENT
Start: 2024-08-28

## 2024-08-28 RX ORDER — TORSEMIDE 20 MG/1
20 TABLET ORAL DAILY
Start: 2024-08-28

## 2024-08-28 NOTE — PROGRESS NOTES
Date of Office Visit: 2024  Encounter Provider: MANOHAR Dhaliwal  Place of Service: [facility] CARDIOLOGY  Established cardiologist: Lashay Leon MD  Patient Name: Barbara Tran  :1955      Patient ID:  Barbara Tran is a 69 y.o. female is here for  followup    With a pertinent medical history of HFpEF, hyperlipidemia, hypertension, DVT with pulmonary embolism in 2021, CAD with LAD stent done 2015.   Her mom, dad, sister and brothers x2 of all had heart disease-her mom, dad, sister and one of her brothers have already  with coronary artery disease and her living brother has had 5 vessel bypass.  Her sister also hae diabetes.  She is single, has 3 children, works as an , has never smoked, has tea and coffee, occasional alcohol and no drugs.        History of Present Illness  She received a proximal LAD stent in .      Last cardiac catheterization done in  showed proximal patent LAD stent patent, 80% septal  stenosis unchanged from prior cath, normal left circumflex and RCA, normal left ventricular systolic function.     She had COVID-19 pneumonia 3/8/2021.    She then presented 6/3/2021 with acute short windedness and was diagnosed with bilateral acute pulmonary embolism without acute cor pulmonale.  She was started on Eliquis.      Echo done 9/15/2021 showed ejection fraction of 46-50% with moderate to severe septal hypokinesis, grade 1 diastolic dysfunction, normal right ventricular size and function with normal saline contrast today, no pericardial effusion and mild left atrial enlargement.      She has followed with Dr. Rodriguez for pulmonary nodules.     Her spironolactone was decreased due to hyperkalemia.  She follows with Dr. Vargas for endocrinology.      Echo done 2022 shows ejection fraction 54% with normal RVSP, grade 1 diastolic dysfunction, no significant valvular abnormalities.      She had  nonischemic stress nuclear perfusion study done 5/4/2022.      CT chest without contrast on 3/13/2023 showed stable micronodules. Dr. Leon reviewed the CT chest images which showed scattered calcification in the aortic arch, calcification throughout the LAD-stent in the LAD, calcification in the RCA and circumflex.     Venous duplex studies of lower extremities showed normal right lower extremity venous duplex, she had mild stenosis of bilateral carotid arteries with otherwise normal vascular screening done 7/14/2023.      She had a nonischemic stress nuclear perfusion study done 1/2/2024     echocardiogram done 1/2/2024 showed ejection fraction 56/60% with normal diastolic function and no valvular abnormalities, normal RVSP.     Cardiac catheterization done 4/29/2024 for exertional dyspnea showed 20% proximal disease, patent mid LAD stent, 80% origin second  stenosis, normal circumflex, 20% proximal RCA stenosis with 40% mid vessel stenosis, risk factor modification recommended.     Ultrasound of the thyroid done 7/25/2024 shows a right lower pole nodule.  She sees Dr. Olivares.      Ms. Tran presents today for follow-up of edema.  She feels like the swelling in her legs has not improved at all with the increased dose of torsemide.  She has had some vertigo episodes, has become dizzy with a spinning sensation precipitated by turning her head and she thinks it is maybe due to the increase of that medication.  Unfortunately she tore a ligament in her left ankle and is in a boot and just became weightbearing with a walker this week she has been unable to increase her physical activity due to this injury.  She does not have any chest pain or pressure, shortness of breath, lightheadedness, palpitations or heart racing.  No presyncope/syncope.  She works as a  and her job is demanding and stressful at times.      Current Outpatient Medications on File Prior to Visit   Medication Sig Dispense  Refill    aspirin 81 MG tablet Take 1 tablet by mouth Daily.      buPROPion XL (WELLBUTRIN XL) 300 MG 24 hr tablet Take 1 tablet by mouth Daily. 90 tablet 3    calcium carbonate (OS-SHAHNAZ) 600 MG tablet Take 1 tablet by mouth 2 (Two) Times a Day With Meals.      empagliflozin (JARDIANCE) 10 MG tablet tablet Take 1 tablet by mouth Daily. 14 tablet 0    HYDROcodone-acetaminophen (NORCO)  MG per tablet Take 0.5-1 tablets by mouth Every 6 (Six) Hours As Needed for Moderate Pain or Severe Pain. 10 tablet 0    ibandronate (BONIVA) 150 MG tablet Take 1 tablet by mouth Every 30 (Thirty) Days. 3 tablet 3    levothyroxine (Synthroid) 50 MCG tablet Take 1 tablet by mouth Daily. 90 tablet 2    magnesium oxide (MAG-OX) 400 MG tablet Take 2 tablets by mouth 2 (two) times a day.      potassium chloride (K-TAB) 20 MEQ tablet controlled-release ER tablet Take 1 tablet by mouth Daily. 90 tablet 3    rosuvastatin (CRESTOR) 40 MG tablet Take 1 tablet by mouth Every Night. 90 tablet 3    sacubitril-valsartan (ENTRESTO) 24-26 MG tablet Take 1 tablet by mouth 2 (Two) Times a Day. 28 tablet 0    torsemide 40 MG tablet Take 40 mg by mouth Daily. 90 tablet 3    traMADol (ULTRAM) 50 MG tablet Take 1 tablet by mouth Every 6 (Six) Hours As Needed for Moderate Pain. 60 tablet 0    zolpidem (AMBIEN) 5 MG tablet Take 1 tablet by mouth At Night As Needed for Sleep. 30 tablet 0     No current facility-administered medications on file prior to visit.         Procedures    ECG 12 Lead    Date/Time: 8/28/2024 11:04 AM  Performed by: Laly Álvarez APRN    Authorized by: Laly Álvarez APRN  Comparison: compared with previous ECG from 7/30/2024  Similar to previous ECG  Rhythm: sinus rhythm  BPM: 77  Other findings: non-specific ST-T wave changes    Clinical impression: non-specific ECG              Objective:      Vitals:    08/28/24 1023   BP: 116/76   BP Location: Left arm   Patient Position: Sitting   Pulse: 100   SpO2: 99%   Weight:  "95.3 kg (210 lb)   Height: 167.6 cm (66\")     Body mass index is 33.89 kg/m².  Wt Readings from Last 3 Encounters:   08/28/24 95.3 kg (210 lb)   08/01/24 95.3 kg (210 lb)   07/30/24 99.3 kg (219 lb)     Cardiovascular:      Normal rate. Regular rhythm.      Murmurs: There is no murmur.   Pulses:     Radial: 2+ bilaterally.     Dorsalis pedis: 2+ on the right side.     Posterior tibial: 2+ on the right side.     Comments: L pedal pulses not assessed d/t hard boot for L ankle injury   Edema:     Pretibial: 2+ edema of the right pretibial area.     Ankle: 2+ edema of the right ankle.     Feet: 2+ edema of the right foot.      Lab Review:      Lab Results   Component Value Date    TSH 4.100 02/22/2022       Lab Results   Component Value Date    CHLPL 168 10/20/2021    CHLPL 153 07/24/2020    CHLPL 144 12/24/2019     Lab Results   Component Value Date    TRIG 109 10/20/2021    TRIG 77 07/24/2020    TRIG 73 12/24/2019     Lab Results   Component Value Date    HDL 53 10/20/2021    HDL 56 07/24/2020    HDL 56 12/24/2019     Lab Results   Component Value Date    LDL 95 10/20/2021    LDL 82 07/24/2020    LDL 73 12/24/2019       Lab Results   Component Value Date    WBC 6.66 08/14/2024    HGB 15.4 08/14/2024    HCT 47.6 (H) 08/14/2024    MCV 92.1 08/14/2024     08/14/2024       Lab Results   Component Value Date    GLUCOSE 92 04/24/2024    BUN 20 04/24/2024    CREATININE 0.78 04/24/2024    EGFRRESULT 67 06/21/2022    EGFR 81.3 04/24/2024    BCR 25.6 (H) 04/24/2024    K 4.4 04/24/2024    CO2 28.0 04/24/2024    CALCIUM 9.2 04/24/2024    PROTENTOTREF 6.4 07/24/2020    ALBUMIN 4.4 04/24/2024    BILITOT 0.4 04/24/2024    AST 18 04/24/2024    ALT 22 04/24/2024       Lab Results   Component Value Date    HGBA1C 5.5 08/14/2024       Assessment:     Chronic HFpEF = 56 to 60% per TTE 1/24.  Her bilateral lower extremity edema is unchanged.  CAD status post JARET to the LAD.  Patent stent on [facility] 4/24.  She has no " angina.  Hypertension is controlled on present regimen  Hyperlipidemia on statin therapy  History of PE and DVT 6/20/2021 following a COVID infection was on apixaban through 6/20/2022  Hypothyroidism, thyroid nodules, follows with endocrinology  Mild stenosis of the bilateral internal carotid arteries on US 7/23  Sedentary lifestyle, routine exercise was recommended now she has unfortunately had a left ankle injury and is in a boot limiting her activity.  Dizziness appears to be vertigo with spinning sensation brought on by head movement, first occurring after torsemide was increased.  I have recommended she do review with PCP and maybe see ENT if this worsens.  L leg fluid pocket: She had questions about imaging from last year: I reviewed the findings w her- Fluid pocket in the left lower leg on an ultrasound last year a recommendation at that time was she could follow with general surgery to see if this is something that could be drained - she is going to think about that and review with PCP    Plan:   Her edema is unchanged.   Feels vertigo-like dizzy episodes have occurred since increasing the torsemide.  I am decreasing this back to 20 mg a day and adding spironolactone 25 mg daily.  We will stop her potassium supplement for now.      Thank you for allowing me to participate in this patient's care. Please call with any questions or concerns.     Return in about 4 weeks (around 9/25/2024) for 4-6 week f/u with RM .                                 Dragon dictation device was utilized in this note.

## 2024-09-14 ENCOUNTER — TELEPHONE (OUTPATIENT)
Dept: URGENT CARE | Facility: CLINIC | Age: 69
End: 2024-09-14
Payer: MEDICARE

## 2024-09-14 DIAGNOSIS — R05.9 COUGH, UNSPECIFIED TYPE: Primary | ICD-10-CM

## 2024-09-14 RX ORDER — AZITHROMYCIN 250 MG/1
TABLET, FILM COATED ORAL
Qty: 6 TABLET | Refills: 0 | Status: SHIPPED | OUTPATIENT
Start: 2024-09-14

## 2024-09-14 NOTE — TELEPHONE ENCOUNTER
D/w pt.  Still w/ cough - now productive of green/yellow.  No other new c/o.  P - zpak; pcp prn; o/w same.

## 2024-09-20 ENCOUNTER — APPOINTMENT (OUTPATIENT)
Dept: CT IMAGING | Facility: HOSPITAL | Age: 69
End: 2024-09-20
Payer: MEDICARE

## 2024-09-20 ENCOUNTER — APPOINTMENT (OUTPATIENT)
Dept: GENERAL RADIOLOGY | Facility: HOSPITAL | Age: 69
End: 2024-09-20
Payer: MEDICARE

## 2024-09-20 ENCOUNTER — OFFICE VISIT (OUTPATIENT)
Dept: FAMILY MEDICINE CLINIC | Facility: CLINIC | Age: 69
End: 2024-09-20
Payer: MEDICARE

## 2024-09-20 ENCOUNTER — HOSPITAL ENCOUNTER (INPATIENT)
Facility: HOSPITAL | Age: 69
LOS: 10 days | Discharge: NURSING, REHAB OR LONG-TERM CARE | End: 2024-10-01
Attending: EMERGENCY MEDICINE | Admitting: INTERNAL MEDICINE
Payer: MEDICARE

## 2024-09-20 VITALS
WEIGHT: 207.3 LBS | SYSTOLIC BLOOD PRESSURE: 130 MMHG | HEART RATE: 103 BPM | OXYGEN SATURATION: 98 % | HEIGHT: 61 IN | BODY MASS INDEX: 39.14 KG/M2 | DIASTOLIC BLOOD PRESSURE: 86 MMHG | RESPIRATION RATE: 18 BRPM

## 2024-09-20 DIAGNOSIS — F51.01 PRIMARY INSOMNIA: ICD-10-CM

## 2024-09-20 DIAGNOSIS — I26.99 BILATERAL PULMONARY EMBOLISM: Primary | ICD-10-CM

## 2024-09-20 DIAGNOSIS — R06.02 SHORTNESS OF BREATH: Primary | ICD-10-CM

## 2024-09-20 DIAGNOSIS — U07.1 COVID-19 VIRUS DETECTED: ICD-10-CM

## 2024-09-20 DIAGNOSIS — R00.0 TACHYCARDIA: ICD-10-CM

## 2024-09-20 DIAGNOSIS — Z86.711 HISTORY OF PULMONARY EMBOLISM: ICD-10-CM

## 2024-09-20 PROBLEM — I20.0 UNSTABLE ANGINA: Status: RESOLVED | Noted: 2024-04-16 | Resolved: 2024-09-20

## 2024-09-20 LAB
ALBUMIN SERPL-MCNC: 4.2 G/DL (ref 3.5–5.2)
ALBUMIN/GLOB SERPL: 1.4 G/DL
ALP SERPL-CCNC: 97 U/L (ref 39–117)
ALT SERPL W P-5'-P-CCNC: 32 U/L (ref 1–33)
ANION GAP SERPL CALCULATED.3IONS-SCNC: 14 MMOL/L (ref 5–15)
APTT PPP: 28.6 SECONDS (ref 22.7–35.4)
AST SERPL-CCNC: 23 U/L (ref 1–32)
BASOPHILS # BLD AUTO: 0.07 10*3/MM3 (ref 0–0.2)
BASOPHILS NFR BLD AUTO: 0.6 % (ref 0–1.5)
BILIRUB SERPL-MCNC: 0.3 MG/DL (ref 0–1.2)
BUN SERPL-MCNC: 21 MG/DL (ref 8–23)
BUN/CREAT SERPL: 26.3 (ref 7–25)
CALCIUM SPEC-SCNC: 9 MG/DL (ref 8.6–10.5)
CHLORIDE SERPL-SCNC: 99 MMOL/L (ref 98–107)
CO2 SERPL-SCNC: 21 MMOL/L (ref 22–29)
CREAT SERPL-MCNC: 0.8 MG/DL (ref 0.57–1)
DEPRECATED RDW RBC AUTO: 40.9 FL (ref 37–54)
EGFRCR SERPLBLD CKD-EPI 2021: 78.9 ML/MIN/1.73
EOSINOPHIL # BLD AUTO: 0.18 10*3/MM3 (ref 0–0.4)
EOSINOPHIL NFR BLD AUTO: 1.6 % (ref 0.3–6.2)
ERYTHROCYTE [DISTWIDTH] IN BLOOD BY AUTOMATED COUNT: 12.5 % (ref 12.3–15.4)
GLOBULIN UR ELPH-MCNC: 2.9 GM/DL
GLUCOSE SERPL-MCNC: 114 MG/DL (ref 65–99)
HCT VFR BLD AUTO: 47.7 % (ref 34–46.6)
HGB BLD-MCNC: 16 G/DL (ref 12–15.9)
HOLD SPECIMEN: NORMAL
HOLD SPECIMEN: NORMAL
IMM GRANULOCYTES # BLD AUTO: 0.04 10*3/MM3 (ref 0–0.05)
IMM GRANULOCYTES NFR BLD AUTO: 0.4 % (ref 0–0.5)
INR PPP: 1.18 (ref 0.9–1.1)
LYMPHOCYTES # BLD AUTO: 2.81 10*3/MM3 (ref 0.7–3.1)
LYMPHOCYTES NFR BLD AUTO: 25.5 % (ref 19.6–45.3)
MCH RBC QN AUTO: 30.1 PG (ref 26.6–33)
MCHC RBC AUTO-ENTMCNC: 33.5 G/DL (ref 31.5–35.7)
MCV RBC AUTO: 89.7 FL (ref 79–97)
MONOCYTES # BLD AUTO: 0.72 10*3/MM3 (ref 0.1–0.9)
MONOCYTES NFR BLD AUTO: 6.5 % (ref 5–12)
NEUTROPHILS NFR BLD AUTO: 65.4 % (ref 42.7–76)
NEUTROPHILS NFR BLD AUTO: 7.2 10*3/MM3 (ref 1.7–7)
NRBC BLD AUTO-RTO: 0 /100 WBC (ref 0–0.2)
NT-PROBNP SERPL-MCNC: 67.9 PG/ML (ref 0–900)
PLATELET # BLD AUTO: 356 10*3/MM3 (ref 140–450)
PMV BLD AUTO: 9.5 FL (ref 6–12)
POTASSIUM SERPL-SCNC: 3.5 MMOL/L (ref 3.5–5.2)
PROT SERPL-MCNC: 7.1 G/DL (ref 6–8.5)
PROTHROMBIN TIME: 15.2 SECONDS (ref 11.7–14.2)
RBC # BLD AUTO: 5.32 10*6/MM3 (ref 3.77–5.28)
SODIUM SERPL-SCNC: 134 MMOL/L (ref 136–145)
TROPONIN T SERPL HS-MCNC: 14 NG/L
TROPONIN T SERPL HS-MCNC: 15 NG/L
WBC NRBC COR # BLD AUTO: 11.02 10*3/MM3 (ref 3.4–10.8)
WHOLE BLOOD HOLD COAG: NORMAL
WHOLE BLOOD HOLD SPECIMEN: NORMAL

## 2024-09-20 PROCEDURE — 85025 COMPLETE CBC W/AUTO DIFF WBC: CPT

## 2024-09-20 PROCEDURE — 71275 CT ANGIOGRAPHY CHEST: CPT

## 2024-09-20 PROCEDURE — 85610 PROTHROMBIN TIME: CPT | Performed by: PHYSICIAN ASSISTANT

## 2024-09-20 PROCEDURE — 93005 ELECTROCARDIOGRAM TRACING: CPT | Performed by: EMERGENCY MEDICINE

## 2024-09-20 PROCEDURE — 84484 ASSAY OF TROPONIN QUANT: CPT | Performed by: PHYSICIAN ASSISTANT

## 2024-09-20 PROCEDURE — 36415 COLL VENOUS BLD VENIPUNCTURE: CPT

## 2024-09-20 PROCEDURE — 85730 THROMBOPLASTIN TIME PARTIAL: CPT | Performed by: PHYSICIAN ASSISTANT

## 2024-09-20 PROCEDURE — 84484 ASSAY OF TROPONIN QUANT: CPT | Performed by: EMERGENCY MEDICINE

## 2024-09-20 PROCEDURE — 83880 ASSAY OF NATRIURETIC PEPTIDE: CPT | Performed by: INTERNAL MEDICINE

## 2024-09-20 PROCEDURE — G0378 HOSPITAL OBSERVATION PER HR: HCPCS

## 2024-09-20 PROCEDURE — 93005 ELECTROCARDIOGRAM TRACING: CPT

## 2024-09-20 PROCEDURE — 71045 X-RAY EXAM CHEST 1 VIEW: CPT

## 2024-09-20 PROCEDURE — 93010 ELECTROCARDIOGRAM REPORT: CPT | Performed by: INTERNAL MEDICINE

## 2024-09-20 PROCEDURE — 25010000002 HEPARIN (PORCINE) 25000-0.45 UT/250ML-% SOLUTION: Performed by: PHYSICIAN ASSISTANT

## 2024-09-20 PROCEDURE — 80053 COMPREHEN METABOLIC PANEL: CPT | Performed by: EMERGENCY MEDICINE

## 2024-09-20 PROCEDURE — 25010000002 HEPARIN (PORCINE) PER 1000 UNITS: Performed by: PHYSICIAN ASSISTANT

## 2024-09-20 PROCEDURE — 83880 ASSAY OF NATRIURETIC PEPTIDE: CPT | Performed by: EMERGENCY MEDICINE

## 2024-09-20 PROCEDURE — 25510000001 IOPAMIDOL PER 1 ML: Performed by: EMERGENCY MEDICINE

## 2024-09-20 PROCEDURE — 99291 CRITICAL CARE FIRST HOUR: CPT

## 2024-09-20 RX ORDER — IOPAMIDOL 755 MG/ML
100 INJECTION, SOLUTION INTRAVASCULAR
Status: COMPLETED | OUTPATIENT
Start: 2024-09-20 | End: 2024-09-20

## 2024-09-20 RX ORDER — DEXTROMETHORPHAN HYDROBROMIDE AND PROMETHAZINE HYDROCHLORIDE 15; 6.25 MG/5ML; MG/5ML
5 SYRUP ORAL ONCE
Status: COMPLETED | OUTPATIENT
Start: 2024-09-20 | End: 2024-09-20

## 2024-09-20 RX ORDER — ZOLPIDEM TARTRATE 5 MG/1
5 TABLET ORAL NIGHTLY PRN
Qty: 30 TABLET | Refills: 0 | Status: ON HOLD | OUTPATIENT
Start: 2024-09-20

## 2024-09-20 RX ORDER — HEPARIN SODIUM 5000 [USP'U]/ML
80 INJECTION, SOLUTION INTRAVENOUS; SUBCUTANEOUS ONCE
Status: COMPLETED | OUTPATIENT
Start: 2024-09-20 | End: 2024-09-20

## 2024-09-20 RX ORDER — METHYLPREDNISOLONE 4 MG
TABLET, DOSE PACK ORAL
Qty: 21 EACH | Refills: 0 | Status: ON HOLD | OUTPATIENT
Start: 2024-09-20 | End: 2024-09-25

## 2024-09-20 RX ORDER — HEPARIN SODIUM 5000 [USP'U]/ML
40-80 INJECTION, SOLUTION INTRAVENOUS; SUBCUTANEOUS EVERY 6 HOURS PRN
Status: DISCONTINUED | OUTPATIENT
Start: 2024-09-20 | End: 2024-09-23

## 2024-09-20 RX ORDER — HEPARIN SODIUM 10000 [USP'U]/100ML
16 INJECTION, SOLUTION INTRAVENOUS
Status: DISCONTINUED | OUTPATIENT
Start: 2024-09-20 | End: 2024-09-23

## 2024-09-20 RX ORDER — DEXTROMETHORPHAN HYDROBROMIDE AND PROMETHAZINE HYDROCHLORIDE 15; 6.25 MG/5ML; MG/5ML
5 SYRUP ORAL 4 TIMES DAILY PRN
Qty: 180 ML | Refills: 0 | Status: ON HOLD | OUTPATIENT
Start: 2024-09-20

## 2024-09-20 RX ORDER — SODIUM CHLORIDE 0.9 % (FLUSH) 0.9 %
10 SYRINGE (ML) INJECTION AS NEEDED
Status: DISCONTINUED | OUTPATIENT
Start: 2024-09-20 | End: 2024-10-01 | Stop reason: HOSPADM

## 2024-09-20 RX ADMIN — HEPARIN SODIUM 7500 UNITS: 5000 INJECTION INTRAVENOUS; SUBCUTANEOUS at 21:57

## 2024-09-20 RX ADMIN — PROMETHAZINE HYDROCHLORIDE AND DEXTROMETHORPHAN HYDROBROMIDE ORAL SOLUTION 5 ML: 15; 6.25 SOLUTION ORAL at 22:59

## 2024-09-20 RX ADMIN — HEPARIN SODIUM 16 UNITS/KG/HR: 10000 INJECTION, SOLUTION INTRAVENOUS at 21:58

## 2024-09-20 RX ADMIN — IOPAMIDOL 95 ML: 755 INJECTION, SOLUTION INTRAVENOUS at 20:01

## 2024-09-20 NOTE — ED PROVIDER NOTES
EMERGENCY DEPARTMENT ENCOUNTER  Room Number:  04/04  PCP: Millicent Ace MD  Independent Historians: Patient      HPI:  Chief Complaint: had concerns including Shortness of Breath.     A complete HPI/ROS/PMH/PSH/SH/FH are unobtainable due to: None    Chronic or social conditions impacting patient care (Social Determinants of Health): None      Context: The patient is a 69 y.o. female with a medical history of hypertension, hyperlipidemia, CAD, hypothyroidism, CHF, pulmonary embolism who presents to the ED c/o acute shortness of breath.  She states about 2 weeks ago she developed upper respiratory symptoms and testing positive for COVID-19 and influenza on September 8.  She has had cough congestion sore throat nasal congestion rhinorrhea.  Continues to have cough congestion, has had intermittent shortness of breath, has been worse the last couple days.  Primarily exertional.  Her previous pulmonary embolism was after having had COVID-19.  She is no longer anticoagulated.  She is chronic lower extremity edema, states it is better today than usual.  Denies any chest pain or hemoptysis.  No history of lung disease.      Review of prior external notes (non-ED) -and- Review of prior external test results outside of this encounter:  Office visit today with PCP, Dr. Yun.  Patient evaluated for shortness of breath.  CTA chest as outpatient was ordered however radiology was unable to schedule until Monday and patient deferred to the ER.  Oxygen noted to be normal, patient was tachycardic in office.  If CTA was negative plan was for patient to take Medrol Dosepak and was prescribed Phenergan DM and those were sent to her pharmacy.        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    Chronic pain of left knee  02/11/2015    Chronic coronary artery disease 07/21/2016    Hypothyroidism 10/21/2016    History of sepsis 10/22/2016    Primary insomnia 01/20/2017    Chronic diastolic congestive heart failure 08/30/2018    S/P drug eluting coronary stent placement 09/21/2018    Pulmonic valve regurgitation 09/21/2018    Tricuspid valve regurgitation 09/21/2018    Osteoporosis 12/31/2019    Hyperglycemia 07/27/2020    History of pulmonary embolism 06/2021    Balance problem 08/26/2022     Resolved Ambulatory Problems     Diagnosis Date Noted    Simple obesity 01/28/2016    Pain in radius 01/28/2016    Pancolitis 10/20/2016    Localized edema 10/24/2016    Balance problems 04/17/2018    COVID-19 virus detected 03/10/2021    Acute pulmonary embolism without acute cor pulmonale 06/03/2021    Unstable angina 04/16/2024     Past Medical History:   Diagnosis Date    Allergic 2015    Arthritis l5 years or so ago    Cataract 6-9 months ago    CHF (congestive heart failure)     Coronary artery disease 2005 stent    Deep vein thrombosis 06/03/2021    Depression     Disease of thyroid gland     Headache Covid 3-6-21    HL (hearing loss) Last 4-6 months    Hyperlipidemia     Hypertension since 2005    Obesity     Osteopenia last few years    Pulmonary embolism 06/03/2021         PAST SURGICAL HISTORY  Past Surgical History:   Procedure Laterality Date    BUNIONECTOMY Bilateral     HAMMERT TOE REPAIR/BUNIONECTOMY    CARDIAC CATHETERIZATION  2015, 2018    CARDIAC CATHETERIZATION N/A 4/29/2024    Procedure: Coronary angiography;  Surgeon: Cong Rivera MD;  Location:  [facility];  Service: Cardiovascular;  Laterality: N/A;    CARDIAC CATHETERIZATION N/A 4/29/2024    Procedure: Left Heart Cath;  Surgeon: Cong Rivera MD;  Location:  [facility];  Service: Cardiovascular;  Laterality: N/A;    CARDIAC CATHETERIZATION N/A 4/29/2024    Procedure: Left ventriculography;  Surgeon: Cong Rivera MD;   Location:  [facility];  Service: Cardiovascular;  Laterality: N/A;    CATARACT EXTRACTION      CHOLECYSTECTOMY      COLONOSCOPY N/A 2016    Procedure: COLONOSCOPY WITH COLD BIOPSIES;  Surgeon: Medina Guillen MD;  Location:  [facility];  Service:     CORONARY STENT PLACEMENT      LAD 80% blockage    GANGLION CYST EXCISION      R WRIST    SUBTOTAL HYSTERECTOMY  2009    TONSILLECTOMY  1965    TUBAL ABDOMINAL LIGATION  1985         FAMILY HISTORY  Family History   Problem Relation Age of Onset    Heart disease Mother             Heart disease Father             No Known Problems Maternal Aunt     No Known Problems Maternal Uncle     No Known Problems Paternal Aunt     No Known Problems Paternal Uncle     No Known Problems Maternal Grandmother     No Known Problems Maternal Grandfather     No Known Problems Paternal Grandmother     No Known Problems Paternal Grandfather     Diabetes Sister     Heart disease Sister 69            Heart disease Brother         open heart-bypass    Cancer Brother         Bladder/Rodney    Arthritis Brother     Heart disease Brother 62            Heart attack Brother             Arthritis Son     Celiac disease Neg Hx     Cirrhosis Neg Hx     Colon cancer Neg Hx     Colon polyps Neg Hx     Crohn's disease Neg Hx     Cystic fibrosis Neg Hx     Esophageal cancer Neg Hx     Hemochromatosis Neg Hx     Inflammatory bowel disease Neg Hx     Irritable bowel syndrome Neg Hx     Liver cancer Neg Hx     Liver disease Neg Hx     Rectal cancer Neg Hx     Stomach cancer Neg Hx     Ulcerative colitis Neg Hx     Dante's disease Neg Hx     Alcohol abuse Neg Hx     Pancreatitis Neg Hx          SOCIAL HISTORY  Social History     Socioeconomic History    Marital status: Single   Tobacco Use    Smoking status: Never     Passive exposure: Never    Smokeless tobacco: Never    Tobacco comments:     N/A   Vaping Use    Vaping status: Never  Used   Substance and Sexual Activity    Alcohol use: Yes     Alcohol/week: 2.0 standard drinks of alcohol     Types: 1 Glasses of wine, 1 Cans of beer per week     Comment: socially    Drug use: Never    Sexual activity: Not Currently     Partners: Male     Birth control/protection: None         ALLERGIES  Codeine, Levofloxacin, and Morphine      REVIEW OF SYSTEMS  Review of Systems  Included in HPI  All systems reviewed and negative except for those discussed in HPI.      PHYSICAL EXAM    I have reviewed the triage vital signs and nursing notes.    ED Triage Vitals   Temp Heart Rate Resp BP SpO2   09/20/24 1704 09/20/24 1704 09/20/24 1704 09/20/24 1725 09/20/24 1704   99.1 °F (37.3 °C) 114 18 111/71 100 %      Temp src Heart Rate Source Patient Position BP Location FiO2 (%)   -- -- -- -- --              Physical Exam  GENERAL: alert, no acute distress  SKIN: Warm, dry  HENT: Normocephalic, atraumatic  EYES: no scleral icterus  CV: regular rhythm, regular rate, 1+ pitting bilateral lower extremity edema  RESPIRATORY: normal effort, lungs clear, no rhonchi wheezes or crackles  ABDOMEN: nondistended soft nontender  MUSCULOSKELETAL: no deformity  NEURO: alert, moves all extremities, follows commands            LAB RESULTS  Recent Results (from the past 24 hour(s))   ECG 12 Lead ED Triage Standing Order; SOA    Collection Time: 09/20/24  5:08 PM   Result Value Ref Range    QT Interval 325 ms    QTC Interval 420 ms   Comprehensive Metabolic Panel    Collection Time: 09/20/24  5:28 PM    Specimen: Blood   Result Value Ref Range    Glucose 114 (H) 65 - 99 mg/dL    BUN 21 8 - 23 mg/dL    Creatinine 0.80 0.57 - 1.00 mg/dL    Sodium 134 (L) 136 - 145 mmol/L    Potassium 3.5 3.5 - 5.2 mmol/L    Chloride 99 98 - 107 mmol/L    CO2 21.0 (L) 22.0 - 29.0 mmol/L    Calcium 9.0 8.6 - 10.5 mg/dL    Total Protein 7.1 6.0 - 8.5 g/dL    Albumin 4.2 3.5 - 5.2 g/dL    ALT (SGPT) 32 1 - 33 U/L    AST (SGOT) 23 1 - 32 U/L    Alkaline  Phosphatase 97 39 - 117 U/L    Total Bilirubin 0.3 0.0 - 1.2 mg/dL    Globulin 2.9 gm/dL    A/G Ratio 1.4 g/dL    BUN/Creatinine Ratio 26.3 (H) 7.0 - 25.0    Anion Gap 14.0 5.0 - 15.0 mmol/L    eGFR 78.9 >60.0 mL/min/1.73   BNP    Collection Time: 09/20/24  5:28 PM    Specimen: Blood   Result Value Ref Range    proBNP 67.9 0.0 - 900.0 pg/mL   Single High Sensitivity Troponin T    Collection Time: 09/20/24  5:28 PM    Specimen: Blood   Result Value Ref Range    HS Troponin T 15 (H) <14 ng/L   Green Top (Gel)    Collection Time: 09/20/24  5:28 PM   Result Value Ref Range    Extra Tube Hold for add-ons.    Lavender Top    Collection Time: 09/20/24  5:28 PM   Result Value Ref Range    Extra Tube hold for add-on    Gold Top - SST    Collection Time: 09/20/24  5:28 PM   Result Value Ref Range    Extra Tube Hold for add-ons.    Light Blue Top    Collection Time: 09/20/24  5:28 PM   Result Value Ref Range    Extra Tube Hold for add-ons.    CBC Auto Differential    Collection Time: 09/20/24  5:28 PM    Specimen: Blood   Result Value Ref Range    WBC 11.02 (H) 3.40 - 10.80 10*3/mm3    RBC 5.32 (H) 3.77 - 5.28 10*6/mm3    Hemoglobin 16.0 (H) 12.0 - 15.9 g/dL    Hematocrit 47.7 (H) 34.0 - 46.6 %    MCV 89.7 79.0 - 97.0 fL    MCH 30.1 26.6 - 33.0 pg    MCHC 33.5 31.5 - 35.7 g/dL    RDW 12.5 12.3 - 15.4 %    RDW-SD 40.9 37.0 - 54.0 fl    MPV 9.5 6.0 - 12.0 fL    Platelets 356 140 - 450 10*3/mm3    Neutrophil % 65.4 42.7 - 76.0 %    Lymphocyte % 25.5 19.6 - 45.3 %    Monocyte % 6.5 5.0 - 12.0 %    Eosinophil % 1.6 0.3 - 6.2 %    Basophil % 0.6 0.0 - 1.5 %    Immature Grans % 0.4 0.0 - 0.5 %    Neutrophils, Absolute 7.20 (H) 1.70 - 7.00 10*3/mm3    Lymphocytes, Absolute 2.81 0.70 - 3.10 10*3/mm3    Monocytes, Absolute 0.72 0.10 - 0.90 10*3/mm3    Eosinophils, Absolute 0.18 0.00 - 0.40 10*3/mm3    Basophils, Absolute 0.07 0.00 - 0.20 10*3/mm3    Immature Grans, Absolute 0.04 0.00 - 0.05 10*3/mm3    nRBC 0.0 0.0 - 0.2 /100 WBC    Single High Sensitivity Troponin T    Collection Time: 09/20/24  7:34 PM    Specimen: Blood   Result Value Ref Range    HS Troponin T 14 (H) <14 ng/L         RADIOLOGY  CT Angiogram Chest Pulmonary Embolism    Result Date: 9/20/2024  CT ANGIOGRAM OF THE CHEST  HISTORY: Shortness of breath  COMPARISON: March 13, 2023  TECHNIQUE: Axial CT imaging was obtained through the thorax. IV contrast was administered. Three-D reformatted images were obtained.  FINDINGS: There are bilateral pulmonary emboli, with the most proximal emboli noted within a lobar branch to the right lower lobe. There is no evidence of right heart strain. Thyroid gland is somewhat atrophic. Trachea and esophagus are within normal limits. There are coronary artery calcifications. Examination was not optimized for assessment of the thoracic aorta. It is normal in caliber. Mediastinal lymph nodes are not pathologically enlarged. There is some small areas of groundglass attenuation noted within the periphery of the right lower lobe, best seen on images 84 through 86, which may reflect small areas of pulmonary infarction.. Images through the upper abdomen demonstrate changes of prior cholecystectomy. No acute osseous abnormalities are seen. There are bilateral breast implants. There is thoracic scoliosis, with convexity to the right.       1. Bilateral pulmonary emboli. There is no evidence of right heart strain. There are some small areas of groundglass attenuation which are noted within the anterior right lower lobe, small areas of pulmonary infarction not excluded.  FINDINGS were discussed with Diane Glover at 9:09 p.m.  Radiation dose reduction techniques were utilized, including automated exposure control and exposure modulation based on body size.   This report was finalized on 9/20/2024 9:10 PM by Dr. Mary Flores M.D on Workstation: [id number]      XR Chest 1 View    Result Date: 9/20/2024  Portable chest radiograph  HISTORY: Shortness of air   TECHNIQUE: Single AP portable radiograph of the chest  COMPARISON: Chest radiograph 1/19/2015      FINDINGS AND IMPRESSION: No pulmonary consolidation, pleural effusion or pneumothorax is seen. Cardiac silhouette is within normal limits for size. Mild asymmetric elevation of the right diaphragm.  This report was finalized on 9/20/2024 7:46 PM by Dr. Rodney Freeman M.D on Workstation: [id number]         MEDICATIONS GIVEN IN ER  Medications   sodium chloride 0.9 % flush 10 mL (has no administration in time range)   heparin (porcine) 5000 UNIT/ML injection 7,500 Units (has no administration in time range)   heparin 44758 units/250 mL (100 units/mL) in 0.45 % NaCl infusion (has no administration in time range)   heparin (porcine) 5000 UNIT/ML injection 3,800-7,500 Units (has no administration in time range)   promethazine-dextromethorphan (PROMETHAZINE-DM) 6.25-15 MG/5ML syrup 5 mL (has no administration in time range)   iopamidol (ISOVUE-370) 76 % injection 100 mL (95 mL Intravenous Given by Other 9/20/24 2001)         ORDERS PLACED DURING THIS VISIT:  Orders Placed This Encounter   Procedures    XR Chest 1 View    CT Angiogram Chest Pulmonary Embolism    [facility] Draw    Comprehensive Metabolic Panel    BNP    Single High Sensitivity Troponin T    CBC Auto Differential    Single High Sensitivity Troponin T    Protime-INR    aPTT    aPTT    aPTT    CBC Auto Differential    NPO Diet NPO Type: Strict NPO    Undress & Gown    Continuous Pulse Oximetry    Vital Signs    Adjust Heparin Rate Based on aPTT Using Nomogram    Verify All Anticoagulant Orders Are Discontinued Upon Initiation of Heparin Protocol (eg Enoxaparin, Fondaparinux, Apixaban, Dabigatran, Edoxaban, or Rivaroxaban)    RN To Release aPTT Order 6 Hours After Heparin Infusion & 6 Hours After Each Infusion Change Until  2 Consecutive Therapeutic aPTTs Are Achieved    [facility] (on-call MD unless specified) Details    Oxygen Therapy- Nasal Cannula; Titrate 1-6 LPM  Per SpO2; 90 - 95%    ECG 12 Lead ED Triage Standing Order; SOA    Insert Peripheral IV    Initiate Observation Status    CBC & Differential    Green Top (Gel)    Lavender Top    Gold Top - SST    Light Blue Top    CBC & Differential         OUTPATIENT MEDICATION MANAGEMENT:  Current Facility-Administered Medications Ordered in Epic   Medication Dose Route Frequency Provider Last Rate Last Admin    heparin (porcine) 5000 UNIT/ML injection 3,800-7,500 Units  40-80 Units/kg Intravenous Q6H PRN Clara Glover PA-C        heparin (porcine) 5000 UNIT/ML injection 7,500 Units  80 Units/kg Intravenous Once Clara Glover PA-C        heparin 20454 units/250 mL (100 units/mL) in 0.45 % NaCl infusion  16 Units/kg/hr Intravenous Titrated Clara Glover PA-C        promethazine-dextromethorphan (PROMETHAZINE-DM) 6.25-15 MG/5ML syrup 5 mL  5 mL Oral Once Clara Glover PA-C        sodium chloride 0.9 % flush 10 mL  10 mL Intravenous PRN Reji Daniel II, MD         Current Outpatient Medications Ordered in Epic   Medication Sig Dispense Refill    aspirin 325 MG tablet Take 1 tablet by mouth Daily for 14 days. 14 tablet 0    buPROPion XL (WELLBUTRIN XL) 300 MG 24 hr tablet Take 1 tablet by mouth Daily. 90 tablet 3    calcium carbonate (OS-SHAHNAZ) 600 MG tablet Take 1 tablet by mouth 2 (Two) Times a Day With Meals.      empagliflozin (JARDIANCE) 10 MG tablet tablet Take 1 tablet by mouth Daily. 28 tablet 0    ibandronate (BONIVA) 150 MG tablet Take 1 tablet by mouth Every 30 (Thirty) Days. 3 tablet 3    levothyroxine (Synthroid) 50 MCG tablet Take 1 tablet by mouth Daily. 90 tablet 2    magnesium oxide (MAG-OX) 400 MG tablet Take 2 tablets by mouth 2 (two) times a day.      methylPREDNISolone (MEDROL) 4 MG dose pack Take as directed on package instructions. 21 each 0    promethazine-dextromethorphan (PROMETHAZINE-DM) 6.25-15 MG/5ML syrup Take 5 mL by mouth 4 (Four) Times a Day As Needed for Cough. 180 mL 0     rosuvastatin (CRESTOR) 40 MG tablet Take 1 tablet by mouth Every Night. 90 tablet 3    sacubitril-valsartan (ENTRESTO) 24-26 MG tablet Take 1 tablet by mouth 2 (Two) Times a Day. 28 tablet 0    spironolactone (ALDACTONE) 25 MG tablet Take 1 tablet by mouth Daily. 90 tablet 3    torsemide (DEMADEX) 20 MG tablet Take 1 tablet by mouth Daily.      traMADol (ULTRAM) 50 MG tablet Take 1 tablet by mouth Every 6 (Six) Hours As Needed for Moderate Pain. 60 tablet 0    zolpidem (AMBIEN) 5 MG tablet Take 1 tablet by mouth At Night As Needed for Sleep. 30 tablet 0         PROCEDURES  Procedures      Critical care provider statement:    Critical care time (minutes): 34.   Critical care time was exclusive of:  Separately billable procedures and treating other patients   Critical care was necessary to treat or prevent imminent or life-threatening deterioration of the following conditions:  Circulatory Failure and Respiratory Failure   Critical care was time spent personally by me on the following activities:  Development of treatment plan with patient or surrogate, discussions with consultants, evaluation of patient's response to treatment, examination of patient, obtaining history from patient or surrogate, ordering and performing treatments and interventions, ordering and review of laboratory studies, ordering and review of radiographic studies, pulse oximetry, re-evaluation of patient's condition and review of old charts. Critical Care indicators: Pulmonary edema or embolism       PROGRESS, DATA ANALYSIS, CONSULTS, AND MEDICAL DECISION MAKING  All labs have been independently interpreted by me.  All radiology studies have been reviewed by me. All EKG's have been independently viewed and interpreted by me.  Discussion below represents my analysis of pertinent findings related to patient's condition, differential diagnosis, treatment plan and final disposition.    DIFFERENTIAL    Differential diagnosis includes but is not  limited to CHF, acute coronary syndrome, pulmonary embolism, pneumothorax, pneumonia, asthma/COPD, deconditioning, anemia, anxiety.       Clinical Scores:                  ED Course as of 09/20/24 2137   Fri Sep 20, 2024   1744 WBC(!): 11.02 [KA]   1744 Hemoglobin(!): 16.0 [KA]   1801 Independent interpretation of the EKG performed at 5:08 PM is sinus arrhythmia, rate 100, normal axis, normal intervals, no ST elevation, 1 PVC.  In comparison to prior on 8/28/2024, PVCs are new, no other significant change [KA]   1833 Glucose(!): 114 [KA]   1833 Sodium(!): 134 [KA]   1833 proBNP: 67.9 [KA]   1833 HS Troponin T(!): 15 [KA]   1833 My Independent interpretation of chest x-ray is borderline cardiomegaly, no acute infiltrate [KA]   2032 CT angiogram of the chest independently interpreted by myself.  Bilateral PEs noted. [TD]   2112 I discussed the patient with Dr. Flores, radiologist.  CT angiogram the chest shows bilateral PEs with no sign of right heart strain. [KA]   2132 Discussed the patient with MANOHAR Robles for [facility] including history presentation workup and she agrees to admit on behalf of Dr. Chavez [KA]      ED Course User Index  [KA] Clara Glover PA-C  [TD] Reji Daniel II, MD             AS OF 21:37 EDT VITALS:    BP - 114/74  HR - 79  TEMP - 99.1 °F (37.3 °C)  O2 SATS - 97%    COMPLEXITY OF CARE  The patient requires admission.      DIAGNOSIS  Final diagnoses:   Bilateral pulmonary embolism         DISPOSITION  ED Disposition       ED Disposition   Decision to Admit    Condition   --    Comment   Level of Care: Telemetry [5]   Diagnosis: Pulmonary embolism [451406]   Admitting Physician: CRYSTAL CHAVEZ [[id number]]   Attending Physician: CRYSTAL CHAVEZ [[id number]]                    FOLLOW UP  No follow-up provider specified.      Prescribed Medications     Medication List      No changes were made to your prescriptions during this visit.                   Please note that portions  of this document were completed with a voice recognition program.    Note Disclaimer: At [facility], we believe that sharing information builds trust and better relationships. You are receiving this note because you recently visited [facility]. It is possible you will see health information before a provider has talked with you about it. This kind of information can be easy to misunderstand. To help you fully understand what it means for your health, we urge you to discuss this note with your provider.         Clara Glover PA-C  09/20/24 [id number]

## 2024-09-20 NOTE — ED TRIAGE NOTES
Patient to ED via pv from PCP. Patient c/o shortness of breath x 1 week. Patient was sent by by Millicent Szymanski.

## 2024-09-21 ENCOUNTER — APPOINTMENT (OUTPATIENT)
Dept: CARDIOLOGY | Facility: HOSPITAL | Age: 69
End: 2024-09-21
Payer: MEDICARE

## 2024-09-21 PROBLEM — J11.1 INFLUENZA: Status: ACTIVE | Noted: 2024-09-21

## 2024-09-21 PROBLEM — U07.1 COVID-19 VIRUS DETECTED: Status: ACTIVE | Noted: 2024-09-21

## 2024-09-21 LAB
AORTIC ARCH: 2.6 CM
AORTIC DIMENSIONLESS INDEX: 0.7 (DI)
APTT PPP: 81.4 SECONDS (ref 22.7–35.4)
APTT PPP: >200 SECONDS (ref 22.7–35.4)
ASCENDING AORTA: 3.3 CM
BASOPHILS # BLD AUTO: 0.06 10*3/MM3 (ref 0–0.2)
BASOPHILS NFR BLD AUTO: 0.5 % (ref 0–1.5)
BH CV ECHO MEAS - ACS: 2.06 CM
BH CV ECHO MEAS - AO MAX PG: 5.3 MMHG
BH CV ECHO MEAS - AO MEAN PG: 3 MMHG
BH CV ECHO MEAS - AO ROOT DIAM: 3.4 CM
BH CV ECHO MEAS - AO V2 MAX: 115 CM/SEC
BH CV ECHO MEAS - AO V2 VTI: 22.1 CM
BH CV ECHO MEAS - AVA(I,D): 2.7 CM2
BH CV ECHO MEAS - EDV(CUBED): 93.3 ML
BH CV ECHO MEAS - EDV(MOD-SP2): 114 ML
BH CV ECHO MEAS - EDV(MOD-SP4): 120 ML
BH CV ECHO MEAS - EF(MOD-BP): 57.3 %
BH CV ECHO MEAS - EF(MOD-SP2): 57 %
BH CV ECHO MEAS - EF(MOD-SP4): 56.7 %
BH CV ECHO MEAS - ESV(CUBED): 33.2 ML
BH CV ECHO MEAS - ESV(MOD-SP2): 49 ML
BH CV ECHO MEAS - ESV(MOD-SP4): 52 ML
BH CV ECHO MEAS - FS: 29.2 %
BH CV ECHO MEAS - IVS/LVPW: 1.13 CM
BH CV ECHO MEAS - IVSD: 1 CM
BH CV ECHO MEAS - LAT PEAK E' VEL: 6.9 CM/SEC
BH CV ECHO MEAS - LV DIASTOLIC VOL/BSA (35-75): 58.8 CM2
BH CV ECHO MEAS - LV MASS(C)D: 144.4 GRAMS
BH CV ECHO MEAS - LV MAX PG: 2.7 MMHG
BH CV ECHO MEAS - LV MEAN PG: 1 MMHG
BH CV ECHO MEAS - LV SYSTOLIC VOL/BSA (12-30): 25.5 CM2
BH CV ECHO MEAS - LV V1 MAX: 82.6 CM/SEC
BH CV ECHO MEAS - LV V1 VTI: 16 CM
BH CV ECHO MEAS - LVIDD: 4.5 CM
BH CV ECHO MEAS - LVIDS: 3.2 CM
BH CV ECHO MEAS - LVOT AREA: 3.7 CM2
BH CV ECHO MEAS - LVOT DIAM: 2.18 CM
BH CV ECHO MEAS - LVPWD: 0.89 CM
BH CV ECHO MEAS - MED PEAK E' VEL: 5.5 CM/SEC
BH CV ECHO MEAS - MV A DUR: 0.12 SEC
BH CV ECHO MEAS - MV A MAX VEL: 84.8 CM/SEC
BH CV ECHO MEAS - MV DEC SLOPE: 359 CM/SEC2
BH CV ECHO MEAS - MV DEC TIME: 0.17 SEC
BH CV ECHO MEAS - MV E MAX VEL: 51 CM/SEC
BH CV ECHO MEAS - MV E/A: 0.6
BH CV ECHO MEAS - MV MAX PG: 3.8 MMHG
BH CV ECHO MEAS - MV MEAN PG: 1.57 MMHG
BH CV ECHO MEAS - MV P1/2T: 58.1 MSEC
BH CV ECHO MEAS - MV V2 VTI: 19.7 CM
BH CV ECHO MEAS - MVA(P1/2T): 3.8 CM2
BH CV ECHO MEAS - MVA(VTI): 3 CM2
BH CV ECHO MEAS - PA ACC TIME: 0.06 SEC
BH CV ECHO MEAS - PA V2 MAX: 71.2 CM/SEC
BH CV ECHO MEAS - PI END-D VEL: 113.8 CM/SEC
BH CV ECHO MEAS - PULM A REVS DUR: 0.12 SEC
BH CV ECHO MEAS - PULM A REVS VEL: 28.7 CM/SEC
BH CV ECHO MEAS - PULM DIAS VEL: 28.7 CM/SEC
BH CV ECHO MEAS - PULM S/D: 2.27
BH CV ECHO MEAS - PULM SYS VEL: 65.1 CM/SEC
BH CV ECHO MEAS - RAP SYSTOLE: 3 MMHG
BH CV ECHO MEAS - RV MAX PG: 1.27 MMHG
BH CV ECHO MEAS - RV V1 MAX: 56.3 CM/SEC
BH CV ECHO MEAS - RV V1 VTI: 10.1 CM
BH CV ECHO MEAS - SUP REN AO DIAM: 1.8 CM
BH CV ECHO MEAS - SV(LVOT): 60 ML
BH CV ECHO MEAS - SV(MOD-SP2): 65 ML
BH CV ECHO MEAS - SV(MOD-SP4): 68 ML
BH CV ECHO MEAS - SVI(LVOT): 29.4 ML/M2
BH CV ECHO MEAS - SVI(MOD-SP2): 31.9 ML/M2
BH CV ECHO MEAS - SVI(MOD-SP4): 33.3 ML/M2
BH CV ECHO MEAS - TAPSE (>1.6): 1.69 CM
BH CV ECHO MEAS RV FREE WALL STRAIN: 20.9 %
BH CV ECHO MEASUREMENTS AVERAGE E/E' RATIO: 8.23
BH CV LOW VAS LEFT PERONEAL VESSEL: 1
BH CV LOWER VASCULAR LEFT COMMON FEMORAL AUGMENT: NORMAL
BH CV LOWER VASCULAR LEFT COMMON FEMORAL COMPETENT: NORMAL
BH CV LOWER VASCULAR LEFT COMMON FEMORAL COMPRESS: NORMAL
BH CV LOWER VASCULAR LEFT COMMON FEMORAL PHASIC: NORMAL
BH CV LOWER VASCULAR LEFT COMMON FEMORAL SPONT: NORMAL
BH CV LOWER VASCULAR LEFT DISTAL FEMORAL COMPRESS: NORMAL
BH CV LOWER VASCULAR LEFT GASTRONEMIUS COMPRESS: NORMAL
BH CV LOWER VASCULAR LEFT GREATER SAPH AK COMPRESS: NORMAL
BH CV LOWER VASCULAR LEFT GREATER SAPH BK COMPRESS: NORMAL
BH CV LOWER VASCULAR LEFT LESSER SAPH COMPRESS: NORMAL
BH CV LOWER VASCULAR LEFT MID FEMORAL AUGMENT: NORMAL
BH CV LOWER VASCULAR LEFT MID FEMORAL COMPETENT: NORMAL
BH CV LOWER VASCULAR LEFT MID FEMORAL COMPRESS: NORMAL
BH CV LOWER VASCULAR LEFT MID FEMORAL PHASIC: NORMAL
BH CV LOWER VASCULAR LEFT MID FEMORAL SPONT: NORMAL
BH CV LOWER VASCULAR LEFT PERONEAL COMPRESS: NORMAL
BH CV LOWER VASCULAR LEFT PERONEAL THROMBUS: NORMAL
BH CV LOWER VASCULAR LEFT POPLITEAL AUGMENT: NORMAL
BH CV LOWER VASCULAR LEFT POPLITEAL COMPETENT: NORMAL
BH CV LOWER VASCULAR LEFT POPLITEAL COMPRESS: NORMAL
BH CV LOWER VASCULAR LEFT POPLITEAL PHASIC: NORMAL
BH CV LOWER VASCULAR LEFT POPLITEAL SPONT: NORMAL
BH CV LOWER VASCULAR LEFT POSTERIOR TIBIAL COMPRESS: NORMAL
BH CV LOWER VASCULAR LEFT PROFUNDA FEMORAL COMPRESS: NORMAL
BH CV LOWER VASCULAR LEFT PROXIMAL FEMORAL COMPRESS: NORMAL
BH CV LOWER VASCULAR LEFT SAPHENOFEMORAL JUNCTION COMPRESS: NORMAL
BH CV LOWER VASCULAR RIGHT COMMON FEMORAL AUGMENT: NORMAL
BH CV LOWER VASCULAR RIGHT COMMON FEMORAL COMPETENT: NORMAL
BH CV LOWER VASCULAR RIGHT COMMON FEMORAL COMPRESS: NORMAL
BH CV LOWER VASCULAR RIGHT COMMON FEMORAL PHASIC: NORMAL
BH CV LOWER VASCULAR RIGHT COMMON FEMORAL SPONT: NORMAL
BH CV LOWER VASCULAR RIGHT DISTAL FEMORAL COMPRESS: NORMAL
BH CV LOWER VASCULAR RIGHT GASTRONEMIUS COMPRESS: NORMAL
BH CV LOWER VASCULAR RIGHT GREATER SAPH AK COMPRESS: NORMAL
BH CV LOWER VASCULAR RIGHT GREATER SAPH BK COMPRESS: NORMAL
BH CV LOWER VASCULAR RIGHT LESSER SAPH COMPRESS: NORMAL
BH CV LOWER VASCULAR RIGHT MID FEMORAL AUGMENT: NORMAL
BH CV LOWER VASCULAR RIGHT MID FEMORAL COMPETENT: NORMAL
BH CV LOWER VASCULAR RIGHT MID FEMORAL COMPRESS: NORMAL
BH CV LOWER VASCULAR RIGHT MID FEMORAL PHASIC: NORMAL
BH CV LOWER VASCULAR RIGHT MID FEMORAL SPONT: NORMAL
BH CV LOWER VASCULAR RIGHT PERONEAL COMPRESS: NORMAL
BH CV LOWER VASCULAR RIGHT POPLITEAL AUGMENT: NORMAL
BH CV LOWER VASCULAR RIGHT POPLITEAL COMPETENT: NORMAL
BH CV LOWER VASCULAR RIGHT POPLITEAL COMPRESS: NORMAL
BH CV LOWER VASCULAR RIGHT POPLITEAL PHASIC: NORMAL
BH CV LOWER VASCULAR RIGHT POPLITEAL SPONT: NORMAL
BH CV LOWER VASCULAR RIGHT POSTERIOR TIBIAL COMPRESS: NORMAL
BH CV LOWER VASCULAR RIGHT PROFUNDA FEMORAL COMPRESS: NORMAL
BH CV LOWER VASCULAR RIGHT PROXIMAL FEMORAL COMPRESS: NORMAL
BH CV LOWER VASCULAR RIGHT SAPHENOFEMORAL JUNCTION COMPRESS: NORMAL
BH CV POP FLUID COLLECT LEFT: 1
BH CV VAS POP FLUID COLLECTED: 1
BH CV VAS PRELIMINARY FINDINGS SCRIPTING: 1
BH CV XLRA - RV BASE: 3.3 CM
BH CV XLRA - RV LENGTH: 7.2 CM
BH CV XLRA - RV MID: 2.24 CM
BH CV XLRA - TDI S': 7.7 CM/SEC
DEPRECATED RDW RBC AUTO: 40.8 FL (ref 37–54)
EOSINOPHIL # BLD AUTO: 0.28 10*3/MM3 (ref 0–0.4)
EOSINOPHIL NFR BLD AUTO: 2.5 % (ref 0.3–6.2)
ERYTHROCYTE [DISTWIDTH] IN BLOOD BY AUTOMATED COUNT: 12.5 % (ref 12.3–15.4)
HCT VFR BLD AUTO: 43.1 % (ref 34–46.6)
HGB BLD-MCNC: 14.8 G/DL (ref 12–15.9)
IMM GRANULOCYTES # BLD AUTO: 0.03 10*3/MM3 (ref 0–0.05)
IMM GRANULOCYTES NFR BLD AUTO: 0.3 % (ref 0–0.5)
LEFT ATRIUM VOLUME INDEX: 22.1 ML/M2
LYMPHOCYTES # BLD AUTO: 3.56 10*3/MM3 (ref 0.7–3.1)
LYMPHOCYTES NFR BLD AUTO: 31.6 % (ref 19.6–45.3)
MCH RBC QN AUTO: 30.5 PG (ref 26.6–33)
MCHC RBC AUTO-ENTMCNC: 34.3 G/DL (ref 31.5–35.7)
MCV RBC AUTO: 88.7 FL (ref 79–97)
MONOCYTES # BLD AUTO: 0.74 10*3/MM3 (ref 0.1–0.9)
MONOCYTES NFR BLD AUTO: 6.6 % (ref 5–12)
NEUTROPHILS NFR BLD AUTO: 58.5 % (ref 42.7–76)
NEUTROPHILS NFR BLD AUTO: 6.6 10*3/MM3 (ref 1.7–7)
NRBC BLD AUTO-RTO: 0 /100 WBC (ref 0–0.2)
NT-PROBNP SERPL-MCNC: 59.8 PG/ML (ref 0–900)
PLATELET # BLD AUTO: 320 10*3/MM3 (ref 140–450)
PMV BLD AUTO: 9.8 FL (ref 6–12)
RBC # BLD AUTO: 4.86 10*6/MM3 (ref 3.77–5.28)
SINUS: 2.8 CM
STJ: 3.3 CM
WBC NRBC COR # BLD AUTO: 11.27 10*3/MM3 (ref 3.4–10.8)

## 2024-09-21 PROCEDURE — 85730 THROMBOPLASTIN TIME PARTIAL: CPT | Performed by: HOSPITALIST

## 2024-09-21 PROCEDURE — 99222 1ST HOSP IP/OBS MODERATE 55: CPT | Performed by: INTERNAL MEDICINE

## 2024-09-21 PROCEDURE — 85670 THROMBIN TIME PLASMA: CPT | Performed by: INTERNAL MEDICINE

## 2024-09-21 PROCEDURE — 85613 RUSSELL VIPER VENOM DILUTED: CPT | Performed by: INTERNAL MEDICINE

## 2024-09-21 PROCEDURE — 93356 MYOCRD STRAIN IMG SPCKL TRCK: CPT | Performed by: STUDENT IN AN ORGANIZED HEALTH CARE EDUCATION/TRAINING PROGRAM

## 2024-09-21 PROCEDURE — 99204 OFFICE O/P NEW MOD 45 MIN: CPT | Performed by: INTERNAL MEDICINE

## 2024-09-21 PROCEDURE — 85730 THROMBOPLASTIN TIME PARTIAL: CPT | Performed by: PHYSICIAN ASSISTANT

## 2024-09-21 PROCEDURE — 81241 F5 GENE: CPT | Performed by: INTERNAL MEDICINE

## 2024-09-21 PROCEDURE — 93970 EXTREMITY STUDY: CPT | Performed by: SURGERY

## 2024-09-21 PROCEDURE — 85732 THROMBOPLASTIN TIME PARTIAL: CPT | Performed by: INTERNAL MEDICINE

## 2024-09-21 PROCEDURE — 93306 TTE W/DOPPLER COMPLETE: CPT

## 2024-09-21 PROCEDURE — 85705 THROMBOPLASTIN INHIBITION: CPT | Performed by: INTERNAL MEDICINE

## 2024-09-21 PROCEDURE — 25010000002 HEPARIN (PORCINE) 25000-0.45 UT/250ML-% SOLUTION: Performed by: PHYSICIAN ASSISTANT

## 2024-09-21 PROCEDURE — 93306 TTE W/DOPPLER COMPLETE: CPT | Performed by: STUDENT IN AN ORGANIZED HEALTH CARE EDUCATION/TRAINING PROGRAM

## 2024-09-21 PROCEDURE — 85025 COMPLETE CBC W/AUTO DIFF WBC: CPT | Performed by: PHYSICIAN ASSISTANT

## 2024-09-21 PROCEDURE — 81240 F2 GENE: CPT | Performed by: INTERNAL MEDICINE

## 2024-09-21 PROCEDURE — 25510000001 PERFLUTREN 6.52 MG/ML SUSPENSION 2 ML VIAL: Performed by: INTERNAL MEDICINE

## 2024-09-21 PROCEDURE — 93970 EXTREMITY STUDY: CPT

## 2024-09-21 PROCEDURE — 93356 MYOCRD STRAIN IMG SPCKL TRCK: CPT

## 2024-09-21 RX ORDER — BUPROPION HYDROCHLORIDE 300 MG/1
300 TABLET ORAL DAILY
Status: DISCONTINUED | OUTPATIENT
Start: 2024-09-21 | End: 2024-10-01 | Stop reason: HOSPADM

## 2024-09-21 RX ORDER — SPIRONOLACTONE 25 MG/1
25 TABLET ORAL DAILY
Status: DISCONTINUED | OUTPATIENT
Start: 2024-09-21 | End: 2024-10-01 | Stop reason: HOSPADM

## 2024-09-21 RX ORDER — TORSEMIDE 20 MG/1
20 TABLET ORAL DAILY
Status: DISCONTINUED | OUTPATIENT
Start: 2024-09-21 | End: 2024-10-01 | Stop reason: HOSPADM

## 2024-09-21 RX ORDER — HYDROCODONE POLISTIREX AND CHLORPHENIRAMINE POLISTIREX 10; 8 MG/5ML; MG/5ML
5 SUSPENSION, EXTENDED RELEASE ORAL EVERY 12 HOURS PRN
Status: DISPENSED | OUTPATIENT
Start: 2024-09-21 | End: 2024-09-26

## 2024-09-21 RX ORDER — ROSUVASTATIN CALCIUM 40 MG/1
40 TABLET, COATED ORAL NIGHTLY
Status: DISCONTINUED | OUTPATIENT
Start: 2024-09-21 | End: 2024-10-01 | Stop reason: HOSPADM

## 2024-09-21 RX ORDER — TRAMADOL HYDROCHLORIDE 50 MG/1
50 TABLET ORAL EVERY 6 HOURS PRN
Status: DISPENSED | OUTPATIENT
Start: 2024-09-21 | End: 2024-09-26

## 2024-09-21 RX ORDER — LEVOTHYROXINE SODIUM 50 UG/1
50 TABLET ORAL
Status: DISCONTINUED | OUTPATIENT
Start: 2024-09-21 | End: 2024-10-01 | Stop reason: HOSPADM

## 2024-09-21 RX ADMIN — ROSUVASTATIN CALCIUM 40 MG: 40 TABLET, FILM COATED ORAL at 23:25

## 2024-09-21 RX ADMIN — PERFLUTREN 3 ML: 6.52 INJECTION, SUSPENSION INTRAVENOUS at 14:38

## 2024-09-21 RX ADMIN — SPIRONOLACTONE 25 MG: 25 TABLET, FILM COATED ORAL at 09:24

## 2024-09-21 RX ADMIN — SACUBITRIL AND VALSARTAN 1 TABLET: 24; 26 TABLET, FILM COATED ORAL at 09:25

## 2024-09-21 RX ADMIN — HYDROCODONE POLISTIREX AND CHLORPHENIRAMINE POLISTIREX 5 ML: 10; 8 SUSPENSION, EXTENDED RELEASE ORAL at 23:26

## 2024-09-21 RX ADMIN — SACUBITRIL AND VALSARTAN 1 TABLET: 24; 26 TABLET, FILM COATED ORAL at 23:25

## 2024-09-21 RX ADMIN — TORSEMIDE 20 MG: 20 TABLET ORAL at 09:24

## 2024-09-21 RX ADMIN — BUPROPION HYDROCHLORIDE 300 MG: 300 TABLET, EXTENDED RELEASE ORAL at 09:25

## 2024-09-21 RX ADMIN — HEPARIN SODIUM 13 UNITS/KG/HR: 10000 INJECTION, SOLUTION INTRAVENOUS at 17:41

## 2024-09-21 NOTE — PLAN OF CARE
Goal Outcome Evaluation:  Plan of Care Reviewed With: patient           Outcome Evaluation: vss, heparin gtt, no c/o pain or discomfort this shift, NPO as of 0530, scheduled ECHO in the AM. Pt notified.

## 2024-09-21 NOTE — PLAN OF CARE
Goal Outcome Evaluation:  Plan of Care Reviewed With: patient        Progress: improving  Outcome Evaluation: VSS (see chart), heparin drip, AO x 4, assist to stand by, room air, call light within reach, continue plan of care

## 2024-09-21 NOTE — CONSULTS
Nutrition Services    Patient Name:  Barbara Tran  YOB: 1955  MRN: [id number]  Admit Date:  9/20/2024    Assessment Date:  09/21/24    Summary: Nurse admission screen    69yoF admitted with pulmonary embolism, COVID 19. Hx of CAD, CHF, hypothyroidism, HTN. Pt eating breakfast during visit. Requested soup and tea. RD provided. Reports poor appetite since sept 8, 2024. Only ate a few bites of her breakfast. Reports some congestion during visit. Dislikes thick food/liquids at this time. Agreeable to boost breeze for additional kcal and protein. No significant wt loss noted. Pt on diuretics. No muscle wasting or fat loss seen per NFPE. Encouraged good PO intake as tolerated.    Labs: Na 134, glu 114  Meds: spironolactone, Demadex     Plan/recs:  Boost breeze BID  Encourage PO intakes    RD to follow    CLINICAL NUTRITION ASSESSMENT      Reason for Assessment Nurse Admission Screen     Diagnosis/Problem   Pulmonary embolism, COVID 19   Medical/Surgical History Past Medical History:   Diagnosis Date    Allergic 2015    codeine, morphine, levaquin    Arthritis l5 years or so ago    Cataract 6-9 months ago    Need surgery    CHF (congestive heart failure)     Coronary artery disease 2005 stent    COVID-19 virus detected 03/10/2021    Deep vein thrombosis 06/03/2021    Pulmonary embolism    Depression     Disease of thyroid gland     Headache Covid 3-6-21    Has continued    History of pulmonary embolism 06/01/2021    HL (hearing loss) Last 4-6 months    Hyperlipidemia     Hypertension since 2005    Hypothyroidism since 2012    Obesity     Osteopenia last few years    Pulmonary embolism 06/03/2021    From Covid    Unstable angina 04/16/2024       Past Surgical History:   Procedure Laterality Date    BUNIONECTOMY Bilateral     HAMMERT TOE REPAIR/BUNIONECTOMY    CARDIAC CATHETERIZATION  2015, 2018    CARDIAC CATHETERIZATION N/A 4/29/2024    Procedure: Coronary angiography;  Surgeon: Cong Rivera MD;   "Location:  [facility];  Service: Cardiovascular;  Laterality: N/A;    CARDIAC CATHETERIZATION N/A 4/29/2024    Procedure: Left Heart Cath;  Surgeon: Cong Rivera MD;  Location:  [facility];  Service: Cardiovascular;  Laterality: N/A;    CARDIAC CATHETERIZATION N/A 4/29/2024    Procedure: Left ventriculography;  Surgeon: Cong Rivera MD;  Location:  [facility];  Service: Cardiovascular;  Laterality: N/A;    CATARACT EXTRACTION      CHOLECYSTECTOMY  1995    COLONOSCOPY N/A 12/19/2016    Procedure: COLONOSCOPY WITH COLD BIOPSIES;  Surgeon: Medina Guillen MD;  [facility];  Service:     CORONARY STENT PLACEMENT  2005    LAD 80% blockage    GANGLION CYST EXCISION      R WRIST    SUBTOTAL HYSTERECTOMY  2009    TONSILLECTOMY  1965    TUBAL ABDOMINAL LIGATION  1985        Anthropometrics        Current Height  Current Weight  BMI kg/m2 Height: 170.2 cm (67\")  Weight: 92.7 kg (204 lb 5.9 oz) (09/21/24 0007)  Body mass index is 32.01 kg/m².   Adjusted BMI (if applicable)    BMI Category Obese, Class I (30 - 34.9)   Ideal Body Weight (IBW) 135#   Usual Body Weight (UBW) 210#   Weight Trend Stable   Weight History Wt Readings from Last 30 Encounters:   09/21/24 0007 92.7 kg (204 lb 5.9 oz)   09/20/24 1725 93.9 kg (207 lb)   09/20/24 1541 94 kg (207 lb 4.8 oz)   09/08/24 1409 95.3 kg (210 lb)   08/28/24 1023 95.3 kg (210 lb)   08/01/24 2047 95.3 kg (210 lb)   07/30/24 1312 99.3 kg (219 lb)   04/29/24 1044 95.3 kg (210 lb)   04/16/24 1206 96.2 kg (212 lb)   01/02/24 0826 94.8 kg (209 lb)   12/19/23 1234 94.8 kg (209 lb)   09/25/23 1033 94.3 kg (208 lb)   09/19/23 0850 95.4 kg (210 lb 4.8 oz)   03/14/23 1357 97.6 kg (215 lb 1.6 oz)   02/16/23 0816 94.3 kg (208 lb)   01/27/23 1304 96.2 kg (212 lb)   12/26/22 2250 104 kg (229 lb 0.9 oz)   12/26/22 1727 98 kg (216 lb)   12/21/22 0810 98 kg (216 lb)   08/26/22 0814 96.2 kg (212 lb)   06/09/22 0802 98.9 kg " (218 lb)   06/03/22 2205 98.4 kg (217 lb)   05/04/22 1241 98.4 kg (217 lb)   04/14/22 0943 98.4 kg (217 lb)   10/08/21 1541 99.3 kg (219 lb)   09/15/21 1131 94.8 kg (209 lb)   06/21/21 1128 94.8 kg (209 lb)   03/11/21 0832 96.2 kg (212 lb)   12/14/20 0855 96.6 kg (213 lb)   07/16/20 1141 96.6 kg (213 lb)   09/16/19 0909 93.4 kg (205 lb 14.4 oz)   05/30/19 0817 94.8 kg (209 lb)        Labs       Pertinent Labs    Results from last 7 days   Lab Units 09/20/24  1728   SODIUM mmol/L 134*   POTASSIUM mmol/L 3.5   CHLORIDE mmol/L 99   CO2 mmol/L 21.0*   BUN mg/dL 21   CREATININE mg/dL 0.80   CALCIUM mg/dL 9.0   BILIRUBIN mg/dL 0.3   ALK PHOS U/L 97   ALT (SGPT) U/L 32   AST (SGOT) U/L 23   GLUCOSE mg/dL 114*     Results from last 7 days   Lab Units 09/21/24  0346 09/20/24  1728   HEMOGLOBIN g/dL 14.8 16.0*   HEMATOCRIT % 43.1 47.7*   WBC 10*3/mm3 11.27* 11.02*   ALBUMIN g/dL  --  4.2     Results from last 7 days   Lab Units 09/21/24  0346 09/20/24  2153 09/20/24  1728   INR   --  1.18*  --    APTT seconds >200.0* 28.6  --    PLATELETS 10*3/mm3 320  --  356     COVID19   Date Value Ref Range Status   06/01/2022 Not Detected Not Detected - Ref. Range Final     Lab Results   Component Value Date    HGBA1C 5.5 08/14/2024          Medications           Scheduled Medications buPROPion XL, 300 mg, Oral, Daily  levothyroxine, 50 mcg, Oral, Q AM  rosuvastatin, 40 mg, Oral, Nightly  sacubitril-valsartan, 1 tablet, Oral, BID  spironolactone, 25 mg, Oral, Daily  torsemide, 20 mg, Oral, Daily       Infusions heparin, 16 Units/kg/hr, Last Rate: 13 Units/kg/hr (09/21/24 0638)       PRN Medications   heparin (porcine)    sodium chloride    traMADol     Physical Findings          General Findings alert, obese, oriented, room air   Oral/Mouth Cavity tooth or teeth missing   Edema  lower extremity , 2+ (mild)   Gastrointestinal WDL, last bowel movement: 9/20   Skin  skin intact   Tubes/Drains/Lines none   NFPE No clinical signs of muscle  wasting or fat loss   --  Current Nutrition Orders & Evaluation of Intake       Oral Nutrition     Food Allergies NKFA   Current PO Diet Diet: Cardiac; Healthy Heart (2-3 Na+); Fluid Consistency: Thin (IDDSI 0)   Supplement n/a   PO Evaluation     % PO Intake Bites only    Factors Affecting Intake: altered mental status, decreased appetite   --  PES STATEMENT / NUTRITION DIAGNOSIS      Nutrition Dx Problem  Problem: Inadequate Oral Intake  Etiology: Medical Diagnosis - COVID 19    Signs/Symptoms: PO intake and Report of Minimal PO Intake     NUTRITION INTERVENTION / PLAN OF CARE      Intervention Goal(s) Reduce/improve symptoms, Meet estimated needs, Disease management/therapy, Establish PO intake, Tolerate PO , Increase intake, Accepts oral nutrition supplement, Appropriate weight loss, and PO intake goal %: >75%         RD Intervention/Action Interview for preferences, Advise alternative selection, Supplement provided, Encourage intake, Continue to monitor, Care plan reviewed, and Recommend/order: boost breeze   --      Prescription/Orders:       PO Diet       Supplements Boost breeze BID      Enteral Nutrition       Parenteral Nutrition    New Prescription Ordered? Yes   --      Monitor/Evaluation Per protocol   Discharge Plan/Needs Pending clinical course   --    RD to follow per protocol.      Electronically signed by:  Maria Alejandra Avina RD  09/21/24 10:32 EDT

## 2024-09-21 NOTE — ED NOTES
Nursing report ED to floor  Barbara Tran  69 y.o.  female    HPI :  HPI (Adult)  Stated Reason for Visit: sob x1 week  History Obtained From: patient    Chief Complaint  Chief Complaint   Patient presents with    Shortness of Breath       Admitting doctor:   Rocky Chavez MD    Admitting diagnosis:   The encounter diagnosis was Bilateral pulmonary embolism.    Code status:   Current Code Status       Date Active Code Status Order ID Comments User Context       Prior            Allergies:   Codeine, Levofloxacin, and Morphine    Isolation:   Enhanced Droplet/Contact     Intake and Output  No intake or output data in the 24 hours ending 09/20/24 2257    Weight:       09/20/24  1725   Weight: 93.9 kg (207 lb)       Most recent vitals:   Vitals:    09/20/24 1851 09/20/24 1853 09/20/24 1915 09/20/24 1916   BP: 114/74      Pulse:   79 79   Resp:       Temp:       SpO2:  99% 96% 97%   Weight:       Height:           Active LDAs/IV Access:   Lines, Drains & Airways       Active LDAs       Name Placement date Placement time Site Days    Peripheral IV 09/20/24 1822 Right Antecubital 09/20/24 1822  Antecubital  less than 1                    Labs (abnormal labs have a star):   Labs Reviewed   COMPREHENSIVE METABOLIC PANEL - Abnormal; Notable for the following components:       Result Value    Glucose 114 (*)     Sodium 134 (*)     CO2 21.0 (*)     BUN/Creatinine Ratio 26.3 (*)     All other components within normal limits    Narrative:     GFR Normal >60  Chronic Kidney Disease <60  Kidney Failure <15    The GFR formula is only valid for adults with stable renal function between ages 18 and 70.   SINGLE HS TROPONIN T - Abnormal; Notable for the following components:    HS Troponin T 15 (*)     All other components within normal limits    Narrative:     High Sensitive Troponin T Reference Range:  <14.0 ng/L- Negative Female for AMI  <22.0 ng/L- Negative Male for AMI  >=14 - Abnormal Female indicating possible  myocardial injury.  >=22 - Abnormal Male indicating possible myocardial injury.   Clinicians would have to utilize clinical acumen, EKG, Troponin, and serial changes to determine if it is an Acute Myocardial Infarction or myocardial injury due to an underlying chronic condition.        CBC WITH AUTO DIFFERENTIAL - Abnormal; Notable for the following components:    WBC 11.02 (*)     RBC 5.32 (*)     Hemoglobin 16.0 (*)     Hematocrit 47.7 (*)     Neutrophils, Absolute 7.20 (*)     All other components within normal limits   SINGLE HS TROPONIN T - Abnormal; Notable for the following components:    HS Troponin T 14 (*)     All other components within normal limits    Narrative:     High Sensitive Troponin T Reference Range:  <14.0 ng/L- Negative Female for AMI  <22.0 ng/L- Negative Male for AMI  >=14 - Abnormal Female indicating possible myocardial injury.  >=22 - Abnormal Male indicating possible myocardial injury.   Clinicians would have to utilize clinical acumen, EKG, Troponin, and serial changes to determine if it is an Acute Myocardial Infarction or myocardial injury due to an underlying chronic condition.        PROTIME-INR - Abnormal; Notable for the following components:    Protime 15.2 (*)     INR 1.18 (*)     All other components within normal limits   BNP (IN-HOUSE) - Normal    Narrative:     This assay is used as an aid in the diagnosis of individuals suspected of having heart failure. It can be used as an aid in the diagnosis of acute decompensated heart failure (ADHF) in patients presenting with signs and symptoms of ADHF to the emergency department (ED). In addition, NT-proBNP of <300 pg/mL indicates ADHF is not likely.    Age Range Result Interpretation  NT-proBNP Concentration (pg/mL:      <50             Positive            >450                   Gray                 300-450                    Negative             <300    50-75           Positive            >900                  Carty                 300-900                  Negative            <300      >75             Positive            >1800                  Gray                300-1800                  Negative            <300   APTT - Normal   RAINBOW DRAW    Narrative:     The following orders were created for panel order [place] Draw.  Procedure                               Abnormality         Status                     ---------                               -----------         ------                     Green Top (Gel)[[id number]]                                  Final result               Lavender Top[[id number]]                                     Final result               Gold Top - SST[[id number]]                                   Final result               Light Blue Top[[id number]]                                   Final result                 Please view results for these tests on the individual orders.   CBC WITH AUTO DIFFERENTIAL   APTT   CBC WITH AUTO DIFFERENTIAL   CBC AND DIFFERENTIAL    Narrative:     The following orders were created for panel order CBC & Differential.  Procedure                               Abnormality         Status                     ---------                               -----------         ------                     CBC Auto Differential[[id number]]        Abnormal            Final result                 Please view results for these tests on the individual orders.   GREEN TOP   LAVENDER TOP   GOLD TOP - SST   LIGHT BLUE TOP   CBC AND DIFFERENTIAL    Narrative:     The following orders were created for panel order CBC & Differential.  Procedure                               Abnormality         Status                     ---------                               -----------         ------                     CBC Auto Differential[[id number]]                                                         Please view results for these tests on the individual orders.   CBC AND DIFFERENTIAL    Narrative:     The following orders  were created for panel order CBC & Differential.  Procedure                               Abnormality         Status                     ---------                               -----------         ------                     CBC Auto Differential[[id number]]                                                         Please view results for these tests on the individual orders.       EKG:   ECG 12 Lead ED Triage Standing Order; SOA   Preliminary Result   HEART DAYQ=767  bpm   RR Havqjtou=812  ms   AK Mvaylwoj=338  ms   P Horizontal Axis=-2  deg   P Front Axis=34  deg   QRSD Lndbxlnd=842  ms   QT Gdkixlfp=215  ms   TZxX=458  ms   QRS Axis=-34  deg   T Wave Axis=92  deg   - BORDERLINE ECG -   Sinus arrhythmia   Ventricular premature complex   Left axis deviation   Low voltage, precordial leads   Abnormal R-wave progression, late transition   Borderline T wave abnormalities   Date and Time of Study:2024-09-20 17:08:27          Meds given in ED:   Medications   sodium chloride 0.9 % flush 10 mL (has no administration in time range)   heparin 83205 units/250 mL (100 units/mL) in 0.45 % NaCl infusion (16 Units/kg/hr × 93.9 kg Intravenous New Bag 9/20/24 2158)   heparin (porcine) 5000 UNIT/ML injection 3,800-7,500 Units (has no administration in time range)   promethazine-dextromethorphan (PROMETHAZINE-DM) 6.25-15 MG/5ML syrup 5 mL (has no administration in time range)   iopamidol (ISOVUE-370) 76 % injection 100 mL (95 mL Intravenous Given by Other 9/20/24 2001)   heparin (porcine) 5000 UNIT/ML injection 7,500 Units (7,500 Units Intravenous Given 9/20/24 2157)       Imaging results:  CT Angiogram Chest Pulmonary Embolism    Result Date: 9/20/2024   1. Bilateral pulmonary emboli. There is no evidence of right heart strain. There are some small areas of groundglass attenuation which are noted within the anterior right lower lobe, small areas of pulmonary infarction not excluded.  FINDINGS were discussed with Diane Glover at 9:09 p.m.   Radiation dose reduction techniques were utilized, including automated exposure control and exposure modulation based on body size.   This report was finalized on 9/20/2024 9:10 PM by Dr. Mary Flores M.D on Workstation: [id number]      XR Chest 1 View    Result Date: 9/20/2024  FINDINGS AND IMPRESSION: No pulmonary consolidation, pleural effusion or pneumothorax is seen. Cardiac silhouette is within normal limits for size. Mild asymmetric elevation of the right diaphragm.  This report was finalized on 9/20/2024 7:46 PM by Dr. Rodney Freeman M.D on Workstation: [id number]       Ambulatory status:   - ad na    Social issues:   Social History     Socioeconomic History    Marital status: Single   Tobacco Use    Smoking status: Never     Passive exposure: Never    Smokeless tobacco: Never    Tobacco comments:     N/A   Vaping Use    Vaping status: Never Used   Substance and Sexual Activity    Alcohol use: Yes     Alcohol/week: 2.0 standard drinks of alcohol     Types: 1 Glasses of wine, 1 Cans of beer per week     Comment: socially    Drug use: Never    Sexual activity: Not Currently     Partners: Male     Birth control/protection: None       Peripheral Neurovascular  Peripheral Neurovascular (Adult)  Peripheral Neurovascular WDL: WDL    Neuro Cognitive  Neuro Cognitive (Adult)  Cognitive/Neuro/Behavioral WDL: WDL    Learning  Learning Assessment (Adult)  Learning Readiness and Ability: no barriers identified    Respiratory  Respiratory (Adult)  Airway WDL: WDL  Respiratory WDL  Respiratory WDL: WDL    Abdominal Pain       Pain Assessments  Pain (Adult)  (0-10) Pain Rating: Rest: 0  (0-10) Pain Rating: Activity: 0    NIH Stroke Scale       Leeanne Clarke RN  09/20/24 22:57 EDT

## 2024-09-21 NOTE — PROGRESS NOTES
Name: Barbara Tran ADMIT: 2024   : 1955  PCP: Millicent Ace MD    MRN: [id number] LOS: 0 days   AGE/SEX: 69 y.o. female  ROOM: [facility]     Subjective   Subjective   Complaining of cough and generalized not feeling well.     Objective   Objective   Vital Signs  Temp:  [97.8 °F (36.6 °C)-99.1 °F (37.3 °C)] 97.9 °F (36.6 °C)  Heart Rate:  [] 89  Resp:  [18-19] 19  BP: (111-135)/(62-86) 132/62  SpO2:  [96 %-100 %] 97 %  on   ;   Device (Oxygen Therapy): room air  Body mass index is 32.01 kg/m².    Physical Exam  Constitutional:       General: She is not in acute distress.     Appearance: She is ill-appearing. She is not toxic-appearing.   HENT:      Head: Normocephalic and atraumatic.   Cardiovascular:      Rate and Rhythm: Normal rate and regular rhythm.   Pulmonary:      Effort: No respiratory distress.      Breath sounds: No wheezing or rales.      Comments: Frequent coughing  Abdominal:      General: Bowel sounds are normal.      Palpations: Abdomen is soft.      Tenderness: There is no abdominal tenderness. There is no guarding or rebound.   Musculoskeletal:         General: No swelling.   Skin:     General: Skin is warm and dry.   Neurological:      General: No focal deficit present.      Mental Status: She is alert and oriented to person, place, and time.   Psychiatric:         Mood and Affect: Mood normal.         Behavior: Behavior normal.     Results Review  I reviewed the patient's new clinical results.  Results from last 7 days   Lab Units 24  0346 24  1728   WBC 10*3/mm3 11.27* 11.02*   HEMOGLOBIN g/dL 14.8 16.0*   PLATELETS 10*3/mm3 320 356     Results from last 7 days   Lab Units 24  1728   SODIUM mmol/L 134*   POTASSIUM mmol/L 3.5   CHLORIDE mmol/L 99   CO2 mmol/L 21.0*   BUN mg/dL 21   CREATININE mg/dL 0.80   GLUCOSE mg/dL 114*     Lab Results   Component Value Date    ANIONGAP 14.0 2024     Estimated Creatinine Clearance: 75.3 mL/min (by C-G formula based  "on SCr of 0.8 mg/dL).   Lab Results   Component Value Date    EGFR 78.9 09/20/2024     Results from last 7 days   Lab Units 09/20/24  1728   ALBUMIN g/dL 4.2   BILIRUBIN mg/dL 0.3   ALK PHOS U/L 97   AST (SGOT) U/L 23   ALT (SGPT) U/L 32     Results from last 7 days   Lab Units 09/20/24  1728   CALCIUM mg/dL 9.0   ALBUMIN g/dL 4.2       No results found for: \"HGBA1C\", \"POCGLU\"    CT Angiogram Chest Pulmonary Embolism    Result Date: 9/20/2024   1. Bilateral pulmonary emboli. There is no evidence of right heart strain. There are some small areas of groundglass attenuation which are noted within the anterior right lower lobe, small areas of pulmonary infarction not excluded.  FINDINGS were discussed with Diane Glover at 9:09 p.m.  Radiation dose reduction techniques were utilized, including automated exposure control and exposure modulation based on body size.   This report was finalized on 9/20/2024 9:10 PM by Dr. Mary Flores M.D on Workstation: [id number]      XR Chest 1 View    Result Date: 9/20/2024  FINDINGS AND IMPRESSION: No pulmonary consolidation, pleural effusion or pneumothorax is seen. Cardiac silhouette is within normal limits for size. Mild asymmetric elevation of the right diaphragm.  This report was finalized on 9/20/2024 7:46 PM by Dr. Rodney Freeman M.D on Workstation: [id number]       Scheduled Meds  buPROPion XL, 300 mg, Oral, Daily  levothyroxine, 50 mcg, Oral, Q AM  rosuvastatin, 40 mg, Oral, Nightly  sacubitril-valsartan, 1 tablet, Oral, BID  spironolactone, 25 mg, Oral, Daily  torsemide, 20 mg, Oral, Daily    Continuous Infusions  heparin, 16 Units/kg/hr, Last Rate: 13 Units/kg/hr (09/21/24 0638)    PRN Meds    heparin (porcine)    Hydrocod Sukhjinder-Chlorphe Sukhjinder ER    sodium chloride    traMADol    heparin, 16 Units/kg/hr, Last Rate: 13 Units/kg/hr (09/21/24 0638)    Diet  Diet: Cardiac; Healthy Heart (2-3 Na+); Fluid Consistency: Thin (IDDSI 0)    Results for orders placed during the " hospital encounter of 01/02/24    Adult Transthoracic Echo Complete W/ Cont if Necessary Per Protocol    Interpretation Summary    Left ventricular systolic function is normal. Left ventricular ejection fraction appears to be 56 - 60%.    Left ventricular diastolic function was normal.    Estimated right ventricular systolic pressure from tricuspid regurgitation is normal (<35 mmHg). Calculated right ventricular systolic pressure from tricuspid regurgitation is 22 mmHg.        Assessment/Plan     Active Hospital Problems    Diagnosis  POA    **Pulmonary embolism [I26.99]  Yes    COVID-19 virus detected [U07.1]  Unknown    Influenza [J11.1]  Unknown    History of pulmonary embolism [Z86.711]  Yes    Chronic diastolic congestive heart failure [I50.32]  Yes    Hypothyroidism [E03.9]  Yes    Chronic coronary artery disease [I25.10]  Yes    Benign essential HTN [I10]  Yes    HLD (hyperlipidemia) [E78.5]  Yes      Resolved Hospital Problems   No resolved problems to display.     69 y.o. female     Acute bilateral PE (recurrent PE)  Pulmonary infarction not excluded on CT scan  Continue heparin probably switch to apixaban (treated with apixaban for a year previously)  Cardiology consulted. No right heart strain on CT scan  Hemodynamically stable not requiring any oxygen       Influenza  COVID  Positive for both on 9/8/2024  Continue supportive care  Not requiring any oxygen  Add antitussive    CAD history of stent  HFpEF previous echocardiogram above  Hypertension BP acceptable  Hyperlipidemia statin  Hypothyroidism levothyroxine  Obesity Body mass index is 32.01 kg/m².     DVT prophylaxis  anticoagulation as above    Discharge  TBD  Expected discharge date/ time has not been documented.    Discussed with patient and nursing staff    Lambert Foley MD  [facility]  09/21/24

## 2024-09-21 NOTE — H&P
Patient Name:  Barbara Tran  YOB: 1955  MRN:  [id number]  Admit Date:  9/20/2024  Patient Care Team:  Millicent Ace MD as PCP - General (Family Medicine)  Kin Patel MD as Consulting Physician (Orthopedic Surgery)  Bere Beckford MD as Consulting Physician (Cardiology)  Patrizia Page MD as Consulting Physician (Ophthalmology)      Subjective   History Present Illness     Chief Complaint   Patient presents with    Shortness of Breath     History of Present Illness  Ms. Tran is a 69 year old female with history of hypertension, hyperlipidemia, CAD, hypothyroidism, CHF, history of PE who presents to the emergency room for shortness of breath.  Patient states she had COVID-19 on September 8, she has had nasal congestion, sore throat, cough since then.  She feels like her cough and congestion have been worsening in her shortness of breath has worsened over the last couple days instead of improving.  Patient states when she had COVID-19 several years ago she developed pulmonary embolism after that, she was on some anticoagulation for about 6 months but has not been on any anticoagulation recently.  She does have chronic lower extremity edema that she states is actually better today.  She denies any chest pain, she is normally not on any oxygen at home.  She denies having any chest pain, no abdominal pain, no nausea or vomiting.  In the emergency room patient's troponin was 15 with repeat of 14, EKG showed sinus arrhythmia with a rate of 100, borderline T wave abnormalities.  Sodium 134, potassium 3.5, creatinine 0.80, BUN 21, glucose 114, INR 1.1, white blood cell count 11.0, HGB 16, hct 47.7.  CTA of her chest does show bilateral pulmonary emboli, no evidence of right heart strain, some small areas of groundglass attenuation, small areas of pulmonary infarct cannot be excluded.  Patient was started heparin drip in the emergency room.  Patient requesting to see hematology since  this is recurrent emboli, but both of them post-COVID.    Review of Systems   Constitutional:  Positive for fatigue. Negative for appetite change and fever.   HENT:  Positive for congestion, rhinorrhea, sinus pressure and sore throat. Negative for nosebleeds and trouble swallowing.    Eyes:  Negative for photophobia, redness and visual disturbance.   Respiratory:  Positive for cough and shortness of breath. Negative for chest tightness and wheezing.    Cardiovascular:  Negative for chest pain, palpitations and leg swelling.   Gastrointestinal:  Negative for abdominal distention, abdominal pain, nausea and vomiting.   Endocrine: Negative.    Genitourinary: Negative.    Musculoskeletal:  Negative for gait problem and joint swelling.   Skin: Negative.    Neurological:  Negative for dizziness, seizures, speech difficulty, light-headedness and headaches.   Hematological: Negative.    Psychiatric/Behavioral:  Negative for behavioral problems and confusion.         Personal History     Past Medical History:   Diagnosis Date    Allergic 2015    codeine, morphine, levaquin    Arthritis l5 years or so ago    Cataract 6-9 months ago    Need surgery    CHF (congestive heart failure)     Coronary artery disease 2005 stent    COVID-19 virus detected 03/10/2021    Deep vein thrombosis 06/03/2021    Pulmonary embolism    Depression     Disease of thyroid gland     Headache Covid 3-6-21    Has continued    History of pulmonary embolism 06/01/2021    HL (hearing loss) Last 4-6 months    Hyperlipidemia     Hypertension since 2005    Hypothyroidism since 2012    Obesity     Osteopenia last few years    Pulmonary embolism 06/03/2021    From Covid    Unstable angina 04/16/2024     Past Surgical History:   Procedure Laterality Date    BUNIONECTOMY Bilateral     HAMMERT TOE REPAIR/BUNIONECTOMY    CARDIAC CATHETERIZATION  2015, 2018    CARDIAC CATHETERIZATION N/A 4/29/2024    Procedure: Coronary angiography;  Surgeon: Cong Rivera MD;   Location:  [facility];  Service: Cardiovascular;  Laterality: N/A;    CARDIAC CATHETERIZATION N/A 2024    Procedure: Left Heart Cath;  Surgeon: Cong Rivera MD;  Location:  [facility];  Service: Cardiovascular;  Laterality: N/A;    CARDIAC CATHETERIZATION N/A 2024    Procedure: Left ventriculography;  Surgeon: Cong Rivera MD;  Location:  [facility];  Service: Cardiovascular;  Laterality: N/A;    CATARACT EXTRACTION      CHOLECYSTECTOMY      COLONOSCOPY N/A 2016    Procedure: COLONOSCOPY WITH COLD BIOPSIES;  Surgeon: Medina Guillen MD;  Location:  [facility];  Service:     CORONARY STENT PLACEMENT      LAD 80% blockage    GANGLION CYST EXCISION      R WRIST    SUBTOTAL HYSTERECTOMY  2009    TONSILLECTOMY  1965    TUBAL ABDOMINAL LIGATION  1985     Family History   Problem Relation Age of Onset    Heart disease Mother             Heart disease Father             No Known Problems Maternal Aunt     No Known Problems Maternal Uncle     No Known Problems Paternal Aunt     No Known Problems Paternal Uncle     No Known Problems Maternal Grandmother     No Known Problems Maternal Grandfather     No Known Problems Paternal Grandmother     No Known Problems Paternal Grandfather     Diabetes Sister     Heart disease Sister 69            Heart disease Brother         open heart-bypass    Cancer Brother         Bladder/Rodney    Arthritis Brother     Heart disease Brother 62            Heart attack Brother             Arthritis Son     Celiac disease Neg Hx     Cirrhosis Neg Hx     Colon cancer Neg Hx     Colon polyps Neg Hx     Crohn's disease Neg Hx     Cystic fibrosis Neg Hx     Esophageal cancer Neg Hx     Hemochromatosis Neg Hx     Inflammatory bowel disease Neg Hx     Irritable bowel syndrome Neg Hx     Liver cancer Neg Hx     Liver disease Neg Hx     Rectal cancer Neg Hx     Stomach cancer  Neg Hx     Ulcerative colitis Neg Hx     Dante's disease Neg Hx     Alcohol abuse Neg Hx     Pancreatitis Neg Hx      Social History     Tobacco Use    Smoking status: Never     Passive exposure: Never    Smokeless tobacco: Never    Tobacco comments:     N/A   Vaping Use    Vaping status: Never Used   Substance Use Topics    Alcohol use: Yes     Alcohol/week: 2.0 standard drinks of alcohol     Types: 1 Glasses of wine, 1 Cans of beer per week     Comment: socially    Drug use: Never     No current facility-administered medications on file prior to encounter.     Current Outpatient Medications on File Prior to Encounter   Medication Sig Dispense Refill    aspirin 325 MG tablet Take 1 tablet by mouth Daily for 14 days. 14 tablet 0    buPROPion XL (WELLBUTRIN XL) 300 MG 24 hr tablet Take 1 tablet by mouth Daily. 90 tablet 3    calcium carbonate (OS-SHAHNAZ) 600 MG tablet Take 1 tablet by mouth 2 (Two) Times a Day With Meals.      empagliflozin (JARDIANCE) 10 MG tablet tablet Take 1 tablet by mouth Daily. 28 tablet 0    ibandronate (BONIVA) 150 MG tablet Take 1 tablet by mouth Every 30 (Thirty) Days. 3 tablet 3    levothyroxine (Synthroid) 50 MCG tablet Take 1 tablet by mouth Daily. 90 tablet 2    magnesium oxide (MAG-OX) 400 MG tablet Take 2 tablets by mouth 2 (two) times a day.      methylPREDNISolone (MEDROL) 4 MG dose pack Take as directed on package instructions. 21 each 0    promethazine-dextromethorphan (PROMETHAZINE-DM) 6.25-15 MG/5ML syrup Take 5 mL by mouth 4 (Four) Times a Day As Needed for Cough. 180 mL 0    rosuvastatin (CRESTOR) 40 MG tablet Take 1 tablet by mouth Every Night. 90 tablet 3    sacubitril-valsartan (ENTRESTO) 24-26 MG tablet Take 1 tablet by mouth 2 (Two) Times a Day. 28 tablet 0    spironolactone (ALDACTONE) 25 MG tablet Take 1 tablet by mouth Daily. 90 tablet 3    torsemide (DEMADEX) 20 MG tablet Take 1 tablet by mouth Daily.      traMADol (ULTRAM) 50 MG tablet Take 1 tablet by mouth Every 6  (Six) Hours As Needed for Moderate Pain. 60 tablet 0    zolpidem (AMBIEN) 5 MG tablet Take 1 tablet by mouth At Night As Needed for Sleep. 30 tablet 0    [DISCONTINUED] azithromycin (Zithromax Z-Tristan) 250 MG tablet Take 2 tablets at the same time Day 1 and then 1 tablet every 24 hours thereafter. 6 tablet 0    [DISCONTINUED] HYDROcodone-acetaminophen (NORCO)  MG per tablet Take 0.5-1 tablets by mouth Every 6 (Six) Hours As Needed for Moderate Pain or Severe Pain. 10 tablet 0    [DISCONTINUED] zolpidem (AMBIEN) 5 MG tablet Take 1 tablet by mouth At Night As Needed for Sleep. 30 tablet 0     Allergies   Allergen Reactions    Codeine Dizziness     lightheaded    Levofloxacin Itching    Morphine Itching       Objective    Objective     Vital Signs  Temp:  [99.1 °F (37.3 °C)] 99.1 °F (37.3 °C)  Heart Rate:  [] 79  Resp:  [18] 18  BP: (111-130)/(71-86) 114/74  SpO2:  [96 %-100 %] 97 %  on   ;   Device (Oxygen Therapy): room air  Body mass index is 39.11 kg/m².    Physical Exam  Vitals and nursing note reviewed.   Constitutional:       General: She is not in acute distress.     Appearance: She is well-developed.   HENT:      Head: Normocephalic.   Neck:      Vascular: No JVD.   Cardiovascular:      Rate and Rhythm: Normal rate and regular rhythm.      Comments: NSr on the monitor with HR 78  Pulmonary:      Effort: Pulmonary effort is normal.      Breath sounds: Normal breath sounds.      Comments: Lung sounds clear but diminished, Sats 96% on room air, no shortness of breath at this time  Abdominal:      General: Bowel sounds are normal. There is no distension.      Palpations: Abdomen is soft.      Tenderness: There is no abdominal tenderness.   Musculoskeletal:         General: Normal range of motion.      Cervical back: Normal range of motion.      Right lower le+ Edema present.      Left lower le+ Edema present.   Skin:     General: Skin is warm and dry.      Capillary Refill: Capillary refill takes  less than 2 seconds.   Neurological:      General: No focal deficit present.      Mental Status: She is alert and oriented to person, place, and time.   Psychiatric:         Speech: Speech normal.         Behavior: Behavior normal.         Cognition and Memory: Cognition normal.         Judgment: Judgment normal.       Results Review:  I reviewed the patient's new clinical results.  I reviewed the patient's new imaging results and agree with the interpretation.  I reviewed the patient's other test results and agree with the interpretation  I personally viewed and interpreted the patient's EKG/Telemetry data  Discussed with ED provider.    Lab Results (last 24 hours)       Procedure Component Value Units Date/Time    CBC & Differential [[id number]]  (Abnormal) Collected: 09/20/24 1728    Specimen: Blood Updated: 09/20/24 1736    Narrative:      The following orders were created for panel order CBC & Differential.  Procedure                               Abnormality         Status                     ---------                               -----------         ------                     CBC Auto Differential[[id number]]        Abnormal            Final result                 Please view results for these tests on the individual orders.    Comprehensive Metabolic Panel [[id number]]  (Abnormal) Collected: 09/20/24 1728    Specimen: Blood Updated: 09/20/24 1802     Glucose 114 mg/dL      BUN 21 mg/dL      Creatinine 0.80 mg/dL      Sodium 134 mmol/L      Potassium 3.5 mmol/L      Chloride 99 mmol/L      CO2 21.0 mmol/L      Calcium 9.0 mg/dL      Total Protein 7.1 g/dL      Albumin 4.2 g/dL      ALT (SGPT) 32 U/L      AST (SGOT) 23 U/L      Alkaline Phosphatase 97 U/L      Total Bilirubin 0.3 mg/dL      Globulin 2.9 gm/dL      A/G Ratio 1.4 g/dL      BUN/Creatinine Ratio 26.3     Anion Gap 14.0 mmol/L      eGFR 78.9 mL/min/1.73     Narrative:      GFR Normal >60  Chronic Kidney Disease <60  Kidney Failure <15    The GFR  formula is only valid for adults with stable renal function between ages 18 and 70.    BNP [[id number]]  (Normal) Collected: 09/20/24 1728    Specimen: Blood Updated: 09/20/24 1802     proBNP 67.9 pg/mL     Narrative:      This assay is used as an aid in the diagnosis of individuals suspected of having heart failure. It can be used as an aid in the diagnosis of acute decompensated heart failure (ADHF) in patients presenting with signs and symptoms of ADHF to the emergency department (ED). In addition, NT-proBNP of <300 pg/mL indicates ADHF is not likely.    Age Range Result Interpretation  NT-proBNP Concentration (pg/mL:      <50             Positive            >450                   Gray                 300-450                    Negative             <300    50-75           Positive            >900                  Gray                300-900                  Negative            <300      >75             Positive            >1800                  Gray                300-1800                  Negative            <300    Single High Sensitivity Troponin T [[id number]]  (Abnormal) Collected: 09/20/24 1728    Specimen: Blood Updated: 09/20/24 1802     HS Troponin T 15 ng/L     Narrative:      High Sensitive Troponin T Reference Range:  <14.0 ng/L- Negative Female for AMI  <22.0 ng/L- Negative Male for AMI  >=14 - Abnormal Female indicating possible myocardial injury.  >=22 - Abnormal Male indicating possible myocardial injury.   Clinicians would have to utilize clinical acumen, EKG, Troponin, and serial changes to determine if it is an Acute Myocardial Infarction or myocardial injury due to an underlying chronic condition.         CBC Auto Differential [[id number]]  (Abnormal) Collected: 09/20/24 1728    Specimen: Blood Updated: 09/20/24 1736     WBC 11.02 10*3/mm3      RBC 5.32 10*6/mm3      Hemoglobin 16.0 g/dL      Hematocrit 47.7 %      MCV 89.7 fL      MCH 30.1 pg      MCHC 33.5 g/dL      RDW 12.5 %      RDW-SD 40.9  fl      MPV 9.5 fL      Platelets 356 10*3/mm3      Neutrophil % 65.4 %      Lymphocyte % 25.5 %      Monocyte % 6.5 %      Eosinophil % 1.6 %      Basophil % 0.6 %      Immature Grans % 0.4 %      Neutrophils, Absolute 7.20 10*3/mm3      Lymphocytes, Absolute 2.81 10*3/mm3      Monocytes, Absolute 0.72 10*3/mm3      Eosinophils, Absolute 0.18 10*3/mm3      Basophils, Absolute 0.07 10*3/mm3      Immature Grans, Absolute 0.04 10*3/mm3      nRBC 0.0 /100 WBC     Single High Sensitivity Troponin T [[id number]]  (Abnormal) Collected: 09/20/24 1934    Specimen: Blood Updated: 09/20/24 2106     HS Troponin T 14 ng/L     Narrative:      High Sensitive Troponin T Reference Range:  <14.0 ng/L- Negative Female for AMI  <22.0 ng/L- Negative Male for AMI  >=14 - Abnormal Female indicating possible myocardial injury.  >=22 - Abnormal Male indicating possible myocardial injury.   Clinicians would have to utilize clinical acumen, EKG, Troponin, and serial changes to determine if it is an Acute Myocardial Infarction or myocardial injury due to an underlying chronic condition.         Protime-INR [[id number]]  (Abnormal) Collected: 09/20/24 2153    Specimen: Blood Updated: 09/20/24 2213     Protime 15.2 Seconds      INR 1.18    aPTT [[id number]]  (Normal) Collected: 09/20/24 2153    Specimen: Blood Updated: 09/20/24 2213     PTT 28.6 seconds             Imaging Results (Last 24 Hours)       Procedure Component Value Units Date/Time    CT Angiogram Chest Pulmonary Embolism [[id number]] Collected: 09/20/24 2101     Updated: 09/20/24 2113    Narrative:      CT ANGIOGRAM OF THE CHEST     HISTORY: Shortness of breath     COMPARISON: March 13, 2023     TECHNIQUE: Axial CT imaging was obtained through the thorax. IV contrast  was administered. Three-D reformatted images were obtained.     FINDINGS:  There are bilateral pulmonary emboli, with the most proximal emboli  noted within a lobar branch to the right lower lobe. There is no  evidence of  right heart strain. Thyroid gland is somewhat atrophic.  Trachea and esophagus are within normal limits. There are coronary  artery calcifications. Examination was not optimized for assessment of  the thoracic aorta. It is normal in caliber. Mediastinal lymph nodes are  not pathologically enlarged. There is some small areas of groundglass  attenuation noted within the periphery of the right lower lobe, best  seen on images 84 through 86, which may reflect small areas of pulmonary  infarction.. Images through the upper abdomen demonstrate changes of  prior cholecystectomy. No acute osseous abnormalities are seen. There  are bilateral breast implants. There is thoracic scoliosis, with  convexity to the right.       Impression:         1. Bilateral pulmonary emboli. There is no evidence of right heart  strain. There are some small areas of groundglass attenuation which are  noted within the anterior right lower lobe, small areas of pulmonary  infarction not excluded.     FINDINGS were discussed with Diane Glover at 9:09 p.m.     Radiation dose reduction techniques were utilized, including automated  exposure control and exposure modulation based on body size.        This report was finalized on 9/20/2024 9:10 PM by Dr. Mary Flores M.D on Workstation: [id number]       XR Chest 1 View [[id number]] Collected: 09/20/24 1942     Updated: 09/20/24 1949    Narrative:      Portable chest radiograph     HISTORY: Shortness of air     TECHNIQUE: Single AP portable radiograph of the chest     COMPARISON: Chest radiograph 1/19/2015       Impression:      FINDINGS AND IMPRESSION:  No pulmonary consolidation, pleural effusion or pneumothorax is seen.  Cardiac silhouette is within normal limits for size. Mild asymmetric  elevation of the right diaphragm.     This report was finalized on 9/20/2024 7:46 PM by Dr. Rodney Freeman M.D  on Workstation: [id number]               Results for orders placed during the hospital encounter  of 01/02/24    Adult Transthoracic Echo Complete W/ Cont if Necessary Per Protocol    Interpretation Summary    Left ventricular systolic function is normal. Left ventricular ejection fraction appears to be 56 - 60%.    Left ventricular diastolic function was normal.    Estimated right ventricular systolic pressure from tricuspid regurgitation is normal (<35 mmHg). Calculated right ventricular systolic pressure from tricuspid regurgitation is 22 mmHg.      ECG 12 Lead ED Triage Standing Order; SOA   Preliminary Result   HEART EOPJ=064  bpm   RR Upbfsgli=249  ms   WY Daepkgyv=036  ms   P Horizontal Axis=-2  deg   P Front Axis=34  deg   QRSD Fzynbroe=499  ms   QT Vbrgxdpa=709  ms   ICjP=685  ms   QRS Axis=-34  deg   T Wave Axis=92  deg   - BORDERLINE ECG -   Sinus arrhythmia   Ventricular premature complex   Left axis deviation   Low voltage, precordial leads   Abnormal R-wave progression, late transition   Borderline T wave abnormalities   Date and Time of Study:2024-09-20 17:08:27           Assessment/Plan     Active Hospital Problems    Diagnosis  POA    **Pulmonary embolism [I26.99]  Yes    History of pulmonary embolism [Z86.711]  Yes     Related to COVID - June 2021      Chronic diastolic congestive heart failure [I50.32]  Yes     OVERVIEW:  Noted 8/30/2018        Hypothyroidism [E03.9]  Yes     Aileen 8/26/2022   Lab Results   Component Value Date    TSH 2.7 02/22/2022           Chronic coronary artery disease [I25.10]  Yes     Cardiac Catheterization 1/21/2015 Patent proximal LAD stent from 2005 with origin of septal  at LAD stent around 80% which is unchanged from prior cardiac cath, normal LV systolic function     Drug Eluting Stent Placement 3/22/2005 3.0 x 13 mm Cypher stent to distal LAD with adjuvant balloon angioplasty to the second septal  @ [facility]           Benign essential HTN [I10]  Yes     Aileen 8/26/2022  BP is controlled well. She will recheck in six months. She will continue  present meds. Prescription is done by her cardiologist.        HLD (hyperlipidemia) [E78.5]  Yes     Aileen 8/26/2022    Patient has been on hyperlipidemia medications for some time.  She is doing fine with that and labs are stable.        Ms. Tran is a 69 year old female with history of hypertension, hyperlipidemia, CAD, hypothyroidism, CHF, history of PE who presents to the emergency room for shortness of breath.     Acute pulmonary embolism  -Patient started on heparin drip, will continue that, will likely need to be transition to p.o. anticoagulation and continue that for lifetime due to recurrent PE  -Patient recent 2D echo was done in January showed a EF of 56 to 60%, left ventricular systolic function was normal.  Consult cardiology for any further testing if needed  -Consult hematology, both PEs have been post-COVID  -O2 to keep sats above 90%  -Telemetry unit for monitoring  -Up with assist    Hypertension/hyperlipidemia/CAD/CHF  -Daily weights  -Continue home medications when med rec completed  -Telemetry unit for monitoring  -Recheck CBC, BMP in a.m.    Hypothyroidism  -Continue levothyroxine      I discussed the patient's findings and my recommendations with patient.    VTE Prophylaxis - heparin drip initiated.  Code Status - Full code.       MANOHAR Hartmann  [facility]  09/20/24  22:13 EDT

## 2024-09-21 NOTE — CONSULTS
.     REASON FOR CONSULTATION:     Provide an opinion on any further workup or treatment  Pulmonary embolism                             REQUESTING PHYSICIAN: Reji Daniel II,*  RECORDS OBTAINED:  Records of the patient's history including those obtained from the referring provider were reviewed and summarized in detail.    HISTORY OF PRESENT ILLNESS:  The patient is a 69 y.o. year old female  who is here for follow-up with the above-mentioned history.  Patient is a 69-year-old lady with hypertension, hyperlipidemia, coronary artery disease, hypothyroidism, congestive heart failure, previous history of pulmonary embolism following COVID-19 infection and was treated with 1 year of Eliquis.  Patient tested positive for COVID-19 on September 8 and had some nasal congestion, sore throat and cough.  She had progressive worsening of dyspnea over the past 2 to 3 days and presented to the emergency room for further evaluation.  CT angiogram of the chest performed 9/20/2024 shows bilateral pulmonary emboli that the most proximal 1 in the lobar branch of the right lower lobe.  No evidence of right heart strain.  Patient has been started on heparin drip and we have been consulted for recommendations on pulmonary embolism which is now recurrent after each COVID infection.    CBC reviewed and WBC count 11.27, hemoglobin 14.8 and platelets 320,000.  CMP within normal limits.  Troponin 14.  BNP 59.8    9/8/2024  SARS antigen detected  Influenza A antigen detected        Past Medical History:   Diagnosis Date    Allergic 2015    codeine, morphine, levaquin    Arthritis l5 years or so ago    Cataract 6-9 months ago    Need surgery    CHF (congestive heart failure)     Coronary artery disease 2005 stent    COVID-19 virus detected 03/10/2021    Deep vein thrombosis 06/03/2021    Pulmonary embolism    Depression     Disease of thyroid gland     Headache Covid 3-6-21    Has continued    History of pulmonary embolism  06/01/2021    HL (hearing loss) Last 4-6 months    Hyperlipidemia     Hypertension since 2005    Hypothyroidism since 2012    Obesity     Osteopenia last few years    Pulmonary embolism 06/03/2021    From Covid    Unstable angina 04/16/2024     Past Surgical History:   Procedure Laterality Date    BUNIONECTOMY Bilateral     HAMMERT TOE REPAIR/BUNIONECTOMY    CARDIAC CATHETERIZATION  2015, 2018    CARDIAC CATHETERIZATION N/A 4/29/2024    Procedure: Coronary angiography;  Surgeon: Cong Rivera MD;  Location:  [facility];  Service: Cardiovascular;  Laterality: N/A;    CARDIAC CATHETERIZATION N/A 4/29/2024    Procedure: Left Heart Cath;  Surgeon: Cong Rivera MD;  Location:  [facility];  Service: Cardiovascular;  Laterality: N/A;    CARDIAC CATHETERIZATION N/A 4/29/2024    Procedure: Left ventriculography;  Surgeon: Cong Rivera MD;  Location:  [facility];  Service: Cardiovascular;  Laterality: N/A;    CATARACT EXTRACTION      CHOLECYSTECTOMY  1995    COLONOSCOPY N/A 12/19/2016    Procedure: COLONOSCOPY WITH COLD BIOPSIES;  Surgeon: Medina Guillen MD;  [facility];  Service:     CORONARY STENT PLACEMENT  2005    LAD 80% blockage    GANGLION CYST EXCISION      R WRIST    SUBTOTAL HYSTERECTOMY  2009    TONSILLECTOMY  1965    TUBAL ABDOMINAL LIGATION  1985       MEDICATIONS    Current Facility-Administered Medications:     buPROPion XL (WELLBUTRIN XL) 24 hr tablet 300 mg, 300 mg, Oral, Daily, Sarah Powell APRN, 300 mg at 09/21/24 0925    heparin (porcine) 5000 UNIT/ML injection 3,800-7,500 Units, 40-80 Units/kg, Intravenous, Q6H PRN, Clara Glover PA-C    heparin 04934 units/250 mL (100 units/mL) in 0.45 % NaCl infusion, 16 Units/kg/hr, Intravenous, Titrated, Clara Glover PA-C, Last Rate: 12.2 mL/hr at 09/21/24 0638, 13 Units/kg/hr at 09/21/24 0638    Hydrocod Sukhjinder-Chlorphe Sukhjinder ER (TUSSIONEX PENNKINETIC) 10-8 MG/5ML ER  suspension 5 mL, 5 mL, Oral, Q12H PRN, Lambert Foley MD    levothyroxine (SYNTHROID, LEVOTHROID) tablet 50 mcg, 50 mcg, Oral, Q AM, Sarah Powell, APRN    rosuvastatin (CRESTOR) tablet 40 mg, 40 mg, Oral, Nightly, Sarah Powell, APRN    sacubitril-valsartan (ENTRESTO) 24-26 MG tablet 1 tablet, 1 tablet, Oral, BID, Sarah Powell APRN, 1 tablet at 24 0925    sodium chloride 0.9 % flush 10 mL, 10 mL, Intravenous, PRN, Reji Daniel II, MD    spironolactone (ALDACTONE) tablet 25 mg, 25 mg, Oral, Daily, Sarah Powell, APRN, 25 mg at 24 0924    torsemide (DEMADEX) tablet 20 mg, 20 mg, Oral, Daily, Sarah Powell APRN, 20 mg at 24 0924    traMADol (ULTRAM) tablet 50 mg, 50 mg, Oral, Q6H PRN, Sarah Powell, APRN    ALLERGIES:     Allergies   Allergen Reactions    Codeine Dizziness     lightheaded    Levofloxacin Itching    Morphine Itching       SOCIAL HISTORY:       Social History     Socioeconomic History    Marital status: Single   Tobacco Use    Smoking status: Never     Passive exposure: Never    Smokeless tobacco: Never    Tobacco comments:     N/A   Vaping Use    Vaping status: Never Used   Substance and Sexual Activity    Alcohol use: Yes     Alcohol/week: 2.0 standard drinks of alcohol     Types: 1 Glasses of wine, 1 Cans of beer per week     Comment: socially    Drug use: Never    Sexual activity: Not Currently     Partners: Male     Birth control/protection: None         FAMILY HISTORY:  Family History   Problem Relation Age of Onset    Heart disease Mother             Heart disease Father             No Known Problems Maternal Aunt     No Known Problems Maternal Uncle     No Known Problems Paternal Aunt     No Known Problems Paternal Uncle     No Known Problems Maternal Grandmother     No Known Problems Maternal Grandfather     No Known Problems Paternal Grandmother     No Known Problems Paternal Grandfather     Diabetes Sister      "Heart disease Sister 69            Heart disease Brother         open heart-bypass    Cancer Brother         Bladder/Rodney    Arthritis Brother     Heart disease Brother 62            Heart attack Brother             Arthritis Son     Celiac disease Neg Hx     Cirrhosis Neg Hx     Colon cancer Neg Hx     Colon polyps Neg Hx     Crohn's disease Neg Hx     Cystic fibrosis Neg Hx     Esophageal cancer Neg Hx     Hemochromatosis Neg Hx     Inflammatory bowel disease Neg Hx     Irritable bowel syndrome Neg Hx     Liver cancer Neg Hx     Liver disease Neg Hx     Rectal cancer Neg Hx     Stomach cancer Neg Hx     Ulcerative colitis Neg Hx     Dante's disease Neg Hx     Alcohol abuse Neg Hx     Pancreatitis Neg Hx        REVIEW OF SYSTEMS:  Review of Systems   Constitutional:  Positive for activity change and fatigue.   HENT:  Positive for congestion.    Respiratory:  Positive for cough and shortness of breath.    Genitourinary: Negative.    Musculoskeletal: Negative.    Hematological: Negative.    Psychiatric/Behavioral: Negative.                Vitals:    24 2301 24 2302 24 0007 24 0759   BP: 118/67  135/78 132/62   BP Location:    Left arm   Patient Position:    Lying   Pulse:  87  89   Resp:   18 19   Temp:   97.8 °F (36.6 °C) 97.9 °F (36.6 °C)   TempSrc:   Oral Oral   SpO2:  98%  97%   Weight:   92.7 kg (204 lb 5.9 oz)    Height:   170.2 cm (67\")           No data to display               PHYSICAL EXAM:      CONSTITUTIONAL:  Vital signs reviewed.  No distress, looks comfortable.  EYES:  Conjunctivae and lids unremarkable.  PERRLA  EARS,NOSE,MOUTH,THROAT:  Ears and nose appear unremarkable.  Lips, teeth, gums appear unremarkable.  RESPIRATORY:  Normal respiratory effort.  Lungs clear to auscultation bilaterally.  CARDIOVASCULAR:  Normal S1, S2.  No murmurs rubs or gallops.  No significant lower extremity edema.  GASTROINTESTINAL: Abdomen appears unremarkable.  Nontender.  " No hepatomegaly.  No splenomegaly.  LYMPHATIC:  No cervical, supraclavicular, axillary lymphadenopathy.  MUSCULOSKELETAL:  Unremarkable gait and station.  Unremarkable digits/nails.  No cyanosis or clubbing.  SKIN:  Warm.  No rashes.  PSYCHIATRIC:  Normal judgment and insight.  Normal mood and affect.      RECENT LABS:        WBC   Date Value Ref Range Status   09/21/2024 11.27 (H) 3.40 - 10.80 10*3/mm3 Final   09/20/2024 11.02 (H) 3.40 - 10.80 10*3/mm3 Final     Hemoglobin   Date Value Ref Range Status   09/21/2024 14.8 12.0 - 15.9 g/dL Final   09/20/2024 16.0 (H) 12.0 - 15.9 g/dL Final     Platelets   Date Value Ref Range Status   09/21/2024 320 140 - 450 10*3/mm3 Final   09/20/2024 356 140 - 450 10*3/mm3 Final       Assessment & Plan   [unfilled]      Barbara Tran       *Recurrent pulmonary embolism  In both instances the pulmonary embolism was followed by COVID-19 infection.  Although there has been a precipitating event each time given the fact that this is a second event with bilateral pulmonary embolism I will proceed with thrombophilia workup.  No family history of PE or DVT.  Recommend anticoagulation with Eliquis 10 mg twice daily for 1 week and subsequently 5 mg twice daily.  Patient will benefit from lifelong anticoagulation due to the second episode of pulmonary embolism.    *COVID-19 infection as well as flu A    *Hypertension, hyperlipidemia, coronary artery disease and CHF-management per primary team      Recommendations  Thrombophilia workup  Will follow-up on the thrombophilia workup as an outpatient.  Patient is on room air and vital signs are stable.  Start patient on Eliquis.  We will sign off at this time.  Please call with additional questions or concerns.

## 2024-09-21 NOTE — CONSULTS
Date of Hospital Visit: 24  Encounter Provider: Lashay Leon MD  Place of Service: [facility] CARDIOLOGY  Patient Name: Barbara Tran  :1955  Referral Provider: Reji Daniel II*    Chief complaint Shortness of Breath     History of Present Illness:    Barbara Tran is a 69 y.o. female with a history of HFpEF, hyperlipidemia, hypertension, DVT with pulmonary embolism in 2021, CAD with LAD stent done 2015.     She received a proximal LAD stent in .  Last cardiac catheterization done in  showed proximal patent LAD stent patent, 80% septal  stenosis unchanged from prior cath, normal left circumflex and RCA, normal left ventricular systolic function.     She had COVID-19 pneumonia 3/8/2021.  She then presented 6/3/2021 with acute short windedness and was diagnosed with bilateral acute pulmonary embolism without acute cor pulmonale.  She was started on Eliquis but that was discontinued after six months.  Echo done 9/15/2021 showed ejection fraction of 46-50% with moderate to severe septal hypokinesis, grade 1 diastolic dysfunction, normal right ventricular size and function with normal saline contrast today, no pericardial effusion and mild left atrial enlargement.  She has followed with Dr. Rodriguez for pulmonary nodules.    She presented to the ED yesterday with complaints of shortness of breath.  She tested positive for Covid and influenza A on 2024.  She was prescribed Paxlovid and Tamiflu.  She took 2 doses and began having severe nausea and diarrhea.  It was discontinued.  She  continued to have cough and congestion.  She went to see her PCP Dr. Szymanski.  She sent her for a CTA of the chest 2024 reveals bilateral pulmonary emboli with no right heart strain noted. Troponin's 15->14.  BNP normal and echocardiogram are pending.  She  is currently on room air, blood pressures are normal.  Lower extremity venous study showed  acute below the knee DVT on the left    I have been consulted for pulmonary embolus.  Hematology has evaluated patient for PE.  Vitals are stable and she is on room air.    Her breathing is better but she is coughing a lot.  She has no chest pain or pressure.  Her nausea is better.  She feels dizzy when she stands up.  She says she is never felt so bad she did within the last couple weeks.    She ruptured a tendon in foot 3 weeks before she contracted COVID and she was nonweightbearing, was very sedentary during this time.  When she contracted COVID and influenza, she was basically in bed, and mobile.      ECHO 24    Left ventricular systolic function is normal. Left ventricular ejection fraction appears to be 56 - 60%.    Left ventricular diastolic function was normal.    Estimated right ventricular systolic pressure from tricuspid regurgitation is normal (<35 mmHg). Calculated right ventricular systolic pressure from tricuspid regurgitation is 22 mmHg    STRESS TEST 24    Myocardial perfusion imaging indicates a normal myocardial perfusion study with no evidence of ischemia. Impressions are consistent with a low risk study.    Left ventricular ejection fraction is normal (Calculated EF = 50%).    Compared to the prior study from 2022 the current study reveals no changes.    Findings consistent with a normal ECG stress test.       CARDIAC CATH 24  LEFT VENTRICULOGRAPHY: The LV pressure was 105/6.  There was mild mid anterior to apical hypokinesis the EF overall is 50% calcium in the coronaries and on the mitral annulus.  There was no mitral insufficiency or gradient across the aortic valve on pullback.     CORONARY ANGIOGRAPHY:  Left main: Normal  Left anterior descendin% proximal disease a stent in the mid LAD is widely patent and looks good distally is normal the second second  has a's 80% origin lesion and it  Ramus intermedius:Not present  Circumflex: Normal  RCA: Is a dominant  vessel.  20% proximal 40% irregular mid lesion 10% distal     SUMMARY: Prior anterior infarct with mild hypokinesis of the mid to distal anterior apex.  Stent in the LAD is widely patent and looks good there is some irregular 40% disease in the mid RCA      Past Medical History:   Diagnosis Date    Allergic 2015    codeine, morphine, levaquin    Arthritis l5 years or so ago    Cataract 6-9 months ago    Need surgery    CHF (congestive heart failure)     Coronary artery disease 2005 stent    COVID-19 virus detected 03/10/2021    Deep vein thrombosis 06/03/2021    Pulmonary embolism    Depression     Disease of thyroid gland     Headache Covid 3-6-21    Has continued    History of pulmonary embolism 06/01/2021    HL (hearing loss) Last 4-6 months    Hyperlipidemia     Hypertension since 2005    Hypothyroidism since 2012    Obesity     Osteopenia last few years    Pulmonary embolism 06/03/2021    From Covid    Unstable angina 04/16/2024       Past Surgical History:   Procedure Laterality Date    BUNIONECTOMY Bilateral     HAMMERT TOE REPAIR/BUNIONECTOMY    CARDIAC CATHETERIZATION  2015, 2018    CARDIAC CATHETERIZATION N/A 4/29/2024    Procedure: Coronary angiography;  Surgeon: Cong Rivera MD;  Location: [facility] CATH INVASIVE LOCATION;  Service: Cardiovascular;  Laterality: N/A;    CARDIAC CATHETERIZATION N/A 4/29/2024    Procedure: Left Heart Cath;  Surgeon: Cong Rivera MD;  Location: [facility] CATH INVASIVE LOCATION;  Service: Cardiovascular;  Laterality: N/A;    CARDIAC CATHETERIZATION N/A 4/29/2024    Procedure: Left ventriculography;  Surgeon: Cong Rivera MD;  Location: [facility] CATH INVASIVE LOCATION;  Service: Cardiovascular;  Laterality: N/A;    CATARACT EXTRACTION      CHOLECYSTECTOMY  1995    COLONOSCOPY N/A 12/19/2016    Procedure: COLONOSCOPY WITH COLD BIOPSIES;  Surgeon: Medina Guillen MD;  Location: [facility] ENDOSCOPY;  Service:     CORONARY STENT PLACEMENT  2005    LAD 80% blockage    GANGLION  CYST EXCISION      R WRIST    SUBTOTAL HYSTERECTOMY  2009    TONSILLECTOMY  1965    TUBAL ABDOMINAL LIGATION  1985       Medications Prior to Admission   Medication Sig Dispense Refill Last Dose    buPROPion XL (WELLBUTRIN XL) 300 MG 24 hr tablet Take 1 tablet by mouth Daily. 90 tablet 3 9/20/2024    calcium carbonate (OS-SHAHNAZ) 600 MG tablet Take 1 tablet by mouth 2 (Two) Times a Day With Meals.   9/20/2024    empagliflozin (JARDIANCE) 10 MG tablet tablet Take 1 tablet by mouth Daily. 28 tablet 0 9/20/2024    levothyroxine (Synthroid) 50 MCG tablet Take 1 tablet by mouth Daily. 90 tablet 2 9/20/2024    magnesium oxide (MAG-OX) 400 MG tablet Take 2 tablets by mouth 2 (two) times a day.   9/20/2024    promethazine-dextromethorphan (PROMETHAZINE-DM) 6.25-15 MG/5ML syrup Take 5 mL by mouth 4 (Four) Times a Day As Needed for Cough. 180 mL 0 9/21/2024    sacubitril-valsartan (ENTRESTO) 24-26 MG tablet Take 1 tablet by mouth 2 (Two) Times a Day. 28 tablet 0 9/20/2024    spironolactone (ALDACTONE) 25 MG tablet Take 1 tablet by mouth Daily. 90 tablet 3 9/20/2024    torsemide (DEMADEX) 20 MG tablet Take 1 tablet by mouth Daily.   9/20/2024    traMADol (ULTRAM) 50 MG tablet Take 1 tablet by mouth Every 6 (Six) Hours As Needed for Moderate Pain. 60 tablet 0 9/20/2024    aspirin 325 MG tablet Take 1 tablet by mouth Daily for 14 days. 14 tablet 0     ibandronate (BONIVA) 150 MG tablet Take 1 tablet by mouth Every 30 (Thirty) Days. 3 tablet 3     methylPREDNISolone (MEDROL) 4 MG dose pack Take as directed on package instructions. 21 each 0     rosuvastatin (CRESTOR) 40 MG tablet Take 1 tablet by mouth Every Night. 90 tablet 3     zolpidem (AMBIEN) 5 MG tablet Take 1 tablet by mouth At Night As Needed for Sleep. 30 tablet 0        Current Meds  Scheduled Meds:buPROPion XL, 300 mg, Oral, Daily  levothyroxine, 50 mcg, Oral, Q AM  rosuvastatin, 40 mg, Oral, Nightly  sacubitril-valsartan, 1 tablet, Oral, BID  spironolactone, 25 mg,  Oral, Daily  torsemide, 20 mg, Oral, Daily      Continuous Infusions:heparin, 16 Units/kg/hr, Last Rate: 13 Units/kg/hr (24 0638)      PRN Meds:.  heparin (porcine)    sodium chloride    traMADol    Allergies as of 2024 - Reviewed 2024   Allergen Reaction Noted    Codeine Dizziness 2016    Levofloxacin Itching 10/23/2016    Morphine Itching 10/21/2016       Social History     Socioeconomic History    Marital status: Single   Tobacco Use    Smoking status: Never     Passive exposure: Never    Smokeless tobacco: Never    Tobacco comments:     N/A   Vaping Use    Vaping status: Never Used   Substance and Sexual Activity    Alcohol use: Yes     Alcohol/week: 2.0 standard drinks of alcohol     Types: 1 Glasses of wine, 1 Cans of beer per week     Comment: socially    Drug use: Never    Sexual activity: Not Currently     Partners: Male     Birth control/protection: None       Family History   Problem Relation Age of Onset    Heart disease Mother             Heart disease Father             No Known Problems Maternal Aunt     No Known Problems Maternal Uncle     No Known Problems Paternal Aunt     No Known Problems Paternal Uncle     No Known Problems Maternal Grandmother     No Known Problems Maternal Grandfather     No Known Problems Paternal Grandmother     No Known Problems Paternal Grandfather     Diabetes Sister     Heart disease Sister 69            Heart disease Brother         open heart-bypass    Cancer Brother         Bladder/Rodney    Arthritis Brother     Heart disease Brother 62            Heart attack Brother             Arthritis Son     Celiac disease Neg Hx     Cirrhosis Neg Hx     Colon cancer Neg Hx     Colon polyps Neg Hx     Crohn's disease Neg Hx     Cystic fibrosis Neg Hx     Esophageal cancer Neg Hx     Hemochromatosis Neg Hx     Inflammatory bowel disease Neg Hx     Irritable bowel syndrome Neg Hx     Liver cancer Neg Hx     Liver  "disease Neg Hx     Rectal cancer Neg Hx     Stomach cancer Neg Hx     Ulcerative colitis Neg Hx     Dante's disease Neg Hx     Alcohol abuse Neg Hx     Pancreatitis Neg Hx        REVIEW OF SYSTEMS:   12 point ROS was performed and is negative except as outlined in HPI       Objective:   Temp:  [97.8 °F (36.6 °C)-99.1 °F (37.3 °C)] 97.9 °F (36.6 °C)  Heart Rate:  [] 89  Resp:  [18-19] 19  BP: (111-135)/(62-86) 132/62  Body mass index is 32.01 kg/m².  Flowsheet Rows      Flowsheet Row First Filed Value   Admission Height 154.9 cm (61\") Documented at 09/20/2024 1725   Admission Weight 93.9 kg (207 lb) Documented at 09/20/2024 1725          Vitals:    09/21/24 0759   BP: 132/62   Pulse: 89   Resp: 19   Temp: 97.9 °F (36.6 °C)   SpO2: 97%       General Appearance:    Alert, cooperative, in no acute distress   Head:    Normocephalic, without obvious abnormality, atraumatic   Throat:   oral mucosa moist   Lungs:     Clear to auscultation,respirations regular, even and unlabored    Heart:    Regular rhythm and normal rate, normal S1 and S2, no murmur, no gallop, no rub, no click   Extremities:   Moves all extremities well, no edema, no cyanosis, no redness   Pulses:   Pulses palpable and equal bilaterally. Normal radial, carotid,  dorsalis pedis and posterior tibial pulses bilaterally.      Lab Review:      Results from last 7 days   Lab Units 09/20/24  1728   SODIUM mmol/L 134*   POTASSIUM mmol/L 3.5   CHLORIDE mmol/L 99   CO2 mmol/L 21.0*   BUN mg/dL 21   CREATININE mg/dL 0.80   CALCIUM mg/dL 9.0   BILIRUBIN mg/dL 0.3   ALK PHOS U/L 97   ALT (SGPT) U/L 32   AST (SGOT) U/L 23   GLUCOSE mg/dL 114*     Results from last 7 days   Lab Units 09/20/24  1934 09/20/24  1728   HSTROP T ng/L 14* 15*     @LABRCNT(bnp)@  Results from last 7 days   Lab Units 09/21/24  0346 09/20/24  1728   WBC 10*3/mm3 11.27* 11.02*   HEMOGLOBIN g/dL 14.8 16.0*   HEMATOCRIT % 43.1 47.7*   PLATELETS 10*3/mm3 320 356     Results from last 7 days "   Lab Units 09/21/24  0346 09/20/24  2153   INR   --  1.18*   APTT seconds >200.0* 28.6             9-20-24 8-28-24      I personally viewed and interpreted the patient's EKG/Telemetry data        Pulmonary embolism    Benign essential HTN    HLD (hyperlipidemia)    Chronic coronary artery disease    Hypothyroidism    Chronic diastolic congestive heart failure    History of pulmonary embolism      Assessment and Plan:    COVID infection 9/8/2024  Influenza 9/8/2024  Bilateral pulmonary embolism noted on CTA chest 9/20/2024, h/o PE 6/2021.  Acute left lower extremity DVT.  Nausea and diarrhea with Paxlovid  CAD, s/p LAD stent    Continue supportive care - await echo.     Lashay Leon MD  09/21/24  09:38 EDT.  Time spent in reviewing chart, discussion and examination:

## 2024-09-21 NOTE — ED PROVIDER NOTES
MD ATTESTATION NOTE    SHARED VISIT: This visit was performed by BOTH a physician and an APC. The substantive portion of the medical decision making was performed by this attesting physician who made or approved the management plan and takes responsibility for patient management. All studies documented in the APC note (if performed) were independently interpreted by me.    The FELICITA and I have discussed this patient's history, physical exam, MDM, and treatment plan.  I have reviewed the documentation and personally had a face to face interaction with the patient. The attached note describes my personal findings.      Barbara Tran is a 69 y.o. female who presents to the ED c/o acute shortness of breath.  This has been ongoing since she tested positive for influenza and COVID-19 on 9/8/2024.  She reports having persistent congestion and cough.  No leg swelling.    On exam:  GENERAL: not distressed  HENT: nares patent  EYES: no scleral icterus  CV: regular rhythm, regular rate  RESPIRATORY: normal effort, clear to auscultation bilaterally  ABDOMEN: soft  MUSCULOSKELETAL: no deformity, no lower extremity edema or tenderness  NEURO: alert, moves all extremities, follows commands  SKIN: warm, dry    Labs  Recent Results (from the past 24 hour(s))   ECG 12 Lead ED Triage Standing Order; SOA    Collection Time: 09/20/24  5:08 PM   Result Value Ref Range    QT Interval 325 ms    QTC Interval 420 ms   Comprehensive Metabolic Panel    Collection Time: 09/20/24  5:28 PM    Specimen: Blood   Result Value Ref Range    Glucose 114 (H) 65 - 99 mg/dL    BUN 21 8 - 23 mg/dL    Creatinine 0.80 0.57 - 1.00 mg/dL    Sodium 134 (L) 136 - 145 mmol/L    Potassium 3.5 3.5 - 5.2 mmol/L    Chloride 99 98 - 107 mmol/L    CO2 21.0 (L) 22.0 - 29.0 mmol/L    Calcium 9.0 8.6 - 10.5 mg/dL    Total Protein 7.1 6.0 - 8.5 g/dL    Albumin 4.2 3.5 - 5.2 g/dL    ALT (SGPT) 32 1 - 33 U/L    AST (SGOT) 23 1 - 32 U/L    Alkaline Phosphatase 97 39 - 117 U/L     Total Bilirubin 0.3 0.0 - 1.2 mg/dL    Globulin 2.9 gm/dL    A/G Ratio 1.4 g/dL    BUN/Creatinine Ratio 26.3 (H) 7.0 - 25.0    Anion Gap 14.0 5.0 - 15.0 mmol/L    eGFR 78.9 >60.0 mL/min/1.73   BNP    Collection Time: 09/20/24  5:28 PM    Specimen: Blood   Result Value Ref Range    proBNP 67.9 0.0 - 900.0 pg/mL   Single High Sensitivity Troponin T    Collection Time: 09/20/24  5:28 PM    Specimen: Blood   Result Value Ref Range    HS Troponin T 15 (H) <14 ng/L   Green Top (Gel)    Collection Time: 09/20/24  5:28 PM   Result Value Ref Range    Extra Tube Hold for add-ons.    Lavender Top    Collection Time: 09/20/24  5:28 PM   Result Value Ref Range    Extra Tube hold for add-on    Gold Top - SST    Collection Time: 09/20/24  5:28 PM   Result Value Ref Range    Extra Tube Hold for add-ons.    Light Blue Top    Collection Time: 09/20/24  5:28 PM   Result Value Ref Range    Extra Tube Hold for add-ons.    CBC Auto Differential    Collection Time: 09/20/24  5:28 PM    Specimen: Blood   Result Value Ref Range    WBC 11.02 (H) 3.40 - 10.80 10*3/mm3    RBC 5.32 (H) 3.77 - 5.28 10*6/mm3    Hemoglobin 16.0 (H) 12.0 - 15.9 g/dL    Hematocrit 47.7 (H) 34.0 - 46.6 %    MCV 89.7 79.0 - 97.0 fL    MCH 30.1 26.6 - 33.0 pg    MCHC 33.5 31.5 - 35.7 g/dL    RDW 12.5 12.3 - 15.4 %    RDW-SD 40.9 37.0 - 54.0 fl    MPV 9.5 6.0 - 12.0 fL    Platelets 356 140 - 450 10*3/mm3    Neutrophil % 65.4 42.7 - 76.0 %    Lymphocyte % 25.5 19.6 - 45.3 %    Monocyte % 6.5 5.0 - 12.0 %    Eosinophil % 1.6 0.3 - 6.2 %    Basophil % 0.6 0.0 - 1.5 %    Immature Grans % 0.4 0.0 - 0.5 %    Neutrophils, Absolute 7.20 (H) 1.70 - 7.00 10*3/mm3    Lymphocytes, Absolute 2.81 0.70 - 3.10 10*3/mm3    Monocytes, Absolute 0.72 0.10 - 0.90 10*3/mm3    Eosinophils, Absolute 0.18 0.00 - 0.40 10*3/mm3    Basophils, Absolute 0.07 0.00 - 0.20 10*3/mm3    Immature Grans, Absolute 0.04 0.00 - 0.05 10*3/mm3    nRBC 0.0 0.0 - 0.2 /100 WBC       Radiology  XR Chest 1  View    Result Date: 9/20/2024  Portable chest radiograph  HISTORY: Shortness of air  TECHNIQUE: Single AP portable radiograph of the chest  COMPARISON: Chest radiograph 1/19/2015      FINDINGS AND IMPRESSION: No pulmonary consolidation, pleural effusion or pneumothorax is seen. Cardiac silhouette is within normal limits for size. Mild asymmetric elevation of the right diaphragm.  This report was finalized on 9/20/2024 7:46 PM by Dr. Rodney Freeman M.D on Workstation: [id number]       Medications given in the ED:  Medications   sodium chloride 0.9 % flush 10 mL (has no administration in time range)   iopamidol (ISOVUE-370) 76 % injection 100 mL (95 mL Intravenous Given by Other 9/20/24 2001)       Orders placed during this visit:  Orders Placed This Encounter   Procedures    XR Chest 1 View    CT Angiogram Chest Pulmonary Embolism    [place] Draw    Comprehensive Metabolic Panel    BNP    Single High Sensitivity Troponin T    CBC Auto Differential    Single High Sensitivity Troponin T    NPO Diet NPO Type: Strict NPO    Undress & Gown    Continuous Pulse Oximetry    Vital Signs    Oxygen Therapy- Nasal Cannula; Titrate 1-6 LPM Per SpO2; 90 - 95%    ECG 12 Lead ED Triage Standing Order; SOA    Insert Peripheral IV    CBC & Differential    Green Top (Gel)    Lavender Top    Gold Top - SST    Light Blue Top       Medical Decision Making:  ED Course as of 09/20/24 2314   Fri Sep 20, 2024   1744 WBC(!): 11.02 [KA]   1744 Hemoglobin(!): 16.0 [KA]   1801 Independent interpretation of the EKG performed at 5:08 PM is sinus arrhythmia, rate 100, normal axis, normal intervals, no ST elevation, 1 PVC.  In comparison to prior on 8/28/2024, PVCs are new, no other significant change [KA]   1833 Glucose(!): 114 [KA]   1833 Sodium(!): 134 [KA]   1833 proBNP: 67.9 [KA]   1833 HS Troponin T(!): 15 [KA]   1833 My Independent interpretation of chest x-ray is borderline cardiomegaly, no acute infiltrate [KA]   2032 CT angiogram of the  chest independently interpreted by myself.  Bilateral PEs noted. [TD]   2112 I discussed the patient with Dr. Flores, radiologist.  CT angiogram the chest shows bilateral PEs with no sign of right heart strain. [KA]   2132 Discussed the patient with MANOHAR Robles for [facility] including history presentation workup and she agrees to admit on behalf of Dr. Chavez [KA]      ED Course User Index  [KA] Clara Glover PA-C  [TD] Reji Daniel II, MD       Differential diagnosis:  Pneumonia, PE, viral syndrome.  I have low suspicion for ACS.    Diagnosis  Final diagnoses:   Bilateral pulmonary embolism          Reji Daniel II, MD  09/20/24 8091

## 2024-09-21 NOTE — PROGRESS NOTES
I supervised care provided by the MANOHAR Powell. We have discussed the case. I have reviewed  the note and agree with the plan of treatment. I personally interviewed the patient and examined the patient. Exam with a pleasant 70 yo female. No resp distress. Soft, nt. Trace LE edema.     This is a 70 yo with history of hypertension, hyperlipidemia, CAD, hypothyroidism, CHF, history of PE who presents to the emergency room for shortness of breath and found to have acute PE in the setting of recent covid and flu infection. Plan to treat with heparin gtt with transition to oral therapy. Suspect she will need life long a/c therapy with now 2 instances of pulmonary embolism in her life.       I performed the substantive portion of the medical decision making.    Rocky Chavez MD  [facility]  09/20/24  22:58 EDT

## 2024-09-22 PROBLEM — I51.3 RIGHT VENTRICULAR THROMBUS: Status: ACTIVE | Noted: 2024-09-22

## 2024-09-22 LAB
ANION GAP SERPL CALCULATED.3IONS-SCNC: 10.5 MMOL/L (ref 5–15)
APTT PPP: 44.1 SECONDS (ref 22.7–35.4)
APTT PPP: 90.4 SECONDS (ref 22.7–35.4)
BASOPHILS # BLD AUTO: 0.06 10*3/MM3 (ref 0–0.2)
BASOPHILS NFR BLD AUTO: 0.6 % (ref 0–1.5)
BUN SERPL-MCNC: 13 MG/DL (ref 8–23)
BUN/CREAT SERPL: 19.4 (ref 7–25)
CALCIUM SPEC-SCNC: 8.9 MG/DL (ref 8.6–10.5)
CHLORIDE SERPL-SCNC: 100 MMOL/L (ref 98–107)
CO2 SERPL-SCNC: 24.5 MMOL/L (ref 22–29)
CREAT SERPL-MCNC: 0.67 MG/DL (ref 0.57–1)
DEPRECATED RDW RBC AUTO: 41.5 FL (ref 37–54)
EGFRCR SERPLBLD CKD-EPI 2021: 93.6 ML/MIN/1.73
EOSINOPHIL # BLD AUTO: 0.27 10*3/MM3 (ref 0–0.4)
EOSINOPHIL NFR BLD AUTO: 2.7 % (ref 0.3–6.2)
ERYTHROCYTE [DISTWIDTH] IN BLOOD BY AUTOMATED COUNT: 12.7 % (ref 12.3–15.4)
GLUCOSE SERPL-MCNC: 118 MG/DL (ref 65–99)
HCT VFR BLD AUTO: 45.3 % (ref 34–46.6)
HGB BLD-MCNC: 15.3 G/DL (ref 12–15.9)
IMM GRANULOCYTES # BLD AUTO: 0.03 10*3/MM3 (ref 0–0.05)
IMM GRANULOCYTES NFR BLD AUTO: 0.3 % (ref 0–0.5)
LYMPHOCYTES # BLD AUTO: 2.9 10*3/MM3 (ref 0.7–3.1)
LYMPHOCYTES NFR BLD AUTO: 28.6 % (ref 19.6–45.3)
MAGNESIUM SERPL-MCNC: 2.2 MG/DL (ref 1.6–2.4)
MCH RBC QN AUTO: 30.2 PG (ref 26.6–33)
MCHC RBC AUTO-ENTMCNC: 33.8 G/DL (ref 31.5–35.7)
MCV RBC AUTO: 89.5 FL (ref 79–97)
MONOCYTES # BLD AUTO: 0.71 10*3/MM3 (ref 0.1–0.9)
MONOCYTES NFR BLD AUTO: 7 % (ref 5–12)
NEUTROPHILS NFR BLD AUTO: 6.18 10*3/MM3 (ref 1.7–7)
NEUTROPHILS NFR BLD AUTO: 60.8 % (ref 42.7–76)
NRBC BLD AUTO-RTO: 0 /100 WBC (ref 0–0.2)
PLATELET # BLD AUTO: 317 10*3/MM3 (ref 140–450)
PMV BLD AUTO: 9.5 FL (ref 6–12)
POTASSIUM SERPL-SCNC: 3.1 MMOL/L (ref 3.5–5.2)
RBC # BLD AUTO: 5.06 10*6/MM3 (ref 3.77–5.28)
SODIUM SERPL-SCNC: 135 MMOL/L (ref 136–145)
WBC NRBC COR # BLD AUTO: 10.15 10*3/MM3 (ref 3.4–10.8)

## 2024-09-22 PROCEDURE — 80048 BASIC METABOLIC PNL TOTAL CA: CPT | Performed by: HOSPITALIST

## 2024-09-22 PROCEDURE — 83735 ASSAY OF MAGNESIUM: CPT | Performed by: HOSPITALIST

## 2024-09-22 PROCEDURE — 85300 ANTITHROMBIN III ACTIVITY: CPT | Performed by: INTERNAL MEDICINE

## 2024-09-22 PROCEDURE — 36415 COLL VENOUS BLD VENIPUNCTURE: CPT | Performed by: PHYSICIAN ASSISTANT

## 2024-09-22 PROCEDURE — 85730 THROMBOPLASTIN TIME PARTIAL: CPT | Performed by: HOSPITALIST

## 2024-09-22 PROCEDURE — 85306 CLOT INHIBIT PROT S FREE: CPT | Performed by: INTERNAL MEDICINE

## 2024-09-22 PROCEDURE — 86147 CARDIOLIPIN ANTIBODY EA IG: CPT | Performed by: INTERNAL MEDICINE

## 2024-09-22 PROCEDURE — 86146 BETA-2 GLYCOPROTEIN ANTIBODY: CPT | Performed by: INTERNAL MEDICINE

## 2024-09-22 PROCEDURE — 99232 SBSQ HOSP IP/OBS MODERATE 35: CPT | Performed by: INTERNAL MEDICINE

## 2024-09-22 PROCEDURE — 85730 THROMBOPLASTIN TIME PARTIAL: CPT | Performed by: PHYSICIAN ASSISTANT

## 2024-09-22 PROCEDURE — 25010000002 HEPARIN (PORCINE) 25000-0.45 UT/250ML-% SOLUTION: Performed by: PHYSICIAN ASSISTANT

## 2024-09-22 PROCEDURE — 85025 COMPLETE CBC W/AUTO DIFF WBC: CPT | Performed by: PHYSICIAN ASSISTANT

## 2024-09-22 RX ORDER — POTASSIUM CHLORIDE 750 MG/1
40 TABLET, FILM COATED, EXTENDED RELEASE ORAL EVERY 4 HOURS
Status: COMPLETED | OUTPATIENT
Start: 2024-09-22 | End: 2024-09-22

## 2024-09-22 RX ADMIN — BUPROPION HYDROCHLORIDE 300 MG: 300 TABLET, EXTENDED RELEASE ORAL at 10:02

## 2024-09-22 RX ADMIN — SACUBITRIL AND VALSARTAN 1 TABLET: 24; 26 TABLET, FILM COATED ORAL at 20:44

## 2024-09-22 RX ADMIN — POTASSIUM CHLORIDE 40 MEQ: 750 TABLET, EXTENDED RELEASE ORAL at 23:21

## 2024-09-22 RX ADMIN — ROSUVASTATIN CALCIUM 40 MG: 40 TABLET, FILM COATED ORAL at 20:44

## 2024-09-22 RX ADMIN — TORSEMIDE 20 MG: 20 TABLET ORAL at 10:02

## 2024-09-22 RX ADMIN — POTASSIUM CHLORIDE 40 MEQ: 750 TABLET, EXTENDED RELEASE ORAL at 19:04

## 2024-09-22 RX ADMIN — SACUBITRIL AND VALSARTAN 1 TABLET: 24; 26 TABLET, FILM COATED ORAL at 10:02

## 2024-09-22 RX ADMIN — HEPARIN SODIUM 17 UNITS/KG/HR: 10000 INJECTION, SOLUTION INTRAVENOUS at 15:36

## 2024-09-22 RX ADMIN — LEVOTHYROXINE SODIUM 50 MCG: 50 TABLET ORAL at 05:54

## 2024-09-22 RX ADMIN — SPIRONOLACTONE 25 MG: 25 TABLET, FILM COATED ORAL at 10:02

## 2024-09-22 RX ADMIN — POTASSIUM CHLORIDE 40 MEQ: 750 TABLET, EXTENDED RELEASE ORAL at 15:35

## 2024-09-22 RX ADMIN — HYDROCODONE POLISTIREX AND CHLORPHENIRAMINE POLISTIREX 5 ML: 10; 8 SUSPENSION, EXTENDED RELEASE ORAL at 20:44

## 2024-09-22 RX ADMIN — TRAMADOL HYDROCHLORIDE 50 MG: 50 TABLET ORAL at 19:04

## 2024-09-22 NOTE — PLAN OF CARE
Goal Outcome Evaluation:  Plan of Care Reviewed With: patient           Outcome Evaluation: vss,a/ox4, heparin gtt, no c/o pain this shift, cough managed with prn medications per MD order. plan of care continued this shift.

## 2024-09-22 NOTE — PROGRESS NOTES
Consult Daily Progress Note  [facility]   09/22/24      Patient Name:  Barbara Tran  MRN:  [id number]   YOB: 1955  Age: 69 y.o.  Sex: female  LOS: 1    Reason for Consult:  Shortness of breath, pulmonary embolism, groundglass opacity    Interval History:  No acute events overnight  Echocardiogram with right ventricular thrombus  Pulmonary emboli noted on CT angiogram  Breathing is stable at rest however notices worsening when she exerts herself  On room air  No nausea vomiting  No chest pain or palpitations  In August had ligament tear in her right foot so was immobilized for some time and subsequently developed COVID and influenza     Physical Exam:  Vitals:    09/22/24 1005   BP: 123/73   Pulse: 78   Resp: 18   Temp: 98.3 °F (36.8 °C)   SpO2:        Intake/Output    Intake/Output Summary (Last 24 hours) at 9/22/2024 1456  Last data filed at 9/22/2024 1300  Gross per 24 hour   Intake 1490 ml   Output --   Net 1490 ml       General: Alert, nontoxic, NAD  HEENT: NC/AT, EOMI, MMM  Neck: Supple, trachea midline  Cardiac: RRR, no murmur, gallops, rubs  Pulmonary: Clear to auscultation bilaterally, no adventitious breath sounds, normal respiratory effort  GI: Soft, non-tender, non-distended, normal bowel sounds  Extremities: Warm, well perfused, no LE edema  Skin: no visible rash  Neuro: CN II - XII grossly intact  Psychiatry: Normal mood and affect      Data Review:  Results from last 7 days   Lab Units 09/22/24  0625 09/21/24  0346 09/20/24  1728   WBC 10*3/mm3 10.15 11.27* 11.02*   HEMOGLOBIN g/dL 15.3 14.8 16.0*   PLATELETS 10*3/mm3 317 320 356     Results from last 7 days   Lab Units 09/22/24  1224 09/20/24  1728   SODIUM mmol/L 135* 134*   POTASSIUM mmol/L 3.1* 3.5   CHLORIDE mmol/L 100 99   CO2 mmol/L 24.5 21.0*   BUN mg/dL 13 21   CREATININE mg/dL 0.67 0.80   GLUCOSE mg/dL 118* 114*   CALCIUM mg/dL 8.9 9.0   MAGNESIUM mg/dL 2.2  --    Estimated Creatinine Clearance: 90 mL/min (by  C-G formula based on SCr of 0.67 mg/dL).    Results from last 7 days   Lab Units 09/22/24  0625 09/21/24  0346 09/20/24  1728   AST (SGOT) U/L  --   --  23   ALT (SGPT) U/L  --   --  32   PLATELETS 10*3/mm3 317 320 356               Imaging:  Reviewed chest images personally from past 3 days    ASSESSMENT  /  PLAN:    Acute Recurrent Pulmonary Emboli  RV Thrombus  Acute Left Lower Extremity DVT   GGO on CT chest   COVID-19  Influenza A   History of Pulmonary Nodules  HFpEF  Hypertension  Hyperlipidemia  Coronary disease  Obesity    -Patient presented with worsening shortness of breath and found to have recurrent pulmonary emboli, lower extremity DVT, right ventricular thrombus.  This was likely in the setting of immobility after ligament tear in her right foot which led to immobility for few weeks.  Probably then exacerbated by COVID and influenza infection.  -Stable on room air, appropriate saturations  -Supportive care for COVID and influenza  -Heparin drip for DVT/PE  -Ongoing workup by hematology for hypercoagulable state  -Appreciate cardiology input regarding RV thrombus  -Groundglass opacities on CT are absolutely minimal, likely viral/PE related.  -We will set patient up for follow-up in [facility] clinic with repeat CT scan to ensure resolution of groundglass opacity.      Thank you for allowing us to participate in this patients care. Pulmonary will continue to follow.     Da Lau MD  [facility]  Pulmonary and Critical Care Medicine, Interventional Pulmonology    Parts of this note may be an electronic transcription/translation of spoken language to printed text using the Dragon dictation system.

## 2024-09-22 NOTE — PROGRESS NOTES
LOS: 1 day   Patient Care Team:  Millicent Ace MD as PCP - General (Family Medicine)  Kin Patel MD as Consulting Physician (Orthopedic Surgery)  Bere Beckford MD as Consulting Physician (Cardiology)  Patrizia Page MD as Consulting Physician (Ophthalmology)    Chief Complaint:     Follow-up PE    Interval History:     Her breathing is stable but she does have a lot of mucus and a cough.    Objective   Vital Signs  Temp:  [97.2 °F (36.2 °C)-97.9 °F (36.6 °C)] 97.2 °F (36.2 °C)  Heart Rate:  [89-93] 93  Resp:  [18-20] 18  BP: (131-132)/(62-71) 131/71    Intake/Output Summary (Last 24 hours) at 9/22/2024 0749  Last data filed at 9/22/2024 0210  Gross per 24 hour   Intake 1070 ml   Output --   Net 1070 ml       Comfortable NAD  Neck supple, no JVD or thyromegaly appreciated  S1/S2 RRR, no m/r/g  Lungs CTA B, normal effort  Abdomen S/NT/ND (+) BS, no HSM appreciated  Extremities warm, no clubbing, cyanosis, or edema  No visible or palpable skin lesions  A/Ox4, mood and affect appropriate    Results Review:      Results from last 7 days   Lab Units 09/20/24  1728   SODIUM mmol/L 134*   POTASSIUM mmol/L 3.5   CHLORIDE mmol/L 99   CO2 mmol/L 21.0*   BUN mg/dL 21   CREATININE mg/dL 0.80   GLUCOSE mg/dL 114*   CALCIUM mg/dL 9.0     Results from last 7 days   Lab Units 09/20/24  1934 09/20/24  1728   HSTROP T ng/L 14* 15*     Results from last 7 days   Lab Units 09/22/24  0625 09/21/24  0346 09/20/24  1728   WBC 10*3/mm3 10.15 11.27* 11.02*   HEMOGLOBIN g/dL 15.3 14.8 16.0*   HEMATOCRIT % 45.3 43.1 47.7*   PLATELETS 10*3/mm3 317 320 356     Results from last 7 days   Lab Units 09/22/24  0626 09/21/24  1617 09/21/24  0346 09/20/24  2153   INR   --   --   --  1.18*   APTT seconds 44.1* 81.4* >200.0* 28.6                   I reviewed the patient's new clinical results.  I personally viewed and interpreted the patient's EKG/Telemetry data        Medication Review:   buPROPion XL, 300 mg, Oral,  Daily  levothyroxine, 50 mcg, Oral, Q AM  rosuvastatin, 40 mg, Oral, Nightly  sacubitril-valsartan, 1 tablet, Oral, BID  spironolactone, 25 mg, Oral, Daily  torsemide, 20 mg, Oral, Daily        heparin, 16 Units/kg/hr, Last Rate: 17 Units/kg/hr (09/22/24 0733)        Assessment & Plan       Pulmonary embolism    Benign essential HTN    HLD (hyperlipidemia)    Chronic coronary artery disease    Hypothyroidism    Chronic diastolic congestive heart failure    History of pulmonary embolism    COVID-19 virus detected    Influenza    COVID infection 9/8/2024, has cough  Influenza 9/8/2024  Bilateral pulmonary embolism noted on CTA chest 9/20/2024, h/o PE 6/2021.  Acute left lower extremity DVT.  RV thrombus  Nausea and diarrhea with Paxlovid  CAD, s/p LAD stent, no angina    She is doing well on heparin.  Await hematology's recommendation for anticoagulation.  Stable vitals.  will follow.    Lashay Leon MD  09/22/24  07:49 EDT

## 2024-09-22 NOTE — PROGRESS NOTES
Name: Barbara Tran ADMIT: 2024   : 1955  PCP: Millicent Ace MD    MRN: [id number] LOS: 1 days   AGE/SEX: 69 y.o. female  ROOM: [facility]     Subjective   Subjective   Tussidex helping cough.     Objective   Objective   Vital Signs  Temp:  [97.2 °F (36.2 °C)-98.5 °F (36.9 °C)] 98.5 °F (36.9 °C)  Heart Rate:  [78-93] 78  Resp:  [18-20] 18  BP: (125-131)/(70-71) 125/70  SpO2:  [93 %] 93 %  on   ;   Device (Oxygen Therapy): room air  Body mass index is 31.95 kg/m².    Physical Exam  Constitutional:       General: She is not in acute distress.     Appearance: She is ill-appearing. She is not toxic-appearing.   HENT:      Head: Normocephalic and atraumatic.   Cardiovascular:      Rate and Rhythm: Normal rate and regular rhythm.   Pulmonary:      Effort: No respiratory distress.      Breath sounds: No wheezing or rales.   Abdominal:      General: Bowel sounds are normal.      Palpations: Abdomen is soft.      Tenderness: There is no abdominal tenderness. There is no guarding or rebound.   Musculoskeletal:         General: No swelling.   Skin:     General: Skin is warm and dry.   Neurological:      General: No focal deficit present.      Mental Status: She is alert and oriented to person, place, and time.   Psychiatric:         Mood and Affect: Mood normal.         Behavior: Behavior normal.     Results Review  I reviewed the patient's new clinical results.  Results from last 7 days   Lab Units 24  0625 24  0346 24  1728   WBC 10*3/mm3 10.15 11.27* 11.02*   HEMOGLOBIN g/dL 15.3 14.8 16.0*   PLATELETS 10*3/mm3 317 320 356     Results from last 7 days   Lab Units 24  1728   SODIUM mmol/L 134*   POTASSIUM mmol/L 3.5   CHLORIDE mmol/L 99   CO2 mmol/L 21.0*   BUN mg/dL 21   CREATININE mg/dL 0.80   GLUCOSE mg/dL 114*     Lab Results   Component Value Date    ANIONGAP 14.0 2024     Estimated Creatinine Clearance: 75.3 mL/min (by C-G formula based on SCr of 0.8 mg/dL).   Lab Results  "  Component Value Date    EGFR 78.9 09/20/2024     Results from last 7 days   Lab Units 09/20/24  1728   ALBUMIN g/dL 4.2   BILIRUBIN mg/dL 0.3   ALK PHOS U/L 97   AST (SGOT) U/L 23   ALT (SGPT) U/L 32     Results from last 7 days   Lab Units 09/20/24  1728   CALCIUM mg/dL 9.0   ALBUMIN g/dL 4.2       No results found for: \"HGBA1C\", \"POCGLU\"    CT Angiogram Chest Pulmonary Embolism    Result Date: 9/20/2024   1. Bilateral pulmonary emboli. There is no evidence of right heart strain. There are some small areas of groundglass attenuation which are noted within the anterior right lower lobe, small areas of pulmonary infarction not excluded.  FINDINGS were discussed with Diane Glover at 9:09 p.m.  Radiation dose reduction techniques were utilized, including automated exposure control and exposure modulation based on body size.   This report was finalized on 9/20/2024 9:10 PM by Dr. Mary Flores M.D on Workstation: [id number]      XR Chest 1 View    Result Date: 9/20/2024  FINDINGS AND IMPRESSION: No pulmonary consolidation, pleural effusion or pneumothorax is seen. Cardiac silhouette is within normal limits for size. Mild asymmetric elevation of the right diaphragm.  This report was finalized on 9/20/2024 7:46 PM by Dr. Rodney Freeman M.D on Workstation: [id number]       Scheduled Meds  buPROPion XL, 300 mg, Oral, Daily  levothyroxine, 50 mcg, Oral, Q AM  rosuvastatin, 40 mg, Oral, Nightly  sacubitril-valsartan, 1 tablet, Oral, BID  spironolactone, 25 mg, Oral, Daily  torsemide, 20 mg, Oral, Daily    Continuous Infusions  heparin, 16 Units/kg/hr, Last Rate: 17 Units/kg/hr (09/22/24 0733)    PRN Meds    heparin (porcine)    Hydrocod Sukhjinder-Chlorphe Sukhjinder ER    sodium chloride    traMADol    heparin, 16 Units/kg/hr, Last Rate: 17 Units/kg/hr (09/22/24 0733)    Diet  Diet: Cardiac; Healthy Heart (2-3 Na+); Fluid Consistency: Thin (IDDSI 0)    Results for orders placed during the hospital encounter of 09/20/24    Adult " Transthoracic Echo Complete W/ Cont if Necessary Per Protocol    Interpretation Summary    Left ventricular systolic function is normal. Calculated left ventricular EF = 57.3% Left ventricular ejection fraction appears to be 56 - 60%.    Normal right ventricular cavity size, wall thickness and septal motion noted. Borderline low right ventricular systolic function noted. There is a thrombus present in the right ventricle. There is a mobile, threadlike echodensity noted attached in the RV trabecula likely consistent with thrombus    Left ventricular diastolic function was normal.    There is no evidence of acute right heart strain        Assessment/Plan     Active Hospital Problems    Diagnosis  POA    **Pulmonary embolism [I26.99]  Yes    Right ventricular thrombus [I51.3]  Unknown    COVID-19 virus detected [U07.1]  Unknown    Influenza [J11.1]  Unknown    History of pulmonary embolism [Z86.711]  Yes    Chronic diastolic congestive heart failure [I50.32]  Yes    Hypothyroidism [E03.9]  Yes    Chronic coronary artery disease [I25.10]  Yes    Benign essential HTN [I10]  Yes    HLD (hyperlipidemia) [E78.5]  Yes      Resolved Hospital Problems   No resolved problems to display.     69 y.o. female     Acute bilateral PE (recurrent PE)  Pulmonary infarction not excluded on CT scan  RV thrombus  Currently on heparin   Appreciate hematology hypercoag workup  Appreciate pulmonology recommends repeat CT scan in 6 to 8 weeks to ensure clearance of probable pulmonary infarcts (follow-up with [facility])  Echocardiogram no right heart strain but shows RV thrombus  For now continue heparin pending further cardiology recommendations.   Hemodynamically stable not requiring any oxygen     Previous PE treated with apixaban, likely lifelong anticoagulation    Influenza  COVID  Positive for both on 9/8/2024  Continue supportive care  Not requiring any oxygen  Cough improved with antitussive medication    CAD history of stent  HFpEF EF 56 to  60%  Hypertension BP acceptable  Hyperlipidemia statin  Hypothyroidism levothyroxine  Obesity Body mass index is 31.95 kg/m².     DVT prophylaxis  anticoagulation as above    Discharge  TBD  Expected discharge date/ time has not been documented.    Discussed with patient and nursing staff    Lambert Foley MD  [facility]  09/22/24

## 2024-09-22 NOTE — CONSULTS
[facility]  [phone]  Dr. Ace Christie      Subjective   LOS: 0 days     Patient is a 69-year-old female with history of DVT/PE after COVID in 2021, chronic HFpEF, CAD, hypertension, hyperlipidemia and pulmonary nodules (previously saw Dr. Rodriguez).  She presented to the ED on 9/20/2024 with complaints of acute worsening of shortness of breath.  She tested positive for COVID and influenza A on September 8, 2024 and was prescribed Paxlovid as well as Tamiflu at that time.  Unfortunately Paxlovid and Tamiflu gave her significant side effects therefore these were discontinued.  She saw her PCP yesterday and was trying to get a stat CTA done however given that they could not get this done until Monday she was directed to the ER where a CTA confirmed pulmonary emboli.  This is now been her second time being diagnosed with pulmonary emboli as well as DVT.  The first time was in 2021 when she had also had COVID prior to this diagnosis.  She tells me that during this time she also had relatively low risk pulmonary emboli and was sent home on Eliquis which she completed therapy.  She has been on heparin drip since being admitted.  She is doing well other than she reports significant upper respiratory congestion, coughing which has been ongoing since the diagnosis of her viral infections a couple weeks ago.  She was noted to have some small areas of groundglass for which we are consulted for.  When I was speaking with patient the echo report had come back showing some right ventricular thrombus and there were multiple family members in the room who had come to the hospital because of this read and are anxious to find out what the plan will be for this.    Barbara Tran  reports current alcohol use of about 2.0 standard drinks of alcohol per week.,  reports that she has never smoked. She has never been exposed to tobacco smoke. She has never used smokeless tobacco.     Past Hx:  has a past medical  history of Allergic (2015), Arthritis (l5 years or so ago), Cataract (6-9 months ago), CHF (congestive heart failure), Coronary artery disease (2005 stent), COVID-19 virus detected (03/10/2021), Deep vein thrombosis (06/03/2021), Depression, Disease of thyroid gland, Headache (Covid 3-6-21), History of pulmonary embolism (06/01/2021), HL (hearing loss) (Last 4-6 months), Hyperlipidemia, Hypertension (since 2005), Hypothyroidism (since 2012), Obesity, Osteopenia (last few years), Pulmonary embolism (06/03/2021), and Unstable angina (04/16/2024).  Surg Hx:  has a past surgical history that includes Coronary stent placement (2005); Colonoscopy (N/A, 12/19/2016); Cholecystectomy (1995); Subtotal Hysterectomy (2009); Tonsillectomy (1965); Tubal ligation (1985); Cardiac catheterization (2015, 2018); Cataract extraction; Ganglion cyst excision; Bunionectomy (Bilateral); Cardiac catheterization (N/A, 4/29/2024); Cardiac catheterization (N/A, 4/29/2024); and Cardiac catheterization (N/A, 4/29/2024).  FH: family history includes Arthritis in her brother and son; Cancer in her brother; Diabetes in her sister; Heart attack in her brother; Heart disease in her brother, father, and mother; Heart disease (age of onset: 62) in her brother; Heart disease (age of onset: 69) in her sister; No Known Problems in her maternal aunt, maternal grandfather, maternal grandmother, maternal uncle, paternal aunt, paternal grandfather, paternal grandmother, and paternal uncle.  SH:  reports that she has never smoked. She has never been exposed to tobacco smoke. She has never used smokeless tobacco. She reports current alcohol use of about 2.0 standard drinks of alcohol per week. She reports that she does not use drugs.    Medications Prior to Admission   Medication Sig Dispense Refill Last Dose    buPROPion XL (WELLBUTRIN XL) 300 MG 24 hr tablet Take 1 tablet by mouth Daily. 90 tablet 3 9/20/2024    calcium carbonate (OS-SHAHNAZ) 600 MG tablet Take 1  tablet by mouth 2 (Two) Times a Day With Meals.   2024    empagliflozin (JARDIANCE) 10 MG tablet tablet Take 1 tablet by mouth Daily. 28 tablet 0 2024    levothyroxine (Synthroid) 50 MCG tablet Take 1 tablet by mouth Daily. 90 tablet 2 2024    magnesium oxide (MAG-OX) 400 MG tablet Take 2 tablets by mouth 2 (two) times a day.   2024    promethazine-dextromethorphan (PROMETHAZINE-DM) 6.25-15 MG/5ML syrup Take 5 mL by mouth 4 (Four) Times a Day As Needed for Cough. 180 mL 0 2024    sacubitril-valsartan (ENTRESTO) 24-26 MG tablet Take 1 tablet by mouth 2 (Two) Times a Day. 28 tablet 0 2024    spironolactone (ALDACTONE) 25 MG tablet Take 1 tablet by mouth Daily. 90 tablet 3 2024    torsemide (DEMADEX) 20 MG tablet Take 1 tablet by mouth Daily.   2024    traMADol (ULTRAM) 50 MG tablet Take 1 tablet by mouth Every 6 (Six) Hours As Needed for Moderate Pain. 60 tablet 0 2024    aspirin 325 MG tablet Take 1 tablet by mouth Daily for 14 days. 14 tablet 0     ibandronate (BONIVA) 150 MG tablet Take 1 tablet by mouth Every 30 (Thirty) Days. 3 tablet 3     methylPREDNISolone (MEDROL) 4 MG dose pack Take as directed on package instructions. 21 each 0     rosuvastatin (CRESTOR) 40 MG tablet Take 1 tablet by mouth Every Night. 90 tablet 3     zolpidem (AMBIEN) 5 MG tablet Take 1 tablet by mouth At Night As Needed for Sleep. 30 tablet 0      Allergies   Allergen Reactions    Codeine Dizziness     lightheaded    Levofloxacin Itching    Morphine Itching       Review of Systems   All other systems reviewed and are negative.    Vital Signs past 24hrs  BP range: BP: (131-135)/(62-78) 131/71  Pulse range: Heart Rate:  [89-93] 93  Resp rate range: Resp:  [18-20] 18  Temp range: Temp (24hrs), Av.7 °F (36.5 °C), Min:97.2 °F (36.2 °C), Max:97.9 °F (36.6 °C)    Oxygen range: SpO2:  [97 %] 97 %;  ;   Device (Oxygen Therapy): room air  92.5 kg (204 lb); Body mass index is 31.95 kg/m².  Net IO  Since Admission: 1,467.8 mL [09/21/24 2315]      Physical Exam  Vitals reviewed.   Constitutional:       General: She is not in acute distress.     Appearance: Normal appearance.   HENT:      Head: Normocephalic and atraumatic.      Nose: Congestion and rhinorrhea present.   Eyes:      Extraocular Movements: Extraocular movements intact.      Conjunctiva/sclera: Conjunctivae normal.      Pupils: Pupils are equal, round, and reactive to light.   Cardiovascular:      Rate and Rhythm: Normal rate and regular rhythm.      Heart sounds: No murmur heard.     No friction rub. No gallop.   Pulmonary:      Effort: Pulmonary effort is normal. No respiratory distress.      Breath sounds: Normal breath sounds. No wheezing, rhonchi or rales.   Abdominal:      General: Abdomen is flat.      Palpations: Abdomen is soft.      Tenderness: There is no abdominal tenderness.   Musculoskeletal:         General: No swelling or tenderness. Normal range of motion.      Cervical back: Normal range of motion. No rigidity or tenderness.   Skin:     General: Skin is warm and dry.      Findings: No rash.   Neurological:      General: No focal deficit present.      Mental Status: She is alert and oriented to person, place, and time.   Psychiatric:         Mood and Affect: Mood normal.         Behavior: Behavior normal.         Results Review:    I have reviewed the laboratory and imaging data from current admission. My annotations are as noted in assessment and plan.  Result Review:  I have personally reviewed the results from the time of this admission to 9/21/2024 23:15 EDT and agree with these findings:  [x]  Laboratory list / accordion  [x]  Microbiology  [x]  Radiology  [x]  EKG/Telemetry   [x]  Cardiology/Vascular   [x]  Pathology  [x]  Old records  []  Other:          Medication Review:  I have reviewed the current MAR. My annotations are as noted in assessment and plan.    heparin, 16 Units/kg/hr, Last Rate: 13 Units/kg/hr (09/21/24  1741)      Lines, Drains & Airways       Active LDAs       Name Placement date Placement time Site Days    Peripheral IV 09/20/24 1822 Right Antecubital 09/20/24 1822  Antecubital  1    Peripheral IV Left;Posterior Hand --  --  Hand  --                  Diet Orders (active) (From admission, onward)       Start     Ordered    09/21/24 1200  Dietary Nutrition Supplements Boost Breeze (Ensure Clear)  Daily With Lunch & Dinner       09/21/24 1039    09/21/24 0842  Diet: Cardiac; Healthy Heart (2-3 Na+); Fluid Consistency: Thin (IDDSI 0)  Diet Effective Now         09/21/24 0841                  Enhanced Droplet/Contact , Droplet     Assessment  Acute Recurrent Pulmonary Emboli  RV Thrombus  Acute Left Lower Extremity DVT   GGO on CT chest   COVID-19  Influenza A   History of Pulmonary Nodules      Plan  -Patient now has recurrent PEs that have come on shortly after having COVID.  She will likely need lifelong anticoagulation at this point.  Hematology is running hypercoagulable workup which will be followed up on.  - Regarding the RV thrombus, I will have nursing page cardiology to alert them of this in case they have an already been.  This is a difficult scenario as there are no obvious guidelines on what to do with PE in the setting of RV thrombus.  Her PESI score places her in class II low risk category and she is doing otherwise fine on room air.  However the RV thrombus does pose a higher mortality and therefore this is why we will reach out to see if anything needs to be done more immediately.  Otherwise just continuing anticoagulation.  - The groundglass opacities on CT certainly could be related to a multitude of things including the viral infections.  They are in somewhat of a wedge shape and in the location of the more significant pulmonary emboli therefore these could just be pulmonary infarcts.  Would recommend a repeat CT scan in 6 to 8 weeks to assure clearance.  -Cardiology and hematology following as  well.  - Continue heparin drip with likely transition to DOAC closer to discharge  -Will need follow-up as outpatient with [facility] for the repeat CT scan to follow-up on the groundglass      Electronically signed by Ace Christie DO, 09/21/24, 11:15 PM EDT.      Part of this note may be an electronic transcription/translation of spoken language to printed text using the Dragon Dictation System.

## 2024-09-22 NOTE — PLAN OF CARE
Goal Outcome Evaluation:  Plan of Care Reviewed With: patient        Progress: improving  Outcome Evaluation: VSS (see chart), AOx4, Heparin drip, no complaint of pain during shift, call light within reach, continue plan of care

## 2024-09-23 ENCOUNTER — TELEPHONE (OUTPATIENT)
Dept: FAMILY MEDICINE CLINIC | Facility: CLINIC | Age: 69
End: 2024-09-23

## 2024-09-23 LAB
ANION GAP SERPL CALCULATED.3IONS-SCNC: 9 MMOL/L (ref 5–15)
APTT PPP: 137.4 SECONDS (ref 22.7–35.4)
BASOPHILS # BLD AUTO: 0.07 10*3/MM3 (ref 0–0.2)
BASOPHILS NFR BLD AUTO: 0.7 % (ref 0–1.5)
BUN SERPL-MCNC: 14 MG/DL (ref 8–23)
BUN/CREAT SERPL: 20 (ref 7–25)
CALCIUM SPEC-SCNC: 8.4 MG/DL (ref 8.6–10.5)
CHLORIDE SERPL-SCNC: 105 MMOL/L (ref 98–107)
CO2 SERPL-SCNC: 22 MMOL/L (ref 22–29)
CREAT SERPL-MCNC: 0.7 MG/DL (ref 0.57–1)
DEPRECATED RDW RBC AUTO: 42.2 FL (ref 37–54)
EGFRCR SERPLBLD CKD-EPI 2021: 92.6 ML/MIN/1.73
EOSINOPHIL # BLD AUTO: 0.28 10*3/MM3 (ref 0–0.4)
EOSINOPHIL NFR BLD AUTO: 2.8 % (ref 0.3–6.2)
ERYTHROCYTE [DISTWIDTH] IN BLOOD BY AUTOMATED COUNT: 12.8 % (ref 12.3–15.4)
F5 GENE MUT ANL BLD/T: NORMAL
FACTOR II, DNA ANALYSIS: NORMAL
GLUCOSE SERPL-MCNC: 107 MG/DL (ref 65–99)
HCT VFR BLD AUTO: 43.2 % (ref 34–46.6)
HGB BLD-MCNC: 14.5 G/DL (ref 12–15.9)
IMM GRANULOCYTES # BLD AUTO: 0.02 10*3/MM3 (ref 0–0.05)
IMM GRANULOCYTES NFR BLD AUTO: 0.2 % (ref 0–0.5)
LYMPHOCYTES # BLD AUTO: 3.77 10*3/MM3 (ref 0.7–3.1)
LYMPHOCYTES NFR BLD AUTO: 37.3 % (ref 19.6–45.3)
MCH RBC QN AUTO: 30.3 PG (ref 26.6–33)
MCHC RBC AUTO-ENTMCNC: 33.6 G/DL (ref 31.5–35.7)
MCV RBC AUTO: 90.2 FL (ref 79–97)
MONOCYTES # BLD AUTO: 0.7 10*3/MM3 (ref 0.1–0.9)
MONOCYTES NFR BLD AUTO: 6.9 % (ref 5–12)
NEUTROPHILS NFR BLD AUTO: 5.26 10*3/MM3 (ref 1.7–7)
NEUTROPHILS NFR BLD AUTO: 52.1 % (ref 42.7–76)
NRBC BLD AUTO-RTO: 0 /100 WBC (ref 0–0.2)
PLATELET # BLD AUTO: 307 10*3/MM3 (ref 140–450)
PMV BLD AUTO: 9.6 FL (ref 6–12)
POTASSIUM SERPL-SCNC: 4.3 MMOL/L (ref 3.5–5.2)
QT INTERVAL: 325 MS
QTC INTERVAL: 420 MS
RBC # BLD AUTO: 4.79 10*6/MM3 (ref 3.77–5.28)
SARS-COV-2 AG RESP QL IA.RAPID: NORMAL
SODIUM SERPL-SCNC: 136 MMOL/L (ref 136–145)
WBC NRBC COR # BLD AUTO: 10.1 10*3/MM3 (ref 3.4–10.8)

## 2024-09-23 PROCEDURE — 80048 BASIC METABOLIC PNL TOTAL CA: CPT | Performed by: HOSPITALIST

## 2024-09-23 PROCEDURE — 85730 THROMBOPLASTIN TIME PARTIAL: CPT | Performed by: HOSPITALIST

## 2024-09-23 PROCEDURE — 87426 SARSCOV CORONAVIRUS AG IA: CPT | Performed by: HOSPITALIST

## 2024-09-23 PROCEDURE — 85025 COMPLETE CBC W/AUTO DIFF WBC: CPT | Performed by: PHYSICIAN ASSISTANT

## 2024-09-23 PROCEDURE — 25010000002 HEPARIN (PORCINE) 25000-0.45 UT/250ML-% SOLUTION: Performed by: PHYSICIAN ASSISTANT

## 2024-09-23 PROCEDURE — 99232 SBSQ HOSP IP/OBS MODERATE 35: CPT | Performed by: NURSE PRACTITIONER

## 2024-09-23 RX ORDER — BENZONATATE 100 MG/1
200 CAPSULE ORAL EVERY 4 HOURS PRN
Status: DISCONTINUED | OUTPATIENT
Start: 2024-09-23 | End: 2024-10-01 | Stop reason: HOSPADM

## 2024-09-23 RX ADMIN — BUPROPION HYDROCHLORIDE 300 MG: 300 TABLET, EXTENDED RELEASE ORAL at 08:39

## 2024-09-23 RX ADMIN — HEPARIN SODIUM 17 UNITS/KG/HR: 10000 INJECTION, SOLUTION INTRAVENOUS at 07:03

## 2024-09-23 RX ADMIN — TRAMADOL HYDROCHLORIDE 50 MG: 50 TABLET ORAL at 07:02

## 2024-09-23 RX ADMIN — LEVOTHYROXINE SODIUM 50 MCG: 50 TABLET ORAL at 05:04

## 2024-09-23 RX ADMIN — TRAMADOL HYDROCHLORIDE 50 MG: 50 TABLET ORAL at 13:01

## 2024-09-23 RX ADMIN — APIXABAN 10 MG: 5 TABLET, FILM COATED ORAL at 13:01

## 2024-09-23 RX ADMIN — ROSUVASTATIN CALCIUM 40 MG: 40 TABLET, FILM COATED ORAL at 20:36

## 2024-09-23 RX ADMIN — TORSEMIDE 20 MG: 20 TABLET ORAL at 08:39

## 2024-09-23 RX ADMIN — HYDROCODONE POLISTIREX AND CHLORPHENIRAMINE POLISTIREX 5 ML: 10; 8 SUSPENSION, EXTENDED RELEASE ORAL at 23:26

## 2024-09-23 RX ADMIN — BENZONATATE 200 MG: 100 CAPSULE ORAL at 20:42

## 2024-09-23 RX ADMIN — SPIRONOLACTONE 25 MG: 25 TABLET, FILM COATED ORAL at 08:40

## 2024-09-23 RX ADMIN — APIXABAN 10 MG: 5 TABLET, FILM COATED ORAL at 20:36

## 2024-09-23 RX ADMIN — SACUBITRIL AND VALSARTAN 1 TABLET: 24; 26 TABLET, FILM COATED ORAL at 20:36

## 2024-09-23 RX ADMIN — SACUBITRIL AND VALSARTAN 1 TABLET: 24; 26 TABLET, FILM COATED ORAL at 08:40

## 2024-09-23 NOTE — PROGRESS NOTES
"    Patient Name: Barbara Tran  :1955  69 y.o.      Patient Care Team:  Millicent Ace MD as PCP - General (Family Medicine)  Kin Patel MD as Consulting Physician (Orthopedic Surgery)  Bere Beckford MD as Consulting Physician (Cardiology)  Patrizia Page MD as Consulting Physician (Ophthalmology)    Chief Complaint: follow up RV thrombus    Interval History: on room air. No complaints. -130. C/o cough.       Objective   Vital Signs  Temp:  [96.6 °F (35.9 °C)-98.9 °F (37.2 °C)] 96.6 °F (35.9 °C)  Heart Rate:  [81-92] 92  Resp:  [14-17] 16  BP: (111-137)/(61-69) 137/68    Intake/Output Summary (Last 24 hours) at 2024 1237  Last data filed at 2024 0200  Gross per 24 hour   Intake 1080 ml   Output --   Net 1080 ml     Flowsheet Rows      Flowsheet Row First Filed Value   Admission Height 154.9 cm (61\") Documented at 2024 1725   Admission Weight 93.9 kg (207 lb) Documented at 2024 1725            Physical Exam:   General Appearance:    Alert, cooperative, in no acute distress   Lungs:     Clear to auscultation.  Normal respiratory effort and rate.      Heart:    Regular rhythm and normal rate, normal S1 and S2, no murmurs, gallops or rubs.     Chest Wall:    No abnormalities observed   Abdomen:     Soft, nontender, positive bowel sounds.     Extremities:   no cyanosis, clubbing or edema.  No marked joint deformities.  Adequate musculoskeletal strength.       Results Review:    Results from last 7 days   Lab Units 24  0311   SODIUM mmol/L 136   POTASSIUM mmol/L 4.3   CHLORIDE mmol/L 105   CO2 mmol/L 22.0   BUN mg/dL 14   CREATININE mg/dL 0.70   GLUCOSE mg/dL 107*   CALCIUM mg/dL 8.4*     Results from last 7 days   Lab Units 24  1934 24  1728   HSTROP T ng/L 14* 15*     Results from last 7 days   Lab Units 24  0311   WBC 10*3/mm3 10.10   HEMOGLOBIN g/dL 14.5   HEMATOCRIT % 43.2   PLATELETS 10*3/mm3 307     Results from last 7 days   Lab " Units 09/23/24  0853 09/22/24  1224 09/22/24  0626 09/21/24  0346 09/20/24  2153   INR   --   --   --   --  1.18*   APTT seconds 137.4* 90.4* 44.1*   < > 28.6    < > = values in this interval not displayed.     Results from last 7 days   Lab Units 09/22/24  1224   MAGNESIUM mg/dL 2.2                   Medication Review:   apixaban, 10 mg, Oral, Q12H   Followed by  [START ON 9/30/2024] apixaban, 5 mg, Oral, Q12H  buPROPion XL, 300 mg, Oral, Daily  levothyroxine, 50 mcg, Oral, Q AM  rosuvastatin, 40 mg, Oral, Nightly  sacubitril-valsartan, 1 tablet, Oral, BID  spironolactone, 25 mg, Oral, Daily  torsemide, 20 mg, Oral, Daily              Assessment & Plan   COVID19 infection  Influenza   Bilateral pulmonary embolism   Acute LLE DVT  RV thrombus  CAD status post LAD stent     Heparin --> apixaban today.   Cardiac status appears stable.   Hopefully home soon.     MANOHAR Lu  [facility]  09/23/24  12:37 EDT

## 2024-09-23 NOTE — PLAN OF CARE
Problem: Adult Inpatient Plan of Care  Goal: Plan of Care Review  Outcome: Ongoing, Progressing  Flowsheets (Taken 9/23/2024 1701)  Progress: improving  Plan of Care Reviewed With: patient  Goal: Patient-Specific Goal (Individualized)  Outcome: Ongoing, Progressing  Goal: Absence of Hospital-Acquired Illness or Injury  Outcome: Ongoing, Progressing  Intervention: Identify and Manage Fall Risk  Recent Flowsheet Documentation  Taken 9/23/2024 1600 by Dania Dejesus RN  Safety Promotion/Fall Prevention:   activity supervised   assistive device/personal items within reach   clutter free environment maintained   fall prevention program maintained   nonskid shoes/slippers when out of bed   safety round/check completed  Taken 9/23/2024 1400 by Dania Dejesus RN  Safety Promotion/Fall Prevention:   assistive device/personal items within reach   activity supervised   clutter free environment maintained   fall prevention program maintained   nonskid shoes/slippers when out of bed   safety round/check completed  Taken 9/23/2024 1200 by Dania Dejesus, RN  Safety Promotion/Fall Prevention:   activity supervised   assistive device/personal items within reach   clutter free environment maintained   fall prevention program maintained   nonskid shoes/slippers when out of bed   safety round/check completed  Taken 9/23/2024 1000 by Dania Dejesus, RN  Safety Promotion/Fall Prevention:   activity supervised   assistive device/personal items within reach   clutter free environment maintained   fall prevention program maintained   nonskid shoes/slippers when out of bed   safety round/check completed  Taken 9/23/2024 0800 by Dania Dejesus, RN  Safety Promotion/Fall Prevention:   activity supervised   assistive device/personal items within reach   clutter free environment maintained   fall prevention program maintained   nonskid shoes/slippers when out of bed  Intervention: Prevent and Manage VTE (Venous Thromboembolism)  Risk  Recent Flowsheet Documentation  Taken 9/23/2024 1600 by Dania Dejesus RN  Activity Management: activity encouraged  Taken 9/23/2024 1400 by Dania Dejesus RN  Activity Management: activity encouraged  Taken 9/23/2024 1200 by Dania Dejesus RN  Activity Management: activity encouraged  Taken 9/23/2024 1000 by Dania Dejesus RN  Activity Management: activity encouraged  Taken 9/23/2024 0800 by Dania Dejesus RN  Activity Management: activity encouraged  Intervention: Prevent Infection  Recent Flowsheet Documentation  Taken 9/23/2024 1600 by Dania Dejesus RN  Infection Prevention: single patient room provided  Taken 9/23/2024 1400 by Dania Dejesus RN  Infection Prevention: single patient room provided  Taken 9/23/2024 1200 by Dania Dejesus RN  Infection Prevention: single patient room provided  Taken 9/23/2024 1000 by Dania Dejesus RN  Infection Prevention: single patient room provided  Goal: Optimal Comfort and Wellbeing  Outcome: Ongoing, Progressing  Intervention: Monitor Pain and Promote Comfort  Recent Flowsheet Documentation  Taken 9/23/2024 1400 by Dania Dejesus RN  Pain Management Interventions: see MAR  Taken 9/23/2024 0800 by Dania Dejesus RN  Pain Management Interventions: see MAR  Intervention: Provide Person-Centered Care  Recent Flowsheet Documentation  Taken 9/23/2024 1400 by Dania Dejesus RN  Trust Relationship/Rapport:   choices provided   care explained  Taken 9/23/2024 0800 by Dania Dejesus RN  Trust Relationship/Rapport:   care explained   choices provided  Goal: Readiness for Transition of Care  Outcome: Ongoing, Progressing   Goal Outcome Evaluation:  Plan of Care Reviewed With: patient        Progress: improving

## 2024-09-23 NOTE — PLAN OF CARE
Goal Outcome Evaluation:  Plan of Care Reviewed With: patient           Outcome Evaluation: vss.a/ox4, compliant with plan of care,no c/o pain this shift,able to make needs known, call light within reach, continues with heparin gtt,  droplet precautions still in place.

## 2024-09-23 NOTE — TELEPHONE ENCOUNTER
Caller: Barbara Tran    Relationship: Self    Best call back number:     [phone]       What was the call regarding: PATIENT IS REQUESTING TO SPEAK TO ISMA TO FOLLOW UP ON HER APPOINTMENT ON FRIDAY 9/20/24

## 2024-09-23 NOTE — PROGRESS NOTES
Name: Barbara Tran ADMIT: 2024   : 1955  PCP: Millicent Ace MD    MRN: [id number] LOS: 2 days   AGE/SEX: 69 y.o. female  ROOM: [facility]     Subjective   Subjective   No new complaints. Sitting in chair. Daughter and son are at bedside.     Objective   Objective   Vital Signs  Temp:  [96.6 °F (35.9 °C)-98.9 °F (37.2 °C)] 96.6 °F (35.9 °C)  Heart Rate:  [81-92] 92  Resp:  [14-17] 16  BP: (111-137)/(61-69) 137/68  SpO2:  [97 %] 97 %  on   ;   Device (Oxygen Therapy): room air  Body mass index is 31.95 kg/m².    Physical Exam  Constitutional:       General: She is not in acute distress.     Appearance: She is not toxic-appearing.   HENT:      Head: Normocephalic and atraumatic.   Cardiovascular:      Rate and Rhythm: Normal rate and regular rhythm.   Pulmonary:      Effort: No respiratory distress.      Breath sounds: No wheezing or rales.   Abdominal:      General: Bowel sounds are normal.      Palpations: Abdomen is soft.      Tenderness: There is no abdominal tenderness. There is no guarding or rebound.   Musculoskeletal:         General: No swelling.      Left lower leg: Edema (trace) present.   Skin:     General: Skin is warm and dry.   Neurological:      General: No focal deficit present.      Mental Status: She is alert and oriented to person, place, and time.   Psychiatric:         Mood and Affect: Mood normal.         Behavior: Behavior normal.     Results Review  I reviewed the patient's new clinical results.  Results from last 7 days   Lab Units 24  0311 24  0625 24  0346 24  1728   WBC 10*3/mm3 10.10 10.15 11.27* 11.02*   HEMOGLOBIN g/dL 14.5 15.3 14.8 16.0*   PLATELETS 10*3/mm3 307 317 320 356     Results from last 7 days   Lab Units 24  0311 24  1224 24  1728   SODIUM mmol/L 136 135* 134*   POTASSIUM mmol/L 4.3 3.1* 3.5   CHLORIDE mmol/L 105 100 99   CO2 mmol/L 22.0 24.5 21.0*   BUN mg/dL 14 13 21   CREATININE mg/dL 0.70 0.67 0.80   GLUCOSE mg/dL  "107* 118* 114*     Lab Results   Component Value Date    ANIONGAP 9.0 09/23/2024     Estimated Creatinine Clearance: 86.1 mL/min (by C-G formula based on SCr of 0.7 mg/dL).   Lab Results   Component Value Date    EGFR 92.6 09/23/2024     Results from last 7 days   Lab Units 09/20/24  1728   ALBUMIN g/dL 4.2   BILIRUBIN mg/dL 0.3   ALK PHOS U/L 97   AST (SGOT) U/L 23   ALT (SGPT) U/L 32     Results from last 7 days   Lab Units 09/23/24  0311 09/22/24  1224 09/20/24  1728   CALCIUM mg/dL 8.4* 8.9 9.0   ALBUMIN g/dL  --   --  4.2   MAGNESIUM mg/dL  --  2.2  --        No results found for: \"HGBA1C\", \"POCGLU\"    No radiology results for the last day    Scheduled Meds  apixaban, 10 mg, Oral, Q12H   Followed by  [START ON 9/30/2024] apixaban, 5 mg, Oral, Q12H  buPROPion XL, 300 mg, Oral, Daily  levothyroxine, 50 mcg, Oral, Q AM  rosuvastatin, 40 mg, Oral, Nightly  sacubitril-valsartan, 1 tablet, Oral, BID  spironolactone, 25 mg, Oral, Daily  torsemide, 20 mg, Oral, Daily    Continuous Infusions     PRN Meds    Calcium Replacement - Follow Nurse / BPA Driven Protocol    Hydrocod Sukhjinder-Chlorphe Sukhjinder ER    Magnesium Standard Dose Replacement - Follow Nurse / BPA Driven Protocol    Phosphorus Replacement - Follow Nurse / BPA Driven Protocol    Potassium Replacement - Follow Nurse / BPA Driven Protocol    sodium chloride    traMADol       Diet  Diet: Cardiac; Healthy Heart (2-3 Na+); Fluid Consistency: Thin (IDDSI 0)    Results for orders placed during the hospital encounter of 09/20/24    Adult Transthoracic Echo Complete W/ Cont if Necessary Per Protocol    Interpretation Summary    Left ventricular systolic function is normal. Calculated left ventricular EF = 57.3% Left ventricular ejection fraction appears to be 56 - 60%.    Normal right ventricular cavity size, wall thickness and septal motion noted. Borderline low right ventricular systolic function noted. There is a thrombus present in the right ventricle. There is a " mobile, threadlike echodensity noted attached in the RV trabecula likely consistent with thrombus    Left ventricular diastolic function was normal.    There is no evidence of acute right heart strain        Assessment/Plan     Active Hospital Problems    Diagnosis  POA    **Pulmonary embolism [I26.99]  Yes    Right ventricular thrombus [I51.3]  Unknown    COVID-19 virus detected [U07.1]  Unknown    Influenza [J11.1]  Unknown    History of pulmonary embolism [Z86.711]  Yes    Chronic diastolic congestive heart failure [I50.32]  Yes    Hypothyroidism [E03.9]  Yes    Chronic coronary artery disease [I25.10]  Yes    Benign essential HTN [I10]  Yes    HLD (hyperlipidemia) [E78.5]  Yes      Resolved Hospital Problems   No resolved problems to display.     69 y.o. female     Acute bilateral PE (recurrent PE)  Left DVT  Pulmonary infarction not excluded on CT scan  RV thrombus  Currently on heparin will switch to apixaban and monitor another day. Previously treated with apixaban. Now will likely have lifelong anticoagulant  Appreciate hematology hypercoag workup  Appreciate pulmonology recommends repeat CT scan in 6 to 8 weeks to ensure clearance of probable pulmonary infarcts (follow-up with [facility])  Echocardiogram no right heart strain but shows RV thrombus  Hemodynamically stable not requiring any oxygen       Influenza  COVID  Positive for both on 9/8/2024  Continue supportive care  Not requiring any oxygen  Cough improved with antitussive medication    CAD history of stent  HFpEF EF 56 to 60%  Hypertension BP acceptable  Hyperlipidemia statin  Hypothyroidism levothyroxine  Obesity Body mass index is 31.95 kg/m².     DVT prophylaxis  anticoagulation as above    Discharge  Home tomorrow  Expected Discharge Date: 9/24/2024; Expected Discharge Time:     Discussed with patient, family, and nursing staff    Lambert Foley MD  [facility]  09/23/24

## 2024-09-23 NOTE — PROGRESS NOTES
[facility]  [phone]  Dr. Alphonse Bello    Subjective:  LOS: 2    Chief Complaint:      No acute events overnight.  Her only complaint is continued coughing.    Objective   Vital Signs past 24hrs    Temp range: Temp (24hrs), Av.1 °F (36.7 °C), Min:96.6 °F (35.9 °C), Max:98.9 °F (37.2 °C)    BP range: BP: (105-137)/(61-74) 105/74  Pulse range: Heart Rate:  [] 104  Resp rate range: Resp:  [16-17] 16    Device (Oxygen Therapy): room air   Oxygen range:       92.5 kg (204 lb); Body mass index is 31.95 kg/m².    Intake/Output Summary (Last 24 hours) at 2024 1607  Last data filed at 2024 0200  Gross per 24 hour   Intake 720 ml   Output --   Net 720 ml         Physical Exam  Constitutional:       Appearance: Normal appearance.   HENT:      Head: Normocephalic and atraumatic.      Nose: Nose normal.      Mouth/Throat:      Mouth: Mucous membranes are moist.      Pharynx: Oropharynx is clear.   Eyes:      Extraocular Movements: Extraocular movements intact.      Conjunctiva/sclera: Conjunctivae normal.   Cardiovascular:      Rate and Rhythm: Normal rate and regular rhythm.      Pulses: Normal pulses.      Heart sounds: Normal heart sounds. No murmur heard.  Pulmonary:      Effort: Pulmonary effort is normal. No respiratory distress.      Breath sounds: Normal breath sounds. No stridor. No wheezing or rales.   Abdominal:      General: Abdomen is flat. Bowel sounds are normal.      Palpations: Abdomen is soft.   Skin:     General: Skin is warm and dry.   Neurological:      General: No focal deficit present.      Mental Status: She is alert and oriented to person, place, and time. Mental status is at baseline.   Psychiatric:         Mood and Affect: Mood normal.         Behavior: Behavior normal.       Results Review:    I have reviewed the laboratory and imaging data since the last note by [facility] physician.  My annotations are noted in assessment and plan.    Lab Results (last 24 hours)        Procedure Component Value Units Date/Time    CBC & Differential [[id number]]  (Abnormal) Collected: 09/23/24 0311    Specimen: Blood Updated: 09/23/24 0338    Narrative:      The following orders were created for panel order CBC & Differential.  Procedure                               Abnormality         Status                     ---------                               -----------         ------                     CBC Auto Differential[[id number]]        Abnormal            Final result                 Please view results for these tests on the individual orders.    Basic Metabolic Panel [[id number]]  (Abnormal) Collected: 09/23/24 0311    Specimen: Blood Updated: 09/23/24 0407     Glucose 107 mg/dL      BUN 14 mg/dL      Creatinine 0.70 mg/dL      Sodium 136 mmol/L      Potassium 4.3 mmol/L      Chloride 105 mmol/L      CO2 22.0 mmol/L      Calcium 8.4 mg/dL      BUN/Creatinine Ratio 20.0     Anion Gap 9.0 mmol/L      eGFR 92.6 mL/min/1.73     Narrative:      GFR Normal >60  Chronic Kidney Disease <60  Kidney Failure <15    The GFR formula is only valid for adults with stable renal function between ages 18 and 70.    CBC Auto Differential [[id number]]  (Abnormal) Collected: 09/23/24 0311    Specimen: Blood Updated: 09/23/24 0338     WBC 10.10 10*3/mm3      RBC 4.79 10*6/mm3      Hemoglobin 14.5 g/dL      Hematocrit 43.2 %      MCV 90.2 fL      MCH 30.3 pg      MCHC 33.6 g/dL      RDW 12.8 %      RDW-SD 42.2 fl      MPV 9.6 fL      Platelets 307 10*3/mm3      Neutrophil % 52.1 %      Lymphocyte % 37.3 %      Monocyte % 6.9 %      Eosinophil % 2.8 %      Basophil % 0.7 %      Immature Grans % 0.2 %      Neutrophils, Absolute 5.26 10*3/mm3      Lymphocytes, Absolute 3.77 10*3/mm3      Monocytes, Absolute 0.70 10*3/mm3      Eosinophils, Absolute 0.28 10*3/mm3      Basophils, Absolute 0.07 10*3/mm3      Immature Grans, Absolute 0.02 10*3/mm3      nRBC 0.0 /100 WBC     aPTT [[id number]]  (Abnormal) Collected: 09/23/24  0853    Specimen: Blood Updated: 09/23/24 0922     .4 seconds     COVID-19 RAPID AG,VERITOR,COR/PAD/ALANNA/RELL/LAG/LISETTE/ IN-HOUSE,DRY SWAB, 1 HR TAT - Swab, Nasal Cavity [[id number]]  (Normal) Collected: 09/23/24 1213    Specimen: Swab from Nasal Cavity Updated: 09/23/24 1306     COVID19 Presumptive Negative    Narrative:      Fact sheets for providers: https://www.fda.gov/media/519967/download    Fact sheets for patients: https://www.fda.gov/media/980743/download          No radiology results for the last day      Result Review:  I have personally reviewed the results from last note by [facility] physician to 9/23/2024 16:07 EDT and agree with these findings:  [x]  Laboratory list / accordion  [x]  Microbiology  [x]  Radiology  [x]  EKG/Telemetry   [x]  Cardiology/Vascular   []  Pathology  []  Old records  []  Other:    Medication Review:  I have reviewed the current MAR.  My annotations are noted in assessment and plan.    apixaban, 10 mg, Oral, Q12H   Followed by  [START ON 9/30/2024] apixaban, 5 mg, Oral, Q12H  buPROPion XL, 300 mg, Oral, Daily  levothyroxine, 50 mcg, Oral, Q AM  rosuvastatin, 40 mg, Oral, Nightly  sacubitril-valsartan, 1 tablet, Oral, BID  spironolactone, 25 mg, Oral, Daily  torsemide, 20 mg, Oral, Daily           Lines, Drains & Airways       Active LDAs       Name Placement date Placement time Site Days    Peripheral IV 09/20/24 1822 Right Antecubital 09/20/24 1822  Antecubital  2                  Enhanced Droplet/Contact , Droplet  Diet Orders (active) (From admission, onward)       Start     Ordered    09/21/24 1200  Dietary Nutrition Supplements Boost Breeze (Ensure Clear)  Daily With Lunch & Dinner       09/21/24 1039    09/21/24 0842  Diet: Cardiac; Healthy Heart (2-3 Na+); Fluid Consistency: Thin (IDDSI 0)  Diet Effective Now         09/21/24 0841                  PCCM Problems  Acute PE, recurrent  RV thrombus  Acute left lower extremity DVT  Hx of recent COVID-19 infection  Hx of recent  Influenza A infection  History of pulmonary nodules  HFpEF  Hypertension  Hyperlipidemia  CAD  Obesity      THESE ARE NEW MEDICAL PROBLEMS TO ME.    Plan of Treatment  - added tessalon perles to help with coughing  - Eliquis for PE  - Hematology workup for hypercoagulable state, pending  - PT/OT consulted  -Continue supportive care for COVID and flu, no specific treatment indicated.  - Cardiology consulted for RV thrombus, plan to continue eliquis. No intervention recommended.    Due to her recurrent PE after getting COVID twice now, I would recommend lifelong anticoagulation with Eliquis.  Patient previously followed with Dr. Rodriguez in pulmonary clinic. Upon discharge, will arrange follow up in our clinic for her.  From a pulmonary perspective, okay for discharge when primary team feels she is medically stable.    Alphonse Bello MD  09/23/24  16:07 EDT      Part of this note may be an electronic transcription/translation of spoken language to printed text using the Dragon Dictation System.

## 2024-09-24 ENCOUNTER — APPOINTMENT (OUTPATIENT)
Dept: GENERAL RADIOLOGY | Facility: HOSPITAL | Age: 69
End: 2024-09-24
Payer: MEDICARE

## 2024-09-24 LAB
ANION GAP SERPL CALCULATED.3IONS-SCNC: 9.9 MMOL/L (ref 5–15)
AT III PPP CHRO-ACNC: 91 % (ref 90–134)
B2 GLYCOPROT1 IGA SER-ACNC: <9 GPI IGA UNITS (ref 0–25)
B2 GLYCOPROT1 IGG SER-ACNC: <9 GPI IGG UNITS (ref 0–20)
B2 GLYCOPROT1 IGM SER-ACNC: <9 GPI IGM UNITS (ref 0–32)
BUN SERPL-MCNC: 15 MG/DL (ref 8–23)
BUN/CREAT SERPL: 21.7 (ref 7–25)
CALCIUM SPEC-SCNC: 9.1 MG/DL (ref 8.6–10.5)
CARDIOLIPIN IGG SER IA-ACNC: <9 GPL U/ML (ref 0–14)
CARDIOLIPIN IGM SER IA-ACNC: <9 MPL U/ML (ref 0–12)
CHLORIDE SERPL-SCNC: 99 MMOL/L (ref 98–107)
CO2 SERPL-SCNC: 23.1 MMOL/L (ref 22–29)
CREAT SERPL-MCNC: 0.69 MG/DL (ref 0.57–1)
EGFRCR SERPLBLD CKD-EPI 2021: 92.9 ML/MIN/1.73
GLUCOSE SERPL-MCNC: 115 MG/DL (ref 65–99)
POTASSIUM SERPL-SCNC: 4 MMOL/L (ref 3.5–5.2)
PROT S ACT/NOR PPP: 102 % (ref 70–127)
PROT S FREE PPP-ACNC: 112 % (ref 49–138)
SODIUM SERPL-SCNC: 132 MMOL/L (ref 136–145)
URATE SERPL-MCNC: 5.2 MG/DL (ref 2.4–5.7)

## 2024-09-24 PROCEDURE — 73560 X-RAY EXAM OF KNEE 1 OR 2: CPT

## 2024-09-24 PROCEDURE — 25010000002 HYDROMORPHONE PER 4 MG: Performed by: HOSPITALIST

## 2024-09-24 PROCEDURE — 99232 SBSQ HOSP IP/OBS MODERATE 35: CPT | Performed by: NURSE PRACTITIONER

## 2024-09-24 PROCEDURE — 97535 SELF CARE MNGMENT TRAINING: CPT

## 2024-09-24 PROCEDURE — 97166 OT EVAL MOD COMPLEX 45 MIN: CPT

## 2024-09-24 PROCEDURE — 84550 ASSAY OF BLOOD/URIC ACID: CPT | Performed by: NURSE PRACTITIONER

## 2024-09-24 PROCEDURE — 80048 BASIC METABOLIC PNL TOTAL CA: CPT | Performed by: HOSPITALIST

## 2024-09-24 RX ORDER — HYDROMORPHONE HYDROCHLORIDE 1 MG/ML
0.5 INJECTION, SOLUTION INTRAMUSCULAR; INTRAVENOUS; SUBCUTANEOUS
Status: DISCONTINUED | OUTPATIENT
Start: 2024-09-24 | End: 2024-09-24

## 2024-09-24 RX ORDER — HYDROCODONE BITARTRATE AND ACETAMINOPHEN 5; 325 MG/1; MG/1
1 TABLET ORAL EVERY 6 HOURS PRN
Status: DISCONTINUED | OUTPATIENT
Start: 2024-09-24 | End: 2024-10-01 | Stop reason: HOSPADM

## 2024-09-24 RX ORDER — HYDROMORPHONE HYDROCHLORIDE 1 MG/ML
0.5 INJECTION, SOLUTION INTRAMUSCULAR; INTRAVENOUS; SUBCUTANEOUS
Status: DISPENSED | OUTPATIENT
Start: 2024-09-24 | End: 2024-09-29

## 2024-09-24 RX ORDER — ONDANSETRON 2 MG/ML
4 INJECTION INTRAMUSCULAR; INTRAVENOUS EVERY 4 HOURS PRN
Status: DISCONTINUED | OUTPATIENT
Start: 2024-09-24 | End: 2024-10-01 | Stop reason: HOSPADM

## 2024-09-24 RX ORDER — HYDROCODONE BITARTRATE AND ACETAMINOPHEN 5; 325 MG/1; MG/1
1 TABLET ORAL ONCE
Status: COMPLETED | OUTPATIENT
Start: 2024-09-24 | End: 2024-09-24

## 2024-09-24 RX ADMIN — HYDROCODONE BITARTRATE AND ACETAMINOPHEN 1 TABLET: 5; 325 TABLET ORAL at 21:32

## 2024-09-24 RX ADMIN — HYDROCODONE BITARTRATE AND ACETAMINOPHEN 1 TABLET: 5; 325 TABLET ORAL at 05:37

## 2024-09-24 RX ADMIN — SACUBITRIL AND VALSARTAN 1 TABLET: 24; 26 TABLET, FILM COATED ORAL at 09:40

## 2024-09-24 RX ADMIN — HYDROMORPHONE HYDROCHLORIDE 0.5 MG: 1 INJECTION, SOLUTION INTRAMUSCULAR; INTRAVENOUS; SUBCUTANEOUS at 17:33

## 2024-09-24 RX ADMIN — HYDROMORPHONE HYDROCHLORIDE 0.5 MG: 1 INJECTION, SOLUTION INTRAMUSCULAR; INTRAVENOUS; SUBCUTANEOUS at 12:19

## 2024-09-24 RX ADMIN — APIXABAN 10 MG: 5 TABLET, FILM COATED ORAL at 21:28

## 2024-09-24 RX ADMIN — BUPROPION HYDROCHLORIDE 300 MG: 300 TABLET, EXTENDED RELEASE ORAL at 09:40

## 2024-09-24 RX ADMIN — LEVOTHYROXINE SODIUM 50 MCG: 50 TABLET ORAL at 05:37

## 2024-09-24 RX ADMIN — TRAMADOL HYDROCHLORIDE 50 MG: 50 TABLET ORAL at 04:12

## 2024-09-24 RX ADMIN — HYDROCODONE BITARTRATE AND ACETAMINOPHEN 1 TABLET: 5; 325 TABLET ORAL at 10:18

## 2024-09-24 RX ADMIN — SPIRONOLACTONE 25 MG: 25 TABLET, FILM COATED ORAL at 09:40

## 2024-09-24 RX ADMIN — ROSUVASTATIN CALCIUM 40 MG: 40 TABLET, FILM COATED ORAL at 21:28

## 2024-09-24 RX ADMIN — APIXABAN 10 MG: 5 TABLET, FILM COATED ORAL at 09:39

## 2024-09-24 RX ADMIN — SACUBITRIL AND VALSARTAN 1 TABLET: 24; 26 TABLET, FILM COATED ORAL at 21:28

## 2024-09-24 RX ADMIN — TORSEMIDE 20 MG: 20 TABLET ORAL at 09:38

## 2024-09-24 NOTE — PROGRESS NOTES
Name: Barbara Tran ADMIT: 2024   : 1955  PCP: Millicent Ace MD    MRN: [id number] LOS: 3 days   AGE/SEX: 69 y.o. female  ROOM: [facility]     Subjective   Subjective   No new complaints except severe bilateral knee pain. It started 3 AM. She cannot put any weight on it. She states she has never had pain like this before. No history of gout.     Objective   Objective   Vital Signs  Temp:  [97.4 °F (36.3 °C)-98.2 °F (36.8 °C)] 97.6 °F (36.4 °C)  Heart Rate:  [] 84  Resp:  [16] 16  BP: (105-140)/(59-75) 132/75  SpO2:  [94 %-98 %] 97 %  on   ;   Device (Oxygen Therapy): room air  Body mass index is 31.95 kg/m².    Physical Exam  Constitutional:       Appearance: She is not toxic-appearing.      Comments: She looks uncomfortable due to knee pain   HENT:      Head: Normocephalic and atraumatic.   Cardiovascular:      Rate and Rhythm: Normal rate and regular rhythm.   Pulmonary:      Effort: No respiratory distress.      Breath sounds: No wheezing or rales.   Abdominal:      General: Bowel sounds are normal.      Palpations: Abdomen is soft.      Tenderness: There is no abdominal tenderness. There is no guarding or rebound.   Musculoskeletal:      Right knee: Swelling present. No erythema. Tenderness present.      Left knee: Swelling present. No erythema. Tenderness present.      Comments: has ice on knees so can not assess warmth   Skin:     General: Skin is warm and dry.   Neurological:      General: No focal deficit present.      Mental Status: She is alert and oriented to person, place, and time.   Psychiatric:         Mood and Affect: Mood normal.         Behavior: Behavior normal.     Results Review  I reviewed the patient's new clinical results.  Results from last 7 days   Lab Units 24  0311 24  0625 24  0346 24  1728   WBC 10*3/mm3 10.10 10.15 11.27* 11.02*   HEMOGLOBIN g/dL 14.5 15.3 14.8 16.0*   PLATELETS 10*3/mm3 307 317 320 356     Results from last 7 days   Lab Units  "09/24/24  0625 09/23/24  0311 09/22/24  1224 09/20/24  1728   SODIUM mmol/L 132* 136 135* 134*   POTASSIUM mmol/L 4.0 4.3 3.1* 3.5   CHLORIDE mmol/L 99 105 100 99   CO2 mmol/L 23.1 22.0 24.5 21.0*   BUN mg/dL 15 14 13 21   CREATININE mg/dL 0.69 0.70 0.67 0.80   GLUCOSE mg/dL 115* 107* 118* 114*     Lab Results   Component Value Date    ANIONGAP 9.9 09/24/2024     Estimated Creatinine Clearance: 87.4 mL/min (by C-G formula based on SCr of 0.69 mg/dL).   Lab Results   Component Value Date    EGFR 92.9 09/24/2024     Results from last 7 days   Lab Units 09/20/24  1728   ALBUMIN g/dL 4.2   BILIRUBIN mg/dL 0.3   ALK PHOS U/L 97   AST (SGOT) U/L 23   ALT (SGPT) U/L 32     Results from last 7 days   Lab Units 09/24/24 0625 09/23/24 0311 09/22/24  1224 09/20/24  1728   CALCIUM mg/dL 9.1 8.4* 8.9 9.0   ALBUMIN g/dL  --   --   --  4.2   MAGNESIUM mg/dL  --   --  2.2  --        No results found for: \"HGBA1C\", \"POCGLU\"    XR Knee 1 or 2 View Right    Result Date: 9/24/2024   As described.    This report was finalized on 9/24/2024 8:10 AM by Dr. William Mota M.D on Workstation: [id number]       Scheduled Meds  apixaban, 10 mg, Oral, Q12H   Followed by  [START ON 9/30/2024] apixaban, 5 mg, Oral, Q12H  buPROPion XL, 300 mg, Oral, Daily  levothyroxine, 50 mcg, Oral, Q AM  rosuvastatin, 40 mg, Oral, Nightly  sacubitril-valsartan, 1 tablet, Oral, BID  spironolactone, 25 mg, Oral, Daily  torsemide, 20 mg, Oral, Daily    Continuous Infusions     PRN Meds    benzonatate    Calcium Replacement - Follow Nurse / BPA Driven Protocol    Hydrocod Sukhjinder-Chlorphe Sukhjinder ER    HYDROcodone-acetaminophen    HYDROmorphone    Magnesium Standard Dose Replacement - Follow Nurse / BPA Driven Protocol    Phosphorus Replacement - Follow Nurse / BPA Driven Protocol    Potassium Replacement - Follow Nurse / BPA Driven Protocol    sodium chloride    traMADol       Diet  Diet: Cardiac; Healthy Heart (2-3 Na+); Fluid Consistency: Thin (IDDSI 0)    Results " for orders placed during the hospital encounter of 09/20/24    Adult Transthoracic Echo Complete W/ Cont if Necessary Per Protocol    Interpretation Summary    Left ventricular systolic function is normal. Calculated left ventricular EF = 57.3% Left ventricular ejection fraction appears to be 56 - 60%.    Normal right ventricular cavity size, wall thickness and septal motion noted. Borderline low right ventricular systolic function noted. There is a thrombus present in the right ventricle. There is a mobile, threadlike echodensity noted attached in the RV trabecula likely consistent with thrombus    Left ventricular diastolic function was normal.    There is no evidence of acute right heart strain        Assessment/Plan     Active Hospital Problems    Diagnosis  POA    **Pulmonary embolism [I26.99]  Yes    Right ventricular thrombus [I51.3]  Unknown    COVID-19 virus detected [U07.1]  Unknown    Influenza [J11.1]  Unknown    History of pulmonary embolism [Z86.711]  Yes    Chronic diastolic congestive heart failure [I50.32]  Yes    Hypothyroidism [E03.9]  Yes    Chronic coronary artery disease [I25.10]  Yes    Benign essential HTN [I10]  Yes    HLD (hyperlipidemia) [E78.5]  Yes      Resolved Hospital Problems   No resolved problems to display.     69 y.o. female     Acute bilateral PE (recurrent PE)  Left DVT  Pulmonary infarction not excluded on CT scan  RV thrombus  Have switched to apixaban. Now will likely have lifelong anticoagulant  Appreciate hematology- hypercoag workup/outpatient workup  Appreciate pulmonology recommends repeat CT scan in 6 to 8 weeks to ensure clearance of probable pulmonary infarcts (follow-up with [facility])  Echocardiogram no right heart strain but shows RV thrombus  Hemodynamically stable not requiring any oxygen       Bilateral knee pain  Uric acid OK, though does not exclude gout  Minimal knee effusion on right knee x-ray, will check left knee x-ray  Add pain medications, ask ortho to see-  aspiration of effusion? steroids?    Influenza  COVID  Positive for both on 9/8/2024. Antigen negative x 1  Continue supportive care  Not requiring any oxygen  Cough improved with antitussive medication    CAD history of stent  HFpEF EF 56 to 60%  Hypertension BP acceptable  Hyperlipidemia statin  Hypothyroidism levothyroxine  Obesity Body mass index is 31.95 kg/m².     DVT prophylaxis  anticoagulation as above    Discharge  Home tomorrow  Expected Discharge Date: 9/24/2024; Expected Discharge Time:     Discussed with patient and nursing staff    Lambert Foley MD  [facility]  09/24/24

## 2024-09-24 NOTE — PROGRESS NOTES
[facility]  [phone]  Dr. Alphonse Bello    Subjective:  LOS: 3    Chief Complaint:      No acute events overnight.  She seems to be doing well.       Objective   Vital Signs past 24hrs    Temp range: Temp (24hrs), Av.8 °F (36.6 °C), Min:97.4 °F (36.3 °C), Max:98.2 °F (36.8 °C)    BP range: BP: (105-140)/(59-74) 132/71  Pulse range: Heart Rate:  [] 97  Resp rate range: Resp:  [16] 16    Device (Oxygen Therapy): room air   Oxygen range:SpO2:  [94 %-98 %] 98 %      92.5 kg (204 lb); Body mass index is 31.95 kg/m².    Intake/Output Summary (Last 24 hours) at 2024 1139  Last data filed at 2024 0900  Gross per 24 hour   Intake 480 ml   Output --   Net 480 ml         Physical Exam  Constitutional:       Appearance: Normal appearance.   HENT:      Head: Normocephalic and atraumatic.      Nose: Nose normal.      Mouth/Throat:      Mouth: Mucous membranes are moist.      Pharynx: Oropharynx is clear.   Eyes:      Extraocular Movements: Extraocular movements intact.      Conjunctiva/sclera: Conjunctivae normal.   Cardiovascular:      Rate and Rhythm: Normal rate and regular rhythm.      Pulses: Normal pulses.      Heart sounds: Normal heart sounds. No murmur heard.  Pulmonary:      Effort: Pulmonary effort is normal. No respiratory distress.      Breath sounds: Normal breath sounds. No stridor. No wheezing or rales.   Abdominal:      General: Abdomen is flat. Bowel sounds are normal.      Palpations: Abdomen is soft.   Skin:     General: Skin is warm and dry.   Neurological:      General: No focal deficit present.      Mental Status: She is alert and oriented to person, place, and time. Mental status is at baseline.   Psychiatric:         Mood and Affect: Mood normal.         Behavior: Behavior normal.       Results Review:    I have reviewed the laboratory and imaging data since the last note by [facility] physician.  My annotations are noted in assessment and plan.    Lab Results (last 24  hours)       Procedure Component Value Units Date/Time    COVID-19 RAPID AG,VERITOR,COR/PAD/ALANNA/RELL/LAG/LISETTE/ IN-HOUSE,DRY SWAB, 1 HR TAT - Swab, Nasal Cavity [[id number]]  (Normal) Collected: 09/23/24 1213    Specimen: Swab from Nasal Cavity Updated: 09/23/24 1306     COVID19 Presumptive Negative    Narrative:      Fact sheets for providers: https://www.fda.gov/media/379322/download    Fact sheets for patients: https://www.fda.gov/media/603408/download    Basic Metabolic Panel [[id number]]  (Abnormal) Collected: 09/24/24 0625    Specimen: Blood Updated: 09/24/24 0727     Glucose 115 mg/dL      BUN 15 mg/dL      Creatinine 0.69 mg/dL      Sodium 132 mmol/L      Potassium 4.0 mmol/L      Chloride 99 mmol/L      CO2 23.1 mmol/L      Calcium 9.1 mg/dL      BUN/Creatinine Ratio 21.7     Anion Gap 9.9 mmol/L      eGFR 92.9 mL/min/1.73     Narrative:      GFR Normal >60  Chronic Kidney Disease <60  Kidney Failure <15    The GFR formula is only valid for adults with stable renal function between ages 18 and 70.    Uric Acid [[id number]]  (Normal) Collected: 09/24/24 0625    Specimen: Blood Updated: 09/24/24 1001     Uric Acid 5.2 mg/dL           XR Knee 1 or 2 View Right    Result Date: 9/24/2024   As described.    This report was finalized on 9/24/2024 8:10 AM by Dr. William Mota M.D on Workstation: [id number]         Result Review:  I have personally reviewed the results from last note by [facility] physician to 9/24/2024 11:39 EDT and agree with these findings:  [x]  Laboratory list / accordion  [x]  Microbiology  [x]  Radiology  [x]  EKG/Telemetry   [x]  Cardiology/Vascular   []  Pathology  []  Old records  []  Other:    Medication Review:  I have reviewed the current MAR.  My annotations are noted in assessment and plan.    apixaban, 10 mg, Oral, Q12H   Followed by  [START ON 9/30/2024] apixaban, 5 mg, Oral, Q12H  buPROPion XL, 300 mg, Oral, Daily  levothyroxine, 50 mcg, Oral, Q AM  rosuvastatin, 40 mg, Oral,  Nightly  sacubitril-valsartan, 1 tablet, Oral, BID  spironolactone, 25 mg, Oral, Daily  torsemide, 20 mg, Oral, Daily           Lines, Drains & Airways       Active LDAs       Name Placement date Placement time Site Days    Peripheral IV 09/20/24 1822 Right Antecubital 09/20/24 1822  Antecubital  2                  Enhanced Droplet/Contact , Droplet  Diet Orders (active) (From admission, onward)       Start     Ordered    09/21/24 1200  Dietary Nutrition Supplements Boost Breeze (Ensure Clear)  Daily With Lunch & Dinner       09/21/24 1039    09/21/24 0842  Diet: Cardiac; Healthy Heart (2-3 Na+); Fluid Consistency: Thin (IDDSI 0)  Diet Effective Now         09/21/24 0841                  PCCM Problems  Acute PE, recurrent  RV thrombus  Acute left lower extremity DVT  Hx of recent COVID-19 infection  Hx of recent Influenza A infection  History of pulmonary nodules  HFpEF  Hypertension  Hyperlipidemia  CAD  Obesity      Plan of Treatment  - tessalon perles to help with coughing  - Eliquis for PE  - Hematology workup for hypercoagulable state, pending  - PT/OT consulted  - Continue supportive care for COVID and flu, no specific treatment indicated.  - Cardiology consulted for RV thrombus, plan to continue eliquis. No intervention recommended.    Due to her recurrent PE after getting COVID twice now, I would recommend lifelong anticoagulation with Eliquis.  Patient previously followed with Dr. Rodriguez in pulmonary clinic.  Pulmonary perspective, okay for discharge when medically ready per primary team.  I will arrange follow-up with Dr. Rodriguez in our pulmonary clinic.    Alphonse Bello MD  09/24/24  11:39 EDT      Part of this note may be an electronic transcription/translation of spoken language to printed text using the Dragon Dictation System.

## 2024-09-24 NOTE — PROGRESS NOTES
Nutrition Services    Patient Name:  Barbara Tran  YOB: 1955  MRN: [id number]  Admit Date:  9/20/2024    Assessment Date:  09/24/24    CLINICAL NUTRITION - PROGRESS NOTE       Reason for Encounter Follow-up         Nutrition Order Diet: Cardiac; Healthy Heart (2-3 Na+); Fluid Consistency: Thin (IDDSI 0)    Supplements/Snacks Boost Breeze BID    PO Intake 75% x 5 meals         Pertinent Information Bilateral knee pain.  Coughing.  Eating 75% at meals per chart review.        Intervention/Plan Continue Boost Breeze BID.  Continue to encourage PO intake.    Plans for discharge tomorrow.     RD to follow up per protocol.    Electronically signed by:  Leela Salinas RD  09/24/24 15:05 EDT

## 2024-09-24 NOTE — PLAN OF CARE
Goal Outcome Evaluation:  Plan of Care Reviewed With: patient        Progress: no change  Outcome Evaluation: 69 year old female admitted to [facility] with shortness of breath and history of hypertension, hyperlipidemia, CAD, hypothyroidism, CHF, history of PE. Medical work up revealing pt. with B PE and noted LLE DVT. Prior to admission pt. reports living with her daughter who works as a nurse, independent with ADLS. Currently pt. presents with decreased act sanjay, strength, balance and pain limiting independence and participation with self care. Pt was able to complete bed mob mod A, scooting self up in bed with repositioning mod A. Pt. sat EOB and engaged in light grooming before requesting to return to supine. OT will continue to follow and progress pt to address the above deficits and rec DC to SNF at this time      Anticipated Discharge Disposition (OT): skilled nursing facility

## 2024-09-24 NOTE — PROGRESS NOTES
"    Patient Name: Barbara Tran  :1955  69 y.o.      Patient Care Team:  Millicent Ace MD as PCP - General (Family Medicine)  Kin Patel MD as Consulting Physician (Orthopedic Surgery)  Bere Beckford MD as Consulting Physician (Cardiology)  Patrizia Page MD as Consulting Physician (Ophthalmology)    Chief Complaint: follow up RV thrombus    Interval History: c/o bilateral knee pain. Severe. Kind of an acute/abrupt onset. No SOA.        Objective   Vital Signs  Temp:  [97.4 °F (36.3 °C)-98.2 °F (36.8 °C)] 97.4 °F (36.3 °C)  Heart Rate:  [] 97  Resp:  [16] 16  BP: (105-140)/(59-74) 132/71    Intake/Output Summary (Last 24 hours) at 2024 1121  Last data filed at 2024 0900  Gross per 24 hour   Intake 480 ml   Output --   Net 480 ml     Flowsheet Rows      Flowsheet Row First Filed Value   Admission Height 154.9 cm (61\") Documented at 2024 1725   Admission Weight 93.9 kg (207 lb) Documented at 2024 1725            Physical Exam:   General Appearance:    Alert, cooperative, in no acute distress   Lungs:     Clear to auscultation.  Normal respiratory effort and rate.      Heart:    Regular rhythm and normal rate, normal S1 and S2, no murmurs, gallops or rubs.     Chest Wall:    No abnormalities observed   Abdomen:     Soft, nontender, positive bowel sounds.     Extremities:   no cyanosis, clubbing or edema.  No marked joint deformities.  Adequate musculoskeletal strength.       Results Review:    Results from last 7 days   Lab Units 24  0625   SODIUM mmol/L 132*   POTASSIUM mmol/L 4.0   CHLORIDE mmol/L 99   CO2 mmol/L 23.1   BUN mg/dL 15   CREATININE mg/dL 0.69   GLUCOSE mg/dL 115*   CALCIUM mg/dL 9.1     Results from last 7 days   Lab Units 24  1934 24  1728   HSTROP T ng/L 14* 15*     Results from last 7 days   Lab Units 24  0311   WBC 10*3/mm3 10.10   HEMOGLOBIN g/dL 14.5   HEMATOCRIT % 43.2   PLATELETS 10*3/mm3 307     Results from last 7 " days   Lab Units 09/23/24  0853 09/22/24  1224 09/22/24  0626 09/21/24  0346 09/20/24  2153   INR   --   --   --   --  1.18*   APTT seconds 137.4* 90.4* 44.1*   < > 28.6    < > = values in this interval not displayed.     Results from last 7 days   Lab Units 09/22/24  1224   MAGNESIUM mg/dL 2.2                   Medication Review:   apixaban, 10 mg, Oral, Q12H   Followed by  [START ON 9/30/2024] apixaban, 5 mg, Oral, Q12H  buPROPion XL, 300 mg, Oral, Daily  levothyroxine, 50 mcg, Oral, Q AM  rosuvastatin, 40 mg, Oral, Nightly  sacubitril-valsartan, 1 tablet, Oral, BID  spironolactone, 25 mg, Oral, Daily  torsemide, 20 mg, Oral, Daily              Assessment & Plan   COVID19 infection  Influenza   Bilateral pulmonary embolism   Acute LLE DVT  RV thrombus  CAD status post LAD stent     Now on apixaban.    Severe knee pain developed overnight. Uric acid normal.   Cardiac status appears pretty stable.   Knee pain per primary team. Will see as needed. Has appt with MANOHAR 10/1.     MANOHAR Lu  [facility]  09/24/24  11:21 EDT

## 2024-09-24 NOTE — CASE MANAGEMENT/SOCIAL WORK
Discharge Planning Assessment  [facility]     Patient Name: Barbara Tran  MRN: [id number]  Today's Date: 9/24/2024    Admit Date: 9/20/2024    Plan: Home with family to assist.   Discharge Needs Assessment       Row Name 09/24/24 1520       Living Environment    People in Home child(francesco), adult    Name(s) of People in Home Ángela    Current Living Arrangements home    Potentially Unsafe Housing Conditions none    Primary Care Provided by self    Family Caregiver if Needed child(francesco), adult    Quality of Family Relationships involved;helpful    Able to Return to Prior Arrangements yes       Resource/Environmental Concerns    Resource/Environmental Concerns home accessibility    Home Accessibility Concerns stairs to access bedroom or bathroom  3rd floor bedroom       Transition Planning    Patient/Family Anticipates Transition to home with family    Patient/Family Anticipated Services at Transition none    Transportation Anticipated family or friend will provide       Discharge Needs Assessment    Readmission Within the Last 30 Days no previous admission in last 30 days    Equipment Currently Used at Home none    Concerns to be Addressed no discharge needs identified    Anticipated Changes Related to Illness none    Equipment Needed After Discharge none                   Discharge Plan       Row Name 09/24/24 2278       Plan    Plan Home with family to assist.    Patient/Family in Agreement with Plan yes    Plan Comments Spoke with patient daughter Ángela by phone, introduced self, explained CCP role and verified face sheet and pharmacy information. Pt lives with Ángela in 3 level home with 5-6 JOSE, and bedroom on 3rd floor. She is normally IADL's, she uses no medical equipment, has no HH or SNF history. She plans home with dtr to transport, she denies dc needs but is concerned about pt knee pain, she had a torn tendon and was using a walking boot, she states pain is worse, she isn't sure who orthopedic dr was  that has seen her. Updated Dr. Foley of dtr concerns,  CCP will follow - Medina DUNN                  Continued Care and Services - Admitted Since 9/20/2024    No active coordination exists for this encounter.       Expected Discharge Date and Time       Expected Discharge Date Expected Discharge Time    Sep 24, 2024            Demographic Summary       Row Name 09/24/24 1519       General Information    Admission Type inpatient      Row Name 09/24/24 1513       General Information    Admission Type inpatient                   Functional Status       Row Name 09/24/24 1519       Functional Status    Usual Activity Tolerance excellent    Current Activity Tolerance good       Assessment of Health Literacy    Health Literacy Good       Functional Status, IADL    Medications independent    Meal Preparation independent    Housekeeping independent    Laundry independent    Shopping independent       Mental Status Summary    Recent Changes in Mental Status/Cognitive Functioning unable to assess                   Psychosocial    No documentation.                  Abuse/Neglect    No documentation.                  Legal       Row Name 09/24/24 1519       Financial/Legal    Who Manages Finances if Patient Unable dtr                   Substance Abuse    No documentation.                  Patient Forms    No documentation.                     Medina Maehr RN

## 2024-09-24 NOTE — PLAN OF CARE
Goal Outcome Evaluation:            VSS. Pt alert and oriented x4. 0430: pt began to c/o increased pain to the R knee, and noticeable increase of swelling to R knee. Pain noted to increase difficulty of ROM, and ambulating out of bed. Paged covering provider. Provider Josette Ni returned page with new order of xray to R knee and one time dose of Norco for increased pain. Pain medication giver per order. Xray obtained this shift. Pt resting in bed with call light in reach, bed alarm in place. All care continue per plan of care.

## 2024-09-24 NOTE — SIGNIFICANT NOTE
09/24/24 1526   OTHER   Discipline physical therapist   Rehab Time/Intention   Session Not Performed other (see comments)  (Pt with acute onset pain in B knees that is severe. Unable to stand with OT earlier this PM. Ortho consult pending. Discussed with RN who stated pt unable to tolerate PT eval this afternoon; will follow up tomorrow.)   Recommendation   PT - Next Appointment 09/25/24

## 2024-09-24 NOTE — PLAN OF CARE
Pt. VS WNL. C/o persistent bilateral knee pain, improved with IV pain meds per pt. While at rest, still 10/10 pain with movement despite pain meds. Pt. A&O x4. Pt. Unable to stand to get to bedside commode d/t pain. This RN notified MD upon assessment this a.m., PO and IV pain meds ordered and given. Pt. Had xrays of knees and ortho MD consulted as well. Pt. Upset and tearful this a.m. about not being able to get to bedside commode and having to use bedpan. Purewick offered for comfort and to decrease movement d/t increased pain with movement, purewick refused by patient, states does not work for her. Pt. Placed on bedpan at 1400 rounds at 1443, given call light to call after finished voiding.

## 2024-09-24 NOTE — THERAPY EVALUATION
Patient Name: Barbara Tran  : 1955    MRN: [id number]                              Today's Date: 2024       Admit Date: 2024    Visit Dx:     ICD-10-CM ICD-9-CM   1. Bilateral pulmonary embolism  I26.99 415.19     Patient Active Problem List   Diagnosis    Benign essential HTN    Cervical pain    Dysthymia    Pedal edema    HLD (hyperlipidemia)    Goiter, non-toxic    Chronic pain of left knee    Chronic coronary artery disease    Hypothyroidism    History of sepsis    Primary insomnia    Chronic diastolic congestive heart failure    S/P drug eluting coronary stent placement    Pulmonic valve regurgitation    Tricuspid valve regurgitation    Osteoporosis    Hyperglycemia    History of pulmonary embolism    Balance problem    Pulmonary embolism    COVID-19 virus detected    Influenza    Right ventricular thrombus     Past Medical History:   Diagnosis Date    Allergic 2015    codeine, morphine, levaquin    Arthritis l5 years or so ago    Cataract 6-9 months ago    Need surgery    CHF (congestive heart failure)     Coronary artery disease 2005 stent    COVID-19 virus detected 03/10/2021    Deep vein thrombosis 2021    Pulmonary embolism    Depression     Disease of thyroid gland     Headache Covid 3-6-21    Has continued    History of pulmonary embolism 2021    HL (hearing loss) Last 4-6 months    Hyperlipidemia     Hypertension since     Hypothyroidism since     Obesity     Osteopenia last few years    Pulmonary embolism 2021    From Covid    Unstable angina 2024     Past Surgical History:   Procedure Laterality Date    BUNIONECTOMY Bilateral     HAMMERT TOE REPAIR/BUNIONECTOMY    CARDIAC CATHETERIZATION  ,     CARDIAC CATHETERIZATION N/A 2024    Procedure: Coronary angiography;  Surgeon: Cong Rivera MD;  Location: [facility] CATH INVASIVE LOCATION;  Service: Cardiovascular;  Laterality: N/A;    CARDIAC CATHETERIZATION N/A 2024    Procedure: Left  Heart Cath;  Surgeon: Cong Rivera MD;  Location:  [facility] CATH INVASIVE LOCATION;  Service: Cardiovascular;  Laterality: N/A;    CARDIAC CATHETERIZATION N/A 4/29/2024    Procedure: Left ventriculography;  Surgeon: Cong Rivera MD;  Location:  [facility] CATH INVASIVE LOCATION;  Service: Cardiovascular;  Laterality: N/A;    CATARACT EXTRACTION      CHOLECYSTECTOMY  1995    COLONOSCOPY N/A 12/19/2016    Procedure: COLONOSCOPY WITH COLD BIOPSIES;  Surgeon: Medina Guillen MD;  Location: [facility] ENDOSCOPY;  Service:     CORONARY STENT PLACEMENT  2005    LAD 80% blockage    GANGLION CYST EXCISION      R WRIST    SUBTOTAL HYSTERECTOMY  2009    TONSILLECTOMY  1965    TUBAL ABDOMINAL LIGATION  1985      General Information       Row Name 09/24/24 1512          OT Time and Intention    Document Type evaluation  -AG     Mode of Treatment occupational therapy  -AG       Row Name 09/24/24 1512          General Information    Patient Profile Reviewed yes  -AG     Prior Level of Function independent:;ADL's  -AG     Existing Precautions/Restrictions fall  -AG     Barriers to Rehab medically complex  -AG       Row Name 09/24/24 1512          Living Environment    People in Home child(francesco), adult  -AG       Row Name 09/24/24 1512          Cognition    Orientation Status (Cognition) oriented x 3  -AG       Row Name 09/24/24 1512          Safety Issues, Functional Mobility    Safety Issues Affecting Function (Mobility) safety precaution awareness  -AG     Impairments Affecting Function (Mobility) balance;endurance/activity tolerance;strength;pain  -AG               User Key  (r) = Recorded By, (t) = Taken By, (c) = Cosigned By      Initials Name Provider Type    AG David Chau OT Occupational Therapist                     Mobility/ADL's       Row Name 09/24/24 1514          Bed Mobility    Bed Mobility supine-sit;scooting/bridging;sit-supine  -AG     Scooting/Bridging [place] (Bed Mobility) moderate assist (50% patient  effort);maximum assist (25% patient effort)  -AG     Supine-Sit [place] (Bed Mobility) moderate assist (50% patient effort)  -AG     Sit-Supine [place] (Bed Mobility) moderate assist (50% patient effort)  -AG     Assistive Device (Bed Mobility) bed rails;head of bed elevated;draw sheet  -AG     Comment, (Bed Mobility) excess time and assist  -AG       Row Name 09/24/24 1514          Transfers    Comment, (Transfers) unable to progress 2/2 pain  -AG       Row Name 09/24/24 1514          Activities of Daily Living    BADL Assessment/Intervention grooming;upper body dressing  -AG       Row Name 09/24/24 1514          Grooming Assessment/Training    [place] Level (Grooming) grooming skills;hair care, combing/brushing;wash face, hands;set up  -AG     Position (Grooming) edge of bed sitting  -AG       Row Name 09/24/24 1514          Upper Body Dressing Assessment/Training    [place] Level (Upper Body Dressing) upper body dressing skills;don;front opening garment;set up  -AG               User Key  (r) = Recorded By, (t) = Taken By, (c) = Cosigned By      Initials Name Provider Type    AG David Chau OT Occupational Therapist                   Obj/Interventions       Row Name 09/24/24 1515          Sensory Assessment (Somatosensory)    Sensory Assessment (Somatosensory) sensation intact  -AG       Row Name 09/24/24 1515          Vision Assessment/Intervention    Visual Impairment/Limitations WFL  -AG       Row Name 09/24/24 1515          Range of Motion Comprehensive    General Range of Motion no range of motion deficits identified  -AG       Row Name 09/24/24 1515          Strength Comprehensive (MMT)    General Manual Muscle Testing (MMT) Assessment no strength deficits identified  -AG       Row Name 09/24/24 1515          Motor Skills    Motor Skills functional endurance  -AG     Functional Endurance poor  -AG       Row Name 09/24/24 1515          Balance    Balance Assessment sitting static  balance;sitting dynamic balance  -AG     Static Sitting Balance standby assist  -AG     Dynamic Sitting Balance contact guard  -AG     Position, Sitting Balance unsupported;sitting edge of bed  -AG     Balance Interventions sitting;standing  -AG               User Key  (r) = Recorded By, (t) = Taken By, (c) = Cosigned By      Initials Name Provider Type    AG David Chau, OT Occupational Therapist                   Goals/Plan       Row Name 09/24/24 1523          Bed Mobility Goal 1 (OT)    Activity/Assistive Device (Bed Mobility Goal 1, OT) bed mobility activities, all  -AG     [place] Level/Cues Needed (Bed Mobility Goal 1, OT) modified independence  -AG     Time Frame (Bed Mobility Goal 1, OT) short term goal (STG);2 weeks  -AG       Row Name 09/24/24 1523          Transfer Goal 1 (OT)    Activity/Assistive Device (Transfer Goal 1, OT) transfers, all  -AG     [place] Level/Cues Needed (Transfer Goal 1, OT) modified independence  -AG     Time Frame (Transfer Goal 1, OT) short term goal (STG);2 weeks  -AG     Progress/Outcome (Transfer Goal 1, OT) new goal  -AG       Row Name 09/24/24 1523          Dressing Goal 1 (OT)    Activity/Device (Dressing Goal 1, OT) dressing skills, all;upper body dressing;lower body dressing  -AG     [place]/Cues Needed (Dressing Goal 1, OT) modified independence  -AG     Time Frame (Dressing Goal 1, OT) short term goal (STG);2 weeks  -AG     Progress/Outcome (Dressing Goal 1, OT) new goal  -AG       Row Name 09/24/24 1523          Toileting Goal 1 (OT)    Activity/Device (Toileting Goal 1, OT) toileting skills, all  -AG     [place] Level/Cues Needed (Toileting Goal 1, OT) modified independence  -AG     Time Frame (Toileting Goal 1, OT) short term goal (STG);2 weeks  -AG     Progress/Outcome (Toileting Goal 1, OT) new goal  -AG       Row Name 09/24/24 1523          Grooming Goal 1 (OT)    Activity/Device (Grooming Goal 1, OT) grooming skills, all  -AG      [place] (Grooming Goal 1, OT) modified independence  -AG     Time Frame (Grooming Goal 1, OT) short term goal (STG);2 weeks  -AG     Progress/Outcome (Grooming Goal 1, OT) new goal  -AG       Row Name 09/24/24 1523          Therapy Assessment/Plan (OT)    Planned Therapy Interventions (OT) activity tolerance training;BADL retraining;functional balance retraining;occupation/activity based interventions;patient/caregiver education/training;strengthening exercise;transfer/mobility retraining  -AG               User Key  (r) = Recorded By, (t) = Taken By, (c) = Cosigned By      Initials Name Provider Type    AG David Chau, VALENCIA Occupational Therapist                   Clinical Impression       Row Name 09/24/24 1516          Pain Assessment    Pretreatment Pain Rating 10/10  -AG     Posttreatment Pain Rating 10/10  -AG     Pain Location - Side/Orientation Bilateral  -AG     Pain Location - knee  -AG     Pre/Posttreatment Pain Comment c/o dull/achy pain to B knees  -AG     Pain Intervention(s) Repositioned;Rest  -AG       Row Name 09/24/24 7707          Plan of Care Review    Plan of Care Reviewed With patient  -AG     Progress no change  -AG     Outcome Evaluation 69 year old female admitted to [facility] with shortness of breath and history of hypertension, hyperlipidemia, CAD, hypothyroidism, CHF, history of PE. Medical work up revealing pt. with B PE and noted LLE DVT. Prior to admission pt. reports living with her daughter who works as a nurse, independent with ADLS. Currently pt. presents with decreased act sanjay, strength, balance and pain limiting independence and participation with self care. Pt was able to complete bed mob mod A, scooting self up in bed with repositioning mod A. Pt. sat EOB and engaged in light grooming before requesting to return to supine. OT will continue to follow and progress pt to address the above deficits and rec DC to SNF at this time  -AG       Row Name 09/24/24 0215          Therapy  Assessment/Plan (OT)    Rehab Potential (OT) good, to achieve stated therapy goals  -AG     Criteria for Skilled Therapeutic Interventions Met (OT) yes;meets criteria;skilled treatment is necessary  -AG     Therapy Frequency (OT) 5 times/wk  -AG       Row Name 09/24/24 1515          Therapy Plan Review/Discharge Plan (OT)    Anticipated Discharge Disposition (OT) skilled nursing facility  -AG       Row Name 09/24/24 1515          Positioning and Restraints    Pre-Treatment Position in bed  -AG     Post Treatment Position bed  -AG     In Bed notified nsg;call light within reach;encouraged to call for assist;exit alarm on;fowlers  -AG               User Key  (r) = Recorded By, (t) = Taken By, (c) = Cosigned By      Initials Name Provider Type    David Martinez, VALENCIA Occupational Therapist                   Outcome Measures       Row Name 09/24/24 1523          How much help from another is currently needed...    Putting on and taking off regular lower body clothing? 1  -AG     Bathing (including washing, rinsing, and drying) 2  -AG     Toileting (which includes using toilet bed pan or urinal) 1  -AG     Putting on and taking off regular upper body clothing 3  -AG     Taking care of personal grooming (such as brushing teeth) 3  -AG     Eating meals 4  -AG     AM-PAC 6 Clicks Score (OT) 14  -AG       Row Name 09/24/24 0936          How much help from another person do you currently need...    Turning from your back to your side while in flat bed without using bedrails? 4  -CW     Moving from lying on back to sitting on the side of a flat bed without bedrails? 4  -CW     Moving to and from a bed to a chair (including a wheelchair)? 4  -CW     Standing up from a chair using your arms (e.g., wheelchair, bedside chair)? 4  -CW     Climbing 3-5 steps with a railing? 3  -CW     To walk in hospital room? 3  -CW     AM-PAC 6 Clicks Score (PT) 22  -CW     Highest Level of Mobility Goal 7 --> Walk 25 feet or more  -CW        Row Name 09/24/24 1523          Functional Assessment    Outcome Measure Options AM-PAC 6 Clicks Daily Activity (OT)  -               User Key  (r) = Recorded By, (t) = Taken By, (c) = Cosigned By      Initials Name Provider Type    CW Carol Johnson, RN Registered Nurse    David Martinez OT Occupational Therapist                    Occupational Therapy Education       Title: PT OT SLP Therapies (In Progress)       Topic: Occupational Therapy (Done)       Point: ADL training (Done)       Description:   Instruct learner(s) on proper safety adaptation and remediation techniques during self care or transfers.   Instruct in proper use of assistive devices.                  Learning Progress Summary             Patient Acceptance, E,TB, VU by  at 9/24/2024 1523                         Point: Home exercise program (Done)       Description:   Instruct learner(s) on appropriate technique for monitoring, assisting and/or progressing therapeutic exercises/activities.                  Learning Progress Summary             Patient Acceptance, E,TB, VU by  at 9/24/2024 1523                         Point: Precautions (Done)       Description:   Instruct learner(s) on prescribed precautions during self-care and functional transfers.                  Learning Progress Summary             Patient Acceptance, E,TB, VU by  at 9/24/2024 1523                         Point: Body mechanics (Done)       Description:   Instruct learner(s) on proper positioning and spine alignment during self-care, functional mobility activities and/or exercises.                  Learning Progress Summary             Patient Acceptance, E,TB, VU by  at 9/24/2024 1523                                         User Key       Initials Effective Dates Name Provider Type [facility] 01/23/24 -  David Cahu OT Occupational Therapist OT                  OT Recommendation and Plan  Planned Therapy Interventions (OT): activity tolerance  training, BADL retraining, functional balance retraining, occupation/activity based interventions, patient/caregiver education/training, strengthening exercise, transfer/mobility retraining  Therapy Frequency (OT): 5 times/wk  Plan of Care Review  Plan of Care Reviewed With: patient  Progress: no change  Outcome Evaluation: 69 year old female admitted to [facility] with shortness of breath and history of hypertension, hyperlipidemia, CAD, hypothyroidism, CHF, history of PE. Medical work up revealing pt. with B PE and noted LLE DVT. Prior to admission pt. reports living with her daughter who works as a nurse, independent with ADLS. Currently pt. presents with decreased act sanjay, strength, balance and pain limiting independence and participation with self care. Pt was able to complete bed mob mod A, scooting self up in bed with repositioning mod A. Pt. sat EOB and engaged in light grooming before requesting to return to supine. OT will continue to follow and progress pt to address the above deficits and rec DC to SNF at this time     Time Calculation:   Evaluation Complexity (OT)  Review Occupational Profile/Medical/Therapy History Complexity: expanded/moderate complexity  Clinical Decision Making Complexity (OT): detailed assessment/moderate complexity  Overall Complexity of Evaluation (OT): moderate complexity     Time Calculation- OT       Row Name 09/24/24 1524             Time Calculation- OT    OT Start Time 1315  -AG      OT Stop Time 1330  -AG      OT Time Calculation (min) 15 min  -AG      Total Timed Code Minutes- OT 10 minute(s)  -AG      OT Received On 09/24/24  -AG      OT - Next Appointment 09/25/24  -AG      OT Goal Re-Cert Due Date 10/08/24  -AG         Timed Charges    56999 - OT Self Care/Mgmt Minutes 10  -AG         Untimed Charges    OT Eval/Re-eval Minutes 5  -AG         Total Minutes    Timed Charges Total Minutes 10  -AG      Untimed Charges Total Minutes 5  -AG       Total Minutes 15  -AG                 User Key  (r) = Recorded By, (t) = Taken By, (c) = Cosigned By      Initials Name Provider Type     David Chau OT Occupational Therapist                  Therapy Charges for Today       Code Description Service Date Service Provider Modifiers Qty    [id number]  OT SELF CARE/MGMT/TRAIN EA 15 MIN 9/24/2024 David Chau, OT GO 1    [id number]  OT EVAL MOD COMPLEXITY 2 9/24/2024 David Chau OT GO 1                 David Chau OT  9/24/2024

## 2024-09-25 ENCOUNTER — APPOINTMENT (OUTPATIENT)
Dept: GENERAL RADIOLOGY | Facility: HOSPITAL | Age: 69
End: 2024-09-25
Payer: MEDICARE

## 2024-09-25 LAB
ANION GAP SERPL CALCULATED.3IONS-SCNC: 12 MMOL/L (ref 5–15)
APPEARANCE FLD: ABNORMAL
APPEARANCE FLD: ABNORMAL
APTT SCREEN TO CONFIRM RATIO: 1.04 RATIO (ref 0–1.34)
BUN SERPL-MCNC: 13 MG/DL (ref 8–23)
BUN/CREAT SERPL: 22 (ref 7–25)
CALCIUM SPEC-SCNC: 9.2 MG/DL (ref 8.6–10.5)
CHLORIDE SERPL-SCNC: 98 MMOL/L (ref 98–107)
CO2 SERPL-SCNC: 21 MMOL/L (ref 22–29)
COLOR FLD: ABNORMAL
COLOR FLD: YELLOW
CONFIRM APTT/NORMAL: 48.2 SEC (ref 0–47.6)
CREAT SERPL-MCNC: 0.59 MG/DL (ref 0.57–1)
CRYSTALS FLD MICRO: NORMAL
CRYSTALS FLD MICRO: NORMAL
DRVVT SCREEN TO CONFIRM RATIO: 1.1 RATIO (ref 0.8–1.2)
EGFRCR SERPLBLD CKD-EPI 2021: 96.5 ML/MIN/1.73
GLUCOSE SERPL-MCNC: 119 MG/DL (ref 65–99)
LA 2 SCREEN W REFLEX-IMP: ABNORMAL
LYMPHOCYTES NFR FLD MANUAL: 1 %
LYMPHOCYTES NFR FLD MANUAL: 3 %
METHOD: ABNORMAL
METHOD: ABNORMAL
MIXING DRVVT: 41.4 SEC (ref 0–40.4)
MONOCYTES NFR FLD: 1 %
MONOCYTES NFR FLD: 3 %
NEUTROPHILS NFR FLD MANUAL: 94 %
NEUTROPHILS NFR FLD MANUAL: 98 %
NUC CELL # FLD: ABNORMAL /MM3
NUC CELL # FLD: ABNORMAL /MM3
POTASSIUM SERPL-SCNC: 4 MMOL/L (ref 3.5–5.2)
RBC # FLD AUTO: 60 /MM3
RBC # FLD AUTO: 8200 /MM3
SCREEN APTT: 38.4 SEC (ref 0–43.5)
SCREEN DRVVT: 51.4 SEC (ref 0–47)
SODIUM SERPL-SCNC: 131 MMOL/L (ref 136–145)
THROMBIN TIME: >150 SEC (ref 0–23)
TT IMM NP PPP: 149.1 SEC (ref 0–23)
TT P HPASE PPP: 23.7 SEC (ref 0–23)

## 2024-09-25 PROCEDURE — 3E0U3BZ INTRODUCTION OF ANESTHETIC AGENT INTO JOINTS, PERCUTANEOUS APPROACH: ICD-10-PCS | Performed by: RADIOLOGY

## 2024-09-25 PROCEDURE — 87015 SPECIMEN INFECT AGNT CONCNTJ: CPT | Performed by: ORTHOPAEDIC SURGERY

## 2024-09-25 PROCEDURE — 25010000002 ONDANSETRON PER 1 MG: Performed by: NURSE PRACTITIONER

## 2024-09-25 PROCEDURE — 89051 BODY FLUID CELL COUNT: CPT | Performed by: ORTHOPAEDIC SURGERY

## 2024-09-25 PROCEDURE — 0S9D3ZX DRAINAGE OF LEFT KNEE JOINT, PERCUTANEOUS APPROACH, DIAGNOSTIC: ICD-10-PCS | Performed by: RADIOLOGY

## 2024-09-25 PROCEDURE — 25010000002 BUPIVACAINE (PF) 0.25 % SOLUTION: Performed by: NURSE PRACTITIONER

## 2024-09-25 PROCEDURE — 77002 NEEDLE LOCALIZATION BY XRAY: CPT

## 2024-09-25 PROCEDURE — 25010000002 LIDOCAINE 1 % SOLUTION: Performed by: NURSE PRACTITIONER

## 2024-09-25 PROCEDURE — 87070 CULTURE OTHR SPECIMN AEROBIC: CPT | Performed by: ORTHOPAEDIC SURGERY

## 2024-09-25 PROCEDURE — 99222 1ST HOSP IP/OBS MODERATE 55: CPT | Performed by: ORTHOPAEDIC SURGERY

## 2024-09-25 PROCEDURE — 87075 CULTR BACTERIA EXCEPT BLOOD: CPT | Performed by: ORTHOPAEDIC SURGERY

## 2024-09-25 PROCEDURE — 80048 BASIC METABOLIC PNL TOTAL CA: CPT | Performed by: HOSPITALIST

## 2024-09-25 PROCEDURE — 25510000001 IOPAMIDOL 61 % SOLUTION: Performed by: NURSE PRACTITIONER

## 2024-09-25 PROCEDURE — 89060 EXAM SYNOVIAL FLUID CRYSTALS: CPT | Performed by: ORTHOPAEDIC SURGERY

## 2024-09-25 PROCEDURE — 25010000002 METHYLPREDNISOLONE PER 80 MG: Performed by: NURSE PRACTITIONER

## 2024-09-25 PROCEDURE — 0S9C3ZX DRAINAGE OF RIGHT KNEE JOINT, PERCUTANEOUS APPROACH, DIAGNOSTIC: ICD-10-PCS | Performed by: RADIOLOGY

## 2024-09-25 PROCEDURE — 87205 SMEAR GRAM STAIN: CPT | Performed by: ORTHOPAEDIC SURGERY

## 2024-09-25 PROCEDURE — 3E0U33Z INTRODUCTION OF ANTI-INFLAMMATORY INTO JOINTS, PERCUTANEOUS APPROACH: ICD-10-PCS | Performed by: RADIOLOGY

## 2024-09-25 RX ORDER — LIDOCAINE HYDROCHLORIDE 10 MG/ML
10 INJECTION, SOLUTION INFILTRATION; PERINEURAL ONCE
Status: COMPLETED | OUTPATIENT
Start: 2024-09-25 | End: 2024-09-25

## 2024-09-25 RX ORDER — METHYLPREDNISOLONE ACETATE 80 MG/ML
80 INJECTION, SUSPENSION INTRA-ARTICULAR; INTRALESIONAL; INTRAMUSCULAR; SOFT TISSUE ONCE
Status: COMPLETED | OUTPATIENT
Start: 2024-09-25 | End: 2024-09-25

## 2024-09-25 RX ORDER — IOPAMIDOL 612 MG/ML
50 INJECTION, SOLUTION INTRAVASCULAR
Status: COMPLETED | OUTPATIENT
Start: 2024-09-25 | End: 2024-09-25

## 2024-09-25 RX ORDER — BUPIVACAINE HYDROCHLORIDE 2.5 MG/ML
10 INJECTION, SOLUTION EPIDURAL; INFILTRATION; INTRACAUDAL ONCE
Status: COMPLETED | OUTPATIENT
Start: 2024-09-25 | End: 2024-09-25

## 2024-09-25 RX ADMIN — LEVOTHYROXINE SODIUM 50 MCG: 50 TABLET ORAL at 05:00

## 2024-09-25 RX ADMIN — METHYLPREDNISOLONE ACETATE 80 MG: 80 INJECTION, SUSPENSION INTRA-ARTICULAR; INTRALESIONAL; INTRAMUSCULAR; SOFT TISSUE at 15:05

## 2024-09-25 RX ADMIN — HYDROCODONE BITARTRATE AND ACETAMINOPHEN 1 TABLET: 5; 325 TABLET ORAL at 04:55

## 2024-09-25 RX ADMIN — HYDROCODONE BITARTRATE AND ACETAMINOPHEN 1 TABLET: 5; 325 TABLET ORAL at 23:11

## 2024-09-25 RX ADMIN — BENZONATATE 200 MG: 100 CAPSULE ORAL at 16:42

## 2024-09-25 RX ADMIN — LIDOCAINE HYDROCHLORIDE 5 ML: 10 INJECTION, SOLUTION INFILTRATION; PERINEURAL at 15:22

## 2024-09-25 RX ADMIN — IOPAMIDOL 5 ML: 612 INJECTION, SOLUTION INTRAVENOUS at 15:05

## 2024-09-25 RX ADMIN — BUPIVACAINE HYDROCHLORIDE 5 ML: 2.5 INJECTION, SOLUTION EPIDURAL; INFILTRATION; INTRACAUDAL at 15:05

## 2024-09-25 RX ADMIN — ONDANSETRON 4 MG: 2 INJECTION, SOLUTION INTRAMUSCULAR; INTRAVENOUS at 23:11

## 2024-09-25 RX ADMIN — ONDANSETRON 4 MG: 2 INJECTION, SOLUTION INTRAMUSCULAR; INTRAVENOUS at 04:48

## 2024-09-25 RX ADMIN — SACUBITRIL AND VALSARTAN 1 TABLET: 24; 26 TABLET, FILM COATED ORAL at 08:26

## 2024-09-25 RX ADMIN — APIXABAN 10 MG: 5 TABLET, FILM COATED ORAL at 21:08

## 2024-09-25 RX ADMIN — BUPROPION HYDROCHLORIDE 300 MG: 300 TABLET, EXTENDED RELEASE ORAL at 08:26

## 2024-09-25 RX ADMIN — TORSEMIDE 20 MG: 20 TABLET ORAL at 08:26

## 2024-09-25 RX ADMIN — LIDOCAINE HYDROCHLORIDE 4 ML: 10 INJECTION, SOLUTION INFILTRATION; PERINEURAL at 15:04

## 2024-09-25 RX ADMIN — SPIRONOLACTONE 25 MG: 25 TABLET, FILM COATED ORAL at 08:26

## 2024-09-25 RX ADMIN — APIXABAN 10 MG: 5 TABLET, FILM COATED ORAL at 08:27

## 2024-09-25 RX ADMIN — BENZONATATE 200 MG: 100 CAPSULE ORAL at 04:55

## 2024-09-25 RX ADMIN — SACUBITRIL AND VALSARTAN 1 TABLET: 24; 26 TABLET, FILM COATED ORAL at 21:08

## 2024-09-25 RX ADMIN — ONDANSETRON 4 MG: 2 INJECTION, SOLUTION INTRAMUSCULAR; INTRAVENOUS at 13:43

## 2024-09-25 RX ADMIN — HYDROCODONE BITARTRATE AND ACETAMINOPHEN 1 TABLET: 5; 325 TABLET ORAL at 16:42

## 2024-09-25 RX ADMIN — ROSUVASTATIN CALCIUM 40 MG: 40 TABLET, FILM COATED ORAL at 21:08

## 2024-09-25 RX ADMIN — HYDROCODONE BITARTRATE AND ACETAMINOPHEN 1 TABLET: 5; 325 TABLET ORAL at 10:57

## 2024-09-25 NOTE — PLAN OF CARE
"Goal Outcome Evaluation:         VSS. Pt alert and oriented x4, continues with difficulty ambulating out of bed and transferring to bedside commode r/t R knee pain. Pt c/o increased pain overnight, gave prn pain medications as ordered. Paged on call provider as pt stated that increase use of pain medications have began to make her nauseous. New order for zofran placed. Pt stated this morning that she had a \"restful night\" and \"was able to get some sleep\". Updated daughter this shift on status of pt. Daughter requests phone call when provider comes to bedside to speak with pt. Pt resting in bed with call light in reach and bed alarm in place. All care continued per plan of care.   "

## 2024-09-25 NOTE — CONSULTS
Orthopedic Consult      Patient: Barbara Tran    Date of Admission: 9/20/2024  5:30 PM    YOB: 1955    Medical Record Number: [id number]    Consulting Physician: Rocky Chavez MD    Chief Complaints: Bilateral knee pain and swelling    History of Present Illness: 69 y.o. female admitted to [facility] to services of Rocky Chavez MD. patient had to be admitted that she had acute bilateral PEs.  Left DVT.  She is now on apixaban.  States 2 days ago she woke up was having knee pain.  Has a history of knee pain and received steroid injections in the past with that was some 10 years ago.  She has generalized knee issues however they were quite severe and difficulty to bear weight ambulate due to the pain and swelling.    Allergies:   Allergies   Allergen Reactions    Codeine Dizziness     lightheaded    Levofloxacin Itching    Morphine Itching       Home Medications:    Current Facility-Administered Medications:     apixaban (ELIQUIS) tablet 10 mg, 10 mg, Oral, Q12H, 10 mg at 09/24/24 2128 **FOLLOWED BY** [START ON 9/30/2024] apixaban (ELIQUIS) tablet 5 mg, 5 mg, Oral, Q12H, Lambert Foley MD    benzonatate (TESSALON) capsule 200 mg, 200 mg, Oral, Q4H PRN, Alphonse Bello MD, 200 mg at 09/25/24 0455    buPROPion XL (WELLBUTRIN XL) 24 hr tablet 300 mg, 300 mg, Oral, Daily, Sarah Powell, APRN, 300 mg at 09/24/24 0940    Calcium Replacement - Follow Nurse / BPA Driven Protocol, , Does not apply, PRN, Lambert Foley MD    Hydrocod Sukhjinder-Chlorphe Sukhjinder ER (TUSSIONEX PENNKINETIC) 10-8 MG/5ML ER suspension 5 mL, 5 mL, Oral, Q12H PRN, Lambert Foley MD, 5 mL at 09/23/24 2326    HYDROcodone-acetaminophen (NORCO) 5-325 MG per tablet 1 tablet, 1 tablet, Oral, Q6H PRN, Lambert Foley MD, 1 tablet at 09/25/24 0455    HYDROmorphone (DILAUDID) injection 0.5 mg, 0.5 mg, Intravenous, Q2H PRN, Lambert Foley MD, 0.5 mg at 09/24/24 8358    levothyroxine (SYNTHROID, LEVOTHROID)  tablet 50 mcg, 50 mcg, Oral, Q AM, Sarah Powell APRN, 50 mcg at 09/25/24 0500    Magnesium Standard Dose Replacement - Follow Nurse / BPA Driven Protocol, , Does not apply, Alaina ARCE Minh Loc, MD    ondansetron (ZOFRAN) injection 4 mg, 4 mg, Intravenous, Q4H PRN, Ivan Howell APRN, 4 mg at 09/25/24 0448    Phosphorus Replacement - Follow Nurse / BPA Driven Protocol, , Does not apply, Alaina ARCE Minh Loc, MD    Potassium Replacement - Follow Nurse / BPA Driven Protocol, , Does not apply, Alaina ARCE Minh Loc, MD    rosuvastatin (CRESTOR) tablet 40 mg, 40 mg, Oral, Nightly, Sarah Powell APRN, 40 mg at 09/24/24 2128    sacubitril-valsartan (ENTRESTO) 24-26 MG tablet 1 tablet, 1 tablet, Oral, BID, Sarah Powell APRN, 1 tablet at 09/24/24 2128    sodium chloride 0.9 % flush 10 mL, 10 mL, Intravenous, PRN, Reji Daniel II, MD    spironolactone (ALDACTONE) tablet 25 mg, 25 mg, Oral, Daily, Sarah Powell APRN, 25 mg at 09/24/24 0940    torsemide (DEMADEX) tablet 20 mg, 20 mg, Oral, Daily, Sarah Powell APRN, 20 mg at 09/24/24 0938    traMADol (ULTRAM) tablet 50 mg, 50 mg, Oral, Q6H PRN, Sarah Powell APRN, 50 mg at 09/24/24 0412    Current Medications:  Scheduled Meds:apixaban, 10 mg, Oral, Q12H   Followed by  [START ON 9/30/2024] apixaban, 5 mg, Oral, Q12H  buPROPion XL, 300 mg, Oral, Daily  levothyroxine, 50 mcg, Oral, Q AM  rosuvastatin, 40 mg, Oral, Nightly  sacubitril-valsartan, 1 tablet, Oral, BID  spironolactone, 25 mg, Oral, Daily  torsemide, 20 mg, Oral, Daily      Continuous Infusions:   PRN Meds:.  benzonatate    Calcium Replacement - Follow Nurse / BPA Driven Protocol    Hydrocod Sukhjinder-Chlorphe Sukhjinder ER    HYDROcodone-acetaminophen    HYDROmorphone    Magnesium Standard Dose Replacement - Follow Nurse / BPA Driven Protocol    ondansetron    Phosphorus Replacement - Follow Nurse / BPA Driven Protocol    Potassium Replacement - Follow Nurse / BPA  Driven Protocol    sodium chloride    traMADol    Past Medical History:   Diagnosis Date    Allergic 2015    codeine, morphine, levaquin    Arthritis l5 years or so ago    Cataract 6-9 months ago    Need surgery    CHF (congestive heart failure)     Coronary artery disease 2005 stent    COVID-19 virus detected 03/10/2021    Deep vein thrombosis 06/03/2021    Pulmonary embolism    Depression     Disease of thyroid gland     Headache Covid 3-6-21    Has continued    History of pulmonary embolism 06/01/2021    HL (hearing loss) Last 4-6 months    Hyperlipidemia     Hypertension since 2005    Hypothyroidism since 2012    Obesity     Osteopenia last few years    Pulmonary embolism 06/03/2021    From Covid    Unstable angina 04/16/2024       Past Surgical History:   Procedure Laterality Date    BUNIONECTOMY Bilateral     HAMMERT TOE REPAIR/BUNIONECTOMY    CARDIAC CATHETERIZATION  2015, 2018    CARDIAC CATHETERIZATION N/A 4/29/2024    Procedure: Coronary angiography;  Surgeon: Cong Rivera MD;  Location:  [facility];  Service: Cardiovascular;  Laterality: N/A;    CARDIAC CATHETERIZATION N/A 4/29/2024    Procedure: Left Heart Cath;  Surgeon: Cong Rivrea MD;  Location:  [facility];  Service: Cardiovascular;  Laterality: N/A;    CARDIAC CATHETERIZATION N/A 4/29/2024    Procedure: Left ventriculography;  Surgeon: Cong Rivera MD;  Location:  [facility];  Service: Cardiovascular;  Laterality: N/A;    CATARACT EXTRACTION      CHOLECYSTECTOMY  1995    COLONOSCOPY N/A 12/19/2016    Procedure: COLONOSCOPY WITH COLD BIOPSIES;  Surgeon: Medina Guillen MD;  Location[facility];  Service:     CORONARY STENT PLACEMENT  2005    LAD 80% blockage    GANGLION CYST EXCISION      R WRIST    SUBTOTAL HYSTERECTOMY  2009    TONSILLECTOMY  1965    TUBAL ABDOMINAL LIGATION  1985       Social History     Occupational History    Occupation: interior design   Tobacco Use     Smoking status: Never     Passive exposure: Never    Smokeless tobacco: Never    Tobacco comments:     N/A   Vaping Use    Vaping status: Never Used   Substance and Sexual Activity    Alcohol use: Yes     Alcohol/week: 2.0 standard drinks of alcohol     Types: 1 Glasses of wine, 1 Cans of beer per week     Comment: socially    Drug use: Never    Sexual activity: Not Currently     Partners: Male     Birth control/protection: None      Social History     Social History Narrative    Not on file       Family History   Problem Relation Age of Onset    Heart disease Mother             Heart disease Father             No Known Problems Maternal Aunt     No Known Problems Maternal Uncle     No Known Problems Paternal Aunt     No Known Problems Paternal Uncle     No Known Problems Maternal Grandmother     No Known Problems Maternal Grandfather     No Known Problems Paternal Grandmother     No Known Problems Paternal Grandfather     Diabetes Sister     Heart disease Sister 69            Heart disease Brother         open heart-bypass    Cancer Brother         Bladder/Rodney    Arthritis Brother     Heart disease Brother 62            Heart attack Brother             Arthritis Son     Celiac disease Neg Hx     Cirrhosis Neg Hx     Colon cancer Neg Hx     Colon polyps Neg Hx     Crohn's disease Neg Hx     Cystic fibrosis Neg Hx     Esophageal cancer Neg Hx     Hemochromatosis Neg Hx     Inflammatory bowel disease Neg Hx     Irritable bowel syndrome Neg Hx     Liver cancer Neg Hx     Liver disease Neg Hx     Rectal cancer Neg Hx     Stomach cancer Neg Hx     Ulcerative colitis Neg Hx     Dante's disease Neg Hx     Alcohol abuse Neg Hx     Pancreatitis Neg Hx        Review of Systems:     Constitutional:  Denies fever, shaking or chills         All other pertinent positives and negatives as noted above in HPI.    Physical Exam: 69 y.o. female    Vitals:    24 1115 24 1520  09/24/24 1910 09/25/24 0030   BP: 132/75 135/65  129/63   BP Location: Left arm Left arm     Patient Position: Lying      Pulse: 84 91 92 101   Resp: 16 16 16 16   Temp: 97.6 °F (36.4 °C) 97.6 °F (36.4 °C) 98.4 °F (36.9 °C) 98.5 °F (36.9 °C)   TempSrc: Oral Oral Oral Oral   SpO2: 97%   95%   Weight:       Height:         General:  Awake, alert. No acute distress.          Extremities: Bilateral knees examined.  Positive effusion bilaterally positive tenderness palpation limited range of motion due to discomfort.  No redness.  Mild warmth but consistent with the extremity.  Compartments soft compressible.  More tenderness on the left which goes along with left DVT.  Motor and sensory intact distally.    All other extremities atraumatic without gross abnormality.     Diagnostic Tests:    No results displayed because visit has over 200 results.        Lab Results (last 24 hours)       Procedure Component Value Units Date/Time    Basic Metabolic Panel [[id number]]  (Abnormal) Collected: 09/25/24 0350    Specimen: Blood Updated: 09/25/24 0433     Glucose 119 mg/dL      BUN 13 mg/dL      Creatinine 0.59 mg/dL      Sodium 131 mmol/L      Potassium 4.0 mmol/L      Comment: Slight hemolysis detected by analyzer. Result may be falsely elevated.        Chloride 98 mmol/L      CO2 21.0 mmol/L      Calcium 9.2 mg/dL      BUN/Creatinine Ratio 22.0     Anion Gap 12.0 mmol/L      eGFR 96.5 mL/min/1.73     Narrative:      GFR Normal >60  Chronic Kidney Disease <60  Kidney Failure <15    The GFR formula is only valid for adults with stable renal function between ages 18 and 70.    Anticardiolipin Antibody, IgG / M, Qn [[id number]] Collected: 09/22/24 0625    Specimen: Blood from Hand, Left Updated: 09/24/24 1512     Anticardiolipin IgG <9 GPL U/mL      Comment:                           Negative:              <15                            Indeterminate:     15 - 20                            Low-Med Positive: >20 - 80                             High Positive:         >80        Anticardiolipin IgM <9 MPL U/mL      Comment:                           Negative:              <13                            Indeterminate:     13 - 20                            Low-Med Positive: >20 - 80                            High Positive:         >80       Narrative:      Performed at:  01 - [facility] [address]  [id number]  : Serafin Lagos PhD, Phone:  [phone]    Beta-2 Glycoprotein Antibodies [[id number]] Collected: 09/22/24 0625    Specimen: Blood from Hand, Left Updated: 09/24/24 1417     Beta-2 Glyco 1 IgG <9 GPI IgG units      Comment: The reference interval reflects a 3SD or 99th percentile interval,  which is thought to represent a potentially clinically significant  result in accordance with the International Consensus Statement on  the classification criteria for definitive antiphospholipid syndrome  (APS). J Thromb Haem 2006;4:295-306.        Beta-2 Glyco 1 IgA <9 GPI IgA units      Comment: The reference interval reflects a 3SD or 99th percentile interval,  which is thought to represent a potentially clinically significant  result in accordance with the International Consensus Statement on  the classification criteria for definitive antiphospholipid syndrome  (APS). J Thromb Haem 2006;4:295-306.        Beta-2 Glyco 1 IgM <9 GPI IgM units      Comment: The reference interval reflects a 3SD or 99th percentile interval,  which is thought to represent a potentially clinically significant  result in accordance with the International Consensus Statement on  the classification criteria for definitive antiphospholipid syndrome  (APS). J Thromb Haem 2006;4:295-306.       Narrative:      Performed at:  01 - [facility] [address]  [id number]  : Serafin Lagos PhD, Phone:  [phone]    Protein S Functional [[id number]]  (Normal) Collected: 09/22/24 0625    Specimen: Blood from Hand,  Left Updated: 09/24/24 1032     Protein S Functional 102 %     Narrative:      Lupus anticoagulants and/or anti-phospholipid antibodies (APA) have been noted to interfere in the assay. Highly elevated levels of Factor VIII (greater than 250%) may lead to an under-estimation of the functional protein S level. Thrombin inhibitors and heparin may lead to an over-estimation of the functional protein S level.    Protein S Antigen, Free [[id number]]  (Normal) Collected: 09/22/24 0625    Specimen: Blood from Hand, Left Updated: 09/24/24 1001     Protein S Ag, Free 112.0 %     Narrative:      Deficiency of Protein S is associated with a high risk of developing venous thromboembolism, especially in young adults.  Acquired deficiencies of Protein S are associated with pregnancy, hepatic disorder, oral anticoagulant therapy, disseminated intravascular coagulation (DIC), newborns, and other clinical conditions.      Uric Acid [[id number]]  (Normal) Collected: 09/24/24 0625    Specimen: Blood Updated: 09/24/24 1001     Uric Acid 5.2 mg/dL     Antithrombin III [[id number]]  (Normal) Collected: 09/22/24 0625    Specimen: Blood from Hand, Left Updated: 09/24/24 0951     Antithrombin Activity 91 %     Basic Metabolic Panel [[id number]]  (Abnormal) Collected: 09/24/24 0625    Specimen: Blood Updated: 09/24/24 0727     Glucose 115 mg/dL      BUN 15 mg/dL      Creatinine 0.69 mg/dL      Sodium 132 mmol/L      Potassium 4.0 mmol/L      Chloride 99 mmol/L      CO2 23.1 mmol/L      Calcium 9.1 mg/dL      BUN/Creatinine Ratio 21.7     Anion Gap 9.9 mmol/L      eGFR 92.9 mL/min/1.73     Narrative:      GFR Normal >60  Chronic Kidney Disease <60  Kidney Failure <15    The GFR formula is only valid for adults with stable renal function between ages 18 and 70.            Imaging:   Narrative & Impression   XR KNEE 1 OR 2 VW LEFT-     INDICATIONS: Pain and swelling.     TECHNIQUE: 2 VIEWS OF THE LEFT KNEE     COMPARISON: None available      FINDINGS:     Prominent lateral tibiofemoral joint space narrowing is noted. Moderate  degenerative spurring is seen. No acute fracture, erosion, or  dislocation is identified. Minimal knee effusion. Follow-up/further  evaluation can be obtained as indications persist.     IMPRESSION:     As described.           This report was finalized on 9/24/2024 2:17 PM by Dr. William Mota M.D on Workstation: [email or web address]        Narrative & Impression   XR KNEE 1 OR 2 VW RIGHT-     INDICATIONS: Pain.     TECHNIQUE: 3 VIEWS OF THE RIGHT KNEE     COMPARISON: None available     FINDINGS:     No acute fracture, erosion, or dislocation is identified. Prominent  lateral tibiofemoral joint space narrowing is seen. Moderate  degenerative spurring is noted. Minimal knee effusion is present.  Follow-up/further evaluation can be obtained as indications persist.     IMPRESSION:     As described.           This report was finalized on 9/24/2024 8:10 AM by Dr. William Mota M.D on Workstation: [email or web address]         Assessment: bilateral knee OA, possible hemarthrosis due to anticoagulation    Plan: I feel the patient symptoms are more arthritic in nature given history and exam findings as well as imaging.  However there may be a component of hemarthrosis due to anticoagulation therapy for DVT/PE.  Would recommend rest, ice AK-Taine modification she may be reasonable for steroids.  Also may be pertinent for aspiration first then with steroid injection.    Please call with questions or concerns thanks    Date: 9/25/2024    Reji Davenport MD    CC: Rocky Chavez MD

## 2024-09-25 NOTE — PROGRESS NOTES
Name: Barbara Tran ADMIT: 2024   : 1955  PCP: Millicent Ace MD    MRN: [id number] LOS: 4 days   AGE/SEX: 69 y.o. female  ROOM: [facility]   Subjective   Chief Complaint   Patient presents with    Shortness of Breath     Dyspnea better  +pain in bilateral knees  Has had swelling  Had not been able to ambulate    ROS  No f/c  No n/v  No cp/palp  No soa/cough    Objective   Vital Signs  Temp:  [97.6 °F (36.4 °C)-98.5 °F (36.9 °C)] 98.2 °F (36.8 °C)  Heart Rate:  [] 110  Resp:  [16] 16  BP: (109-135)/(63-91) 109/91  SpO2:  [94 %-97 %] 97 %  on  Flow (L/min):  [2] 2;   Device (Oxygen Therapy): room air  Body mass index is 31.95 kg/m².    Physical Exam  Constitutional:       General: She is not in acute distress.  HENT:      Head: Normocephalic and atraumatic.   Eyes:      General: No scleral icterus.  Cardiovascular:      Rate and Rhythm: Regular rhythm.      Heart sounds: Normal heart sounds.   Pulmonary:      Effort: Pulmonary effort is normal. No respiratory distress.   Abdominal:      General: There is no distension.      Palpations: Abdomen is soft.   Musculoskeletal:      Cervical back: Neck supple.   Neurological:      Mental Status: She is alert.   Psychiatric:         Behavior: Behavior normal.     Bilateral slight knee swelling and decreased ROM    Results Review:       I reviewed the patient's new clinical results.  Results from last 7 days   Lab Units 24  03124  0625 24  0346 24  1728   WBC 10*3/mm3 10.10 10.15 11.27* 11.02*   HEMOGLOBIN g/dL 14.5 15.3 14.8 16.0*   PLATELETS 10*3/mm3 307 317 320 356     Results from last 7 days   Lab Units 24  0350 24  0625 24  0311 24  1224   SODIUM mmol/L 131* 132* 136 135*   POTASSIUM mmol/L 4.0 4.0 4.3 3.1*   CHLORIDE mmol/L 98 99 105 100   CO2 mmol/L 21.0* 23.1 22.0 24.5   BUN mg/dL 13 15 14 13   CREATININE mg/dL 0.59 0.69 0.70 0.67   GLUCOSE mg/dL 119* 115* 107* 118*   Estimated Creatinine  "Clearance: 102.2 mL/min (by C-G formula based on SCr of 0.59 mg/dL).  Results from last 7 days   Lab Units 09/20/24  1728   ALBUMIN g/dL 4.2   BILIRUBIN mg/dL 0.3   ALK PHOS U/L 97   AST (SGOT) U/L 23   ALT (SGPT) U/L 32     Results from last 7 days   Lab Units 09/25/24  0350 09/24/24  0625 09/23/24  0311 09/22/24  1224 09/20/24  1728   CALCIUM mg/dL 9.2 9.1 8.4* 8.9 9.0   ALBUMIN g/dL  --   --   --   --  4.2   MAGNESIUM mg/dL  --   --   --  2.2  --          Coag   Results from last 7 days   Lab Units 09/23/24  0853 09/22/24  1224 09/22/24  0626 09/21/24  1617 09/21/24  0346 09/20/24  2153   INR   --   --   --   --   --  1.18*   APTT seconds 137.4* 90.4* 44.1* 81.4* >200.0* 28.6     HbA1C   Lab Results   Component Value Date    HGBA1C 5.5 08/14/2024    HGBA1C 5.5 12/07/2023    HGBA1C 5.4 04/28/2023     Infection     Radiology(recent) XR Knee 1 or 2 View Left    Result Date: 9/24/2024   As described.    This report was finalized on 9/24/2024 2:17 PM by Dr. William Mota M.D on Workstation: [id number]      XR Knee 1 or 2 View Right    Result Date: 9/24/2024   As described.    This report was finalized on 9/24/2024 8:10 AM by Dr. William Mota M.D on Workstation: [id number]     No results found for: \"TROPONINT\", \"TROPONINI\", \"BNP\"  No components found for: \"TSH;2\"    apixaban, 10 mg, Oral, Q12H   Followed by  [START ON 9/30/2024] apixaban, 5 mg, Oral, Q12H  buPROPion XL, 300 mg, Oral, Daily  levothyroxine, 50 mcg, Oral, Q AM  rosuvastatin, 40 mg, Oral, Nightly  sacubitril-valsartan, 1 tablet, Oral, BID  spironolactone, 25 mg, Oral, Daily  torsemide, 20 mg, Oral, Daily       Diet: Cardiac; Healthy Heart (2-3 Na+); Fluid Consistency: Thin (IDDSI 0)      Assessment & Plan      Active Hospital Problems    Diagnosis  POA    **Pulmonary embolism [I26.99]  Yes    Right ventricular thrombus [I51.3]  Unknown    COVID-19 virus detected [U07.1]  Unknown    Influenza [J11.1]  Unknown    History of pulmonary embolism [Z86.711]  " Yes    Chronic diastolic congestive heart failure [I50.32]  Yes    Hypothyroidism [E03.9]  Yes    Chronic coronary artery disease [I25.10]  Yes    Benign essential HTN [I10]  Yes    HLD (hyperlipidemia) [E78.5]  Yes      Resolved Hospital Problems   No resolved problems to display.       Acute bilateral PE (recurrent PE)  Left DVT  Pulmonary infarction not excluded on CT scan  RV thrombus  Have switched to apixaban. Now will likely have lifelong anticoagulant  Appreciate hematology- hypercoag workup/outpatient workup  Appreciate pulmonology recommends repeat CT scan in 6 to 8 weeks to ensure clearance of probable pulmonary infarcts (follow-up with [facility])  Echocardiogram no right heart strain but shows RV thrombus  Hemodynamically stable not requiring any oxygen        Bilateral knee pain  Uric acid OK, though does not exclude gout  Minimal knee effusion on right knee x-ray  pain medications, Orthopedics has seen and plan on rest, ice, and likely aspiration/joint injection. Potential hemarthrosis.      Influenza  COVID  Positive for both on 9/8/2024. Antigen negative x 1  Continue supportive care  Not requiring any oxygen  Cough improved with antitussive medication     CAD history of stent  HFpEF EF 56 to 60%  Hypertension BP acceptable  Hyperlipidemia statin  Hypothyroidism levothyroxine  Obesity Body mass index is 31.95 kg/m².      DVT prophylaxis  anticoagulation as above     Discharge  Probably going to now need rehab soon after joint aspiration/injection  Expected Discharge Date: 9/26/2024; Expected Discharge Time:       HÉCTOR Chavez MD  [facility]  09/25/24  13:50 EDT

## 2024-09-25 NOTE — SIGNIFICANT NOTE
09/25/24 0944   OTHER   Discipline occupational therapist   Rehab Time/Intention   Session Not Performed other (see comments)  (RN states pt currently unable to tolerate therapy today due to severe B knee pain with any attempts at moving B LEs. Noted ortho consult pending, f/u tomorrow)   Recommendation   OT - Next Appointment 09/26/24

## 2024-09-25 NOTE — PROGRESS NOTES
[facility]  [phone]  Dr. Alphonse Bello    Subjective:  LOS: 4    Chief Complaint:      No acute events overnight.  She remains on room air.  Recently her main issue has been pain in her knee for which orthopedics was consulted for and performed steroid injection in her right knee and they drained fluid from her left knee, culture sent.    Objective   Vital Signs past 24hrs    Temp range: Temp (24hrs), Av.2 °F (36.8 °C), Min:97.6 °F (36.4 °C), Max:98.5 °F (36.9 °C)    BP range: BP: (109-135)/(63-91) 109/91  Pulse range: Heart Rate:  [] 110  Resp rate range: Resp:  [16] 16    Device (Oxygen Therapy): room airFlow (L/min):  [2] 2  Oxygen range:SpO2:  [94 %-97 %] 97 %      92.5 kg (204 lb); Body mass index is 31.95 kg/m².  No intake or output data in the 24 hours ending 24 1346        Physical Exam  Constitutional:       Appearance: Normal appearance.   HENT:      Head: Normocephalic and atraumatic.      Nose: Nose normal.      Mouth/Throat:      Mouth: Mucous membranes are moist.      Pharynx: Oropharynx is clear.   Eyes:      Extraocular Movements: Extraocular movements intact.      Conjunctiva/sclera: Conjunctivae normal.   Cardiovascular:      Rate and Rhythm: Normal rate and regular rhythm.      Pulses: Normal pulses.      Heart sounds: Normal heart sounds. No murmur heard.  Pulmonary:      Effort: Pulmonary effort is normal. No respiratory distress.      Breath sounds: Normal breath sounds. No stridor. No wheezing or rales.   Abdominal:      General: Abdomen is flat. Bowel sounds are normal.      Palpations: Abdomen is soft.   Skin:     General: Skin is warm and dry.   Neurological:      General: No focal deficit present.      Mental Status: She is alert and oriented to person, place, and time. Mental status is at baseline.   Psychiatric:         Mood and Affect: Mood normal.         Behavior: Behavior normal.       Results Review:    I have reviewed the laboratory and  imaging data since the last note by [facility] physician.  My annotations are noted in assessment and plan.    Lab Results (last 24 hours)       Procedure Component Value Units Date/Time    Basic Metabolic Panel [[id number]]  (Abnormal) Collected: 09/25/24 0350    Specimen: Blood Updated: 09/25/24 0433     Glucose 119 mg/dL      BUN 13 mg/dL      Creatinine 0.59 mg/dL      Sodium 131 mmol/L      Potassium 4.0 mmol/L      Comment: Slight hemolysis detected by analyzer. Result may be falsely elevated.        Chloride 98 mmol/L      CO2 21.0 mmol/L      Calcium 9.2 mg/dL      BUN/Creatinine Ratio 22.0     Anion Gap 12.0 mmol/L      eGFR 96.5 mL/min/1.73     Narrative:      GFR Normal >60  Chronic Kidney Disease <60  Kidney Failure <15    The GFR formula is only valid for adults with stable renal function between ages 18 and 70.          XR Knee 1 or 2 View Left    Result Date: 9/24/2024   As described.    This report was finalized on 9/24/2024 2:17 PM by Dr. William Mota M.D on Workstation: [id number]      XR Knee 1 or 2 View Right    Result Date: 9/24/2024   As described.    This report was finalized on 9/24/2024 8:10 AM by Dr. William Mota M.D on Workstation: [id number]         Result Review:  I have personally reviewed the results from last note by [facility] physician to 9/25/2024 13:46 EDT and agree with these findings:  [x]  Laboratory list / accordion  [x]  Microbiology  [x]  Radiology  [x]  EKG/Telemetry   [x]  Cardiology/Vascular   []  Pathology  []  Old records  []  Other:    Medication Review:  I have reviewed the current MAR.  My annotations are noted in assessment and plan.    apixaban, 10 mg, Oral, Q12H   Followed by  [START ON 9/30/2024] apixaban, 5 mg, Oral, Q12H  buPROPion XL, 300 mg, Oral, Daily  levothyroxine, 50 mcg, Oral, Q AM  rosuvastatin, 40 mg, Oral, Nightly  sacubitril-valsartan, 1 tablet, Oral, BID  spironolactone, 25 mg, Oral, Daily  torsemide, 20 mg, Oral, Daily           Lines, Drains & Airways        Active LDAs       Name Placement date Placement time Site Days    Peripheral IV 09/20/24 1822 Right Antecubital 09/20/24  1822  Antecubital  2                  Enhanced Droplet/Contact , Droplet  Diet Orders (active) (From admission, onward)       Start     Ordered    09/21/24 1200  Dietary Nutrition Supplements Boost Breeze (Ensure Clear)  Daily With Lunch & Dinner       09/21/24 1039    09/21/24 0842  Diet: Cardiac; Healthy Heart (2-3 Na+); Fluid Consistency: Thin (IDDSI 0)  Diet Effective Now         09/21/24 0841                  PCCM Problems  Acute PE, recurrent  RV thrombus  Acute left lower extremity DVT  Hx of recent COVID-19 infection  Hx of recent Influenza A infection  History of pulmonary nodules  HFpEF  Hypertension  Hyperlipidemia  CAD  Obesity      Plan of Treatment  - tessalon perles to help with coughing  - Eliquis for PE  - Hematology workup for hypercoagulable state, pending  - PT/OT consulted  - Continue supportive care for COVID and flu, no specific treatment indicated.  - Cardiology consulted for RV thrombus, plan to continue eliquis. No intervention recommended.    Due to her recurrent PE after getting COVID twice now, I would recommend lifelong anticoagulation with Eliquis.  Patient previously followed with Dr. Rodriguez in pulmonary clinic.  From a pulmonary perspective, okay for discharge when medically ready.  I will arrange follow-up with Dr. Rodriguez in our pulmonary clinic.      Pulmonary will sign off.  Please call if there are any questions or concerns.    Alphonse Bello MD  09/25/24  13:46 EDT      Part of this note may be an electronic transcription/translation of spoken language to printed text using the Dragon Dictation System.

## 2024-09-25 NOTE — SIGNIFICANT NOTE
09/25/24 0938   OTHER   Discipline physical therapist   Rehab Time/Intention   Session Not Performed other (see comments)  (RN states pt currently unable to tolerate PT eval due to severe B knee pain with any attempts at moving B LEs. Noted ortho consult pending. Will continue to follow for PT eval as appropriate.)   Recommendation   PT - Next Appointment 09/26/24

## 2024-09-25 NOTE — PLAN OF CARE
Problem: Adult Inpatient Plan of Care  Goal: Plan of Care Review  Outcome: Progressing  Flowsheets (Taken 9/25/2024 1658)  Outcome Evaluation: patient is alert and oriented. Knee joint aspiration. bed side commode. assistX 2. Room air  Goal: Patient-Specific Goal (Individualized)  Outcome: Progressing  Goal: Absence of Hospital-Acquired Illness or Injury  Outcome: Progressing  Intervention: Identify and Manage Fall Risk  Recent Flowsheet Documentation  Taken 9/25/2024 1218 by Christine Melo RN  Safety Promotion/Fall Prevention:   clutter free environment maintained   activity supervised  Taken 9/25/2024 1017 by Christine Melo RN  Safety Promotion/Fall Prevention:   clutter free environment maintained   activity supervised  Taken 9/25/2024 0835 by Christine Melo RN  Safety Promotion/Fall Prevention:   clutter free environment maintained   activity supervised  Intervention: Prevent Skin Injury  Recent Flowsheet Documentation  Taken 9/25/2024 0835 by Christine Melo RN  Skin Protection: incontinence pads utilized  Intervention: Prevent and Manage VTE (Venous Thromboembolism) Risk  Recent Flowsheet Documentation  Taken 9/25/2024 0835 by Christine Melo RN  VTE Prevention/Management: other (see comments)  Range of Motion: active ROM (range of motion) encouraged  Intervention: Prevent Infection  Recent Flowsheet Documentation  Taken 9/25/2024 1218 by Christine Melo RN  Infection Prevention: single patient room provided  Taken 9/25/2024 1017 by Christine Melo RN  Infection Prevention: single patient room provided  Taken 9/25/2024 0835 by Christine Melo RN  Infection Prevention: single patient room provided  Goal: Optimal Comfort and Wellbeing  Outcome: Progressing  Intervention: Provide Person-Centered Care  Recent Flowsheet Documentation  Taken 9/25/2024 0835 by Christine Melo RN  Trust Relationship/Rapport:   care explained   choices provided  Goal: Readiness for Transition of Care  Outcome: Progressing     Problem:  Hypertension Comorbidity  Goal: Blood Pressure in Desired Range  Outcome: Progressing  Intervention: Maintain Blood Pressure Management  Recent Flowsheet Documentation  Taken 9/25/2024 1218 by Christine Melo RN  Medication Review/Management: medications reviewed  Taken 9/25/2024 1017 by Christine Melo RN  Medication Review/Management: medications reviewed  Taken 9/25/2024 0835 by Christine Melo RN  Medication Review/Management: medications reviewed     Problem: Skin Injury Risk Increased  Goal: Skin Health and Integrity  Outcome: Progressing  Intervention: Optimize Skin Protection  Recent Flowsheet Documentation  Taken 9/25/2024 0835 by Christine Melo RN  Pressure Reduction Techniques: frequent weight shift encouraged  Pressure Reduction Devices: positioning supports utilized  Skin Protection: incontinence pads utilized     Problem: Fall Injury Risk  Goal: Absence of Fall and Fall-Related Injury  Outcome: Progressing  Intervention: Identify and Manage Contributors  Recent Flowsheet Documentation  Taken 9/25/2024 1218 by Christine Melo RN  Medication Review/Management: medications reviewed  Taken 9/25/2024 1017 by Christine Melo RN  Medication Review/Management: medications reviewed  Taken 9/25/2024 0835 by Christine Melo RN  Medication Review/Management: medications reviewed  Intervention: Promote Injury-Free Environment  Recent Flowsheet Documentation  Taken 9/25/2024 1218 by Christine Melo RN  Safety Promotion/Fall Prevention:   clutter free environment maintained   activity supervised  Taken 9/25/2024 1017 by Christine Melo RN  Safety Promotion/Fall Prevention:   clutter free environment maintained   activity supervised  Taken 9/25/2024 0835 by Christine Melo RN  Safety Promotion/Fall Prevention:   clutter free environment maintained   activity supervised   Goal Outcome Evaluation:              Outcome Evaluation: patient is alert and oriented. Knee joint aspiration. bed side commode. assistX 2. Room air

## 2024-09-26 LAB
ANION GAP SERPL CALCULATED.3IONS-SCNC: 12.2 MMOL/L (ref 5–15)
BUN SERPL-MCNC: 22 MG/DL (ref 8–23)
BUN/CREAT SERPL: 31.4 (ref 7–25)
CALCIUM SPEC-SCNC: 9.3 MG/DL (ref 8.6–10.5)
CHLORIDE SERPL-SCNC: 99 MMOL/L (ref 98–107)
CO2 SERPL-SCNC: 22.8 MMOL/L (ref 22–29)
CREAT SERPL-MCNC: 0.7 MG/DL (ref 0.57–1)
EGFRCR SERPLBLD CKD-EPI 2021: 92.6 ML/MIN/1.73
GLUCOSE SERPL-MCNC: 141 MG/DL (ref 65–99)
POTASSIUM SERPL-SCNC: 4.1 MMOL/L (ref 3.5–5.2)
SODIUM SERPL-SCNC: 134 MMOL/L (ref 136–145)

## 2024-09-26 PROCEDURE — 97162 PT EVAL MOD COMPLEX 30 MIN: CPT

## 2024-09-26 PROCEDURE — 99231 SBSQ HOSP IP/OBS SF/LOW 25: CPT | Performed by: ORTHOPAEDIC SURGERY

## 2024-09-26 PROCEDURE — 80048 BASIC METABOLIC PNL TOTAL CA: CPT | Performed by: HOSPITALIST

## 2024-09-26 PROCEDURE — 97530 THERAPEUTIC ACTIVITIES: CPT

## 2024-09-26 RX ORDER — COLCHICINE 0.6 MG/1
1.2 TABLET ORAL ONCE
Status: COMPLETED | OUTPATIENT
Start: 2024-09-26 | End: 2024-09-26

## 2024-09-26 RX ORDER — COLCHICINE 0.6 MG/1
0.6 TABLET ORAL DAILY
Status: DISCONTINUED | OUTPATIENT
Start: 2024-09-27 | End: 2024-10-01 | Stop reason: HOSPADM

## 2024-09-26 RX ADMIN — APIXABAN 10 MG: 5 TABLET, FILM COATED ORAL at 22:22

## 2024-09-26 RX ADMIN — ROSUVASTATIN CALCIUM 40 MG: 40 TABLET, FILM COATED ORAL at 22:22

## 2024-09-26 RX ADMIN — BENZONATATE 200 MG: 100 CAPSULE ORAL at 12:42

## 2024-09-26 RX ADMIN — APIXABAN 10 MG: 5 TABLET, FILM COATED ORAL at 08:32

## 2024-09-26 RX ADMIN — LEVOTHYROXINE SODIUM 50 MCG: 50 TABLET ORAL at 08:32

## 2024-09-26 RX ADMIN — SACUBITRIL AND VALSARTAN 1 TABLET: 24; 26 TABLET, FILM COATED ORAL at 08:32

## 2024-09-26 RX ADMIN — TORSEMIDE 20 MG: 20 TABLET ORAL at 08:32

## 2024-09-26 RX ADMIN — BENZONATATE 200 MG: 100 CAPSULE ORAL at 22:22

## 2024-09-26 RX ADMIN — COLCHICINE 1.2 MG: 0.6 TABLET ORAL at 08:33

## 2024-09-26 RX ADMIN — BUPROPION HYDROCHLORIDE 300 MG: 300 TABLET, EXTENDED RELEASE ORAL at 08:33

## 2024-09-26 RX ADMIN — HYDROCODONE BITARTRATE AND ACETAMINOPHEN 1 TABLET: 5; 325 TABLET ORAL at 08:33

## 2024-09-26 RX ADMIN — SPIRONOLACTONE 25 MG: 25 TABLET, FILM COATED ORAL at 08:33

## 2024-09-26 RX ADMIN — LEVOTHYROXINE SODIUM 50 MCG: 50 TABLET ORAL at 08:33

## 2024-09-26 RX ADMIN — SACUBITRIL AND VALSARTAN 1 TABLET: 24; 26 TABLET, FILM COATED ORAL at 22:22

## 2024-09-26 RX ADMIN — HYDROCODONE BITARTRATE AND ACETAMINOPHEN 1 TABLET: 5; 325 TABLET ORAL at 14:21

## 2024-09-26 NOTE — PLAN OF CARE
Problem: Adult Inpatient Plan of Care  Goal: Plan of Care Review  Outcome: Progressing  Flowsheets (Taken 9/26/2024 1727)  Outcome Evaluation: patient is resting. VSS  Goal: Patient-Specific Goal (Individualized)  Outcome: Progressing  Goal: Absence of Hospital-Acquired Illness or Injury  Outcome: Progressing  Intervention: Identify and Manage Fall Risk  Recent Flowsheet Documentation  Taken 9/26/2024 1621 by Christine Melo RN  Safety Promotion/Fall Prevention:   clutter free environment maintained   activity supervised  Taken 9/26/2024 1600 by Christine Melo RN  Safety Promotion/Fall Prevention:   clutter free environment maintained   activity supervised  Taken 9/26/2024 1217 by Christine Melo RN  Safety Promotion/Fall Prevention:   clutter free environment maintained   activity supervised  Taken 9/26/2024 1017 by Christine Melo RN  Safety Promotion/Fall Prevention:   clutter free environment maintained   activity supervised  Taken 9/26/2024 0850 by Christine Melo RN  Safety Promotion/Fall Prevention:   clutter free environment maintained   activity supervised  Intervention: Prevent Infection  Recent Flowsheet Documentation  Taken 9/26/2024 1621 by Christine Melo RN  Infection Prevention: single patient room provided  Taken 9/26/2024 1600 by Christine Melo RN  Infection Prevention: single patient room provided  Taken 9/26/2024 1217 by Christine Melo RN  Infection Prevention: single patient room provided  Taken 9/26/2024 1017 by Christine Melo RN  Infection Prevention: single patient room provided  Taken 9/26/2024 0850 by Christine Melo RN  Infection Prevention: single patient room provided  Goal: Optimal Comfort and Wellbeing  Outcome: Progressing  Goal: Readiness for Transition of Care  Outcome: Progressing     Problem: Hypertension Comorbidity  Goal: Blood Pressure in Desired Range  Outcome: Progressing  Intervention: Maintain Blood Pressure Management  Recent Flowsheet Documentation  Taken 9/26/2024 1621 by  Christine Melo RN  Medication Review/Management: medications reviewed  Taken 9/26/2024 1600 by Christine Melo RN  Medication Review/Management: medications reviewed  Taken 9/26/2024 1217 by Christine Melo RN  Medication Review/Management: medications reviewed  Taken 9/26/2024 1017 by Christine Melo RN  Medication Review/Management: medications reviewed  Taken 9/26/2024 0850 by Christine Melo RN  Medication Review/Management: medications reviewed     Problem: Skin Injury Risk Increased  Goal: Skin Health and Integrity  Outcome: Progressing     Problem: Fall Injury Risk  Goal: Absence of Fall and Fall-Related Injury  Outcome: Progressing  Intervention: Identify and Manage Contributors  Recent Flowsheet Documentation  Taken 9/26/2024 1621 by Christine Melo RN  Medication Review/Management: medications reviewed  Taken 9/26/2024 1600 by Christine Melo RN  Medication Review/Management: medications reviewed  Taken 9/26/2024 1217 by Christine Melo RN  Medication Review/Management: medications reviewed  Taken 9/26/2024 1017 by Christine Melo RN  Medication Review/Management: medications reviewed  Taken 9/26/2024 0850 by Christine Melo RN  Medication Review/Management: medications reviewed  Intervention: Promote Injury-Free Environment  Recent Flowsheet Documentation  Taken 9/26/2024 1621 by Christine Melo RN  Safety Promotion/Fall Prevention:   clutter free environment maintained   activity supervised  Taken 9/26/2024 1600 by Christine Melo RN  Safety Promotion/Fall Prevention:   clutter free environment maintained   activity supervised  Taken 9/26/2024 1217 by Christine Melo RN  Safety Promotion/Fall Prevention:   clutter free environment maintained   activity supervised  Taken 9/26/2024 1017 by Christine Melo RN  Safety Promotion/Fall Prevention:   clutter free environment maintained   activity supervised  Taken 9/26/2024 0850 by Christine Melo RN  Safety Promotion/Fall Prevention:   clutter free environment maintained    activity supervised   Goal Outcome Evaluation:              Outcome Evaluation: patient is resting. VSS

## 2024-09-26 NOTE — PLAN OF CARE
Goal Outcome Evaluation:  Plan of Care Reviewed With: patient           Outcome Evaluation: Pt is a 70 yo female who presented from home with acute B PE, acute L LE DVT, RV thrombus. Recent diagnosis of covid-19 and influenza as well as recent ligament tear in L foot. Pt was [facility] for 3 weeks and was just cleared to [facility] following ligamentous injury. Prior to admission, pt was living at home with daughter and independent with mobility. Pt has walker at home. Pt reports worsening B knee pain; ortho is following for aspiration and steroid injection. Upon exam, pt demos generalized weakness, impaired balance, and decreased endurance limiting mobility. Pt performed bed mobility with mod A and STS transfer with max A x2. Pt was able to take a few shuffled side steps at EOB with min A x2 and rwx; however, unable to ambulate farther due to pain and weakness. Pt will continue to benefit from PT to address impairments and increase independence with mobility. Rec DC to SNF pending progress.      Anticipated Discharge Disposition (PT): skilled nursing facility

## 2024-09-26 NOTE — PROGRESS NOTES
ORTHO PROGRESS NOTE      Patient: Barbara Tran    Date of Admission: 9/20/2024  5:30 PM    YOB: 1955    Medical Record Number: [id number]    Attending Physician: Rocky Chavez MD          Subjective:    Patient seen and examined.  Reports knee pain has improved.  States they did steroid injection the right and did not really get the aspirate fluid.  They were concerned about the left.      Allergies:   Allergies   Allergen Reactions    Codeine Dizziness     lightheaded    Levofloxacin Itching    Morphine Itching       Medications:   Current Medications:  Scheduled Meds:apixaban, 10 mg, Oral, Q12H   Followed by  [START ON 9/30/2024] apixaban, 5 mg, Oral, Q12H  buPROPion XL, 300 mg, Oral, Daily  [START ON 9/27/2024] colchicine, 0.6 mg, Oral, Daily  levothyroxine, 50 mcg, Oral, Q AM  rosuvastatin, 40 mg, Oral, Nightly  sacubitril-valsartan, 1 tablet, Oral, BID  spironolactone, 25 mg, Oral, Daily  torsemide, 20 mg, Oral, Daily      Continuous Infusions:   PRN Meds:.  benzonatate    Calcium Replacement - Follow Nurse / BPA Driven Protocol    Hydrocod Sukhjinder-Chlorphe Sukhjinder ER    HYDROcodone-acetaminophen    HYDROmorphone    Magnesium Standard Dose Replacement - Follow Nurse / BPA Driven Protocol    ondansetron    Phosphorus Replacement - Follow Nurse / BPA Driven Protocol    Potassium Replacement - Follow Nurse / BPA Driven Protocol    sodium chloride      Physical Exam: 69 y.o. female  General Appearance:  A and O x 3     Vitals:    09/25/24 1525 09/25/24 1925 09/26/24 0000 09/26/24 0808   BP:  106/48 151/74 119/63   BP Location:  Left arm Left arm    Patient Position:  Lying Lying    Pulse:  97 102    Resp: 17 17 17    Temp: 98.2 °F (36.8 °C) 98.6 °F (37 °C) 99.4 °F (37.4 °C) 97.3 °F (36.3 °C)   TempSrc: Oral Oral Oral Oral   SpO2:  96% 96%    Weight:       Height:            Bilateral knees examined minimal tenderness laterally about the left knee.  Knee range of motion much improved.  Still some  swelling to the left knee compared to the right but again appears improved.  Right knee no tenderness.  Range of motion improved.  Compartment soft compressible.  Motor and sensory intact distally.      Assessment:    Pulmonary embolism    Benign essential HTN    HLD (hyperlipidemia)    Chronic coronary artery disease    Hypothyroidism    Chronic diastolic congestive heart failure    History of pulmonary embolism    COVID-19 virus detected    Influenza    Right ventricular thrombus       Plan:      Aspirate showed slight increase in cell count however for a native knee this is below infectious threshold.  There is been no growth to date.  And crystal exam was positive for calcium pyrophosphate suggestive of pseudogout.    At this time we will plan conservative management.  Patient was much improved this morning.  We will still await some culture results and see how she does with therapy today.  She is limited with anti-inflammatories given anticoagulation needed for blood clot.  May consider left steroid knee injection that can be ordered but will continue to follow her progress as well as cultures.  Please call any questions or concerns thank you  Continue pain control measures      Date: 9/26/24Time: 09:18 LILA Davenport MD

## 2024-09-26 NOTE — CASE MANAGEMENT/SOCIAL WORK
Continued Stay Note  [facility]     Patient Name: Barbara Tran  MRN: [id number]  Today's Date: 9/26/2024    Admit Date: 9/20/2024    Plan: Home with family vs SNF   Discharge Plan       Row Name 09/26/24 1658       Plan    Plan Home with family vs SNF    Patient/Family in Agreement with Plan yes    Plan Comments Spoke with pt on the phone. She stated that she may need to go to rehab because she felt very weak on her feet when working with PT and OT today. Patient Choice list printed from [facility] for pt. She stated that she will look over the list tonight and give CCP four or five referral choices tomorrow. She denied any further needs at this time. CCP following. TUCKER Agosot RN                   Discharge Codes    No documentation.                 Expected Discharge Date and Time       Expected Discharge Date Expected Discharge Time    Sep 27, 2024               Vince Díaz RN

## 2024-09-26 NOTE — PROGRESS NOTES
Name: Barbara Tran ADMIT: 2024   : 1955  PCP: Millicent Ace MD    MRN: [id number] LOS: 5 days   AGE/SEX: 69 y.o. female  ROOM: [facility]   Subjective   Chief Complaint   Patient presents with    Shortness of Breath     Dyspnea improved  +pain in bilateral knees  Has had swelling  Had not been able to ambulate  Did have joint aspiration yesterday  Right knee was injected by steroid  Pain a little better but not resolved, still hasn't ambulated    ROS  No f/c  No n/v  No cp/palp  No soa/cough    Objective   Vital Signs  Temp:  [97.3 °F (36.3 °C)-99.4 °F (37.4 °C)] 97.6 °F (36.4 °C)  Heart Rate:  [] 79  Resp:  [17] 17  BP: (106-151)/(48-74) 108/61  SpO2:  [96 %] 96 %  on   ;   Device (Oxygen Therapy): room air  Body mass index is 31.95 kg/m².    Physical Exam  Constitutional:       General: She is not in acute distress.  HENT:      Head: Normocephalic and atraumatic.   Eyes:      General: No scleral icterus.  Cardiovascular:      Rate and Rhythm: Regular rhythm.      Heart sounds: Normal heart sounds.   Pulmonary:      Effort: Pulmonary effort is normal. No respiratory distress.   Abdominal:      General: There is no distension.      Palpations: Abdomen is soft.   Musculoskeletal:      Cervical back: Neck supple.   Neurological:      Mental Status: She is alert.   Psychiatric:         Behavior: Behavior normal.     Bilateral slight knee swelling and decreased ROM    Results Review:       I reviewed the patient's new clinical results.  Results from last 7 days   Lab Units 24  0311 24  0625 24  0346 24  1728   WBC 10*3/mm3 10.10 10.15 11.27* 11.02*   HEMOGLOBIN g/dL 14.5 15.3 14.8 16.0*   PLATELETS 10*3/mm3 307 317 320 356     Results from last 7 days   Lab Units 24  1127 24  0350 24  0625 24  0311   SODIUM mmol/L 134* 131* 132* 136   POTASSIUM mmol/L 4.1 4.0 4.0 4.3   CHLORIDE mmol/L 99 98 99 105   CO2 mmol/L 22.8 21.0* 23.1 22.0   BUN mg/dL 22 13  15 14   CREATININE mg/dL 0.70 0.59 0.69 0.70   GLUCOSE mg/dL 141* 119* 115* 107*   Estimated Creatinine Clearance: 86.1 mL/min (by C-G formula based on SCr of 0.7 mg/dL).  Results from last 7 days   Lab Units 09/20/24  1728   ALBUMIN g/dL 4.2   BILIRUBIN mg/dL 0.3   ALK PHOS U/L 97   AST (SGOT) U/L 23   ALT (SGPT) U/L 32     Results from last 7 days   Lab Units 09/26/24  1127 09/25/24  0350 09/24/24  0625 09/23/24  0311 09/22/24  1224 09/20/24  1728   CALCIUM mg/dL 9.3 9.2 9.1 8.4* 8.9 9.0   ALBUMIN g/dL  --   --   --   --   --  4.2   MAGNESIUM mg/dL  --   --   --   --  2.2  --          Coag   Results from last 7 days   Lab Units 09/23/24  0853 09/22/24  1224 09/22/24  0626 09/21/24  1617 09/21/24  0346 09/20/24  2153   INR   --   --   --   --   --  1.18*   APTT seconds 137.4* 90.4* 44.1* 81.4* >200.0* 28.6     HbA1C   Lab Results   Component Value Date    HGBA1C 5.5 08/14/2024    HGBA1C 5.5 12/07/2023    HGBA1C 5.4 04/28/2023     Infection   Results from last 7 days   Lab Units 09/25/24  1510   BODYFLDCX  No growth  No growth     Radiology(recent) FL Guided Aspiration Joint    Result Date: 9/26/2024  1. Technically successful right knee joint aspiration with fluoroscopic guidance, yielding a dry tap. 2. Technically successful right knee intra-articular medication injection with fluoroscopic guidance. 3. Technically successful left knee joint aspiration with fluoroscopic guidance, yielding approximately 5 mL of thick, cloudy pale yellow, slightly blood-tinged aspirate material.      FL Guide For Pain Meds Inj    Result Date: 9/26/2024  1. Technically successful right knee joint aspiration with fluoroscopic guidance, yielding a dry tap. 2. Technically successful right knee intra-articular medication injection with fluoroscopic guidance. 3. Technically successful left knee joint aspiration with fluoroscopic guidance, yielding approximately 5 mL of thick, cloudy pale yellow, slightly blood-tinged aspirate material.   "    XR Knee 1 or 2 View Left    Result Date: 9/24/2024   As described.    This report was finalized on 9/24/2024 2:17 PM by Dr. William Mota M.D on Workstation: [id number]     No results found for: \"TROPONINT\", \"TROPONINI\", \"BNP\"  No components found for: \"TSH;2\"    apixaban, 10 mg, Oral, Q12H   Followed by  [START ON 9/30/2024] apixaban, 5 mg, Oral, Q12H  buPROPion XL, 300 mg, Oral, Daily  [START ON 9/27/2024] colchicine, 0.6 mg, Oral, Daily  levothyroxine, 50 mcg, Oral, Q AM  rosuvastatin, 40 mg, Oral, Nightly  sacubitril-valsartan, 1 tablet, Oral, BID  spironolactone, 25 mg, Oral, Daily  torsemide, 20 mg, Oral, Daily       Diet: Cardiac; Healthy Heart (2-3 Na+); Fluid Consistency: Thin (IDDSI 0)      Assessment & Plan      Active Hospital Problems    Diagnosis  POA    **Pulmonary embolism [I26.99]  Yes    Right ventricular thrombus [I51.3]  Unknown    COVID-19 virus detected [U07.1]  Unknown    Influenza [J11.1]  Unknown    History of pulmonary embolism [Z86.711]  Yes    Chronic diastolic congestive heart failure [I50.32]  Yes    Hypothyroidism [E03.9]  Yes    Chronic coronary artery disease [I25.10]  Yes    Benign essential HTN [I10]  Yes    HLD (hyperlipidemia) [E78.5]  Yes      Resolved Hospital Problems   No resolved problems to display.       Acute bilateral PE (recurrent PE)  Left DVT  Pulmonary infarction not excluded on CT scan  RV thrombus  Have switched to apixaban. Now will likely have lifelong anticoagulant  Appreciate hematology- hypercoag workup/outpatient workup  Appreciate pulmonology recommends repeat CT scan in 6 to 8 weeks to ensure clearance of probable pulmonary infarcts (follow-up with [facility])  Echocardiogram no right heart strain but shows RV thrombus  Hemodynamically stable not requiring any oxygen        Bilateral knee pain  Pseudogout    pain medications, Orthopedics has seen and had aspiration/joint injection with steroid on the right though not left. Fluid c/w pseudogout. Have started " on cochicine- cannot use nsaids with her a/c.      Influenza  COVID  Positive for both on 9/8/2024. Antigen negative x 1  Continue supportive care  Not requiring any oxygen  Cough improved with antitussive medication     CAD history of stent  HFpEF EF 56 to 60%  Hypertension BP acceptable  Hyperlipidemia statin  Hypothyroidism levothyroxine  Obesity Body mass index is 31.95 kg/m².      DVT prophylaxis  anticoagulation as above     Discharge  Rehab vs home, hopefully 9/27 if mobility is improved pending PT eval      [facility] staff  Reviewed records       Rocky Chavez MD  [facility]  09/26/24  13:50 EDT

## 2024-09-26 NOTE — PLAN OF CARE
Goal Outcome Evaluation:           VSS. Pt alert and oriented x4. Pt stated that lidocaine to knee from aspiration has helped with pain overnight, still has some pain though. Gave prn pain medication as ordered with positive effects. Updated daughter overnight. Pt resting in bed with call light in reach, bed alarm in place.

## 2024-09-26 NOTE — PLAN OF CARE
Goal Outcome Evaluation:  Plan of Care Reviewed With: patient           Outcome Evaluation: Pt seen today for OT/PT cotreat to maximize safety and therepeutic benefit. Continues to present w/ ryan knee pain limiting all activtiy. Performed bed mobility w/ Mod A. Dep for LBD. Attempted STS from EOB w/ Max A x2 and RW, pt unable to complete on 1st attempt but able to complete on 2nd attempt w/ bed elevated. Side stepped to HOB w/ Min A x2 using RW, verbal cueing for sequencing and assist to move RW. Returned to sitting due to fatigue and then back to supine. Pt presents w/ increased pain and impaired strength, balance, and activity tolerance to perform near baseline. OT will f/u.      Anticipated Discharge Disposition (OT): skilled nursing facility

## 2024-09-26 NOTE — THERAPY TREATMENT NOTE
Patient Name: Barbara Tran  : 1955    MRN: [id number]                              Today's Date: 2024       Admit Date: 2024    Visit Dx:     ICD-10-CM ICD-9-CM   1. Bilateral pulmonary embolism  I26.99 415.19     Patient Active Problem List   Diagnosis    Benign essential HTN    Cervical pain    Dysthymia    Pedal edema    HLD (hyperlipidemia)    Goiter, non-toxic    Chronic pain of left knee    Chronic coronary artery disease    Hypothyroidism    History of sepsis    Primary insomnia    Chronic diastolic congestive heart failure    S/P drug eluting coronary stent placement    Pulmonic valve regurgitation    Tricuspid valve regurgitation    Osteoporosis    Hyperglycemia    History of pulmonary embolism    Balance problem    Pulmonary embolism    COVID-19 virus detected    Influenza    Right ventricular thrombus     Past Medical History:   Diagnosis Date    Allergic 2015    codeine, morphine, levaquin    Arthritis l5 years or so ago    Cataract 6-9 months ago    Need surgery    CHF (congestive heart failure)     Coronary artery disease 2005 stent    COVID-19 virus detected 03/10/2021    Deep vein thrombosis 2021    Pulmonary embolism    Depression     Disease of thyroid gland     Headache Covid 3-6-21    Has continued    History of pulmonary embolism 2021    HL (hearing loss) Last 4-6 months    Hyperlipidemia     Hypertension since     Hypothyroidism since     Obesity     Osteopenia last few years    Pulmonary embolism 2021    From Covid    Unstable angina 2024     Past Surgical History:   Procedure Laterality Date    BUNIONECTOMY Bilateral     HAMMERT TOE REPAIR/BUNIONECTOMY    CARDIAC CATHETERIZATION  ,     CARDIAC CATHETERIZATION N/A 2024    Procedure: Coronary angiography;  Surgeon: Cong Rivera MD;  Location: [facility] CATH INVASIVE LOCATION;  Service: Cardiovascular;  Laterality: N/A;    CARDIAC CATHETERIZATION N/A 2024    Procedure: Left  Heart Cath;  Surgeon: Cong Rivera MD;  Location: [facility] CATH INVASIVE LOCATION;  Service: Cardiovascular;  Laterality: N/A;    CARDIAC CATHETERIZATION N/A 4/29/2024    Procedure: Left ventriculography;  Surgeon: Cong Rivera MD;  Location:  [facility] CATH INVASIVE LOCATION;  Service: Cardiovascular;  Laterality: N/A;    CATARACT EXTRACTION      CHOLECYSTECTOMY  1995    COLONOSCOPY N/A 12/19/2016    Procedure: COLONOSCOPY WITH COLD BIOPSIES;  Surgeon: Medina Guillen MD;  Location: [facility] ENDOSCOPY;  Service:     CORONARY STENT PLACEMENT  2005    LAD 80% blockage    GANGLION CYST EXCISION      R WRIST    SUBTOTAL HYSTERECTOMY  2009    TONSILLECTOMY  1965    TUBAL ABDOMINAL LIGATION  1985      General Information       Row Name 09/26/24 1449          OT Time and Intention    Document Type therapy note (daily note)  -KG     Mode of Treatment co-treatment;physical therapy;occupational therapy  cotreat appropriate due to pt acuity level, needing 2 person assist for mobility, limited tolerance for multiple sessions.  -KG       Row Name 09/26/24 1449          General Information    Existing Precautions/Restrictions fall  -KG       Row Name 09/26/24 1449          Safety Issues, Functional Mobility    Safety Issues Affecting Function (Mobility) ability to follow commands;insight into deficits/self-awareness;judgment;positioning of assistive device;problem-solving;sequencing abilities  -KG     Impairments Affecting Function (Mobility) balance;coordination;endurance/activity tolerance;pain;strength  -KG               User Key  (r) = Recorded By, (t) = Taken By, (c) = Cosigned By      Initials Name Provider Type    KG Rodrigo Carty OT Occupational Therapist                     Mobility/ADL's       Row Name 09/26/24 6581          Bed Mobility    Bed Mobility supine-sit;sit-supine  -KG     Supine-Sit [place] (Bed Mobility) moderate assist (50% patient effort);1 person assist  -KG     Sit-Supine [place] (Bed  Mobility) moderate assist (50% patient effort);1 person assist  -KG       Row Name 09/26/24 1450          Transfers    Transfers sit-stand transfer  -KG       Row Name 09/26/24 1450          Sit-Stand Transfer    Sit-Stand [place] (Transfers) maximum assist (25% patient effort);2 person assist  -KG     Assistive Device (Sit-Stand Transfers) walker, front-wheeled  -KG     Comment, (Sit-Stand Transfer) unsuccessful on 1st attempt, able to complete on 2nd attempt w/ bed elevated  -KG       Row Name 09/26/24 1450          Functional Mobility    Functional Mobility- Ind. Level --  side stepped to HOB w/ Min A x2 using RW, verbal cueing for sequencing and assist to maneuver RW  -KG       Row Name 09/26/24 1450          Activities of Daily Living    BADL Assessment/Intervention lower body dressing  -KG       Row Name 09/26/24 1450          Lower Body Dressing Assessment/Training    [place] Level (Lower Body Dressing) don;socks;dependent (less than 25% patient effort)  -KG               User Key  (r) = Recorded By, (t) = Taken By, (c) = Cosigned By      Initials Name Provider Type    Rodrigo Medina OT Occupational Therapist                   Obj/Interventions       Row Name 09/26/24 1451          Balance    Balance Assessment sitting static balance;standing static balance;standing dynamic balance  -KG     Static Sitting Balance standby assist  -KG     Position, Sitting Balance sitting edge of bed  -KG     Static Standing Balance minimal assist;2-person assist  -KG     Dynamic Standing Balance minimal assist;2-person assist  -KG     Position/Device Used, Standing Balance supported;walker, front-wheeled  -KG     Balance Interventions sitting;standing;supported;static;dynamic;occupation based/functional task  -KG               User Key  (r) = Recorded By, (t) = Taken By, (c) = Cosigned By      Initials Name Provider Type    Rodrigo Medina OT Occupational Therapist                   Goals/Plan    No  documentation.                  Clinical Impression       Row Name 09/26/24 1452          Pain Assessment    Posttreatment Pain Rating 8/10  -KG     Pain Location - Side/Orientation Bilateral  -KG     Pain Location - knee  -KG     Pre/Posttreatment Pain Comment worsens w/ movement  -KG     Pain Intervention(s) Repositioned;Rest  -KG       Row Name 09/26/24 1452          Plan of Care Review    Plan of Care Reviewed With patient  -KG     Outcome Evaluation Pt seen today for OT/PT cotreat to maximize safety and therepeutic benefit. Continues to present w/ ryan knee pain limiting all activtiy. Performed bed mobility w/ Mod A. Dep for LBD. Attempted STS from EOB w/ Max A x2 and RW, pt unable to complete on 1st attempt but able to complete on 2nd attempt w/ bed elevated. Side stepped to HOB w/ Min A x2 using RW, verbal cueing for sequencing and assist to move RW. Returned to sitting due to fatigue and then back to supine. Pt presents w/ increased pain and impaired strength, balance, and activity tolerance to perform near baseline. OT will f/u.  -KG       Row Name 09/26/24 1452          Therapy Plan Review/Discharge Plan (OT)    Anticipated Discharge Disposition (OT) skilled nursing facility  -KG       Row Name 09/26/24 1452          Vital Signs    Pre Patient Position Supine  -KG     Intra Patient Position Standing  -KG     Post Patient Position Supine  -KG       Row Name 09/26/24 1452          Positioning and Restraints    Pre-Treatment Position in bed  -KG     Post Treatment Position bed  -KG     In Bed notified nsg;supine;call light within reach;encouraged to call for assist;exit alarm on  -KG               User Key  (r) = Recorded By, (t) = Taken By, (c) = Cosigned By      Initials Name Provider Type    KG Rodrigo Carty OT Occupational Therapist                   Outcome Measures       Row Name 09/26/24 0814          How much help from another person do you currently need...    Turning from your back to your side  while in flat bed without using bedrails? 2  -KP     Moving from lying on back to sitting on the side of a flat bed without bedrails? 2  -KP     Moving to and from a bed to a chair (including a wheelchair)? 1  -KP     Standing up from a chair using your arms (e.g., wheelchair, bedside chair)? 1  -KP     Climbing 3-5 steps with a railing? 1  -KP     To walk in hospital room? 1  -KP     AM-PAC 6 Clicks Score (PT) 8  -KP     Highest Level of Mobility Goal 3 --> Sit at edge of bed  -KP               User Key  (r) = Recorded By, (t) = Taken By, (c) = Cosigned By      Initials Name Provider Type    Christine Marks, RN Registered Nurse                    Occupational Therapy Education       Title: PT OT SLP Therapies (In Progress)       Topic: Occupational Therapy (Done)       Point: ADL training (Done)       Description:   Instruct learner(s) on proper safety adaptation and remediation techniques during self care or transfers.   Instruct in proper use of assistive devices.                  Learning Progress Summary             Patient Acceptance, E,TB, VU by  at 9/24/2024 1523                         Point: Home exercise program (Done)       Description:   Instruct learner(s) on appropriate technique for monitoring, assisting and/or progressing therapeutic exercises/activities.                  Learning Progress Summary             Patient Acceptance, E,TB, VU by  at 9/24/2024 1523                         Point: Precautions (Done)       Description:   Instruct learner(s) on prescribed precautions during self-care and functional transfers.                  Learning Progress Summary             Patient Acceptance, E,TB, VU by  at 9/24/2024 1523                         Point: Body mechanics (Done)       Description:   Instruct learner(s) on proper positioning and spine alignment during self-care, functional mobility activities and/or exercises.                  Learning Progress Summary             Patient Acceptance,  E,TB, VU by  at 9/24/2024 1523                                         User Key       Initials Effective Dates Name Provider Type Discipline     01/23/24 -  David Chau OT Occupational Therapist OT                  OT Recommendation and Plan     Plan of Care Review  Plan of Care Reviewed With: patient  Outcome Evaluation: Pt seen today for OT/PT cotreat to maximize safety and therepeutic benefit. Continues to present w/ ryan knee pain limiting all activtiy. Performed bed mobility w/ Mod A. Dep for LBD. Attempted STS from EOB w/ Max A x2 and RW, pt unable to complete on 1st attempt but able to complete on 2nd attempt w/ bed elevated. Side stepped to HOB w/ Min A x2 using RW, verbal cueing for sequencing and assist to move RW. Returned to sitting due to fatigue and then back to supine. Pt presents w/ increased pain and impaired strength, balance, and activity tolerance to perform near baseline. OT will f/u.     Time Calculation:         Time Calculation- OT       Row Name 09/26/24 1455             Time Calculation- OT    OT Start Time 1348  -KG      OT Stop Time 1415  -KG      OT Time Calculation (min) 27 min  -KG      Total Timed Code Minutes- OT 27 minute(s)  -KG      OT Received On 09/26/24  -KG      OT - Next Appointment 09/27/24  -KG         Timed Charges    36432 - OT Therapeutic Activity Minutes 27  -KG         Total Minutes    Timed Charges Total Minutes 27  -KG       Total Minutes 27  -KG                User Key  (r) = Recorded By, (t) = Taken By, (c) = Cosigned By      Initials Name Provider Type     Rodrigo Carty OT Occupational Therapist                  Therapy Charges for Today       Code Description Service Date Service Provider Modifiers Qty    [id number]  OT THERAPEUTIC ACT EA 15 MIN 9/26/2024 Rodrigo Carty OT GO 2                 Rodrigo Carty OT  9/26/2024

## 2024-09-26 NOTE — THERAPY EVALUATION
Patient Name: Barbara Tran  : 1955    MRN: [id number]                              Today's Date: 2024       Admit Date: 2024    Visit Dx:     ICD-10-CM ICD-9-CM   1. Bilateral pulmonary embolism  I26.99 415.19     Patient Active Problem List   Diagnosis    Benign essential HTN    Cervical pain    Dysthymia    Pedal edema    HLD (hyperlipidemia)    Goiter, non-toxic    Chronic pain of left knee    Chronic coronary artery disease    Hypothyroidism    History of sepsis    Primary insomnia    Chronic diastolic congestive heart failure    S/P drug eluting coronary stent placement    Pulmonic valve regurgitation    Tricuspid valve regurgitation    Osteoporosis    Hyperglycemia    History of pulmonary embolism    Balance problem    Pulmonary embolism    COVID-19 virus detected    Influenza    Right ventricular thrombus     Past Medical History:   Diagnosis Date    Allergic 2015    codeine, morphine, levaquin    Arthritis l5 years or so ago    Cataract 6-9 months ago    Need surgery    CHF (congestive heart failure)     Coronary artery disease 2005 stent    COVID-19 virus detected 03/10/2021    Deep vein thrombosis 2021    Pulmonary embolism    Depression     Disease of thyroid gland     Headache Covid 3-6-21    Has continued    History of pulmonary embolism 2021    HL (hearing loss) Last 4-6 months    Hyperlipidemia     Hypertension since     Hypothyroidism since     Obesity     Osteopenia last few years    Pulmonary embolism 2021    From Covid    Unstable angina 2024     Past Surgical History:   Procedure Laterality Date    BUNIONECTOMY Bilateral     HAMMERT TOE REPAIR/BUNIONECTOMY    CARDIAC CATHETERIZATION  ,     CARDIAC CATHETERIZATION N/A 2024    Procedure: Coronary angiography;  Surgeon: Cong Rivera MD;  Location: [facility] CATH INVASIVE LOCATION;  Service: Cardiovascular;  Laterality: N/A;    CARDIAC CATHETERIZATION N/A 2024    Procedure: Left  Heart Cath;  Surgeon: Cong Rivera MD;  Location:  [facility] CATH INVASIVE LOCATION;  Service: Cardiovascular;  Laterality: N/A;    CARDIAC CATHETERIZATION N/A 4/29/2024    Procedure: Left ventriculography;  Surgeon: Cong Rivera MD;  Location:  [facility] CATH INVASIVE LOCATION;  Service: Cardiovascular;  Laterality: N/A;    CATARACT EXTRACTION      CHOLECYSTECTOMY  1995    COLONOSCOPY N/A 12/19/2016    Procedure: COLONOSCOPY WITH COLD BIOPSIES;  Surgeon: Medina Guillen MD;  Location: [facility] ENDOSCOPY;  Service:     CORONARY STENT PLACEMENT  2005    LAD 80% blockage    GANGLION CYST EXCISION      R WRIST    SUBTOTAL HYSTERECTOMY  2009    TONSILLECTOMY  1965    TUBAL ABDOMINAL LIGATION  1985      General Information       Row Name 09/26/24 1453          Physical Therapy Time and Intention    Document Type evaluation  -EF     Mode of Treatment co-treatment;physical therapy;occupational therapy;other (see comments)  -EF       Row Name 09/26/24 1453          General Information    Patient Profile Reviewed yes  -EF     Prior Level of Function independent:;all household mobility;community mobility  -EF     Existing Precautions/Restrictions fall  -EF     Barriers to Rehab medically complex  -EF       Row Name 09/26/24 1453          Living Environment    People in Home child(francesco), adult  -EF       Row Name 09/26/24 1453          Home Main Entrance    Number of Stairs, Main Entrance five  -EF     Stair Railings, Main Entrance railings safe and in good condition  -EF       Row Name 09/26/24 1453          Cognition    Orientation Status (Cognition) oriented x 4  -EF       Row Name 09/26/24 1453          Safety Issues, Functional Mobility    Impairments Affecting Function (Mobility) balance;endurance/activity tolerance;pain;strength  -EF     Comment, Safety Issues/Impairments (Mobility) Co treatment medically appropriate and necessary due to patient acuity level, activity tolerance and safety of patient and staff. Evaluation  established to achieve all goals in POC.  -EF               User Key  (r) = Recorded By, (t) = Taken By, (c) = Cosigned By      Initials Name Provider Type    EF Joanne Fritz PT Physical Therapist                   Mobility       Row Name 09/26/24 1456          Bed Mobility    Supine-Sit Byron (Bed Mobility) moderate assist (50% patient effort);verbal cues  -EF     Sit-Supine [place] (Bed Mobility) moderate assist (50% patient effort);verbal cues  -EF     Assistive Device (Bed Mobility) bed rails;head of bed elevated;draw sheet  -EF       Row Name 09/26/24 1456          Sit-Stand Transfer    Sit-Stand Byron (Transfers) maximum assist (25% patient effort);2 person assist  -EF     Assistive Device (Sit-Stand Transfers) walker, front-wheeled  -EF     Comment, (Sit-Stand Transfer) unsucessful on 1st attempt, able to perform on 2nd attempt with bed elevated  -EF       Row Name 09/26/24 1456          Gait/Stairs (Locomotion)    [place]ron Level (Gait) minimum assist (75% patient effort);2 person assist;verbal cues  -EF     Assistive Device (Gait) walker, front-wheeled  -EF     Distance in Feet (Gait) --  3 side steps at EOB  -EF     Deviations/Abnormal Patterns (Gait) cher decreased;antalgic;festinating/shuffling;weight shifting decreased  -EF     Bilateral Gait Deviations forward flexed posture  -EF               User Key  (r) = Recorded By, (t) = Taken By, (c) = Cosigned By      Initials Name Provider Type    Joanne Coelho PT Physical Therapist                   Obj/Interventions       Row Name 09/26/24 1459          Range of Motion Comprehensive    General Range of Motion bilateral lower extremity ROM WFL  -EF       Row Name 09/26/24 1459          Strength Comprehensive (MMT)    Comment, General Manual Muscle Testing (MMT) Assessment B LE weakness noted; increased difficulty with active movement B due to knee pain  -EF       Row Name 09/26/24 1459          Balance    Balance  Assessment sitting static balance;standing static balance;standing dynamic balance  -EF     Static Sitting Balance standby assist  -EF     Position, Sitting Balance unsupported;sitting edge of bed  -EF     Static Standing Balance minimal assist;2-person assist  -EF     Dynamic Standing Balance minimal assist;2-person assist  -EF     Position/Device Used, Standing Balance supported;walker, front-wheeled  -EF       Row Name 09/26/24 1459          Sensory Assessment (Somatosensory)    Sensory Assessment (Somatosensory) LE sensation intact  -EF               User Key  (r) = Recorded By, (t) = Taken By, (c) = Cosigned By      Initials Name Provider Type    EF Joanne Fritz, PT Physical Therapist                   Goals/Plan       Row Name 09/26/24 1506          Bed Mobility Goal 1 (PT)    Activity/Assistive Device (Bed Mobility Goal 1, PT) sit to supine/supine to sit  -EF     [place] Level/Cues Needed (Bed Mobility Goal 1, PT) standby assist  -EF     Time Frame (Bed Mobility Goal 1, PT) 1 week;long term goal (LTG)  -EF     Progress/Outcomes (Bed Mobility Goal 1, PT) goal ongoing  -EF       Row Name 09/26/24 1506          Transfer Goal 1 (PT)    Activity/Assistive Device (Transfer Goal 1, PT) sit-to-stand/stand-to-sit;bed-to-chair/chair-to-bed;walker, rolling  -EF     [place] Level/Cues Needed (Transfer Goal 1, PT) minimum assist (75% or more patient effort)  -EF     Time Frame (Transfer Goal 1, PT) 1 week;long term goal (LTG)  -EF     Progress/Outcome (Transfer Goal 1, PT) goal ongoing  -EF       Row Name 09/26/24 1506          Gait Training Goal 1 (PT)    Activity/Assistive Device (Gait Training Goal 1, PT) gait (walking locomotion);walker, rolling  -EF     [place] Level (Gait Training Goal 1, PT) minimum assist (75% or more patient effort)  -EF     Distance (Gait Training Goal 1, PT) 50'  -EF     Time Frame (Gait Training Goal 1, PT) 1 week;long term goal (LTG)  -EF     Progress/Outcome (Gait  Training Goal 1, PT) goal ongoing  -EF       Row Name 09/26/24 1501          Therapy Assessment/Plan (PT)    Planned Therapy Interventions (PT) balance training;bed mobility training;gait training;home exercise program;patient/family education;ROM (range of motion);stair training;strengthening;transfer training;postural re-education  -EF               User Key  (r) = Recorded By, (t) = Taken By, (c) = Cosigned By      Initials Name Provider Type    EF Joanne Fritz, PT Physical Therapist                   Clinical Impression       Row Name 09/26/24 1501          Pain    Pretreatment Pain Rating 5/10  -EF     Posttreatment Pain Rating 7/10  -EF     Pain Location - Side/Orientation Bilateral  -EF     Pain Location - knee  -EF     Pre/Posttreatment Pain Comment reports knee pain worse with movement; L knee slightly worse then R knee  -EF     Pain Intervention(s) Repositioned;Rest  -EF       Row Name 09/26/24 1501          Plan of Care Review    Plan of Care Reviewed With patient  -EF     Outcome Evaluation Pt is a 70 yo female who presented from home with acute B PE, acute L LE DVT, RV thrombus. Recent diagnosis of covid-19 and influenza as well as recent ligament tear in L foot. Pt was [facility] for 3 weeks and was just cleared to [facility] following ligamentous injury. Prior to admission, pt was living at home with daughter and independent with mobility. Pt has walker at home. Pt reports worsening B knee pain; ortho is following for aspiration and steroid injection. Upon exam, pt demos generalized weakness, impaired balance, and decreased endurance limiting mobility. Pt performed bed mobility with mod A and STS transfer with max A x2. Pt was able to take a few shuffled side steps at EOB with min A x2 and rwx; however, unable to ambulate farther due to pain and weakness. Pt will continue to benefit from PT to address impairments and increase independence with mobility. Rec DC to SNF pending progress.  -EF       Row Name  09/26/24 1501          Therapy Assessment/Plan (PT)    Rehab Potential (PT) good, to achieve stated therapy goals  -EF     Criteria for Skilled Interventions Met (PT) yes;meets criteria;skilled treatment is necessary  -EF     Therapy Frequency (PT) 5 times/wk  -EF       Row Name 09/26/24 1501          Positioning and Restraints    Pre-Treatment Position in bed  -EF     Post Treatment Position bed  -EF     In Bed fowlers;call light within reach;encouraged to call for assist;exit alarm on;LLE elevated;RLE elevated;notified nsg  -EF               User Key  (r) = Recorded By, (t) = Taken By, (c) = Cosigned By      Initials Name Provider Type    EF Joanne Fritz, PT Physical Therapist                   Outcome Measures       Row Name 09/26/24 1507 09/26/24 0850       How much help from another person do you currently need...    Turning from your back to your side while in flat bed without using bedrails? 2  -EF 2  -KP    Moving from lying on back to sitting on the side of a flat bed without bedrails? 2  -EF 2  -KP    Moving to and from a bed to a chair (including a wheelchair)? 2  -EF 1  -KP    Standing up from a chair using your arms (e.g., wheelchair, bedside chair)? 2  -EF 1  -KP    Climbing 3-5 steps with a railing? 1  -EF 1  -KP    To walk in hospital room? 2  -EF 1  -KP    AM-PAC 6 Clicks Score (PT) 11  -EF 8  -KP    Highest Level of Mobility Goal 4 --> Transfer to chair/commode  -EF 3 --> Sit at edge of bed  -KP              User Key  (r) = Recorded By, (t) = Taken By, (c) = Cosigned By      Initials Name Provider Type    Joanne Coelho, PT Physical Therapist    Christine Marks RN Registered Nurse                                 Physical Therapy Education       Title: PT OT SLP Therapies (In Progress)       Topic: Physical Therapy (In Progress)       Point: Mobility training (Done)       Learning Progress Summary             Patient Acceptance, E,TB, VU,NR by EF at 9/26/2024 1504                          Point: Home exercise program (Not Started)       Learner Progress:  Not documented in this visit.              Point: Body mechanics (Not Started)       Learner Progress:  Not documented in this visit.              Point: Precautions (Not Started)       Learner Progress:  Not documented in this visit.                              User Key       Initials Effective Dates Name Provider Type Discipline     05/31/24 -  Joanne Fritz PT Physical Therapist PT                  PT Recommendation and Plan  Planned Therapy Interventions (PT): balance training, bed mobility training, gait training, home exercise program, patient/family education, ROM (range of motion), stair training, strengthening, transfer training, postural re-education  Plan of Care Reviewed With: patient  Outcome Evaluation: Pt is a 70 yo female who presented from home with acute B PE, acute L LE DVT, RV thrombus. Recent diagnosis of covid-19 and influenza as well as recent ligament tear in L foot. Pt was [facility] for 3 weeks and was just cleared to [facility] following ligamentous injury. Prior to admission, pt was living at home with daughter and independent with mobility. Pt has walker at home. Pt reports worsening B knee pain; ortho is following for aspiration and steroid injection. Upon exam, pt demos generalized weakness, impaired balance, and decreased endurance limiting mobility. Pt performed bed mobility with mod A and STS transfer with max A x2. Pt was able to take a few shuffled side steps at EOB with min A x2 and rwx; however, unable to ambulate farther due to pain and weakness. Pt will continue to benefit from PT to address impairments and increase independence with mobility. Rec DC to SNF pending progress.     Time Calculation:         PT Charges       Row Name 09/26/24 1505             Time Calculation    Start Time 1348  -EF      Stop Time 1412  -EF      Time Calculation (min) 24 min  -EF      PT Received On 09/26/24  -EF      PT - Next  Appointment 09/27/24  -EF      PT Goal Re-Cert Due Date 10/03/24  -EF         Time Calculation- PT    Total Timed Code Minutes- PT 10 minute(s)  -EF         Timed Charges    01129 - PT Therapeutic Activity Minutes 10  -EF         Total Minutes    Timed Charges Total Minutes 10  -EF       Total Minutes 10  -EF                User Key  (r) = Recorded By, (t) = Taken By, (c) = Cosigned By      Initials Name Provider Type    EF Joanne Fritz, PT Physical Therapist                  Therapy Charges for Today       Code Description Service Date Service Provider Modifiers Qty    [id number]  PT THERAPEUTIC ACT EA 15 MIN 9/26/2024 Joanne Fritz, PT GP 1    [id number] HC PT EVAL MOD COMPLEXITY 2 9/26/2024 Joanne Fritz, PT GP 1            PT G-Codes  Outcome Measure Options: AM-PAC 6 Clicks Daily Activity (OT)  AM-PAC 6 Clicks Score (PT): 11  AM-PAC 6 Clicks Score (OT): 14  PT Discharge Summary  Anticipated Discharge Disposition (PT): skilled nursing facility    Joanne Fritz PT  9/26/2024

## 2024-09-27 ENCOUNTER — APPOINTMENT (OUTPATIENT)
Dept: GENERAL RADIOLOGY | Facility: HOSPITAL | Age: 69
End: 2024-09-27
Payer: MEDICARE

## 2024-09-27 LAB
ANION GAP SERPL CALCULATED.3IONS-SCNC: 8 MMOL/L (ref 5–15)
BUN SERPL-MCNC: 25 MG/DL (ref 8–23)
BUN/CREAT SERPL: 39.1 (ref 7–25)
CALCIUM SPEC-SCNC: 9 MG/DL (ref 8.6–10.5)
CHLORIDE SERPL-SCNC: 102 MMOL/L (ref 98–107)
CO2 SERPL-SCNC: 24 MMOL/L (ref 22–29)
CREAT SERPL-MCNC: 0.64 MG/DL (ref 0.57–1)
DEPRECATED RDW RBC AUTO: 41.5 FL (ref 37–54)
EGFRCR SERPLBLD CKD-EPI 2021: 94.6 ML/MIN/1.73
ERYTHROCYTE [DISTWIDTH] IN BLOOD BY AUTOMATED COUNT: 12.6 % (ref 12.3–15.4)
GLUCOSE SERPL-MCNC: 161 MG/DL (ref 65–99)
HCT VFR BLD AUTO: 41.3 % (ref 34–46.6)
HGB BLD-MCNC: 13.9 G/DL (ref 12–15.9)
MAGNESIUM SERPL-MCNC: 2.4 MG/DL (ref 1.6–2.4)
MCH RBC QN AUTO: 30 PG (ref 26.6–33)
MCHC RBC AUTO-ENTMCNC: 33.7 G/DL (ref 31.5–35.7)
MCV RBC AUTO: 89 FL (ref 79–97)
PLATELET # BLD AUTO: 370 10*3/MM3 (ref 140–450)
PMV BLD AUTO: 9.9 FL (ref 6–12)
POTASSIUM SERPL-SCNC: 4 MMOL/L (ref 3.5–5.2)
RBC # BLD AUTO: 4.64 10*6/MM3 (ref 3.77–5.28)
SARS-COV-2 AG RESP QL IA.RAPID: NORMAL
SODIUM SERPL-SCNC: 134 MMOL/L (ref 136–145)
WBC NRBC COR # BLD AUTO: 11.73 10*3/MM3 (ref 3.4–10.8)

## 2024-09-27 PROCEDURE — 80048 BASIC METABOLIC PNL TOTAL CA: CPT | Performed by: HOSPITALIST

## 2024-09-27 PROCEDURE — 77002 NEEDLE LOCALIZATION BY XRAY: CPT

## 2024-09-27 PROCEDURE — 83735 ASSAY OF MAGNESIUM: CPT | Performed by: HOSPITALIST

## 2024-09-27 PROCEDURE — 25510000001 IOPAMIDOL 61 % SOLUTION: Performed by: HOSPITALIST

## 2024-09-27 PROCEDURE — 97530 THERAPEUTIC ACTIVITIES: CPT

## 2024-09-27 PROCEDURE — 25010000002 METHYLPREDNISOLONE PER 80 MG: Performed by: NURSE PRACTITIONER

## 2024-09-27 PROCEDURE — 25010000002 LIDOCAINE 1 % SOLUTION: Performed by: NURSE PRACTITIONER

## 2024-09-27 PROCEDURE — 25010000002 BUPIVACAINE (PF) 0.25 % SOLUTION: Performed by: NURSE PRACTITIONER

## 2024-09-27 PROCEDURE — 85027 COMPLETE CBC AUTOMATED: CPT | Performed by: INTERNAL MEDICINE

## 2024-09-27 PROCEDURE — 87426 SARSCOV CORONAVIRUS AG IA: CPT | Performed by: HOSPITALIST

## 2024-09-27 PROCEDURE — 3E0U33Z INTRODUCTION OF ANTI-INFLAMMATORY INTO JOINTS, PERCUTANEOUS APPROACH: ICD-10-PCS | Performed by: RADIOLOGY

## 2024-09-27 PROCEDURE — 97110 THERAPEUTIC EXERCISES: CPT

## 2024-09-27 PROCEDURE — 3E0U3BZ INTRODUCTION OF ANESTHETIC AGENT INTO JOINTS, PERCUTANEOUS APPROACH: ICD-10-PCS | Performed by: RADIOLOGY

## 2024-09-27 RX ORDER — METHYLPREDNISOLONE ACETATE 80 MG/ML
80 INJECTION, SUSPENSION INTRA-ARTICULAR; INTRALESIONAL; INTRAMUSCULAR; SOFT TISSUE ONCE
Status: COMPLETED | OUTPATIENT
Start: 2024-09-27 | End: 2024-09-27

## 2024-09-27 RX ORDER — LIDOCAINE HYDROCHLORIDE 10 MG/ML
10 INJECTION, SOLUTION INFILTRATION; PERINEURAL ONCE
Status: COMPLETED | OUTPATIENT
Start: 2024-09-27 | End: 2024-09-27

## 2024-09-27 RX ORDER — BUPIVACAINE HYDROCHLORIDE 2.5 MG/ML
10 INJECTION, SOLUTION EPIDURAL; INFILTRATION; INTRACAUDAL ONCE
Status: COMPLETED | OUTPATIENT
Start: 2024-09-27 | End: 2024-09-27

## 2024-09-27 RX ORDER — HYDROCODONE POLISTIREX AND CHLORPHENIRAMINE POLISTIREX 10; 8 MG/5ML; MG/5ML
5 SUSPENSION, EXTENDED RELEASE ORAL EVERY 12 HOURS PRN
Status: DISCONTINUED | OUTPATIENT
Start: 2024-09-27 | End: 2024-09-28

## 2024-09-27 RX ORDER — IOPAMIDOL 612 MG/ML
100 INJECTION, SOLUTION INTRAVASCULAR
Status: COMPLETED | OUTPATIENT
Start: 2024-09-27 | End: 2024-09-27

## 2024-09-27 RX ADMIN — BUPROPION HYDROCHLORIDE 300 MG: 300 TABLET, EXTENDED RELEASE ORAL at 09:11

## 2024-09-27 RX ADMIN — SACUBITRIL AND VALSARTAN 1 TABLET: 24; 26 TABLET, FILM COATED ORAL at 22:13

## 2024-09-27 RX ADMIN — COLCHICINE 0.6 MG: 0.6 TABLET ORAL at 09:11

## 2024-09-27 RX ADMIN — APIXABAN 10 MG: 5 TABLET, FILM COATED ORAL at 22:13

## 2024-09-27 RX ADMIN — HYDROCODONE BITARTRATE AND ACETAMINOPHEN 1 TABLET: 5; 325 TABLET ORAL at 04:39

## 2024-09-27 RX ADMIN — LEVOTHYROXINE SODIUM 50 MCG: 50 TABLET ORAL at 04:10

## 2024-09-27 RX ADMIN — SPIRONOLACTONE 25 MG: 25 TABLET, FILM COATED ORAL at 09:12

## 2024-09-27 RX ADMIN — TORSEMIDE 20 MG: 20 TABLET ORAL at 09:11

## 2024-09-27 RX ADMIN — BUPIVACAINE HYDROCHLORIDE 5 ML: 2.5 INJECTION, SOLUTION EPIDURAL; INFILTRATION; INTRACAUDAL at 13:48

## 2024-09-27 RX ADMIN — ROSUVASTATIN CALCIUM 40 MG: 40 TABLET, FILM COATED ORAL at 22:13

## 2024-09-27 RX ADMIN — HYDROCODONE BITARTRATE AND ACETAMINOPHEN 1 TABLET: 5; 325 TABLET ORAL at 16:11

## 2024-09-27 RX ADMIN — LIDOCAINE HYDROCHLORIDE 1 ML: 10 INJECTION, SOLUTION INFILTRATION; PERINEURAL at 13:44

## 2024-09-27 RX ADMIN — BENZONATATE 200 MG: 100 CAPSULE ORAL at 04:10

## 2024-09-27 RX ADMIN — APIXABAN 10 MG: 5 TABLET, FILM COATED ORAL at 09:11

## 2024-09-27 RX ADMIN — IOPAMIDOL 1 ML: 612 INJECTION, SOLUTION INTRAVENOUS at 13:46

## 2024-09-27 RX ADMIN — METHYLPREDNISOLONE ACETATE 80 MG: 80 INJECTION, SUSPENSION INTRA-ARTICULAR; INTRALESIONAL; INTRAMUSCULAR; SOFT TISSUE at 13:48

## 2024-09-27 RX ADMIN — HYDROCODONE POLISTIREX AND CHLORPHENIRAMINE POLISTIREX 5 ML: 10; 8 SUSPENSION, EXTENDED RELEASE ORAL at 22:13

## 2024-09-27 RX ADMIN — SACUBITRIL AND VALSARTAN 1 TABLET: 24; 26 TABLET, FILM COATED ORAL at 09:11

## 2024-09-27 NOTE — CASE MANAGEMENT/SOCIAL WORK
Continued Stay Note  [facility]     Patient Name: Barbara Tran  MRN: [id number]  Today's Date: 9/27/2024    Admit Date: 9/20/2024    Plan: SNF   Discharge Plan       Row Name 09/27/24 1722       Plan    Plan SNF    Patient/Family in Agreement with Plan yes    Plan Comments Met with pt in room. She confirmed she will be going to rehab at discharge. Family may be able to transport. She chose four facilities from the Patient Choice list. Referrals entered per request. She denied any further needs at this time. CCP following. TUCKER Agosto RN                   Discharge Codes    No documentation.                 Expected Discharge Date and Time       Expected Discharge Date Expected Discharge Time    Sep 28, 2024               Vince Díaz RN

## 2024-09-27 NOTE — PLAN OF CARE
Goal Outcome Evaluation:  Plan of Care Reviewed With: patient           Outcome Evaluation: vss, pain managed with prn pain medications this shift, used bedpan for voiding,Plan of care continued this shift.

## 2024-09-27 NOTE — PROGRESS NOTES
Name: Barbara Tran ADMIT: 2024   : 1955  PCP: Millicent Ace MD    MRN: [id number] LOS: 6 days   AGE/SEX: 69 y.o. female  ROOM: [facility] ([place]     Subjective   Subjective   Right knee pain is improved. She still has left knee pain. Also complaining of cough she states her Tussidex order .     Objective   Objective   Vital Signs  Temp:  [97.6 °F (36.4 °C)-98.9 °F (37.2 °C)] 98.5 °F (36.9 °C)  Heart Rate:  [77-88] 77  Resp:  [16] 16  BP: (108-142)/(61-78) 120/68  SpO2:  [90 %-97 %] 90 %  on   ;   Device (Oxygen Therapy): room air  Body mass index is 31.95 kg/m².    Physical Exam  Constitutional:       General: She is not in acute distress.     Appearance: She is not toxic-appearing.   HENT:      Head: Normocephalic and atraumatic.   Cardiovascular:      Rate and Rhythm: Normal rate and regular rhythm.   Pulmonary:      Effort: Pulmonary effort is normal. No respiratory distress.      Breath sounds: Normal breath sounds. No wheezing or rhonchi.   Abdominal:      General: Bowel sounds are normal.      Palpations: Abdomen is soft.      Tenderness: There is no abdominal tenderness. There is no guarding or rebound.   Musculoskeletal:         General: No swelling.      Right knee: Swelling present.      Left knee: Swelling present. Tenderness present.   Skin:     General: Skin is warm and dry.   Neurological:      General: No focal deficit present.      Mental Status: She is alert.   Psychiatric:         Mood and Affect: Mood normal.         Behavior: Behavior normal.     Results Review  I reviewed the patient's new clinical results.  Results from last 7 days   Lab Units 24  0601 24  0311 24  0625 24  0346   WBC 10*3/mm3 11.73* 10.10 10.15 11.27*   HEMOGLOBIN g/dL 13.9 14.5 15.3 14.8   PLATELETS 10*3/mm3 370 307 317 320     Results from last 7 days   Lab Units 24  0601 24  1127 24  0350 24  0625   SODIUM mmol/L 134* 134* 131* 132*   POTASSIUM mmol/L 4.0 4.1 4.0  "4.0   CHLORIDE mmol/L 102 99 98 99   CO2 mmol/L 24.0 22.8 21.0* 23.1   BUN mg/dL 25* 22 13 15   CREATININE mg/dL 0.64 0.70 0.59 0.69   GLUCOSE mg/dL 161* 141* 119* 115*     Lab Results   Component Value Date    ANIONGAP 8.0 09/27/2024     Estimated Creatinine Clearance: 94.2 mL/min (by C-G formula based on SCr of 0.64 mg/dL).   Lab Results   Component Value Date    EGFR 94.6 09/27/2024     Results from last 7 days   Lab Units 09/20/24  1728   ALBUMIN g/dL 4.2   BILIRUBIN mg/dL 0.3   ALK PHOS U/L 97   AST (SGOT) U/L 23   ALT (SGPT) U/L 32     Results from last 7 days   Lab Units 09/27/24  0601 09/26/24  1127 09/25/24  0350 09/24/24  0625 09/23/24  0311 09/22/24  1224 09/20/24  1728   CALCIUM mg/dL 9.0 9.3 9.2 9.1   < > 8.9 9.0   ALBUMIN g/dL  --   --   --   --   --   --  4.2   MAGNESIUM mg/dL  --   --   --   --   --  2.2  --     < > = values in this interval not displayed.       No results found for: \"HGBA1C\", \"POCGLU\"    FL Guided Aspiration Joint    Result Date: 9/26/2024  1. Technically successful right knee joint aspiration with fluoroscopic guidance, yielding a dry tap. 2. Technically successful right knee intra-articular medication injection with fluoroscopic guidance. 3. Technically successful left knee joint aspiration with fluoroscopic guidance, yielding approximately 5 mL of thick, cloudy pale yellow, slightly blood-tinged aspirate material.      FL Guide For Pain Meds Inj    Result Date: 9/26/2024  1. Technically successful right knee joint aspiration with fluoroscopic guidance, yielding a dry tap. 2. Technically successful right knee intra-articular medication injection with fluoroscopic guidance. 3. Technically successful left knee joint aspiration with fluoroscopic guidance, yielding approximately 5 mL of thick, cloudy pale yellow, slightly blood-tinged aspirate material.       Scheduled Meds  apixaban, 10 mg, Oral, Q12H   Followed by  [START ON 9/30/2024] apixaban, 5 mg, Oral, Q12H  buPROPion XL, 300 " mg, Oral, Daily  colchicine, 0.6 mg, Oral, Daily  levothyroxine, 50 mcg, Oral, Q AM  rosuvastatin, 40 mg, Oral, Nightly  sacubitril-valsartan, 1 tablet, Oral, BID  spironolactone, 25 mg, Oral, Daily  torsemide, 20 mg, Oral, Daily    Continuous Infusions   PRN Meds    benzonatate    Calcium Replacement - Follow Nurse / BPA Driven Protocol    HYDROcodone-acetaminophen    HYDROmorphone    Magnesium Standard Dose Replacement - Follow Nurse / BPA Driven Protocol    ondansetron    Phosphorus Replacement - Follow Nurse / BPA Driven Protocol    Potassium Replacement - Follow Nurse / BPA Driven Protocol    sodium chloride     Diet  Diet: Cardiac; Healthy Heart (2-3 Na+); Fluid Consistency: Thin (IDDSI 0)       Assessment/Plan     Active Hospital Problems    Diagnosis  POA    **Pulmonary embolism [I26.99]  Yes    Right ventricular thrombus [I51.3]  Unknown    COVID-19 virus detected [U07.1]  Unknown    Influenza [J11.1]  Unknown    History of pulmonary embolism [Z86.711]  Yes    Chronic diastolic congestive heart failure [I50.32]  Yes    Hypothyroidism [E03.9]  Yes    Chronic coronary artery disease [I25.10]  Yes    Benign essential HTN [I10]  Yes    HLD (hyperlipidemia) [E78.5]  Yes      Resolved Hospital Problems   No resolved problems to display.     69 y.o. female     Acute bilateral PE (recurrent PE)  Left DVT  Pulmonary infarction not excluded on CT scan  RV thrombus  Have switched to apixaban. Now will likely have lifelong anticoagulant  Appreciate hematology- hypercoag workup/outpatient workup  Appreciate pulmonology recommends repeat CT scan in 6 to 8 weeks to ensure clearance of probable pulmonary infarcts (follow-up with [facility])  Echocardiogram no right heart strain but shows RV thrombus  Hemodynamically stable not requiring any oxygen        Bilateral knee pain  Pseudogout   pain medications, Orthopedics has seen and had aspiration/joint injection with steroid on the right though not left. Fluid c/w pseudogout. Have  started on cochicine- cannot use nsaids with her a/c. Pain in the right improved orthopedic surgery considering left steroid knee injection     Influenza  COVID  Positive for both on 9/8/2024. Antigen negative x 1, will recheck  Continue supportive care  Not requiring any oxygen  Cough improved with antitussive medication     CAD history of stent  HFpEF EF 56 to 60%  Hypertension BP acceptable  Hyperlipidemia statin  Hypothyroidism levothyroxine  Obesity Body mass index is 31.95 kg/m².      DVT prophylaxis  anticoagulation as above     Discharge  Probably rehab per therapy notes  Expected Discharge Date: 9/27/2024; Expected Discharge Time:     Discussed with patient and nursing staff    Lambert Foley MD  [facility]  09/27/24

## 2024-09-27 NOTE — PROGRESS NOTES
Orthopedic Progress Note      Patient: Barbara Tran    YOB: 1955    Medical Record Number: [id number]    Attending Physician: Lambert Foley MD    Date of Admission: 9/20/2024  5:30 PM    Admitting Dx:  Pulmonary embolism [I26.99]  Bilateral pulmonary embolism [I26.99]    Current Problem List:   Pulmonary embolism    Benign essential HTN    HLD (hyperlipidemia)    Chronic coronary artery disease    Hypothyroidism    Chronic diastolic congestive heart failure    History of pulmonary embolism    COVID-19 virus detected    Influenza    Right ventricular thrombus      Past Medical History:   Diagnosis Date    Allergic 2015    codeine, morphine, levaquin    Arthritis l5 years or so ago    Basal cell carcinoma     CAD (coronary artery disease)     Cataract 6-9 months ago    Need surgery    CHF (congestive heart failure)     Coronary artery disease 2005 stent    COVID-19 virus detected 03/10/2021    Deep vein thrombosis 06/03/2021    Pulmonary embolism    Depression     Disease of thyroid gland     Headache Covid 3-6-21    Has continued    History of pulmonary embolism 06/01/2021    History of urinary tract infection     HL (hearing loss) Last 4-6 months    Hyperlipidemia     Hypertension since 2005    Hypothyroidism since 2012    Multinodular goiter     Obesity     Osteopenia last few years    Osteoporosis     Pulmonary embolism 06/03/2021    From Covid    Pulmonic valve regurgitation     Tricuspid valve regurgitation     Unstable angina 04/16/2024       Current Medications:  Scheduled Meds:apixaban, 10 mg, Oral, Q12H   Followed by  [START ON 9/30/2024] apixaban, 5 mg, Oral, Q12H  buPROPion XL, 300 mg, Oral, Daily  colchicine, 0.6 mg, Oral, Daily  levothyroxine, 50 mcg, Oral, Q AM  rosuvastatin, 40 mg, Oral, Nightly  sacubitril-valsartan, 1 tablet, Oral, BID  spironolactone, 25 mg, Oral, Daily  torsemide, 20 mg, Oral, Daily      PRN Meds:.  benzonatate    Calcium Replacement - Follow Nurse / BPA  Driven Protocol    Hydrocod Sukhjinder-Chlorphe Sukhjinder ER    HYDROcodone-acetaminophen    HYDROmorphone    Magnesium Standard Dose Replacement - Follow Nurse / BPA Driven Protocol    ondansetron    Phosphorus Replacement - Follow Nurse / BPA Driven Protocol    Potassium Replacement - Follow Nurse / BPA Driven Protocol    sodium chloride    SUBJECTIVE: 69 y.o.  female awake and alert.  Continues to complain of bilateral knee pain.  Left knee is worse than the right    OBJECTIVE:   Vitals:    09/26/24 1200 09/26/24 2120 09/26/24 2351 09/27/24 0800   BP: 108/61 128/78 142/78 120/68   BP Location: Left arm Left arm Left arm Left arm   Patient Position: Lying Lying Lying Lying   Pulse: 79 84 88 77   Resp:  16 16 16   Temp: 97.6 °F (36.4 °C) 98.9 °F (37.2 °C) 98.5 °F (36.9 °C) 98.5 °F (36.9 °C)   TempSrc: Oral Oral Oral Oral   SpO2:  97% 90%    Weight:       Height:         I/O last 3 completed shifts:  In: 720 [P.O.:720]  Out: -     Diagnostic Tests:  Lab Results (last 72 hours)       Procedure Component Value Units Date/Time    Magnesium [[id number]]  (Normal) Collected: 09/27/24 0937    Specimen: Blood Updated: 09/27/24 1008     Magnesium 2.4 mg/dL     COVID-19 RAPID AG,VERITOR,COR/PAD/ALANNA/RELL/LAG/LISETTE/ IN-HOUSE,DRY SWAB, 1 HR TAT - Swab, Nasal Cavity [[id number]]  (Normal) Collected: 09/27/24 0915    Specimen: Swab from Nasal Cavity Updated: 09/27/24 0951     COVID19 Presumptive Negative    Narrative:      Fact sheets for providers: https://www.fda.gov/media/749321/download    Fact sheets for patients: https://www.fda.gov/media/505424/download    Basic Metabolic Panel [[id number]]  (Abnormal) Collected: 09/27/24 0601    Specimen: Blood Updated: 09/27/24 0721     Glucose 161 mg/dL      BUN 25 mg/dL      Creatinine 0.64 mg/dL      Sodium 134 mmol/L      Potassium 4.0 mmol/L      Chloride 102 mmol/L      CO2 24.0 mmol/L      Calcium 9.0 mg/dL      BUN/Creatinine Ratio 39.1     Anion Gap 8.0 mmol/L      eGFR 94.6 mL/min/1.73      Narrative:      GFR Normal >60  Chronic Kidney Disease <60  Kidney Failure <15    The GFR formula is only valid for adults with stable renal function between ages 18 and 70.    CBC (No Diff) [[id number]]  (Abnormal) Collected: 09/27/24 0601    Specimen: Blood Updated: 09/27/24 0701     WBC 11.73 10*3/mm3      RBC 4.64 10*6/mm3      Hemoglobin 13.9 g/dL      Hematocrit 41.3 %      MCV 89.0 fL      MCH 30.0 pg      MCHC 33.7 g/dL      RDW 12.6 %      RDW-SD 41.5 fl      MPV 9.9 fL      Platelets 370 10*3/mm3     Body Fluid Culture - Aspirate, Knee, Left [[id number]] Collected: 09/25/24 1510    Specimen: Aspirate from Knee, Left Updated: 09/27/24 0646     Body Fluid Culture No growth at 2 days     Gram Stain No WBCs or organisms seen    Body Fluid Culture - Aspirate, Knee, Right [[id number]] Collected: 09/25/24 1510    Specimen: Aspirate from Knee, Right Updated: 09/27/24 0645     Body Fluid Culture No growth at 2 days     Gram Stain No WBCs or organisms seen    Basic Metabolic Panel [[id number]]  (Abnormal) Collected: 09/26/24 1127    Specimen: Blood Updated: 09/26/24 1230     Glucose 141 mg/dL      BUN 22 mg/dL      Creatinine 0.70 mg/dL      Sodium 134 mmol/L      Potassium 4.1 mmol/L      Chloride 99 mmol/L      CO2 22.8 mmol/L      Calcium 9.3 mg/dL      BUN/Creatinine Ratio 31.4     Anion Gap 12.2 mmol/L      eGFR 92.6 mL/min/1.73     Narrative:      GFR Normal >60  Chronic Kidney Disease <60  Kidney Failure <15    The GFR formula is only valid for adults with stable renal function between ages 18 and 70.    Lupus Anticoagulant [[id number]]  (Abnormal) Collected: 09/21/24 1618    Specimen: Blood Updated: 09/25/24 1709     Dilute Prothrombin Time(dPT) 48.2 sec      dPT Confirm Ratio 1.04 Ratio      Thrombin Time >150.0 sec      PTT Lupus Anticoagulant 38.4 sec      Comment: Additional testing confirms the presence of heparin in the test  sample. Results obtained after heparin neutralization.        Dilute Viper Venom  Time 51.4 sec      Lupus Anticoagulant Reflex Comment:     Comment: No lupus anticoagulant was detected. PTT-LA and dRVVT results are  consistent with specific inhibitors to one or more common pathway factors  (X, V, II or fibrinogen). The dPT was extended but the dPT confirmatory  ratio was normal. This is consistent with a deficiency or specific  inhibition of one or more extrinsic pathway factors (VII, X, V, II or  fibrinogen). The extended thrombin time failed to correct on addition of a  heparin neutralizer or normal plasma suggesting the presence of dabigatran,  a direct thrombin inhibitor anticoagulant.  Important Note: The results of LA testing are not valid for patients  receiving anticoagulant therapy. This treatment does not, however,  interfere with anticardiolipin and beta-2 glycoprotein 1 antibody testing.  Important Note: The results of LA testing are not valid for patients  receiving anticoagulant therapy. This treatment does not, however,  interfere with anticardiolipin and beta-2 glycoprotein 1 antibody testing.       Narrative:      Performed at:  [address] [place] [address]  [id number]  : Jorje Hayward MD, Phone:  [phone]    Reflexed TT Mix+TTN [[id number]]  (Abnormal) Collected: 09/21/24 1618    Specimen: Blood Updated: 09/25/24 1709     Thrombin Time Mix 149.1 sec      Thrombin Neutralization 23.7 sec     Narrative:      Performed at:  [address] [place] [address]  [id number]  : Jorje Hayward MD, Phone:  [phone]    Reflexed DRVVT Mix [[id number]]  (Abnormal) Collected: 09/21/24 1618    Specimen: Blood Updated: 09/25/24 1709     Dilute Viper Venom 1:1 Mix 41.4 sec     Narrative:      Performed at:  [address] [place] [address]  [id number]  : Jorje Hayward MD, Phone:  [phone]    Reflexed DRVVT Confirm [[id number]] Collected: 09/21/24 1618    Specimen: Blood Updated:  09/25/24 1709     Dilute Viper Venom Time 1.1 ratio     Narrative:      Performed at:  [address] [place] [address]  [id number]  : Jorje Hayward MD, Phone:  [phone]    Crystal Exam, Fluid - Synovial Fluid, [[id number]] Collected: 09/25/24 1510    Specimen: Synovial Fluid Updated: 09/25/24 1707     Crystals, Fluid Intra and Extracellular crystals observed exhibiting polarization characteristics of Calcium Pyrophosphate    Crystal Exam, Fluid - Synovial Fluid, Knee, Right [[id number]] Collected: 09/25/24 1510    Specimen: Synovial Fluid from Knee, Right Updated: 09/25/24 1707     Crystals, Fluid Intra and Extracellular crystals observed exhibiting polarization characteristics of Calcium Pyrophosphate    Body Fluid Cell Count With Differential - Aspirate, Knee, Left [[id number]]  (Abnormal) Collected: 09/25/24 1510    Specimen: Aspirate from Knee, Left Updated: 09/25/24 1653    Narrative:      The following orders were created for panel order Body Fluid Cell Count With Differential - Aspirate, Knee, Left.  Procedure                               Abnormality         Status                     ---------                               -----------         ------                     Body fluid cell count - ...[[id number]]  Abnormal            Final result               Body fluid differential ...[[id number]]                      Final result                 Please view results for these tests on the individual orders.    Body fluid cell count - Aspirate, Knee, Left [[id number]]  (Abnormal) Collected: 09/25/24 1510    Specimen: Aspirate from Knee, Left Updated: 09/25/24 1653     Color, Fluid Yellow     Appearance, Fluid Hazy     RBC, Fluid 60 /mm3      Nucleated Cells, Fluid 11,900 /mm3      Method: Hemacytometer Method    Narrative:      No reference range established. Physician to interpret results with clinical findings.    Two syringes received in Lab. Both syringes had patient chart  label, but did not specify Left or Right Knee.    Body fluid differential - Aspirate, Knee, Left [[id number]] Collected: 09/25/24 1510    Specimen: Aspirate from Knee, Left Updated: 09/25/24 1653     Neutrophils, Fluid 94 %      Lymphocytes, Fluid 3 %      Monocytes, Fluid 3 %     Body Fluid Cell Count With Differential - Aspirate, Knee, Right [[id number]]  (Abnormal) Collected: 09/25/24 1510    Specimen: Aspirate from Knee, Right Updated: 09/25/24 1647    Narrative:      The following orders were created for panel order Body Fluid Cell Count With Differential - Aspirate, Knee, Right.  Procedure                               Abnormality         Status                     ---------                               -----------         ------                     Body fluid cell count - ...[[id number]]  Abnormal            Final result               Body fluid differential ...[[id number]]                      Final result                 Please view results for these tests on the individual orders.    Body fluid cell count - Aspirate, Knee, Right [[id number]]  (Abnormal) Collected: 09/25/24 1510    Specimen: Aspirate from Knee, Right Updated: 09/25/24 1647     Color, Fluid Pink     Appearance, Fluid Hazy     RBC, Fluid 8,200 /mm3      Nucleated Cells, Fluid 13,200 /mm3      Method: Hemacytometer Method    Narrative:      No reference range established. Physician to interpret results with clinical findings.  Two syringes received in Lab. Both syringes had patient chart label, but did not specify Left or Right Knee.    Body fluid differential - Aspirate, Knee, Right [[id number]] Collected: 09/25/24 1510    Specimen: Aspirate from Knee, Right Updated: 09/25/24 1647     Neutrophils, Fluid 98 %      Lymphocytes, Fluid 1 %      Monocytes, Fluid 1 %     Anaerobic Culture - Aspirate, Knee, Left [[id number]] Collected: 09/25/24 1510    Specimen: Aspirate from Knee, Left Updated: 09/25/24 1540    Basic Metabolic Panel [[id number]]   (Abnormal) Collected: 09/25/24 0350    Specimen: Blood Updated: 09/25/24 0433     Glucose 119 mg/dL      BUN 13 mg/dL      Creatinine 0.59 mg/dL      Sodium 131 mmol/L      Potassium 4.0 mmol/L      Comment: Slight hemolysis detected by analyzer. Result may be falsely elevated.        Chloride 98 mmol/L      CO2 21.0 mmol/L      Calcium 9.2 mg/dL      BUN/Creatinine Ratio 22.0     Anion Gap 12.0 mmol/L      eGFR 96.5 mL/min/1.73     Narrative:      GFR Normal >60  Chronic Kidney Disease <60  Kidney Failure <15    The GFR formula is only valid for adults with stable renal function between ages 18 and 70.    Anticardiolipin Antibody, IgG / M, Qn [[id number]] Collected: 09/22/24 0625    Specimen: Blood from Hand, Left Updated: 09/24/24 1512     Anticardiolipin IgG <9 GPL U/mL      Comment:                           Negative:              <15                            Indeterminate:     15 - 20                            Low-Med Positive: >20 - 80                            High Positive:         >80        Anticardiolipin IgM <9 MPL U/mL      Comment:                           Negative:              <13                            Indeterminate:     13 - 20                            Low-Med Positive: >20 - 80                            High Positive:         >80       Narrative:      Performed at:  [address] [place] [address]  [id number]  : Serafin Lagos PhD, Phone:  [phone]    Beta-2 Glycoprotein Antibodies [[id number]] Collected: 09/22/24 0625    Specimen: Blood from Hand, Left Updated: 09/24/24 1417     Beta-2 Glyco 1 IgG <9 GPI IgG units      Comment: The reference interval reflects a 3SD or 99th percentile interval,  which is thought to represent a potentially clinically significant  result in accordance with the International Consensus Statement on  the classification criteria for definitive antiphospholipid syndrome  (APS). J Thromb Haem 2006;4:295-306.        Beta-2 Glyco  1 IgA <9 GPI IgA units      Comment: The reference interval reflects a 3SD or 99th percentile interval,  which is thought to represent a potentially clinically significant  result in accordance with the International Consensus Statement on  the classification criteria for definitive antiphospholipid syndrome  (APS). J Thromb Haem 2006;4:295-306.        Beta-2 Glyco 1 IgM <9 GPI IgM units      Comment: The reference interval reflects a 3SD or 99th percentile interval,  which is thought to represent a potentially clinically significant  result in accordance with the International Consensus Statement on  the classification criteria for definitive antiphospholipid syndrome  (APS). J Thromb Haem 2006;4:295-306.       Narrative:      Performed at:  [address]  [id number]  : Serafin Lagos PhD, Phone:  [phone]             PHYSICAL EXAM: Both knees remain tender to touch.  There is no erythema and no warmth.  She does have some suprapatellar effusion but states that the right knee is better since the injection.  Will having considerable left knee pain.  She has full extension of both knees.  She can flex her right knee to about 90 degrees before she has discomfort.  Left knee only flexes to about 50 degrees before she complains of pain.  Slow progress with PT.  Culture from the left knee is no growth at 2 days.  Does show some intracellular and extracellular calcium pyrophosphate crystals.  X-rays of both knees shows degenerative joint disease.  Patient has been started on colchicine.     ASSESSMENT & PLAN:  Have discussed with her about sending her down for cortisone injection of the left knee.  Patient is agreeable to the injection.  Would also recommend that she work on straight leg raises quad sets knee range of motion and then ankle pumps while lying in bed.  Continue to work with PT for mobilization.  Also recommend ice to both knees.  Will continue to follow  while in the hospital      Date: 9/27/2024    Jodie Collins RN

## 2024-09-27 NOTE — THERAPY TREATMENT NOTE
Patient Name: Barbara Tran  : 1955    MRN: [id number]                              Today's Date: 2024       Admit Date: 2024    Visit Dx:     ICD-10-CM ICD-9-CM   1. Bilateral pulmonary embolism  I26.99 415.19     Patient Active Problem List   Diagnosis    Benign essential HTN    Cervical pain    Dysthymia    Pedal edema    HLD (hyperlipidemia)    Goiter, non-toxic    Chronic pain of left knee    Chronic coronary artery disease    Hypothyroidism    History of sepsis    Primary insomnia    Chronic diastolic congestive heart failure    S/P drug eluting coronary stent placement    Pulmonic valve regurgitation    Tricuspid valve regurgitation    Osteoporosis    Hyperglycemia    History of pulmonary embolism    Balance problem    Pulmonary embolism    COVID-19 virus detected    Influenza    Right ventricular thrombus     Past Medical History:   Diagnosis Date    Allergic 2015    codeine, morphine, levaquin    Arthritis l5 years or so ago    Basal cell carcinoma     CAD (coronary artery disease)     Cataract 6-9 months ago    Need surgery    CHF (congestive heart failure)     Coronary artery disease 2005 stent    COVID-19 virus detected 03/10/2021    Deep vein thrombosis 2021    Pulmonary embolism    Depression     Disease of thyroid gland     Headache Covid 3--21    Has continued    History of pulmonary embolism 2021    History of urinary tract infection     HL (hearing loss) Last 4-6 months    Hyperlipidemia     Hypertension since     Hypothyroidism since     Multinodular goiter     Obesity     Osteopenia last few years    Osteoporosis     Pulmonary embolism 2021    From Covid    Pulmonic valve regurgitation     Tricuspid valve regurgitation     Unstable angina 2024     Past Surgical History:   Procedure Laterality Date    BUNIONECTOMY Bilateral     HAMMERT TOE REPAIR/BUNIONECTOMY    CARDIAC CATHETERIZATION  ,     CARDIAC CATHETERIZATION N/A 2024     Procedure: Coronary angiography;  Surgeon: Cong Rivera MD;  Location:  [facility];  Service: Cardiovascular;  Laterality: N/A;    CARDIAC CATHETERIZATION N/A 04/29/2024    Procedure: Left Heart Cath;  Surgeon: Cong Rivera MD;  Location:  [facility];  Service: Cardiovascular;  Laterality: N/A;    CARDIAC CATHETERIZATION N/A 04/29/2024    Procedure: Left ventriculography;  Surgeon: Cong Rivera MD;  Location:  [facility];  Service: Cardiovascular;  Laterality: N/A;    CATARACT EXTRACTION      CHOLECYSTECTOMY  1995    COLONOSCOPY N/A 12/19/2016    Procedure: COLONOSCOPY WITH COLD BIOPSIES;  Surgeon: Medina Guillen MD;  [facility];  Service:     CORONARY STENT PLACEMENT  2005    LAD 80% blockage    CYST REMOVAL      GANGLION CYST EXCISION      R WRIST    SKIN BIOPSY      SUBTOTAL HYSTERECTOMY  2009    THYROID BIOPSY  2014    TONSILLECTOMY  1965    TUBAL ABDOMINAL LIGATION  1985      General Information       Row Name 09/27/24 1604          Physical Therapy Time and Intention    Document Type therapy note (daily note)  -EF     Mode of Treatment individual therapy;physical therapy  -EF       Row Name 09/27/24 1604          General Information    Patient Profile Reviewed yes  -EF     Existing Precautions/Restrictions fall  -EF     Barriers to Rehab medically complex  -EF       Row Name 09/27/24 1604          Cognition    Orientation Status (Cognition) oriented x 4  -EF       Row Name 09/27/24 1604          Safety Issues, Functional Mobility    Impairments Affecting Function (Mobility) endurance/activity tolerance;pain;strength;range of motion (ROM)  -EF               User Key  (r) = Recorded By, (t) = Taken By, (c) = Cosigned By      Initials Name Provider Type    EF Joanne Fritz PT Physical Therapist                   Mobility       Row Name 09/27/24 1604          Bed Mobility    Supine-Sit [place] (Bed Mobility) standby  assist  -EF     Assistive Device (Bed Mobility) head of bed elevated;bed rails  -EF     Comment, (Bed Mobility) increased time required to perform  -EF       Row Name 09/27/24 1604          Bed-Chair Transfer    Bed-Chair [place] (Transfers) contact guard;verbal cues  -EF     Assistive Device (Bed-Chair Transfers) walker, front-wheeled  -EF     Comment, (Bed-Chair Transfer) stand pivot tranfer bed > bsc with cues for hand placement  -EF       Row Name 09/27/24 1604          Sit-Stand Transfer    Sit-Stand [place] (Transfers) contact guard;verbal cues  -EF     Assistive Device (Sit-Stand Transfers) walker, front-wheeled  -EF     Comment, (Sit-Stand Transfer) from elevated bed surface; cues for hand placement  -EF       Row Name 09/27/24 1604          Gait/Stairs (Locomotion)    [place] Level (Gait) contact guard;verbal cues  -EF     Assistive Device (Gait) walker, front-wheeled  -EF     Distance in Feet (Gait) 5  -EF     Deviations/Abnormal Patterns (Gait) cher decreased;antalgic;stride length decreased  -EF     Bilateral Gait Deviations forward flexed posture  -EF     Comment, (Gait/Stairs) Cues for upright posture and gait sequencing with rwx  -EF               User Key  (r) = Recorded By, (t) = Taken By, (c) = Cosigned By      Initials Name Provider Type    EF Joanne Fritz PT Physical Therapist                   Obj/Interventions       Row Name 09/27/24 1606          Motor Skills    Therapeutic Exercise --  Supine B quad sets x10 reps, B SLR x5 reps, R heel slides x5 reps  -EF       Row Name 09/27/24 1606          Balance    Balance Assessment standing static balance;standing dynamic balance  -EF     Static Sitting Balance standby assist  -EF     Static Standing Balance standby assist  -EF     Dynamic Standing Balance contact guard  -EF     Position/Device Used, Standing Balance supported;walker, front-wheeled  -EF               User Key  (r) = Recorded By, (t) = Taken By, (c) = Cosigned  By      Initials Name Provider Type    EF Joanne Fritz, PT Physical Therapist                   Goals/Plan    No documentation.                  Clinical Impression       Row Name 09/27/24 1606          Pain    Pretreatment Pain Rating 5/10  -EF     Posttreatment Pain Rating 7/10  -EF     Pain Location - Side/Orientation Left  -EF     Pain Location incisional  -EF     Pain Location - knee  -EF     Pain Intervention(s) Repositioned;Cold applied;Elevated  -EF       Row Name 09/27/24 1606          Plan of Care Review    Plan of Care Reviewed With patient  -EF     Progress improving  -EF     Outcome Evaluation Pt demos steady progress with overall mobility and reports improvement in B knee pain today. L knee remains more painful than R. Pt tolerated increased activity and demos increased independence with mobility. Pt performed bed mobility with SBA and transfers with CGA and rwx. Pt was able to ambulate short distance from bed to chair with CGA and rwx. Pt educated on quad sets, heel slides, and SLR for HEP and verbalizes understanding. Pt will continue to benefit from PT to address impairments and increase independence with mobility.  -EF       Row Name 09/27/24 1606          Therapy Assessment/Plan (PT)    Rehab Potential (PT) good, to achieve stated therapy goals  -EF     Criteria for Skilled Interventions Met (PT) yes;meets criteria;skilled treatment is necessary  -EF     Therapy Frequency (PT) 6 times/wk  -EF       Row Name 09/27/24 1606          Positioning and Restraints    Pre-Treatment Position in bed  -EF     Post Treatment Position chair  -EF     In Chair reclined;call light within reach;encouraged to call for assist;exit alarm on;with family/caregiver;legs elevated  -EF               User Key  (r) = Recorded By, (t) = Taken By, (c) = Cosigned By      Initials Name Provider Type    Joanne Coelho PT Physical Therapist                   Outcome Measures       Row Name 09/27/24 1607          How  much help from another person do you currently need...    Turning from your back to your side while in flat bed without using bedrails? 3  -EF     Moving from lying on back to sitting on the side of a flat bed without bedrails? 3  -EF     Moving to and from a bed to a chair (including a wheelchair)? 3  -EF     Standing up from a chair using your arms (e.g., wheelchair, bedside chair)? 3  -EF     Climbing 3-5 steps with a railing? 2  -EF     To walk in hospital room? 3  -EF     AM-PAC 6 Clicks Score (PT) 17  -EF     Highest Level of Mobility Goal 5 --> Static standing  -EF               User Key  (r) = Recorded By, (t) = Taken By, (c) = Cosigned By      Initials Name Provider Type    EF Joanne Fritz PT Physical Therapist                                 Physical Therapy Education       Title: PT OT SLP Therapies (In Progress)       Topic: Physical Therapy (In Progress)       Point: Mobility training (Done)       Learning Progress Summary             Patient Acceptance, E,TB, VU,NR by  at 9/27/2024 1608    Acceptance, E,TB, VU,NR by  at 9/26/2024 1508                         Point: Home exercise program (Done)       Learning Progress Summary             Patient Acceptance, E,TB, VU,NR by  at 9/27/2024 1608                         Point: Body mechanics (Done)       Learning Progress Summary             Patient Acceptance, E,TB, VU,NR by  at 9/27/2024 1608                         Point: Precautions (Not Started)       Learner Progress:  Not documented in this visit.                              User Key       Initials Effective Dates Name Provider Type Discipline     05/31/24 -  Joanne Fritz PT Physical Therapist PT                  PT Recommendation and Plan  Planned Therapy Interventions (PT): balance training, bed mobility training, gait training, home exercise program, patient/family education, ROM (range of motion), stair training, strengthening, transfer training, postural re-education  Plan  of Care Reviewed With: patient  Progress: improving  Outcome Evaluation: Pt demos steady progress with overall mobility and reports improvement in B knee pain today. L knee remains more painful than R. Pt tolerated increased activity and demos increased independence with mobility. Pt performed bed mobility with SBA and transfers with CGA and rwx. Pt was able to ambulate short distance from bed to chair with CGA and rwx. Pt educated on quad sets, heel slides, and SLR for HEP and verbalizes understanding. Pt will continue to benefit from PT to address impairments and increase independence with mobility.     Time Calculation:         PT Charges       Row Name 09/27/24 1609             Time Calculation    Start Time 1458  -EF      Stop Time 1525  -EF      Time Calculation (min) 27 min  -EF      PT Received On 09/27/24  -EF      PT - Next Appointment 09/28/24  -EF         Time Calculation- PT    Total Timed Code Minutes- PT 27 minute(s)  -EF         Timed Charges    07527 - PT Therapeutic Exercise Minutes 12  -EF      52223 - PT Therapeutic Activity Minutes 15  -EF         Total Minutes    Timed Charges Total Minutes 27  -EF       Total Minutes 27  -EF                User Key  (r) = Recorded By, (t) = Taken By, (c) = Cosigned By      Initials Name Provider Type    EF Joanne Fritz, PT Physical Therapist                  Therapy Charges for Today       Code Description Service Date Service Provider Modifiers Qty    01891481434 HC PT THERAPEUTIC ACT EA 15 MIN 9/26/2024 Joanne Fritz, PT GP 1    85338151708  PT EVAL MOD COMPLEXITY 2 9/26/2024 Joanne Fritz, PT GP 1    06156045001 HC PT THER PROC EA 15 MIN 9/27/2024 Joanne Fritz, PT GP 1    37230441287 HC PT THERAPEUTIC ACT EA 15 MIN 9/27/2024 Joanne Fritz, PT GP 1            PT G-Codes  Outcome Measure Options: AM-PAC 6 Clicks Daily Activity (OT)  AM-PAC 6 Clicks Score (PT): 17  AM-PAC 6 Clicks Score (OT): 14  PT Discharge Summary  Anticipated  Discharge Disposition (PT): home with assist, home with home health, skilled nursing facility    Joanne Fritz, PT  9/27/2024

## 2024-09-27 NOTE — PLAN OF CARE
Goal Outcome Evaluation:  Plan of Care Reviewed With: patient        Progress: improving  Outcome Evaluation: Pt demos steady progress with overall mobility and reports improvement in B knee pain today. L knee remains more painful than R. Pt tolerated increased activity and demos increased independence with mobility. Pt performed bed mobility with SBA and transfers with CGA and rwx. Pt was able to ambulate short distance from bed to chair with CGA and rwx. Pt educated on quad sets, heel slides, and SLR for HEP and verbalizes understanding. Pt will continue to benefit from PT to address impairments and increase independence with mobility.      Anticipated Discharge Disposition (PT): home with assist, home with home health, skilled nursing facility

## 2024-09-27 NOTE — DISCHARGE PLACEMENT REQUEST
"Olive Tran (69 y.o. Female)       Date of Birth   1955    Social Security Number       Address   [address] [address]    Home Phone   [phone]    MRN   [id number]       Taoism   Yazidism    Marital Status   Single                            Admission Date   9/20/24    Admission Type   Emergency    Admitting Provider   Rocky Chavez MD    Attending Provider   Lambert Foley MD    Department, Room/Bed   [address], E[address]/1       Discharge Date       Discharge Disposition       Discharge Destination                                 Attending Provider: Lambert Foley MD    Allergies: Codeine, Levofloxacin, Morphine    Isolation: None   Infection: COVID (History) (09/27/24)   Code Status: CPR    Ht: 170.2 cm (67\")   Wt: 92.5 kg (204 lb)    Admission Cmt: None   Principal Problem: Pulmonary embolism [I26.99]                   Active Insurance as of 9/20/2024       Primary Coverage       Payor Plan Insurance Group Employer/Plan Group    MEDICARE MEDICARE A & B        Payor Plan Address Payor Plan Phone Number Payor Plan Fax Number Effective Dates    [address] [phone]  1/1/2018 - None Entered    [facility] [id number]         Subscriber Name Subscriber Birth Date Member ID       OLIVE TRAN 1955 [id number]               Secondary Coverage       Payor Plan Insurance Group Employer/Plan Group    [facility] SUP [facility] SUP [id number]       Payor Plan Address Payor Plan Phone Number Payor Plan Fax Number Effective Dates    [address] [address]   4/1/2024 - None Entered    [facility] [id number]         Subscriber Name Subscriber Birth Date Member ID       OLIVE TRAN 1955 [id number]                     Emergency Contacts        (Rel.) Home Phone Work Phone Mobile Phone    Ángela Grayson (Daughter) [phone] -- --    Mack Tran (Son) -- -- [phone]    RADHA TRAN " (Son) -- -- [phone]              Emergency Contact Information       Name Relation Home Work Mobile    WandyÁngela talbot Daughter [phone]      Mack Myers Son   [phone]    RADHA MYERS Son   [phone]

## 2024-09-28 LAB
ANION GAP SERPL CALCULATED.3IONS-SCNC: 12.4 MMOL/L (ref 5–15)
BUN SERPL-MCNC: 25 MG/DL (ref 8–23)
BUN/CREAT SERPL: 47.2 (ref 7–25)
CALCIUM SPEC-SCNC: 9.1 MG/DL (ref 8.6–10.5)
CHLORIDE SERPL-SCNC: 98 MMOL/L (ref 98–107)
CO2 SERPL-SCNC: 24.6 MMOL/L (ref 22–29)
CREAT SERPL-MCNC: 0.53 MG/DL (ref 0.57–1)
EGFRCR SERPLBLD CKD-EPI 2021: 99 ML/MIN/1.73
GLUCOSE SERPL-MCNC: 130 MG/DL (ref 65–99)
MAGNESIUM SERPL-MCNC: 2.5 MG/DL (ref 1.6–2.4)
POTASSIUM SERPL-SCNC: 4.3 MMOL/L (ref 3.5–5.2)
SODIUM SERPL-SCNC: 135 MMOL/L (ref 136–145)

## 2024-09-28 PROCEDURE — 97530 THERAPEUTIC ACTIVITIES: CPT

## 2024-09-28 PROCEDURE — 83735 ASSAY OF MAGNESIUM: CPT | Performed by: HOSPITALIST

## 2024-09-28 PROCEDURE — 80048 BASIC METABOLIC PNL TOTAL CA: CPT | Performed by: HOSPITALIST

## 2024-09-28 PROCEDURE — 63710000001 PREDNISONE PER 1 MG: Performed by: HOSPITALIST

## 2024-09-28 PROCEDURE — 97110 THERAPEUTIC EXERCISES: CPT

## 2024-09-28 RX ORDER — CODEINE PHOSPHATE/GUAIFENESIN 10-100MG/5
5 LIQUID (ML) ORAL EVERY 4 HOURS PRN
Status: DISCONTINUED | OUTPATIENT
Start: 2024-09-28 | End: 2024-09-28

## 2024-09-28 RX ORDER — HYDROCODONE BITARTRATE AND HOMATROPINE METHYLBROMIDE ORAL SOLUTION 5; 1.5 MG/5ML; MG/5ML
5 LIQUID ORAL EVERY 4 HOURS PRN
Status: DISCONTINUED | OUTPATIENT
Start: 2024-09-28 | End: 2024-10-01 | Stop reason: HOSPADM

## 2024-09-28 RX ORDER — PREDNISONE 20 MG/1
40 TABLET ORAL ONCE
Status: COMPLETED | OUTPATIENT
Start: 2024-09-28 | End: 2024-09-28

## 2024-09-28 RX ORDER — BENZONATATE 200 MG/1
200 CAPSULE ORAL EVERY 4 HOURS PRN
Start: 2024-09-28

## 2024-09-28 RX ORDER — HYDROCODONE BITARTRATE AND HOMATROPINE METHYLBROMIDE ORAL SOLUTION 5; 1.5 MG/5ML; MG/5ML
5 LIQUID ORAL EVERY 4 HOURS PRN
Qty: 120 ML | Refills: 0 | Status: SHIPPED | OUTPATIENT
Start: 2024-09-28

## 2024-09-28 RX ORDER — HYDROCODONE BITARTRATE AND ACETAMINOPHEN 5; 325 MG/1; MG/1
1 TABLET ORAL EVERY 6 HOURS PRN
Qty: 8 TABLET | Refills: 0 | Status: SHIPPED | OUTPATIENT
Start: 2024-09-28

## 2024-09-28 RX ORDER — PANTOPRAZOLE SODIUM 40 MG/1
40 TABLET, DELAYED RELEASE ORAL
Status: DISCONTINUED | OUTPATIENT
Start: 2024-09-29 | End: 2024-10-01 | Stop reason: HOSPADM

## 2024-09-28 RX ORDER — PANTOPRAZOLE SODIUM 40 MG/1
40 TABLET, DELAYED RELEASE ORAL
Start: 2024-09-29

## 2024-09-28 RX ADMIN — HYDROCODONE BITARTRATE AND ACETAMINOPHEN 1 TABLET: 5; 325 TABLET ORAL at 16:02

## 2024-09-28 RX ADMIN — TORSEMIDE 20 MG: 20 TABLET ORAL at 10:16

## 2024-09-28 RX ADMIN — HYDROCODONE BITARTRATE AND ACETAMINOPHEN 1 TABLET: 5; 325 TABLET ORAL at 10:26

## 2024-09-28 RX ADMIN — COLCHICINE 0.6 MG: 0.6 TABLET ORAL at 10:17

## 2024-09-28 RX ADMIN — ROSUVASTATIN CALCIUM 40 MG: 40 TABLET, FILM COATED ORAL at 20:29

## 2024-09-28 RX ADMIN — APIXABAN 10 MG: 5 TABLET, FILM COATED ORAL at 10:25

## 2024-09-28 RX ADMIN — APIXABAN 10 MG: 5 TABLET, FILM COATED ORAL at 20:29

## 2024-09-28 RX ADMIN — PREDNISONE 40 MG: 20 TABLET ORAL at 12:57

## 2024-09-28 RX ADMIN — SPIRONOLACTONE 25 MG: 25 TABLET, FILM COATED ORAL at 10:16

## 2024-09-28 RX ADMIN — BUPROPION HYDROCHLORIDE 300 MG: 300 TABLET, EXTENDED RELEASE ORAL at 10:16

## 2024-09-28 RX ADMIN — HYDROCODONE BITARTRATE AND HOMATROPINE METHYLBROMIDE 5 ML: 5; 1.5 SOLUTION ORAL at 20:29

## 2024-09-28 RX ADMIN — SACUBITRIL AND VALSARTAN 1 TABLET: 24; 26 TABLET, FILM COATED ORAL at 20:29

## 2024-09-28 RX ADMIN — LEVOTHYROXINE SODIUM 50 MCG: 50 TABLET ORAL at 05:47

## 2024-09-28 RX ADMIN — SACUBITRIL AND VALSARTAN 1 TABLET: 24; 26 TABLET, FILM COATED ORAL at 10:16

## 2024-09-28 NOTE — PLAN OF CARE
Goal Outcome Evaluation:  Plan of Care Reviewed With: patient           Outcome Evaluation: vss. no c/o pain this shift, compliant with plan of care.

## 2024-09-28 NOTE — PLAN OF CARE
Goal Outcome Evaluation:  Plan of Care Reviewed With: patient        Progress: no change  Outcome Evaluation: Pt was seen for PT tx this AM. Pt was in bed at beg of session and sat up to EOB w/ SBA and incr time. Pain in B knees limiting through out session. Pt stood w/ CGA. Pt amb to BSC req SBA as she sat. Pt stood then amb approx 4' to chair req CGA and use of fww. Pt was slow and antalgic  w/ no overt LOB. Pt performed B LE ther ex for strengthening and was educ to continue at reg intervals. Pt UIC at end of session. PT will prog as pt sanjay.      Anticipated Discharge Disposition (PT): skilled nursing facility

## 2024-09-28 NOTE — PROGRESS NOTES
Name: Barbara Tran ADMIT: 2024   : 1955  PCP: Millicent Ace MD    MRN: [id number] LOS: 7 days   AGE/SEX: 69 y.o. female  ROOM: [facility]     Subjective   Subjective   She states her right knee is 50% better and her left knee is 10% better. Still with some coughing. Tussionex help but is not lasting long enough.     Objective   Objective   Vital Signs  Temp:  [96.5 °F (35.8 °C)-98.6 °F (37 °C)] 97.7 °F (36.5 °C)  Heart Rate:  [] 101  Resp:  [16-19] 19  BP: (114-142)/(64-84) 142/84  SpO2:  [93 %-97 %] 93 %  on   ;   Device (Oxygen Therapy): room air  Body mass index is 31.95 kg/m².    Physical Exam  Constitutional:       General: She is not in acute distress.     Appearance: She is not toxic-appearing.   HENT:      Head: Normocephalic and atraumatic.   Cardiovascular:      Rate and Rhythm: Normal rate and regular rhythm.   Pulmonary:      Effort: Pulmonary effort is normal. No respiratory distress.      Breath sounds: Normal breath sounds. No wheezing or rhonchi.   Abdominal:      General: Bowel sounds are normal.      Palpations: Abdomen is soft.      Tenderness: There is no abdominal tenderness. There is no guarding or rebound.   Skin:     General: Skin is warm and dry.   Neurological:      General: No focal deficit present.      Mental Status: She is alert.   Psychiatric:         Mood and Affect: Mood normal.         Behavior: Behavior normal.     Results Review  I reviewed the patient's new clinical results.  Results from last 7 days   Lab Units 24  0601 24  0311 24  0625   WBC 10*3/mm3 11.73* 10.10 10.15   HEMOGLOBIN g/dL 13.9 14.5 15.3   PLATELETS 10*3/mm3 370 307 317     Results from last 7 days   Lab Units 24  0520 24  0601 24  1127 24  0350   SODIUM mmol/L 135* 134* 134* 131*   POTASSIUM mmol/L 4.3 4.0 4.1 4.0   CHLORIDE mmol/L 98 102 99 98   CO2 mmol/L 24.6 24.0 22.8 21.0*   BUN mg/dL 25* 25* 22 13   CREATININE mg/dL 0.53* 0.64 0.70 0.59  "  GLUCOSE mg/dL 130* 161* 141* 119*     Lab Results   Component Value Date    ANIONGAP 12.4 09/28/2024     Estimated Creatinine Clearance: 113.7 mL/min (A) (by C-G formula based on SCr of 0.53 mg/dL (L)).   Lab Results   Component Value Date    EGFR 99.0 09/28/2024           Results from last 7 days   Lab Units 09/28/24  0520 09/27/24  0937 09/27/24  0601 09/26/24  1127 09/25/24  0350 09/23/24  0311 09/22/24  1224   CALCIUM mg/dL 9.1  --  9.0 9.3 9.2   < > 8.9   MAGNESIUM mg/dL 2.5* 2.4  --   --   --   --  2.2    < > = values in this interval not displayed.       No results found for: \"HGBA1C\", \"POCGLU\"    FL Guide For Pain Meds Inj    Result Date: 9/27/2024  Technically successful left knee intra-articular medication injection with fluoroscopic guidance.   Procedure performed by MANOHAR Rodríguez. By electronically signing this report, I, the supervising radiologist, attest that I was not present for the procedure(s) . I agree with the finalized report.    This report was finalized on 9/27/2024 4:25 PM by Dr. Tre Beckford M.D on Workstation: [id number]       Scheduled Meds  apixaban, 10 mg, Oral, Q12H   Followed by  [START ON 9/30/2024] apixaban, 5 mg, Oral, Q12H  buPROPion XL, 300 mg, Oral, Daily  colchicine, 0.6 mg, Oral, Daily  levothyroxine, 50 mcg, Oral, Q AM  rosuvastatin, 40 mg, Oral, Nightly  sacubitril-valsartan, 1 tablet, Oral, BID  spironolactone, 25 mg, Oral, Daily  torsemide, 20 mg, Oral, Daily    Continuous Infusions   PRN Meds    benzonatate    Calcium Replacement - Follow Nurse / BPA Driven Protocol    Hydrocod Sukhjinder-Chlorphe Sukhjinder ER    HYDROcodone-acetaminophen    HYDROmorphone    Magnesium Standard Dose Replacement - Follow Nurse / BPA Driven Protocol    ondansetron    Phosphorus Replacement - Follow Nurse / BPA Driven Protocol    Potassium Replacement - Follow Nurse / BPA Driven Protocol    sodium chloride     Diet  Diet: Cardiac; Healthy Heart (2-3 Na+); Fluid Consistency: Thin (IDDSI 0)     "   Assessment/Plan     Active Hospital Problems    Diagnosis  POA    **Pulmonary embolism [I26.99]  Yes    Right ventricular thrombus [I51.3]  Unknown    COVID-19 virus detected [U07.1]  Unknown    Influenza [J11.1]  Unknown    History of pulmonary embolism [Z86.711]  Yes    Chronic diastolic congestive heart failure [I50.32]  Yes    Hypothyroidism [E03.9]  Yes    Chronic coronary artery disease [I25.10]  Yes    Benign essential HTN [I10]  Yes    HLD (hyperlipidemia) [E78.5]  Yes      Resolved Hospital Problems   No resolved problems to display.     69 y.o. female     Acute bilateral PE (recurrent PE)  Left DVT  Pulmonary infarction not excluded on CT scan  RV thrombus  Now on apixaban. Now will likely have lifelong anticoagulant  Hematology- hypercoag workup/outpatient workup  Pulmonology recommends repeat CT scan in 6 to 8 weeks to ensure clearance of probable pulmonary infarcts (follow-up with [facility])  Echocardiogram no right heart strain but shows RV thrombus  Hemodynamically stable not requiring any oxygen        Bilateral knee pain  Pseudogout  Continue pain medications, Orthopedics has seen and now both knees have been injected   Currently on colchicine, avoid nsaids with her a/c. Try prednisone 40 mg x 1, add PPI     Influenza  COVID  Positive for both on 9/8/2024. Antigen negative x 2  Continue supportive care  Not requiring any oxygen  Cough-switch antitussive medication     CAD history of stent  HFpEF EF 56 to 60%  Hypertension BP acceptable  Hyperlipidemia statin  Hypothyroidism levothyroxine  Obesity Body mass index is 31.95 kg/m².      DVT prophylaxis  anticoagulation as above     Discharge  Planning for SNF  Expected Discharge Date: 9/28/2024; Expected Discharge Time:     Discussed with patient and nursing staff    Lambert Foley MD  [facility]  09/28/24

## 2024-09-28 NOTE — THERAPY TREATMENT NOTE
Patient Name: Barbara Tran  : 1955    MRN: [id number]                              Today's Date: 2024       Admit Date: 2024    Visit Dx:     ICD-10-CM ICD-9-CM   1. Bilateral pulmonary embolism  I26.99 415.19     Patient Active Problem List   Diagnosis    Benign essential HTN    Cervical pain    Dysthymia    Pedal edema    HLD (hyperlipidemia)    Goiter, non-toxic    Chronic pain of left knee    Chronic coronary artery disease    Hypothyroidism    History of sepsis    Primary insomnia    Chronic diastolic congestive heart failure    S/P drug eluting coronary stent placement    Pulmonic valve regurgitation    Tricuspid valve regurgitation    Osteoporosis    Hyperglycemia    History of pulmonary embolism    Balance problem    Pulmonary embolism    COVID-19 virus detected    Influenza    Right ventricular thrombus     Past Medical History:   Diagnosis Date    Allergic 2015    codeine, morphine, levaquin    Arthritis l5 years or so ago    Basal cell carcinoma     CAD (coronary artery disease)     Cataract 6-9 months ago    Need surgery    CHF (congestive heart failure)     Coronary artery disease 2005 stent    COVID-19 virus detected 03/10/2021    Deep vein thrombosis 2021    Pulmonary embolism    Depression     Disease of thyroid gland     Headache Covid 3--21    Has continued    History of pulmonary embolism 2021    History of urinary tract infection     HL (hearing loss) Last 4-6 months    Hyperlipidemia     Hypertension since     Hypothyroidism since     Multinodular goiter     Obesity     Osteopenia last few years    Osteoporosis     Pulmonary embolism 2021    From Covid    Pulmonic valve regurgitation     Tricuspid valve regurgitation     Unstable angina 2024     Past Surgical History:   Procedure Laterality Date    BUNIONECTOMY Bilateral     HAMMERT TOE REPAIR/BUNIONECTOMY    CARDIAC CATHETERIZATION  ,     CARDIAC CATHETERIZATION N/A 2024     Procedure: Coronary angiography;  Surgeon: Cong Rivera MD;  Location:  [facility];  Service: Cardiovascular;  Laterality: N/A;    CARDIAC CATHETERIZATION N/A 04/29/2024    Procedure: Left Heart Cath;  Surgeon: Cong Rivera MD;  Location:  [facility];  Service: Cardiovascular;  Laterality: N/A;    CARDIAC CATHETERIZATION N/A 04/29/2024    Procedure: Left ventriculography;  Surgeon: Cong Rivera MD;  Location:  [facility];  Service: Cardiovascular;  Laterality: N/A;    CATARACT EXTRACTION      CHOLECYSTECTOMY  1995    COLONOSCOPY N/A 12/19/2016    Procedure: COLONOSCOPY WITH COLD BIOPSIES;  Surgeon: Medina Guillen MD;  [facility];  Service:     CORONARY STENT PLACEMENT  2005    LAD 80% blockage    CYST REMOVAL      GANGLION CYST EXCISION      R WRIST    SKIN BIOPSY      SUBTOTAL HYSTERECTOMY  2009    THYROID BIOPSY  2014    TONSILLECTOMY  1965    TUBAL ABDOMINAL LIGATION  1985      General Information       Row Name 09/28/24 1210          Physical Therapy Time and Intention    Document Type therapy note (daily note)  -PH     Mode of Treatment physical therapy  -       Row Name 09/28/24 1210          General Information    Existing Precautions/Restrictions fall  -PH     Barriers to Rehab medically complex  -PH       Row Name 09/28/24 1210          Cognition    Orientation Status (Cognition) oriented x 4  -       Row Name 09/28/24 1210          Safety Issues, Functional Mobility    Impairments Affecting Function (Mobility) endurance/activity tolerance;strength;pain;range of motion (ROM)  -PH     Comment, Safety Issues/Impairments (Mobility) gt belt and non skid socks donned  -PH               User Key  (r) = Recorded By, (t) = Taken By, (c) = Cosigned By      Initials Name Provider Type    PH Brandi Stinson PTA Physical Therapist Assistant                   Mobility       Row Name 09/28/24 1210          Bed Mobility    Bed  Mobility supine-sit  -PH     Supine-Sit [place] (Bed Mobility) standby assist  -PH     Assistive Device (Bed Mobility) head of bed elevated;bed rails  -PH     Comment, (Bed Mobility) incr time w/ B knee pain limiting  -PH       Row Name 09/28/24 1210          Transfers    Comment, (Transfers) pt transferred to BSC then to chair  -PH       Row Name 09/28/24 1210          Bed-Chair Transfer    Comment, (Bed-Chair Transfer) 2x sit to stand - from L EOB and from BSC  -PH       Row Name 09/28/24 1210          Sit-Stand Transfer    Sit-Stand [place] (Transfers) contact guard;verbal cues  -PH     Assistive Device (Sit-Stand Transfers) walker, front-wheeled  -PH       Row Name 09/28/24 1210          Gait/Stairs (Locomotion)    [place] Level (Gait) contact guard;verbal cues  -PH     Assistive Device (Gait) walker, front-wheeled  -PH     Distance in Feet (Gait) 6  2' to BSC then approx 4' to chair  -PH     Deviations/Abnormal Patterns (Gait) cher decreased;antalgic;festinating/shuffling;gait speed decreased;stride length decreased  -PH     Bilateral Gait Deviations forward flexed posture  -PH     [place] Level (Stairs) not tested  -PH     Comment, (Gait/Stairs) cues for postural correction; slow w/ no overt LOB  -PH               User Key  (r) = Recorded By, (t) = Taken By, (c) = Cosigned By      Initials Name Provider Type    PH Brandi Stinson, PTA Physical Therapist Assistant                   Obj/Interventions       Row Name 09/28/24 1212          Motor Skills    Therapeutic Exercise other (see comments)  BAP, LAQ, QS; x 5-10 reps  -PH       Row Name 09/28/24 1212          Balance    Balance Assessment sitting static balance;standing static balance  -PH     Static Sitting Balance modified independence  -PH     Static Standing Balance contact guard  -PH     Position/Device Used, Standing Balance walker, front-wheeled  -PH               User Key  (r) = Recorded By, (t) = Taken By, (c) =  Cosigned By      Initials Name Provider Type     Brandi Stinson PTA Physical Therapist Assistant                   Goals/Plan    No documentation.                  Clinical Impression       Row Name 09/28/24 1212          Pain    Pretreatment Pain Rating 6/10  -PH     Posttreatment Pain Rating 6/10  -PH     Pain Location - Side/Orientation Bilateral  -PH     Pain Location - knee  -PH     Pain Intervention(s) Cold pack;Repositioned;Elevated;Rest  -PH       Row Name 09/28/24 1212          Plan of Care Review    Plan of Care Reviewed With patient  -PH     Progress no change  -PH     Outcome Evaluation Pt was seen for PT tx this AM. Pt was in bed at beg of session and sat up to EOB w/ SBA and incr time. Pain in B knees limiting through out session. Pt stood w/ CGA. Pt amb to BSC req SBA as she sat. Pt stood then amb approx 4' to chair req CGA and use of fww. Pt was slow and antalgic  w/ no overt LOB. Pt performed B LE ther ex for strengthening and was educ to continue at reg intervals. Pt UIC at end of session. PT will prog as pt sanjay.  -PH       Row Name 09/28/24 1212          Vital Signs    O2 Delivery Pre Treatment room air  -PH     O2 Delivery Intra Treatment room air  -PH     O2 Delivery Post Treatment room air  -PH       Row Name 09/28/24 1212          Positioning and Restraints    Pre-Treatment Position in bed  -PH     Post Treatment Position chair  -PH     In Chair reclined;call light within reach;encouraged to call for assist;exit alarm on;notified nsg;legs elevated  -PH               User Key  (r) = Recorded By, (t) = Taken By, (c) = Cosigned By      Initials Name Provider Type     Brandi Stinson PTA Physical Therapist Assistant                   Outcome Measures       Row Name 09/28/24 1215          How much help from another person do you currently need...    Turning from your back to your side while in flat bed without using bedrails? 3  -PH     Moving from lying on back to sitting on  the side of a flat bed without bedrails? 3  -PH     Moving to and from a bed to a chair (including a wheelchair)? 3  -PH     Standing up from a chair using your arms (e.g., wheelchair, bedside chair)? 3  -PH     Climbing 3-5 steps with a railing? 2  -PH     To walk in hospital room? 3  -PH     AM-PAC 6 Clicks Score (PT) 17  -PH     Highest Level of Mobility Goal 5 --> Static standing  -PH       Row Name 09/28/24 1215          Functional Assessment    Outcome Measure Options AM-PAC 6 Clicks Basic Mobility (PT)  -PH               User Key  (r) = Recorded By, (t) = Taken By, (c) = Cosigned By      Initials Name Provider Type    PH Brandi Stinson PTA Physical Therapist Assistant                                 Physical Therapy Education       Title: PT OT SLP Therapies (Done)       Topic: Physical Therapy (Done)       Point: Mobility training (Done)       Learning Progress Summary             Patient Acceptance, E,TB,D, VU by  at 9/28/2024 1215    Acceptance, E,TB, VU by CL at 9/28/2024 0317    Acceptance, E,TB, VU,NR by EF at 9/27/2024 1608    Acceptance, E,TB, VU,NR by EF at 9/26/2024 1508                         Point: Home exercise program (Done)       Learning Progress Summary             Patient Acceptance, E,TB,D, VU by PH at 9/28/2024 1215    Acceptance, E,TB, VU by CL at 9/28/2024 0317    Acceptance, E,TB, VU,NR by EF at 9/27/2024 1608                         Point: Body mechanics (Done)       Learning Progress Summary             Patient Acceptance, E,TB,D, VU by PH at 9/28/2024 1215    Acceptance, E,TB, VU by CL at 9/28/2024 0317    Acceptance, E,TB, VU,NR by EF at 9/27/2024 1608                         Point: Precautions (Done)       Learning Progress Summary             Patient Acceptance, E,TB,D, VU by PH at 9/28/2024 1215    Acceptance, E,TB, VU by CL at 9/28/2024 0317                                         User Key       Initials Effective Dates Name Provider Type Discipline    EF 05/31/24 -   Joanne Fritz, PT Physical Therapist PT    PH 06/16/21 -  Brandi Stinson PTA Physical Therapist Assistant PT    CL 03/19/24 -  Autumn Carson RN Registered Nurse Nurse                  PT Recommendation and Plan     Plan of Care Reviewed With: patient  Progress: no change  Outcome Evaluation: Pt was seen for PT tx this AM. Pt was in bed at beg of session and sat up to EOB w/ SBA and incr time. Pain in B knees limiting through out session. Pt stood w/ CGA. Pt amb to BSC req SBA as she sat. Pt stood then amb approx 4' to chair req CGA and use of fww. Pt was slow and antalgic  w/ no overt LOB. Pt performed B LE ther ex for strengthening and was educ to continue at reg intervals. Pt UIC at end of session. PT will prog as pt sanjay.     Time Calculation:         PT Charges       Row Name 09/28/24 1215             Time Calculation    Start Time 1035  -PH      Stop Time 1100  -PH      Time Calculation (min) 25 min  -PH      PT Received On 09/28/24  -PH      PT - Next Appointment 09/29/24  -PH         Timed Charges    97502 - PT Therapeutic Exercise Minutes 5  -PH      02363 - PT Therapeutic Activity Minutes 20  -PH         Total Minutes    Timed Charges Total Minutes 25  -PH       Total Minutes 25  -PH                User Key  (r) = Recorded By, (t) = Taken By, (c) = Cosigned By      Initials Name Provider Type    PH Brandi Stinson PTA Physical Therapist Assistant                  Therapy Charges for Today       Code Description Service Date Service Provider Modifiers Qty    10555578470 HC PT THER PROC EA 15 MIN 9/28/2024 Brandi Stinson PTA GP 1    61538103404 HC PT THERAPEUTIC ACT EA 15 MIN 9/28/2024 Brandi Stinson PTA GP 1            PT G-Codes  Outcome Measure Options: AM-PAC 6 Clicks Basic Mobility (PT)  AM-PAC 6 Clicks Score (PT): 17  AM-PAC 6 Clicks Score (OT): 14  PT Discharge Summary  Anticipated Discharge Disposition (PT): skilled nursing facility    Brandi Stinson  PTA  9/28/2024

## 2024-09-28 NOTE — PROGRESS NOTES
"Continued Stay Note  [facility]     Patient Name: Barbara Tran  MRN: [id number]  Today's Date: 9/28/2024    Admit Date: 9/20/2024    Plan: [facility] and [facility] referrals pending. [facility], awaiting clinical review. [facility] will not have bed available this weekend. Facilities represenatives will follow-up Monday with CCP and patient, or earlier if updates available this weekend.........Erma PICKARD   Discharge Plan       Row Name 09/28/24 1531       Plan    Plan [facility] and [facility] referrals pending. [facility] accepted, awaiting clinical review. [facility] will not have bed available this weekend. Facilities representatives will follow-up Monday with CCP and patient, or earlier if updates available this weekend.........Erma PICKARD    Patient/Family in Agreement with Plan yes    Plan Comments Inbound call from RN requesting update on placement/bed status as family hopeful patient can DC this weekend. Call placed to [facility] and  left requesting call back. Call placed to [facility], staff member states admissions staff will return Monday. Second call placed to [facility] after 30 minutes, S/W Rachel who states file awaiting admissions staff review which will likely be Monday. Rachel will reach out to supervisor over [facility] to see if there is anyone to review today or tomorrow, will update CCP if she hears back this weekend, otherwise [facility] rep to follow-up Monday. [facility] place to [facility], as [facility] categorized as \"considering\" in EPIC, states earliest bed availability Monday or Tuesday and rep will follow-up Monday with patient/CCP. Update to MD and RN........Erma PICKARD                   Discharge Codes    No documentation.                 Expected Discharge Date and Time       Expected Discharge Date Expected Discharge Time    Sep 28, 2024               Destiny Lancaster, MELLY    "

## 2024-09-29 LAB
ANION GAP SERPL CALCULATED.3IONS-SCNC: 10 MMOL/L (ref 5–15)
BUN SERPL-MCNC: 25 MG/DL (ref 8–23)
BUN/CREAT SERPL: 36.8 (ref 7–25)
CALCIUM SPEC-SCNC: 9.4 MG/DL (ref 8.6–10.5)
CHLORIDE SERPL-SCNC: 99 MMOL/L (ref 98–107)
CO2 SERPL-SCNC: 26 MMOL/L (ref 22–29)
CREAT SERPL-MCNC: 0.68 MG/DL (ref 0.57–1)
EGFRCR SERPLBLD CKD-EPI 2021: 93.2 ML/MIN/1.73
GLUCOSE SERPL-MCNC: 135 MG/DL (ref 65–99)
MAGNESIUM SERPL-MCNC: 2.5 MG/DL (ref 1.6–2.4)
POTASSIUM SERPL-SCNC: 4 MMOL/L (ref 3.5–5.2)
SODIUM SERPL-SCNC: 135 MMOL/L (ref 136–145)

## 2024-09-29 PROCEDURE — 83735 ASSAY OF MAGNESIUM: CPT | Performed by: HOSPITALIST

## 2024-09-29 PROCEDURE — 63710000001 PREDNISONE PER 5 MG: Performed by: HOSPITALIST

## 2024-09-29 PROCEDURE — 80048 BASIC METABOLIC PNL TOTAL CA: CPT | Performed by: HOSPITALIST

## 2024-09-29 RX ORDER — PREDNISONE 10 MG/1
30 TABLET ORAL ONCE
Status: COMPLETED | OUTPATIENT
Start: 2024-09-29 | End: 2024-09-29

## 2024-09-29 RX ADMIN — LEVOTHYROXINE SODIUM 50 MCG: 50 TABLET ORAL at 05:06

## 2024-09-29 RX ADMIN — APIXABAN 10 MG: 5 TABLET, FILM COATED ORAL at 20:53

## 2024-09-29 RX ADMIN — HYDROCODONE BITARTRATE AND ACETAMINOPHEN 1 TABLET: 5; 325 TABLET ORAL at 08:34

## 2024-09-29 RX ADMIN — PANTOPRAZOLE SODIUM 40 MG: 40 TABLET, DELAYED RELEASE ORAL at 05:08

## 2024-09-29 RX ADMIN — TORSEMIDE 20 MG: 20 TABLET ORAL at 08:34

## 2024-09-29 RX ADMIN — HYDROCODONE BITARTRATE AND HOMATROPINE METHYLBROMIDE 5 ML: 5; 1.5 SOLUTION ORAL at 06:22

## 2024-09-29 RX ADMIN — APIXABAN 10 MG: 5 TABLET, FILM COATED ORAL at 08:34

## 2024-09-29 RX ADMIN — SACUBITRIL AND VALSARTAN 1 TABLET: 24; 26 TABLET, FILM COATED ORAL at 08:34

## 2024-09-29 RX ADMIN — SPIRONOLACTONE 25 MG: 25 TABLET, FILM COATED ORAL at 08:34

## 2024-09-29 RX ADMIN — HYDROCODONE BITARTRATE AND ACETAMINOPHEN 1 TABLET: 5; 325 TABLET ORAL at 18:23

## 2024-09-29 RX ADMIN — BUPROPION HYDROCHLORIDE 300 MG: 300 TABLET, EXTENDED RELEASE ORAL at 08:34

## 2024-09-29 RX ADMIN — ROSUVASTATIN CALCIUM 40 MG: 40 TABLET, FILM COATED ORAL at 20:53

## 2024-09-29 RX ADMIN — BENZONATATE 200 MG: 100 CAPSULE ORAL at 18:23

## 2024-09-29 RX ADMIN — COLCHICINE 0.6 MG: 0.6 TABLET ORAL at 08:34

## 2024-09-29 RX ADMIN — SACUBITRIL AND VALSARTAN 1 TABLET: 24; 26 TABLET, FILM COATED ORAL at 20:53

## 2024-09-29 RX ADMIN — PREDNISONE 30 MG: 10 TABLET ORAL at 12:26

## 2024-09-29 RX ADMIN — HYDROCODONE BITARTRATE AND HOMATROPINE METHYLBROMIDE 5 ML: 5; 1.5 SOLUTION ORAL at 20:53

## 2024-09-29 NOTE — PLAN OF CARE
Problem: Adult Inpatient Plan of Care  Goal: Plan of Care Review  Outcome: Progressing  Flowsheets (Taken 9/29/2024 1632)  Outcome Evaluation: Alert and oriented. ambulated to bathroom. waiting for rehab  Goal: Patient-Specific Goal (Individualized)  Outcome: Progressing  Goal: Absence of Hospital-Acquired Illness or Injury  Outcome: Progressing  Intervention: Identify and Manage Fall Risk  Recent Flowsheet Documentation  Taken 9/29/2024 1615 by Christine Melo RN  Safety Promotion/Fall Prevention:   clutter free environment maintained   activity supervised  Taken 9/29/2024 1402 by Christine Melo RN  Safety Promotion/Fall Prevention:   clutter free environment maintained   activity supervised  Taken 9/29/2024 1200 by Christine Melo RN  Safety Promotion/Fall Prevention:   clutter free environment maintained   activity supervised  Taken 9/29/2024 1010 by Christine Melo RN  Safety Promotion/Fall Prevention:   clutter free environment maintained   activity supervised  Taken 9/29/2024 0820 by Christine Melo RN  Safety Promotion/Fall Prevention:   clutter free environment maintained   activity supervised  Intervention: Prevent and Manage VTE (Venous Thromboembolism) Risk  Recent Flowsheet Documentation  Taken 9/29/2024 1402 by Christine Melo RN  Activity Management: up in chair  Taken 9/29/2024 0820 by Christine Melo RN  Activity Management: activity minimized  VTE Prevention/Management: (Eliquis) other (see comments)  Range of Motion: active ROM (range of motion) encouraged  Intervention: Prevent Infection  Recent Flowsheet Documentation  Taken 9/29/2024 1615 by Christine Melo RN  Infection Prevention: single patient room provided  Taken 9/29/2024 1402 by Christine Melo RN  Infection Prevention: single patient room provided  Taken 9/29/2024 1200 by Christine Melo RN  Infection Prevention: single patient room provided  Taken 9/29/2024 1010 by Christine Melo RN  Infection Prevention: single patient room provided  Taken  9/29/2024 0820 by Christine Melo RN  Infection Prevention: single patient room provided  Goal: Optimal Comfort and Wellbeing  Outcome: Progressing  Intervention: Provide Person-Centered Care  Recent Flowsheet Documentation  Taken 9/29/2024 0820 by Christine Melo RN  Trust Relationship/Rapport:   care explained   choices provided  Goal: Readiness for Transition of Care  Outcome: Progressing     Problem: Hypertension Comorbidity  Goal: Blood Pressure in Desired Range  Outcome: Progressing  Intervention: Maintain Blood Pressure Management  Recent Flowsheet Documentation  Taken 9/29/2024 1615 by Christine Melo RN  Medication Review/Management: medications reviewed  Taken 9/29/2024 1402 by Christine Melo RN  Medication Review/Management: medications reviewed  Taken 9/29/2024 1200 by Christine Melo RN  Medication Review/Management: medications reviewed  Taken 9/29/2024 1010 by Christine Melo RN  Medication Review/Management: medications reviewed  Taken 9/29/2024 0820 by Christine Melo RN  Medication Review/Management: medications reviewed     Problem: Skin Injury Risk Increased  Goal: Skin Health and Integrity  Outcome: Progressing     Problem: Fall Injury Risk  Goal: Absence of Fall and Fall-Related Injury  Outcome: Progressing  Intervention: Identify and Manage Contributors  Recent Flowsheet Documentation  Taken 9/29/2024 1615 by Christine Melo RN  Medication Review/Management: medications reviewed  Taken 9/29/2024 1402 by Christine Melo RN  Medication Review/Management: medications reviewed  Taken 9/29/2024 1200 by Christine Melo RN  Medication Review/Management: medications reviewed  Taken 9/29/2024 1010 by Christine Melo RN  Medication Review/Management: medications reviewed  Taken 9/29/2024 0820 by Christine Melo RN  Medication Review/Management: medications reviewed  Intervention: Promote Injury-Free Environment  Recent Flowsheet Documentation  Taken 9/29/2024 1615 by Christine Melo RN  Safety Promotion/Fall  Prevention:   clutter free environment maintained   activity supervised  Taken 9/29/2024 1402 by Christine Melo RN  Safety Promotion/Fall Prevention:   clutter free environment maintained   activity supervised  Taken 9/29/2024 1200 by Christine Melo RN  Safety Promotion/Fall Prevention:   clutter free environment maintained   activity supervised  Taken 9/29/2024 1010 by Christine Melo RN  Safety Promotion/Fall Prevention:   clutter free environment maintained   activity supervised  Taken 9/29/2024 0820 by Christine Melo RN  Safety Promotion/Fall Prevention:   clutter free environment maintained   activity supervised   Goal Outcome Evaluation:              Outcome Evaluation: Alert and oriented. ambulated to bathroom. waiting for rehab

## 2024-09-29 NOTE — PROGRESS NOTES
Name: Barbara Tran ADMIT: 2024   : 1955  PCP: Millicent Ace MD    MRN: [id number] LOS: 8 days   AGE/SEX: 69 y.o. female  ROOM: [facility]     Subjective   Subjective   Knee is improved. She is trying to coordinate discharge to SNF with family who can be off to help with transition. She thinks son will be able to help tomorrow.     Objective   Objective   Vital Signs  Temp:  [98 °F (36.7 °C)-98.4 °F (36.9 °C)] 98 °F (36.7 °C)  Heart Rate:  [67-78] 67  Resp:  [16-18] 18  BP: (119-131)/(62-72) 131/62  SpO2:  [96 %-97 %] 97 %  on   ;   Device (Oxygen Therapy): room air  Body mass index is 31.95 kg/m².    Physical Exam  Constitutional:       General: She is not in acute distress.     Appearance: She is not toxic-appearing.   HENT:      Head: Normocephalic and atraumatic.   Cardiovascular:      Rate and Rhythm: Normal rate and regular rhythm.   Pulmonary:      Effort: Pulmonary effort is normal. No respiratory distress.      Breath sounds: Normal breath sounds. No wheezing or rhonchi.   Abdominal:      General: Bowel sounds are normal.      Palpations: Abdomen is soft.      Tenderness: There is no abdominal tenderness. There is no guarding or rebound.   Skin:     General: Skin is warm and dry.   Neurological:      General: No focal deficit present.      Mental Status: She is alert.   Psychiatric:         Mood and Affect: Mood normal.         Behavior: Behavior normal.     Results Review  I reviewed the patient's new clinical results.  Results from last 7 days   Lab Units 24  0601 24  0311   WBC 10*3/mm3 11.73* 10.10   HEMOGLOBIN g/dL 13.9 14.5   PLATELETS 10*3/mm3 370 307     Results from last 7 days   Lab Units 24  0616 24  0520 24  0601 24  1127   SODIUM mmol/L 135* 135* 134* 134*   POTASSIUM mmol/L 4.0 4.3 4.0 4.1   CHLORIDE mmol/L 99 98 102 99   CO2 mmol/L 26.0 24.6 24.0 22.8   BUN mg/dL 25* 25* 25* 22   CREATININE mg/dL 0.68 0.53* 0.64 0.70   GLUCOSE mg/dL 135* 130*  "161* 141*     Lab Results   Component Value Date    ANIONGAP 10.0 09/29/2024     Estimated Creatinine Clearance: 88.6 mL/min (by C-G formula based on SCr of 0.68 mg/dL).   Lab Results   Component Value Date    EGFR 93.2 09/29/2024           Results from last 7 days   Lab Units 09/29/24  0616 09/28/24  0520 09/27/24  0937 09/27/24  0601 09/26/24  1127 09/23/24  0311 09/22/24  1224   CALCIUM mg/dL 9.4 9.1  --  9.0 9.3   < > 8.9   MAGNESIUM mg/dL 2.5* 2.5* 2.4  --   --   --  2.2    < > = values in this interval not displayed.       No results found for: \"HGBA1C\", \"POCGLU\"    FL Guide For Pain Meds Inj    Result Date: 9/27/2024  Technically successful left knee intra-articular medication injection with fluoroscopic guidance.   Procedure performed by MANOHAR Rodríguez. By electronically signing this report, I, the supervising radiologist, attest that I was not present for the procedure(s) . I agree with the finalized report.    This report was finalized on 9/27/2024 4:25 PM by Dr. Tre Beckford M.D on Workstation: [id number]       Scheduled Meds  apixaban, 10 mg, Oral, Q12H   Followed by  [START ON 9/30/2024] apixaban, 5 mg, Oral, Q12H  buPROPion XL, 300 mg, Oral, Daily  colchicine, 0.6 mg, Oral, Daily  levothyroxine, 50 mcg, Oral, Q AM  pantoprazole, 40 mg, Oral, Q AM  predniSONE, 30 mg, Oral, Once  rosuvastatin, 40 mg, Oral, Nightly  sacubitril-valsartan, 1 tablet, Oral, BID  spironolactone, 25 mg, Oral, Daily  torsemide, 20 mg, Oral, Daily    Continuous Infusions   PRN Meds    benzonatate    Calcium Replacement - Follow Nurse / BPA Driven Protocol    HYDROcodone Bit-Homatrop MBr    HYDROcodone-acetaminophen    HYDROmorphone    Magnesium Standard Dose Replacement - Follow Nurse / BPA Driven Protocol    ondansetron    Phosphorus Replacement - Follow Nurse / BPA Driven Protocol    Potassium Replacement - Follow Nurse / BPA Driven Protocol    sodium chloride     Diet  Diet: Cardiac; Healthy Heart (2-3 Na+); Fluid " Consistency: Thin (IDDSI 0)       Assessment/Plan     Active Hospital Problems    Diagnosis  POA    **Pulmonary embolism [I26.99]  Yes    Right ventricular thrombus [I51.3]  Unknown    COVID-19 virus detected [U07.1]  Unknown    Influenza [J11.1]  Unknown    History of pulmonary embolism [Z86.711]  Yes    Chronic diastolic congestive heart failure [I50.32]  Yes    Hypothyroidism [E03.9]  Yes    Chronic coronary artery disease [I25.10]  Yes    Benign essential HTN [I10]  Yes    HLD (hyperlipidemia) [E78.5]  Yes      Resolved Hospital Problems   No resolved problems to display.     69 y.o. female     Acute bilateral PE (recurrent PE)  Left DVT  Pulmonary infarction not excluded on CT scan  RV thrombus  Now on apixaban. Now will likely have lifelong anticoagulant  Hematology- hypercoag workup/outpatient workup  Pulmonology recommends repeat CT scan in 6 to 8 weeks to ensure clearance of probable pulmonary infarcts (follow-up with [facility])  Echocardiogram no right heart strain but showed RV thrombus  Hemodynamically stable not requiring any oxygen        Bilateral knee pain  Pseudogout  Continue pain medications, Orthopedics has seen and now both knees have been injected   Currently on colchicine, avoid nsaids with her a/c. Pain improved with a dose of prednisone yesterday will give another today.  Continue therapies     Influenza  COVID  Positive for both on 9/8/2024. Antigen negative x 2 and out of isolation  Continue supportive care  Not requiring any oxygen  Antitussive for cough     CAD history of stent  HFpEF EF 56 to 60%  Hypertension BP acceptable  Hyperlipidemia statin  Hypothyroidism levothyroxine  Obesity Body mass index is 31.95 kg/m².      DVT prophylaxis  anticoagulation as above     Discharge  Planning for SNF probably tomorrow when bed available  Expected Discharge Date: 9/30/2024; Expected Discharge Time:     Discussed with patient and nursing staff    Lambert Foley MD  [facility]  09/29/24

## 2024-09-29 NOTE — PLAN OF CARE
Goal Outcome Evaluation:  Plan of Care Reviewed With: patient           Outcome Evaluation: vss. no c/o pain this shift, pt ambulating with stand by assist with a walker, cough managed with prn medication per MD order. Compliant with plan of care. Plan to d/c to rehab pending clearence.

## 2024-09-30 LAB
ANION GAP SERPL CALCULATED.3IONS-SCNC: 8 MMOL/L (ref 5–15)
BACTERIA FLD CULT: NORMAL
BACTERIA FLD CULT: NORMAL
BACTERIA SPEC ANAEROBE CULT: NORMAL
BUN SERPL-MCNC: 24 MG/DL (ref 8–23)
BUN/CREAT SERPL: 42.1 (ref 7–25)
CALCIUM SPEC-SCNC: 8.9 MG/DL (ref 8.6–10.5)
CHLORIDE SERPL-SCNC: 99 MMOL/L (ref 98–107)
CO2 SERPL-SCNC: 27 MMOL/L (ref 22–29)
CREAT SERPL-MCNC: 0.57 MG/DL (ref 0.57–1)
EGFRCR SERPLBLD CKD-EPI 2021: 97.3 ML/MIN/1.73
GLUCOSE SERPL-MCNC: 105 MG/DL (ref 65–99)
GRAM STN SPEC: NORMAL
GRAM STN SPEC: NORMAL
MAGNESIUM SERPL-MCNC: 2.4 MG/DL (ref 1.6–2.4)
POTASSIUM SERPL-SCNC: 3.7 MMOL/L (ref 3.5–5.2)
SODIUM SERPL-SCNC: 134 MMOL/L (ref 136–145)

## 2024-09-30 PROCEDURE — 80048 BASIC METABOLIC PNL TOTAL CA: CPT | Performed by: HOSPITALIST

## 2024-09-30 PROCEDURE — 97530 THERAPEUTIC ACTIVITIES: CPT

## 2024-09-30 PROCEDURE — 83735 ASSAY OF MAGNESIUM: CPT | Performed by: HOSPITALIST

## 2024-09-30 RX ADMIN — TORSEMIDE 20 MG: 20 TABLET ORAL at 08:24

## 2024-09-30 RX ADMIN — SPIRONOLACTONE 25 MG: 25 TABLET, FILM COATED ORAL at 08:24

## 2024-09-30 RX ADMIN — SACUBITRIL AND VALSARTAN 1 TABLET: 24; 26 TABLET, FILM COATED ORAL at 20:11

## 2024-09-30 RX ADMIN — APIXABAN 5 MG: 5 TABLET, FILM COATED ORAL at 20:12

## 2024-09-30 RX ADMIN — HYDROCODONE BITARTRATE AND ACETAMINOPHEN 1 TABLET: 5; 325 TABLET ORAL at 02:25

## 2024-09-30 RX ADMIN — HYDROCODONE BITARTRATE AND HOMATROPINE METHYLBROMIDE 5 ML: 5; 1.5 SOLUTION ORAL at 03:18

## 2024-09-30 RX ADMIN — ROSUVASTATIN CALCIUM 40 MG: 40 TABLET, FILM COATED ORAL at 20:11

## 2024-09-30 RX ADMIN — BUPROPION HYDROCHLORIDE 300 MG: 300 TABLET, EXTENDED RELEASE ORAL at 08:24

## 2024-09-30 RX ADMIN — BENZONATATE 200 MG: 100 CAPSULE ORAL at 20:12

## 2024-09-30 RX ADMIN — COLCHICINE 0.6 MG: 0.6 TABLET ORAL at 08:24

## 2024-09-30 RX ADMIN — HYDROCODONE BITARTRATE AND ACETAMINOPHEN 1 TABLET: 5; 325 TABLET ORAL at 08:26

## 2024-09-30 RX ADMIN — APIXABAN 5 MG: 5 TABLET, FILM COATED ORAL at 08:24

## 2024-09-30 RX ADMIN — LEVOTHYROXINE SODIUM 50 MCG: 50 TABLET ORAL at 05:38

## 2024-09-30 RX ADMIN — HYDROCODONE BITARTRATE AND ACETAMINOPHEN 1 TABLET: 5; 325 TABLET ORAL at 20:12

## 2024-09-30 RX ADMIN — SACUBITRIL AND VALSARTAN 1 TABLET: 24; 26 TABLET, FILM COATED ORAL at 08:24

## 2024-09-30 RX ADMIN — PANTOPRAZOLE SODIUM 40 MG: 40 TABLET, DELAYED RELEASE ORAL at 05:38

## 2024-09-30 NOTE — PLAN OF CARE
Goal Outcome Evaluation:  Plan of Care Reviewed With: patient        Progress: improving  Outcome Evaluation: pt seen for OT in PM, pt sitting on EOB on arrival, she is agreeable however eager to get out of hospital and to rehab. She completed STS/functional mobility with SBA/CGA with rwx, she demo's household distances to/from BR this date. Reports overall feeling better, still remains with some decreased activity tolerance. continue to progress as able.      Anticipated Discharge Disposition (OT): skilled nursing facility

## 2024-09-30 NOTE — PLAN OF CARE
Goal Outcome Evaluation:  Plan of Care Reviewed With: patient        Progress: improving  Outcome Evaluation: Pt found sitting EOB, eager to d/c to rehab, generally without c/os. Pt stood from EOB with CGA using r wx. Pt amb 40' with r wx and CGA - slow pace, flexed posture, good balance, endurance improving but still limited. Pt placed in recliner chair with all needs met. Nurse notified of request for B knee ice packs. Cont PT to address functional deficits and progress as tolerated and prepare for d/c to rehab as appropriate

## 2024-09-30 NOTE — CASE MANAGEMENT/SOCIAL WORK
Continued Stay Note  [facility]     Patient Name: Barbara Tran  MRN: [id number]  Today's Date: 9/30/2024    Admit Date: 9/20/2024    Plan: [facility] SN[facility]   Discharge Plan       Row Name 09/30/24 1623       Plan    Plan [facility]    Patient/Family in Agreement with Plan yes    Plan Comments Met with pt in room. She confirmed her plan is to go to SNF at discharge. Her family may be able to transport her. We discussed accepting facilities and she has decided to go to [facility]. Spoke with [facility] and they have a bed available today. Updated primary RN and MD. Packet complete, in CCP office. Pt denied any further needs at this time. CCP following. TUCKER Agosto RN                   Discharge Codes    No documentation.                 Expected Discharge Date and Time       Expected Discharge Date Expected Discharge Time    Sep 30, 2024               Vince Díaz RN

## 2024-09-30 NOTE — THERAPY TREATMENT NOTE
Patient Name: Barbara Tran  : 1955    MRN: [id number]                              Today's Date: 2024       Admit Date: 2024    Visit Dx:     ICD-10-CM ICD-9-CM   1. Bilateral pulmonary embolism  I26.99 415.19   2. COVID-19 virus detected  U07.1 079.89     Patient Active Problem List   Diagnosis    Benign essential HTN    Cervical pain    Dysthymia    Pedal edema    HLD (hyperlipidemia)    Goiter, non-toxic    Chronic pain of left knee    Chronic coronary artery disease    Hypothyroidism    History of sepsis    Primary insomnia    Chronic diastolic congestive heart failure    S/P drug eluting coronary stent placement    Pulmonic valve regurgitation    Tricuspid valve regurgitation    Osteoporosis    Hyperglycemia    History of pulmonary embolism    Balance problem    Pulmonary embolism    COVID-19 virus detected    Influenza    Right ventricular thrombus     Past Medical History:   Diagnosis Date    Allergic 2015    codeine, morphine, levaquin    Arthritis l5 years or so ago    Basal cell carcinoma     CAD (coronary artery disease)     Cataract 6-9 months ago    Need surgery    CHF (congestive heart failure)     Coronary artery disease 2005 stent    COVID-19 virus detected 03/10/2021    Deep vein thrombosis 2021    Pulmonary embolism    Depression     Disease of thyroid gland     Headache Covid 3-21    Has continued    History of pulmonary embolism 2021    History of urinary tract infection     HL (hearing loss) Last 4-6 months    Hyperlipidemia     Hypertension since     Hypothyroidism since     Multinodular goiter     Obesity     Osteopenia last few years    Osteoporosis     Pulmonary embolism 2021    From Covid    Pulmonic valve regurgitation     Tricuspid valve regurgitation     Unstable angina 2024     Past Surgical History:   Procedure Laterality Date    BUNIONECTOMY Bilateral     HAMMERT TOE REPAIR/BUNIONECTOMY    CARDIAC CATHETERIZATION  , 2018     CARDIAC CATHETERIZATION N/A 04/29/2024    Procedure: Coronary angiography;  Surgeon: Cong Rivera MD;  Location:  [facility];  Service: Cardiovascular;  Laterality: N/A;    CARDIAC CATHETERIZATION N/A 04/29/2024    Procedure: Left Heart Cath;  Surgeon: Cong Rivera MD;  Location:  [facility];  Service: Cardiovascular;  Laterality: N/A;    CARDIAC CATHETERIZATION N/A 04/29/2024    Procedure: Left ventriculography;  Surgeon: Cong Rivera MD;  Location:  [facility];  Service: Cardiovascular;  Laterality: N/A;    CATARACT EXTRACTION      CHOLECYSTECTOMY  1995    COLONOSCOPY N/A 12/19/2016    Procedure: COLONOSCOPY WITH COLD BIOPSIES;  Surgeon: Medina Guillen MD;  [facility];  Service:     CORONARY STENT PLACEMENT  2005    LAD 80% blockage    CYST REMOVAL      GANGLION CYST EXCISION      R WRIST    SKIN BIOPSY      SUBTOTAL HYSTERECTOMY  2009    THYROID BIOPSY  2014    TONSILLECTOMY  1965    TUBAL ABDOMINAL LIGATION  1985      General Information       Row Name 09/30/24 1506          OT Time and Intention    Document Type therapy note (daily note)  -MM     Mode of Treatment co-treatment;physical therapy;occupational therapy  -MM       Row Name 09/30/24 1506          General Information    Patient Profile Reviewed yes  -MM     Existing Precautions/Restrictions fall  -MM       Row Name 09/30/24 1506          Cognition    Orientation Status (Cognition) oriented x 4  -MM       Row Name 09/30/24 1506          Safety Issues, Functional Mobility    Impairments Affecting Function (Mobility) endurance/activity tolerance;strength  -MM     Comment, Safety Issues/Impairments (Mobility) Co-treatment medically necessary and appropriate d/t pt's acuity level, decreased endurance and activity tolerance, and safety of patient and staff. Treatment focused on progression of care and goals established in POC. Gait belt and non-skid socks worn.  -MM                User Key  (r) = Recorded By, (t) = Taken By, (c) = Cosigned By      Initials Name Provider Type    Kenyatta Christopher OT Occupational Therapist                     Mobility/ADL's       Row Name 09/30/24 1506          Bed Mobility    Comment, (Bed Mobility) sitting EOB on arrival, [facility] end of session  -MM       Row Name 09/30/24 1506          Transfers    Transfers sit-stand transfer  -MM       Row Name 09/30/24 1506          Sit-Stand Transfer    Sit-Stand [place] (Transfers) standby assist;verbal cues  -MM     Assistive Device (Sit-Stand Transfers) walker, front-wheeled  -MM       Row Name 09/30/24 1506          Functional Mobility    Functional Mobility- Ind. Level standby assist  -MM     Functional Mobility- Device walker, front-wheeled  -MM     Functional Mobility- Comment household distances with rwx SBA  -MM       Row Name 09/30/24 1506          Activities of Daily Living    BADL Assessment/Intervention toileting  -       Row Name 09/30/24 1506          Toileting Assessment/Training    Comment, (Toileting) pt reports up to BR multiple times this date, already completed g/h prior to session  -               User Key  (r) = Recorded By, (t) = Taken By, (c) = Cosigned By      Initials Name Provider Type    MM Kenyatta Dias OT Occupational Therapist                   Obj/Interventions       Row Name 09/30/24 1512          Motor Skills    Motor Skills functional endurance  -MM     Functional Endurance improving  -       Row Name 09/30/24 1512          Balance    Balance Assessment sitting static balance;sitting dynamic balance;sit to stand dynamic balance;standing dynamic balance;standing static balance  -MM     Static Sitting Balance modified independence  -MM     Dynamic Sitting Balance supervision  -MM     Position, Sitting Balance sitting edge of bed;unsupported  -MM     Sit to Stand Dynamic Balance contact guard;standby assist  -MM     Static Standing Balance standby assist  -MM     Dynamic  Standing Balance contact guard  -MM     Position/Device Used, Standing Balance supported;walker, front-wheeled  -MM     Balance Interventions sitting;standing;supported;sit to stand;static;dynamic;moderate challenge  -MM     Comment, Balance no LOBs  -MM               User Key  (r) = Recorded By, (t) = Taken By, (c) = Cosigned By      Initials Name Provider Type    MM Kenyatta Dias OT Occupational Therapist                   Goals/Plan    No documentation.                  Clinical Impression       Row Name 09/30/24 1513          Pain Assessment    Pretreatment Pain Rating 0/10 - no pain  -MM     Posttreatment Pain Rating 0/10 - no pain  -MM       Row Name 09/30/24 1513          Plan of Care Review    Plan of Care Reviewed With patient  -MM     Progress improving  -MM     Outcome Evaluation pt seen for OT in PM, pt sitting on EOB on arrival, she is agreeable however eager to get out of hospital and to rehab. She completed STS/functional mobility with SBA/CGA with rwx, she demo's household distances to/from BR this date. Reports overall feeling better, still remains with some decreased activity tolerance. continue to progress as able.  -MM       Row Name 09/30/24 1513          Therapy Plan Review/Discharge Plan (OT)    Anticipated Discharge Disposition (OT) skilled nursing facility  -MM       Row Name 09/30/24 1513          Vital Signs    O2 Delivery Pre Treatment room air  -MM       Row Name 09/30/24 1513          Positioning and Restraints    Pre-Treatment Position in bed  -MM     Post Treatment Position chair  -MM     In Chair notified nsg;reclined;call light within reach;encouraged to call for assist  pt requested no chair alarm  -MM               User Key  (r) = Recorded By, (t) = Taken By, (c) = Cosigned By      Initials Name Provider Type    Kenyatta Christopher OT Occupational Therapist                   Outcome Measures       Row Name 09/30/24 1518          How much help from another is currently needed...     Putting on and taking off regular lower body clothing? 3  -MM     Bathing (including washing, rinsing, and drying) 3  -MM     Toileting (which includes using toilet bed pan or urinal) 3  -MM     Putting on and taking off regular upper body clothing 3  -MM     Taking care of personal grooming (such as brushing teeth) 3  -MM     Eating meals 4  -MM     AM-PAC 6 Clicks Score (OT) 19  -MM       Row Name 09/30/24 1509 09/30/24 0820       How much help from another person do you currently need...    Turning from your back to your side while in flat bed without using bedrails? 3  -DJ 3  -KP    Moving from lying on back to sitting on the side of a flat bed without bedrails? 3  -DJ 3  -KP    Moving to and from a bed to a chair (including a wheelchair)? 3  -DJ 2  -KP    Standing up from a chair using your arms (e.g., wheelchair, bedside chair)? 3  -DJ 3  -KP    Climbing 3-5 steps with a railing? 2  -DJ 2  -KP    To walk in hospital room? 3  -DJ 3  -KP    AM-PAC 6 Clicks Score (PT) 17  -DJ 16  -KP    Highest Level of Mobility Goal 5 --> Static standing  -DJ 5 --> Static standing  -KP      Row Name 09/30/24 1518 09/30/24 1509       Functional Assessment    Outcome Measure Options AM-PAC 6 Clicks Daily Activity (OT)  -MM AM-PAC 6 Clicks Basic Mobility (PT)  -DJ              User Key  (r) = Recorded By, (t) = Taken By, (c) = Cosigned By      Initials Name Provider Type    Teresa Golden, PT Physical Therapist    Kenyatta Christopher OT Occupational Therapist    Christine Marks, RN Registered Nurse                    Occupational Therapy Education       Title: PT OT SLP Therapies (Done)       Topic: Occupational Therapy (Done)       Point: ADL training (Done)       Description:   Instruct learner(s) on proper safety adaptation and remediation techniques during self care or transfers.   Instruct in proper use of assistive devices.                  Learning Progress Summary             Patient Acceptance, E,TB, VU by CL at 9/28/2024  0317    Acceptance, E,TB, VU by AG at 9/24/2024 1523                         Point: Home exercise program (Done)       Description:   Instruct learner(s) on appropriate technique for monitoring, assisting and/or progressing therapeutic exercises/activities.                  Learning Progress Summary             Patient Acceptance, E,TB, VU by CL at 9/28/2024 0317    Acceptance, E,TB, VU by AG at 9/24/2024 1523                         Point: Precautions (Done)       Description:   Instruct learner(s) on prescribed precautions during self-care and functional transfers.                  Learning Progress Summary             Patient Acceptance, E,TB, VU by CL at 9/28/2024 0317    Acceptance, E,TB, VU by AG at 9/24/2024 1523                         Point: Body mechanics (Done)       Description:   Instruct learner(s) on proper positioning and spine alignment during self-care, functional mobility activities and/or exercises.                  Learning Progress Summary             Patient Acceptance, E,TB, VU by  at 9/28/2024 0317    Acceptance, E,TB, VU by AG at 9/24/2024 1523                                         User Key       Initials Effective Dates Name Provider Type Discipline    CL 03/19/24 -  Autumn Carson, RN Registered Nurse Nurse     01/23/24 -  David Chau OT Occupational Therapist OT                  OT Recommendation and Plan     Plan of Care Review  Plan of Care Reviewed With: patient  Progress: improving  Outcome Evaluation: pt seen for OT in PM, pt sitting on EOB on arrival, she is agreeable however eager to get out of hospital and to rehab. She completed STS/functional mobility with SBA/CGA with rwx, she demo's household distances to/from BR this date. Reports overall feeling better, still remains with some decreased activity tolerance. continue to progress as able.     Time Calculation:         Time Calculation- OT       Row Name 09/30/24 1518             Time Calculation- OT    OT Start  Time 1428  -MM      OT Stop Time 1441  -MM      OT Time Calculation (min) 13 min  -MM      Total Timed Code Minutes- OT 13 minute(s)  -MM      OT Received On 09/30/24  -MM      OT - Next Appointment 10/01/24  -MM         Timed Charges    50888 - OT Therapeutic Activity Minutes 13  -MM         Total Minutes    Timed Charges Total Minutes 13  -MM       Total Minutes 13  -MM                User Key  (r) = Recorded By, (t) = Taken By, (c) = Cosigned By      Initials Name Provider Type    MM Kenyatta Dias OT Occupational Therapist                  Therapy Charges for Today       Code Description Service Date Service Provider Modifiers Qty    [id number]  OT THERAPEUTIC ACT EA 15 MIN 9/30/2024 Kenyatta Dias OT GO 1                 Kenyatta Dias OT  9/30/2024

## 2024-09-30 NOTE — PROGRESS NOTES
Nutrition Services    Patient Name:  Barbara Tran  YOB: 1955  MRN: [id number]  Admit Date:  9/20/2024    Assessment Date:  09/30/24    CLINICAL NUTRITION - PROGRESS NOTE       Reason for Encounter Follow-up         Nutrition Order Diet: Cardiac; Healthy Heart (2-3 Na+); Fluid Consistency: Thin (IDDSI 0)    Supplements/Snacks Boost Breeze BID--dislikes and asking to D/C them    PO Intake 75%          Pertinent Information Pulmonary embolism, HTN, CAD, CHF,         Intervention/Plan Visited pt who states appetite is good. She dislikes the Boost Breeze and does not feel she needs any supplements   Encourage PO intake with all meals       RD to follow up per protocol.    Electronically signed by:  Lesli Torres RD  09/30/24 14:53 EDT

## 2024-09-30 NOTE — THERAPY TREATMENT NOTE
Patient Name: Barbara Tran  : 1955    MRN: [id number]                              Today's Date: 2024       Admit Date: 2024    Visit Dx:     ICD-10-CM ICD-9-CM   1. Bilateral pulmonary embolism  I26.99 415.19   2. COVID-19 virus detected  U07.1 079.89     Patient Active Problem List   Diagnosis    Benign essential HTN    Cervical pain    Dysthymia    Pedal edema    HLD (hyperlipidemia)    Goiter, non-toxic    Chronic pain of left knee    Chronic coronary artery disease    Hypothyroidism    History of sepsis    Primary insomnia    Chronic diastolic congestive heart failure    S/P drug eluting coronary stent placement    Pulmonic valve regurgitation    Tricuspid valve regurgitation    Osteoporosis    Hyperglycemia    History of pulmonary embolism    Balance problem    Pulmonary embolism    COVID-19 virus detected    Influenza    Right ventricular thrombus     Past Medical History:   Diagnosis Date    Allergic 2015    codeine, morphine, levaquin    Arthritis l5 years or so ago    Basal cell carcinoma     CAD (coronary artery disease)     Cataract 6-9 months ago    Need surgery    CHF (congestive heart failure)     Coronary artery disease 2005 stent    COVID-19 virus detected 03/10/2021    Deep vein thrombosis 2021    Pulmonary embolism    Depression     Disease of thyroid gland     Headache Covid 3-21    Has continued    History of pulmonary embolism 2021    History of urinary tract infection     HL (hearing loss) Last 4-6 months    Hyperlipidemia     Hypertension since     Hypothyroidism since     Multinodular goiter     Obesity     Osteopenia last few years    Osteoporosis     Pulmonary embolism 2021    From Covid    Pulmonic valve regurgitation     Tricuspid valve regurgitation     Unstable angina 2024     Past Surgical History:   Procedure Laterality Date    BUNIONECTOMY Bilateral     HAMMERT TOE REPAIR/BUNIONECTOMY    CARDIAC CATHETERIZATION  , 2018     CARDIAC CATHETERIZATION N/A 04/29/2024    Procedure: Coronary angiography;  Surgeon: Cong Rivera MD;  Location:  [facility];  Service: Cardiovascular;  Laterality: N/A;    CARDIAC CATHETERIZATION N/A 04/29/2024    Procedure: Left Heart Cath;  Surgeon: Cong Rivera MD;  Location:  [facility];  Service: Cardiovascular;  Laterality: N/A;    CARDIAC CATHETERIZATION N/A 04/29/2024    Procedure: Left ventriculography;  Surgeon: Cong Rivera MD;  Location:  [facility];  Service: Cardiovascular;  Laterality: N/A;    CATARACT EXTRACTION      CHOLECYSTECTOMY  1995    COLONOSCOPY N/A 12/19/2016    Procedure: COLONOSCOPY WITH COLD BIOPSIES;  Surgeon: Medina Guillen MD;  [facility];  Service:     CORONARY STENT PLACEMENT  2005    LAD 80% blockage    CYST REMOVAL      GANGLION CYST EXCISION      R WRIST    SKIN BIOPSY      SUBTOTAL HYSTERECTOMY  2009    THYROID BIOPSY  2014    TONSILLECTOMY  1965    TUBAL ABDOMINAL LIGATION  1985      General Information       Row Name 09/30/24 1459          Physical Therapy Time and Intention    Document Type therapy note (daily note)  -DJ     Mode of Treatment physical therapy;co-treatment;occupational therapy  cotx appropriate to progress mobility  -DJ       Row Name 09/30/24 1459          General Information    Patient Profile Reviewed yes  -DJ     Existing Precautions/Restrictions fall  -DJ       Row Name 09/30/24 1459          Cognition    Orientation Status (Cognition) oriented x 4  -DJ       Row Name 09/30/24 1459          Safety Issues, Functional Mobility    Comment, Safety Issues/Impairments (Mobility) gt belt, rubber soled shoes  -DJ               User Key  (r) = Recorded By, (t) = Taken By, (c) = Cosigned By      Initials Name Provider Type    Teresa Golden, PT Physical Therapist                   Mobility       Row Name 09/30/24 1500          Bed Mobility    Scooting/Bridging [place] (Bed  Mobility) not tested  -DJ     Supine-Sit [place] (Bed Mobility) not tested  -DJ     Comment, (Bed Mobility) Pt sitting EOB upon arrival  -DJ       Row Name 09/30/24 1500          Transfers    Comment, (Transfers) sit/stand from EOB  -DJ       Row Name 09/30/24 1500          Bed-Chair Transfer    Bed-Chair [place] (Transfers) not tested  -DJ       Row Name 09/30/24 1500          Sit-Stand Transfer    Sit-Stand [place] (Transfers) standby assist;verbal cues  -DJ     Assistive Device (Sit-Stand Transfers) walker, front-wheeled  -DJ       Row Name 09/30/24 1500          Gait/Stairs (Locomotion)    [place] Level (Gait) contact guard;verbal cues  -DJ     Assistive Device (Gait) walker, front-wheeled  -DJ     Distance in Feet (Gait) 40  -DJ     Deviations/Abnormal Patterns (Gait) cher decreased;antalgic;festinating/shuffling;gait speed decreased;stride length decreased  -DJ     Bilateral Gait Deviations forward flexed posture  -DJ     Comment, (Gait/Stairs) Pt amb 40' with r wx and CGA - slow pace, flexed posture, good balance, endurance improving but still limited  -DJ               User Key  (r) = Recorded By, (t) = Taken By, (c) = Cosigned By      Initials Name Provider Type    Teresa Golden, PT Physical Therapist                   Obj/Interventions       Row Name 09/30/24 1503          Motor Skills    Motor Skills functional endurance  -DJ     Functional Endurance improving but still limited  -DJ       Row Name 09/30/24 1503          Balance    Balance Assessment standing static balance;standing dynamic balance  -DJ     Static Standing Balance contact guard;verbal cues;standby assist  -DJ     Dynamic Standing Balance contact guard;verbal cues  -DJ     Position/Device Used, Standing Balance walker, rolling  -DJ     Balance Interventions sitting;standing;sit to stand;supported;weight shifting activity  -DJ     Comment, Balance good  -DJ               User Key  (r) = Recorded By, (t) = Taken By,  (c) = Cosigned By      Initials Name Provider Type    Teresa Golden, PT Physical Therapist                   Goals/Plan    No documentation.                  Clinical Impression       Row Name 09/30/24 1504          Pain    Pre/Posttreatment Pain Comment Pt c/o B knee pain and requesting ice packs after therapy  -DJ     Pain Intervention(s) Repositioned;Rest;Cold pack  -DJ       Row Name 09/30/24 1504          Plan of Care Review    Plan of Care Reviewed With patient  -DJ     Progress improving  -DJ     Outcome Evaluation Pt found sitting EOB, eager to d/c to rehab, generally without c/os. Pt stood from EOB with CGA using r wx. Pt amb 40' with r wx and CGA - slow pace, flexed posture, good balance, endurance improving but still limited. Pt placed in recliner chair with all needs met. Nurse notified of request for B knee ice packs. Cont PT to address functional deficits and progress as tolerated and prepare for d/c to rehab as appropriate  -DJ       Row Name 09/30/24 1504          Therapy Assessment/Plan (PT)    Patient/Family Therapy Goals Statement (PT) rehab  -DJ     Criteria for Skilled Interventions Met (PT) skilled treatment is necessary  -DJ       Row Name 09/30/24 1504          Vital Signs    O2 Delivery Pre Treatment room air  -DJ     O2 Delivery Intra Treatment room air  -DJ     O2 Delivery Post Treatment room air  -DJ     Pre Patient Position Sitting  -DJ     Intra Patient Position Standing  -DJ     Post Patient Position Sitting  -DJ       Row Name 09/30/24 1504          Positioning and Restraints    Pre-Treatment Position in bed  -DJ     Post Treatment Position chair  -DJ     In Chair notified nsg;reclined;call light within reach;encouraged to call for assist  pt requested for chair alarm to be removed  -DJ               User Key  (r) = Recorded By, (t) = Taken By, (c) = Cosigned By      Initials Name Provider Type    Teresa Golden, PT Physical Therapist                   Outcome Measures       Row  Name 09/30/24 1509 09/30/24 0820       How much help from another person do you currently need...    Turning from your back to your side while in flat bed without using bedrails? 3  -DJ 3  -KP    Moving from lying on back to sitting on the side of a flat bed without bedrails? 3  -DJ 3  -KP    Moving to and from a bed to a chair (including a wheelchair)? 3  -DJ 2  -KP    Standing up from a chair using your arms (e.g., wheelchair, bedside chair)? 3  -DJ 3  -KP    Climbing 3-5 steps with a railing? 2  -DJ 2  -KP    To walk in hospital room? 3  -DJ 3  -KP    AM-PAC 6 Clicks Score (PT) 17  -DJ 16  -KP    Highest Level of Mobility Goal 5 --> Static standing  -DJ 5 --> Static standing  -KP      Row Name 09/30/24 1509          Functional Assessment    Outcome Measure Options AM-PAC 6 Clicks Basic Mobility (PT)  -DJ               User Key  (r) = Recorded By, (t) = Taken By, (c) = Cosigned By      Initials Name Provider Type    Teresa Golden, PT Physical Therapist    Christine Marks, RN Registered Nurse                                 Physical Therapy Education       Title: PT OT SLP Therapies (Done)       Topic: Physical Therapy (Done)       Point: Mobility training (Done)       Learning Progress Summary             Patient Acceptance, E, VU by DJ at 9/30/2024 1509    Acceptance, E,TB,D, VU by PH at 9/28/2024 1215    Acceptance, E,TB, VU by CL at 9/28/2024 0317    Acceptance, E,TB, VU,NR by EF at 9/27/2024 1608    Acceptance, E,TB, VU,NR by EF at 9/26/2024 1508                         Point: Home exercise program (Done)       Learning Progress Summary             Patient Acceptance, E,TB,D, VU by PH at 9/28/2024 1215    Acceptance, E,TB, VU by CL at 9/28/2024 0317    Acceptance, E,TB, VU,NR by EF at 9/27/2024 1608                         Point: Body mechanics (Done)       Learning Progress Summary             Patient Acceptance, E, VU by DJ at 9/30/2024 1509    Acceptance, E,TB,D, VU by PH at 9/28/2024 1215    Acceptance,  E,TB, VU by CL at 9/28/2024 0317    Acceptance, E,TB, VU,NR by EF at 9/27/2024 1608                         Point: Precautions (Done)       Learning Progress Summary             Patient Acceptance, E, VU by DJ at 9/30/2024 1509    Acceptance, E,TB,D, VU by PH at 9/28/2024 1215    Acceptance, E,TB, VU by CL at 9/28/2024 0317                                         User Key       Initials Effective Dates Name Provider Type Discipline    EF 05/31/24 -  Joanne Fritz, PT Physical Therapist PT    PH 06/16/21 -  Brandi Stinson PTA Physical Therapist Assistant PT    DJ 10/25/19 -  Teresa Wang PT Physical Therapist PT    CL 03/19/24 -  Autumn Carson RN Registered Nurse Nurse                  PT Recommendation and Plan     Plan of Care Reviewed With: patient  Progress: improving  Outcome Evaluation: Pt found sitting EOB, eager to d/c to rehab, generally without c/os. Pt stood from EOB with CGA using r wx. Pt amb 40' with r wx and CGA - slow pace, flexed posture, good balance, endurance improving but still limited. Pt placed in recliner chair with all needs met. Nurse notified of request for B knee ice packs. Cont PT to address functional deficits and progress as tolerated and prepare for d/c to rehab as appropriate     Time Calculation:         PT Charges       Row Name 09/30/24 1510             Time Calculation    Start Time 1428  -DJ      Stop Time 1442  -DJ      Time Calculation (min) 14 min  -DJ      PT Non-Billable Time (min) 10 min  -DJ      PT Received On 09/30/24  -DJ      PT - Next Appointment 10/01/24  -DJ                User Key  (r) = Recorded By, (t) = Taken By, (c) = Cosigned By      Initials Name Provider Type    Teresa Golden, PT Physical Therapist                  Therapy Charges for Today       Code Description Service Date Service Provider Modifiers Qty    12450151265 HC PT THERAPEUTIC ACT EA 15 MIN 9/30/2024 Teresa Wang, PT GP 1            PT G-Codes  Outcome Measure Options: AM-PAC  6 Clicks Basic Mobility (PT)  AM-PAC 6 Clicks Score (PT): 17  AM-PAC 6 Clicks Score (OT): 14       Teresa Wang, PT  9/30/2024

## 2024-09-30 NOTE — PLAN OF CARE
Problem: Adult Inpatient Plan of Care  Goal: Plan of Care Review  Outcome: Progressing  Flowsheets (Taken 9/30/2024 1542)  Outcome Evaluation: patient is alert and oriented.  VSS noc/o pain. ambulated to bathroom  Goal: Patient-Specific Goal (Individualized)  Outcome: Progressing  Goal: Absence of Hospital-Acquired Illness or Injury  Outcome: Progressing  Intervention: Identify and Manage Fall Risk  Recent Flowsheet Documentation  Taken 9/30/2024 1400 by Christine Melo RN  Safety Promotion/Fall Prevention:   clutter free environment maintained   activity supervised  Taken 9/30/2024 1207 by Christine Melo RN  Safety Promotion/Fall Prevention:   clutter free environment maintained   activity supervised  Taken 9/30/2024 1001 by Christine Melo RN  Safety Promotion/Fall Prevention:   clutter free environment maintained   activity supervised  Taken 9/30/2024 0820 by Christine Melo RN  Safety Promotion/Fall Prevention:   clutter free environment maintained   activity supervised  Intervention: Prevent and Manage VTE (Venous Thromboembolism) Risk  Recent Flowsheet Documentation  Taken 9/30/2024 0820 by Christine Melo RN  Activity Management: ambulated to bathroom  VTE Prevention/Management: (eliquis) other (see comments)  Intervention: Prevent Infection  Recent Flowsheet Documentation  Taken 9/30/2024 1400 by Christine Melo RN  Infection Prevention: single patient room provided  Taken 9/30/2024 1207 by Christine Melo RN  Infection Prevention: single patient room provided  Taken 9/30/2024 1001 by Christine Melo RN  Infection Prevention: single patient room provided  Taken 9/30/2024 0820 by Christine Melo RN  Infection Prevention: single patient room provided  Goal: Optimal Comfort and Wellbeing  Outcome: Progressing  Intervention: Provide Person-Centered Care  Recent Flowsheet Documentation  Taken 9/30/2024 0820 by Christine Melo RN  Trust Relationship/Rapport:   care explained   choices provided  Goal: Readiness for  Transition of Care  Outcome: Progressing     Problem: Hypertension Comorbidity  Goal: Blood Pressure in Desired Range  Outcome: Progressing  Intervention: Maintain Blood Pressure Management  Recent Flowsheet Documentation  Taken 9/30/2024 1400 by Christine Melo RN  Medication Review/Management: medications reviewed  Taken 9/30/2024 1207 by Christine Melo RN  Medication Review/Management: medications reviewed  Taken 9/30/2024 1001 by Christine Melo RN  Medication Review/Management: medications reviewed  Taken 9/30/2024 0820 by Christine Melo RN  Medication Review/Management: medications reviewed     Problem: Skin Injury Risk Increased  Goal: Skin Health and Integrity  Outcome: Progressing     Problem: Fall Injury Risk  Goal: Absence of Fall and Fall-Related Injury  Outcome: Progressing  Intervention: Identify and Manage Contributors  Recent Flowsheet Documentation  Taken 9/30/2024 1400 by Christine Melo RN  Medication Review/Management: medications reviewed  Taken 9/30/2024 1207 by Christine Melo RN  Medication Review/Management: medications reviewed  Taken 9/30/2024 1001 by Christine Melo RN  Medication Review/Management: medications reviewed  Taken 9/30/2024 0820 by Christine Melo RN  Medication Review/Management: medications reviewed  Intervention: Promote Injury-Free Environment  Recent Flowsheet Documentation  Taken 9/30/2024 1400 by Christine Melo RN  Safety Promotion/Fall Prevention:   clutter free environment maintained   activity supervised  Taken 9/30/2024 1207 by Christine Melo RN  Safety Promotion/Fall Prevention:   clutter free environment maintained   activity supervised  Taken 9/30/2024 1001 by Christine Melo RN  Safety Promotion/Fall Prevention:   clutter free environment maintained   activity supervised  Taken 9/30/2024 0820 by Christine Melo RN  Safety Promotion/Fall Prevention:   clutter free environment maintained   activity supervised   Goal Outcome Evaluation:              Outcome Evaluation:  patient is alert and oriented.  VSS noc/o pain. ambulated to bathroom

## 2024-09-30 NOTE — PROGRESS NOTES
Name: Barbara Tran ADMIT: 2024   : 1955  PCP: Millicent Ace MD    MRN: [id number] LOS: 9 days   AGE/SEX: 69 y.o. female  ROOM: [facility]     Subjective   Subjective   Patient is seen at bedside, no new complaints.       Objective   Objective   Vital Signs  Temp:  [97.6 °F (36.4 °C)-98.4 °F (36.9 °C)] 97.8 °F (36.6 °C)  Heart Rate:  [62-97] 97  Resp:  [18] 18  BP: (106-130)/(58-87) 107/59  SpO2:  [95 %-97 %] 97 %  on   ;   Device (Oxygen Therapy): room air  Body mass index is 31.95 kg/m².  Physical Exam  Constitutional:       General: She is not in acute distress.     Appearance: She is not toxic-appearing.   HENT:      Head: Normocephalic and atraumatic.   Cardiovascular:      Rate and Rhythm: Normal rate and regular rhythm.   Pulmonary:      Effort: Pulmonary effort is normal. No respiratory distress.      Breath sounds: Normal breath sounds. No wheezing or rhonchi.   Abdominal:      General: Bowel sounds are normal.      Palpations: Abdomen is soft.      Tenderness: There is no abdominal tenderness. There is no guarding or rebound.   Skin:     General: Skin is warm and dry.   Neurological:      General: No focal deficit present.      Mental Status: She is alert.   Psychiatric:         Mood and Affect: Mood normal.         Behavior: Behavior normal.       Copied text material from yesterday's note has been reviewed for appropriate changes and remains accurate as of 24.      Results Review     I reviewed the patient's new clinical results.  Results from last 7 days   Lab Units 24  0601   WBC 10*3/mm3 11.73*   HEMOGLOBIN g/dL 13.9   PLATELETS 10*3/mm3 370     Results from last 7 days   Lab Units 24  0636 24  0616 24  0520 24  0601   SODIUM mmol/L 134* 135* 135* 134*   POTASSIUM mmol/L 3.7 4.0 4.3 4.0   CHLORIDE mmol/L 99 99 98 102   CO2 mmol/L 27.0 26.0 24.6 24.0   BUN mg/dL 24* 25* 25* 25*   CREATININE mg/dL 0.57 0.68 0.53* 0.64   GLUCOSE mg/dL 105* 135* 130*  "161*   EGFR mL/min/1.73 97.3 93.2 99.0 94.6       Results from last 7 days   Lab Units 09/30/24  0636 09/29/24  0616 09/28/24  0520 09/27/24  0937 09/27/24  0601   CALCIUM mg/dL 8.9 9.4 9.1  --  9.0   MAGNESIUM mg/dL 2.4 2.5* 2.5* 2.4  --        No results found for: \"HGBA1C\", \"POCGLU\"    No radiology results for the last day    I have personally reviewed all medications:  Scheduled Medications  apixaban, 5 mg, Oral, Q12H  buPROPion XL, 300 mg, Oral, Daily  colchicine, 0.6 mg, Oral, Daily  levothyroxine, 50 mcg, Oral, Q AM  pantoprazole, 40 mg, Oral, Q AM  rosuvastatin, 40 mg, Oral, Nightly  sacubitril-valsartan, 1 tablet, Oral, BID  spironolactone, 25 mg, Oral, Daily  torsemide, 20 mg, Oral, Daily    Infusions   Diet  Diet: Cardiac; Healthy Heart (2-3 Na+); Fluid Consistency: Thin (IDDSI 0)    I have personally reviewed:  [x]  Laboratory   [x]  Microbiology   [x]  Radiology   [x]  EKG/Telemetry  [x]  Cardiology/Vascular   []  Pathology    []  Records       Assessment/Plan     Active Hospital Problems    Diagnosis  POA    **Pulmonary embolism [I26.99]  Yes    Right ventricular thrombus [I51.3]  Unknown    COVID-19 virus detected [U07.1]  Unknown    Influenza [J11.1]  Unknown    History of pulmonary embolism [Z86.711]  Yes    Chronic diastolic congestive heart failure [I50.32]  Yes    Hypothyroidism [E03.9]  Yes    Chronic coronary artery disease [I25.10]  Yes    Benign essential HTN [I10]  Yes    HLD (hyperlipidemia) [E78.5]  Yes      Resolved Hospital Problems   No resolved problems to display.       69 y.o. female admitted with Pulmonary embolism.    Acute bilateral PE (recurrent PE)  Left DVT  Pulmonary infarction not excluded on CT scan  RV thrombus  Now on apixaban. Now will likely have lifelong anticoagulant  Hematology- hypercoag workup/outpatient workup  Pulmonology recommends repeat CT scan in 6 to 8 weeks to ensure clearance of probable pulmonary infarcts (follow-up with [facility])  Echocardiogram no right heart " strain but showed RV thrombus  Hemodynamically stable not requiring any oxygen        Bilateral knee pain  Pseudogout  Continue pain medications, Orthopedics has seen and now both knees have been injected   Currently on colchicine, avoid nsaids with her a/c. Pain improved with a dose of prednisone yesterday will give another today.  Continue therapies     Influenza  COVID  Positive for both on 9/8/2024. Antigen negative x 2 and out of isolation  Continue supportive care  Not requiring any oxygen  Antitussive for cough     CAD history of stent  HFpEF EF 56 to 60%  Hypertension BP acceptable  Hyperlipidemia statin  Hypothyroidism levothyroxine  Obesity Body mass index is 31.95 kg/m².      DVT prophylaxis  anticoagulation as above     Discharge  Planning for SNF probably tomorrow when bed available      Holden Wren MD  [facility]  09/30/24  18:23 EDT

## 2024-09-30 NOTE — PLAN OF CARE
Goal Outcome Evaluation:  Plan of Care Reviewed With: patient           Outcome Evaluation: vss, a/ox4, compliant with plan of care, ambulates with assist of 1 person to the bathroom, pain managed with prn pain medication this shift, plans to d/c to rehab pending clearence.

## 2024-10-01 VITALS
HEIGHT: 67 IN | SYSTOLIC BLOOD PRESSURE: 127 MMHG | OXYGEN SATURATION: 97 % | WEIGHT: 204 LBS | RESPIRATION RATE: 16 BRPM | DIASTOLIC BLOOD PRESSURE: 89 MMHG | TEMPERATURE: 98.1 F | HEART RATE: 109 BPM | BODY MASS INDEX: 32.02 KG/M2

## 2024-10-01 LAB
ALBUMIN SERPL-MCNC: 3.5 G/DL (ref 3.5–5.2)
ALBUMIN/GLOB SERPL: 1.5 G/DL
ALP SERPL-CCNC: 76 U/L (ref 39–117)
ALT SERPL W P-5'-P-CCNC: 83 U/L (ref 1–33)
ANION GAP SERPL CALCULATED.3IONS-SCNC: 10 MMOL/L (ref 5–15)
AST SERPL-CCNC: 28 U/L (ref 1–32)
BASOPHILS # BLD AUTO: 0.03 10*3/MM3 (ref 0–0.2)
BASOPHILS NFR BLD AUTO: 0.3 % (ref 0–1.5)
BILIRUB SERPL-MCNC: 0.3 MG/DL (ref 0–1.2)
BUN SERPL-MCNC: 24 MG/DL (ref 8–23)
BUN/CREAT SERPL: 40 (ref 7–25)
CALCIUM SPEC-SCNC: 9 MG/DL (ref 8.6–10.5)
CHLORIDE SERPL-SCNC: 101 MMOL/L (ref 98–107)
CO2 SERPL-SCNC: 27 MMOL/L (ref 22–29)
CREAT SERPL-MCNC: 0.6 MG/DL (ref 0.57–1)
DEPRECATED RDW RBC AUTO: 42.2 FL (ref 37–54)
EGFRCR SERPLBLD CKD-EPI 2021: 96.1 ML/MIN/1.73
EOSINOPHIL # BLD AUTO: 0.25 10*3/MM3 (ref 0–0.4)
EOSINOPHIL NFR BLD AUTO: 2.7 % (ref 0.3–6.2)
ERYTHROCYTE [DISTWIDTH] IN BLOOD BY AUTOMATED COUNT: 12.8 % (ref 12.3–15.4)
GLOBULIN UR ELPH-MCNC: 2.3 GM/DL
GLUCOSE SERPL-MCNC: 105 MG/DL (ref 65–99)
HCT VFR BLD AUTO: 45.7 % (ref 34–46.6)
HGB BLD-MCNC: 15.2 G/DL (ref 12–15.9)
IMM GRANULOCYTES # BLD AUTO: 0.05 10*3/MM3 (ref 0–0.05)
IMM GRANULOCYTES NFR BLD AUTO: 0.5 % (ref 0–0.5)
LYMPHOCYTES # BLD AUTO: 3.51 10*3/MM3 (ref 0.7–3.1)
LYMPHOCYTES NFR BLD AUTO: 38 % (ref 19.6–45.3)
MAGNESIUM SERPL-MCNC: 2.4 MG/DL (ref 1.6–2.4)
MCH RBC QN AUTO: 30 PG (ref 26.6–33)
MCHC RBC AUTO-ENTMCNC: 33.3 G/DL (ref 31.5–35.7)
MCV RBC AUTO: 90.3 FL (ref 79–97)
MONOCYTES # BLD AUTO: 0.62 10*3/MM3 (ref 0.1–0.9)
MONOCYTES NFR BLD AUTO: 6.7 % (ref 5–12)
NEUTROPHILS NFR BLD AUTO: 4.77 10*3/MM3 (ref 1.7–7)
NEUTROPHILS NFR BLD AUTO: 51.8 % (ref 42.7–76)
NRBC BLD AUTO-RTO: 0 /100 WBC (ref 0–0.2)
PLATELET # BLD AUTO: 356 10*3/MM3 (ref 140–450)
PMV BLD AUTO: 9.3 FL (ref 6–12)
POTASSIUM SERPL-SCNC: 3.9 MMOL/L (ref 3.5–5.2)
PROT SERPL-MCNC: 5.8 G/DL (ref 6–8.5)
RBC # BLD AUTO: 5.06 10*6/MM3 (ref 3.77–5.28)
SODIUM SERPL-SCNC: 138 MMOL/L (ref 136–145)
WBC NRBC COR # BLD AUTO: 9.23 10*3/MM3 (ref 3.4–10.8)

## 2024-10-01 PROCEDURE — 85025 COMPLETE CBC W/AUTO DIFF WBC: CPT | Performed by: INTERNAL MEDICINE

## 2024-10-01 PROCEDURE — 83735 ASSAY OF MAGNESIUM: CPT | Performed by: HOSPITALIST

## 2024-10-01 PROCEDURE — 80053 COMPREHEN METABOLIC PANEL: CPT | Performed by: INTERNAL MEDICINE

## 2024-10-01 RX ORDER — COLCHICINE 0.6 MG/1
0.6 TABLET ORAL DAILY
Qty: 30 TABLET | Refills: 0 | Status: SHIPPED | OUTPATIENT
Start: 2024-10-02

## 2024-10-01 RX ADMIN — BUPROPION HYDROCHLORIDE 300 MG: 300 TABLET, EXTENDED RELEASE ORAL at 08:02

## 2024-10-01 RX ADMIN — HYDROCODONE BITARTRATE AND ACETAMINOPHEN 1 TABLET: 5; 325 TABLET ORAL at 06:22

## 2024-10-01 RX ADMIN — SACUBITRIL AND VALSARTAN 1 TABLET: 24; 26 TABLET, FILM COATED ORAL at 08:02

## 2024-10-01 RX ADMIN — PANTOPRAZOLE SODIUM 40 MG: 40 TABLET, DELAYED RELEASE ORAL at 06:22

## 2024-10-01 RX ADMIN — BENZONATATE 200 MG: 100 CAPSULE ORAL at 06:22

## 2024-10-01 RX ADMIN — COLCHICINE 0.6 MG: 0.6 TABLET ORAL at 08:02

## 2024-10-01 RX ADMIN — SPIRONOLACTONE 25 MG: 25 TABLET, FILM COATED ORAL at 08:02

## 2024-10-01 RX ADMIN — APIXABAN 5 MG: 5 TABLET, FILM COATED ORAL at 08:02

## 2024-10-01 RX ADMIN — LEVOTHYROXINE SODIUM 50 MCG: 50 TABLET ORAL at 06:22

## 2024-10-01 RX ADMIN — TORSEMIDE 20 MG: 20 TABLET ORAL at 08:02

## 2024-10-01 RX ADMIN — HYDROCODONE BITARTRATE AND ACETAMINOPHEN 1 TABLET: 5; 325 TABLET ORAL at 13:58

## 2024-10-01 NOTE — PLAN OF CARE
Goal Outcome Evaluation:      A&OX4  VSS, on RA   Denies CP or SOB   Complaints of ongoing, productive cough, medicated x1 w/ Tessalon per MAR   Ongoing bilateral knee pain impairing mobility, medicated x1 per MAR   Standby assist w/ walker to BR   Hopeful to d/c today to [facility] pending bed availability & transport

## 2024-10-01 NOTE — DISCHARGE PLACEMENT REQUEST
"Olive Tran (69 y.o. Female)       Date of Birth   1955    Social Security Number       Address   [address] [address]    Home Phone   [phone]    MRN   [id number]       Catholic   Nondenominational    Marital Status   Single                            Admission Date   9/20/24    Admission Type   Emergency    Admitting Provider   Rocky Chavez MD    Attending Provider   Holden Wren MD    Department, Room/Bed   [address], E560/1       Discharge Date       Discharge Disposition   Skilled Nursing Facility (DC - External)    Discharge Destination                                 Attending Provider: Holden Wren MD    Allergies: Codeine, Levofloxacin, Morphine    Isolation: None   Infection: COVID (History) (09/27/24)   Code Status: CPR    Ht: 170.2 cm (67\")   Wt: 92.5 kg (204 lb)    Admission Cmt: None   Principal Problem: Pulmonary embolism [I26.99]                   Active Insurance as of 9/20/2024       Primary Coverage       Payor Plan Insurance Group Employer/Plan Group    MEDICARE MEDICARE A & B        Payor Plan Address Payor Plan Phone Number Payor Plan Fax Number Effective Dates    [address] [phone]  1/1/2018 - None Entered    [facility] [id number]         Subscriber Name Subscriber Birth Date Member ID       OLIVE TRAN 1955 [id number]               Secondary Coverage       Payor Plan Insurance Group Employer/Plan Group    [facility] D-Share  SUP [facility] DocASAP [facility] [id number]       Payor Plan Address Payor Plan Phone Number Payor Plan Fax Number Effective Dates    [address] [address]   4/1/2024 - None Entered    [facility] [id number]         Subscriber Name Subscriber Birth Date Member ID       OLIVE TRAN 1955 [id number]                     Emergency Contacts        (Rel.) Home Phone Work Phone Mobile Phone    Ángela Grayson (Daughter) [phone] -- --    MarcMack (Son) -- " -- [phone]    RADHA MYERS (Son) -- -- [phone]                 Discharge Summary        Holden Wren MD at 10/01/24 1102          Date of Discharge:  10/1/2024    PCP: Millicent Ace MD    Discharge Diagnosis:   Active Hospital Problems    Diagnosis  POA    **Pulmonary embolism [I26.99]  Yes    Right ventricular thrombus [I51.3]  Unknown    COVID-19 virus detected [U07.1]  Unknown    Influenza [J11.1]  Unknown    History of pulmonary embolism [Z86.711]  Yes    Chronic diastolic congestive heart failure [I50.32]  Yes    Hypothyroidism [E03.9]  Yes    Chronic coronary artery disease [I25.10]  Yes    Benign essential HTN [I10]  Yes    HLD (hyperlipidemia) [E78.5]  Yes      Resolved Hospital Problems   No resolved problems to display.          Consults       Date and Time Order Name Status Description    9/24/2024 12:14 PM Inpatient Orthopedic Surgery Consult Completed     9/21/2024 10:49 AM Inpatient Pulmonology Consult Completed     9/20/2024 10:13 PM Hematology & Oncology Inpatient Consult Completed     9/20/2024 10:13 PM Inpatient Cardiology Consult Completed     9/20/2024  9:11 PM [facility] (on-call MD unless specified) Details            Hospital Course  69 y.o. female     Acute bilateral PE (recurrent PE)  Left DVT  Pulmonary infarction not excluded on CT scan  RV thrombus  Now on apixaban. Now will likely have lifelong anticoagulant  Hematology- hypercoag workup/outpatient workup  Pulmonology recommends repeat CT scan in 6 to 8 weeks to ensure clearance of probable pulmonary infarcts (follow-up with [facility])  Echocardiogram no right heart strain but showed RV thrombus  Hemodynamically stable not requiring any oxygen        Bilateral knee pain  Pseudogout  Continue pain medications, Orthopedics has seen and now both knees have been injected   Currently on colchicine, avoid nsaids with her a/c. Pain improved with a dose of prednisone yesterday will give another today.  Continue therapies      Influenza  COVID  Positive for both on 9/8/2024. Antigen negative x 2 and out of isolation  Continue supportive care  Not requiring any oxygen  Antitussive for cough     CAD history of stent  HFpEF EF 56 to 60%  Hypertension BP acceptable  Hyperlipidemia statin  Hypothyroidism levothyroxine  Obesity Body mass index is 31.95 kg/m².      DVT prophylaxis  anticoagulation as above       COVID and bilateral PE, left DVT, and RV thrombus. She had previous PE from COVID infection also. Will need lifelong anticoagulation. Heparin was switched to apixaban.  Was seen and examined at bedside, patient is deemed stable for discharge, total time spent on discharge management 30 minutes.        Temp:  [97.6 °F (36.4 °C)-98.3 °F (36.8 °C)] 98.2 °F (36.8 °C)  Heart Rate:  [69-97] 69  Resp:  [16-18] 16  BP: (107-133)/(59-81) 124/80  Body mass index is 31.95 kg/m².    Physical Exam    General, awake and alert.  Head and ENT, normocephalic and atraumatic.  Lungs, symmetric expansion, equal air entry bilaterally.  Heart, regular rate and rhythm.  Abdomen, soft and nontender.  Extremities, no clubbing or cyanosis.  Neuro, no focal deficits.  Skin: Warm and no rash.  Psych, normal mood and affect.  Musculoskeletal, joint examination is grossly normal.    Disposition: Skilled Nursing Facility (DC - External)       Discharge Medications        New Medications        Instructions Start Date   apixaban 5 MG tablet tablet  Commonly known as: ELIQUIS   take 10 mg bid for two days and on 9/30/24 take 5 mg bid      benzonatate 200 MG capsule  Commonly known as: TESSALON   200 mg, Oral, Every 4 Hours PRN      HYDROcodone Bit-Homatrop MBr 5-1.5 MG/5ML solution  Commonly known as: HYCODAN   5 mL, Oral, Every 4 Hours PRN      HYDROcodone-acetaminophen 5-325 MG per tablet  Commonly known as: NORCO   1 tablet, Oral, Every 6 Hours PRN      naloxone 4 MG/0.1ML nasal spray  Commonly known as: NARCAN   Call 911. Don't prime. Spray in 1 nostril for  overdose. Repeat in 2-3 minutes in other nostril if no or minimal breathing/responsiveness.      pantoprazole 40 MG EC tablet  Commonly known as: PROTONIX   40 mg, Oral, Every Early Morning             Continue These Medications        Instructions Start Date   buPROPion  MG 24 hr tablet  Commonly known as: WELLBUTRIN XL   300 mg, Oral, Daily      calcium carbonate 600 MG tablet  Commonly known as: OS-SHAHNAZ   600 mg, Oral, 2 Times Daily With Meals      empagliflozin 10 MG tablet tablet  Commonly known as: JARDIANCE   10 mg, Oral, Daily      levothyroxine 50 MCG tablet  Commonly known as: Synthroid   50 mcg, Oral, Daily      magnesium oxide 400 MG tablet  Commonly known as: MAG-OX   800 mg, Oral, 2 times daily      rosuvastatin 40 MG tablet  Commonly known as: CRESTOR   40 mg, Oral, Nightly      sacubitril-valsartan 24-26 MG tablet  Commonly known as: ENTRESTO   1 tablet, Oral, 2 Times Daily      spironolactone 25 MG tablet  Commonly known as: ALDACTONE   25 mg, Oral, Daily      torsemide 20 MG tablet  Commonly known as: DEMADEX   20 mg, Oral, Daily             Stop These Medications      aspirin 325 MG tablet     ibandronate 150 MG tablet  Commonly known as: BONIVA     methylPREDNISolone 4 MG dose pack  Commonly known as: MEDROL     promethazine-dextromethorphan 6.25-15 MG/5ML syrup  Commonly known as: PROMETHAZINE-DM     traMADol 50 MG tablet  Commonly known as: ULTRAM     zolpidem 5 MG tablet  Commonly known as: AMBIEN             Diet Instructions       Diet: Regular/House Diet      Discharge Diet: Regular/House Diet    Texture: Regular Texture (IDDSI 7)    Fluid Consistency: Thin (IDDSI 0)           Activity Instructions       Activity as Tolerated             Additional Instructions for the Follow-ups that You Need to Schedule       Call MD for problems / concerns.   As directed      Discharge Follow-up with PCP   As directed       Currently Documented PCP:    Millicent Ace MD    PCP Phone Number:     [phone]     Follow Up Details: Follow-up with PCP and pulmonary in 7 to 10 days.               Contact information for follow-up providers       Millicent Ace MD .    Specialty: Family Medicine  Why: Follow-up with PCP and pulmonary in 7 to 10 days.  Contact information:  [address]  [facility] [address]  [phone]                       Contact information for after-discharge care       Destination       [facility] .    Service: Skilled Nursing  Contact information:  [address]  [facility] [id number] [phone]                                  Future Appointments   Date Time Provider Department Center   10/15/2024  8:40 AM LAB CHAIR 5 [facility]  [facility]   10/15/2024  9:00 AM Sunita Crawley APRN [facility] [place]   10/21/2024  3:30 PM Millicent Ace MD [facility] PC DR JESENIA Wren MD  [facility]  10/01/24    Discharge time spent greater than 30 minutes.    Electronically signed by Holden Wren MD at 10/01/24 9316

## 2024-10-01 NOTE — DISCHARGE SUMMARY
Date of Discharge:  10/1/2024    PCP: Millicent Ace MD    Discharge Diagnosis:   Active Hospital Problems    Diagnosis  POA    **Pulmonary embolism [I26.99]  Yes    Right ventricular thrombus [I51.3]  Unknown    COVID-19 virus detected [U07.1]  Unknown    Influenza [J11.1]  Unknown    History of pulmonary embolism [Z86.711]  Yes    Chronic diastolic congestive heart failure [I50.32]  Yes    Hypothyroidism [E03.9]  Yes    Chronic coronary artery disease [I25.10]  Yes    Benign essential HTN [I10]  Yes    HLD (hyperlipidemia) [E78.5]  Yes      Resolved Hospital Problems   No resolved problems to display.          Consults       Date and Time Order Name Status Description    9/24/2024 12:14 PM Inpatient Orthopedic Surgery Consult Completed     9/21/2024 10:49 AM Inpatient Pulmonology Consult Completed     9/20/2024 10:13 PM Hematology & Oncology Inpatient Consult Completed     9/20/2024 10:13 PM Inpatient Cardiology Consult Completed     9/20/2024  9:11 PM [facility] (on-call MD unless specified) Details            Hospital Course  69 y.o. female     Acute bilateral PE (recurrent PE)  Left DVT  Pulmonary infarction not excluded on CT scan  RV thrombus  Now on apixaban. Now will likely have lifelong anticoagulant  Hematology- hypercoag workup/outpatient workup  Pulmonology recommends repeat CT scan in 6 to 8 weeks to ensure clearance of probable pulmonary infarcts (follow-up with [facility])  Echocardiogram no right heart strain but showed RV thrombus  Hemodynamically stable not requiring any oxygen        Bilateral knee pain  Pseudogout  Continue pain medications, Orthopedics has seen and now both knees have been injected   Currently on colchicine, avoid nsaids with her a/c. Pain improved with a dose of prednisone yesterday will give another today.  Continue therapies     Influenza  COVID  Positive for both on 9/8/2024. Antigen negative x 2 and out of isolation  Continue supportive care  Not requiring any oxygen  Antitussive for  cough     CAD history of stent  HFpEF EF 56 to 60%  Hypertension BP acceptable  Hyperlipidemia statin  Hypothyroidism levothyroxine  Obesity Body mass index is 31.95 kg/m².      DVT prophylaxis  anticoagulation as above       COVID and bilateral PE, left DVT, and RV thrombus. She had previous PE from COVID infection also. Will need lifelong anticoagulation. Heparin was switched to apixaban.  Was seen and examined at bedside, patient is deemed stable for discharge, total time spent on discharge management 30 minutes.        Temp:  [97.6 °F (36.4 °C)-98.3 °F (36.8 °C)] 98.2 °F (36.8 °C)  Heart Rate:  [69-97] 69  Resp:  [16-18] 16  BP: (107-133)/(59-81) 124/80  Body mass index is 31.95 kg/m².    Physical Exam    General, awake and alert.  Head and ENT, normocephalic and atraumatic.  Lungs, symmetric expansion, equal air entry bilaterally.  Heart, regular rate and rhythm.  Abdomen, soft and nontender.  Extremities, no clubbing or cyanosis.  Neuro, no focal deficits.  Skin: Warm and no rash.  Psych, normal mood and affect.  Musculoskeletal, joint examination is grossly normal.    Disposition: Skilled Nursing Facility (DC - External)       Discharge Medications        New Medications        Instructions Start Date   apixaban 5 MG tablet tablet  Commonly known as: ELIQUIS   take 10 mg bid for two days and on 9/30/24 take 5 mg bid      benzonatate 200 MG capsule  Commonly known as: TESSALON   200 mg, Oral, Every 4 Hours PRN      HYDROcodone Bit-Homatrop MBr 5-1.5 MG/5ML solution  Commonly known as: HYCODAN   5 mL, Oral, Every 4 Hours PRN      HYDROcodone-acetaminophen 5-325 MG per tablet  Commonly known as: NORCO   1 tablet, Oral, Every 6 Hours PRN      naloxone 4 MG/0.1ML nasal spray  Commonly known as: NARCAN   Call 911. Don't prime. [place] in 1 nostril for overdose. Repeat in 2-3 minutes in other nostril if no or minimal breathing/responsiveness.      pantoprazole 40 MG EC tablet  Commonly known as: PROTONIX   40 mg,  Oral, Every Early Morning             Continue These Medications        Instructions Start Date   buPROPion  MG 24 hr tablet  Commonly known as: WELLBUTRIN XL   300 mg, Oral, Daily      calcium carbonate 600 MG tablet  Commonly known as: OS-SHAHNAZ   600 mg, Oral, 2 Times Daily With Meals      empagliflozin 10 MG tablet tablet  Commonly known as: JARDIANCE   10 mg, Oral, Daily      levothyroxine 50 MCG tablet  Commonly known as: Synthroid   50 mcg, Oral, Daily      magnesium oxide 400 MG tablet  Commonly known as: MAG-OX   800 mg, Oral, 2 times daily      rosuvastatin 40 MG tablet  Commonly known as: CRESTOR   40 mg, Oral, Nightly      sacubitril-valsartan 24-26 MG tablet  Commonly known as: ENTRESTO   1 tablet, Oral, 2 Times Daily      spironolactone 25 MG tablet  Commonly known as: ALDACTONE   25 mg, Oral, Daily      torsemide 20 MG tablet  Commonly known as: DEMADEX   20 mg, Oral, Daily             Stop These Medications      aspirin 325 MG tablet     ibandronate 150 MG tablet  Commonly known as: BONIVA     methylPREDNISolone 4 MG dose pack  Commonly known as: MEDROL     promethazine-dextromethorphan 6.25-15 MG/5ML syrup  Commonly known as: PROMETHAZINE-DM     traMADol 50 MG tablet  Commonly known as: ULTRAM     zolpidem 5 MG tablet  Commonly known as: AMBIEN             Diet Instructions       Diet: Regular/House Diet      Discharge Diet: Regular/House Diet    Texture: Regular Texture (IDDSI 7)    Fluid Consistency: Thin (IDDSI 0)           Activity Instructions       Activity as Tolerated             Additional Instructions for the Follow-ups that You Need to Schedule       Call MD for problems / concerns.   As directed      Discharge Follow-up with PCP   As directed       Currently Documented PCP:    Mililcent Ace MD    PCP Phone Number:    [phone]     Follow Up Details: Follow-up with PCP and pulmonary in 7 to 10 days.               Contact information for follow-up providers       Millicent Ace,  MD .    Specialty: Family Medicine  Why: Follow-up with PCP and pulmonary in 7 to 10 days.  Contact information:  [address]  [facility] [address] [phone]                       Contact information for after-discharge care       Destination       [facility] .    Service: Skilled Nursing  Contact information:  [address]  [facility] [address] [phone]                                  Future Appointments   Date Time Provider Department Center   10/15/2024  8:40 AM LAB CHAIR 5 [facility]  [facility]   10/15/2024  9:00 AM Sunita Crawley APRN [facility]   10/21/2024  3:30 PM Millicent Ace MD [facility] PC DR JESENIA Wren MD  [facility]  10/01/24    Discharge time spent greater than 30 minutes.

## 2024-10-01 NOTE — CASE MANAGEMENT/SOCIAL WORK
Continued Stay Note  [facility]     Patient Name: Barbara Tran  MRN: [id number]  Today's Date: 10/1/2024    Admit Date: 9/20/2024    Plan: [facility]   Discharge Plan       Row Name 10/01/24 1148       Plan    Plan [facility]    Patient/Family in Agreement with Plan yes    Plan Comments Met with pt in room. She confirmed her plan is to go to [facility] for rehab at discharge. She will need transportation. Spoke with [facility] who confirmed they have accepted and have a bed available. Discharge order is in. Called [facility].  van transport scheduled for 2pm today. Updated primary RN, MD, pt, and signature. Packet complete and given to primary RN. Discharge summary faxed to facility. No further needs assessed at this time. CCP following. TUCKER Agosto RN                   Discharge Codes    No documentation.                 Expected Discharge Date and Time       Expected Discharge Date Expected Discharge Time    Oct 1, 2024               Vince Díaz RN

## 2024-10-02 ENCOUNTER — TELEPHONE (OUTPATIENT)
Dept: CARDIOLOGY | Facility: CLINIC | Age: 69
End: 2024-10-02
Payer: MEDICARE

## 2024-10-02 NOTE — TELEPHONE ENCOUNTER
Pt called in to triage requesting samples of entresto 24-26mg and jardiance 10mg.  I placed 2 boxes of entresto and 1 box (all we have) of jardiance at 6th floor registration desk for pt.      Haley Haskins RN  Triage RN  10/02/24 14:48 EDT

## 2024-10-02 NOTE — TELEPHONE ENCOUNTER
Pt called back. Let her know about samples for her to . She verbalized understanding.    Thank you,    Bindu Dowell RN  Triage [facility]  10/02/24 15:08 EDT

## 2024-10-02 NOTE — TELEPHONE ENCOUNTER
Called and left VM, will continue to try to reach pt.    HUB- please put patient straight through to triage    Haley Haskins RN  Triage RN  10/02/24 14:48 EDT

## 2024-10-04 ENCOUNTER — DOCUMENTATION (OUTPATIENT)
Dept: HOME HEALTH SERVICES | Facility: HOME HEALTHCARE | Age: 69
End: 2024-10-04
Payer: MEDICARE

## 2024-10-04 NOTE — PROGRESS NOTES
Received referral for home health from [facility].  Met with the patient on 10/4 and coordinated her home health.  Confirmed with Dia on 10/4 that Dr. Ace will follow for home health.   Bell's palsy

## 2024-10-05 ENCOUNTER — HOME HEALTH ADMISSION (OUTPATIENT)
Dept: HOME HEALTH SERVICES | Facility: HOME HEALTHCARE | Age: 69
End: 2024-10-05
Payer: MEDICARE

## 2024-10-05 ENCOUNTER — TRANSCRIBE ORDERS (OUTPATIENT)
Dept: HOME HEALTH SERVICES | Facility: HOME HEALTHCARE | Age: 69
End: 2024-10-05
Payer: MEDICARE

## 2024-10-05 DIAGNOSIS — I26.09 OTHER PULMONARY EMBOLISM WITH ACUTE COR PULMONALE, UNSPECIFIED CHRONICITY: Primary | ICD-10-CM

## 2024-10-07 ENCOUNTER — HOME CARE VISIT (OUTPATIENT)
Dept: HOME HEALTH SERVICES | Facility: HOME HEALTHCARE | Age: 69
End: 2024-10-07
Payer: MEDICARE

## 2024-10-07 VITALS
RESPIRATION RATE: 18 BRPM | OXYGEN SATURATION: 97 % | HEART RATE: 71 BPM | SYSTOLIC BLOOD PRESSURE: 104 MMHG | TEMPERATURE: 96 F | DIASTOLIC BLOOD PRESSURE: 70 MMHG

## 2024-10-07 PROCEDURE — G0299 HHS/HOSPICE OF RN EA 15 MIN: HCPCS

## 2024-10-07 NOTE — HOME HEALTH
69F with a history of HTN, CHF, Covid, PE.  Recent hospitalization due to acute bilateral PE (recurrent PE), Left DVT, Pulmonary infarction not excluded on CT scan, RV thrombus.  Patient is now on apixaban. Now will likely have lifelong anticoagulant.  She is having a hematology- hypercoag workup/outpatient workup in 2 weeks.  She has bilateral knee pain/pseudogout.  She has had injections in both knees from ortho.  She uses a walker to ambulate.  Her VSS.  Medications were reconciled.  SN FOC PE.  SN to teach and instruct PE and medication regime.

## 2024-10-08 ENCOUNTER — HOME CARE VISIT (OUTPATIENT)
Dept: HOME HEALTH SERVICES | Facility: HOME HEALTHCARE | Age: 69
End: 2024-10-08
Payer: MEDICARE

## 2024-10-08 ENCOUNTER — PATIENT OUTREACH (OUTPATIENT)
Dept: CASE MANAGEMENT | Facility: OTHER | Age: 69
End: 2024-10-08
Payer: MEDICARE

## 2024-10-08 VITALS — SYSTOLIC BLOOD PRESSURE: 120 MMHG | RESPIRATION RATE: 18 BRPM | TEMPERATURE: 98.2 F | DIASTOLIC BLOOD PRESSURE: 72 MMHG

## 2024-10-08 PROCEDURE — G0151 HHCP-SERV OF PT,EA 15 MIN: HCPCS

## 2024-10-08 NOTE — OUTREACH NOTE
AMBULATORY CASE MANAGEMENT NOTE    Names and Relationships of Patient/Support Persons: Contact: Barbara Tran; Relationship: Self -     SNF Follow-up    Questions/Answers      Flowsheet Row Responses   Acute Facility Discharged From [facility]   Acute Discharge Date 10/01/24   Name of the Skilled Nursing Facility? [facility]   Purpose of SNF Admission PT, OT   Who is the insurance provider or payor of patient stay? Medicare   Skilled Nursing Discharge Date? 10/05/24   Where was the patient discharged to? Home   Home Health Agency at discharge? Yes   Name of Home Health Agency? [facility]   DME Needed at Discharge? No   Are there any medication concerns at Discharge No   Does the patient have a follow-up appointment? No  [she prefers to make her follow up appointment and declined offered assistance with making follow up appointment]          Patient Outreach    Outgoing call to [facility] for follow up.  Patient was DC to home on 10/5 with [facility].  Discussed HRCM program and services and she is interested and verbalized understanding that there is no charge for services and that she may opt out at any time.  Outreach is scheduled for follow up in about 2 weeks to verify if she is current with HH and plan to complete assessments and work on goals of care at this time.  She will need PCP follow up appointment and states that she prefers to make this appointment and declined offered assistance.  She was provided the contact for the orthopedic consulting doctor she saw while admitted, Reji Davenport.  She prefers to make this follow up appointment as she is having ongoing knee pain. [facility] survey was sent via [employer].     Noreen SIMS  Ambulatory Case Management    10/8/2024, 16:21 EDT

## 2024-10-08 NOTE — HOME HEALTH
Pt is a 70 yo female dx in the last few weeks with covid, flu and B PE, left DVT. Pt also has B knee pain, pseudogout dx.  Pt was in rehab x 5 days and now home with family, ambulating with a rwx.  Her bedroom is on the 3rd floor. PLOF pt was indep, driving, working.

## 2024-10-09 ENCOUNTER — HOME CARE VISIT (OUTPATIENT)
Dept: HOME HEALTH SERVICES | Facility: HOME HEALTHCARE | Age: 69
End: 2024-10-09
Payer: MEDICARE

## 2024-10-10 ENCOUNTER — HOME CARE VISIT (OUTPATIENT)
Dept: HOME HEALTH SERVICES | Facility: HOME HEALTHCARE | Age: 69
End: 2024-10-10
Payer: MEDICARE

## 2024-10-11 ENCOUNTER — HOME CARE VISIT (OUTPATIENT)
Dept: HOME HEALTH SERVICES | Facility: HOME HEALTHCARE | Age: 69
End: 2024-10-11
Payer: MEDICARE

## 2024-10-11 PROCEDURE — G0300 HHS/HOSPICE OF LPN EA 15 MIN: HCPCS

## 2024-10-13 VITALS
BODY MASS INDEX: 31.64 KG/M2 | SYSTOLIC BLOOD PRESSURE: 134 MMHG | HEART RATE: 77 BPM | OXYGEN SATURATION: 97 % | DIASTOLIC BLOOD PRESSURE: 64 MMHG | WEIGHT: 202 LBS | RESPIRATION RATE: 16 BRPM | TEMPERATURE: 97.8 F

## 2024-10-14 ENCOUNTER — HOME CARE VISIT (OUTPATIENT)
Dept: HOME HEALTH SERVICES | Facility: HOME HEALTHCARE | Age: 69
End: 2024-10-14
Payer: MEDICARE

## 2024-10-14 VITALS
RESPIRATION RATE: 18 BRPM | OXYGEN SATURATION: 98 % | SYSTOLIC BLOOD PRESSURE: 126 MMHG | HEART RATE: 86 BPM | TEMPERATURE: 96.5 F | DIASTOLIC BLOOD PRESSURE: 70 MMHG

## 2024-10-14 PROCEDURE — G0151 HHCP-SERV OF PT,EA 15 MIN: HCPCS

## 2024-10-15 ENCOUNTER — HOME CARE VISIT (OUTPATIENT)
Dept: HOME HEALTH SERVICES | Facility: HOME HEALTHCARE | Age: 69
End: 2024-10-15
Payer: MEDICARE

## 2024-10-15 ENCOUNTER — LAB (OUTPATIENT)
Dept: LAB | Facility: HOSPITAL | Age: 69
End: 2024-10-15
Payer: MEDICARE

## 2024-10-15 ENCOUNTER — OFFICE VISIT (OUTPATIENT)
Dept: ONCOLOGY | Facility: CLINIC | Age: 69
End: 2024-10-15
Payer: MEDICARE

## 2024-10-15 VITALS
HEIGHT: 67 IN | WEIGHT: 204.8 LBS | SYSTOLIC BLOOD PRESSURE: 123 MMHG | DIASTOLIC BLOOD PRESSURE: 83 MMHG | HEART RATE: 80 BPM | BODY MASS INDEX: 32.15 KG/M2 | TEMPERATURE: 97.9 F | OXYGEN SATURATION: 97 %

## 2024-10-15 DIAGNOSIS — Z79.01 CHRONIC ANTICOAGULATION: ICD-10-CM

## 2024-10-15 DIAGNOSIS — I26.99 BILATERAL PULMONARY EMBOLISM: Primary | ICD-10-CM

## 2024-10-15 LAB
BASOPHILS # BLD AUTO: 0.05 10*3/MM3 (ref 0–0.2)
BASOPHILS NFR BLD AUTO: 0.4 % (ref 0–1.5)
DEPRECATED RDW RBC AUTO: 46.7 FL (ref 37–54)
EOSINOPHIL # BLD AUTO: 0.09 10*3/MM3 (ref 0–0.4)
EOSINOPHIL NFR BLD AUTO: 0.7 % (ref 0.3–6.2)
ERYTHROCYTE [DISTWIDTH] IN BLOOD BY AUTOMATED COUNT: 13.9 % (ref 12.3–15.4)
HCT VFR BLD AUTO: 48.6 % (ref 34–46.6)
HGB BLD-MCNC: 16.1 G/DL (ref 12–15.9)
IMM GRANULOCYTES # BLD AUTO: 0.05 10*3/MM3 (ref 0–0.05)
IMM GRANULOCYTES NFR BLD AUTO: 0.4 % (ref 0–0.5)
LYMPHOCYTES # BLD AUTO: 2.43 10*3/MM3 (ref 0.7–3.1)
LYMPHOCYTES NFR BLD AUTO: 17.7 % (ref 19.6–45.3)
MCH RBC QN AUTO: 30.3 PG (ref 26.6–33)
MCHC RBC AUTO-ENTMCNC: 33.1 G/DL (ref 31.5–35.7)
MCV RBC AUTO: 91.5 FL (ref 79–97)
MONOCYTES # BLD AUTO: 0.58 10*3/MM3 (ref 0.1–0.9)
MONOCYTES NFR BLD AUTO: 4.2 % (ref 5–12)
NEUTROPHILS NFR BLD AUTO: 10.54 10*3/MM3 (ref 1.7–7)
NEUTROPHILS NFR BLD AUTO: 76.6 % (ref 42.7–76)
NRBC BLD AUTO-RTO: 0 /100 WBC (ref 0–0.2)
PLATELET # BLD AUTO: 241 10*3/MM3 (ref 140–450)
PMV BLD AUTO: 10.5 FL (ref 6–12)
RBC # BLD AUTO: 5.31 10*6/MM3 (ref 3.77–5.28)
WBC NRBC COR # BLD AUTO: 13.74 10*3/MM3 (ref 3.4–10.8)

## 2024-10-15 PROCEDURE — 85025 COMPLETE CBC W/AUTO DIFF WBC: CPT | Performed by: NURSE PRACTITIONER

## 2024-10-15 PROCEDURE — 36415 COLL VENOUS BLD VENIPUNCTURE: CPT | Performed by: NURSE PRACTITIONER

## 2024-10-15 NOTE — PROGRESS NOTES
REFERRING PROVIDER:    No referring provider defined for this encounter.    REASON FOR FOLLOW UP:    Bilateral PE and left DVT with RV thrombus    HISTORY OF PRESENT ILLNESS:  Barbara Tran is a 69 y.o. female who comes into the office today for follow-up after recent hospitalization with discharge on 10/1/2024.  She has history of hypertension, hyperlipidemia, coronary artery disease, hypothyroidism, congestive heart failure, previous history of pulmonary embolism following COVID-19 infection and was treated with Eliquis for 1 year.  Patient tested positive for COVID-19 on September 8 and has nasal congestion sore throat and cough.  She had progressive worsening of dyspnea over 2 to 3 days and presented to the emergency room.  CT angiogram chest performed 9/20/2024.    We were consulted in the hospital due to findings of acute bilateral PE, left DVT and RV thrombus.  Concerns of pulmonary infarction.  Echocardiogram performed showing no right heart strain.  Patient diagnosed    with influenza and COVID during hospitalization.  She was started on heparin drip.  Plans made to transition to Eliquis 10 mg twice daily for 1 week and then 5 mg twice daily.  Anticipate lifelong anticoagulation due to second episode of pulmonary embolism.    Continues to tolerate Eliquis without difficulty, currently on 5 mg twice daily dosing.  She does have cost issues however and states she was told this would be about $600 month.    Past Medical History:   Diagnosis Date    Allergic 2015    codeine, morphine, levaquin    Arthritis l5 years or so ago    Basal cell carcinoma     CAD (coronary artery disease)     Cataract 6-9 months ago    Need surgery    CHF (congestive heart failure)     Coronary artery disease 2005 stent    COVID-19 virus detected 03/10/2021    Deep vein thrombosis 06/03/2021    Pulmonary embolism    Depression     Disease of thyroid gland     Headache Covid 3-6-21    Has continued    History of pulmonary embolism  06/01/2021    History of urinary tract infection     HL (hearing loss) Last 4-6 months    Hyperlipidemia     Hypertension since 2005    Hypothyroidism since 2012    Multinodular goiter     Obesity     Osteopenia last few years    Osteoporosis     Pulmonary embolism 06/03/2021    From Covid    Pulmonic valve regurgitation     Tricuspid valve regurgitation     Unstable angina 04/16/2024       Past Surgical History:   Procedure Laterality Date    BUNIONECTOMY Bilateral     HAMMERT TOE REPAIR/BUNIONECTOMY    CARDIAC CATHETERIZATION  2015, 2018    CARDIAC CATHETERIZATION N/A 04/29/2024    Procedure: Coronary angiography;  Surgeon: Cong Rivera MD;  Location:  [facility];  Service: Cardiovascular;  Laterality: N/A;    CARDIAC CATHETERIZATION N/A 04/29/2024    Procedure: Left Heart Cath;  Surgeon: Cong Rivera MD;  Location:  [facility];  Service: Cardiovascular;  Laterality: N/A;    CARDIAC CATHETERIZATION N/A 04/29/2024    Procedure: Left ventriculography;  Surgeon: Cong Rivera MD;  Location:  [facility];  Service: Cardiovascular;  Laterality: N/A;    CATARACT EXTRACTION      CHOLECYSTECTOMY  1995    COLONOSCOPY N/A 12/19/2016    Procedure: COLONOSCOPY WITH COLD BIOPSIES;  Surgeon: Medina Guillen MD;  Location: [facility]OSCOPY;  Service:     CORONARY STENT PLACEMENT  2005    LAD 80% blockage    CYST REMOVAL      GANGLION CYST EXCISION      R WRIST    SKIN BIOPSY      SUBTOTAL HYSTERECTOMY  2009    THYROID BIOPSY  2014    TONSILLECTOMY  1965    TUBAL ABDOMINAL LIGATION  1985       SOCIAL HISTORY:   reports that she has never smoked. She has never been exposed to tobacco smoke. She has never used smokeless tobacco. She reports current alcohol use of about 2.0 standard drinks of alcohol per week. She reports that she does not use drugs.    FAMILY HISTORY:  family history includes Arthritis in her brother and son; Cancer in her brother; Diabetes in her  "sister; Heart attack in her brother and father; Heart disease in her brother, father, and mother; Heart disease (age of onset: 62) in her brother; Heart disease (age of onset: 69) in her sister; No Known Problems in her maternal aunt, maternal grandfather, maternal grandmother, maternal uncle, paternal aunt, paternal grandfather, paternal grandmother, and paternal uncle.    ALLERGIES:  Allergies   Allergen Reactions    Codeine Dizziness     lightheaded    Levofloxacin Itching    Morphine Itching       MEDICATIONS:  The medication list has been reviewed with the patient by the medical assistant, and the list has been updated in the electronic medical record, which I reviewed.  Medication dosages and frequencies were confirmed to be accurate.    Review of Systems    PHYSICAL EXAMINATION:  Vitals:    10/15/24 0911   BP: 123/83   Pulse: 80   Temp: 97.9 °F (36.6 °C)   TempSrc: Oral   SpO2: 97%   Weight: 92.9 kg (204 lb 12.8 oz)   Height: 170.2 cm (67.01\")   PainSc:   8   PainLoc: Knee       Physical Exam  Eyes:      Extraocular Movements: Extraocular movements intact.      Conjunctiva/sclera: Conjunctivae normal.   Pulmonary:      Effort: Pulmonary effort is normal. No respiratory distress.   Musculoskeletal:      Cervical back: Normal range of motion.      Comments: Using walker for assistance   Skin:     General: Skin is warm and dry.   Neurological:      General: No focal deficit present.      Mental Status: She is alert and oriented to person, place, and time.   Psychiatric:         Behavior: Behavior normal.         DIAGNOSTIC DATA:  Lab Results   Component Value Date    WBC 13.74 (H) 10/15/2024    HGB 16.1 (H) 10/15/2024    HCT 48.6 (H) 10/15/2024    MCV 91.5 10/15/2024     10/15/2024             ASSESSMENT:  This is a 69 y.o. female with:    **Recurrent pulmonary embolism  In both instances the pulmonary embolism was followed by COVID-19 infection.  Although there has been a precipitating event each time " given the fact that this is a second event with bilateral pulmonary embolism I will proceed with thrombophilia workup.  No family history of PE or DVT.  Recommend anticoagulation with Eliquis 10 mg twice daily for 1 week and subsequently 5 mg twice daily.  Patient will benefit from lifelong anticoagulation due to the second episode of pulmonary embolism.  Factor V Leiden, factor II DNA analysis, protein S , beta-2 glycoprotein, lupus anticoagulant, anticardiolipin antibody unremarkable with lupus anticoagulant skewed due to patient being on anticoagulant at the time.  10/15/2024 patient returns continue on Eliquis 5 mg twice daily and tolerating without any signs or symptoms of bleeding.  She does have a cost issue with Eliquis however and has been given samples up until this point.  She is provided with a 30-day supply via co-pay card today to be ran his cast patient.  Case discussed with Andria Ly, pharmacy benefits provider who will look into options for her.  Case reviewed with Dr. Rogel today and we would anticipate 6 months of anticoagulation and then could consider reduction of dose to 2.5 mg twice daily.  After this, as long as patient stable could consider released to primary care for further follow-up.  Patient will continue follow-up with pulmonology to monitor lung function and cardiology to monitor heart function.  Referral placed back to cardiology today as patient missed her appointment since she was still in the hospital.     *COVID-19 infection as well as flu A     *Hypertension, hyperlipidemia, coronary artery disease and CHF-management per primary team        PLAN:   Continue Eliquis 5 mg twice daily, new prescription sent to pharmacy with 30-day co-pay card provided to patient  Case discussed with Andria Ly, pharmacy benefits provider who will review options for patient and reach out to her with this information  Follow-up in 2-1/2 months with NP CBC  Follow-up with Dr. Rogel in 5  months with repeat CBC  Continue follow-up with pulmonology as planned  Referral placed back to cardiology for follow-up as patient missed her appointment on 10/1/2024 since she was still in the hospital.  Patient encouraged to call us for any new onset signs or symptoms of bleeding, increased shortness of breath or pain with deep breath, or unilateral edema.    ORDERS PLACED DURING THIS ENCOUNTER  Orders Placed This Encounter   Procedures    CBC Auto Differential     Order Specific Question:   Release to patient     Answer:   Routine Release [[id number]]    Ambulatory Referral to Cardiology     Referral Priority:   Routine     Referral Type:   Consultation     Referral Reason:   Specialty Services Required     Requested Specialty:   Cardiology     Number of Visits Requested:   1    CBC & Differential     Order Specific Question:   Manual Differential     Answer:   No     Order Specific Question:   Release to patient     Answer:   Routine Release [[id number]]   Records reviewed from recent hospitalization.  Case discussed with Dr. Rogel today.  Case discussed with Andria Ly today.  I spent 50 minutes caring for Barbara on this date of service. This time includes time spent by me in the following activities: preparing for the visit, reviewing tests, obtaining and/or reviewing a separately obtained history, performing a medically appropriate examination and/or evaluation, counseling and educating the patient/family/caregiver, ordering medications, tests, or procedures, referring and communicating with other health care professionals, documenting information in the medical record, and independently interpreting results and communicating that information with the patient/family/caregiver.

## 2024-10-17 ENCOUNTER — OFFICE VISIT (OUTPATIENT)
Dept: ORTHOPEDIC SURGERY | Facility: CLINIC | Age: 69
End: 2024-10-17
Payer: MEDICARE

## 2024-10-17 ENCOUNTER — HOME CARE VISIT (OUTPATIENT)
Dept: HOME HEALTH SERVICES | Facility: HOME HEALTHCARE | Age: 69
End: 2024-10-17
Payer: MEDICARE

## 2024-10-17 VITALS
HEART RATE: 94 BPM | TEMPERATURE: 97 F | OXYGEN SATURATION: 96 % | SYSTOLIC BLOOD PRESSURE: 126 MMHG | RESPIRATION RATE: 18 BRPM | DIASTOLIC BLOOD PRESSURE: 76 MMHG

## 2024-10-17 VITALS — HEIGHT: 67 IN | BODY MASS INDEX: 32.22 KG/M2 | WEIGHT: 205.3 LBS | TEMPERATURE: 97.8 F

## 2024-10-17 DIAGNOSIS — M17.0 PRIMARY OSTEOARTHRITIS OF BOTH KNEES: Primary | ICD-10-CM

## 2024-10-17 DIAGNOSIS — R52 PAIN: ICD-10-CM

## 2024-10-17 PROCEDURE — G0151 HHCP-SERV OF PT,EA 15 MIN: HCPCS

## 2024-10-17 RX ORDER — ZOLPIDEM TARTRATE 5 MG/1
5 TABLET ORAL NIGHTLY PRN
COMMUNITY
Start: 2024-10-04

## 2024-10-17 RX ORDER — IBANDRONATE SODIUM 150 MG/1
150 TABLET, FILM COATED ORAL
COMMUNITY

## 2024-10-17 NOTE — PROGRESS NOTES
Patient ID: Barbara Tran     Chief Complaint:    Chief Complaint   Patient presents with    Left Knee - Follow-up, Pain    Right Knee - Follow-up, Pain        HPI:    Barbara Tran is a 69 y.o. who presents today for evaluation bilateral knee pain.  She states left is worse than right.  Been working on some PT at home overall feels that is going well.    Social History     Socioeconomic History    Marital status: Single   Tobacco Use    Smoking status: Never     Passive exposure: Never    Smokeless tobacco: Never    Tobacco comments:     N/A   Vaping Use    Vaping status: Never Used   Substance and Sexual Activity    Alcohol use: Yes     Alcohol/week: 2.0 standard drinks of alcohol     Types: 1 Glasses of wine, 1 Cans of beer per week     Comment: socially    Drug use: Never    Sexual activity: Not Currently     Partners: Male     Birth control/protection: None     Past Medical History:   Diagnosis Date    Allergic     codeine, morphine, levaquin    Arthritis l5 years or so ago    Basal cell carcinoma     CAD (coronary artery disease)     Cataract 6-9 months ago    Need surgery    CHF (congestive heart failure)     Coronary artery disease  stent    COVID-19 virus detected 03/10/2021    Deep vein thrombosis 2021    Pulmonary embolism    Depression     Disease of thyroid gland     Headache Covid 3--21    Has continued    History of pulmonary embolism 2021    History of urinary tract infection     HL (hearing loss) Last 4-6 months    Hyperlipidemia     Hypertension since 2005    Hypothyroidism since     Multinodular goiter     Obesity     Osteopenia last few years    Osteoporosis     Pulmonary embolism 2021    From Covid    Pulmonic valve regurgitation     Tricuspid valve regurgitation     Unstable angina 2024     Family History   Problem Relation Age of Onset    Heart disease Mother             Heart disease Father             Heart attack Father     Diabetes  "Sister     Heart disease Sister 69            Heart disease Brother         open heart-bypass    Cancer Brother         Bladder/Rodney    Arthritis Brother     Heart disease Brother 62            Heart attack Brother             Arthritis Son     No Known Problems Maternal Aunt     No Known Problems Maternal Uncle     No Known Problems Paternal Aunt     No Known Problems Paternal Uncle     No Known Problems Maternal Grandmother     No Known Problems Maternal Grandfather     No Known Problems Paternal Grandmother     No Known Problems Paternal Grandfather     Celiac disease Neg Hx     Cirrhosis Neg Hx     Colon cancer Neg Hx     Colon polyps Neg Hx     Crohn's disease Neg Hx     Cystic fibrosis Neg Hx     Esophageal cancer Neg Hx     Hemochromatosis Neg Hx     Inflammatory bowel disease Neg Hx     Irritable bowel syndrome Neg Hx     Liver cancer Neg Hx     Liver disease Neg Hx     Rectal cancer Neg Hx     Stomach cancer Neg Hx     Ulcerative colitis Neg Hx     Dante's disease Neg Hx     Alcohol abuse Neg Hx     Pancreatitis Neg Hx        ROS:    ROS:  Constitutional:  Denies fever, shaking or chills         All other pertinent positives and negatives as noted above in HPI.    Physical Exam:     Vital Signs:  Temp 97.8 °F (36.6 °C) (Temporal)   Ht 170.2 cm (67.01\")   Wt 93.1 kg (205 lb 4.8 oz)   LMP  (LMP Unknown)   BMI 32.15 kg/m²   Constitutional: Awake alert and oriented x3, well developed, well nourished, no acute distress, non-toxic appearance.      Musculoskeletal:    Exam of the bilateral  knee:  Painful gait with a subtle limp  No muscle atrophy, erythema, ecchymosis, or gross deformity noted  mild knee effusion    Active range of motion 6-110 left knee, 4-115 on the right  5/5 strength flexion and extension  The knee is stable to varus and valgus stress testing  Mild varus alignment of the limb  Lachman negative  Posterior drawer negative  Arnaldo's negative  Patellofemoral grind " +  Sensation grossly intact to light tough throughout the lower extremity  Skin is intact  Distal pulses are 2+  No signs or symptoms of DVT          Diagnostic Studies:     Imaging was personally and individually reviewed and discussed at length with the patient:    Previous films reviewed            Assessment:     bilateral  Knee Osteoarthritis            Plan:     Based on x-rays, history, and office evaluation, we have diagnosed Barbara Tran with knee arthritis. At this time, we recommend starting with a conservative treatment program. This will consist of cortisone injection during significant flare-ups, NSAIDS for daily maintenance, physical therapy for strengthening and modalities as indicated, and bracing when appropriate. These measures will continue, until symptoms are no longer relieved, become more severe or function begins to significantly deteriorate. At that point joint replacement options will be discussed.    Follow up in 3 months unless symptoms return or a new issue occurs.  Patient will call the office to schedule an appointment.     All questions were answered, the patient understands and agrees with the plan.

## 2024-10-18 ENCOUNTER — HOME CARE VISIT (OUTPATIENT)
Dept: HOME HEALTH SERVICES | Facility: HOME HEALTHCARE | Age: 69
End: 2024-10-18
Payer: MEDICARE

## 2024-10-21 ENCOUNTER — HOME CARE VISIT (OUTPATIENT)
Dept: HOME HEALTH SERVICES | Facility: HOME HEALTHCARE | Age: 69
End: 2024-10-21
Payer: MEDICARE

## 2024-10-21 ENCOUNTER — OFFICE VISIT (OUTPATIENT)
Dept: FAMILY MEDICINE CLINIC | Facility: CLINIC | Age: 69
End: 2024-10-21
Payer: MEDICARE

## 2024-10-21 VITALS
HEART RATE: 92 BPM | BODY MASS INDEX: 32.02 KG/M2 | RESPIRATION RATE: 18 BRPM | OXYGEN SATURATION: 98 % | WEIGHT: 204 LBS | HEIGHT: 67 IN | DIASTOLIC BLOOD PRESSURE: 62 MMHG | SYSTOLIC BLOOD PRESSURE: 120 MMHG

## 2024-10-21 VITALS
HEART RATE: 108 BPM | OXYGEN SATURATION: 96 % | DIASTOLIC BLOOD PRESSURE: 70 MMHG | TEMPERATURE: 96.5 F | SYSTOLIC BLOOD PRESSURE: 120 MMHG | RESPIRATION RATE: 18 BRPM

## 2024-10-21 DIAGNOSIS — G89.29 CHRONIC PAIN OF LEFT KNEE: ICD-10-CM

## 2024-10-21 DIAGNOSIS — M25.562 CHRONIC PAIN OF LEFT KNEE: ICD-10-CM

## 2024-10-21 DIAGNOSIS — Z00.00 MEDICARE ANNUAL WELLNESS VISIT, SUBSEQUENT: Primary | ICD-10-CM

## 2024-10-21 DIAGNOSIS — I26.99 ACUTE PULMONARY EMBOLISM WITHOUT ACUTE COR PULMONALE, UNSPECIFIED PULMONARY EMBOLISM TYPE: ICD-10-CM

## 2024-10-21 DIAGNOSIS — M11.269 PSEUDOGOUT OF KNEE, UNSPECIFIED LATERALITY: ICD-10-CM

## 2024-10-21 DIAGNOSIS — I51.3 RIGHT VENTRICULAR THROMBUS: ICD-10-CM

## 2024-10-21 PROCEDURE — G0151 HHCP-SERV OF PT,EA 15 MIN: HCPCS

## 2024-10-21 PROCEDURE — 3074F SYST BP LT 130 MM HG: CPT | Performed by: FAMILY MEDICINE

## 2024-10-21 PROCEDURE — 3078F DIAST BP <80 MM HG: CPT | Performed by: FAMILY MEDICINE

## 2024-10-21 PROCEDURE — 99214 OFFICE O/P EST MOD 30 MIN: CPT | Performed by: FAMILY MEDICINE

## 2024-10-21 PROCEDURE — 1111F DSCHRG MED/CURRENT MED MERGE: CPT | Performed by: FAMILY MEDICINE

## 2024-10-21 PROCEDURE — G0439 PPPS, SUBSEQ VISIT: HCPCS | Performed by: FAMILY MEDICINE

## 2024-10-21 PROCEDURE — 1125F AMNT PAIN NOTED PAIN PRSNT: CPT | Performed by: FAMILY MEDICINE

## 2024-10-21 RX ORDER — HYDROCODONE BITARTRATE AND ACETAMINOPHEN 5; 325 MG/1; MG/1
1 TABLET ORAL EVERY 8 HOURS PRN
Qty: 45 TABLET | Refills: 0 | Status: SHIPPED | OUTPATIENT
Start: 2024-10-21

## 2024-10-21 NOTE — PROGRESS NOTES
"Barbara Tran is a 69 y.o. female admitted to [facility] and  is seen for follow up of COVID related Pulmonary Embolis, cardiac thrombus and DVT  Admission date 9/20 D/C date 10/1  Discharge summary is available.  Risk for Readmission (LACE) No data recorded  Current outpatient and discharge medications have been reconciled for the patient.  Reviewed by: Millicent Ace MD  She was admitted for the following reason(s):  Primary admitting diagnosis at discharge was Pulmonary Embolus.  Pertinent secondary diagnoses include Right ventricular thrombus COVID, Flu, Chronic diastolic CHF knee pain-pseudogout.  Course During Hospital Stay:  Admitted and begun on heparin, then transitioned to apixaban. Will see pulmonology and hematology for f/u. Seen by ortho for knee pseudogout - had aspiration bilaterally with cortisone injection.   Procedures Performed in Hospital : Echo , CT of chest, and xrays of knees  Pending tests : none  Pain Control:  not well contrlled. She is unable to take NSAIDs. Not getting full advantage of her PT due to the amount of pain she is in. Will give her more hydrocodone with discussion of concerns for dependence  Diet: Low fat, low carb  Activity after Discharge: activity as tolerated    Items to be addressed at first follow up visit include:  1. Medication changes: eliquis, pantoprazole, hydrocodone, narcan      She reports her overall condition are improving since discharge.  Specific complaints: related to the pain in her knees and inability to ambulate. .  Social support is said to be adequate to meet her needs. .      Objective   Vitals:    10/21/24 1533   BP: 120/62   Pulse: 92   Resp: 18   SpO2: 98%   Weight: 92.5 kg (204 lb)   Height: 170.2 cm (67\")     Body mass index is 31.95 kg/m².    Physical Exam  Vitals reviewed.   Constitutional:       Appearance: Normal appearance. She is well-developed.   Cardiovascular:      Rate and Rhythm: Normal rate and regular rhythm.      " Heart sounds: Normal heart sounds.   Pulmonary:      Effort: Pulmonary effort is normal.      Breath sounds: Normal breath sounds.   Neurological:      Mental Status: She is alert.   Psychiatric:         Behavior: Behavior normal.         Thought Content: Thought content normal.         Judgment: Judgment normal.             {Followup: 23]    Assessment & Plan  Acute pulmonary embolism without acute cor pulmonale, unspecified pulmonary embolism type  Seeing pulmonology. Will need repeat CT  Chronic pain of left knee  Still with significant pain. Colchicine not helping  Right ventricular thrombus  Followed by cardiology  Pseudogout of knee, unspecified laterality

## 2024-10-23 ENCOUNTER — PATIENT OUTREACH (OUTPATIENT)
Dept: CASE MANAGEMENT | Facility: OTHER | Age: 69
End: 2024-10-23
Payer: MEDICARE

## 2024-10-23 PROBLEM — M11.269 PSEUDOGOUT OF KNEE: Status: ACTIVE | Noted: 2024-10-23

## 2024-10-23 NOTE — OUTREACH NOTE
AMBULATORY CASE MANAGEMENT NOTE    Names and Relationships of Patient/Support Persons: Contact: [facility]; Relationship: Other -     SNF Follow-up    Questions/Answers      Flowsheet Row Responses   Acute Facility Discharged From [facility]   Acute Discharge Date 10/01/24   Name of the Skilled Nursing Facility? [facility]   Purpose of SNF Admission PT, OT   Who is the insurance provider or payor of patient stay? Medicare   Skilled Nursing Discharge Date? 10/05/24   Where was the patient discharged to? Home   Home Health Agency at discharge? Yes   Name of Home Health Agency? [facility]   DME Needed at Discharge? No   Are there any medication concerns at Discharge No   Does the patient have a follow-up appointment? Yes          Patient Outreach    Outgoing call to the patient for follow up from hospital admission and SNF stay 10/1-10/5 at [facility] and  services with last PT note 10/21.  VM was left for the patient for follow up and to offer and discuss HRCM services.  Another outreach is scheduled for this week.     Noreen SIMS  Ambulatory Case Management    10/23/2024, 15:54 EDT

## 2024-10-23 NOTE — PROGRESS NOTES
"Medicare Wellness Visit  Subjective    Barbara Tran is a 69 y.o. female who presents for a Subsequent Medicare Wellness Visit.    Compared to one year ago, the patient feels her physical health is worse due to her recent hospitalization and the pain in her knees and her mental health is the same.  Recent Hospitalizations:  This patient has had a [facility] admission record on file within the last 365 days.  Current Medical Providers:  Patient Care Team:  Millicent Ace MD as PCP - General (Family Medicine)  Kin Patel MD as Consulting Physician (Orthopedic Surgery)  Bere Beckford MD as Consulting Physician (Cardiology)  Patrizia Page MD as Consulting Physician (Ophthalmology)  Sarah Powell APRN as Referring Physician (Nurse Practitioner)  Noreen Costa RN as Ambulatory  ([facility] [place])  Opioid medication/s are on active medication list.  and I have evaluated her active treatment plan and pain score trends (see table).  Vitals:    10/21/24 1533   PainSc:   9     I have reviewed the chart for potential of high risk medication and harmful drug interactions in the elderly. She is not tolerating PT due to the amount of pain she is in and she is not mobile much at all. Will prescribe a minimal amount of hydrocodone 5 with discussion of no more than 45 in a month.   Aspirin is not on active medication list.  Aspirin use is contraindicated for this patient due to: current use of Eliquis.  .  Advance Care Planning   Advance Directive is not on file.  ACP discussion was held with the patient during this visit. Patient has an advance directive (not in EMR), copy requested.  Objective    Vitals:    10/21/24 1533   BP: 120/62   Pulse: 92   Resp: 18   SpO2: 98%   Weight: 92.5 kg (204 lb)   Height: 170.2 cm (67\")   PainSc:   9     Wt Readings from Last 3 Encounters:   10/21/24 92.5 kg (204 lb)   10/17/24 93.1 kg (205 lb 4.8 oz)   10/15/24 92.9 kg (204 lb 12.8 oz)     Estimated " "body mass index is 31.95 kg/m² as calculated from the following:    Height as of this encounter: 170.2 cm (67\").    Weight as of this encounter: 92.5 kg (204 lb).  BMI is >= 30 and <35. (Class 1 Obesity). The following options were offered after discussion;: not the time given recent medical issues. She is aware she weighs too much     Does the patient have evidence of cognitive impairment? No  LABS:   Recent Results (from the past 4 weeks)   Body Fluid Culture - Aspirate, Knee, Left    Collection Time: 09/25/24  3:10 PM    Specimen: Knee, Left; Aspirate   Result Value Ref Range    Body Fluid Culture No growth at 5 days     Gram Stain No WBCs or organisms seen    Crystal Exam, Fluid - Synovial Fluid,    Collection Time: 09/25/24  3:10 PM    Specimen: Synovial Fluid   Result Value Ref Range    Crystals, Fluid       Intra and Extracellular crystals observed exhibiting polarization characteristics of Calcium Pyrophosphate   Anaerobic Culture - Aspirate, Knee, Left    Collection Time: 09/25/24  3:10 PM    Specimen: Knee, Left; Aspirate   Result Value Ref Range    Anaerobic Culture No anaerobes isolated at 5 days    Body Fluid Culture - Aspirate, Knee, Right    Collection Time: 09/25/24  3:10 PM    Specimen: Knee, Right; Aspirate   Result Value Ref Range    Body Fluid Culture No growth at 5 days     Gram Stain No WBCs or organisms seen    Crystal Exam, Fluid - Synovial Fluid, Knee, Right    Collection Time: 09/25/24  3:10 PM    Specimen: Knee, Right; Synovial Fluid   Result Value Ref Range    Crystals, Fluid       Intra and Extracellular crystals observed exhibiting polarization characteristics of Calcium Pyrophosphate   Body fluid cell count - Aspirate, Knee, Left    Collection Time: 09/25/24  3:10 PM    Specimen: Knee, Left; Aspirate   Result Value Ref Range    Color, Fluid Yellow     Appearance, Fluid Hazy (A) Clear    RBC, Fluid 60 /mm3    Nucleated Cells, Fluid 11,900 /mm3    Method: Hemacytometer Method    Body " fluid cell count - Aspirate, Knee, Right    Collection Time: 09/25/24  3:10 PM    Specimen: Knee, Right; Aspirate   Result Value Ref Range    Color, Fluid Pink     Appearance, Fluid Hazy (A) Clear    RBC, Fluid 8,200 /mm3    Nucleated Cells, Fluid 13,200 /mm3    Method: Hemacytometer Method    Body fluid differential - Aspirate, Knee, Right    Collection Time: 09/25/24  3:10 PM    Specimen: Knee, Right; Aspirate   Result Value Ref Range    Neutrophils, Fluid 98 %    Lymphocytes, Fluid 1 %    Monocytes, Fluid 1 %   Body fluid differential - Aspirate, Knee, Left    Collection Time: 09/25/24  3:10 PM    Specimen: Knee, Left; Aspirate   Result Value Ref Range    Neutrophils, Fluid 94 %    Lymphocytes, Fluid 3 %    Monocytes, Fluid 3 %   Basic Metabolic Panel    Collection Time: 09/26/24 11:27 AM    Specimen: Blood   Result Value Ref Range    Glucose 141 (H) 65 - 99 mg/dL    BUN 22 8 - 23 mg/dL    Creatinine 0.70 0.57 - 1.00 mg/dL    Sodium 134 (L) 136 - 145 mmol/L    Potassium 4.1 3.5 - 5.2 mmol/L    Chloride 99 98 - 107 mmol/L    CO2 22.8 22.0 - 29.0 mmol/L    Calcium 9.3 8.6 - 10.5 mg/dL    BUN/Creatinine Ratio 31.4 (H) 7.0 - 25.0    Anion Gap 12.2 5.0 - 15.0 mmol/L    eGFR 92.6 >60.0 mL/min/1.73   Basic Metabolic Panel    Collection Time: 09/27/24  6:01 AM    Specimen: Blood   Result Value Ref Range    Glucose 161 (H) 65 - 99 mg/dL    BUN 25 (H) 8 - 23 mg/dL    Creatinine 0.64 0.57 - 1.00 mg/dL    Sodium 134 (L) 136 - 145 mmol/L    Potassium 4.0 3.5 - 5.2 mmol/L    Chloride 102 98 - 107 mmol/L    CO2 24.0 22.0 - 29.0 mmol/L    Calcium 9.0 8.6 - 10.5 mg/dL    BUN/Creatinine Ratio 39.1 (H) 7.0 - 25.0    Anion Gap 8.0 5.0 - 15.0 mmol/L    eGFR 94.6 >60.0 mL/min/1.73   CBC (No Diff)    Collection Time: 09/27/24  6:01 AM    Specimen: Blood   Result Value Ref Range    WBC 11.73 (H) 3.40 - 10.80 10*3/mm3    RBC 4.64 3.77 - 5.28 10*6/mm3    Hemoglobin 13.9 12.0 - 15.9 g/dL    Hematocrit 41.3 34.0 - 46.6 %    MCV 89.0 79.0 -  97.0 fL    MCH 30.0 26.6 - 33.0 pg    MCHC 33.7 31.5 - 35.7 g/dL    RDW 12.6 12.3 - 15.4 %    RDW-SD 41.5 37.0 - 54.0 fl    MPV 9.9 6.0 - 12.0 fL    Platelets 370 140 - 450 10*3/mm3   COVID-19 RAPID AG,VERITOR,COR/PAD/ALANNA/RELL/LAG/LISETTE/ IN-HOUSE,DRY SWAB, 1 HR TAT - Swab, Nasal Cavity    Collection Time: 09/27/24  9:15 AM    Specimen: Nasal Cavity; Swab   Result Value Ref Range    COVID19 Presumptive Negative Presumptive Negative - Ref. Range   Magnesium    Collection Time: 09/27/24  9:37 AM    Specimen: Blood   Result Value Ref Range    Magnesium 2.4 1.6 - 2.4 mg/dL   Basic Metabolic Panel    Collection Time: 09/28/24  5:20 AM    Specimen: Blood   Result Value Ref Range    Glucose 130 (H) 65 - 99 mg/dL    BUN 25 (H) 8 - 23 mg/dL    Creatinine 0.53 (L) 0.57 - 1.00 mg/dL    Sodium 135 (L) 136 - 145 mmol/L    Potassium 4.3 3.5 - 5.2 mmol/L    Chloride 98 98 - 107 mmol/L    CO2 24.6 22.0 - 29.0 mmol/L    Calcium 9.1 8.6 - 10.5 mg/dL    BUN/Creatinine Ratio 47.2 (H) 7.0 - 25.0    Anion Gap 12.4 5.0 - 15.0 mmol/L    eGFR 99.0 >60.0 mL/min/1.73   Magnesium    Collection Time: 09/28/24  5:20 AM    Specimen: Blood   Result Value Ref Range    Magnesium 2.5 (H) 1.6 - 2.4 mg/dL   Basic Metabolic Panel    Collection Time: 09/29/24  6:16 AM    Specimen: Blood   Result Value Ref Range    Glucose 135 (H) 65 - 99 mg/dL    BUN 25 (H) 8 - 23 mg/dL    Creatinine 0.68 0.57 - 1.00 mg/dL    Sodium 135 (L) 136 - 145 mmol/L    Potassium 4.0 3.5 - 5.2 mmol/L    Chloride 99 98 - 107 mmol/L    CO2 26.0 22.0 - 29.0 mmol/L    Calcium 9.4 8.6 - 10.5 mg/dL    BUN/Creatinine Ratio 36.8 (H) 7.0 - 25.0    Anion Gap 10.0 5.0 - 15.0 mmol/L    eGFR 93.2 >60.0 mL/min/1.73   Magnesium    Collection Time: 09/29/24  6:16 AM    Specimen: Blood   Result Value Ref Range    Magnesium 2.5 (H) 1.6 - 2.4 mg/dL   Basic Metabolic Panel    Collection Time: 09/30/24  6:36 AM    Specimen: Blood   Result Value Ref Range    Glucose 105 (H) 65 - 99 mg/dL    BUN 24 (H) 8 -  23 mg/dL    Creatinine 0.57 0.57 - 1.00 mg/dL    Sodium 134 (L) 136 - 145 mmol/L    Potassium 3.7 3.5 - 5.2 mmol/L    Chloride 99 98 - 107 mmol/L    CO2 27.0 22.0 - 29.0 mmol/L    Calcium 8.9 8.6 - 10.5 mg/dL    BUN/Creatinine Ratio 42.1 (H) 7.0 - 25.0    Anion Gap 8.0 5.0 - 15.0 mmol/L    eGFR 97.3 >60.0 mL/min/1.73   Magnesium    Collection Time: 09/30/24  6:36 AM    Specimen: Blood   Result Value Ref Range    Magnesium 2.4 1.6 - 2.4 mg/dL   Magnesium    Collection Time: 10/01/24  6:03 AM    Specimen: Blood   Result Value Ref Range    Magnesium 2.4 1.6 - 2.4 mg/dL   Comprehensive Metabolic Panel    Collection Time: 10/01/24  6:03 AM    Specimen: Blood   Result Value Ref Range    Glucose 105 (H) 65 - 99 mg/dL    BUN 24 (H) 8 - 23 mg/dL    Creatinine 0.60 0.57 - 1.00 mg/dL    Sodium 138 136 - 145 mmol/L    Potassium 3.9 3.5 - 5.2 mmol/L    Chloride 101 98 - 107 mmol/L    CO2 27.0 22.0 - 29.0 mmol/L    Calcium 9.0 8.6 - 10.5 mg/dL    Total Protein 5.8 (L) 6.0 - 8.5 g/dL    Albumin 3.5 3.5 - 5.2 g/dL    ALT (SGPT) 83 (H) 1 - 33 U/L    AST (SGOT) 28 1 - 32 U/L    Alkaline Phosphatase 76 39 - 117 U/L    Total Bilirubin 0.3 0.0 - 1.2 mg/dL    Globulin 2.3 gm/dL    A/G Ratio 1.5 g/dL    BUN/Creatinine Ratio 40.0 (H) 7.0 - 25.0    Anion Gap 10.0 5.0 - 15.0 mmol/L    eGFR 96.1 >60.0 mL/min/1.73   CBC Auto Differential    Collection Time: 10/01/24  6:03 AM    Specimen: Blood   Result Value Ref Range    WBC 9.23 3.40 - 10.80 10*3/mm3    RBC 5.06 3.77 - 5.28 10*6/mm3    Hemoglobin 15.2 12.0 - 15.9 g/dL    Hematocrit 45.7 34.0 - 46.6 %    MCV 90.3 79.0 - 97.0 fL    MCH 30.0 26.6 - 33.0 pg    MCHC 33.3 31.5 - 35.7 g/dL    RDW 12.8 12.3 - 15.4 %    RDW-SD 42.2 37.0 - 54.0 fl    MPV 9.3 6.0 - 12.0 fL    Platelets 356 140 - 450 10*3/mm3    Neutrophil % 51.8 42.7 - 76.0 %    Lymphocyte % 38.0 19.6 - 45.3 %    Monocyte % 6.7 5.0 - 12.0 %    Eosinophil % 2.7 0.3 - 6.2 %    Basophil % 0.3 0.0 - 1.5 %    Immature Grans % 0.5 0.0 - 0.5 %     Neutrophils, Absolute 4.77 1.70 - 7.00 10*3/mm3    Lymphocytes, Absolute 3.51 (H) 0.70 - 3.10 10*3/mm3    Monocytes, Absolute 0.62 0.10 - 0.90 10*3/mm3    Eosinophils, Absolute 0.25 0.00 - 0.40 10*3/mm3    Basophils, Absolute 0.03 0.00 - 0.20 10*3/mm3    Immature Grans, Absolute 0.05 0.00 - 0.05 10*3/mm3    nRBC 0.0 0.0 - 0.2 /100 WBC   CBC Auto Differential    Collection Time: 10/15/24 10:33 AM    Specimen: Blood   Result Value Ref Range    WBC 13.74 (H) 3.40 - 10.80 10*3/mm3    RBC 5.31 (H) 3.77 - 5.28 10*6/mm3    Hemoglobin 16.1 (H) 12.0 - 15.9 g/dL    Hematocrit 48.6 (H) 34.0 - 46.6 %    MCV 91.5 79.0 - 97.0 fL    MCH 30.3 26.6 - 33.0 pg    MCHC 33.1 31.5 - 35.7 g/dL    RDW 13.9 12.3 - 15.4 %    RDW-SD 46.7 37.0 - 54.0 fl    MPV 10.5 6.0 - 12.0 fL    Platelets 241 140 - 450 10*3/mm3    Neutrophil % 76.6 (H) 42.7 - 76.0 %    Lymphocyte % 17.7 (L) 19.6 - 45.3 %    Monocyte % 4.2 (L) 5.0 - 12.0 %    Eosinophil % 0.7 0.3 - 6.2 %    Basophil % 0.4 0.0 - 1.5 %    Immature Grans % 0.4 0.0 - 0.5 %    Neutrophils, Absolute 10.54 (H) 1.70 - 7.00 10*3/mm3    Lymphocytes, Absolute 2.43 0.70 - 3.10 10*3/mm3    Monocytes, Absolute 0.58 0.10 - 0.90 10*3/mm3    Eosinophils, Absolute 0.09 0.00 - 0.40 10*3/mm3    Basophils, Absolute 0.05 0.00 - 0.20 10*3/mm3    Immature Grans, Absolute 0.05 0.00 - 0.05 10*3/mm3    nRBC 0.0 0.0 - 0.2 /100 WBC     HEALTH RISK ASSESSMENT  Smoking Status:  Social History     Tobacco Use   Smoking Status Never    Passive exposure: Never   Smokeless Tobacco Never   Tobacco Comments    N/A     Alcohol Consumption:  Social History     Substance and Sexual Activity   Alcohol Use Yes    Alcohol/week: 2.0 standard drinks of alcohol    Types: 1 Glasses of wine, 1 Cans of beer per week    Comment: socially     Fall Risk Screen:  STEADI Fall Risk Assessment was completed, and patient is at LOW risk for falls.Assessment completed on:10/21/2024  Depression Screening:      10/21/2024     3:34 PM   PHQ-2/PHQ-9  Depression Screening   Little interest or pleasure in doing things Not at all   Feeling down, depressed, or hopeless Not at all     Health Habits and Functional and Cognitive Screening:      10/14/2024     8:37 AM   Functional & Cognitive Status   Do you have difficulty preparing food and eating? No    Do you have difficulty bathing yourself, getting dressed or grooming yourself? No    Do you have difficulty using the toilet? No    Do you have difficulty moving around from place to place? No    Do you have trouble with steps or getting out of a bed or a chair? No    Current Diet Well Balanced Diet    Dental Exam Up to date    Eye Exam Up to date    Exercise (times per week) 0 times per week    Current Exercises Include No Regular Exercise    Do you need help using the phone?  No    Are you deaf or do you have serious difficulty hearing?  No    Do you need help to go to places out of walking distance? Yes  All ADLs and other mobility issues related to pseudogout of her knees and chronic pain with it   Do you need help shopping? Yes    Do you need help preparing meals?  No    Do you need help with housework?  Yes    Do you need help with laundry? Yes    Do you need help taking your medications? No    Do you need help managing money? No    Do you ever drive or ride in a car without wearing a seat belt? No    Have you felt unusual stress, anger or loneliness in the last month? No    Who do you live with? Other    If you need help, do you have trouble finding someone available to you? No    Have you been bothered in the last four weeks by sexual problems? No    Do you have difficulty concentrating, remembering or making decisions? No        Patient-reported     Health Habits  Current Diet: (Patient-Rptd) Well Balanced Diet  Dental Exam: (Patient-Rptd) Up to date  Eye Exam: (Patient-Rptd) Up to date  Exercise (times per week): (Patient-Rptd) 0 times per week  Current Exercises Include: (Patient-Rptd) No Regular  Exercise  Age-appropriate Screening Schedule:  Refer to the list below for future screening recommendations based on patient's age, sex and/or medical conditions. Orders for these recommended tests are listed in the plan section. The patient has been provided with a written plan.  Health Maintenance   Topic Date Due    TDAP/TD VACCINES (1 - Tdap) Never done    ZOSTER VACCINE (1 of 2) Never done    DIABETIC FOOT EXAM  Never done    BMI FOLLOWUP  01/27/2024    URINE MICROALBUMIN  04/28/2024    INFLUENZA VACCINE  08/01/2024    COVID-19 Vaccine (6 - 2023-24 season) 09/01/2024    ANNUAL WELLNESS VISIT  09/25/2024    HEMOGLOBIN A1C  02/14/2025    DIABETIC EYE EXAM  07/15/2025    LIPID PANEL  08/14/2025    MAMMOGRAM  02/22/2026    DXA SCAN  02/22/2026    COLORECTAL CANCER SCREENING  12/19/2026    HEPATITIS C SCREENING  Completed    Pneumococcal Vaccine 65+  Completed        Assessment & Plan   CMS Preventative Services Quick Reference  Risk Factors Identified During Encounter  Inactivity/Sedentary:  Patient is undergoing Physical therapy  The above risks/problems have been discussed with the patient.  Follow up actions/plans if indicated are seen below in the Assessment/Plan Section.  Pertinent information has been shared with the patient in the After Visit Summary a copy of which has been given/sent to the patient.   Follow Up:   Follow Up Medicare Visit in one year  Assessment & Plan   See separate note re: A/P.

## 2024-10-24 ENCOUNTER — HOME CARE VISIT (OUTPATIENT)
Dept: HOME HEALTH SERVICES | Facility: HOME HEALTHCARE | Age: 69
End: 2024-10-24
Payer: MEDICARE

## 2024-10-24 ENCOUNTER — TELEPHONE (OUTPATIENT)
Dept: FAMILY MEDICINE CLINIC | Facility: CLINIC | Age: 69
End: 2024-10-24

## 2024-10-24 VITALS
RESPIRATION RATE: 18 BRPM | HEART RATE: 102 BPM | TEMPERATURE: 97 F | DIASTOLIC BLOOD PRESSURE: 76 MMHG | SYSTOLIC BLOOD PRESSURE: 126 MMHG | OXYGEN SATURATION: 97 %

## 2024-10-24 VITALS
RESPIRATION RATE: 18 BRPM | OXYGEN SATURATION: 96 % | SYSTOLIC BLOOD PRESSURE: 112 MMHG | DIASTOLIC BLOOD PRESSURE: 70 MMHG | HEART RATE: 78 BPM

## 2024-10-24 PROCEDURE — G0151 HHCP-SERV OF PT,EA 15 MIN: HCPCS

## 2024-10-24 PROCEDURE — G0299 HHS/HOSPICE OF RN EA 15 MIN: HCPCS

## 2024-10-24 NOTE — TELEPHONE ENCOUNTER
Caller: VERO    Relationship: [facility]    Best call back number:     [phone]       What orders are you requesting (i.e. lab or imaging): VERBAL ORDER TO EXTEND PT TWICE A WEEK FOR ONE WEEK         Additional notes: PLEASE CALL

## 2024-10-28 NOTE — HOME HEALTH
Discussed patient condition at length and her overall breathing status is improved, but her main issue is pain with her knee.  Replacement surgery is in her future, but blood clots must be resolved before she can have surgery.  She is wanting to follow up with pain management and she will discuss with PCP about referral.  Discussed non-pharmacological means to pain reduction in knee.  Patient does not feel she needs any further SN visits, but PT will continue, she knows to reach out to appropriate MD for any further questions or concerns.

## 2024-10-29 ENCOUNTER — HOME CARE VISIT (OUTPATIENT)
Dept: HOME HEALTH SERVICES | Facility: HOME HEALTHCARE | Age: 69
End: 2024-10-29
Payer: MEDICARE

## 2024-10-29 ENCOUNTER — TELEPHONE (OUTPATIENT)
Dept: CASE MANAGEMENT | Facility: OTHER | Age: 69
End: 2024-10-29
Payer: MEDICARE

## 2024-10-29 ENCOUNTER — TELEPHONE (OUTPATIENT)
Dept: FAMILY MEDICINE CLINIC | Facility: CLINIC | Age: 69
End: 2024-10-29

## 2024-10-29 NOTE — TELEPHONE ENCOUNTER
Caller:  Barbara Tran     Best call back number: [phone]    What is your medical concern? PATIENT WAS IN A WEEK AND A HALF AGO ON 10/21 AND STATES THAT SHE IS FEELING REALLY SICK. PATIENT STATES THAT SHE HAS A RESPIRATORY INFECTION AT THE END OF SEPTEMBER FROM COVID AND THE FLU. PATIENT STATES THAT THE ANTIBIOTIC DID NOT DO ANYTHING. THIS LED TO BLOOD CLOTS AND OTHER THINGS. PATIENT SAW DR. MENDEZ FOR CHECK UP AND EVERYTHING SEEMED FINE. PATIENT STATES THAT THIS PAST WEEKEND, HER COUGH CAME BACK AND IS COUGHING MUCUS UP. ONCE SHE STARTS COUGHING, SHE CANNOT GET THIS UNDER CONTROL. WHAT SHE IS COUGHING UP IS YELLOW AND A LITTLE BLOOD TINGED. SHE STATES THAT SHE IS NOT COUGHING UP BLOOD.    PATIENT WOULD LIKE FOR [facility] TO CALL HER TO DISCUSS NEXT STEPS ASAP.

## 2024-10-29 NOTE — TELEPHONE ENCOUNTER
Outreach attempted x 3 for follow up and to offer and discuss case management services.  Noreen Garcia RN ACM

## 2024-10-31 ENCOUNTER — HOME CARE VISIT (OUTPATIENT)
Dept: HOME HEALTH SERVICES | Facility: HOME HEALTHCARE | Age: 69
End: 2024-10-31
Payer: MEDICARE

## 2024-10-31 VITALS
DIASTOLIC BLOOD PRESSURE: 66 MMHG | RESPIRATION RATE: 18 BRPM | SYSTOLIC BLOOD PRESSURE: 118 MMHG | OXYGEN SATURATION: 97 % | HEART RATE: 71 BPM | TEMPERATURE: 97.2 F

## 2024-10-31 PROCEDURE — G0151 HHCP-SERV OF PT,EA 15 MIN: HCPCS

## 2024-11-01 ENCOUNTER — TELEPHONE (OUTPATIENT)
Dept: ORTHOPEDIC SURGERY | Facility: CLINIC | Age: 69
End: 2024-11-01
Payer: MEDICARE

## 2024-11-01 DIAGNOSIS — R52 PAIN: ICD-10-CM

## 2024-11-01 DIAGNOSIS — M17.0 PRIMARY OSTEOARTHRITIS OF BOTH KNEES: Primary | ICD-10-CM

## 2024-11-01 NOTE — TELEPHONE ENCOUNTER
Sent patient mychart message and voice mail stating that her order for physical therapy with [facility] have been placed.

## 2024-11-05 ENCOUNTER — PATIENT MESSAGE (OUTPATIENT)
Dept: CARDIOLOGY | Facility: CLINIC | Age: 69
End: 2024-11-05
Payer: MEDICARE

## 2024-11-05 DIAGNOSIS — F51.01 PRIMARY INSOMNIA: Primary | ICD-10-CM

## 2024-11-06 RX ORDER — ZOLPIDEM TARTRATE 5 MG/1
5 TABLET ORAL NIGHTLY PRN
Qty: 30 TABLET | Refills: 0 | Status: SHIPPED | OUTPATIENT
Start: 2024-11-06

## 2024-11-13 ENCOUNTER — OFFICE VISIT (OUTPATIENT)
Dept: CARDIOLOGY | Facility: CLINIC | Age: 69
End: 2024-11-13
Payer: MEDICARE

## 2024-11-13 ENCOUNTER — TRANSCRIBE ORDERS (OUTPATIENT)
Dept: ADMINISTRATIVE | Facility: HOSPITAL | Age: 69
End: 2024-11-13
Payer: MEDICARE

## 2024-11-13 VITALS
HEIGHT: 67 IN | HEART RATE: 85 BPM | DIASTOLIC BLOOD PRESSURE: 78 MMHG | SYSTOLIC BLOOD PRESSURE: 110 MMHG | BODY MASS INDEX: 32.02 KG/M2 | WEIGHT: 204 LBS

## 2024-11-13 DIAGNOSIS — I26.99 PULMONARY EMBOLISM ASSOCIATED WITH COVID-19: Primary | ICD-10-CM

## 2024-11-13 DIAGNOSIS — I25.10 CHRONIC CORONARY ARTERY DISEASE: Primary | ICD-10-CM

## 2024-11-13 DIAGNOSIS — E78.2 MIXED HYPERLIPIDEMIA: ICD-10-CM

## 2024-11-13 DIAGNOSIS — U07.1 PULMONARY EMBOLISM ASSOCIATED WITH COVID-19: Primary | ICD-10-CM

## 2024-11-13 DIAGNOSIS — I50.32 CHRONIC DIASTOLIC CONGESTIVE HEART FAILURE: ICD-10-CM

## 2024-11-13 DIAGNOSIS — I10 BENIGN ESSENTIAL HTN: ICD-10-CM

## 2024-11-13 DIAGNOSIS — I26.94 MULTIPLE SUBSEGMENTAL PULMONARY EMBOLI WITHOUT ACUTE COR PULMONALE: ICD-10-CM

## 2024-11-13 DIAGNOSIS — I51.3 RIGHT VENTRICULAR THROMBUS: ICD-10-CM

## 2024-11-13 NOTE — PROGRESS NOTES
Date of Office Visit: 2024  Encounter Provider: Lashay Leon MD  Place of Service: [facility] CARDIOLOGY  Patient Name: Barbara Tran  :1955      Patient ID:  Barbara Tran is a 69 y.o. female is here for  followup for CAD and HFpEF.        History of Present Illness    She has a history of HFpEF, hyperlipidemia, hypertension, DVT with pulmonary embolism in 2021, CAD with LAD stent done 2015, mild DAVID on sleep study 2022.     She received a proximal LAD stent in .  Last cardiac catheterization done in  showed proximal patent LAD stent patent, 80% septal  stenosis unchanged from prior cath, normal left circumflex and RCA, normal left ventricular systolic function.     She had COVID-19 pneumonia 3/8/2021.  She then presented 6/3/2021 with acute short windedness and was diagnosed with bilateral acute pulmonary embolism without acute cor pulmonale.  She was started on Eliquis.  Echo done 9/15/2021 showed ejection fraction of 46-50% with moderate to severe septal hypokinesis, grade 1 diastolic dysfunction, normal right ventricular size and function with normal saline contrast today, no pericardial effusion and mild left atrial enlargement.  She has followed with Dr. Rodriguez for pulmonary nodules.     Her mom, dad, sister and brothers x2 of all had heart disease-her mom, dad, sister and one of her brothers have already  with coronary artery disease and her living brother has had 5 vessel bypass.  Her sister also hae diabetes.  She is single, has 3 children, works as an , has never smoked, has tea and coffee, occasional alcohol and no drugs.      Her spironolactone was decreased due to hyperkalemia.  She follows with Dr. Vargas for endocrinology.      Echo done 2022 shows ejection fraction 54% with normal RVSP, grade 1 diastolic dysfunction, no significant valvular abnormalities.  She had nonischemic stress nuclear  perfusion study done 5/4/2022.  CT chest without contrast on 3/13/2023 showed stable micronodules.  I did review the CT chest images which shows scattered calcification in the aortic arch, calcification throughout the LAD-stent in the LAD, calcification in the RCA and circumflex.     Venous duplex studies of lower extremities showed normal right lower extremity venous duplex, she had mild stenosis of bilateral carotid arteries with otherwise normal vascular screening done 7/14/2023.  She had a nonischemic stress nuclear perfusion study done 1/2/2024, echocardiogram done 1/2/2024 showed ejection fraction 56/60% with normal diastolic function and no valvular abnormalities, normal RVSP.     Cardiac catheterization done 4/29/2024 for exertional dyspnea showed 20% proximal disease, patent mid LAD stent, 80% origin second  stenosis, normal circumflex, 20% proximal RCA stenosis with 40% mid vessel stenosis, risk factor modification recommended.    Ultrasound of the thyroid done 7/25/2024 shows a right lower pole nodule.  She sees Dr. Olivares.     At her last visit with me, she had lower extremity edema and weight gain.  I added Zaroxolyn with a consideration of adding spironolactone.  She saw Laly 8/28/2024 and she had not improved-still had a lot of lower extremity edema despite increasing torsemide.  She was also having dizziness.  She had torn a ligament in her left ankle and was in a walking boot using a walker.  Laly decreased her torsemide back down to 20 and started her on spironolactone.  She then was admitted 9/20/2024 with short windedness.  She had tested COVID-positive 9/8/2024.  In the emergency department, her troponin was 15, positive for flu and COVID, CTA of her chest showed bilateral pulmonary emboli without evidence of right heart strain.  She was started on heparin drip.  Her venous Doppler showed acute left lower extremity DVT 9/21/2024.  Echocardiogram done 9/21/2024 showed ejection fraction  of 57% with thrombus in the right ventricle, normal right ventricular size with low normal function, normal diastolic function no acute right heart strain, no significant valvular abnormalities.  Otology saw her for hypercoagulable workup and outpatient follow-up.  Pulmonary saw her and recommended CT scan repeat as an outpatient.  She was hemodynamically stable and did not require oxygen.  She was switched to apixaban and recommended lifelong anticoagulation because was the second second pulmonary embolism that she had sustained from COVID infection.  Laboratory values done 10/1/2024 showed unremarkable CMP and CBC.  She saw Sunita Crawley with oncology 10/15/2021.  She was on Eliquis 5 mg twice daily it was recommend that she follow-up with Dr. Rogel in 5 months.  She followed up with Dr. Rodriguez in the office 11/5/2024 for persistent cough.  He started her on benzonatate for the cough.    She still has a cough which is very aggravating.  She has no orthopnea or PND.  She has lower extremity edema which is gone in the morning but starts increasing as soon as she is standing.  She has no chest pain or pressure.  She does not feel her heart racing or skipping.  She is having some knee pain.  She is fatigued but mostly from the cough.  She does not sleep well.      Past Medical History:   Diagnosis Date   • Allergic 2015    codeine, morphine, levaquin   • Arthritis l5 years or so ago   • Basal cell carcinoma    • CAD (coronary artery disease)    • Cataract 6-9 months ago    Need surgery   • CHF (congestive heart failure)    • Coronary artery disease 2005 stent   • COVID-19 virus detected 03/10/2021   • Deep vein thrombosis 06/03/2021    Pulmonary embolism   • Depression    • Disease of thyroid gland    • Headache Covid 3-6-21    Has continued   • History of pulmonary embolism 06/01/2021   • History of urinary tract infection    • HL (hearing loss) Last 4-6 months   • Hyperlipidemia    • Hypertension since 2005   •  Hypothyroidism since 2012   • Multinodular goiter    • Obesity    • Osteopenia last few years   • Osteoporosis    • Pulmonary embolism 06/03/2021    From Covid   • Pulmonic valve regurgitation    • Tricuspid valve regurgitation    • Unstable angina 04/16/2024         Past Surgical History:   Procedure Laterality Date   • BUNIONECTOMY Bilateral     HAMMERT TOE REPAIR/BUNIONECTOMY   • CARDIAC CATHETERIZATION  2015, 2018   • CARDIAC CATHETERIZATION N/A 04/29/2024    Procedure: Coronary angiography;  Surgeon: Cong Rivera MD;  Location:  [facility];  Service: Cardiovascular;  Laterality: N/A;   • CARDIAC CATHETERIZATION N/A 04/29/2024    Procedure: Left Heart Cath;  Surgeon: Cong Rivera MD;  Location:  [facility];  Service: Cardiovascular;  Laterality: N/A;   • CARDIAC CATHETERIZATION N/A 04/29/2024    Procedure: Left ventriculography;  Surgeon: Cong Rivera MD;  Location:  [facility];  Service: Cardiovascular;  Laterality: N/A;   • CATARACT EXTRACTION     • CHOLECYSTECTOMY  1995   • COLONOSCOPY N/A 12/19/2016    Procedure: COLONOSCOPY WITH COLD BIOPSIES;  Surgeon: Medina Guillen MD;  Location: [facility] ENDOSCOPY;  Service:    • CORONARY STENT PLACEMENT  2005    LAD 80% blockage   • CYST REMOVAL     • GANGLION CYST EXCISION      R WRIST   • SKIN BIOPSY     • SUBTOTAL HYSTERECTOMY  2009   • THYROID BIOPSY  2014   • TONSILLECTOMY  1965   • TUBAL ABDOMINAL LIGATION  1985       Current Outpatient Medications on File Prior to Visit   Medication Sig Dispense Refill   • apixaban (ELIQUIS) 5 MG tablet tablet Take 1 tablet by mouth Every 12 (Twelve) Hours. Indications: DVT/PE (active thrombosis) 60 tablet 2   • benzonatate (TESSALON) 200 MG capsule Take 1 capsule by mouth Every 4 (Four) Hours As Needed for Cough.     • buPROPion XL (WELLBUTRIN XL) 300 MG 24 hr tablet Take 1 tablet by mouth Daily. 90 tablet 3   • calcium carbonate (OS-SHAHNAZ) 600 MG tablet Take 1  tablet by mouth 2 (Two) Times a Day With Meals. Indications: Low Amount of Calcium in the Blood     • empagliflozin (JARDIANCE) 10 MG tablet tablet Take 1 tablet by mouth Daily. 28 tablet 0   • HYDROcodone-acetaminophen (NORCO) 5-325 MG per tablet Take 1 tablet by mouth Every 8 (Eight) Hours As Needed for Moderate Pain. Indications: Pain 45 tablet 0   • ibandronate (Boniva) 150 MG tablet Take 1 tablet by mouth Every 30 (Thirty) Days. Patient taking once a month     • levothyroxine (Synthroid) 50 MCG tablet Take 1 tablet by mouth Daily. 90 tablet 2   • magnesium oxide (MAG-OX) 400 MG tablet Take 2 tablets by mouth 2 (two) times a day. Indications: Disorder with Low Magnesium Levels     • rosuvastatin (CRESTOR) 40 MG tablet Take 1 tablet by mouth Every Night. 90 tablet 3   • sacubitril-valsartan (ENTRESTO) 24-26 MG tablet Take 1 tablet by mouth 2 (Two) Times a Day. Indications: Cardiac Failure 28 tablet 0   • spironolactone (ALDACTONE) 25 MG tablet Take 1 tablet by mouth Daily. 90 tablet 3   • torsemide (DEMADEX) 20 MG tablet Take 1 tablet by mouth Daily.     • zolpidem (AMBIEN) 5 MG tablet Take 1 tablet by mouth At Night As Needed for Sleep. Indications: Trouble Sleeping 30 tablet 0     No current facility-administered medications on file prior to visit.       Social History     Socioeconomic History   • Marital status: Single   Tobacco Use   • Smoking status: Never     Passive exposure: Never   • Smokeless tobacco: Never   • Tobacco comments:     N/A   Vaping Use   • Vaping status: Never Used   Substance and Sexual Activity   • Alcohol use: Yes     Alcohol/week: 2.0 standard drinks of alcohol     Types: 1 Glasses of wine, 1 Cans of beer per week     Comment: socially   • Drug use: Never   • Sexual activity: Not Currently     Partners: Male     Birth control/protection: None             Procedures    ECG 12 Lead    Date/Time: 11/13/2024 9:41 AM  Performed by: Lashay Leon MD    Authorized by: Carolyn  "Lashay CEBALLOS MD  Comparison: compared with previous ECG   Similar to previous ECG  Rhythm: sinus rhythm  T inversion: aVF, V4, V5 and V6    Clinical impression: abnormal EKG            Objective:      Vitals:    11/13/24 0928   BP: 110/78   Pulse: 85   Weight: 92.5 kg (204 lb)   Height: 170.2 cm (67\")     Body mass index is 31.95 kg/m².    Vitals reviewed.   Constitutional:       General: Not in acute distress.     Appearance: Not diaphoretic.   Neck:      Vascular: No carotid bruit or JVD.   Pulmonary:      Effort: Pulmonary effort is normal.      Breath sounds: Normal breath sounds. Decreased air movement present.   Cardiovascular:      Normal rate. Regular rhythm.      Murmurs: There is no murmur.      No gallop.  No rub.   Pulses:     Intact distal pulses.      Carotid: 2+ bilaterally.     Radial: 2+ bilaterally.     Dorsalis pedis: 2+ bilaterally.     Posterior tibial: 2+ bilaterally.  Edema:     Peripheral edema present.     Ankle: bilateral 2+ edema of the ankle.     Feet: bilateral 1+ edema of the feet.  Neurological:      Cranial Nerves: No cranial nerve deficit.       Lab Review:       Assessment:      Diagnosis Plan   1. Chronic coronary artery disease  Adult Transthoracic Echo Complete W/ Cont if Necessary Per Protocol      2. Benign essential HTN        3. Mixed hyperlipidemia        4. Right ventricular thrombus  Adult Transthoracic Echo Complete W/ Cont if Necessary Per Protocol      5. Multiple subsegmental pulmonary emboli without acute cor pulmonale  Adult Transthoracic Echo Complete W/ Cont if Necessary Per Protocol      6. Chronic diastolic congestive heart failure  Adult Transthoracic Echo Complete W/ Cont if Necessary Per Protocol        Chronic HFpEF, is volume overloaded with leg edema, on torsemide 20 mg daily, prolactin, Entresto and Jardiance.  CAD- h/o LAD stent.  Patent stent on cardiac catheterization 4/2024.  No angina.  COVID-19 infection 3/2021 and 9/2024.  History of DVT and pulmonary " embolism 6/2021 and 9/2024 with RV thrombus-both episodes due to COVID.  Repeat echocardiogram looking for RV thrombus and RV function.  Hyperlipidemia, on rosuvastatin.   Hypertension, goal <120/80.   Bilateral carotid artery stenosis.  Cough, seeing Dr. Rodriguez  Sedentary lifestyle, advised exercise daily, she would benefit from pool therapy  Severe fatigue, may be due to meds, may be due to low blood pressure.  I do not want to change her medications though because she has benefited from her current medications.  Thyroid nodules, follows with Dr. Olivares.      Plan:       See Laly in 3 months, no medication changes.  Samples of Jardiance, Entresto and Eliquis given today.  Set up repeat echocardiogram.  She has a repeat CT chest done before December 6.  She will see Dr. Rodriguez December 6.

## 2024-11-18 DIAGNOSIS — M25.562 CHRONIC PAIN OF LEFT KNEE: ICD-10-CM

## 2024-11-18 DIAGNOSIS — G89.29 CHRONIC PAIN OF LEFT KNEE: ICD-10-CM

## 2024-11-18 RX ORDER — HYDROCODONE BITARTRATE AND ACETAMINOPHEN 5; 325 MG/1; MG/1
1 TABLET ORAL EVERY 8 HOURS PRN
Qty: 45 TABLET | Refills: 0 | Status: SHIPPED | OUTPATIENT
Start: 2024-11-18

## 2024-11-19 ENCOUNTER — TELEPHONE (OUTPATIENT)
Dept: CARDIOLOGY | Facility: CLINIC | Age: 69
End: 2024-11-19

## 2024-11-19 NOTE — TELEPHONE ENCOUNTER
Caller: OLIVE MYERS    Relationship:PATIENT    Callback number: [phone]   Is it ok to leave a message: [x] Yes [] No    Requested medication for samples: JARDIANCE 10 MG SAMPLES, AND ENTRESTO 24-26 MG SAMPLES    How much medication does the patient currently have left: OUT OF JARDIANCE MEDICATION AND 5 DAYS OF ENTRESTO 24-26 MG MEDICATION    Who will be picking up the samples: OLIVE MYERS OR TERRY BASSETT    Do you need information about patient financial assistance for this medication: [] Yes [x] No    Additional details provided: PATIENT WOULD LIKE TO PICK THE SAMPLES UP AT THE [facility]  LOCATION AND SHE NEEDS TO KNOW WHEN [facility] OFFICE IS OPEN. PLEASE CONTACT PATIENT TO ADVISE. THANK YOU.

## 2024-11-22 ENCOUNTER — HOSPITAL ENCOUNTER (OUTPATIENT)
Dept: CARDIOLOGY | Facility: HOSPITAL | Age: 69
Discharge: HOME | End: 2024-11-22
Payer: MEDICARE

## 2024-11-22 ENCOUNTER — TELEPHONE (OUTPATIENT)
Dept: CARDIOLOGY | Facility: CLINIC | Age: 69
End: 2024-11-22
Payer: MEDICARE

## 2024-11-22 VITALS
SYSTOLIC BLOOD PRESSURE: 110 MMHG | HEIGHT: 67 IN | BODY MASS INDEX: 32.02 KG/M2 | WEIGHT: 204 LBS | DIASTOLIC BLOOD PRESSURE: 60 MMHG | HEART RATE: 93 BPM

## 2024-11-22 DIAGNOSIS — I51.3 RIGHT VENTRICULAR THROMBUS: ICD-10-CM

## 2024-11-22 DIAGNOSIS — I26.94 MULTIPLE SUBSEGMENTAL PULMONARY EMBOLI WITHOUT ACUTE COR PULMONALE: ICD-10-CM

## 2024-11-22 DIAGNOSIS — I25.10 CHRONIC CORONARY ARTERY DISEASE: ICD-10-CM

## 2024-11-22 DIAGNOSIS — I50.32 CHRONIC DIASTOLIC CONGESTIVE HEART FAILURE: ICD-10-CM

## 2024-11-22 LAB
AORTIC ARCH: 2.4 CM
AORTIC DIMENSIONLESS INDEX: 0.67 (DI)
ASCENDING AORTA: 3.3 CM
BH CV ECHO MEAS - ACS: 1.85 CM
BH CV ECHO MEAS - AI P1/2T: 508.1 MSEC
BH CV ECHO MEAS - AO MAX PG: 5.1 MMHG
BH CV ECHO MEAS - AO MEAN PG: 3 MMHG
BH CV ECHO MEAS - AO ROOT DIAM: 3.4 CM
BH CV ECHO MEAS - AO V2 MAX: 113 CM/SEC
BH CV ECHO MEAS - AO V2 VTI: 24.1 CM
BH CV ECHO MEAS - AVA(I,D): 2.6 CM2
BH CV ECHO MEAS - EDV(CUBED): 82.2 ML
BH CV ECHO MEAS - EDV(MOD-SP2): 132 ML
BH CV ECHO MEAS - EDV(MOD-SP4): 110 ML
BH CV ECHO MEAS - EF(MOD-BP): 49.1 %
BH CV ECHO MEAS - EF(MOD-SP2): 53.8 %
BH CV ECHO MEAS - EF(MOD-SP4): 50.9 %
BH CV ECHO MEAS - ESV(CUBED): 26.3 ML
BH CV ECHO MEAS - ESV(MOD-SP2): 61 ML
BH CV ECHO MEAS - ESV(MOD-SP4): 54 ML
BH CV ECHO MEAS - FS: 31.6 %
BH CV ECHO MEAS - IVS/LVPW: 1.07 CM
BH CV ECHO MEAS - IVSD: 1 CM
BH CV ECHO MEAS - LAT PEAK E' VEL: 6.3 CM/SEC
BH CV ECHO MEAS - LV DIASTOLIC VOL/BSA (35-75): 53.9 CM2
BH CV ECHO MEAS - LV MASS(C)D: 138.1 GRAMS
BH CV ECHO MEAS - LV MAX PG: 2.33 MMHG
BH CV ECHO MEAS - LV MEAN PG: 1 MMHG
BH CV ECHO MEAS - LV SYSTOLIC VOL/BSA (12-30): 26.5 CM2
BH CV ECHO MEAS - LV V1 MAX: 76.3 CM/SEC
BH CV ECHO MEAS - LV V1 VTI: 16.1 CM
BH CV ECHO MEAS - LVIDD: 4.3 CM
BH CV ECHO MEAS - LVIDS: 3 CM
BH CV ECHO MEAS - LVOT AREA: 3.9 CM2
BH CV ECHO MEAS - LVOT DIAM: 2.22 CM
BH CV ECHO MEAS - LVPWD: 0.93 CM
BH CV ECHO MEAS - MED PEAK E' VEL: 4.2 CM/SEC
BH CV ECHO MEAS - MV A DUR: 0.12 SEC
BH CV ECHO MEAS - MV A MAX VEL: 86.4 CM/SEC
BH CV ECHO MEAS - MV DEC SLOPE: 404.6 CM/SEC2
BH CV ECHO MEAS - MV DEC TIME: 0.14 SEC
BH CV ECHO MEAS - MV E MAX VEL: 48.7 CM/SEC
BH CV ECHO MEAS - MV E/A: 0.56
BH CV ECHO MEAS - MV MAX PG: 3 MMHG
BH CV ECHO MEAS - MV MEAN PG: 1.42 MMHG
BH CV ECHO MEAS - MV P1/2T: 38.7 MSEC
BH CV ECHO MEAS - MV V2 VTI: 19.1 CM
BH CV ECHO MEAS - MVA(P1/2T): 5.7 CM2
BH CV ECHO MEAS - MVA(VTI): 3.3 CM2
BH CV ECHO MEAS - PA ACC TIME: 0.11 SEC
BH CV ECHO MEAS - PA V2 MAX: 76 CM/SEC
BH CV ECHO MEAS - PI END-D VEL: 78.2 CM/SEC
BH CV ECHO MEAS - PULM A REVS DUR: 0.11 SEC
BH CV ECHO MEAS - PULM A REVS VEL: 25.3 CM/SEC
BH CV ECHO MEAS - PULM DIAS VEL: 23.1 CM/SEC
BH CV ECHO MEAS - PULM S/D: 2.28
BH CV ECHO MEAS - PULM SYS VEL: 52.7 CM/SEC
BH CV ECHO MEAS - QP/QS: 0.55
BH CV ECHO MEAS - RV MAX PG: 1.06 MMHG
BH CV ECHO MEAS - RV V1 MAX: 51.4 CM/SEC
BH CV ECHO MEAS - RV V1 VTI: 8.1 CM
BH CV ECHO MEAS - RVOT DIAM: 2.33 CM
BH CV ECHO MEAS - SUP REN AO DIAM: 1.6 CM
BH CV ECHO MEAS - SV(LVOT): 62.1 ML
BH CV ECHO MEAS - SV(MOD-SP2): 71 ML
BH CV ECHO MEAS - SV(MOD-SP4): 56 ML
BH CV ECHO MEAS - SV(RVOT): 34.4 ML
BH CV ECHO MEAS - SVI(LVOT): 30.5 ML/M2
BH CV ECHO MEAS - SVI(MOD-SP2): 34.8 ML/M2
BH CV ECHO MEAS - SVI(MOD-SP4): 27.5 ML/M2
BH CV ECHO MEAS - TAPSE (>1.6): 1.29 CM
BH CV ECHO MEAS - TR MAX PG: 7.4 MMHG
BH CV ECHO MEAS - TR MAX VEL: 136.3 CM/SEC
BH CV ECHO MEASUREMENTS AVERAGE E/E' RATIO: 9.28
BH CV XLRA - RV BASE: 2.8 CM
BH CV XLRA - RV LENGTH: 6.6 CM
BH CV XLRA - RV MID: 2.35 CM
BH CV XLRA - TDI S': 8.5 CM/SEC
LEFT ATRIUM VOLUME INDEX: 23.2 ML/M2
SINUS: 3.4 CM
STJ: 2.48 CM

## 2024-11-22 PROCEDURE — 93306 TTE W/DOPPLER COMPLETE: CPT

## 2024-11-22 PROCEDURE — 25510000001 PERFLUTREN 6.52 MG/ML SUSPENSION 2 ML VIAL: Performed by: INTERNAL MEDICINE

## 2024-11-22 PROCEDURE — 93356 MYOCRD STRAIN IMG SPCKL TRCK: CPT

## 2024-11-22 RX ADMIN — PERFLUTREN 2.5 ML: 6.52 INJECTION, SUSPENSION INTRAVENOUS at 09:05

## 2024-11-22 NOTE — TELEPHONE ENCOUNTER
Called and left VM, will continue to try to reach pt.    HUB- please put patient straight through to triage    Haley Haskins, RN  Triage RN  11/22/24 12:48 EST

## 2024-11-22 NOTE — TELEPHONE ENCOUNTER
Notified patient of results/recommendations. Patient is requesting that you call her directly.     Angelina Chinchilla RN  Triage [facility]

## 2024-11-26 ENCOUNTER — HOSPITAL ENCOUNTER (OUTPATIENT)
Dept: CT IMAGING | Facility: HOSPITAL | Age: 69
Discharge: HOME | End: 2024-11-26
Admitting: INTERNAL MEDICINE
Payer: MEDICARE

## 2024-11-26 DIAGNOSIS — U07.1 PULMONARY EMBOLISM ASSOCIATED WITH COVID-19: ICD-10-CM

## 2024-11-26 DIAGNOSIS — I26.99 PULMONARY EMBOLISM ASSOCIATED WITH COVID-19: ICD-10-CM

## 2024-11-26 PROCEDURE — 71275 CT ANGIOGRAPHY CHEST: CPT

## 2024-11-26 PROCEDURE — 25510000001 IOPAMIDOL PER 1 ML: Performed by: INTERNAL MEDICINE

## 2024-11-26 RX ORDER — IOPAMIDOL 755 MG/ML
100 INJECTION, SOLUTION INTRAVASCULAR
Status: COMPLETED | OUTPATIENT
Start: 2024-11-26 | End: 2024-11-26

## 2024-11-26 RX ADMIN — IOPAMIDOL 95 ML: 755 INJECTION, SOLUTION INTRAVENOUS at 10:31

## 2024-12-05 DIAGNOSIS — E03.9 HYPOTHYROIDISM, UNSPECIFIED TYPE: ICD-10-CM

## 2024-12-05 DIAGNOSIS — F51.01 PRIMARY INSOMNIA: ICD-10-CM

## 2024-12-06 RX ORDER — ZOLPIDEM TARTRATE 5 MG/1
5 TABLET ORAL NIGHTLY PRN
Qty: 30 TABLET | Refills: 0 | Status: SHIPPED | OUTPATIENT
Start: 2024-12-06

## 2024-12-06 RX ORDER — LEVOTHYROXINE SODIUM 50 UG/1
50 TABLET ORAL DAILY
Qty: 90 TABLET | Refills: 0 | Status: SHIPPED | OUTPATIENT
Start: 2024-12-06

## 2024-12-10 ENCOUNTER — TELEPHONE (OUTPATIENT)
Dept: CARDIOLOGY | Facility: CLINIC | Age: 69
End: 2024-12-10
Payer: MEDICARE

## 2024-12-13 DIAGNOSIS — M25.562 CHRONIC PAIN OF LEFT KNEE: ICD-10-CM

## 2024-12-13 DIAGNOSIS — G89.29 CHRONIC PAIN OF LEFT KNEE: ICD-10-CM

## 2024-12-13 NOTE — TELEPHONE ENCOUNTER
Protocol Failed.     NOV 2/19/2025 (EE)    LOV 11/13/2024 (RM)        Assessment:        Diagnosis Plan   1. Chronic coronary artery disease  Adult Transthoracic Echo Complete W/ Cont if Necessary Per Protocol       2. Benign essential HTN          3. Mixed hyperlipidemia          4. Right ventricular thrombus  Adult Transthoracic Echo Complete W/ Cont if Necessary Per Protocol       5. Multiple subsegmental pulmonary emboli without acute cor pulmonale  Adult Transthoracic Echo Complete W/ Cont if Necessary Per Protocol       6. Chronic diastolic congestive heart failure  Adult Transthoracic Echo Complete W/ Cont if Necessary Per Protocol          Chronic HFpEF, is volume overloaded with leg edema, on torsemide 20 mg daily, prolactin, Entresto and Jardiance.  CAD- h/o LAD stent.  Patent stent on cardiac catheterization 4/2024.  No angina.  COVID-19 infection 3/2021 and 9/2024.  History of DVT and pulmonary embolism 6/2021 and 9/2024 with RV thrombus-both episodes due to COVID.  Repeat echocardiogram looking for RV thrombus and RV function.  Hyperlipidemia, on rosuvastatin.   Hypertension, goal <120/80.   Bilateral carotid artery stenosis.  Cough, seeing Dr. Rodriguez  Sedentary lifestyle, advised exercise daily, she would benefit from pool therapy  Severe fatigue, may be due to meds, may be due to low blood pressure.  I do not want to change her medications though because she has benefited from her current medications.  Thyroid nodules, follows with Dr. Olivares.      Plan:       See Laly in 3 months, no medication changes.  Samples of Jardiance, Entresto and Eliquis given today.  Set up repeat echocardiogram.  She has a repeat CT chest done before December 6.  She will see Dr. Rodriguez December 6.

## 2024-12-13 NOTE — TELEPHONE ENCOUNTER
LAST REFILL - 11/18/24  LAST VISIT - 10/21/24  NEXT VISIT - not scheduled    Patient comment: Zara I really do need to get this refilled.  It barely takes the edge off the pain in my knee and hip.  I’m in PT trying to strengthen them but I’m in so much pain afterwards.  Thanks

## 2024-12-15 DIAGNOSIS — F34.1 DYSTHYMIA: ICD-10-CM

## 2024-12-16 RX ORDER — BUPROPION HYDROCHLORIDE 300 MG/1
300 TABLET ORAL DAILY
Qty: 90 TABLET | Refills: 3 | Status: SHIPPED | OUTPATIENT
Start: 2024-12-16

## 2024-12-16 RX ORDER — SPIRONOLACTONE 25 MG/1
25 TABLET ORAL DAILY
Qty: 90 TABLET | Refills: 3 | Status: SHIPPED | OUTPATIENT
Start: 2024-12-16

## 2024-12-16 RX ORDER — TORSEMIDE 20 MG/1
20 TABLET ORAL DAILY
Start: 2024-12-16

## 2024-12-16 RX ORDER — HYDROCODONE BITARTRATE AND ACETAMINOPHEN 5; 325 MG/1; MG/1
1 TABLET ORAL EVERY 8 HOURS PRN
Qty: 45 TABLET | Refills: 0 | Status: SHIPPED | OUTPATIENT
Start: 2024-12-16

## 2024-12-20 ENCOUNTER — TELEPHONE (OUTPATIENT)
Dept: CARDIOLOGY | Facility: CLINIC | Age: 69
End: 2024-12-20
Payer: MEDICARE

## 2024-12-26 ENCOUNTER — PATIENT MESSAGE (OUTPATIENT)
Dept: CARDIOLOGY | Facility: CLINIC | Age: 69
End: 2024-12-26
Payer: MEDICARE

## 2024-12-26 ENCOUNTER — TELEPHONE (OUTPATIENT)
Dept: FAMILY MEDICINE CLINIC | Facility: CLINIC | Age: 69
End: 2024-12-26

## 2024-12-26 NOTE — TELEPHONE ENCOUNTER
Caller: Barbara Tran    Relationship: Self    Best call back number: [phone]    What was the call regarding: PATIENT WOULD LIKE TO SPEAK WITH ISMA ABOUT HER SYMPTOMS SHE IS SEEING DR. MENDEZ FOR TOMORROW. PLEASE ADVISE ASAP.

## 2024-12-26 NOTE — TELEPHONE ENCOUNTER
Spoke to patient she is having bilateral pain in the back between her waist and bra line painful to the touch and laying on either side increases the pain.  Patient scheduled tomorrow but patient was advised to go to the ER due to symptoms and pain she states she is in

## 2024-12-27 ENCOUNTER — HOSPITAL ENCOUNTER (INPATIENT)
Facility: HOSPITAL | Age: 69
LOS: 2 days | Discharge: HOME HEALTH | End: 2025-01-02
Attending: EMERGENCY MEDICINE | Admitting: INTERNAL MEDICINE
Payer: MEDICARE

## 2024-12-27 ENCOUNTER — APPOINTMENT (OUTPATIENT)
Dept: ULTRASOUND IMAGING | Facility: HOSPITAL | Age: 69
End: 2024-12-27
Payer: MEDICARE

## 2024-12-27 ENCOUNTER — OFFICE VISIT (OUTPATIENT)
Dept: FAMILY MEDICINE CLINIC | Facility: CLINIC | Age: 69
End: 2024-12-27
Payer: MEDICARE

## 2024-12-27 ENCOUNTER — APPOINTMENT (OUTPATIENT)
Dept: GENERAL RADIOLOGY | Facility: HOSPITAL | Age: 69
End: 2024-12-27
Payer: MEDICARE

## 2024-12-27 VITALS
DIASTOLIC BLOOD PRESSURE: 90 MMHG | HEIGHT: 67 IN | SYSTOLIC BLOOD PRESSURE: 134 MMHG | HEART RATE: 100 BPM | RESPIRATION RATE: 18 BRPM | WEIGHT: 204 LBS | OXYGEN SATURATION: 97 % | BODY MASS INDEX: 32.02 KG/M2

## 2024-12-27 DIAGNOSIS — M51.16 DISPLACEMENT OF LUMBAR INTERVERTEBRAL DISC WITH RADICULOPATHY: ICD-10-CM

## 2024-12-27 DIAGNOSIS — R10.9 ABDOMINAL PAIN, UNSPECIFIED ABDOMINAL LOCATION: Primary | ICD-10-CM

## 2024-12-27 DIAGNOSIS — M54.6 THORACIC BACK PAIN, UNSPECIFIED BACK PAIN LATERALITY, UNSPECIFIED CHRONICITY: ICD-10-CM

## 2024-12-27 DIAGNOSIS — M51.360 DEGENERATION OF INTERVERTEBRAL DISC OF LUMBAR REGION WITH DISCOGENIC BACK PAIN: ICD-10-CM

## 2024-12-27 DIAGNOSIS — M43.10 RETROLISTHESIS OF VERTEBRAE: Primary | ICD-10-CM

## 2024-12-27 DIAGNOSIS — J98.11 ATELECTASIS OF LEFT LUNG: ICD-10-CM

## 2024-12-27 DIAGNOSIS — M54.50 LUMBAR BACK PAIN: ICD-10-CM

## 2024-12-27 LAB
ALBUMIN SERPL-MCNC: 4.3 G/DL (ref 3.5–5.2)
ALBUMIN/GLOB SERPL: 1.3 G/DL
ALP SERPL-CCNC: 72 U/L (ref 39–117)
ALT SERPL W P-5'-P-CCNC: 17 U/L (ref 1–33)
ANION GAP SERPL CALCULATED.3IONS-SCNC: 10.4 MMOL/L (ref 5–15)
AST SERPL-CCNC: 17 U/L (ref 1–32)
BASOPHILS # BLD AUTO: 0.03 10*3/MM3 (ref 0–0.2)
BASOPHILS NFR BLD AUTO: 0.3 % (ref 0–1.5)
BILIRUB BLD-MCNC: NEGATIVE MG/DL
BILIRUB SERPL-MCNC: 0.3 MG/DL (ref 0–1.2)
BUN SERPL-MCNC: 18 MG/DL (ref 8–23)
BUN/CREAT SERPL: 25.4 (ref 7–25)
CALCIUM SPEC-SCNC: 9.3 MG/DL (ref 8.6–10.5)
CHLORIDE SERPL-SCNC: 99 MMOL/L (ref 98–107)
CLARITY, POC: CLEAR
CO2 SERPL-SCNC: 26.6 MMOL/L (ref 22–29)
COLOR UR: YELLOW
CREAT SERPL-MCNC: 0.71 MG/DL (ref 0.57–1)
D DIMER PPP FEU-MCNC: 0.39 MCGFEU/ML (ref 0–0.71)
DEPRECATED RDW RBC AUTO: 45.2 FL (ref 37–54)
EGFRCR SERPLBLD CKD-EPI 2021: 91 ML/MIN/1.73
EOSINOPHIL # BLD AUTO: 0.18 10*3/MM3 (ref 0–0.4)
EOSINOPHIL NFR BLD AUTO: 2 % (ref 0.3–6.2)
ERYTHROCYTE [DISTWIDTH] IN BLOOD BY AUTOMATED COUNT: 13 % (ref 12.3–15.4)
GEN 5 1HR TROPONIN T REFLEX: 7 NG/L
GLOBULIN UR ELPH-MCNC: 3.2 GM/DL
GLUCOSE SERPL-MCNC: 138 MG/DL (ref 65–99)
GLUCOSE UR STRIP-MCNC: NEGATIVE MG/DL
HCT VFR BLD AUTO: 48.4 % (ref 34–46.6)
HGB BLD-MCNC: 15.7 G/DL (ref 12–15.9)
IMM GRANULOCYTES # BLD AUTO: 0.01 10*3/MM3 (ref 0–0.05)
IMM GRANULOCYTES NFR BLD AUTO: 0.1 % (ref 0–0.5)
KETONES UR QL: NEGATIVE
LEUKOCYTE EST, POC: ABNORMAL
LYMPHOCYTES # BLD AUTO: 2.7 10*3/MM3 (ref 0.7–3.1)
LYMPHOCYTES NFR BLD AUTO: 30.3 % (ref 19.6–45.3)
MCH RBC QN AUTO: 30.3 PG (ref 26.6–33)
MCHC RBC AUTO-ENTMCNC: 32.4 G/DL (ref 31.5–35.7)
MCV RBC AUTO: 93.3 FL (ref 79–97)
MONOCYTES # BLD AUTO: 0.47 10*3/MM3 (ref 0.1–0.9)
MONOCYTES NFR BLD AUTO: 5.3 % (ref 5–12)
NEUTROPHILS NFR BLD AUTO: 5.52 10*3/MM3 (ref 1.7–7)
NEUTROPHILS NFR BLD AUTO: 62 % (ref 42.7–76)
NITRITE UR-MCNC: NEGATIVE MG/ML
PH UR: 7 [PH] (ref 5–8)
PLATELET # BLD AUTO: 325 10*3/MM3 (ref 140–450)
PMV BLD AUTO: 9.6 FL (ref 6–12)
POTASSIUM SERPL-SCNC: 3.9 MMOL/L (ref 3.5–5.2)
PROT SERPL-MCNC: 7.5 G/DL (ref 6–8.5)
PROT UR STRIP-MCNC: NEGATIVE MG/DL
QT INTERVAL: 392 MS
QTC INTERVAL: 464 MS
RBC # BLD AUTO: 5.19 10*6/MM3 (ref 3.77–5.28)
RBC # UR STRIP: NEGATIVE /UL
SODIUM SERPL-SCNC: 136 MMOL/L (ref 136–145)
SP GR UR: 1.01 (ref 1–1.03)
TROPONIN T NUMERIC DELTA: 0 NG/L
TROPONIN T SERPL HS-MCNC: 7 NG/L
UROBILINOGEN UR QL: NORMAL
WBC NRBC COR # BLD AUTO: 8.91 10*3/MM3 (ref 3.4–10.8)

## 2024-12-27 PROCEDURE — 85025 COMPLETE CBC W/AUTO DIFF WBC: CPT | Performed by: PHYSICIAN ASSISTANT

## 2024-12-27 PROCEDURE — 72072 X-RAY EXAM THORAC SPINE 3VWS: CPT

## 2024-12-27 PROCEDURE — 93971 EXTREMITY STUDY: CPT

## 2024-12-27 PROCEDURE — 36415 COLL VENOUS BLD VENIPUNCTURE: CPT

## 2024-12-27 PROCEDURE — 99285 EMERGENCY DEPT VISIT HI MDM: CPT

## 2024-12-27 PROCEDURE — 72110 X-RAY EXAM L-2 SPINE 4/>VWS: CPT

## 2024-12-27 PROCEDURE — 85379 FIBRIN DEGRADATION QUANT: CPT | Performed by: PHYSICIAN ASSISTANT

## 2024-12-27 PROCEDURE — 99285 EMERGENCY DEPT VISIT HI MDM: CPT | Performed by: PHYSICIAN ASSISTANT

## 2024-12-27 PROCEDURE — 71045 X-RAY EXAM CHEST 1 VIEW: CPT

## 2024-12-27 PROCEDURE — 93005 ELECTROCARDIOGRAM TRACING: CPT | Performed by: PHYSICIAN ASSISTANT

## 2024-12-27 PROCEDURE — 80053 COMPREHEN METABOLIC PANEL: CPT | Performed by: PHYSICIAN ASSISTANT

## 2024-12-27 PROCEDURE — 84484 ASSAY OF TROPONIN QUANT: CPT | Performed by: PHYSICIAN ASSISTANT

## 2024-12-27 PROCEDURE — G0378 HOSPITAL OBSERVATION PER HR: HCPCS

## 2024-12-27 PROCEDURE — 93010 ELECTROCARDIOGRAM REPORT: CPT | Performed by: INTERNAL MEDICINE

## 2024-12-27 RX ORDER — BISACODYL 10 MG
10 SUPPOSITORY, RECTAL RECTAL DAILY PRN
Status: DISCONTINUED | OUTPATIENT
Start: 2024-12-27 | End: 2025-01-02 | Stop reason: HOSPADM

## 2024-12-27 RX ORDER — MAGNESIUM OXIDE 400 MG/1
800 TABLET ORAL 2 TIMES DAILY
Status: DISCONTINUED | OUTPATIENT
Start: 2024-12-28 | End: 2025-01-02 | Stop reason: HOSPADM

## 2024-12-27 RX ORDER — HYDROCODONE BITARTRATE AND ACETAMINOPHEN 5; 325 MG/1; MG/1
1 TABLET ORAL EVERY 8 HOURS PRN
Status: DISCONTINUED | OUTPATIENT
Start: 2024-12-27 | End: 2024-12-28

## 2024-12-27 RX ORDER — BUPROPION HYDROCHLORIDE 300 MG/1
300 TABLET ORAL DAILY
Status: DISCONTINUED | OUTPATIENT
Start: 2024-12-28 | End: 2025-01-02 | Stop reason: HOSPADM

## 2024-12-27 RX ORDER — TORSEMIDE 20 MG/1
20 TABLET ORAL DAILY
Status: DISCONTINUED | OUTPATIENT
Start: 2024-12-28 | End: 2024-12-30

## 2024-12-27 RX ORDER — SPIRONOLACTONE 25 MG/1
25 TABLET ORAL DAILY
Status: DISCONTINUED | OUTPATIENT
Start: 2024-12-28 | End: 2025-01-02 | Stop reason: HOSPADM

## 2024-12-27 RX ORDER — AMOXICILLIN 250 MG
2 CAPSULE ORAL 2 TIMES DAILY PRN
Status: DISCONTINUED | OUTPATIENT
Start: 2024-12-27 | End: 2025-01-02 | Stop reason: HOSPADM

## 2024-12-27 RX ORDER — SODIUM CHLORIDE 9 MG/ML
40 INJECTION, SOLUTION INTRAVENOUS AS NEEDED
Status: DISCONTINUED | OUTPATIENT
Start: 2024-12-27 | End: 2025-01-02 | Stop reason: HOSPADM

## 2024-12-27 RX ORDER — BISACODYL 5 MG/1
5 TABLET, DELAYED RELEASE ORAL DAILY PRN
Status: DISCONTINUED | OUTPATIENT
Start: 2024-12-27 | End: 2025-01-02 | Stop reason: HOSPADM

## 2024-12-27 RX ORDER — ONDANSETRON 2 MG/ML
4 INJECTION INTRAMUSCULAR; INTRAVENOUS EVERY 6 HOURS PRN
Status: DISCONTINUED | OUTPATIENT
Start: 2024-12-27 | End: 2025-01-02 | Stop reason: HOSPADM

## 2024-12-27 RX ORDER — ROSUVASTATIN CALCIUM 20 MG/1
40 TABLET, COATED ORAL NIGHTLY
Status: DISCONTINUED | OUTPATIENT
Start: 2024-12-28 | End: 2025-01-02 | Stop reason: HOSPADM

## 2024-12-27 RX ORDER — ACETAMINOPHEN 325 MG/1
650 TABLET ORAL EVERY 4 HOURS PRN
Status: DISCONTINUED | OUTPATIENT
Start: 2024-12-27 | End: 2025-01-02 | Stop reason: HOSPADM

## 2024-12-27 RX ORDER — HYDROMORPHONE HYDROCHLORIDE 1 MG/ML
0.5 INJECTION, SOLUTION INTRAMUSCULAR; INTRAVENOUS; SUBCUTANEOUS ONCE
Status: DISCONTINUED | OUTPATIENT
Start: 2024-12-27 | End: 2024-12-27

## 2024-12-27 RX ORDER — LEVOTHYROXINE SODIUM 50 UG/1
50 TABLET ORAL
Status: DISCONTINUED | OUTPATIENT
Start: 2024-12-28 | End: 2025-01-02 | Stop reason: HOSPADM

## 2024-12-27 RX ORDER — ZOLPIDEM TARTRATE 5 MG/1
5 TABLET ORAL NIGHTLY PRN
Status: DISCONTINUED | OUTPATIENT
Start: 2024-12-27 | End: 2025-01-02 | Stop reason: HOSPADM

## 2024-12-27 RX ORDER — POLYETHYLENE GLYCOL 3350 17 G/17G
17 POWDER, FOR SOLUTION ORAL DAILY PRN
Status: DISCONTINUED | OUTPATIENT
Start: 2024-12-27 | End: 2025-01-02 | Stop reason: HOSPADM

## 2024-12-27 RX ORDER — SODIUM CHLORIDE 0.9 % (FLUSH) 0.9 %
10 SYRINGE (ML) INJECTION AS NEEDED
Status: DISCONTINUED | OUTPATIENT
Start: 2024-12-27 | End: 2025-01-02 | Stop reason: HOSPADM

## 2024-12-27 RX ORDER — SODIUM CHLORIDE 0.9 % (FLUSH) 0.9 %
10 SYRINGE (ML) INJECTION EVERY 12 HOURS SCHEDULED
Status: DISCONTINUED | OUTPATIENT
Start: 2024-12-28 | End: 2025-01-02 | Stop reason: HOSPADM

## 2024-12-27 RX ORDER — LIDOCAINE 4 G/G
2 PATCH TOPICAL
Status: DISCONTINUED | OUTPATIENT
Start: 2024-12-28 | End: 2025-01-02 | Stop reason: HOSPADM

## 2024-12-27 RX ORDER — HYDROCODONE BITARTRATE AND ACETAMINOPHEN 7.5; 325 MG/1; MG/1
1 TABLET ORAL EVERY 6 HOURS PRN
Status: DISCONTINUED | OUTPATIENT
Start: 2024-12-27 | End: 2024-12-27

## 2024-12-27 RX ORDER — HYDROCODONE BITARTRATE AND ACETAMINOPHEN 5; 325 MG/1; MG/1
1 TABLET ORAL ONCE AS NEEDED
Status: DISCONTINUED | OUTPATIENT
Start: 2024-12-27 | End: 2024-12-27

## 2024-12-27 RX ADMIN — HYDROCODONE BITARTRATE AND ACETAMINOPHEN 1 TABLET: 5; 325 TABLET ORAL at 21:10

## 2024-12-27 NOTE — FSED PROVIDER NOTE
Subjective   History of Present Illness    Patient, history of DVT, PE, coronary artery disease, is complaining of mid and low back pain which started 6 days ago and has gradually increased in pain since then.  Back pain is worse with any type of movement which includes bending, twisting, standing.  She rates her back pain a 10 out of 10 pain scale.  In addition, patient has had shortness of breath for the past 3 months when she was diagnosed with COVID and influenza and the shortness of breath got worse today.    Review of Systems   Constitutional:  Negative for activity change and appetite change.   Eyes:  Negative for pain.   Respiratory:  Negative for shortness of breath.    Gastrointestinal:  Negative for nausea and vomiting.   Musculoskeletal:  Positive for back pain. Negative for arthralgias.   Skin:  Negative for color change.   Neurological:  Negative for dizziness.   All other systems reviewed and are negative.      Past Medical History:   Diagnosis Date    Allergic 2015    codeine, morphine, levaquin    Arthritis l5 years or so ago    Asthma 9-    Basal cell carcinoma     CAD (coronary artery disease)     Cataract 6-9 months ago    Need surgery    CHF (congestive heart failure)     Coronary artery disease 2005 stent    COVID-19 virus detected 03/10/2021    Deep vein thrombosis 06/03/2021    Pulmonary embolism    Depression     Disease of thyroid gland     Headache Covid 3-6-21    Has continued    History of pulmonary embolism 06/01/2021    History of urinary tract infection     HL (hearing loss) Last 4-6 months    Hyperlipidemia     Hypertension since 2005    Hypothyroidism since 2012    Multinodular goiter     Obesity     Osteopenia last few years    Osteoporosis     Pulmonary embolism 06/03/2021    From Covid    Pulmonic valve regurgitation     Tricuspid valve regurgitation     Unstable angina 04/16/2024       Allergies   Allergen Reactions    Codeine Dizziness     lightheaded    Levofloxacin  Itching    Morphine Itching       Past Surgical History:   Procedure Laterality Date    BUNIONECTOMY Bilateral     HAMMERT TOE REPAIR/BUNIONECTOMY    CARDIAC CATHETERIZATION  ,     CARDIAC CATHETERIZATION N/A 2024    Procedure: Coronary angiography;  Surgeon: Cong Rivera MD;  Location:  [facility];  Service: Cardiovascular;  Laterality: N/A;    CARDIAC CATHETERIZATION N/A 2024    Procedure: Left Heart Cath;  Surgeon: Cong Rivera MD;  Location:  [facility];  Service: Cardiovascular;  Laterality: N/A;    CARDIAC CATHETERIZATION N/A 2024    Procedure: Left ventriculography;  Surgeon: Cong Rivera MD;  Location:  [facility];  Service: Cardiovascular;  Laterality: N/A;    CATARACT EXTRACTION      CHOLECYSTECTOMY      COLONOSCOPY N/A 2016    Procedure: COLONOSCOPY WITH COLD BIOPSIES;  Surgeon: Medina Guillen MD;  Location: [facility] ENDOSCOPY;  Service:     CORONARY STENT PLACEMENT      LAD 80% blockage    CYST REMOVAL      GANGLION CYST EXCISION      R WRIST    SKIN BIOPSY      SUBTOTAL HYSTERECTOMY      THYROID BIOPSY  2014    TONSILLECTOMY  1965    TUBAL ABDOMINAL LIGATION  1985       Family History   Problem Relation Age of Onset    Heart disease Mother             Heart disease Father             Heart attack Father     Diabetes Sister     Heart disease Sister 69            Heart disease Brother         open heart-bypass    Cancer Brother         Bladder/Rodney    Arthritis Brother     Heart disease Brother 62            Heart attack Brother             Arthritis Son     No Known Problems Maternal Aunt     No Known Problems Maternal Uncle     No Known Problems Paternal Aunt     No Known Problems Paternal Uncle     No Known Problems Maternal Grandmother     No Known Problems Maternal Grandfather     No Known Problems Paternal Grandmother     No Known Problems Paternal  Grandfather     Celiac disease Neg Hx     Cirrhosis Neg Hx     Colon cancer Neg Hx     Colon polyps Neg Hx     Crohn's disease Neg Hx     Cystic fibrosis Neg Hx     Esophageal cancer Neg Hx     Hemochromatosis Neg Hx     Inflammatory bowel disease Neg Hx     Irritable bowel syndrome Neg Hx     Liver cancer Neg Hx     Liver disease Neg Hx     Rectal cancer Neg Hx     Stomach cancer Neg Hx     Ulcerative colitis Neg Hx     Dante's disease Neg Hx     Alcohol abuse Neg Hx     Pancreatitis Neg Hx        Social History     Socioeconomic History    Marital status: Single   Tobacco Use    Smoking status: Never     Passive exposure: Never    Smokeless tobacco: Never    Tobacco comments:     N/A   Vaping Use    Vaping status: Never Used   Substance and Sexual Activity    Alcohol use: Yes     Alcohol/week: 2.0 standard drinks of alcohol     Types: 1 Glasses of wine, 1 Cans of beer per week     Comment: socially    Drug use: Never    Sexual activity: Not Currently     Partners: Male     Birth control/protection: None           Objective   Physical Exam  Vitals and nursing note reviewed.   Constitutional:       Appearance: Normal appearance. She is normal weight.   HENT:      Head: Normocephalic and atraumatic.      Nose: Nose normal.      Mouth/Throat:      Mouth: Mucous membranes are moist.   Eyes:      Pupils: Pupils are equal, round, and reactive to light.   Cardiovascular:      Rate and Rhythm: Normal rate and regular rhythm.      Pulses: Normal pulses.      Heart sounds: Normal heart sounds.   Pulmonary:      Effort: Pulmonary effort is normal.      Breath sounds: Normal breath sounds.   Musculoskeletal:         General: Normal range of motion.      Cervical back: Normal range of motion.      Right lower leg: No edema.      Left lower leg: No edema.      Comments: Thoracic and lumbar spine: Left and right paraspinal tenderness, no midline tenderness    No saddle anesthesia   Skin:     General: Skin is warm.    Neurological:      General: No focal deficit present.      Mental Status: She is alert.   Psychiatric:         Behavior: Behavior is cooperative.         Procedures           ED Course  ED Course as of 12/27/24 2156   Fri Dec 27, 2024   2036 Discussed with STEVEN Cunningham in the observation unit.  Agrees admit patient and all questions addressed at this time. [SS]      ED Course User Index  [SS] Desiree Mulligan PA-C                                           Medical Decision Making      Final diagnoses:   Retrolisthesis of vertebrae   Lumbar back pain   Thoracic back pain, unspecified back pain laterality, unspecified chronicity   Atelectasis of left lung       ED Disposition  ED Disposition       ED Disposition   Decision to Admit    Condition   --    Comment   Level of Care: Observation Unit [28]   Diagnosis: Retrolisthesis of vertebrae [042742]   Admitting Physician: FORTUNATO GEORGE [[id number]   Attending Physician: FORTUNATO GEORGE [[id number]]                 No follow-up provider specified.       Medication List      No changes were made to your prescriptions during this visit.

## 2024-12-27 NOTE — PROGRESS NOTES
Assessment & Plan          Back pain.  The patient reports severe pain localized above the waist and under the bra line, extending to the back, which worsens with deep breaths, movement, and coughing. The pain started on Sunday, initially on the left side, and has progressively worsened, now affecting both sides. She visited the ER at [facility] last night but left due to long wait times. The pain is tender to touch and has caused significant sleep disturbance. A urine test was conducted, showing no significant findings.     Her pain is described as 10/10, non-radiating but she has now developed a pleuritic component and is unable to take a deep breath. I advised her to return to the ER as she needs urgent imaging with the severity of her pain and her underlying medical problems. I offered to call EMS but she refused. She does not have anyone with her.      Patient was given instructions and counseling regarding her condition or for health maintenance advice. Please see specific information pulled into the AVS if appropriate.          Barbara is a 69 y.o. being seen today for  Abdominal Pain (X Sunday, no known injury. Patient went to ER but was not seen due to long wait.)   HISTORY    Back Pain  This is a new problem. The current episode started in the past 7 days. The problem occurs constantly. The problem has been worsening since onset. The pain is present in the lumbar spine. The quality of the pain is described as aching, shooting and stabbing. The pain does not radiate. The pain is at a severity of 10/10. The pain is The same all the time. The symptoms are aggravated by bending, coughing, position, lying down, sitting, standing and twisting. Stiffness is present In the morning, at night and all day. Associated symptoms include paresthesias and weakness. Pertinent negatives include no abdominal pain, bladder incontinence, bowel incontinence, chest pain, dysuria or fever. Risk factors include history of  osteoporosis, lack of exercise, obesity and sedentary lifestyle.   Additional comments: Very tender to touch above wrist on both right and left side. Very painful when I cough, twist to reach, bend over or go to set down or get up from a chair.  When lying down no matter what position I’m in it’s is constant pain.        The patient is a 69-year-old female who presents for evaluation of back pain.    She reports experiencing severe pain localized to the area above her waist and under her bra line, extending to her back. The onset of the pain was noted on Sunday, initially on the left side, and has since progressively worsened. The pain intensifies during deep inhalation, movement such as standing up from a seated position, bending over, or putting on pants. It also exacerbates with coughing. She has not identified any specific triggers for the pain. The pain appears to be radiating upwards along her back. She sought medical attention at [facility] last night but did not receive any treatment. Additionally, she reports difficulty in getting in and out of the car due to the pain. She has been experiencing sleep disturbances for the past 3 days, managing only half an hour of sleep at a time. The pain is so severe that it prevents her from wearing a bra and is sensitive to touch.     Social History  She  reports that she has never smoked. She has never been exposed to tobacco smoke. She has never used smokeless tobacco. She reports current alcohol use of about 2.0 standard drinks of alcohol per week. She reports that she does not use drugs.  EXAM DATA    Vital Signs        BP Readings from Last 1 Encounters:   12/27/24 134/90     Wt Readings from Last 3 Encounters:   12/27/24 92.5 kg (204 lb)   11/22/24 92.5 kg (204 lb)   11/13/24 92.5 kg (204 lb)   Body mass index is 31.95 kg/m².  Physical Exam  Vitals reviewed.   Constitutional:       General: She is in acute distress.      Appearance: Normal appearance. She is  well-developed. She is ill-appearing.   Chest:      Chest wall: Tenderness (severe pain with palpation of the upper flank, bilaterally) present.   Musculoskeletal:         General: No swelling.      Comments: No bruising, no erythema, bilaterally tender, no rash   Neurological:      Mental Status: She is alert.   Psychiatric:         Behavior: Behavior normal.         Thought Content: Thought content normal.         Judgment: Judgment normal.        Results for orders placed or performed in visit on 12/27/24   POC Urinalysis Dipstick    Collection Time: 12/27/24  2:11 PM    Specimen: Urine   Result Value Ref Range    Color Yellow Yellow, Straw, Dark Yellow, Sangeetha    Clarity, UA Clear Clear    Glucose, UA Negative Negative mg/dL    Bilirubin Negative Negative    Ketones, UA Negative Negative    Specific Gravity  1.010 1.005 - 1.030    Blood, UA Negative Negative    pH, Urine 7.0 5.0 - 8.0    Protein, POC Negative Negative mg/dL    Urobilinogen, UA Normal Normal, 0.2 E.U./dL    Leukocytes Small (1+) (A) Negative    Nitrite, UA Negative Negative                Patient or patient representative verbalized consent for the use of Ambient Listening during the visit with  Millicent Ace MD for chart documentation. 12/27/2024  15:58 EST

## 2024-12-28 ENCOUNTER — APPOINTMENT (OUTPATIENT)
Dept: MRI IMAGING | Facility: HOSPITAL | Age: 69
End: 2024-12-28
Payer: MEDICARE

## 2024-12-28 ENCOUNTER — APPOINTMENT (OUTPATIENT)
Dept: CT IMAGING | Facility: HOSPITAL | Age: 69
End: 2024-12-28
Payer: MEDICARE

## 2024-12-28 LAB
ANION GAP SERPL CALCULATED.3IONS-SCNC: 9.6 MMOL/L (ref 5–15)
B PARAPERT DNA SPEC QL NAA+PROBE: NOT DETECTED
B PERT DNA SPEC QL NAA+PROBE: NOT DETECTED
BACTERIA UR QL AUTO: NORMAL /HPF
BILIRUB UR QL STRIP: NEGATIVE
BUN SERPL-MCNC: 14 MG/DL (ref 8–23)
BUN/CREAT SERPL: 20 (ref 7–25)
C PNEUM DNA NPH QL NAA+NON-PROBE: NOT DETECTED
CALCIUM SPEC-SCNC: 8.5 MG/DL (ref 8.6–10.5)
CHLORIDE SERPL-SCNC: 103 MMOL/L (ref 98–107)
CHOLEST SERPL-MCNC: 134 MG/DL (ref 0–200)
CLARITY UR: CLEAR
CO2 SERPL-SCNC: 25.4 MMOL/L (ref 22–29)
COLOR UR: YELLOW
CREAT SERPL-MCNC: 0.7 MG/DL (ref 0.57–1)
D-LACTATE SERPL-SCNC: 1.2 MMOL/L (ref 0.5–2)
DEPRECATED RDW RBC AUTO: 38.5 FL (ref 37–54)
EGFRCR SERPLBLD CKD-EPI 2021: 92.6 ML/MIN/1.73
ERYTHROCYTE [DISTWIDTH] IN BLOOD BY AUTOMATED COUNT: 11.6 % (ref 12.3–15.4)
FLUAV SUBTYP SPEC NAA+PROBE: NOT DETECTED
FLUBV RNA ISLT QL NAA+PROBE: NOT DETECTED
GLUCOSE SERPL-MCNC: 121 MG/DL (ref 65–99)
GLUCOSE UR STRIP-MCNC: ABNORMAL MG/DL
HADV DNA SPEC NAA+PROBE: NOT DETECTED
HCOV 229E RNA SPEC QL NAA+PROBE: NOT DETECTED
HCOV HKU1 RNA SPEC QL NAA+PROBE: NOT DETECTED
HCOV NL63 RNA SPEC QL NAA+PROBE: NOT DETECTED
HCOV OC43 RNA SPEC QL NAA+PROBE: NOT DETECTED
HCT VFR BLD AUTO: 42.9 % (ref 34–46.6)
HDLC SERPL-MCNC: 44 MG/DL (ref 40–60)
HGB BLD-MCNC: 14.4 G/DL (ref 12–15.9)
HGB UR QL STRIP.AUTO: NEGATIVE
HMPV RNA NPH QL NAA+NON-PROBE: NOT DETECTED
HPIV1 RNA ISLT QL NAA+PROBE: NOT DETECTED
HPIV2 RNA SPEC QL NAA+PROBE: NOT DETECTED
HPIV3 RNA NPH QL NAA+PROBE: NOT DETECTED
HPIV4 P GENE NPH QL NAA+PROBE: NOT DETECTED
HYALINE CASTS UR QL AUTO: NORMAL /LPF
KETONES UR QL STRIP: NEGATIVE
LDLC SERPL CALC-MCNC: 71 MG/DL (ref 0–100)
LDLC/HDLC SERPL: 1.58 {RATIO}
LEUKOCYTE ESTERASE UR QL STRIP.AUTO: ABNORMAL
M PNEUMO IGG SER IA-ACNC: NOT DETECTED
MCH RBC QN AUTO: 30.4 PG (ref 26.6–33)
MCHC RBC AUTO-ENTMCNC: 33.6 G/DL (ref 31.5–35.7)
MCV RBC AUTO: 90.7 FL (ref 79–97)
NITRITE UR QL STRIP: NEGATIVE
PH UR STRIP.AUTO: 7 [PH] (ref 5–8)
PLATELET # BLD AUTO: 295 10*3/MM3 (ref 140–450)
PMV BLD AUTO: 9.9 FL (ref 6–12)
POTASSIUM SERPL-SCNC: 3.8 MMOL/L (ref 3.5–5.2)
PROCALCITONIN SERPL-MCNC: 0.05 NG/ML (ref 0–0.25)
PROT UR QL STRIP: NEGATIVE
RBC # BLD AUTO: 4.73 10*6/MM3 (ref 3.77–5.28)
RBC # UR STRIP: NORMAL /HPF
REF LAB TEST METHOD: NORMAL
RHINOVIRUS RNA SPEC NAA+PROBE: NOT DETECTED
RSV RNA NPH QL NAA+NON-PROBE: NOT DETECTED
SARS-COV-2 RNA NPH QL NAA+NON-PROBE: NOT DETECTED
SODIUM SERPL-SCNC: 138 MMOL/L (ref 136–145)
SP GR UR STRIP: 1.01 (ref 1–1.03)
SQUAMOUS #/AREA URNS HPF: NORMAL /HPF
TRIGL SERPL-MCNC: 103 MG/DL (ref 0–150)
UROBILINOGEN UR QL STRIP: ABNORMAL
VLDLC SERPL-MCNC: 19 MG/DL (ref 5–40)
WBC # UR STRIP: NORMAL /HPF
WBC NRBC COR # BLD AUTO: 6.97 10*3/MM3 (ref 3.4–10.8)

## 2024-12-28 PROCEDURE — G0378 HOSPITAL OBSERVATION PER HR: HCPCS

## 2024-12-28 PROCEDURE — 83605 ASSAY OF LACTIC ACID: CPT | Performed by: PHYSICIAN ASSISTANT

## 2024-12-28 PROCEDURE — 85027 COMPLETE CBC AUTOMATED: CPT | Performed by: PHYSICIAN ASSISTANT

## 2024-12-28 PROCEDURE — 84145 PROCALCITONIN (PCT): CPT | Performed by: PHYSICIAN ASSISTANT

## 2024-12-28 PROCEDURE — 72146 MRI CHEST SPINE W/O DYE: CPT

## 2024-12-28 PROCEDURE — 0202U NFCT DS 22 TRGT SARS-COV-2: CPT | Performed by: PHYSICIAN ASSISTANT

## 2024-12-28 PROCEDURE — 25010000002 ENOXAPARIN PER 10 MG: Performed by: PHYSICIAN ASSISTANT

## 2024-12-28 PROCEDURE — 25010000002 DEXAMETHASONE PER 1 MG: Performed by: EMERGENCY MEDICINE

## 2024-12-28 PROCEDURE — 81001 URINALYSIS AUTO W/SCOPE: CPT | Performed by: PHYSICIAN ASSISTANT

## 2024-12-28 PROCEDURE — 99214 OFFICE O/P EST MOD 30 MIN: CPT | Performed by: NURSE PRACTITIONER

## 2024-12-28 PROCEDURE — 97116 GAIT TRAINING THERAPY: CPT

## 2024-12-28 PROCEDURE — 71250 CT THORAX DX C-: CPT

## 2024-12-28 PROCEDURE — 72148 MRI LUMBAR SPINE W/O DYE: CPT

## 2024-12-28 PROCEDURE — 80061 LIPID PANEL: CPT | Performed by: PHYSICIAN ASSISTANT

## 2024-12-28 PROCEDURE — 80048 BASIC METABOLIC PNL TOTAL CA: CPT | Performed by: PHYSICIAN ASSISTANT

## 2024-12-28 PROCEDURE — 74176 CT ABD & PELVIS W/O CONTRAST: CPT

## 2024-12-28 PROCEDURE — 97162 PT EVAL MOD COMPLEX 30 MIN: CPT

## 2024-12-28 PROCEDURE — 97530 THERAPEUTIC ACTIVITIES: CPT

## 2024-12-28 RX ORDER — HYDROCODONE BITARTRATE AND ACETAMINOPHEN 7.5; 325 MG/1; MG/1
1 TABLET ORAL EVERY 6 HOURS PRN
Status: DISCONTINUED | OUTPATIENT
Start: 2024-12-28 | End: 2025-01-01

## 2024-12-28 RX ORDER — DEXAMETHASONE SODIUM PHOSPHATE 4 MG/ML
4 INJECTION, SOLUTION INTRA-ARTICULAR; INTRALESIONAL; INTRAMUSCULAR; INTRAVENOUS; SOFT TISSUE EVERY 6 HOURS
Status: DISCONTINUED | OUTPATIENT
Start: 2024-12-28 | End: 2025-01-02 | Stop reason: HOSPADM

## 2024-12-28 RX ORDER — METHOCARBAMOL 500 MG/1
500 TABLET, FILM COATED ORAL EVERY 8 HOURS PRN
Status: DISCONTINUED | OUTPATIENT
Start: 2024-12-28 | End: 2024-12-30

## 2024-12-28 RX ORDER — ENOXAPARIN SODIUM 100 MG/ML
1 INJECTION SUBCUTANEOUS 2 TIMES DAILY
Status: DISCONTINUED | OUTPATIENT
Start: 2024-12-28 | End: 2025-01-02

## 2024-12-28 RX ADMIN — HYDROCODONE BITARTRATE AND ACETAMINOPHEN 1 TABLET: 7.5; 325 TABLET ORAL at 03:35

## 2024-12-28 RX ADMIN — SACUBITRIL AND VALSARTAN 1 TABLET: 24; 26 TABLET, FILM COATED ORAL at 09:51

## 2024-12-28 RX ADMIN — DEXAMETHASONE SODIUM PHOSPHATE 4 MG: 4 INJECTION, SOLUTION INTRAMUSCULAR; INTRAVENOUS at 16:50

## 2024-12-28 RX ADMIN — LIDOCAINE 2 PATCH: 4 PATCH TOPICAL at 03:35

## 2024-12-28 RX ADMIN — APIXABAN 5 MG: 5 TABLET, FILM COATED ORAL at 00:19

## 2024-12-28 RX ADMIN — Medication 10 ML: at 20:56

## 2024-12-28 RX ADMIN — Medication 10 ML: at 00:33

## 2024-12-28 RX ADMIN — ROSUVASTATIN CALCIUM 40 MG: 40 TABLET, FILM COATED ORAL at 00:19

## 2024-12-28 RX ADMIN — APIXABAN 5 MG: 5 TABLET, FILM COATED ORAL at 09:51

## 2024-12-28 RX ADMIN — SACUBITRIL AND VALSARTAN 1 TABLET: 24; 26 TABLET, FILM COATED ORAL at 00:19

## 2024-12-28 RX ADMIN — MAGNESIUM OXIDE TAB 400 MG (241.3 MG ELEMENTAL MG) 800 MG: 400 (241.3 MG) TAB at 00:19

## 2024-12-28 RX ADMIN — HYDROCODONE BITARTRATE AND ACETAMINOPHEN 1 TABLET: 7.5; 325 TABLET ORAL at 23:25

## 2024-12-28 RX ADMIN — DEXAMETHASONE SODIUM PHOSPHATE 4 MG: 4 INJECTION, SOLUTION INTRAMUSCULAR; INTRAVENOUS at 22:27

## 2024-12-28 RX ADMIN — METHOCARBAMOL 500 MG: 500 TABLET ORAL at 15:40

## 2024-12-28 RX ADMIN — MAGNESIUM OXIDE TAB 400 MG (241.3 MG ELEMENTAL MG) 800 MG: 400 (241.3 MG) TAB at 20:55

## 2024-12-28 RX ADMIN — HYDROCODONE BITARTRATE AND ACETAMINOPHEN 1 TABLET: 7.5; 325 TABLET ORAL at 09:51

## 2024-12-28 RX ADMIN — SACUBITRIL AND VALSARTAN 1 TABLET: 24; 26 TABLET, FILM COATED ORAL at 20:55

## 2024-12-28 RX ADMIN — Medication 5 MG: at 23:25

## 2024-12-28 RX ADMIN — BUPROPION HYDROCHLORIDE 300 MG: 300 TABLET, EXTENDED RELEASE ORAL at 09:51

## 2024-12-28 RX ADMIN — ACETAMINOPHEN 650 MG: 325 TABLET ORAL at 20:56

## 2024-12-28 RX ADMIN — Medication 10 ML: at 11:42

## 2024-12-28 RX ADMIN — HYDROCODONE BITARTRATE AND ACETAMINOPHEN 1 TABLET: 7.5; 325 TABLET ORAL at 15:40

## 2024-12-28 RX ADMIN — ROSUVASTATIN CALCIUM 40 MG: 40 TABLET, FILM COATED ORAL at 20:56

## 2024-12-28 RX ADMIN — Medication 10 ML: at 22:28

## 2024-12-28 RX ADMIN — SPIRONOLACTONE 25 MG: 25 TABLET ORAL at 09:51

## 2024-12-28 RX ADMIN — LEVOTHYROXINE SODIUM 50 MCG: 0.05 TABLET ORAL at 07:28

## 2024-12-28 RX ADMIN — METHOCARBAMOL 500 MG: 500 TABLET ORAL at 23:25

## 2024-12-28 RX ADMIN — TORSEMIDE 20 MG: 20 TABLET ORAL at 11:42

## 2024-12-28 RX ADMIN — MAGNESIUM OXIDE TAB 400 MG (241.3 MG ELEMENTAL MG) 800 MG: 400 (241.3 MG) TAB at 09:51

## 2024-12-28 RX ADMIN — ENOXAPARIN SODIUM 90 MG: 100 INJECTION SUBCUTANEOUS at 20:55

## 2024-12-28 NOTE — H&P
[facility]   HISTORY AND PHYSICAL    Patient Name: Barbara Tran  : 1955  MRN: [id number]  Primary Care Physician:  Millicent Ace MD  Date of admission: 2024    Subjective   Subjective     Chief Complaint:   Chief Complaint   Patient presents with    Back Pain     PT C/O BILATERAL BACK PAIN STARTING ; STATES PAIN STARTED ON THE L SIDE AND IS NOW BILATERAL FROM THE WAIST TO THE MID-BACK; NKI; WORSE WITH MOVEMENT          HPI:    Barbara Tran is a 69 y.o. female comes in complaining of bilateral low back pain.  Patient states that on  she started to have back pain somewhat worse on the left than the right.  However patient states that pain is now on both sides and goes from the flank to over the hips bilaterally.  Patient denies any pain shooting down either leg.  Patient denies any saddle anesthesia, numbness or tingling of the legs, urinary or bowel dysfunction, dysuria, urinary frequency, fever or chills.  Patient denies any nausea or vomiting.  Patient denies any history of back surgery.  Patient denies any heavy lifting over the weekend.  Patient states that her pain feels different than typical muscle pain.  Of note, patient reports having a cough about 2 weeks ago that has since overall improved but not gone away.    In the ED, Initial troponin 7, repeat troponin of 7, CMP largely unremarkable for acute findings.  D-dimer negative.  CBC largely unremarkable for acute findings.  UA negative for acute findings.  Chest x-ray shows small atelectasis or infiltrate peripherally at the left base, follow-up suggested.  X-ray thoracic and lumbar spine shows mild retrolisthesis of L1 on L2, L2 on L3, mild anterior listhesis of L4 on L5.  Multilevel endplate spurring, disc space narrowing, and facet arthropathy are present. Duplex of left lower extremity negative for DVT.  EKG shows sinus rhythm 94 bpm, no ST elevation apparent.  Patient is afebrile, pulse in the 80s, on room air  oxygen at 97% SpO2 and blood pressure normotensive.     Review of Systems   All systems were reviewed and negative except for: as per HPI    Personal History     Past Medical History:   Diagnosis Date    Allergic 2015    codeine, morphine, levaquin    Arthritis l5 years or so ago    Asthma 9-    Basal cell carcinoma     CAD (coronary artery disease)     Cataract 6-9 months ago    Need surgery    CHF (congestive heart failure)     Coronary artery disease 2005 stent    COVID-19 virus detected 03/10/2021    Deep vein thrombosis 06/03/2021    Pulmonary embolism    Depression     Disease of thyroid gland     Headache Covid 3-6-21    Has continued    History of pulmonary embolism 06/01/2021    History of urinary tract infection     HL (hearing loss) Last 4-6 months    Hyperlipidemia     Hypertension since 2005    Hypothyroidism since 2012    Multinodular goiter     Obesity     Osteopenia last few years    Osteoporosis     Pulmonary embolism 06/03/2021    From Covid    Pulmonic valve regurgitation     Tricuspid valve regurgitation     Unstable angina 04/16/2024       Past Surgical History:   Procedure Laterality Date    BUNIONECTOMY Bilateral     HAMMERT TOE REPAIR/BUNIONECTOMY    CARDIAC CATHETERIZATION  2015, 2018    CARDIAC CATHETERIZATION N/A 04/29/2024    Procedure: Coronary angiography;  Surgeon: Cong Rivera MD;  Location:  [facility];  Service: Cardiovascular;  Laterality: N/A;    CARDIAC CATHETERIZATION N/A 04/29/2024    Procedure: Left Heart Cath;  Surgeon: Cong Rivera MD;  Location:  [facility];  Service: Cardiovascular;  Laterality: N/A;    CARDIAC CATHETERIZATION N/A 04/29/2024    Procedure: Left ventriculography;  Surgeon: Cong Rivera MD;  Location:  [facility];  Service: Cardiovascular;  Laterality: N/A;    CATARACT EXTRACTION      CHOLECYSTECTOMY  1995    COLONOSCOPY N/A 12/19/2016    Procedure: COLONOSCOPY WITH COLD BIOPSIES;   Surgeon: Medina Guillen MD;  Location: [facility] ENDOSCOPY;  Service:     CORONARY STENT PLACEMENT  2005    LAD 80% blockage    CYST REMOVAL      GANGLION CYST EXCISION      R WRIST    SKIN BIOPSY      SUBTOTAL HYSTERECTOMY  2009    THYROID BIOPSY  2014    TONSILLECTOMY  1965    TUBAL ABDOMINAL LIGATION  1985       Family History: family history includes Arthritis in her brother and son; Cancer in her brother; Diabetes in her sister; Heart attack in her brother and father; Heart disease in her brother, father, and mother; Heart disease (age of onset: 62) in her brother; Heart disease (age of onset: 69) in her sister; No Known Problems in her maternal aunt, maternal grandfather, maternal grandmother, maternal uncle, paternal aunt, paternal grandfather, paternal grandmother, and paternal uncle. Otherwise pertinent FHx was reviewed and not pertinent to current issue.    Social History:  reports that she has never smoked. She has never been exposed to tobacco smoke. She has never used smokeless tobacco. She reports current alcohol use of about 2.0 standard drinks of alcohol per week. She reports that she does not use drugs.    Home Medications:  HYDROcodone-acetaminophen, apixaban, benzonatate, buPROPion XL, calcium carbonate, empagliflozin, ibandronate, levothyroxine, magnesium oxide, rosuvastatin, sacubitril-valsartan, spironolactone, torsemide, and zolpidem    Allergies:  Allergies   Allergen Reactions    Codeine Dizziness     lightheaded    Levofloxacin Itching    Morphine Itching       Objective   Objective     Vitals:   Temp:  [97.9 °F (36.6 °C)-98.8 °F (37.1 °C)] 97.9 °F (36.6 °C)  Heart Rate:  [] 88  Resp:  [16-18] 16  BP: (117-134)/(71-90) 117/77  Physical Exam    Constitutional: Awake, alert   Eyes: PERRLA, sclerae anicteric, no conjunctival injection   HENT: NCAT, mucous membranes moist   Neck: Supple, no thyromegaly, no lymphadenopathy, trachea midline   Respiratory: Clear to auscultation bilaterally,  nonlabored respirations    Cardiovascular: RRR, no murmurs, rubs, or gallops, palpable pedal pulses bilaterally   Gastrointestinal: Positive bowel sounds, soft, nontender, nondistended   Musculoskeletal: Patient is tender throughout the lumbar spine diffusely in the musculature bilaterally and paraspinal musculature.  No step-offs or midline tenderness specifically.  Patient does have bilateral straight leg raise that is positive.  Patient also has CVA tenderness bilaterally.  Trace bilateral ankle edema, otherwise no clubbing or cyanosis to extremities   Psychiatric: Appropriate affect, cooperative   Neurologic: Oriented x 3, strength symmetric in all extremities, Cranial Nerves grossly intact to confrontation, speech clear   Skin: No rashes     Result Review    Result Review:  I have personally reviewed the results from the time of this admission to 12/28/2024 00:02 EST and agree with these findings:  [x]  Laboratory list / accordion  []  Microbiology  [x]  Radiology  [x]  EKG/Telemetry   []  Cardiology/Vascular   []  Pathology  []  Old records  []  Other:  Most notable findings include: see above      Assessment & Plan   Assessment / Plan     Brief Patient Summary:  Barbara Tran is a 69 y.o. female who comes in complaining of low back pain    Active Hospital Problems:  Active Hospital Problems    Diagnosis     **Low back pain     Retrolisthesis of vertebrae      Plan:     Low back pain/flank pain  -Initial troponin 7, repeat troponin of 7,   -CMP and CBC largely unremarkable for acute findings.    -D-dimer negative.    -UA negative for acute findings.    -X-ray thoracic and lumbar spine shows mild retrolisthesis of L1 on L2, L2 on L3, mild anterior listhesis of L4 on L5.  Multilevel endplate spurring, disc space narrowing, and facet arthropathy are present.   -Duplex of left lower extremity negative for DVT.    -EKG shows sinus rhythm 94 bpm, no ST elevation apparent.    -Patient is afebrile, pulse in the 80s,  on room air oxygen at 97% SpO2 and blood pressure normotensive.   -Neurosurgery consult  -MRI thoracic and lumbar spine without contrast  -CT abdomen pelvis without contrast  -Check UA, lactic  -Will increase patient's home Norco from 5 to 7.5 mg, patient states she is very sensitive to IV pain medicine and does not want Dilaudid or morphine.  -PT consult  -Heart healthy diet    Abnormal chest x-ray  -Chest x-ray shows small atelectasis or infiltrate peripherally at the left base, follow-up suggested.    -Patient reports recently getting over a cough and head cold the last  2 weeks.  -Check CT chest without contrast  -Check procal, RVP    History of PE/DVT  -Continue home Eliquis    Anxiety/depression  -Continue home Wellbutrin    Essential hypertension, chronic, controlled  -Continue home Entresto, Aldactone, Demadex    Hyperlipidemia  -Check lipid panel, continue home statin    Hypothyroidism  -Check TSH, continue home Synthroid    History of CHF  -Check A1c  -Hold home Jardiance    Obesity, BMI 31.9, encourage lifestyle modifications      VTE Prophylaxis: SCDs  Pharmacologic & mechanical VTE prophylaxis orders are present.        CODE STATUS:    Code Status (Patient has no pulse and is not breathing): CPR (Attempt to Resuscitate)  Medical Interventions (Patient has pulse or is breathing): Full Support    Admission Status:  I believe this patient meets observation status.    79 minutes have been spent by [facility] providers in the care of this patient while under observation status.      Appropriate PPE worn during patient encounter.  Hand hygeine performed before and after seeing the patient.      Electronically signed by STEVEN Carbone, 12/27/24, 8:39 PM EST.

## 2024-12-28 NOTE — PROGRESS NOTES
ED OBSERVATION PROGRESS/DISCHARGE SUMMARY    Date of Admission: 12/27/2024   LOS: 0 days   PCP: Millicent Ace MD    Final Diagnosis back pain      Subjective     Hospital Outcome: Barbara Tran is a 69 y.o. female comes in complaining of bilateral low back pain.  Patient states that on Sunday she started to have back pain somewhat worse on the left than the right.  However patient states that pain is now on both sides and goes from the flank to over the hips bilaterally.  Patient denies any pain shooting down either leg.  Patient denies any saddle anesthesia, numbness or tingling of the legs, urinary or bowel dysfunction, dysuria, urinary frequency, fever or chills.  Patient denies any nausea or vomiting.  Patient denies any history of back surgery.  Patient denies any heavy lifting over the weekend.  Patient states that her pain feels different than typical muscle pain.  Of note, patient reports having a cough about 2 weeks ago that has since overall improved but not gone away.     In the ED, Initial troponin 7, repeat troponin of 7, CMP largely unremarkable for acute findings.  D-dimer negative.  CBC largely unremarkable for acute findings. Chest x-ray shows small atelectasis or infiltrate peripherally at the left base, follow-up suggested.  X-ray thoracic and lumbar spine shows mild retrolisthesis of L1 on L2, L2 on L3, mild anterior listhesis of L4 on L5.  Multilevel endplate spurring, disc space narrowing, and facet arthropathy are present. Duplex of left lower extremity negative for DVT.  EKG shows sinus rhythm 94 bpm, no ST elevation apparent.  Patient is afebrile, pulse in the 80s, on room air oxygen at 97% SpO2 and blood pressure normotensive.    12/28/2024: Patient reports no improvement of her back pain through the evening.  UA obtained and negative for acute infection.  MRI lumbar spine shows severe facet arthropathy at L4-L5 and L5-S1, impingement of the right L5 nerve root in the lateral recess at  L4-L5 and abutment of the left L5 nerve root and L5-S1 neuroforamen.  Thoracic MRI spine pending final read at this time; have reached out to radiology to expedite as the image was ordered stat.  Neurosurgery saw and evaluated the patient and recommended starting Robaxin 500 mg every 8 hours as needed, recommended pain management for possible YELITZA on Monday, 12/30/2024, patient's Eliquis is on hold for this possible procedure and she was bridged to Lovenox for treatment of her active PE till 24 hours prior to procedure.  Patient is unsure about getting the YELITZA on Monday, neurosurgery did go back and reexplained this procedure and recommendations.  Patient would like to try an evening of IV steroids and see how she feels and reevaluate in the morning whether YELITZA is a procedure she would like to undergo.  IV steroids have been ordered.  Will reevaluate in the a.m.  Patient's family at bedside and plan and imaging results also discussed with them patient's request.    ROS:  General: no fevers, chills  Respiratory: no cough, dyspnea  Cardiovascular: no chest pain, palpitations  Abdomen: No abdominal pain, nausea, vomiting, or diarrhea  Neurologic: No focal weakness    Objective   Physical Exam:  I have reviewed the vital signs.  Temp:  [97.9 °F (36.6 °C)-98.8 °F (37.1 °C)] 97.9 °F (36.6 °C)  Heart Rate:  [] 84  Resp:  [16-18] 17  BP: (117-134)/() 124/106  General Appearance:    Alert, cooperative, no distress, nontoxic appearing but does appear uncomfortable  Head:    Normocephalic, atraumatic, normal hearing  Eyes:    Sclerae anicteric, EOMI  Neck:   Supple, nontender  Lungs: Clear to auscultation bilaterally, respirations unlabored on room air  Heart: Regular rate and rhythm, S1 and S2 normal, no murmur  Abdomen:  Soft, nontender, bowel sounds active, nondistended, obese abdomen  Extremities: No clubbing, cyanosis, or edema to lower extremities  Pulses:  2+ and symmetric in distal lower extremities  Skin:  No rashes   Neurologic: Oriented x3, Normal strength to extremities    Results Review:    I have reviewed the labs, radiology results and diagnostic studies.    Results from last 7 days   Lab Units 12/27/24  1840   WBC 10*3/mm3 8.91   HEMOGLOBIN g/dL 15.7   HEMATOCRIT % 48.4*   PLATELETS 10*3/mm3 325     Results from last 7 days   Lab Units 12/27/24  1840   SODIUM mmol/L 136   POTASSIUM mmol/L 3.9   CHLORIDE mmol/L 99   CO2 mmol/L 26.6   BUN mg/dL 18   CREATININE mg/dL 0.71   CALCIUM mg/dL 9.3   BILIRUBIN mg/dL 0.3   ALK PHOS U/L 72   ALT (SGPT) U/L 17   AST (SGOT) U/L 17   GLUCOSE mg/dL 138*     Imaging Results (Last 24 Hours)       Procedure Component Value Units Date/Time    MRI Lumbar Spine Without Contrast [[id number]] Resulted: 12/28/24 0537     Updated: 12/28/24 0610    MRI Thoracic Spine Without Contrast [[id number]] Resulted: 12/28/24 0537     Updated: 12/28/24 0610    CT Abdomen Pelvis Without Contrast [[id number]] Collected: 12/28/24 0309     Updated: 12/28/24 0309    Narrative:        Patient: OLIVE MYERS  Time Out: 03:09  Exam(s): CT ABDOMEN + PELVIS Without Contrast     EXAM:    CT Abdomen and Pelvis Without Intravenous Contrast    CLINICAL HISTORY:     Reason for exam: low back pain, flank pain.    TECHNIQUE:    Axial computed tomography images of the abdomen and pelvis without   intravenous contrast with coronal and sagittal reformats.  CTDI is 13.89   mGy and DLP is 920.9 mGy-cm.  This CT exam was performed according to the   principle of ALARA (As Low As Reasonably Achievable) by using one or more   of the following dose reduction techniques: automated exposure control,   adjustment of the mA and or kV according to patient size, and or use of   iterative reconstruction technique.    COMPARISON:    No relevant prior studies available.    FINDINGS:    Limitations:  Limited due to lack of IV contrast.     ABDOMEN:    Liver:  Unremarkable.    Gallbladder and bile ducts:  Cholecystectomy.    Pancreas:   Unremarkable.    Spleen:  Unremarkable.    Adrenals:  Unremarkable.    Kidneys and ureters:  Unremarkable.    Stomach and bowel:  Unremarkable.     PELVIS:    Appendix:  No findings to suggest acute appendicitis.    Bladder:  Unremarkable.    Reproductive:  Hysterectomy.     ABDOMEN and PELVIS:    Intraperitoneal space:  No pneumoperitoneum.    Bones joints:  See below.      Soft tissues:  Unremarkable.    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  Atherosclerotic arterial calcifications: moderate.   No aortic aneurysm or dissection. Arterial structures otherwise   unremarkable.    Lymph nodes:  Unremarkable.    IMPRESSION:       1.  Limited due to lack of IV contrast.  2.  No acute abnormality.  3.  See additional findings above.  4.  No urolithiasis seen.      Impression:          Electronically signed by Aldo Pacheco MD on 12-28-24 at 0309    CT Chest Without Contrast Diagnostic [[id number]] Collected: 12/28/24 0308     Updated: 12/28/24 0308    Narrative:        Patient: OLIVE MYERS  Time Out: 03:07  Exam(s): CT CHEST Without Contrast     EXAM:    CT Chest Without Intravenous Contrast    CLINICAL HISTORY:     Reason for exam: abnormal cxr, cough.    TECHNIQUE:    Axial computed tomography images of the chest without intravenous   contrast with coronal and sagittal reformats.  CTDI is  13.89 mGy and DLP   is 920.9 mGy-cm.  This CT exam was performed according to the principle   of ALARA (As Low As Reasonably Achievable) by using one or more of the   following dose reduction techniques: automated exposure control,   adjustment of the mA and or kV according to patient size, and or use of   iterative reconstruction technique.    COMPARISON:    No relevant prior studies available.    FINDINGS:    Limitations:  Limited due to lack of IV contrast.    Lungs:  Unremarkable.    Pleural space:  Unremarkable.    Heart:  Moderate coronary artery calcifications. Otherwise  unremarkable.      Bones joints:  No acute displaced fracture.    Soft tissues:  Bilateral breast implants.    Vasculature:  Atherosclerotic arterial calcifications: mild. No aortic   aneurysm or dissection. Arterial structures otherwise unremarkable.    Vascular structures otherwise unremarkable.    Lymph nodes:  No concerning adenopathy.    IMPRESSION:       1.  No acute abnormality.  2.  Limited due to lack of IV contrast.  3.  See additional findings above.      Impression:          Electronically signed by Aldo Pacheco MD on 12-28-24 at 0307    US Venous Doppler Lower Extremity Left (duplex) [[id number]] Collected: 12/27/24 2038     Updated: 12/27/24 2038    Narrative:        Patient: OLIVE MYERS  Time Out: 20:37  Exam(s): US VENOUS LEFT LOWER EXTREMITY     EXAM:    US Duplex Left Lower Extremity Veins    CLINICAL HISTORY:     Reason for exam: left calf pain.    TECHNIQUE:    Real-time duplex ultrasound scan of the left lower extremity veins   integrating B-mode two-dimensional vascular structure, Doppler spectral   analysis, color flow Doppler imaging and compression.    COMPARISON:    No relevant prior studies available.    FINDINGS:    Deep veins:  Unremarkable.  The visualized deep veins of the left lower   extremity are compressible with color flow.  No visualized thrombus.    Superficial veins:  Unremarkable.    Soft tissues:  No acute findings.    IMPRESSION:         No DVT within the left lower extremity.      Impression:          Electronically signed by Ace Angelo MD on 12-27-24 at 2037    XR Spine Thoracic 3 View [[id number]] Collected: 12/27/24 1934     Updated: 12/27/24 1945    Narrative:      XR SPINE LUMBAR COMPLETE 4+VW-, XR SPINE THORACIC 3 VW-, XR CHEST 1 VW-     INDICATIONS: Pain, short of breath.     TECHNIQUE: FRONTAL VIEW OF THE CHEST, 5 VIEWS OF THE LUMBAR SPINE, 3  VIEWS OF THE THORACIC SPINE     COMPARISON: 12/26/2022,  image from CT from 11/26/2024     FINDINGS:     Chest  x-ray: The heart size is borderline. Pulmonary vasculature is  unremarkable. Small atelectasis or infiltrate peripherally at the left  base, follow-up suggested. No pleural effusion, or pneumothorax. No  acute osseous process.     Thoracic and lumbar spine: Vertebral body heights appear stable. No  acute fracture is identified. Mild retrolisthesis of L1 on L2, L2 on L3,  mild anterior listhesis of L4 on L5. Multilevel endplate spurring, disc  space narrowing, and facet arthropathy are present. S-shaped  thoracolumbar scoliosis is evident. If further imaging evaluation spine  is indicated, MRI could be considered.       Impression:      As described.              This report was finalized on 12/27/2024 7:42 PM by Dr. William Mota M.D on Workstation: [id number]       XR Spine Lumbar Complete 4+VW [[id number]] Collected: 12/27/24 1934     Updated: 12/27/24 1945    Narrative:      XR SPINE LUMBAR COMPLETE 4+VW-, XR SPINE THORACIC 3 VW-, XR CHEST 1 VW-     INDICATIONS: Pain, short of breath.     TECHNIQUE: FRONTAL VIEW OF THE CHEST, 5 VIEWS OF THE LUMBAR SPINE, 3  VIEWS OF THE THORACIC SPINE     COMPARISON: 12/26/2022,  image from CT from 11/26/2024     FINDINGS:     Chest x-ray: The heart size is borderline. Pulmonary vasculature is  unremarkable. Small atelectasis or infiltrate peripherally at the left  base, follow-up suggested. No pleural effusion, or pneumothorax. No  acute osseous process.     Thoracic and lumbar spine: Vertebral body heights appear stable. No  acute fracture is identified. Mild retrolisthesis of L1 on L2, L2 on L3,  mild anterior listhesis of L4 on L5. Multilevel endplate spurring, disc  space narrowing, and facet arthropathy are present. S-shaped  thoracolumbar scoliosis is evident. If further imaging evaluation spine  is indicated, MRI could be considered.       Impression:      As described.              This report was finalized on 12/27/2024 7:42 PM by Dr. William Mota M.D on  Workstation: [id number]       XR Chest 1 View [[id number]] Collected: 12/27/24 1934     Updated: 12/27/24 1945    Narrative:      XR SPINE LUMBAR COMPLETE 4+VW-, XR SPINE THORACIC 3 VW-, XR CHEST 1 VW-     INDICATIONS: Pain, short of breath.     TECHNIQUE: FRONTAL VIEW OF THE CHEST, 5 VIEWS OF THE LUMBAR SPINE, 3  VIEWS OF THE THORACIC SPINE     COMPARISON: 12/26/2022,  image from CT from 11/26/2024     FINDINGS:     Chest x-ray: The heart size is borderline. Pulmonary vasculature is  unremarkable. Small atelectasis or infiltrate peripherally at the left  base, follow-up suggested. No pleural effusion, or pneumothorax. No  acute osseous process.     Thoracic and lumbar spine: Vertebral body heights appear stable. No  acute fracture is identified. Mild retrolisthesis of L1 on L2, L2 on L3,  mild anterior listhesis of L4 on L5. Multilevel endplate spurring, disc  space narrowing, and facet arthropathy are present. S-shaped  thoracolumbar scoliosis is evident. If further imaging evaluation spine  is indicated, MRI could be considered.       Impression:      As described.              This report was finalized on 12/27/2024 7:42 PM by Dr. William Mota M.D on Workstation: [id number]               I have reviewed the medications.     Discharge Medications        Continue These Medications        Instructions Start Date   apixaban 5 MG tablet tablet  Commonly known as: ELIQUIS   5 mg, Oral, Every 12 Hours Scheduled      benzonatate 200 MG capsule  Commonly known as: TESSALON   200 mg, Oral, Every 4 Hours PRN      Boniva 150 MG tablet  Generic drug: ibandronate   150 mg, Every 30 Days      buPROPion  MG 24 hr tablet  Commonly known as: WELLBUTRIN XL   300 mg, Oral, Daily      calcium carbonate 600 MG tablet  Commonly known as: OS-SHAHNAZ   1 tablet, 2 Times Daily With Meals      empagliflozin 10 MG tablet tablet  Commonly known as: JARDIANCE   10 mg, Oral, Daily      HYDROcodone-acetaminophen 5-325 MG per  tablet  Commonly known as: NORCO   1 tablet, Oral, Every 8 Hours PRN      levothyroxine 50 MCG tablet  Commonly known as: Synthroid   50 mcg, Oral, Daily      magnesium oxide 400 MG tablet  Commonly known as: MAG-OX   2 tablets, 2 times daily      rosuvastatin 40 MG tablet  Commonly known as: CRESTOR   40 mg, Oral, Nightly      sacubitril-valsartan 24-26 MG tablet  Commonly known as: ENTRESTO   1 tablet, Oral, 2 Times Daily      spironolactone 25 MG tablet  Commonly known as: ALDACTONE   25 mg, Oral, Daily      torsemide 20 MG tablet  Commonly known as: DEMADEX   20 mg, Oral, Daily      zolpidem 5 MG tablet  Commonly known as: AMBIEN   5 mg, Oral, Nightly PRN              ---------------------------------------------------------------------------------------------  Assessment & Plan   Assessment/Problem List    Low back pain    Retrolisthesis of vertebrae      Plan:    Low back pain/flank pain  -UA negative for acute findings.    -X-ray thoracic and lumbar spine shows mild retrolisthesis of L1 on L2, L2 on L3, mild anterior listhesis of L4 on L5.  Multilevel endplate spurring, disc space narrowing, and facet arthropathy are present.   -Duplex of left lower extremity negative for DVT.    -CT abdomen pelvis without contrast negative for any acute findings  -Will increase patient's home Norco from 5 to 7.5 mg, patient states she is very sensitive to IV pain medicine and does not want Dilaudid or morphine.  -PT consult  - MRI lumbar spine shows severe facet arthropathy at L4-L5 and L5-S1, impingement of the right L5 nerve root in the lateral recess at L4-L5 and abutment of the left L5 nerve root and L5-S1 neuroforamen.    -Thoracic MRI spine pending final read at this time; have reached out to radiology to expedite as the image was ordered stat.    -Neurosurgery saw and evaluated the patient and recommended starting Robaxin 500 mg every 8 hours as needed, recommended pain management for possible YELITZA on Monday,  12/30/2024, patient's Eliquis is on hold for this possible procedure and she was bridged to Lovenox for treatment of her active PE till 24 hours prior to procedure.    -Patient is unsure about getting the YELITZA on Monday, neurosurgery did go back and reexplained this procedure and recommendations.  Patient would like to try an evening of IV steroids and see how she feels and reevaluate in the morning whether YELITZA is a procedure she would like to undergo.  IV steroids have been ordered.  Will reevaluate in the a.m.      Abnormal chest x-ray suspected to be lingering pulm findings from recent upper respiratory infection as it is negative on CT chest imaging  -Chest x-ray shows small atelectasis or infiltrate peripherally at the left base, follow-up suggested.    -Patient reports recently getting over a cough and head cold the last  2 weeks.  -CT chest without contrast negative for any acute issues  -RVP negative and PCR 0.05     Active PE about 2 months ago  -Holding home Eliquis for potential procedure; bridged with Lovenox at this time with pharmacy to dose  -Will need to hold Lovenox 24 hours prior to a possible YELITZA for primary issue, patient will get a dose this evening 12/28/2024 but will need to be held starting 12/29/2024.     Anxiety/depression  -Continue home Wellbutrin     Essential hypertension, chronic, controlled  -Continue home Entresto, Aldactone, Demadex     Hyperlipidemia  -continue home statin     Hypothyroidism  -continue home Synthroid     History of CHF  -Hold home Jardiance     Obesity, BMI 31.9, encourage lifestyle modifications    Disposition: Dependent on hospital course    Follow-up after Discharge: Dependent on hospital course    This note will serve as a progress note    Alaina Mcclelland PA-C 12/28/24 07:17 EST    I have worn appropriate PPE during this patient encounter, sanitized my hands both with entering and exiting patient's room.      58 minutes has been spent by Erik Garcia  [facility] providers in the care of this patient while under observation status

## 2024-12-28 NOTE — PROGRESS NOTES
The FELICITA and I have discussed this patient's history, physical exam and treatment plan.  I provided a substantive portion of the care of this patient.  I have reviewed the documentation and personally had a face to face interaction with the patient and personally performed the physical exam, in its entirety.  I affirm the documentation and agree with the treatment and plan.  The following describes my personal findings.  SHARED VISIT: This visit was performed by BOTH a physician and an APC. The substantive portion of the medical decision making was performed by this attesting physician who made or approved the management plan and takes responsibility for patient management. All studies in the APC note (if performed) were independently interpreted by me.       The patient is admitted to the observation unit for further evaluation of back pain bilateral x 4 days.  Patient denies recent injury, reports pain is worse with movement, palpation, denies chest pain, shortness of air, abdominal pain, weakness, numbness, incontinence.    Comprehensive Physical exam:  Patient is nontoxic appearing oriented, conversant awake, alert  HEENT: normocephalic, atraumatic  Neck: no goiter, no pain with ROM  Pulmonary: Nontachypneic  cardiovascular: Nontachycardic  Abdomen: Soft, nontender  musculoskeletal: Exquisitely tender to palpation at even very light touch to paravertebral soft tissues bilaterally level of T9-L3 without swelling  Neuro/psychiatric:calm, appropriate, cooperative  Skin:warm, dry      Plan:      Low back pain/flank pain  -Initial troponin 7, repeat troponin of 7,   -CMP and CBC largely unremarkable for acute findings.    -D-dimer negative.    -UA negative for acute findings.    -X-ray thoracic and lumbar spine shows mild retrolisthesis of L1 on L2, L2 on L3, mild anterior listhesis of L4 on L5.  Multilevel endplate spurring, disc space narrowing, and facet arthropathy are present.   -Duplex of left lower extremity  negative for DVT.    -EKG shows sinus rhythm 94 bpm, no ST elevation apparent.    -Patient is afebrile, pulse in the 80s, on room air oxygen at 97% SpO2 and blood pressure normotensive.   -Neurosurgery consult, recommend YELITZA 12/30  -MRI thoracic and lumbar spine without contrast show bilateral L5 nerve impingement  -CT abdomen pelvis without contrast  -Check UA, lactic  -Will increase patient's home Norco from 5 to 7.5 mg, patient states she is very sensitive to IV pain medicine and does not want Dilaudid or morphine.  -PT consult  -Heart healthy diet     Abnormal chest x-ray  -Chest x-ray shows small atelectasis or infiltrate peripherally at the left base, follow-up suggested.    -Patient reports recently getting over a cough and head cold the last  2 weeks.  -Check CT chest without contrast  -Check procal, RVP     History of PE/DVT  -Continue home Eliquis     Anxiety/depression  -Continue home Wellbutrin     Essential hypertension, chronic, controlled  -Continue home Entresto, Aldactone, Demadex     Hyperlipidemia  -Check lipid panel, continue home statin     Hypothyroidism  -Check TSH, continue home Synthroid     History of CHF  -Check A1c  -Hold home Jardiance     Obesity, BMI 31.9, encourage lifestyle modifications

## 2024-12-28 NOTE — THERAPY EVALUATION
Patient Name: Barbara Tran  : 1955    MRN: [id number]                              Today's Date: 2024       Admit Date: 2024    Visit Dx:     ICD-10-CM ICD-9-CM   1. Retrolisthesis of vertebrae  M43.10 738.4   2. Lumbar back pain  M54.50 724.2   3. Thoracic back pain, unspecified back pain laterality, unspecified chronicity  M54.6 724.1   4. Atelectasis of left lung  J98.11 518.0     Patient Active Problem List   Diagnosis    Benign essential HTN    Cervical pain    Dysthymia    Pedal edema    HLD (hyperlipidemia)    Goiter, non-toxic    Chronic pain of left knee    Chronic coronary artery disease    Hypothyroidism    History of sepsis    Primary insomnia    Chronic diastolic congestive heart failure    S/P drug eluting coronary stent placement    Pulmonic valve regurgitation    Tricuspid valve regurgitation    Osteoporosis    Hyperglycemia    History of pulmonary embolism    Balance problem    Pulmonary embolism    Right ventricular thrombus    Pseudogout of knee    Low back pain    Retrolisthesis of vertebrae     Past Medical History:   Diagnosis Date    Allergic 2015    codeine, morphine, levaquin    Arthritis l5 years or so ago    Asthma 2024    Basal cell carcinoma     CAD (coronary artery disease)     Cataract 6-9 months ago    Need surgery    CHF (congestive heart failure)     Coronary artery disease 2005 stent    COVID-19 virus detected 03/10/2021    Deep vein thrombosis 2021    Pulmonary embolism    Depression     Disease of thyroid gland     Headache Covid 3-21    Has continued    History of pulmonary embolism 2021    History of urinary tract infection     HL (hearing loss) Last 4-6 months    Hyperlipidemia     Hypertension since 2005    Hypothyroidism since     Multinodular goiter     Obesity     Osteopenia last few years    Osteoporosis     Pulmonary embolism 2021    From Covid    Pulmonic valve regurgitation     Tricuspid valve regurgitation      Unstable angina 04/16/2024     Past Surgical History:   Procedure Laterality Date    BUNIONECTOMY Bilateral     HAMMERT TOE REPAIR/BUNIONECTOMY    CARDIAC CATHETERIZATION  2015, 2018    CARDIAC CATHETERIZATION N/A 04/29/2024    Procedure: Coronary angiography;  Surgeon: Cong Rivera MD;  Location:  [facility];  Service: Cardiovascular;  Laterality: N/A;    CARDIAC CATHETERIZATION N/A 04/29/2024    Procedure: Left Heart Cath;  Surgeon: Cong Rivera MD;  Location:  [facility];  Service: Cardiovascular;  Laterality: N/A;    CARDIAC CATHETERIZATION N/A 04/29/2024    Procedure: Left ventriculography;  Surgeon: Cong Rivera MD;  Location:  [facility];  Service: Cardiovascular;  Laterality: N/A;    CATARACT EXTRACTION      CHOLECYSTECTOMY  1995    COLONOSCOPY N/A 12/19/2016    Procedure: COLONOSCOPY WITH COLD BIOPSIES;  Surgeon: Medina Guillen MD;  Location:  [facility];  Service:     CORONARY STENT PLACEMENT  2005    LAD 80% blockage    CYST REMOVAL      GANGLION CYST EXCISION      R WRIST    SKIN BIOPSY      SUBTOTAL HYSTERECTOMY  2009    THYROID BIOPSY  2014    TONSILLECTOMY  1965    TUBAL ABDOMINAL LIGATION  1985      General Information       [facility] Name 12/28/24 1002          Physical Therapy Time and Intention    Document Type evaluation  -     Mode of Treatment individual therapy;physical therapy  -       Row Name 12/28/24 1002          General Information    Patient Profile Reviewed yes  -     Prior Level of Function independent:;gait;transfer;bed mobility  -     Existing Precautions/Restrictions fall  -     Barriers to Rehab none identified  -       Row Name 12/28/24 1002          Living Environment    People in Home child(francesco), adult  -       Row Name 12/28/24 1002          Home Main Entrance    Number of Stairs, Main Entrance five  -       Row Name 12/28/24 1002          Stairs Within Home, Primary    Stairs, Within Home,  Primary 3 level home: 6+8 to 2nd level, 3+8 to 3rd level where her bedroom is  -[facility] Name 12/28/24 1002          Cognition    Orientation Status (Cognition) oriented x 4  -[facility] Name 12/28/24 1002          Safety Issues/Impairments Affecting Functional Mobility    Impairments Affecting Function (Mobility) balance;endurance/activity tolerance;strength;pain;range of motion (ROM)  -               User Key  (r) = Recorded By, (t) = Taken By, (c) = Cosigned By      Initials Name Provider Type     Mabel Vega PT Physical Therapist                   Mobility       [facility] Name 12/28/24 1003          Bed Mobility    Bed Mobility supine-sit  -     Supine-Sit [place] (Bed Mobility) standby assist;verbal cues  -     Assistive Device (Bed Mobility) bed rails  -     Comment, (Bed Mobility) Cued for log roll - not needing assistance but increased time to complete  -BH       Row Name 12/28/24 1003          Sit-Stand Transfer    Sit-Stand [place] (Transfers) standby assist;verbal cues  -     Assistive Device (Sit-Stand Transfers) walker, front-wheeled  -BH       Row Name 12/28/24 1003          Gait/Stairs (Locomotion)    [place] Level (Gait) standby assist;supervision;verbal cues  -     Assistive Device (Gait) walker, front-wheeled  -     Distance in Feet (Gait) 40  -     Deviations/Abnormal Patterns (Gait) antalgic;cher decreased;gait speed decreased;stride length decreased  -     Bilateral Gait Deviations forward flexed posture  -               User Key  (r) = Recorded By, (t) = Taken By, (c) = Cosigned By      Initials Name Provider Type     Mabel Vega PT Physical Therapist                   Obj/Interventions       [facility] Name 12/28/24 1355          Range of Motion Comprehensive    General Range of Motion bilateral lower extremity ROM WFL  -[facility] Name 12/28/24 1355          Strength Comprehensive (MMT)    General Manual Muscle Testing (MMT) Assessment lower  extremity strength deficits identified  -     Comment, General Manual Muscle Testing (MMT) Assessment Generalized weakness, MMT limited by pain  -       Row Name 12/28/24 1358          Balance    Balance Assessment sitting static balance;sitting dynamic balance;standing static balance;standing dynamic balance  -     Static Sitting Balance supervision  -     Dynamic Sitting Balance standby assist  -     Position, Sitting Balance unsupported;sitting edge of bed  -     Static Standing Balance standby assist  -     Dynamic Standing Balance standby assist  -     Position/Device Used, Standing Balance supported;walker, front-wheeled  -     Balance Interventions standing;sit to stand;supported;dynamic;sitting;static  -       Row Name 12/28/24 135          Sensory Assessment (Somatosensory)    Sensory Assessment (Somatosensory) LE sensation intact  -               User Key  (r) = Recorded By, (t) = Taken By, (c) = Cosigned By      Initials Name Provider Type     Mabel Vega, PT Physical Therapist                   Goals/Plan       Row Name 12/28/24 7735          Bed Mobility Goal 1 (PT)    Activity/Assistive Device (Bed Mobility Goal 1, PT) bed mobility activities, all  -     [place] Level/Cues Needed (Bed Mobility Goal 1, PT) independent  -     Time Frame (Bed Mobility Goal 1, PT) 1 week  -[facility] Name 12/28/24 6100          Transfer Goal 1 (PT)    Activity/Assistive Device (Transfer Goal 1, PT) transfers, all  -     [place] Level/Cues Needed (Transfer Goal 1, PT) independent  -     Time Frame (Transfer Goal 1, PT) 1 week  -[facility] Name 12/28/24 3522          Gait Training Goal 1 (PT)    Activity/Assistive Device (Gait Training Goal 1, PT) gait (walking locomotion)  -     [place] Level (Gait Training Goal 1, PT) independent  -     Distance (Gait Training Goal 1, PT) 150ft  -     Time Frame (Gait Training Goal 1, PT) 1 week  -[facility] Name 12/28/24  7227          Therapy Assessment/Plan (PT)    Planned Therapy Interventions (PT) bed mobility training;balance training;gait training;home exercise program;patient/family education;ROM (range of motion);stair training;strengthening;stretching;transfer training  -               User Key  (r) = Recorded By, (t) = Taken By, (c) = Cosigned By      Initials Name Provider Type     Mabel Vega, PT Physical Therapist                   Clinical Impression       Row Name 12/28/24 4010          Pain    Pretreatment Pain Rating 8/10  -     Posttreatment Pain Rating 10/10  -     Pain Location back  -     Pain Side/Orientation lower  -     Pain Management Interventions exercise or physical activity utilized  -     Response to Pain Interventions activity participation with increased pain  -       Row Name 12/28/24 4008          Plan of Care Review    Plan of Care Reviewed With patient  -     Outcome Evaluation Pt is a 70 yo F admitted from home with LBO, worsening over the last week. Pt reports she lives with her daughter and is typically independent with a rwx. Pt has 5 JOSE and lives in a tri-level home with her bedroom on the 3rd floor. Per MARIAM, plans for possible LESI Monday 12/30 and pt okay for OOB mobility as tolerated. Pt presents to PT with impaired strength, endurance, and pain limiting overall mobility. Pt transferred to EOB with SBA and increased time to complete - cued for log roll. Pt stood with SBA and ambulated 20ft to bathroom with SBA and rwx. Pt independent with toilet transfers and hygiene. Ambulated 20ft back to room SBA and agreeable to sitting UIC, left with needs met. PT will continue to follow, anticipate DC home with HHPT vs OPPT pending progress after LESI.  -       Row Name 12/28/24 6652          Therapy Assessment/Plan (PT)    Patient/Family Therapy Goals Statement (PT) Return to PLOF  -     Rehab Potential (PT) good  -     Criteria for Skilled Interventions Met (PT) yes   -     Therapy Frequency (PT) 5 times/wk  -       Row Name 12/28/24 1356          Vital Signs    O2 Delivery Pre Treatment room air  -     O2 Delivery Intra Treatment room air  -     O2 Delivery Post Treatment room air  -       Row Name 12/28/24 1356          Positioning and Restraints    Pre-Treatment Position in bed  -     Post Treatment Position chair  -     In Chair notified nsg;reclined;call light within reach;encouraged to call for assist  -               User Key  (r) = Recorded By, (t) = Taken By, (c) = Cosigned By      Initials Name Provider Type     Mabel Vega PT Physical Therapist                   Outcome Measures       Row Name 12/28/24 1359 12/28/24 0820       How much help from another person do you currently need...    Turning from your back to your side while in flat bed without using bedrails? 3  - 4  -JJ    Moving from lying on back to sitting on the side of a flat bed without bedrails? 3  - 4  -JJ    Moving to and from a bed to a chair (including a wheelchair)? 4  - 3  -JJ    Standing up from a chair using your arms (e.g., wheelchair, bedside chair)? 4  - 3  -JJ    Climbing 3-5 steps with a railing? 3  - 3  -JJ    To walk in hospital room? 3  - 3  -JJ    AM-PAC 6 Clicks Score (PT) 20  - 20  -JJ    Highest Level of Mobility Goal 6 --> Walk 10 steps or more  - 6 --> Walk 10 steps or more  -      Row Name 12/28/24 1359          Functional Assessment    Outcome Measure Options AM-PAC 6 Clicks Basic Mobility (PT)  -               User Key  (r) = Recorded By, (t) = Taken By, (c) = Cosigned By      Initials Name Provider Type    Mabel Weber PT Physical Therapist    Kiah Alexander, RN Registered Nurse                                 Physical Therapy Education       Title: PT OT SLP Therapies (In Progress)       Topic: Physical Therapy (In Progress)       Point: Mobility training (Done)       Learning Progress Summary            Patient Acceptance,  E,TB,D, VU,NR by  at 12/28/2024 1359                      Point: Home exercise program (Not Started)       Learner Progress:  Not documented in this visit.              Point: Body mechanics (Done)       Learning Progress Summary            Patient Acceptance, E,TB,D, VU,NR by  at 12/28/2024 1359                      Point: Precautions (Done)       Learning Progress Summary            Patient Acceptance, E,TB,D, VU,NR by  at 12/28/2024 1359                                      User Key       Initials Effective Dates Name Provider Type Discipline     04/08/22 -  Mabel Vega, PT Physical Therapist PT                  PT Recommendation and Plan  Planned Therapy Interventions (PT): bed mobility training, balance training, gait training, home exercise program, patient/family education, ROM (range of motion), stair training, strengthening, stretching, transfer training  Outcome Evaluation: Pt is a 70 yo F admitted from home with LBO, worsening over the last week. Pt reports she lives with her daughter and is typically independent with a rwx. Pt has 5 JOSE and lives in a tri-level home with her bedroom on the 3rd floor. Per MARIAM, plans for possible LESI Monday 12/30 and pt okay for OOB mobility as tolerated. Pt presents to PT with impaired strength, endurance, and pain limiting overall mobility. Pt transferred to EOB with SBA and increased time to complete - cued for log roll. Pt stood with SBA and ambulated 20ft to bathroom with SBA and rwx. Pt independent with toilet transfers and hygiene. Ambulated 20ft back to room SBA and agreeable to sitting UIC, left with needs met. PT will continue to follow, anticipate DC home with HHPT vs OPPT pending progress after LESI.     Time Calculation:   PT Evaluation Complexity  History, PT Evaluation Complexity: 1-2 personal factors and/or comorbidities  Examination of Body Systems (PT Eval Complexity): total of 3 or more elements  Clinical Presentation (PT Evaluation  Complexity): evolving  Clinical Decision Making (PT Evaluation Complexity): moderate complexity  Overall Complexity (PT Evaluation Complexity): moderate complexity     PT Charges       Row Name 12/28/24 1400             Time Calculation    Start Time 0851  -      Stop Time 0919  -      Time Calculation (min) 28 min  -      PT Received On 12/28/24  -      PT - Next Appointment 12/30/24  -      PT Goal Re-Cert Due Date 01/04/25  -         Time Calculation- PT    Total Timed Code Minutes- PT 23 minute(s)  -         Timed Charges    04851 - Gait Training Minutes  10  -      67921 - PT Therapeutic Activity Minutes 13  -         Total Minutes    Timed Charges Total Minutes 23  -       Total Minutes 23  -                User Key  (r) = Recorded By, (t) = Taken By, (c) = Cosigned By      Initials Name Provider Type     Mabel Vega, PT Physical Therapist                  Therapy Charges for Today       Code Description Service Date Service Provider Modifiers Qty    32341466041 HC GAIT TRAINING EA 15 MIN 12/28/2024 Mabel Vega, PT GP 1    [id number]  PT THERAPEUTIC ACT EA 15 MIN 12/28/2024 Mabel Vega, PT GP 1    [id number]  PT EVAL MOD COMPLEXITY 3 12/28/2024 Mabel Vega, PT GP 1            PT G-Codes  Outcome Measure Options: AM-PAC 6 Clicks Basic Mobility (PT)  AM-PAC 6 Clicks Score (PT): 20  PT Discharge Summary  Anticipated Discharge Disposition (PT): home with home health, home with outpatient therapy services, home with assist    Mabel Vega PT  12/28/2024

## 2024-12-28 NOTE — PLAN OF CARE
Goal Outcome Evaluation:      Pt admitted to observation for further evaluation of her lower back pain. MRI pending. Lidocaine patch applied, pain medication as needed. Neurosurgery consult. Pt is alert and oriented x4, room air.

## 2024-12-28 NOTE — ED NOTES
ATTEMPTED TO CALL REPORT TO NICK IN OBS UNIT. UNABLE TO GIVE REPORT AND HE STATES HE WILL CALL BACK AT A LATER TIME.

## 2024-12-28 NOTE — PLAN OF CARE
Goal Outcome Evaluation:  Plan of Care Reviewed With: patient           Outcome Evaluation: Pt is a 68 yo F admitted from home with LBO, worsening over the last week. Pt reports she lives with her daughter and is typically independent with a rwx. Pt has 5 JOSE and lives in a tri-level home with her bedroom on the 3rd floor. Per MARIAM, plans for possible LESI Monday 12/30 and pt okay for OOB mobility as tolerated. Pt presents to PT with impaired strength, endurance, and pain limiting overall mobility. Pt transferred to EOB with SBA and increased time to complete - cued for log roll. Pt stood with SBA and ambulated 20ft to bathroom with SBA and rwx. Pt independent with toilet transfers and hygiene. Ambulated 20ft back to room SBA and agreeable to sitting UIC, left with needs met. PT will continue to follow, anticipate DC home with HHPT vs OPPT pending progress after LESI.    Anticipated Discharge Disposition (PT): home with home health, home with outpatient therapy services, home with assist

## 2024-12-28 NOTE — PLAN OF CARE
Goal Outcome Evaluation:  Plan of Care Reviewed With: patient        Progress: no change  Outcome Evaluation: 68yo female pt, came in for Low backpain and flank pain. AO4, VSS, up with walker. Seen by Neuro sx, plan to do YELITZA on Monday. With orders to hold Eliquis and bridge with Lovenox to start tonight. Pt stills complains of pain, PRN pain medications and muscle relaxants given, IV decadron given, Seen by PT today.

## 2024-12-28 NOTE — CONSULTS
[facility] NEUROSURGERY CONSULT NOTE    Patient name: Barbara Tran  Referring Provider: Octavio HARRIS  Reason for Consultation: Low back pain     Patient Care Team:  Millicent Ace MD as PCP - General (Family Medicine)  Kin Patel MD as Consulting Physician (Orthopedic Surgery)  Bere Beckford MD as Consulting Physician (Cardiology)  Patrizia Page MD as Consulting Physician (Ophthalmology)  Sarah Powell APRN as Referring Physician (Nurse Practitioner)    Chief complaint: Low back pain     Subjective .     History of present illness:   Patient is a 69 y.o.  female  with a history of DVT, PE, coronary artery disease presented to the ED with mid and low back pain that started a week ago. The patient reports that the pain has gradually increased. It is worse with any movement.     She has also had worsening shortness of breath since she was diagnosed with COVID/FLU three months ago.     The patient says that the pain is on the left and right between her bra strap and waste band.   The pain doesn't radiate. She denies numbness/tingling. She denies any BUE or BLE weakness.     She says that she tore a ligament in the LLE in August and had been going to therapy but ambulates without difficulty.     She does use a walker. She hasn't had any falls. She denies any new incontinence of bowel/bladder.     She is prescribed Eliquis for a history of recent DVT and PE.    SOCIAL HISTORY  Social History     Tobacco Use    Smoking status: Never     Passive exposure: Never    Smokeless tobacco: Never    Tobacco comments:     N/A   Vaping Use    Vaping status: Never Used   Substance Use Topics    Alcohol use: Yes     Alcohol/week: 2.0 standard drinks of alcohol     Types: 1 Glasses of wine, 1 Cans of beer per week     Comment: socially    Drug use: Never       History  PAST MEDICAL HISTORY  Past Medical History:   Diagnosis Date    Allergic 2015    codeine, morphine, levaquin    Arthritis l5 years or so ago     Asthma 9-    Basal cell carcinoma     CAD (coronary artery disease)     Cataract 6-9 months ago    Need surgery    CHF (congestive heart failure)     Coronary artery disease 2005 stent    COVID-19 virus detected 03/10/2021    Deep vein thrombosis 06/03/2021    Pulmonary embolism    Depression     Disease of thyroid gland     Headache Covid 3-6-21    Has continued    History of pulmonary embolism 06/01/2021    History of urinary tract infection     HL (hearing loss) Last 4-6 months    Hyperlipidemia     Hypertension since 2005    Hypothyroidism since 2012    Multinodular goiter     Obesity     Osteopenia last few years    Osteoporosis     Pulmonary embolism 06/03/2021    From Covid    Pulmonic valve regurgitation     Tricuspid valve regurgitation     Unstable angina 04/16/2024       PAST SURGICAL HISTORY  Past Surgical History:   Procedure Laterality Date    BUNIONECTOMY Bilateral     HAMMERT TOE REPAIR/BUNIONECTOMY    CARDIAC CATHETERIZATION  2015, 2018    CARDIAC CATHETERIZATION N/A 04/29/2024    Procedure: Coronary angiography;  Surgeon: Cong Rivera MD;  Location:  [facility] CATH INVASIVE LOCATION;  Service: Cardiovascular;  Laterality: N/A;    CARDIAC CATHETERIZATION N/A 04/29/2024    Procedure: Left Heart Cath;  Surgeon: Cong Rivera MD;  [facility];  Service: Cardiovascular;  Laterality: N/A;    CARDIAC CATHETERIZATION N/A 04/29/2024    Procedure: Left ventriculography;  Surgeon: Cong Rivera MD;  Location:  [facility] CATH INVASIVE LOCATION;  Service: Cardiovascular;  Laterality: N/A;    CATARACT EXTRACTION      CHOLECYSTECTOMY  1995    COLONOSCOPY N/A 12/19/2016    Procedure: COLONOSCOPY WITH COLD BIOPSIES;  Surgeon: Medina Guillen MD;  Location: [facility] ENDOSCOPY;  Service:     CORONARY STENT PLACEMENT  2005    LAD 80% blockage    CYST REMOVAL      GANGLION CYST EXCISION      R WRIST    SKIN BIOPSY      SUBTOTAL HYSTERECTOMY  2009    THYROID BIOPSY  2014     TONSILLECTOMY  1965    TUBAL ABDOMINAL LIGATION         FAMILY HISTORY  Family History   Problem Relation Age of Onset    Heart disease Mother             Heart disease Father             Heart attack Father     Diabetes Sister     Heart disease Sister 69            Heart disease Brother         open heart-bypass    Cancer Brother         Bladder/Rodney    Arthritis Brother     Heart disease Brother 62            Heart attack Brother             Arthritis Son     No Known Problems Maternal Aunt     No Known Problems Maternal Uncle     No Known Problems Paternal Aunt     No Known Problems Paternal Uncle     No Known Problems Maternal Grandmother     No Known Problems Maternal Grandfather     No Known Problems Paternal Grandmother     No Known Problems Paternal Grandfather     Celiac disease Neg Hx     Cirrhosis Neg Hx     Colon cancer Neg Hx     Colon polyps Neg Hx     Crohn's disease Neg Hx     Cystic fibrosis Neg Hx     Esophageal cancer Neg Hx     Hemochromatosis Neg Hx     Inflammatory bowel disease Neg Hx     Irritable bowel syndrome Neg Hx     Liver cancer Neg Hx     Liver disease Neg Hx     Rectal cancer Neg Hx     Stomach cancer Neg Hx     Ulcerative colitis Neg Hx     Dante's disease Neg Hx     Alcohol abuse Neg Hx     Pancreatitis Neg Hx        Allergies:  Codeine, Levofloxacin, and Morphine    MEDICATIONS:  Medications Prior to Admission   Medication Sig Dispense Refill Last Dose/Taking    apixaban (ELIQUIS) 5 MG tablet tablet Take 1 tablet by mouth Every 12 (Twelve) Hours. Indications: DVT/PE (active thrombosis) 60 tablet 2 Taking    buPROPion XL (WELLBUTRIN XL) 300 MG 24 hr tablet Take 1 tablet by mouth Daily. Indications: Major Depressive Disorder 90 tablet 3 Taking    calcium carbonate (OS-SHAHNAZ) 600 MG tablet Take 1 tablet by mouth 2 (Two) Times a Day With Meals. Indications: Low Amount of Calcium in the Blood   Taking    empagliflozin (JARDIANCE) 10 MG tablet  tablet Take 1 tablet by mouth Daily. Indications: Cardiac Failure 90 tablet 3 Taking    HYDROcodone-acetaminophen (NORCO) 5-325 MG per tablet Take 1 tablet by mouth Every 8 (Eight) Hours As Needed for Moderate Pain. Indications: Pain 45 tablet 0 Taking As Needed    ibandronate (Boniva) 150 MG tablet Take 1 tablet by mouth Every 30 (Thirty) Days. Patient taking once a month   Taking    levothyroxine (Synthroid) 50 MCG tablet Take 1 tablet by mouth Daily. Indications: Underactive Thyroid 90 tablet 0 Taking    magnesium oxide (MAG-OX) 400 MG tablet Take 2 tablets by mouth 2 (two) times a day. Indications: Disorder with Low Magnesium Levels   Taking    rosuvastatin (CRESTOR) 40 MG tablet Take 1 tablet by mouth Every Night. 90 tablet 3 Taking    sacubitril-valsartan (ENTRESTO) 24-26 MG tablet Take 1 tablet by mouth 2 (Two) Times a Day. Indications: Cardiac Failure 28 tablet 0 Taking    spironolactone (ALDACTONE) 25 MG tablet Take 1 tablet by mouth Daily. Indications: Edema 90 tablet 3 Taking    torsemide (DEMADEX) 20 MG tablet Take 1 tablet by mouth Daily. Indications: Cardiac Failure   Taking    zolpidem (AMBIEN) 5 MG tablet Take 1 tablet by mouth At Night As Needed for Sleep. Indications: Trouble Sleeping 30 tablet 0 Taking As Needed    benzonatate (TESSALON) 200 MG capsule Take 1 capsule by mouth Every 4 (Four) Hours As Needed for Cough. (Patient not taking: Reported on 12/27/2024)          Review of Systems  Review of Systems   Constitutional:  Positive for activity change.   Gastrointestinal: Negative.    Genitourinary: Negative.    Musculoskeletal:  Positive for arthralgias, back pain and myalgias.   Neurological: Negative.        Objective     Physical Exam:  Physical Exam  HENT:      Head: Normocephalic.   Eyes:      Pupils: Pupils are equal, round, and reactive to light.   Pulmonary:      Effort: Pulmonary effort is normal.   Abdominal:      Palpations: Abdomen is soft.   Musculoskeletal:      Cervical back:  Normal range of motion and neck supple.      Right lower leg: Edema present.      Left lower leg: Edema present.   Skin:     General: Skin is warm and dry.   Neurological:      General: No focal deficit present.      Mental Status: She is alert.      Cranial Nerves: Cranial nerves 2-12 are intact.      Motor: Motor function is intact.      Coordination: Coordination is intact.      Comments: Tenderness Thoracic/lumbar bilaterally  Bilateral positive straight leg raise  Bilateral gastroc 5/5, bilateral tibialis anterior 5/5  BUE 5/5       Results Review:  LABS:    Admission on 12/27/2024   Component Date Value Ref Range Status    Glucose 12/27/2024 138 (H)  65 - 99 mg/dL Final    BUN 12/27/2024 18  8 - 23 mg/dL Final    Creatinine 12/27/2024 0.71  0.57 - 1.00 mg/dL Final    Sodium 12/27/2024 136  136 - 145 mmol/L Final    Potassium 12/27/2024 3.9  3.5 - 5.2 mmol/L Final    Chloride 12/27/2024 99  98 - 107 mmol/L Final    CO2 12/27/2024 26.6  22.0 - 29.0 mmol/L Final    Calcium 12/27/2024 9.3  8.6 - 10.5 mg/dL Final    Total Protein 12/27/2024 7.5  6.0 - 8.5 g/dL Final    Albumin 12/27/2024 4.3  3.5 - 5.2 g/dL Final    ALT (SGPT) 12/27/2024 17  1 - 33 U/L Final    AST (SGOT) 12/27/2024 17  1 - 32 U/L Final    Alkaline Phosphatase 12/27/2024 72  39 - 117 U/L Final    Total Bilirubin 12/27/2024 0.3  0.0 - 1.2 mg/dL Final    Globulin 12/27/2024 3.2  gm/dL Final    A/G Ratio 12/27/2024 1.3  g/dL Final    BUN/Creatinine Ratio 12/27/2024 25.4 (H)  7.0 - 25.0 Final    Anion Gap 12/27/2024 10.4  5.0 - 15.0 mmol/L Final    eGFR 12/27/2024 91.0  >60.0 mL/min/1.73 Final    D-Dimer, Quantitative 12/27/2024 0.39  0.00 - 0.71 MCGFEU/mL Final    HS Troponin T 12/27/2024 7  <14 ng/L Final    QT Interval 12/27/2024 392  ms Final    QTC Interval 12/27/2024 464  ms Final    WBC 12/27/2024 8.91  3.40 - 10.80 10*3/mm3 Final    RBC 12/27/2024 5.19  3.77 - 5.28 10*6/mm3 Final    Hemoglobin 12/27/2024 15.7  12.0 - 15.9 g/dL Final     Hematocrit 12/27/2024 48.4 (H)  34.0 - 46.6 % Final    MCV 12/27/2024 93.3  79.0 - 97.0 fL Final    MCH 12/27/2024 30.3  26.6 - 33.0 pg Final    MCHC 12/27/2024 32.4  31.5 - 35.7 g/dL Final    RDW 12/27/2024 13.0  12.3 - 15.4 % Final    RDW-SD 12/27/2024 45.2  37.0 - 54.0 fl Final    MPV 12/27/2024 9.6  6.0 - 12.0 fL Final    Platelets 12/27/2024 325  140 - 450 10*3/mm3 Final    Neutrophil % 12/27/2024 62.0  42.7 - 76.0 % Final    Lymphocyte % 12/27/2024 30.3  19.6 - 45.3 % Final    Monocyte % 12/27/2024 5.3  5.0 - 12.0 % Final    Eosinophil % 12/27/2024 2.0  0.3 - 6.2 % Final    Basophil % 12/27/2024 0.3  0.0 - 1.5 % Final    Immature Grans % 12/27/2024 0.1  0.0 - 0.5 % Final    Neutrophils, Absolute 12/27/2024 5.52  1.70 - 7.00 10*3/mm3 Final    Lymphocytes, Absolute 12/27/2024 2.70  0.70 - 3.10 10*3/mm3 Final    Monocytes, Absolute 12/27/2024 0.47  0.10 - 0.90 10*3/mm3 Final    Eosinophils, Absolute 12/27/2024 0.18  0.00 - 0.40 10*3/mm3 Final    Basophils, Absolute 12/27/2024 0.03  0.00 - 0.20 10*3/mm3 Final    Immature Grans, Absolute 12/27/2024 0.01  0.00 - 0.05 10*3/mm3 Final    HS Troponin T 12/27/2024 7  <14 ng/L Final    Troponin T Numeric Delta 12/27/2024 0  Abnormal if >/=3 ng/L Final    ADENOVIRUS, PCR 12/28/2024 Not Detected  Not Detected Final    Coronavirus 229E 12/28/2024 Not Detected  Not Detected Final    Coronavirus HKU1 12/28/2024 Not Detected  Not Detected Final    Coronavirus NL63 12/28/2024 Not Detected  Not Detected Final    Coronavirus OC43 12/28/2024 Not Detected  Not Detected Final    COVID19 12/28/2024 Not Detected  Not Detected - Ref. Range Final    Human Metapneumovirus 12/28/2024 Not Detected  Not Detected Final    Human Rhinovirus/Enterovirus 12/28/2024 Not Detected  Not Detected Final    Influenza A PCR 12/28/2024 Not Detected  Not Detected Final    Influenza B PCR 12/28/2024 Not Detected  Not Detected Final    Parainfluenza Virus 1 12/28/2024 Not Detected  Not Detected Final     Parainfluenza Virus 2 12/28/2024 Not Detected  Not Detected Final    Parainfluenza Virus 3 12/28/2024 Not Detected  Not Detected Final    Parainfluenza Virus 4 12/28/2024 Not Detected  Not Detected Final    RSV, PCR 12/28/2024 Not Detected  Not Detected Final    Bordetella pertussis pcr 12/28/2024 Not Detected  Not Detected Final    Bordetella parapertussis PCR 12/28/2024 Not Detected  Not Detected Final    Chlamydophila pneumoniae PCR 12/28/2024 Not Detected  Not Detected Final    Mycoplasma pneumo by PCR 12/28/2024 Not Detected  Not Detected Final    Procalcitonin 12/28/2024 0.05  0.00 - 0.25 ng/mL Final    Lactate 12/28/2024 1.2  0.5 - 2.0 mmol/L Final       DIAGNOSTICS:  MRI LUMBAR/THORACIC 12/28/24  IMPRESSION:       Severe facet arthropathy at L4-L5 and L5-S1.  Impingement of the right L5   nerve root in the lateral recess at L4-L5 and abutment of the left L5   nerve root in L5-S1 neuroforamen.     IMPRESSION:        Electronically signed by Scooter Lennon MD on 12-28-24 at 0812    Results Review:   I reviewed the patient's new clinical results.  I personally viewed the patient's chart, it was also reviewed by and discussed with Dr Rich    Vital Signs   Temp:  [97.9 °F (36.6 °C)-98.8 °F (37.1 °C)] 97.9 °F (36.6 °C)  Heart Rate:  [] 84  Resp:  [16-18] 17  BP: (117-134)/() 124/106      Assessment & Plan       Low back pain    Retrolisthesis of vertebrae    The patient is a 69 with a history of DVT, PE, coronary artery disease presented to the ED with mid and low back pain that started a week ago.     The patient is prescribed Eliquis for recent DVT/PE. This is currently on hold and she was started on Lovenox.     The patient reports severe pain that is worse with movement. She denies any BLE or BUE weakness.     PLAN:   Will start Robaxin 500mg every 8 hours prn  Will consult Pain Management for possible YELITZA on Monday 12/30  Continue to hold Eliquis for YELITZA  Will need to hold Lovenox 24 hours prior  to YELITZA  PT evaluation/treat      I discussed the patient's findings and my recommendations with patient and Dr. Rich.       Josette Hilton, APRN  12/28/24  07:54 EST

## 2024-12-28 NOTE — PROGRESS NOTES
"[facility] Clinical Pharmacy Services: Enoxaparin Consult    Barbara TUCKER Tran has a pharmacy consult to dose full-dose enoxaparin per Alaina Mcclelland PA-C's request.     Indication: DVT/PE (active thrombosis)  Home Anticoagulation: apixaban 5 mg oral BID     Relevant clinical data and objective history reviewed:  69 y.o. female 170.2 cm (67.01\") 92.5 kg (203 lb 14.8 oz)   Body mass index is 31.93 kg/m².   Results from last 7 days   Lab Units 12/28/24  0818   PLATELETS 10*3/mm3 295     Estimated Creatinine Clearance: 86.1 mL/min (by C-G formula based on SCr of 0.7 mg/dL).    Assessment/Plan    Will start patient on  90 (1mg/kg) subcutaneous every 12 hours, adjusted for renal function. Consult order will be discontinued but pharmacy will continue to follow.  Received apixaban 5 mg 12/28 @ 0950.  Start enoxaparin 12/28 @ 2100.    Romie Ventura III, formerly [facility]  Clinical Pharmacist    "

## 2024-12-29 LAB
BILIRUB UR QL STRIP: NEGATIVE
CLARITY UR: CLEAR
COLOR UR: YELLOW
GLUCOSE UR STRIP-MCNC: ABNORMAL MG/DL
HGB UR QL STRIP.AUTO: NEGATIVE
KETONES UR QL STRIP: NEGATIVE
LEUKOCYTE ESTERASE UR QL STRIP.AUTO: NEGATIVE
NITRITE UR QL STRIP: NEGATIVE
PH UR STRIP.AUTO: 6.5 [PH] (ref 5–8)
PROT UR QL STRIP: NEGATIVE
SP GR UR STRIP: 1.02 (ref 1–1.03)
UROBILINOGEN UR QL STRIP: ABNORMAL

## 2024-12-29 PROCEDURE — G0378 HOSPITAL OBSERVATION PER HR: HCPCS

## 2024-12-29 PROCEDURE — 25010000002 DEXAMETHASONE PER 1 MG: Performed by: EMERGENCY MEDICINE

## 2024-12-29 PROCEDURE — 99214 OFFICE O/P EST MOD 30 MIN: CPT | Performed by: NURSE PRACTITIONER

## 2024-12-29 PROCEDURE — 81003 URINALYSIS AUTO W/O SCOPE: CPT | Performed by: PHYSICIAN ASSISTANT

## 2024-12-29 RX ADMIN — HYDROCODONE BITARTRATE AND ACETAMINOPHEN 1 TABLET: 7.5; 325 TABLET ORAL at 20:07

## 2024-12-29 RX ADMIN — TORSEMIDE 20 MG: 20 TABLET ORAL at 10:23

## 2024-12-29 RX ADMIN — METHOCARBAMOL 500 MG: 500 TABLET ORAL at 16:15

## 2024-12-29 RX ADMIN — DEXAMETHASONE SODIUM PHOSPHATE 4 MG: 4 INJECTION, SOLUTION INTRAMUSCULAR; INTRAVENOUS at 16:15

## 2024-12-29 RX ADMIN — LEVOTHYROXINE SODIUM 50 MCG: 0.05 TABLET ORAL at 05:09

## 2024-12-29 RX ADMIN — ROSUVASTATIN CALCIUM 40 MG: 40 TABLET, FILM COATED ORAL at 20:07

## 2024-12-29 RX ADMIN — ACETAMINOPHEN 650 MG: 325 TABLET ORAL at 02:31

## 2024-12-29 RX ADMIN — DEXAMETHASONE SODIUM PHOSPHATE 4 MG: 4 INJECTION, SOLUTION INTRAMUSCULAR; INTRAVENOUS at 22:14

## 2024-12-29 RX ADMIN — HYDROCODONE BITARTRATE AND ACETAMINOPHEN 1 TABLET: 7.5; 325 TABLET ORAL at 05:09

## 2024-12-29 RX ADMIN — LIDOCAINE 2 PATCH: 4 PATCH TOPICAL at 10:22

## 2024-12-29 RX ADMIN — DEXAMETHASONE SODIUM PHOSPHATE 4 MG: 4 INJECTION, SOLUTION INTRAMUSCULAR; INTRAVENOUS at 10:23

## 2024-12-29 RX ADMIN — Medication 10 ML: at 20:09

## 2024-12-29 RX ADMIN — SACUBITRIL AND VALSARTAN 1 TABLET: 24; 26 TABLET, FILM COATED ORAL at 20:07

## 2024-12-29 RX ADMIN — Medication 10 ML: at 10:23

## 2024-12-29 RX ADMIN — BUPROPION HYDROCHLORIDE 300 MG: 300 TABLET, EXTENDED RELEASE ORAL at 10:22

## 2024-12-29 RX ADMIN — DEXAMETHASONE SODIUM PHOSPHATE 4 MG: 4 INJECTION, SOLUTION INTRAMUSCULAR; INTRAVENOUS at 05:09

## 2024-12-29 RX ADMIN — MAGNESIUM OXIDE TAB 400 MG (241.3 MG ELEMENTAL MG) 800 MG: 400 (241.3 MG) TAB at 10:22

## 2024-12-29 RX ADMIN — MAGNESIUM OXIDE TAB 400 MG (241.3 MG ELEMENTAL MG) 800 MG: 400 (241.3 MG) TAB at 20:07

## 2024-12-29 RX ADMIN — HYDROCODONE BITARTRATE AND ACETAMINOPHEN 1 TABLET: 7.5; 325 TABLET ORAL at 14:03

## 2024-12-29 RX ADMIN — METHOCARBAMOL 500 MG: 500 TABLET ORAL at 07:36

## 2024-12-29 RX ADMIN — SACUBITRIL AND VALSARTAN 1 TABLET: 24; 26 TABLET, FILM COATED ORAL at 10:22

## 2024-12-29 RX ADMIN — SPIRONOLACTONE 25 MG: 25 TABLET ORAL at 10:22

## 2024-12-29 NOTE — PROGRESS NOTES
GILLES GALEAS Attestation Note    I supervised care provided by the midlevel provider.    The FELICITA and I have discussed this patient's history, physical exam, and treatment plan. I have reviewed the documentation and personally had a face to face interaction with the patient  I affirm the documentation and agree with the treatment and plan. I provided a substantive portion of the care of this patient.  I personally performed the physical exam, in its entirety.  My personal findings are documented in below:    History:  Patient admitted to observation unit for further management of lumbar back pain bilaterally.  She says her pain is 7 out of 10 this morning.  Did have some relief with muscle relaxer use yesterday.  No pain radiating into the legs.  No bowel or bladder incontinence problems.    Physical Exam:  General: No acute distress.  HENT: NCAT  Eyes: no scleral icterus.  Neck: Painless range of motion  CV: Pink warm and well-perfused throughout  Respiratory: No distress or increased work of breathing  Abdomen: soft, no focal tenderness or rigidity  Musculoskeletal: no deformity.  Neuro: Alert, speech fluent and easily intelligible  Skin: warm, dry.    Assessment and Plan:  Lumbar back pain: MRI lumbar spine shows Severe facet arthropathy at L4-L5 and L5-S1.  Impingement of the right L5 nerve root in the lateral recess at L4-L5 and abutment of the left L5 nerve root in L5-S1 neuroforamen.  Consulted.  IV steroids initiated.  Multimodal analgesia continued.  Anticipate epidural injection procedure tomorrow.

## 2024-12-29 NOTE — PLAN OF CARE
Goal Outcome Evaluation:            Patient admitted to ED observation for evaluation of low back pain. She has planned injection in her back on Monday. Her VSS, she is steady to walk with walker. Pain is managed currently with her medications ordered.

## 2024-12-29 NOTE — PROGRESS NOTES
ED OBSERVATION PROGRESS/DISCHARGE SUMMARY    Date of Admission: 12/27/2024   LOS: 0 days   PCP: Millicent Ace MD    Final Diagnosis Back pain      Subjective     Hospital Outcome:    Barbara Tran is a 69 y.o. female comes in complaining of bilateral low back pain.  Patient states that on Sunday she started to have back pain somewhat worse on the left than the right.  However patient states that pain is now on both sides and goes from the flank to over the hips bilaterally.  Patient denies any pain shooting down either leg.  Patient denies any saddle anesthesia, numbness or tingling of the legs, urinary or bowel dysfunction, dysuria, urinary frequency, fever or chills.  Patient denies any nausea or vomiting.  Patient denies any history of back surgery.  Patient denies any heavy lifting over the weekend.  Patient states that her pain feels different than typical muscle pain.  Of note, patient reports having a cough about 2 weeks ago that has since overall improved but not gone away.     In the ED, Initial troponin 7, repeat troponin of 7, CMP largely unremarkable for acute findings.  D-dimer negative.  CBC largely unremarkable for acute findings. Chest x-ray shows small atelectasis or infiltrate peripherally at the left base, follow-up suggested.  X-ray thoracic and lumbar spine shows mild retrolisthesis of L1 on L2, L2 on L3, mild anterior listhesis of L4 on L5.  Multilevel endplate spurring, disc space narrowing, and facet arthropathy are present. Duplex of left lower extremity negative for DVT.  EKG shows sinus rhythm 94 bpm, no ST elevation apparent.  Patient is afebrile, pulse in the 80s, on room air oxygen at 97% SpO2 and blood pressure normotensive.     12/28/2024: Patient reports no improvement of her back pain through the evening.  UA obtained and negative for acute infection.  MRI lumbar spine shows severe facet arthropathy at L4-L5 and L5-S1, impingement of the right L5 nerve root in the lateral recess  at L4-L5 and abutment of the left L5 nerve root and L5-S1 neuroforamen.  Thoracic MRI spine pending final read at this time; have reached out to radiology to expedite as the image was ordered stat.  Neurosurgery saw and evaluated the patient and recommended starting Robaxin 500 mg every 8 hours as needed, recommended pain management for possible YELITZA on Monday, 12/30/2024, patient's Eliquis is on hold for this possible procedure and she was bridged to Lovenox for treatment of her active PE till 24 hours prior to procedure.  Patient is unsure about getting the YELITZA on Monday, neurosurgery did go back and reexplained this procedure and recommendations.  Patient would like to try an evening of IV steroids and see how she feels and reevaluate in the morning whether YELITZA is a procedure she would like to undergo.  IV steroids have been ordered.  Will reevaluate in the a.m.  Patient's family at bedside and plan and imaging results also discussed with them patient's request.    12/29/2024: Patient reports mild improvement this morning.  Patient planning for YELITZA tomorrow.  Lovenox on hold at this time for 24 hours prior to procedure, plan to restart her Eliquis postprocedure.  Thoracic spine MRI was read, but there was a issue with having it linked to epic, paper chart brought to the unit and is in the patient's paper chart to be uploaded later.  I did record the results in a separate significant note, it reads for no acute findings in the thoracic spine, mild degenerative disc disease without significant spinal canal narrowing, T2 hyperintense structures adjacent to both shoulders possibly dissecting labral cysts. MARIAM reevaluated the patient today and patient is agreeable for YELITZA on 12/30/2024, so continue current regimen and hopeful discharge after YELITZA if pain/mobility improved. Discussed the Labral cysts finding with the patient and recommend patient follow up with Ortho outpatient if this continues to be an issue for the  patient; Patient gave verbal agreement. Patient did request that her pulmonologist and cardiologist be notified that her ACT was on hold for this procedure so I have sent secure messages to both groups as a courtesy to keep them aware.        ROS:  General: no fevers, chills  Respiratory: no cough, dyspnea  Cardiovascular: no chest pain, palpitations  Abdomen: No abdominal pain, nausea, vomiting, or diarrhea  Neurologic: No focal weakness    Objective   Physical Exam:  I have reviewed the vital signs.  Temp:  [97.6 °F (36.4 °C)-98.6 °F (37 °C)] 97.9 °F (36.6 °C)  Heart Rate:  [75-84] 75  Resp:  [17-18] 18  BP: (109-126)/() 110/68  General Appearance:    Alert, cooperative, no distress  Head:    Normocephalic, atraumatic, normal hearing   Eyes:    Sclerae anicteric, EOMI  Neck:   Supple, non-tender  Lungs: Clear to auscultation bilaterally, respirations unlabored on RA  Heart: Regular rate and rhythm, S1 and S2 normal, no murmur  Abdomen:  Soft, nontender, bowel sounds active, nondistended obese abdomen  Extremities: No clubbing, cyanosis, or edema to lower extremities  Pulses:  2+ and symmetric in distal lower extremities  Skin: No rashes   Neurologic: Oriented x3, Normal strength to extremities    Results Review:    I have reviewed the labs, radiology results and diagnostic studies.    Results from last 7 days   Lab Units 12/28/24  0818   WBC 10*3/mm3 6.97   HEMOGLOBIN g/dL 14.4   HEMATOCRIT % 42.9   PLATELETS 10*3/mm3 295     Results from last 7 days   Lab Units 12/28/24  0818 12/27/24  1840   SODIUM mmol/L 138 136   POTASSIUM mmol/L 3.8 3.9   CHLORIDE mmol/L 103 99   CO2 mmol/L 25.4 26.6   BUN mg/dL 14 18   CREATININE mg/dL 0.70 0.71   CALCIUM mg/dL 8.5* 9.3   BILIRUBIN mg/dL  --  0.3   ALK PHOS U/L  --  72   ALT (SGPT) U/L  --  17   AST (SGOT) U/L  --  17   GLUCOSE mg/dL 121* 138*     Imaging Results (Last 24 Hours)       Procedure Component Value Units Date/Time    MRI Lumbar Spine Without Contrast  [[id number]] Collected: 12/28/24 0812     Updated: 12/28/24 0812    Narrative:        Patient: OLIVE MYERS  Time Out: 08:12  Exam(s): MRI L SPINE Without Contrast     EXAM:    MR Lumbar Spine Without Intravenous Contrast    CLINICAL HISTORY:     Reason for exam: low back pain bilaterally.    TECHNIQUE:    Magnetic resonance images of the lumbar spine without intravenous   contrast in multiple planes.    COMPARISON:    No relevant prior studies available.    FINDINGS:    Vertebrae:  Unremarkable.  No acute fracture.    Spinal cord:  Unremarkable.  Normal signal.    Soft tissues:  Unremarkable.     DISCS SPINAL CANAL NEURAL FORAMINA:    L1-L2:  Unremarkable.  No significant disc disease.  No stenosis.    L2-L3:  Unremarkable.  No significant disc disease.  No stenosis.    L3-L4:  Unremarkable.  No significant disc disease.  No stenosis.    L4-L5:  Mild-moderate disc height loss with broad-based posterior disc   bulge.  Severe bilateral facet arthropathy; there are large faceted genic   cysts.  There is impingement of the right L5 nerve root in the lateral   recess.    L5-S1:  Moderate disc height loss and facet arthropathy.  Moderate to   severe left and moderate right neuroforaminal narrowing.  There is grade   1 retrolisthesis of L5 on S1.    IMPRESSION:       Severe facet arthropathy at L4-L5 and L5-S1.  Impingement of the right L5   nerve root in the lateral recess at L4-L5 and abutment of the left L5   nerve root in L5-S1 neuroforamen.      Impression:          Electronically signed by Scooter Lennon MD on 12-28-24 at 0812    MRI Thoracic Spine Without Contrast [[id number]] Resulted: 12/28/24 0537     Updated: 12/28/24 0610    CT Abdomen Pelvis Without Contrast [[id number]] Collected: 12/28/24 0309     Updated: 12/28/24 0309    Narrative:        Patient: OLIVE MYERS  Time Out: 03:09  Exam(s): CT ABDOMEN + PELVIS Without Contrast     EXAM:    CT Abdomen and Pelvis Without Intravenous Contrast    CLINICAL  HISTORY:     Reason for exam: low back pain, flank pain.    TECHNIQUE:    Axial computed tomography images of the abdomen and pelvis without   intravenous contrast with coronal and sagittal reformats.  CTDI is 13.89   mGy and DLP is 920.9 mGy-cm.  This CT exam was performed according to the   principle of ALARA (As Low As Reasonably Achievable) by using one or more   of the following dose reduction techniques: automated exposure control,   adjustment of the mA and or kV according to patient size, and or use of   iterative reconstruction technique.    COMPARISON:    No relevant prior studies available.    FINDINGS:    Limitations:  Limited due to lack of IV contrast.     ABDOMEN:    Liver:  Unremarkable.    Gallbladder and bile ducts:  Cholecystectomy.    Pancreas:  Unremarkable.    Spleen:  Unremarkable.    Adrenals:  Unremarkable.    Kidneys and ureters:  Unremarkable.    Stomach and bowel:  Unremarkable.     PELVIS:    Appendix:  No findings to suggest acute appendicitis.    Bladder:  Unremarkable.    Reproductive:  Hysterectomy.     ABDOMEN and PELVIS:    Intraperitoneal space:  No pneumoperitoneum.    Bones joints:  See below.      Soft tissues:  Unremarkable.    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  Atherosclerotic arterial calcifications: moderate.   No aortic aneurysm or dissection. Arterial structures otherwise   unremarkable.    Lymph nodes:  Unremarkable.    IMPRESSION:       1.  Limited due to lack of IV contrast.  2.  No acute abnormality.  3.  See additional findings above.  4.  No urolithiasis seen.      Impression:          Electronically signed by Aldo Pacheco MD on 12-28-24 at 0309    CT Chest Without Contrast Diagnostic [[id number]] Collected: 12/28/24 0308     Updated: 12/28/24 0308    Narrative:        Patient: OLIVE MYERS  Time Out: 03:07  Exam(s): CT CHEST Without Contrast     EXAM:    CT Chest Without Intravenous Contrast    CLINICAL  HISTORY:     Reason for exam: abnormal cxr, cough.    TECHNIQUE:    Axial computed tomography images of the chest without intravenous   contrast with coronal and sagittal reformats.  CTDI is  13.89 mGy and DLP   is 920.9 mGy-cm.  This CT exam was performed according to the principle   of ALARA (As Low As Reasonably Achievable) by using one or more of the   following dose reduction techniques: automated exposure control,   adjustment of the mA and or kV according to patient size, and or use of   iterative reconstruction technique.    COMPARISON:    No relevant prior studies available.    FINDINGS:    Limitations:  Limited due to lack of IV contrast.    Lungs:  Unremarkable.    Pleural space:  Unremarkable.    Heart:  Moderate coronary artery calcifications. Otherwise unremarkable.      Bones joints:  No acute displaced fracture.    Soft tissues:  Bilateral breast implants.    Vasculature:  Atherosclerotic arterial calcifications: mild. No aortic   aneurysm or dissection. Arterial structures otherwise unremarkable.    Vascular structures otherwise unremarkable.    Lymph nodes:  No concerning adenopathy.    IMPRESSION:       1.  No acute abnormality.  2.  Limited due to lack of IV contrast.  3.  See additional findings above.      Impression:          Electronically signed by Aldo Pacheco MD on 12-28-24 at 0307            I have reviewed the medications.     Discharge Medications        Continue These Medications        Instructions Start Date   apixaban 5 MG tablet tablet  Commonly known as: ELIQUIS   5 mg, Oral, Every 12 Hours Scheduled      benzonatate 200 MG capsule  Commonly known as: TESSALON   200 mg, Oral, Every 4 Hours PRN      Boniva 150 MG tablet  Generic drug: ibandronate   150 mg, Every 30 Days      buPROPion  MG 24 hr tablet  Commonly known as: WELLBUTRIN XL   300 mg, Oral, Daily      calcium carbonate 600 MG tablet  Commonly known as: OS-SHAHNAZ   1 tablet, 2 Times Daily With Meals       empagliflozin 10 MG tablet tablet  Commonly known as: JARDIANCE   10 mg, Oral, Daily      HYDROcodone-acetaminophen 5-325 MG per tablet  Commonly known as: NORCO   1 tablet, Oral, Every 8 Hours PRN      levothyroxine 50 MCG tablet  Commonly known as: Synthroid   50 mcg, Oral, Daily      magnesium oxide 400 MG tablet  Commonly known as: MAG-OX   2 tablets, 2 times daily      rosuvastatin 40 MG tablet  Commonly known as: CRESTOR   40 mg, Oral, Nightly      sacubitril-valsartan 24-26 MG tablet  Commonly known as: ENTRESTO   1 tablet, Oral, 2 Times Daily      spironolactone 25 MG tablet  Commonly known as: ALDACTONE   25 mg, Oral, Daily      torsemide 20 MG tablet  Commonly known as: DEMADEX   20 mg, Oral, Daily      zolpidem 5 MG tablet  Commonly known as: AMBIEN   5 mg, Oral, Nightly PRN              ---------------------------------------------------------------------------------------------  Assessment & Plan   Assessment/Problem List    Low back pain    Retrolisthesis of vertebrae      Plan:    Low back pain/flank pain  -CT abdomen pelvis without contrast negative for any acute findings  -Will increase patient's home Norco from 5 to 7.5 mg, patient states she is very sensitive to IV pain medicine and does not want Dilaudid or morphine.  -PT consult  - MRI lumbar spine shows severe facet arthropathy at L4-L5 and L5-S1, impingement of the right L5 nerve root in the lateral recess at L4-L5 and abutment of the left L5 nerve root and L5-S1 neuroforamen.    -Thoracic MRI spine has been read but there is a system error with having it linked to her epic account; Paper report in chart to be scanned in at discharge, I have put in a separate significant note with the report today as well. I also contacted [facility] and made them aware of the issue for them to review the report on their rounds.   --Showed no acute spinal issue but did show possible dissecting labral cysts at shoulders; plan for outpatient ortho  -Neurosurgery saw  and evaluated the patient and recommended starting Robaxin 500 mg every 8 hours as needed, recommended pain management for possible YELITZA on Monday, 12/30/2024, patient's Eliquis is on hold for this possible procedure and she was bridged to Lovenox for treatment of her active PE till 24 hours prior to procedure.    -IV steroids continued   -Patient is wanting to pursue the YELITZA and ACT is now on hold and patient will be NPO at midnight     Abnormal chest x-ray suspected to be lingering pulm findings from recent upper respiratory infection as it is negative on CT chest imaging  -Chest x-ray shows small atelectasis or infiltrate peripherally at the left base, follow-up suggested.    -Patient reports recently getting over a cough and head cold the last  2 weeks.  -CT chest without contrast negative for any acute issues  -RVP negative and PCR 0.05     Active PE about 2 months ago  -Holding home Eliquis for potential procedure; bridged with Lovenox at this time with pharmacy to dose  -Will need to hold Lovenox 24 hours prior to a possible YELITZA for primary issue, patient will get a dose this evening 12/28/2024 but will need to be held starting 12/29/2024.     Anxiety/depression  -Continue home Wellbutrin     Essential hypertension, chronic, controlled  -Continue home Entresto, Aldactone, Demadex     Hyperlipidemia  -continue home statin     Hypothyroidism  -continue home Synthroid     History of CHF  -Hold home Jardiance     Obesity, BMI 31.9, encourage lifestyle modifications    Disposition: Likely discharge in 1-2 days    Follow-up after Discharge: Dependent on hospital course    This note will serve as a progress note    Alaina Mcclelland PA-C 12/29/24 07:21 EST    I have worn appropriate PPE during this patient encounter, sanitized my hands both with entering and exiting patient's room.      58 minutes has been spent by [facility] providers in the care of this patient while under observation  status

## 2024-12-29 NOTE — SIGNIFICANT NOTE
MRI thoracic spine without was performed on 12/28/2024 but there was an error with having it connect and result on epic.  Paper copy in chart to be uploaded to patient's chart later.    It was compared to CT dated 11/26/2024    Findings:  Vertebrae showed indeterminate T2 hyperintense structure seen adjacent to the shoulder girdle musculature bilaterally.  Mild endplate edema centered at what is likely the T11-T12 disc space.  No evidence of endplate obstruction to suggest osteomyelitis.    Disc/spinal canal/neural foramen: Unable to provide accurate spinal numbering due to lack of whole field spinal localizer sequences.  This was shallow disc bulge at likely T5-T6.  No spinal canal stenosis.    Spinal cord: Unremarkable.  Normal signal.    Soft tissues: Unremarkable.    Impression: No acute findings in the thoracic spine.  Mild degenerative disc disease without significant spinal canal narrowing.    Addendum: T2 hyperintense structures adjacent to both shoulders, possibly dissecting labral cysts.

## 2024-12-29 NOTE — PROGRESS NOTES
[facility] THORACIC/LUMBAR NEUROSURGERY PROGRESS NOTE      CC: Back pain      Subjective     Interval History: Some improvement with pain this morning     ROS:  Constitutional: No fever, chills  MS: +back pain  Neuro: No numbness, tingling, or weakness,  no balance difficulties  : No difficulty voiding, no incontinence    Objective     Vital signs in last 24 hours:  Temp:  [97.3 °F (36.3 °C)-98.6 °F (37 °C)] 97.5 °F (36.4 °C)  Heart Rate:  [66-80] 66  Resp:  [16-18] 18  BP: (100-126)/(61-78) 121/61    Intake/Output this shift:  No intake/output data recorded.    LABS:  Results from last 7 days   Lab Units 12/28/24  0818 12/27/24  1840   WBC 10*3/mm3 6.97 8.91   HEMOGLOBIN g/dL 14.4 15.7   HEMATOCRIT % 42.9 48.4*   PLATELETS 10*3/mm3 295 325      Results from last 7 days   Lab Units 12/28/24  0818 12/27/24  1840   SODIUM mmol/L 138 136   POTASSIUM mmol/L 3.8 3.9   CHLORIDE mmol/L 103 99   CO2 mmol/L 25.4 26.6   BUN mg/dL 14 18   CREATININE mg/dL 0.70 0.71   CALCIUM mg/dL 8.5* 9.3   BILIRUBIN mg/dL  --  0.3   ALK PHOS U/L  --  72   ALT (SGPT) U/L  --  17   AST (SGOT) U/L  --  17   GLUCOSE mg/dL 121* 138*      12/27/24 D-Dimer 0.39  12/27/24 HS Troponin 7  12/27/24 Procalcitonin 0.05  12/27/24 Lactate 1.2  Results from last 7 days   Lab Units 12/28/24  0818   CHOLESTEROL mg/dL 134   TRIGLYCERIDES mg/dL 103   HDL CHOL mg/dL 44   LDL CHOL mg/dL 71        IMAGING STUDIES:  MRI Lumbar 12/28/24  Narrative & Impression      Patient: OLIVE MYERS  Time Out: 08:12  Exam(s): MRI L SPINE Without Contrast      EXAM:    MR Lumbar Spine Without Intravenous Contrast     CLINICAL HISTORY:     Reason for exam: low back pain bilaterally.     TECHNIQUE:    Magnetic resonance images of the lumbar spine without intravenous   contrast in multiple planes.     COMPARISON:    No relevant prior studies available.     FINDINGS:    Vertebrae:  Unremarkable.  No acute fracture.    Spinal cord:  Unremarkable.  Normal signal.    Soft tissues:   Unremarkable.      DISCS SPINAL CANAL NEURAL FORAMINA:    L1-L2:  Unremarkable.  No significant disc disease.  No stenosis.    L2-L3:  Unremarkable.  No significant disc disease.  No stenosis.    L3-L4:  Unremarkable.  No significant disc disease.  No stenosis.    L4-L5:  Mild-moderate disc height loss with broad-based posterior disc   bulge.  Severe bilateral facet arthropathy; there are large faceted genic   cysts.  There is impingement of the right L5 nerve root in the lateral   recess.    L5-S1:  Moderate disc height loss and facet arthropathy.  Moderate to   severe left and moderate right neuroforaminal narrowing.  There is grade   1 retrolisthesis of L5 on S1.     IMPRESSION:       Severe facet arthropathy at L4-L5 and L5-S1.  Impingement of the right L5   nerve root in the lateral recess at L4-L5 and abutment of the left L5   nerve root in L5-S1 neuroforamen.     MRI Thoracic 12/28/24  IMPRESSION: No acute findings in the the thoracic spine. Mild degenerative disc disease without   Significant spinal canal narrowing.   ADDENDUM: T2 hyperintense structures adjacent to both shoulders, possible dissecting labral cysts      Electronically signed by Scooter Lennon MD on 12-28-24 at 0812       I personally viewed and interpreted the patient's chart.    Meds reviewed/changed: Yes  Eliquis -on hold   Wellbutrin XL 300mg daily  Decadron 4mg every 6 hours   Lovenox 90mg BID  Synthroid 50 mcg daily  Lidocaine Patch every 12 hours   Mag-Ox 800mg BID  Crestor 40 mg daily  Entresto BID  Aldactone 25mg daily  Demadex 20 mg daily  Tylenol 650 mg every 4 hours prn-1 dose/12 hours   Hydrocodone 7.5 mg every 6 hours prn-1 dose/12 hours   Robaxin 500 mg every 8 hours prn-1 dose/12 hours     Physical Exam:    General:   Awake, alert.  Back:    + Bilateral SLR  Motor:    Normal muscle strength, bulk and tone in lower extremities.  No fasciculations, rigidity, spasticity, or abnormal movements.  Reflexes:   no  clonus  Sensation:   Normal to light touch ryan LEs  Station and Gait:             Per PT note, patient transferred to EOB with SBA and increased time to complete cued for log roll. Patient stood with SBA and ambulated 20 feet to bathroom with SBA and RWX. Patient independent with toilet transfers and hygiene. Ambulated 20 feet back to room SBA.   Extremities:   Wearing SCD      Assessment & Plan     ASSESSMENT:      Low back pain    Retrolisthesis of vertebrae    The patient is a 69 with a history of DVT, PE, coronary artery disease presented to the ED with mid and low back pain that started a week ago.      The patient is prescribed Eliquis for recent DVT/PE. This is currently on hold and she was started on Lovenox.      The patient reports severe pain that is worse with movement. She denies any BLE or BUE weakness.     Addendum of Thoracic MRI noted for possible dissecting labral cysts. Recommend consulting ortho as an outpatient.     PLAN:   Pain Management for possible YELITZA on Monday 12/30  Continue to hold Eliquis for YELITZA  Will need to hold Lovenox 24 hours prior to YELITZA  Up with PT  5.   Recommend following up with ortho as outpatient for possible dissecting labral cysts      I discussed the patient's findings and my recommendations with patient and primary care team       LOS: 0 days       Josette Hilton, MANOHAR  12/29/2024  08:04 EST

## 2024-12-30 PROCEDURE — G0378 HOSPITAL OBSERVATION PER HR: HCPCS

## 2024-12-30 PROCEDURE — 25010000002 DEXAMETHASONE PER 1 MG: Performed by: EMERGENCY MEDICINE

## 2024-12-30 PROCEDURE — 25010000002 ENOXAPARIN PER 10 MG: Performed by: EMERGENCY MEDICINE

## 2024-12-30 PROCEDURE — 99213 OFFICE O/P EST LOW 20 MIN: CPT | Performed by: NURSE PRACTITIONER

## 2024-12-30 RX ORDER — TORSEMIDE 20 MG/1
20 TABLET ORAL DAILY
Qty: 90 TABLET | Refills: 3 | Status: SHIPPED | OUTPATIENT
Start: 2024-12-30

## 2024-12-30 RX ORDER — METHOCARBAMOL 500 MG/1
500 TABLET, FILM COATED ORAL 4 TIMES DAILY
Status: DISCONTINUED | OUTPATIENT
Start: 2024-12-30 | End: 2025-01-02 | Stop reason: HOSPADM

## 2024-12-30 RX ORDER — TORSEMIDE 20 MG/1
20 TABLET ORAL DAILY
Qty: 30 TABLET | Refills: 3 | Status: SHIPPED | OUTPATIENT
Start: 2024-12-30 | End: 2024-12-30 | Stop reason: SDUPTHER

## 2024-12-30 RX ORDER — TORSEMIDE 20 MG/1
20 TABLET ORAL DAILY
Status: DISCONTINUED | OUTPATIENT
Start: 2024-12-31 | End: 2025-01-02 | Stop reason: HOSPADM

## 2024-12-30 RX ADMIN — SACUBITRIL AND VALSARTAN 1 TABLET: 24; 26 TABLET, FILM COATED ORAL at 08:53

## 2024-12-30 RX ADMIN — TORSEMIDE 20 MG: 20 TABLET ORAL at 08:53

## 2024-12-30 RX ADMIN — DEXAMETHASONE SODIUM PHOSPHATE 4 MG: 4 INJECTION, SOLUTION INTRAMUSCULAR; INTRAVENOUS at 17:08

## 2024-12-30 RX ADMIN — SACUBITRIL AND VALSARTAN 1 TABLET: 24; 26 TABLET, FILM COATED ORAL at 22:16

## 2024-12-30 RX ADMIN — LEVOTHYROXINE SODIUM 50 MCG: 0.05 TABLET ORAL at 08:53

## 2024-12-30 RX ADMIN — METHOCARBAMOL TABLETS 500 MG: 500 TABLET, COATED ORAL at 19:23

## 2024-12-30 RX ADMIN — HYDROCODONE BITARTRATE AND ACETAMINOPHEN 1 TABLET: 7.5; 325 TABLET ORAL at 10:08

## 2024-12-30 RX ADMIN — Medication 5 MG: at 00:09

## 2024-12-30 RX ADMIN — ENOXAPARIN SODIUM 90 MG: 100 INJECTION SUBCUTANEOUS at 11:34

## 2024-12-30 RX ADMIN — DEXAMETHASONE SODIUM PHOSPHATE 4 MG: 4 INJECTION, SOLUTION INTRAMUSCULAR; INTRAVENOUS at 10:08

## 2024-12-30 RX ADMIN — HYDROCODONE BITARTRATE AND ACETAMINOPHEN 1 TABLET: 7.5; 325 TABLET ORAL at 17:09

## 2024-12-30 RX ADMIN — LIDOCAINE 2 PATCH: 4 PATCH TOPICAL at 08:54

## 2024-12-30 RX ADMIN — HYDROCODONE BITARTRATE AND ACETAMINOPHEN 1 TABLET: 7.5; 325 TABLET ORAL at 03:24

## 2024-12-30 RX ADMIN — HYDROCODONE BITARTRATE AND ACETAMINOPHEN 1 TABLET: 7.5; 325 TABLET ORAL at 22:57

## 2024-12-30 RX ADMIN — DEXAMETHASONE SODIUM PHOSPHATE 4 MG: 4 INJECTION, SOLUTION INTRAMUSCULAR; INTRAVENOUS at 22:57

## 2024-12-30 RX ADMIN — ROSUVASTATIN CALCIUM 40 MG: 40 TABLET, FILM COATED ORAL at 20:48

## 2024-12-30 RX ADMIN — Medication 10 ML: at 08:53

## 2024-12-30 RX ADMIN — MAGNESIUM OXIDE TAB 400 MG (241.3 MG ELEMENTAL MG) 800 MG: 400 (241.3 MG) TAB at 08:53

## 2024-12-30 RX ADMIN — MAGNESIUM OXIDE TAB 400 MG (241.3 MG ELEMENTAL MG) 800 MG: 400 (241.3 MG) TAB at 20:48

## 2024-12-30 RX ADMIN — EMPAGLIFLOZIN 10 MG: 10 TABLET, FILM COATED ORAL at 10:08

## 2024-12-30 RX ADMIN — METHOCARBAMOL 500 MG: 500 TABLET ORAL at 00:09

## 2024-12-30 RX ADMIN — SPIRONOLACTONE 25 MG: 25 TABLET ORAL at 08:53

## 2024-12-30 RX ADMIN — Medication 10 ML: at 20:48

## 2024-12-30 RX ADMIN — DEXAMETHASONE SODIUM PHOSPHATE 4 MG: 4 INJECTION, SOLUTION INTRAMUSCULAR; INTRAVENOUS at 04:11

## 2024-12-30 RX ADMIN — BUPROPION HYDROCHLORIDE 300 MG: 300 TABLET, EXTENDED RELEASE ORAL at 08:53

## 2024-12-30 RX ADMIN — METHOCARBAMOL 500 MG: 500 TABLET ORAL at 08:53

## 2024-12-30 NOTE — PROGRESS NOTES
[facility] THORACIC/LUM[facility]UROSURGERY PROGRESS NOTE      CC: Back pain      Subjective     Interval History: No new complaints or events overnight.  Feels about the same today.    ROS:  Constitutional: No fever, chills  MS: +back pain  Neuro: No numbness, tingling, or weakness,  no balance difficulties  : No difficulty voiding, no incontinence    Objective     Vital signs in last 24 hours:  Temp:  [97.7 °F (36.5 °C)-98.8 °F (37.1 °C)] 98.2 °F (36.8 °C)  Heart Rate:  [63-88] 88  Resp:  [16-18] 18  BP: (104-127)/(57-76) 127/76    Intake/Output this shift:  No intake/output data recorded.    LABS:  Results from last 7 days   Lab Units 12/28/24  0818 12/27/24  1840   WBC 10*3/mm3 6.97 8.91   HEMOGLOBIN g/dL 14.4 15.7   HEMATOCRIT % 42.9 48.4*   PLATELETS 10*3/mm3 295 325      Results from last 7 days   Lab Units 12/28/24  0818 12/27/24  1840   SODIUM mmol/L 138 136   POTASSIUM mmol/L 3.8 3.9   CHLORIDE mmol/L 103 99   CO2 mmol/L 25.4 26.6   BUN mg/dL 14 18   CREATININE mg/dL 0.70 0.71   CALCIUM mg/dL 8.5* 9.3   BILIRUBIN mg/dL  --  0.3   ALK PHOS U/L  --  72   ALT (SGPT) U/L  --  17   AST (SGOT) U/L  --  17   GLUCOSE mg/dL 121* 138*      12/27/24 D-Dimer 0.39  12/27/24 HS Troponin 7  12/27/24 Procalcitonin 0.05  12/27/24 Lactate 1.2  Results from last 7 days   Lab Units 12/28/24  0818   CHOLESTEROL mg/dL 134   TRIGLYCERIDES mg/dL 103   HDL CHOL mg/dL 44   LDL CHOL mg/dL 71        IMAGING STUDIES:  No new imaging to review      I personally viewed and interpreted the patient's chart.    Meds reviewed/changed: Yes    Physical Exam:    General:   Awake, alert.  Back:    + Bilateral SLR  Motor:    Normal muscle strength, bulk and tone in lower extremities.  No fasciculations, rigidity, spasticity, or abnormal movements.  Reflexes:   no clonus  Sensation:   Normal to light touch ryan LEs  Station and Gait:            Deferred  Extremities:   Wearing SCD      Assessment & Plan     ASSESSMENT:      Low back pain     Retrolisthesis of vertebrae    The patient is a 69 with a history of DVT, PE, coronary artery disease presented to the ED with mid and low back pain that started a week ago.      The patient is prescribed Eliquis for recent DVT/PE. This is currently on hold and she was started on Lovenox.      The patient reports severe pain that is worse with movement. She denies any BLE or BUE weakness.     Addendum of Thoracic MRI noted for possible dissecting labral cysts. Recommend consulting ortho as an outpatient.     PLAN:     Continue to hold Eliquis for YELITZA, plan on getting tomorrow.  N.p.o. after midnight.  Will need to hold Lovenox 24 hours prior to YELITZA  Up with PT  5.   Recommend following up with ortho as outpatient for possible dissecting labral cysts      I discussed the patient's findings and my recommendations with patient and primary care team       LOS: 0 days       Laurence Maxwell, APRN  12/30/2024  13:29 EST

## 2024-12-30 NOTE — PLAN OF CARE
Goal Outcome Evaluation:         Lower back pain still persists. PRN pain medication given as requested. Plan for SI injection tomorrow. Pt ambulated around the unit with walker well. Alert and oriented x4. Vitals WNL on room air. Plan of care discussed with pt. Questions encouraged and answered.

## 2024-12-30 NOTE — PROGRESS NOTES
The FELICITA and I have discussed this patient's history, physical exam and treatment plan.  I provided a substantive portion of the care of this patient.  I have reviewed the documentation and personally had a face to face interaction with the patient and personally performed the physical exam, in its entirety.  I affirm the documentation and agree with the treatment and plan.  The following describes my personal findings.  SHARED VISIT: This visit was performed by BOTH a physician and an APC. The substantive portion of the medical decision making was performed by this attesting physician who made or approved the management plan and takes responsibility for patient management. All studies in the APC note (if performed) were independently interpreted by me.       The patient is admitted to the observation unit for further evaluation of bilateral mid/lower back pain for several days prior to arrival, denies chest pain, shortness of air, reports pain worse with movement of torso, reports compliance with anticoagulates and, denies swelling of lower extremities, weakness, numbness, incontinence, radiation of pain to lower extremities, fever.    Comprehensive Physical exam:  Patient is nontoxic appearing oriented, conversant awake, alert  HEENT: normocephalic, atraumatic  Neck: no goiter, no pain with ROM  Pulmonary: Nontachypneic  cardiovascular: Nontachycardic  Abdomen: Soft, nontender  musculoskeletal: Pain to mid/lower back with movement, no discomfort with passive or active range of motion of bilateral shoulders, bilateral radial/posterior tibial pulses intact, trace edema bilateral lower extremities  Neuro/psychiatric:calm, appropriate, cooperative  Skin:warm, dry      Plan:  Low back pain/flank pain  -CT abdomen pelvis reassuring  -P.o./IV analgesia  - MRI lumbar spine shows severe facet arthropathy at L4-L5 and L5-S1, impingement of the right L5 nerve root in the lateral recess at L4-L5 and abutment of the left L5  nerve root and L5-S1 neuroforamen.    -Thoracic MRI spine no acute spinal issue but did show possible dissecting labral cysts at shoulders; plan for outpatient ortho  -Neurosurgery seen in consult and recommends muscle relaxants, YELITZA scheduled for tomorrow given constraints of recent Eliquis dose  -IV steroids continued   -PT worked with the patient on 12/28/2024 and recommends Home with HHPT vs OP PT  -12/30/2024: Unfortunately, patient needs to be off her Eliquis for a total of 75 hours so her LESI will need to be scheduled for 12/31/2024 in the afternoon. Patient was given a dose of Lovenox this morning to bridge for the anticipated procedure tomorrow afternoon.      Abnormal chest x-ray suspected to be lingering pulm findings from recent upper respiratory infection as it is negative on CT chest imaging  -CT chest without contrast negative for any acute issues  -RVP negative and procalcitonin 0.05     History of recent PE  -Holding home Eliquis for potential procedure; bridged with Lovenox at this time with pharmacy to dose  -Will need to hold Lovenox 24 hours prior to a possible YELITZA for primary issue, patient will get a dose this evening 12/28/2024 but will need to be held starting 12/29/2024.  Hemodynamically stable, nontachycardic, denies shortness of air, O2 > 95% on room air

## 2024-12-30 NOTE — PLAN OF CARE
Goal Outcome Evaluation:  Plan of Care Reviewed With: patient           Outcome Evaluation: Assumed care of patient. Patient is alert and orientedx4, on RA, standby assist via walker to toilet. Multimodal pain control given for lower back pain reported. Neurosurgery following. Await Pain Management schedule for LESI injection. Maintained on NPO after MN. Promoted rest and sleep. Encouraged symptoms reporting. Plan of care ongoing.

## 2024-12-30 NOTE — PLAN OF CARE
Goal Outcome Evaluation: pt will be NPO at 0000 for epidural in the afternoon tomorrow. Lovenox given to bridge. Pt has been in consistent pain, see mar for pain interventions. Neurosurgery following. Pt is A/O x4, is agreeable to the plan of care and verbalizes understanding.       Problem: Adult Inpatient Plan of Care  Goal: Plan of Care Review  Outcome: Progressing  Goal: Patient-Specific Goal (Individualized)  Outcome: Progressing  Goal: Absence of Hospital-Acquired Illness or Injury  Outcome: Progressing  Intervention: Identify and Manage Fall Risk  Recent Flowsheet Documentation  Taken 12/30/2024 1810 by Estefany Loo RN  Safety Promotion/Fall Prevention: activity supervised  Taken 12/30/2024 1554 by Estefany Loo RN  Safety Promotion/Fall Prevention:   room organization consistent   safety round/check completed  Taken 12/30/2024 1341 by Estefany Loo RN  Safety Promotion/Fall Prevention:   room organization consistent   safety round/check completed  Taken 12/30/2024 1132 by Estefany Loo RN  Safety Promotion/Fall Prevention:   room organization consistent   safety round/check completed  Taken 12/30/2024 1038 by Estefany Loo RN  Safety Promotion/Fall Prevention:   room organization consistent   safety round/check completed  Taken 12/30/2024 0815 by Estefany Loo RN  Safety Promotion/Fall Prevention:   room organization consistent   safety round/check completed   activity supervised  Taken 12/30/2024 0732 by Estefany Loo RN  Safety Promotion/Fall Prevention:   room organization consistent   safety round/check completed  Intervention: Prevent Skin Injury  Recent Flowsheet Documentation  Taken 12/30/2024 1810 by Estefany Loo RN  Body Position: position changed independently  Taken 12/30/2024 1554 by Estefany Loo RN  Body Position: position changed independently  Taken 12/30/2024 1341 by Estefany Loo RN  Body Position: position changed independently  Taken 12/30/2024  1132 by Estefany Loo RN  Body Position: position changed independently  Taken 12/30/2024 1038 by Estefany Loo RN  Body Position: position changed independently  Taken 12/30/2024 0815 by Estefany Loo RN  Body Position: position changed independently  Taken 12/30/2024 0732 by Estefany Loo RN  Body Position: position changed independently  Intervention: Prevent and Manage VTE (Venous Thromboembolism) Risk  Recent Flowsheet Documentation  Taken 12/30/2024 0815 by Estefany Loo RN  VTE Prevention/Management: SCDs (sequential compression devices) off  Intervention: Prevent Infection  Recent Flowsheet Documentation  Taken 12/30/2024 1810 by Estefany Loo RN  Infection Prevention: single patient room provided  Taken 12/30/2024 1554 by Estefany Loo RN  Infection Prevention: single patient room provided  Taken 12/30/2024 1341 by Estefany Loo RN  Infection Prevention: single patient room provided  Taken 12/30/2024 1132 by Estefany Loo RN  Infection Prevention: single patient room provided  Taken 12/30/2024 1038 by Estefany Loo RN  Infection Prevention: single patient room provided  Taken 12/30/2024 0815 by Estefany Loo RN  Infection Prevention: single patient room provided  Taken 12/30/2024 0732 by Estefany Loo RN  Infection Prevention: single patient room provided  Goal: Optimal Comfort and Wellbeing  Outcome: Progressing  Intervention: Monitor Pain and Promote Comfort  Recent Flowsheet Documentation  Taken 12/30/2024 0815 by Estefany Loo RN  Pain Management Interventions:   position adjusted   quiet environment facilitated   relaxation techniques promoted  Intervention: Provide Person-Centered Care  Recent Flowsheet Documentation  Taken 12/30/2024 1341 by Estefany Loo RN  Trust Relationship/Rapport:   care explained   choices provided  Taken 12/30/2024 0815 by Estefany Loo RN  Trust Relationship/Rapport:   care explained   reassurance provided    thoughts/feelings acknowledged   questions encouraged   questions answered   emotional support provided   empathic listening provided   choices provided  Goal: Readiness for Transition of Care  Outcome: Progressing     Problem: Pain Acute  Goal: Optimal Pain Control and Function  Outcome: Progressing  Intervention: Optimize Psychosocial Wellbeing  Recent Flowsheet Documentation  Taken 12/30/2024 1341 by Estefayn Loo RN  Diversional Activities: smartphone  Taken 12/30/2024 0815 by Estefany Loo RN  Supportive Measures:   active listening utilized   verbalization of feelings encouraged  Diversional Activities: smartphone  Intervention: Develop Pain Management Plan  Recent Flowsheet Documentation  Taken 12/30/2024 0815 by Estefany Loo RN  Pain Management Interventions:   position adjusted   quiet environment facilitated   relaxation techniques promoted  Intervention: Prevent or Manage Pain  Recent Flowsheet Documentation  Taken 12/30/2024 1810 by Estefany Loo RN  Medication Review/Management: medications reviewed  Taken 12/30/2024 1554 by Estefany Loo RN  Medication Review/Management: medications reviewed  Taken 12/30/2024 1341 by Estefany Loo RN  Bowel Elimination Promotion: adequate fluid intake promoted  Sleep/Rest Enhancement: relaxation techniques promoted  Medication Review/Management: medications reviewed  Taken 12/30/2024 1038 by Estefany Loo RN  Medication Review/Management: medications reviewed  Taken 12/30/2024 0815 by Estefany Loo RN  Bowel Elimination Promotion: adequate fluid intake promoted  Sleep/Rest Enhancement: relaxation techniques promoted  Medication Review/Management: medications reviewed     Problem: Fall Injury Risk  Goal: Absence of Fall and Fall-Related Injury  Outcome: Progressing  Intervention: Identify and Manage Contributors  Recent Flowsheet Documentation  Taken 12/30/2024 1810 by Estefany Loo, RN  Medication Review/Management: medications  reviewed  Self-Care Promotion: independence encouraged  Taken 12/30/2024 1554 by Estefany Loo RN  Medication Review/Management: medications reviewed  Taken 12/30/2024 1341 by Estefany Loo RN  Medication Review/Management: medications reviewed  Taken 12/30/2024 1038 by Estefany Loo RN  Medication Review/Management: medications reviewed  Taken 12/30/2024 0815 by Estefany Loo RN  Medication Review/Management: medications reviewed  Intervention: Promote Injury-Free Environment  Recent Flowsheet Documentation  Taken 12/30/2024 1810 by Estefany Loo RN  Safety Promotion/Fall Prevention: activity supervised  Taken 12/30/2024 1554 by Estefany Loo RN  Safety Promotion/Fall Prevention:   room organization consistent   safety round/check completed  Taken 12/30/2024 1341 by Estefany Loo RN  Safety Promotion/Fall Prevention:   room organization consistent   safety round/check completed  Taken 12/30/2024 1132 by Estefany Loo RN  Safety Promotion/Fall Prevention:   room organization consistent   safety round/check completed  Taken 12/30/2024 1038 by Estefany Loo RN  Safety Promotion/Fall Prevention:   room organization consistent   safety round/check completed  Taken 12/30/2024 0815 by Estefany Loo RN  Safety Promotion/Fall Prevention:   room organization consistent   safety round/check completed   activity supervised  Taken 12/30/2024 0732 by Estefany Loo RN  Safety Promotion/Fall Prevention:   room organization consistent   safety round/check completed

## 2024-12-30 NOTE — TELEPHONE ENCOUNTER
Laly    Pt called this afternoon. She is admitted to the hospital for back pain. She is going to have an injection tomorrow. (Pt wanted you to be aware.)    We received a refill request for Torsemide. Is it ok to refill?    Thank you,    Bindu Dowell, RN  Triage [facility] [place]  12/30/24 14:58 EST

## 2024-12-30 NOTE — PROGRESS NOTES
ED OBSERVATION PROGRESS/DISCHARGE SUMMARY    Date of Admission: 12/27/2024   LOS: 0 days   PCP: Millicent Ace MD    Final Diagnosis Back pain      Subjective     Hospital Outcome:   Barbara Tran is a 69 y.o. female comes in complaining of bilateral low back pain.  Patient states that on Sunday she started to have back pain somewhat worse on the left than the right.  However patient states that pain is now on both sides and goes from the flank to over the hips bilaterally.  Patient denies any pain shooting down either leg.  Patient denies any saddle anesthesia, numbness or tingling of the legs, urinary or bowel dysfunction, dysuria, urinary frequency, fever or chills.  Patient denies any nausea or vomiting.  Patient denies any history of back surgery.  Patient denies any heavy lifting over the weekend.  Patient states that her pain feels different than typical muscle pain.  Of note, patient reports having a cough about 2 weeks ago that has since overall improved but not gone away.     In the ED, Initial troponin 7, repeat troponin of 7, CMP largely unremarkable for acute findings.  D-dimer negative.  CBC largely unremarkable for acute findings. Chest x-ray shows small atelectasis or infiltrate peripherally at the left base, follow-up suggested.  X-ray thoracic and lumbar spine shows mild retrolisthesis of L1 on L2, L2 on L3, mild anterior listhesis of L4 on L5.  Multilevel endplate spurring, disc space narrowing, and facet arthropathy are present. Duplex of left lower extremity negative for DVT.  EKG shows sinus rhythm 94 bpm, no ST elevation apparent.  Patient is afebrile, pulse in the 80s, on room air oxygen at 97% SpO2 and blood pressure normotensive.     12/28/2024: Patient reports no improvement of her back pain through the evening.  UA obtained and negative for acute infection.  MRI lumbar spine shows severe facet arthropathy at L4-L5 and L5-S1, impingement of the right L5 nerve root in the lateral recess  at L4-L5 and abutment of the left L5 nerve root and L5-S1 neuroforamen.  Thoracic MRI spine pending final read at this time; have reached out to radiology to expedite as the image was ordered stat.  Neurosurgery saw and evaluated the patient and recommended starting Robaxin 500 mg every 8 hours as needed, recommended pain management for possible YELITZA on Monday, 12/30/2024, patient's Eliquis is on hold for this possible procedure and she was bridged to Lovenox for treatment of her active PE till 24 hours prior to procedure.  Patient is unsure about getting the YELITZA on Monday, neurosurgery did go back and reexplained this procedure and recommendations.  Patient would like to try an evening of IV steroids and see how she feels and reevaluate in the morning whether YELITZA is a procedure she would like to undergo.  IV steroids have been ordered.  Will reevaluate in the a.m.  Patient's family at bedside and plan and imaging results also discussed with them patient's request.     12/29/2024: Patient reports mild improvement this morning.  Patient planning for YELITZA tomorrow.  Lovenox on hold at this time for 24 hours prior to procedure, plan to restart her Eliquis postprocedure.  Thoracic spine MRI was read, but there was a issue with having it linked to epic, paper chart brought to the unit and is in the patient's paper chart to be uploaded later.  I did record the results in a separate significant note, it reads for no acute findings in the thoracic spine, mild degenerative disc disease without significant spinal canal narrowing, T2 hyperintense structures adjacent to both shoulders possibly dissecting labral cysts. MARIAM reevaluated the patient today and patient is agreeable for YELITZA on 12/30/2024, so continue current regimen and hopeful discharge after YELITZA if pain/mobility improved. Discussed the Labral cysts finding with the patient and recommend patient follow up with Ortho outpatient if this continues to be an issue for the  patient; Patient gave verbal agreement. Patient did request that her pulmonologist and cardiologist be notified that her ACT was on hold for this procedure so I have sent secure messages to both groups as a courtesy to keep them aware.     12/30/2024:  Patient continued to request analgesic pain medication throughout the night for back pain. Vital signs stable. Unfortunately, patient needs to be off her Eliquis for a total of 75 hours so her LESI will need to be scheduled for 12/31/2024 in the afternoon. Patient was given a dose of Lovenox this morning to bridge for the anticipated procedure tomorrow afternoon.        ROS:  General: no fevers, chills  Respiratory: no cough, dyspnea  Cardiovascular: no chest pain, palpitations  Abdomen: No abdominal pain, nausea, vomiting, or diarrhea  Neurologic: No focal weakness    Objective   Physical Exam:  I have reviewed the vital signs.  Temp:  [97.3 °F (36.3 °C)-98.8 °F (37.1 °C)] 98.2 °F (36.8 °C)  Heart Rate:  [64-82] 64  Resp:  [16-18] 18  BP: (100-136)/(57-70) 119/67  General Appearance:    Alert, cooperative, no distress  Head:    Normocephalic, atraumatic, normal hearing   Eyes:    Sclerae anicteric, EOMI  Neck:   Supple, nontender  Lungs: Clear to auscultation bilaterally, respirations unlabored on RA  Heart: Regular rate and rhythm, S1 and S2 normal, no murmur  Abdomen:  Soft, nontender, bowel sounds active, nondistended  Extremities: No clubbing, cyanosis, or edema to lower extremities  Pulses:  2+ and symmetric in distal lower extremities  Skin: No rashes   Neurologic: Oriented x3, Normal strength to extremities    Results Review:    I have reviewed the labs, radiology results and diagnostic studies.    Results from last 7 days   Lab Units 12/28/24  0818   WBC 10*3/mm3 6.97   HEMOGLOBIN g/dL 14.4   HEMATOCRIT % 42.9   PLATELETS 10*3/mm3 295     Results from last 7 days   Lab Units 12/28/24  0818 12/27/24  1840   SODIUM mmol/L 138 136   POTASSIUM mmol/L 3.8 3.9    CHLORIDE mmol/L 103 99   CO2 mmol/L 25.4 26.6   BUN mg/dL 14 18   CREATININE mg/dL 0.70 0.71   CALCIUM mg/dL 8.5* 9.3   BILIRUBIN mg/dL  --  0.3   ALK PHOS U/L  --  72   ALT (SGPT) U/L  --  17   AST (SGOT) U/L  --  17   GLUCOSE mg/dL 121* 138*     Imaging Results (Last 24 Hours)       ** No results found for the last 24 hours. **            I have reviewed the medications.     Discharge Medications        Continue These Medications        Instructions Start Date   apixaban 5 MG tablet tablet  Commonly known as: ELIQUIS   5 mg, Oral, Every 12 Hours Scheduled      benzonatate 200 MG capsule  Commonly known as: TESSALON   200 mg, Oral, Every 4 Hours PRN      Boniva 150 MG tablet  Generic drug: ibandronate   150 mg, Every 30 Days      buPROPion  MG 24 hr tablet  Commonly known as: WELLBUTRIN XL   300 mg, Oral, Daily      calcium carbonate 600 MG tablet  Commonly known as: OS-SHAHNAZ   1 tablet, 2 Times Daily With Meals      empagliflozin 10 MG tablet tablet  Commonly known as: JARDIANCE   10 mg, Oral, Daily      HYDROcodone-acetaminophen 5-325 MG per tablet  Commonly known as: NORCO   1 tablet, Oral, Every 8 Hours PRN      levothyroxine 50 MCG tablet  Commonly known as: Synthroid   50 mcg, Oral, Daily      magnesium oxide 400 MG tablet  Commonly known as: MAG-OX   2 tablets, 2 times daily      rosuvastatin 40 MG tablet  Commonly known as: CRESTOR   40 mg, Oral, Nightly      sacubitril-valsartan 24-26 MG tablet  Commonly known as: ENTRESTO   1 tablet, Oral, 2 Times Daily      spironolactone 25 MG tablet  Commonly known as: ALDACTONE   25 mg, Oral, Daily      torsemide 20 MG tablet  Commonly known as: DEMADEX   20 mg, Oral, Daily      zolpidem 5 MG tablet  Commonly known as: AMBIEN   5 mg, Oral, Nightly PRN              ---------------------------------------------------------------------------------------------  Assessment & Plan   Assessment/Problem List    Low back pain    Retrolisthesis of  vertebrae      Plan:  Low back pain/flank pain  -CT abdomen pelvis without contrast negative for any acute findings  -Will increase patient's home Norco from 5 to 7.5 mg, patient states she is very sensitive to IV pain medicine and does not want Dilaudid or morphine.  - MRI lumbar spine shows severe facet arthropathy at L4-L5 and L5-S1, impingement of the right L5 nerve root in the lateral recess at L4-L5 and abutment of the left L5 nerve root and L5-S1 neuroforamen.    -Thoracic MRI spine has been read but there is a system error with having it linked to her epic account; Paper report in chart to be scanned in at discharge, I have put in a separate significant note with the report today as well. I also contacted [facility] and made them aware of the issue for them to review the report on their rounds.   --Showed no acute spinal issue but did show possible dissecting labral cysts at shoulders; plan for outpatient ortho  -Neurosurgery saw and evaluated the patient and recommended starting Robaxin 500 mg every 8 hours as needed, recommended pain management for possible YELITZA on Monday, 12/30/2024, patient's Eliquis is on hold for this possible procedure and she was bridged to Lovenox for treatment of her active PE till 24 hours prior to procedure.    -IV steroids continued   -PT worked with the patient on 12/28/2024 and recommends Home with HHPT vs OP PT  -12/30/2024: Unfortunately, patient needs to be off her Eliquis for a total of 75 hours so her LESI will need to be scheduled for 12/31/2024 in the afternoon. Patient was given a dose of Lovenox this morning to bridge for the anticipated procedure tomorrow afternoon.      Abnormal chest x-ray suspected to be lingering pulm findings from recent upper respiratory infection as it is negative on CT chest imaging  -Chest x-ray shows small atelectasis or infiltrate peripherally at the left base, follow-up suggested.    -Patient reports recently getting over a cough and head cold  the last  2 weeks.  -CT chest without contrast negative for any acute issues  -RVP negative and PCR 0.05     Active PE about 2 months ago  -Holding home Eliquis for potential procedure; bridged with Lovenox at this time with pharmacy to dose  -Will need to hold Lovenox 24 hours prior to a possible YELITZA for primary issue, patient will get a dose this evening 12/28/2024 but will need to be held starting 12/29/2024.     Anxiety/depression  -Continue home Wellbutrin     Essential hypertension, chronic, controlled  -Continue home Entresto, Aldactone, Demadex     Hyperlipidemia  -continue home statin     Hypothyroidism  -continue home Synthroid     History of CHF  -Hold home Jardiance     Obesity, BMI 31.9, encourage lifestyle modifications       Disposition: Dependent on hospital course    Follow-up after Discharge: Dependent on hospital course    This note will serve as a progress note    Alaina Mcclelland PA-C 12/30/24 07:32 EST    I have worn appropriate PPE during this patient encounter, sanitized my hands both with entering and exiting patient's room.      57 minutes has been spent by [facility] providers in the care of this patient while under observation status

## 2024-12-30 NOTE — CONSULTS
Consult for YELITZA noted. Per YON[facility]AN guidelines, eliquis must be held for 75 hours prior. Per chart, last dose taken 12/28 at 0951. Soonest for YELITZA would be 12/31 1300 or later. We will plan to do tomorrow, 12/31 at 1430. If lovenox needed as bridge, therapeutic/daily dosing must be held for 24 hours prior to YELITZA.

## 2024-12-31 ENCOUNTER — ANESTHESIA EVENT (OUTPATIENT)
Dept: PAIN MEDICINE | Facility: HOSPITAL | Age: 69
End: 2024-12-31
Payer: MEDICARE

## 2024-12-31 ENCOUNTER — APPOINTMENT (OUTPATIENT)
Dept: GENERAL RADIOLOGY | Facility: HOSPITAL | Age: 69
End: 2024-12-31
Payer: MEDICARE

## 2024-12-31 ENCOUNTER — ANESTHESIA (OUTPATIENT)
Dept: PAIN MEDICINE | Facility: HOSPITAL | Age: 69
End: 2024-12-31
Payer: MEDICARE

## 2024-12-31 ENCOUNTER — APPOINTMENT (OUTPATIENT)
Dept: PAIN MEDICINE | Facility: HOSPITAL | Age: 69
End: 2024-12-31
Payer: MEDICARE

## 2024-12-31 PROCEDURE — 25010000002 LIDOCAINE PF 1% 1 % SOLUTION: Performed by: ANESTHESIOLOGY

## 2024-12-31 PROCEDURE — 99214 OFFICE O/P EST MOD 30 MIN: CPT | Performed by: NURSE PRACTITIONER

## 2024-12-31 PROCEDURE — 25510000001 IOPAMIDOL 41 % SOLUTION: Performed by: ANESTHESIOLOGY

## 2024-12-31 PROCEDURE — 25010000002 DEXAMETHASONE PER 1 MG: Performed by: EMERGENCY MEDICINE

## 2024-12-31 PROCEDURE — 3E0R33Z INTRODUCTION OF ANTI-INFLAMMATORY INTO SPINAL CANAL, PERCUTANEOUS APPROACH: ICD-10-PCS | Performed by: ANESTHESIOLOGY

## 2024-12-31 PROCEDURE — 25010000002 FENTANYL CITRATE (PF) 50 MCG/ML SOLUTION: Performed by: ANESTHESIOLOGY

## 2024-12-31 PROCEDURE — 77003 FLUOROGUIDE FOR SPINE INJECT: CPT

## 2024-12-31 PROCEDURE — 25010000002 METHYLPREDNISOLONE PER 80 MG: Performed by: ANESTHESIOLOGY

## 2024-12-31 PROCEDURE — 25010000002 DIPHENHYDRAMINE PER 50 MG: Performed by: NURSE PRACTITIONER

## 2024-12-31 RX ORDER — METHYLPREDNISOLONE ACETATE 80 MG/ML
80 INJECTION, SUSPENSION INTRA-ARTICULAR; INTRALESIONAL; INTRAMUSCULAR; SOFT TISSUE ONCE
Status: COMPLETED | OUTPATIENT
Start: 2024-12-31 | End: 2024-12-31

## 2024-12-31 RX ORDER — FENTANYL CITRATE 50 UG/ML
50 INJECTION, SOLUTION INTRAMUSCULAR; INTRAVENOUS AS NEEDED
Status: DISCONTINUED | OUTPATIENT
Start: 2024-12-31 | End: 2025-01-01

## 2024-12-31 RX ORDER — LIDOCAINE HYDROCHLORIDE 10 MG/ML
1 INJECTION, SOLUTION INFILTRATION; PERINEURAL ONCE
Status: COMPLETED | OUTPATIENT
Start: 2024-12-31 | End: 2024-12-31

## 2024-12-31 RX ORDER — MIDAZOLAM HYDROCHLORIDE 1 MG/ML
1 INJECTION, SOLUTION INTRAMUSCULAR; INTRAVENOUS
Status: DISCONTINUED | OUTPATIENT
Start: 2024-12-31 | End: 2024-12-31

## 2024-12-31 RX ORDER — IOPAMIDOL 408 MG/ML
3 INJECTION, SOLUTION INTRATHECAL
Status: COMPLETED | OUTPATIENT
Start: 2024-12-31 | End: 2024-12-31

## 2024-12-31 RX ORDER — DIPHENHYDRAMINE HYDROCHLORIDE 50 MG/ML
12.5 INJECTION INTRAMUSCULAR; INTRAVENOUS ONCE
Status: COMPLETED | OUTPATIENT
Start: 2024-12-31 | End: 2024-12-31

## 2024-12-31 RX ADMIN — MAGNESIUM OXIDE TAB 400 MG (241.3 MG ELEMENTAL MG) 800 MG: 400 (241.3 MG) TAB at 22:10

## 2024-12-31 RX ADMIN — HYDROCODONE BITARTRATE AND ACETAMINOPHEN 1 TABLET: 7.5; 325 TABLET ORAL at 13:16

## 2024-12-31 RX ADMIN — EMPAGLIFLOZIN 10 MG: 10 TABLET, FILM COATED ORAL at 09:08

## 2024-12-31 RX ADMIN — DEXAMETHASONE SODIUM PHOSPHATE 4 MG: 4 INJECTION, SOLUTION INTRAMUSCULAR; INTRAVENOUS at 22:12

## 2024-12-31 RX ADMIN — LEVOTHYROXINE SODIUM 50 MCG: 0.05 TABLET ORAL at 05:08

## 2024-12-31 RX ADMIN — METHOCARBAMOL TABLETS 500 MG: 500 TABLET, COATED ORAL at 18:04

## 2024-12-31 RX ADMIN — MAGNESIUM OXIDE TAB 400 MG (241.3 MG ELEMENTAL MG) 800 MG: 400 (241.3 MG) TAB at 09:08

## 2024-12-31 RX ADMIN — METHOCARBAMOL TABLETS 500 MG: 500 TABLET, COATED ORAL at 09:08

## 2024-12-31 RX ADMIN — SACUBITRIL AND VALSARTAN 1 TABLET: 24; 26 TABLET, FILM COATED ORAL at 22:10

## 2024-12-31 RX ADMIN — ROSUVASTATIN CALCIUM 40 MG: 40 TABLET, FILM COATED ORAL at 22:10

## 2024-12-31 RX ADMIN — SPIRONOLACTONE 25 MG: 25 TABLET ORAL at 09:08

## 2024-12-31 RX ADMIN — METHOCARBAMOL TABLETS 500 MG: 500 TABLET, COATED ORAL at 11:44

## 2024-12-31 RX ADMIN — HYDROCODONE BITARTRATE AND ACETAMINOPHEN 1 TABLET: 7.5; 325 TABLET ORAL at 05:08

## 2024-12-31 RX ADMIN — Medication 10 ML: at 09:08

## 2024-12-31 RX ADMIN — METHOCARBAMOL TABLETS 500 MG: 500 TABLET, COATED ORAL at 22:13

## 2024-12-31 RX ADMIN — IOPAMIDOL 10 ML: 408 INJECTION, SOLUTION INTRATHECAL at 15:00

## 2024-12-31 RX ADMIN — DEXAMETHASONE SODIUM PHOSPHATE 4 MG: 4 INJECTION, SOLUTION INTRAMUSCULAR; INTRAVENOUS at 11:44

## 2024-12-31 RX ADMIN — Medication 10 ML: at 22:13

## 2024-12-31 RX ADMIN — METHYLPREDNISOLONE ACETATE 80 MG: 80 INJECTION, SUSPENSION INTRA-ARTICULAR; INTRALESIONAL; INTRAMUSCULAR; SOFT TISSUE at 15:00

## 2024-12-31 RX ADMIN — FENTANYL CITRATE 25 MCG: 50 INJECTION, SOLUTION INTRAMUSCULAR; INTRAVENOUS at 14:51

## 2024-12-31 RX ADMIN — TORSEMIDE 20 MG: 20 TABLET ORAL at 09:08

## 2024-12-31 RX ADMIN — LIDOCAINE HYDROCHLORIDE 5 ML: 10 INJECTION, SOLUTION EPIDURAL; INFILTRATION; INTRACAUDAL; PERINEURAL at 14:56

## 2024-12-31 RX ADMIN — HYDROCODONE BITARTRATE AND ACETAMINOPHEN 1 TABLET: 7.5; 325 TABLET ORAL at 19:19

## 2024-12-31 RX ADMIN — DIPHENHYDRAMINE HYDROCHLORIDE 12.5 MG: 50 INJECTION, SOLUTION INTRAMUSCULAR; INTRAVENOUS at 22:12

## 2024-12-31 RX ADMIN — DEXAMETHASONE SODIUM PHOSPHATE 4 MG: 4 INJECTION, SOLUTION INTRAMUSCULAR; INTRAVENOUS at 05:16

## 2024-12-31 RX ADMIN — SACUBITRIL AND VALSARTAN 1 TABLET: 24; 26 TABLET, FILM COATED ORAL at 09:08

## 2024-12-31 RX ADMIN — DEXAMETHASONE SODIUM PHOSPHATE 4 MG: 4 INJECTION, SOLUTION INTRAMUSCULAR; INTRAVENOUS at 16:09

## 2024-12-31 RX ADMIN — BUPROPION HYDROCHLORIDE 300 MG: 300 TABLET, EXTENDED RELEASE ORAL at 09:08

## 2024-12-31 NOTE — PROGRESS NOTES
ED OBSERVATION PROGRESS/DISCHARGE SUMMARY    Date of Admission: 12/27/2024   LOS: 0 days   PCP: Millicent Ace MD    Final Diagnosis: Continues to endorse lower back pain, denies lower extremity weakness.  Notes some numbness in the lower extremities since the procedure, this is improving.    Hospital Outcome:   Barbara Tran is a 69 y.o. female comes in complaining of bilateral low back pain.  Patient states that on Sunday she started to have back pain somewhat worse on the left than the right.  However patient states that pain is now on both sides and goes from the flank to over the hips bilaterally.  Patient denies any pain shooting down either leg.  Patient denies any saddle anesthesia, numbness or tingling of the legs, urinary or bowel dysfunction, dysuria, urinary frequency, fever or chills.  Patient denies any nausea or vomiting.  Patient denies any history of back surgery.  Patient denies any heavy lifting over the weekend.  Patient states that her pain feels different than typical muscle pain.  Of note, patient reports having a cough about 2 weeks ago that has since overall improved but not gone away.     In the ED, Initial troponin 7, repeat troponin of 7, CMP largely unremarkable for acute findings.  D-dimer negative.  CBC largely unremarkable for acute findings. Chest x-ray shows small atelectasis or infiltrate peripherally at the left base, follow-up suggested.  X-ray thoracic and lumbar spine shows mild retrolisthesis of L1 on L2, L2 on L3, mild anterior listhesis of L4 on L5.  Multilevel endplate spurring, disc space narrowing, and facet arthropathy are present. Duplex of left lower extremity negative for DVT.  EKG shows sinus rhythm 94 bpm, no ST elevation apparent.  Patient is afebrile, pulse in the 80s, on room air oxygen at 97% SpO2 and blood pressure normotensive.     12/28/2024: Patient reports no improvement of her back pain through the evening.  UA obtained and negative for acute infection.   MRI lumbar spine shows severe facet arthropathy at L4-L5 and L5-S1, impingement of the right L5 nerve root in the lateral recess at L4-L5 and abutment of the left L5 nerve root and L5-S1 neuroforamen.  Thoracic MRI spine pending final read at this time; have reached out to radiology to expedite as the image was ordered stat.  Neurosurgery saw and evaluated the patient and recommended starting Robaxin 500 mg every 8 hours as needed, recommended pain management for possible YELITZA on Monday, 12/30/2024, patient's Eliquis is on hold for this possible procedure and she was bridged to Lovenox for treatment of her active PE till 24 hours prior to procedure.  Patient is unsure about getting the YELITZA on Monday, neurosurgery did go back and reexplained this procedure and recommendations.  Patient would like to try an evening of IV steroids and see how she feels and reevaluate in the morning whether YELITZA is a procedure she would like to undergo.  IV steroids have been ordered.  Will reevaluate in the a.m.  Patient's family at bedside and plan and imaging results also discussed with them patient's request.     12/29/2024: Patient reports mild improvement this morning.  Patient planning for YELITZA tomorrow.  Lovenox on hold at this time for 24 hours prior to procedure, plan to restart her Eliquis postprocedure.  Thoracic spine MRI was read, but there was a issue with having it linked to epic, paper chart brought to the unit and is in the patient's paper chart to be uploaded later.  I did record the results in a separate significant note, it reads for no acute findings in the thoracic spine, mild degenerative disc disease without significant spinal canal narrowing, T2 hyperintense structures adjacent to both shoulders possibly dissecting labral cysts. MARIAM reevaluated the patient today and patient is agreeable for YELITZA on 12/30/2024, so continue current regimen and hopeful discharge after YELITZA if pain/mobility improved. Discussed the  Labral cysts finding with the patient and recommend patient follow up with Ortho outpatient if this continues to be an issue for the patient; Patient gave verbal agreement. Patient did request that her pulmonologist and cardiologist be notified that her ACT was on hold for this procedure so I have sent secure messages to both groups as a courtesy to keep them aware.      12/30/2024:  Patient continued to request analgesic pain medication throughout the night for back pain. Vital signs stable. Unfortunately, patient needs to be off her Eliquis for a total of 75 hours so her LESI will need to be scheduled for 12/31/2024 in the afternoon. Patient was given a dose of Lovenox this morning to bridge for the anticipated procedure tomorrow afternoon.     12/31/2024: She had an epidural injection today with minimal improvement of her symptoms.  I spoke with the hospitalist, the patient will be admitted to the hospital for continued pain management.  Patient follow-up with orthopedic surgery recommended for possible dissecting labral cysts.     ROS:  General: no fevers, chills  Respiratory: no cough, dyspnea  Cardiovascular: no chest pain, palpitations  Abdomen: No abdominal pain, nausea, vomiting, or diarrhea  Neurologic: No focal weakness  MS: low back pain    Objective   Physical Exam:  I have reviewed the vital signs.  Temp:  [97.5 °F (36.4 °C)-98.2 °F (36.8 °C)] 97.7 °F (36.5 °C)  Heart Rate:  [] 68  Resp:  [16-18] 16  BP: (107-131)/() 120/68  General Appearance:    Alert, cooperative, no distress  Head:    Normocephalic, atraumatic  Eyes:    Sclerae anicteric  Neck:   Supple, no mass  Lungs: Clear to auscultation bilaterally, respirations unlabored  Heart: Regular rate and rhythm, S1 and S2 normal, no murmur, rub or gallop  Abdomen:  Soft, nontender, bowel sounds active, nondistended  Extremities: No clubbing, cyanosis, or edema to lower extremities  Pulses:  2+ and symmetric in distal lower  extremities  Skin: No rashes   Neurologic: Oriented x3, Normal strength to extremities    Results Review:    I have reviewed the labs, radiology results and diagnostic studies.    Results from last 7 days   Lab Units 12/28/24  0818   WBC 10*3/mm3 6.97   HEMOGLOBIN g/dL 14.4   HEMATOCRIT % 42.9   PLATELETS 10*3/mm3 295     Results from last 7 days   Lab Units 12/28/24  0818 12/27/24  1840   SODIUM mmol/L 138 136   POTASSIUM mmol/L 3.8 3.9   CHLORIDE mmol/L 103 99   CO2 mmol/L 25.4 26.6   BUN mg/dL 14 18   CREATININE mg/dL 0.70 0.71   CALCIUM mg/dL 8.5* 9.3   BILIRUBIN mg/dL  --  0.3   ALK PHOS U/L  --  72   ALT (SGPT) U/L  --  17   AST (SGOT) U/L  --  17   GLUCOSE mg/dL 121* 138*     Imaging Results (Last 24 Hours)       Procedure Component Value Units Date/Time    [place] [facility] Spine [[id number]] Resulted: 12/31/24 1502     Updated: 12/31/24 1502    Narrative:      This procedure was auto-finalized with no dictation required.            I have reviewed the medications.     Discharge Medications        Continue These Medications        Instructions Start Date   apixaban 5 MG tablet tablet  Commonly known as: ELIQUIS   5 mg, Oral, Every 12 Hours Scheduled      benzonatate 200 MG capsule  Commonly known as: TESSALON   200 mg, Oral, Every 4 Hours PRN      Boniva 150 MG tablet  Generic drug: ibandronate   150 mg, Every 30 Days      buPROPion  MG 24 hr tablet  Commonly known as: WELLBUTRIN XL   300 mg, Oral, Daily      calcium carbonate 600 MG tablet  Commonly known as: OS-SHAHNAZ   1 tablet, 2 Times Daily With Meals      empagliflozin 10 MG tablet tablet  Commonly known as: JARDIANCE   10 mg, Oral, Daily      HYDROcodone-acetaminophen 5-325 MG per tablet  Commonly known as: NORCO   1 tablet, Oral, Every 8 Hours PRN      levothyroxine 50 MCG tablet  Commonly known as: Synthroid   50 mcg, Oral, Daily      magnesium oxide 400 MG tablet  Commonly known as: MAG-OX   2 tablets, 2 times daily      rosuvastatin 40  MG tablet  Commonly known as: CRESTOR   40 mg, Oral, Nightly      sacubitril-valsartan 24-26 MG tablet  Commonly known as: ENTRESTO   1 tablet, Oral, 2 Times Daily      spironolactone 25 MG tablet  Commonly known as: ALDACTONE   25 mg, Oral, Daily      torsemide 20 MG tablet  Commonly known as: DEMADEX   20 mg, Oral, Daily      zolpidem 5 MG tablet  Commonly known as: AMBIEN   5 mg, Oral, Nightly PRN              ---------------------------------------------------------------------------------------------  Assessment & Plan   Assessment/Problem List    Low back pain    Retrolisthesis of vertebrae      Plan:    Low back pain/flank pain  -CT abdomen pelvis without contrast negative for any acute findings  -Will increase patient's home Norco from 5 to 7.5 mg, patient states she is very sensitive to IV pain medicine and does not want Dilaudid or morphine.  - MRI lumbar spine shows severe facet arthropathy at L4-L5 and L5-S1, impingement of the right L5 nerve root in the lateral recess at L4-L5 and abutment of the left L5 nerve root and L5-S1 neuroforamen.    -Thoracic MRI spine has been read but there is a system error with having it linked to her epic account; Paper report in chart to be scanned in at discharge, I have put in a separate significant note with the report today as well. I also contacted [facility] and made them aware of the issue for them to review the report on their rounds.   --Showed no acute spinal issue but did show possible dissecting labral cysts at shoulders; plan for outpatient ortho  -Neurosurgery saw and evaluated the patient and recommended starting Robaxin 500 mg every 8 hours as needed, recommended pain management for possible YELITZA on Monday, 12/30/2024, patient's Eliquis is on hold for this possible procedure and she was bridged to Lovenox for treatment of her active PE till 24 hours prior to procedure.    -IV steroids continued   -PT worked with the patient on 12/28/2024 and recommends Home with  HHPT vs OP PT  -12/30/2024: Unfortunately, patient needs to be off her Eliquis for a total of 75 hours so her LESI will need to be scheduled for 12/31/2024 in the afternoon. Patient was given a dose of Lovenox this morning to bridge for the anticipated procedure tomorrow afternoon.      Abnormal chest x-ray suspected to be lingering pulm findings from recent upper respiratory infection as it is negative on CT chest imaging  -Chest x-ray shows small atelectasis or infiltrate peripherally at the left base, follow-up suggested.    -Patient reports recently getting over a cough and head cold the last  2 weeks.  -CT chest without contrast negative for any acute issues  -RVP negative and PCR 0.05     Active PE about 2 months ago  -Has been on hold for YELITZA  -Resume anticoagulant when okay with pain management     Anxiety/depression  -Continue home Wellbutrin     Essential hypertension, chronic, controlled  -Continue home Entresto, Aldactone, Demadex     Hyperlipidemia  -continue home statin     Hypothyroidism  -continue home Synthroid     History of CHF  -Hold home Jardiance     Obesity, BMI 31.9, encourage lifestyle modifications    Disposition: Admit to hospital    Follow-up after Discharge: Neurosurgery and orthopedic surgery    This note will serve as a progress/transfer note    Elsy Kim, APRN 12/31/24 17:50 EST    I have worn appropriate PPE during this patient encounter, sanitized my hands both with entering and exiting patient's room.    52 minutes has been spent by [facility] providers in the care of this patient while under observation status

## 2024-12-31 NOTE — DISCHARGE INSTRUCTIONS
EPIDURAL STEROID INJECTION          An epidural steroid injection is a shot of steroid medicine and numbing medicine that is given into the space between the spinal cord and the bones of the back (epidural space).  The injection helps relieve pain by an irritated or swollen nerve root.    TELL YOUR HEALTH CARE PROVIDER ABOUT:  Any allergies you have  All medicines you are taking including any over the counter medicines  Any blood disorders you have  Any surgeries you have had  Any medical conditions you have  Whether you are pregnant or may be pregnant    WHAT ARE THE RISK?  Generally, this is a safe procedure. However,problems may occur, including  Headache  Bleeding  Infection  Allergic Reaction  Nerve Damage    WHAT CAN I EXPECT AFTER THE PROCEDURE?    INJECTION SITE  Remove the Band-Aid/s after 24 hours  Check your injection site every day for signs of infection.  Check for:             Redness             Bleeding (small amt is normal)             Warmth             Pus or bad odor  Some numbness may be experienced for several hours following the procedure.  Avoid using heat on the injection site for 24 hours. You may use ice intermittently if needed by placing a         towel between your skin and the ice bag and using the ice for 20 minutes 2-3 times a day.  Do not take baths, swim or use a hot tub for 24 hours.    ACTIVITY  No strenuous activity for 24 hours then return to normal activity as tolerated.  If your leg is numb, no driving until full sensation and strength has returned.    GENERAL INSTRUCTIONS:  The injection site may feel numb, use ice with caution if numbness is present and no heat for 24 hours or until numbness is gone.   If you have numbness or weakness in your arm or leg, use those areas with caution until normal sensation returns.  It is not uncommon to notice an increase in discomfort or a change in the location of discomfort for 3-4 days after the procedure.  If discomfort is noticed  at the injection site, ice may be            applied to that area for 20 min 2-3 times a day.  Take the pain medicine your physician has prescribed or over the counter pain relievers as long as you do not have any contraindications.  If you are a diabetic, monitor your blood sugar closely.  The steroids used in your procedure may increase your blood sugar level up to 36 hours after the injection.  If your blood sugar is greater than 250, call the physician that helps you monitor your blood sugar.  Keep all follow-up visits as scheduled by your health care provider. This is important.    CONTACT OUR OFFICE IF:  You have any of these signs of infection            -Redness, swelling, or warmth around your injection site.            -Fluid or blood coming from your injection site (small amt of blood is normal)            -Pus or a bad odor from your injection site            -A fever  You develop a severe headache or a stiff neck  You lose control of your bladder or bowel movements      PAIN MANAGEMENT [facility]ENTER HOURS   Monday-Friday 7:30 am. - 4:00 pm.  For any problem related to your procedure we can be reached at [phone].  If you experience an emergency with your procedure, call [phone] or go to the emergency room.      What is [employer]?  [employer] is a fitness and wellness program offered at no additional cost to seniors 65+ on eligible Medicare plans that helps you get active, get fit, and connect with others.  The program is designed for all levels and abilities and provides access to online and in-person classes, over 15,000 fitness locations, and health & wellness discounts.  Before starting any exercise program, consult with your primary care provider.  For additional information and to check eligibility:  [email or web address]

## 2024-12-31 NOTE — PLAN OF CARE
Problem: Adult Inpatient Plan of Care  Goal: Plan of Care Review  Outcome: Progressing  Goal: Patient-Specific Goal (Individualized)  Outcome: Progressing  Goal: Absence of Hospital-Acquired Illness or Injury  Outcome: Progressing  Intervention: Identify and Manage Fall Risk  Recent Flowsheet Documentation  Taken 12/31/2024 0400 by Rohan Rosado RN  Safety Promotion/Fall Prevention:   safety round/check completed   activity supervised   assistive device/personal items within reach   clutter free environment maintained   fall prevention program maintained  Taken 12/31/2024 0200 by Rohan Rosado RN  Safety Promotion/Fall Prevention:   activity supervised   assistive device/personal items within reach   safety round/check completed   clutter free environment maintained   nonskid shoes/slippers when out of bed  Taken 12/31/2024 0000 by Rohan Rosado RN  Safety Promotion/Fall Prevention:   safety round/check completed   activity supervised   assistive device/personal items within reach   clutter free environment maintained   fall prevention program maintained  Taken 12/30/2024 2200 by Rohan Rosado RN  Safety Promotion/Fall Prevention:   activity supervised   assistive device/personal items within reach   safety round/check completed   clutter free environment maintained   nonskid shoes/slippers when out of bed  Taken 12/30/2024 2015 by Rohan Rosado RN  Safety Promotion/Fall Prevention:   safety round/check completed   activity supervised   assistive device/personal items within reach   clutter free environment maintained   fall prevention program maintained  Intervention: Prevent Skin Injury  Recent Flowsheet Documentation  Taken 12/31/2024 0400 by Rohan Rosado RN  Skin Protection:   incontinence pads utilized   silicone foam dressing in place   transparent dressing maintained  Taken 12/31/2024 0200 by Rohan Rosado RN  Skin Protection:   incontinence pads utilized    transparent dressing maintained  Taken 12/31/2024 0000 by Rohan Rosado RN  Skin Protection:   incontinence pads utilized   silicone foam dressing in place   transparent dressing maintained  Taken 12/30/2024 2200 by Rohan Rosado RN  Skin Protection:   incontinence pads utilized   transparent dressing maintained  Taken 12/30/2024 2015 by Rohan Rosado RN  Body Position: position changed independently  Intervention: Prevent and Manage VTE (Venous Thromboembolism) Risk  Recent Flowsheet Documentation  Taken 12/30/2024 2015 by Rohan Rosado RN  VTE Prevention/Management:   bilateral   SCDs (sequential compression devices) off  Intervention: Prevent Infection  Recent Flowsheet Documentation  Taken 12/31/2024 0400 by Rohan Rosado RN  Infection Prevention:   hand hygiene promoted   rest/sleep promoted  Taken 12/31/2024 0200 by Rohan Rosado RN  Infection Prevention:   environmental surveillance performed   rest/sleep promoted  Taken 12/31/2024 0000 by Rohan Rosado RN  Infection Prevention:   hand hygiene promoted   rest/sleep promoted  Taken 12/30/2024 2200 by Rohan Rosado RN  Infection Prevention:   environmental surveillance performed   rest/sleep promoted  Taken 12/30/2024 2015 by Rohan Rosado RN  Infection Prevention:   cohorting utilized   hand hygiene promoted   rest/sleep promoted  Goal: Optimal Comfort and Wellbeing  Outcome: Progressing  Intervention: Monitor Pain and Promote Comfort  Recent Flowsheet Documentation  Taken 12/30/2024 2257 by Rohan Rosado RN  Pain Management Interventions:   position adjusted   pillow support provided  Intervention: Provide Person-Centered Care  Recent Flowsheet Documentation  Taken 12/30/2024 2015 by Rohan Rosado RN  Trust Relationship/Rapport:   care explained   emotional support provided   questions answered   questions encouraged   thoughts/feelings acknowledged  Goal: Readiness for Transition of  Care  Outcome: Progressing     Problem: Pain Acute  Goal: Optimal Pain Control and Function  Outcome: Progressing  Intervention: Optimize Psychosocial Wellbeing  Recent Flowsheet Documentation  Taken 12/30/2024 2015 by Rohan Rosado RN  Diversional Activities:   television   smartphone  Intervention: Develop Pain Management Plan  Recent Flowsheet Documentation  Taken 12/30/2024 2257 by Rohan Rosado RN  Pain Management Interventions:   position adjusted   pillow support provided  Intervention: Prevent or Manage Pain  Recent Flowsheet Documentation  Taken 12/30/2024 2015 by oRhan Rosado RN  Medication Review/Management: medications reviewed     Problem: Fall Injury Risk  Goal: Absence of Fall and Fall-Related Injury  Outcome: Progressing  Intervention: Identify and Manage Contributors  Recent Flowsheet Documentation  Taken 12/30/2024 2015 by Rohan Rosado RN  Medication Review/Management: medications reviewed  Intervention: Promote Injury-Free Environment  Recent Flowsheet Documentation  Taken 12/31/2024 0400 by Rohan Rosado RN  Safety Promotion/Fall Prevention:   safety round/check completed   activity supervised   assistive device/personal items within reach   clutter free environment maintained   fall prevention program maintained  Taken 12/31/2024 0200 by Rohan Rosado RN  Safety Promotion/Fall Prevention:   activity supervised   assistive device/personal items within reach   safety round/check completed   clutter free environment maintained   nonskid shoes/slippers when out of bed  Taken 12/31/2024 0000 by Rohan Rosado RN  Safety Promotion/Fall Prevention:   safety round/check completed   activity supervised   assistive device/personal items within reach   clutter free environment maintained   fall prevention program maintained  Taken 12/30/2024 2200 by Rohan Rosado RN  Safety Promotion/Fall Prevention:   activity supervised   assistive device/personal items  within reach   safety round/check completed   clutter free environment maintained   nonskid shoes/slippers when out of bed  Taken 12/30/2024 2015 by Rohan Rosado RN  Safety Promotion/Fall Prevention:   safety round/check completed   activity supervised   assistive device/personal items within reach   clutter free environment maintained   fall prevention program maintained   Goal Outcome Evaluation:         Pt has no new nursing issues at this time. Pt is a&ox4, non-tele, RA, VSS. PRN pain medication given per patient request. NPO-sips w/ meds since midnight. Pt stated pain is starting to radiate on her right side. Procedure scheduled for 1330. Pt has no complaints at this time. Will continue plan of care.

## 2024-12-31 NOTE — PROGRESS NOTES
GILLES GALEAS Attestation Note    I supervised care provided by the midlevel provider.    The FELICITA and I have discussed this patient's history, physical exam, and treatment plan. I have reviewed the documentation and personally had a face to face interaction with the patient  I affirm the documentation and agree with the treatment and plan. I provided a substantive portion of the care of this patient.  I personally performed the physical exam, in its entirety.  My personal findings are documented in below:    History:  Patient admitted to observation unit for further workup and management of worsening pain in the mid to lower back.  This morning she is complaining of pain that is shifting towards the left side more so than in recent days.  Denies any other new symptoms overnight.    Physical Exam:  General: No acute distress.  HENT: NCAT, moist mucous membranes  Eyes: no scleral icterus.  Neck: Painless range of motion  CV: Pink warm and well-perfused throughout  Respiratory: No distress or increased work of breathing  Abdomen: soft, no focal tenderness or rigidity  Musculoskeletal: no deformity.  Neuro: Alert, speech fluent and easily intelligible  Skin: warm, dry.    Assessment and Plan:  Low back pain: Continue multimodal analgesia.  Continue IV steroids.  Epidural steroid injection procedure scheduled for this afternoon.    Recent PE/DVT: Holding home Eliquis due to procedure.  Last dose of Lovenox bridge was 12/29.  Will plan to resume anticoagulation after procedure

## 2024-12-31 NOTE — PROGRESS NOTES
[facility] THORACIC/LUMBAR NEUROSURGERY PROGRESS NOTE      CC: Back pain      Subjective     Interval History: No new complaints or events overnight.  Pain comes and goes mostly in her knee.    ROS:  Constitutional: No fever, chills  MS: +back pain  Neuro: No numbness, tingling, or weakness,  no balance difficulties  : No difficulty voiding, no incontinence    Objective     Vital signs in last 24 hours:  Temp:  [97.5 °F (36.4 °C)-98.2 °F (36.8 °C)] 98.2 °F (36.8 °C)  Heart Rate:  [] 103  Resp:  [16-18] 16  BP: (107-131)/(62-93) 118/87    Intake/Output this shift:  No intake/output data recorded.    LABS:  Results from last 7 days   Lab Units 12/28/24  0818 12/27/24  1840   WBC 10*3/mm3 6.97 8.91   HEMOGLOBIN g/dL 14.4 15.7   HEMATOCRIT % 42.9 48.4*   PLATELETS 10*3/mm3 295 325      Results from last 7 days   Lab Units 12/28/24  0818 12/27/24  1840   SODIUM mmol/L 138 136   POTASSIUM mmol/L 3.8 3.9   CHLORIDE mmol/L 103 99   CO2 mmol/L 25.4 26.6   BUN mg/dL 14 18   CREATININE mg/dL 0.70 0.71   CALCIUM mg/dL 8.5* 9.3   BILIRUBIN mg/dL  --  0.3   ALK PHOS U/L  --  72   ALT (SGPT) U/L  --  17   AST (SGOT) U/L  --  17   GLUCOSE mg/dL 121* 138*      Results from last 7 days   Lab Units 12/28/24  0818   CHOLESTEROL mg/dL 134   TRIGLYCERIDES mg/dL 103   HDL CHOL mg/dL 44   LDL CHOL mg/dL 71        IMAGING STUDIES:  No new imaging to review      I personally viewed and interpreted the patient's chart.    Meds reviewed/changed: Yes    Physical Exam:    General:   Awake, alert.  Back:    + Bilateral SLR  Motor:    Normal muscle strength, bulk and tone in lower extremities.  No fasciculations, rigidity, spasticity, or abnormal movements.  Reflexes:   no clonus  Sensation:   Normal to light touch ryan LEs  Station and Gait:            Deferred  Extremities:   Wearing SCD      Assessment & Plan     ASSESSMENT:      Low back pain    Retrolisthesis of vertebrae    The patient is a 69 with a history of DVT, PE, coronary artery  disease presented to the ED with mid and low back pain that started a week ago.      The patient is prescribed Eliquis for recent DVT/PE. This is currently on hold and she was started on Lovenox.      The patient reports severe pain that is worse with movement. She denies any BLE or BUE weakness.     Addendum of Thoracic MRI noted for possible dissecting labral cysts. Recommend consulting ortho as an outpatient.     PLAN:     Continue to hold Eliquis for YELITZA today.  Resume when okay with pain management.  Up with PT  5.   Recommend following up with ortho as outpatient for possible dissecting labral cysts   6.  Continue steroids, would send home on a Medrol Dosepak and muscle relaxant.  She should follow-up with us in 2 weeks.  Nothing further to add from a neurosurgical standpoint.  Will sign off but be available.     I discussed the patient's findings and my recommendations with patient and primary care team      I spent 35 minutes caring for Barbara Tran on this date of service. This time includes time spent by me in the following activities: preparing for the visit, reviewing tests, obtaining and/or reviewing a separately obtained history, performing a medically appropriate examination and/or evaluation, counseling and educating the patient/family/caregiver, ordering medications, tests, or procedures, referring and communicating with other health care professionals, documenting information in the medical record, independently interpreting results and communicating that information with the patient/family/caregiver, and care coordination          LOS: 0 days       Laurence Maxwell, MANOHAR  12/31/2024  12:16 EST

## 2024-12-31 NOTE — SIGNIFICANT NOTE
12/31/24 0935   OTHER   Discipline physical therapist   Rehab Time/Intention   Session Not Performed other (see comments)  (noted plans for YELITZA. pt provided w extensive generalized mobility education, back safety/precautions and HEP. Pt reports she has been ambulating w rwx from home and plans to shower before injection today.)   Recommendation   PT - Next Appointment 01/01/25

## 2024-12-31 NOTE — H&P
INTERVAL HISTORY:    The patient is an inpatient and presents for her first lumbar epidural steroid injection today.  They have received N/A% improvement since their last injection with a pain level of 9/10 at its worst recently.    Conservative measures tried in the interim. Daily activities are still affected by the pain.    Radiology reports and/or previous notes have been reviewed and are consistent with their diagnosis of Post-Op Diagnosis Codes:     * Displacement of lumbar intervertebral disc with radiculopathy [M51.16]    Alert and oriented  MP - 2  Lungs - clear  CV - rrr    Diagnosis:  Post-Op Diagnosis Codes:     * Displacement of lumbar intervertebral disc with radiculopathy [M51.16]      Plan:  Lumbar epidural steroid injection under fluoroscopic guidance        Target : L4-5    I have encouraged them to continue:  1.  Physical therapy exercises at home as prescribed by physical therapy or from the pain clinic handout (given to the patient).  Continuation of these exercises every day, or multiple times per week, even when the patient has good pain relief, was stressed to the patient as a preventative measure to decrease the frequency and severity of future pain episodes.  2.  Continue pain medicines as already prescribed.  If patient not currently taking any, it is recommended to begin Acetaminophen 1000 mg po q 8 hours.  If other medicines containing Acetaminophen are currently prescribed, maintain daily dose at 3000mg.    3.  If they can tolerate NSAIDS, it is recommended to take Ibuprofen 600 mg po q 6 hours for 7 days during pain exacerbations.   Alternatively, they may substitute an NSAID of their choice (e.g. Aleve)  4.  Heat and ice to the affected area as tolerated for pain control.  It was discussed that heating pads can cause burns.  5.  Low impact exercise such as walking or water exercise was recommended to maintain overall health and aid in weight control.   6.  Follow up as needed for  subsequent injections.  7.  Patient was counseled to abstain from tobacco products.    Time : 21   min

## 2024-12-31 NOTE — ANESTHESIA PROCEDURE NOTES
PAIN Epidural block    Pre-sedation assessment completed: 12/31/2024 2:47 PM    Patient reassessed immediately prior to procedure    Patient location during procedure: pain clinic  Start Time: 12/31/2024 2:47 PM  Stop Time: 12/31/2024 2:59 PM  Indication:at surgeon's request and procedure for pain  Performed By  Anesthesiologist: Aldo Velazquez MD  Preanesthetic Checklist  Completed: patient identified, IV checked, site marked, risks and benefits discussed, surgical consent, monitors and equipment checked, pre-op evaluation and timeout performed  Additional Notes  Dx:  Post-Op Diagnosis Codes:     * Displacement of lumbar intervertebral disc with radiculopathy [M51.16]            Plan : return to clinic as needed      Sedation start:         1451                                         End:1459  Prep:  Pt Position:prone (prone)  Sterile Tech:cap, gloves, mask and sterile barrier  Prep:chlorhexidine gluconate and isopropyl alcohol  Monitoring:blood pressure monitoring, EKG and continuous pulse oximetry  Procedure:Sedation: yes     Approach:midline  Guidance: fluoroscopy and c arm pa and lat and loss of resistance  Location:lumbar  Level:L5-S1 (interlaminar)  Needle Type:Azar  Needle Gauge:20  Aspiration:negative  Medications:  Preservative Free Saline:3mL  Isovue:1mL  Comments:80 mg depomedrol in epidDepomedrol:80 mg  Post Assessment:  Pt Tolerance:patient tolerated the procedure well with no apparent complications  Complications:no

## 2025-01-01 PROBLEM — Z86.718 HISTORY OF DVT (DEEP VEIN THROMBOSIS): Status: ACTIVE | Noted: 2025-01-01

## 2025-01-01 PROBLEM — I50.22 CHRONIC SYSTOLIC CHF (CONGESTIVE HEART FAILURE): Status: ACTIVE | Noted: 2025-01-01

## 2025-01-01 PROBLEM — M50.30 DDD (DEGENERATIVE DISC DISEASE), CERVICAL: Status: ACTIVE | Noted: 2025-01-01

## 2025-01-01 PROBLEM — Z86.79 HISTORY OF CORONARY ARTERY DISEASE: Status: ACTIVE | Noted: 2025-01-01

## 2025-01-01 LAB — TSH SERPL DL<=0.05 MIU/L-ACNC: 0.26 UIU/ML (ref 0.27–4.2)

## 2025-01-01 PROCEDURE — 97110 THERAPEUTIC EXERCISES: CPT

## 2025-01-01 PROCEDURE — 25010000002 DEXAMETHASONE PER 1 MG: Performed by: EMERGENCY MEDICINE

## 2025-01-01 PROCEDURE — 84443 ASSAY THYROID STIM HORMONE: CPT | Performed by: INTERNAL MEDICINE

## 2025-01-01 RX ORDER — METOPROLOL TARTRATE 25 MG/1
12.5 TABLET, FILM COATED ORAL EVERY 12 HOURS SCHEDULED
Status: DISCONTINUED | OUTPATIENT
Start: 2025-01-01 | End: 2025-01-02 | Stop reason: HOSPADM

## 2025-01-01 RX ORDER — HYDROCODONE BITARTRATE AND ACETAMINOPHEN 10; 325 MG/1; MG/1
1 TABLET ORAL EVERY 6 HOURS PRN
Status: DISCONTINUED | OUTPATIENT
Start: 2025-01-01 | End: 2025-01-02 | Stop reason: HOSPADM

## 2025-01-01 RX ORDER — ACETAMINOPHEN 500 MG
500 TABLET ORAL EVERY 8 HOURS
Status: DISCONTINUED | OUTPATIENT
Start: 2025-01-01 | End: 2025-01-02 | Stop reason: HOSPADM

## 2025-01-01 RX ORDER — GABAPENTIN 100 MG/1
100 CAPSULE ORAL EVERY 12 HOURS SCHEDULED
Status: DISCONTINUED | OUTPATIENT
Start: 2025-01-01 | End: 2025-01-02 | Stop reason: HOSPADM

## 2025-01-01 RX ORDER — HYDROMORPHONE HYDROCHLORIDE 1 MG/ML
0.5 INJECTION, SOLUTION INTRAMUSCULAR; INTRAVENOUS; SUBCUTANEOUS
Status: DISCONTINUED | OUTPATIENT
Start: 2025-01-01 | End: 2025-01-02 | Stop reason: HOSPADM

## 2025-01-01 RX ADMIN — HYDROCODONE BITARTRATE AND ACETAMINOPHEN 1 TABLET: 7.5; 325 TABLET ORAL at 02:47

## 2025-01-01 RX ADMIN — MAGNESIUM OXIDE TAB 400 MG (241.3 MG ELEMENTAL MG) 800 MG: 400 (241.3 MG) TAB at 08:37

## 2025-01-01 RX ADMIN — MAGNESIUM OXIDE TAB 400 MG (241.3 MG ELEMENTAL MG) 800 MG: 400 (241.3 MG) TAB at 21:48

## 2025-01-01 RX ADMIN — Medication 10 ML: at 21:59

## 2025-01-01 RX ADMIN — BUPROPION HYDROCHLORIDE 300 MG: 300 TABLET, EXTENDED RELEASE ORAL at 08:37

## 2025-01-01 RX ADMIN — DEXAMETHASONE SODIUM PHOSPHATE 4 MG: 4 INJECTION, SOLUTION INTRAMUSCULAR; INTRAVENOUS at 10:22

## 2025-01-01 RX ADMIN — SACUBITRIL AND VALSARTAN 1 TABLET: 24; 26 TABLET, FILM COATED ORAL at 21:48

## 2025-01-01 RX ADMIN — LEVOTHYROXINE SODIUM 50 MCG: 0.05 TABLET ORAL at 08:37

## 2025-01-01 RX ADMIN — DEXAMETHASONE SODIUM PHOSPHATE 4 MG: 4 INJECTION, SOLUTION INTRAMUSCULAR; INTRAVENOUS at 04:40

## 2025-01-01 RX ADMIN — SPIRONOLACTONE 25 MG: 25 TABLET ORAL at 08:37

## 2025-01-01 RX ADMIN — METHOCARBAMOL TABLETS 500 MG: 500 TABLET, COATED ORAL at 08:37

## 2025-01-01 RX ADMIN — EMPAGLIFLOZIN 10 MG: 10 TABLET, FILM COATED ORAL at 08:37

## 2025-01-01 RX ADMIN — SACUBITRIL AND VALSARTAN 1 TABLET: 24; 26 TABLET, FILM COATED ORAL at 08:37

## 2025-01-01 RX ADMIN — Medication 10 ML: at 10:22

## 2025-01-01 RX ADMIN — DEXAMETHASONE SODIUM PHOSPHATE 4 MG: 4 INJECTION, SOLUTION INTRAMUSCULAR; INTRAVENOUS at 21:58

## 2025-01-01 RX ADMIN — ROSUVASTATIN CALCIUM 40 MG: 40 TABLET, FILM COATED ORAL at 21:48

## 2025-01-01 RX ADMIN — DEXAMETHASONE SODIUM PHOSPHATE 4 MG: 4 INJECTION, SOLUTION INTRAMUSCULAR; INTRAVENOUS at 16:23

## 2025-01-01 RX ADMIN — GABAPENTIN 100 MG: 100 CAPSULE ORAL at 21:48

## 2025-01-01 RX ADMIN — HYDROCODONE BITARTRATE AND ACETAMINOPHEN 1 TABLET: 7.5; 325 TABLET ORAL at 10:22

## 2025-01-01 RX ADMIN — METHOCARBAMOL TABLETS 500 MG: 500 TABLET, COATED ORAL at 21:48

## 2025-01-01 RX ADMIN — ACETAMINOPHEN 500 MG: 500 TABLET, FILM COATED ORAL at 18:20

## 2025-01-01 RX ADMIN — TORSEMIDE 20 MG: 20 TABLET ORAL at 08:37

## 2025-01-01 RX ADMIN — METHOCARBAMOL TABLETS 500 MG: 500 TABLET, COATED ORAL at 18:20

## 2025-01-01 RX ADMIN — METOPROLOL TARTRATE 12.5 MG: 25 TABLET, FILM COATED ORAL at 21:48

## 2025-01-01 RX ADMIN — METHOCARBAMOL TABLETS 500 MG: 500 TABLET, COATED ORAL at 13:03

## 2025-01-01 RX ADMIN — HYDROCODONE BITARTRATE AND ACETAMINOPHEN 1 TABLET: 7.5; 325 TABLET ORAL at 16:28

## 2025-01-01 NOTE — PLAN OF CARE
Goal Outcome Evaluation:           Progress: improving  Outcome Evaluation: Admitted form observation unit today. VSS. Pt had epidural yesterday with minimal effect. PRN PO Norco for pain management and scheduled robaxin PO. Up with standby assist and RW. IV steroids per order. Being seen by PT. Skin checked, WNL except bandaid over epidural site on lower back. Tolerating cardiac diet.

## 2025-01-01 NOTE — PLAN OF CARE
Goal Outcome Evaluation:         Pain controlled with Hydrocodone every 6 hours prn. Walker to ambulate with a stand by assist, not on telemetry, JORDEN POON A+Ox4. Patient waiting for a room on another unit.

## 2025-01-01 NOTE — PLAN OF CARE
Goal Outcome Evaluation: pt continues to be evaluated for intractable pain. See mar for pain interventions. Pt awaiting bed upstairs. Pt had epidural completed. Pt had some numbness from the injection which is resolving. IV team notified of new IV needing to be replaced. Pt is A/O x4 is agreeable to the plan of care and verbalizes  understanding.       Problem: Adult Inpatient Plan of Care  Goal: Absence of Hospital-Acquired Illness or Injury  Intervention: Identify and Manage Fall Risk  Recent Flowsheet Documentation  Taken 12/31/2024 1803 by Estefany Loo RN  Safety Promotion/Fall Prevention:   room organization consistent   safety round/check completed  Taken 12/31/2024 1330 by Estefany Loo RN  Safety Promotion/Fall Prevention: patient off unit  Taken 12/31/2024 1316 by Estefany Loo RN  Safety Promotion/Fall Prevention:   room organization consistent   safety round/check completed  Taken 12/31/2024 1144 by Estefany Loo RN  Safety Promotion/Fall Prevention:   room organization consistent   safety round/check completed  Taken 12/31/2024 1015 by Estefany Loo RN  Safety Promotion/Fall Prevention:   room organization consistent   safety round/check completed  Taken 12/31/2024 0900 by Estefany Loo RN  Safety Promotion/Fall Prevention:   room organization consistent   safety round/check completed  Taken 12/31/2024 0735 by Estefany Loo RN  Safety Promotion/Fall Prevention:   room organization consistent   safety round/check completed  Intervention: Prevent Skin Injury  Recent Flowsheet Documentation  Taken 12/31/2024 1803 by Estefany Loo RN  Body Position: position changed independently  Taken 12/31/2024 1530 by Estefany Loo RN  Body Position: position changed independently  Taken 12/31/2024 1316 by Estefany Loo RN  Body Position: position changed independently  Taken 12/31/2024 1144 by Estefany Loo RN  Body Position: position changed independently  Taken 12/31/2024  1015 by Estefany Loo RN  Body Position: position changed independently  Taken 12/31/2024 0900 by Estefany Loo RN  Body Position: position changed independently  Taken 12/31/2024 0735 by Estefany Loo RN  Body Position: position changed independently  Intervention: Prevent Infection  Recent Flowsheet Documentation  Taken 12/31/2024 1803 by Estefany Loo RN  Infection Prevention: single patient room provided  Taken 12/31/2024 1530 by Estefany Loo RN  Infection Prevention: single patient room provided  Taken 12/31/2024 1316 by Estefany Loo RN  Infection Prevention: single patient room provided  Taken 12/31/2024 1144 by Estefany Loo RN  Infection Prevention: single patient room provided  Taken 12/31/2024 1015 by Estefany Loo RN  Infection Prevention: single patient room provided  Taken 12/31/2024 0900 by Estefany Loo RN  Infection Prevention: single patient room provided  Taken 12/31/2024 0735 by Estefany Loo RN  Infection Prevention: single patient room provided  Goal: Optimal Comfort and Wellbeing  Intervention: Monitor Pain and Promote Comfort  Recent Flowsheet Documentation  Taken 12/31/2024 0900 by Estefany Loo RN  Pain Management Interventions: position adjusted  Intervention: Provide Person-Centered Care  Recent Flowsheet Documentation  Taken 12/31/2024 0900 by Estefany Loo RN  Trust Relationship/Rapport:   care explained   choices provided     Problem: Pain Acute  Goal: Optimal Pain Control and Function  Intervention: Optimize Psychosocial Wellbeing  Recent Flowsheet Documentation  Taken 12/31/2024 0900 by Estefany Loo RN  Supportive Measures: active listening utilized  Diversional Activities:   television   smartphone  Intervention: Develop Pain Management Plan  Recent Flowsheet Documentation  Taken 12/31/2024 0900 by Estefany Loo RN  Pain Management Interventions: position adjusted  Intervention: Prevent or Manage Pain  Recent Flowsheet  Documentation  Taken 12/31/2024 1803 by Estefany Loo RN  Medication Review/Management: medications reviewed  Taken 12/31/2024 1316 by Estefany Loo RN  Medication Review/Management: medications reviewed  Taken 12/31/2024 1144 by Estefany Loo RN  Medication Review/Management: medications reviewed  Taken 12/31/2024 0900 by Estefany Loo RN  Sensory Stimulation Regulation: care clustered  Medication Review/Management: medications reviewed     Problem: Fall Injury Risk  Goal: Absence of Fall and Fall-Related Injury  Intervention: Identify and Manage Contributors  Recent Flowsheet Documentation  Taken 12/31/2024 1803 by Estefany Loo RN  Medication Review/Management: medications reviewed  Taken 12/31/2024 1316 by Estefany Loo RN  Medication Review/Management: medications reviewed  Taken 12/31/2024 1144 by Estefany Loo RN  Medication Review/Management: medications reviewed  Taken 12/31/2024 0900 by Estefany Loo RN  Medication Review/Management: medications reviewed  Self-Care Promotion: independence encouraged  Intervention: Promote Injury-Free Environment  Recent Flowsheet Documentation  Taken 12/31/2024 1803 by Estefany Loo RN  Safety Promotion/Fall Prevention:   room organization consistent   safety round/check completed  Taken 12/31/2024 1330 by Estefany Loo RN  Safety Promotion/Fall Prevention: patient off unit  Taken 12/31/2024 1316 by Estefany Loo RN  Safety Promotion/Fall Prevention:   room organization consistent   safety round/check completed  Taken 12/31/2024 1144 by Estefany Loo RN  Safety Promotion/Fall Prevention:   room organization consistent   safety round/check completed  Taken 12/31/2024 1015 by Estefany Loo RN  Safety Promotion/Fall Prevention:   room organization consistent   safety round/check completed  Taken 12/31/2024 0900 by Estefany Loo RN  Safety Promotion/Fall Prevention:   room organization consistent   safety round/check  completed  Taken 12/31/2024 3346 by Estefany Loo, RN  Safety Promotion/Fall Prevention:   room organization consistent   safety round/check completed

## 2025-01-01 NOTE — PROGRESS NOTES
Name: Barbara Tran ADMIT: 2024   : 1955  PCP: Millicent Ace MD    MRN: [id number] LOS: 1 days   AGE/SEX: 69 y.o. female  ROOM: [facility]     Subjective   Subjective   Continues with low back pain.  Patient rating the pain 5-7/10.  Ambulating with a walker.  No radiation of the pain to the lower extremity.  No lower extremity tingling or numbness today.  No abdominal pain.  No nausea or vomiting.  Normal urine output with no dysuria or hematuria.    Review of Systems  GI.  No abdominal pain.  No nausea or vomiting  Cardiovascular/respiratory.  No chest pain/no shortness of breath/no palpitation/no ankle edema.     Objective   Objective   Vital Signs  Temp:  [97.1 °F (36.2 °C)-98.6 °F (37 °C)] 97.1 °F (36.2 °C)  Heart Rate:  [63-96] 73  Resp:  [16] 16  BP: (117-131)/(65-81) 127/81  SpO2:  [89 %-97 %] 97 %  on   ;   Device (Oxygen Therapy): room air  No intake or output data in the 24 hours ending 25 1657  Body mass index is 31.93 kg/m².      24  1822   Weight: 92.5 kg (203 lb 14.8 oz)     Physical Exam  General.  Elderly female.  Alert and oriented x 4.  In no apparent pain/distress/diaphoresis.  Normal mood and affect.  Eyes.  Pupils equal round and reactive.  Intact extraocular musculature.  No pallor or jaundice  Oral cavity.  Moist mucous membranes  Neck.  Supple.  No JVD.  No lymphadenopathy or thyromegaly  Cardiac vascular.  Regular rate and rhythm with no gallops or murmurs  Chest.  Clear to auscultation bilaterally with no added sounds.  Poor bilateral air entry.  Abdomen.  Soft lax.  No tenderness.  No organomegaly.  No guarding or rebound  Extremities.  No clubbing/cyanosis/edema.  There are no lower extremity neurodeficits  CNS.  No acute focal neurological deficits.    Results Review:      Results from last 7 days   Lab Units 24  0818 24  1840   SODIUM mmol/L 138 136   POTASSIUM mmol/L 3.8 3.9   CHLORIDE mmol/L 103 99   CO2 mmol/L 25.4 26.6   BUN mg/dL 14 18    CREATININE mg/dL 0.70 0.71   GLUCOSE mg/dL 121* 138*   CALCIUM mg/dL 8.5* 9.3   AST (SGOT) U/L  --  17   ALT (SGPT) U/L  --  17     Estimated Creatinine Clearance: 86.1 mL/min (by C-G formula based on SCr of 0.7 mg/dL).          Results from last 7 days   Lab Units 12/27/24 1957 12/27/24  1840   HSTROP T ng/L 7 7                 Results from last 7 days   Lab Units 12/28/24  0818   CHOLESTEROL mg/dL 134   TRIGLYCERIDES mg/dL 103   HDL CHOL mg/dL 44     Results from last 7 days   Lab Units 12/28/24 0818 12/27/24  1840   WBC 10*3/mm3 6.97 8.91   HEMOGLOBIN g/dL 14.4 15.7   HEMATOCRIT % 42.9 48.4*   PLATELETS 10*3/mm3 295 325   MCV fL 90.7 93.3   MCH pg 30.4 30.3   MCHC g/dL 33.6 32.4   RDW % 11.6* 13.0   RDW-SD fl 38.5 45.2   MPV fL 9.9 9.6   NEUTROPHIL % %  --  62.0   LYMPHOCYTE % %  --  30.3   MONOCYTES % %  --  5.3   EOSINOPHIL % %  --  2.0   BASOPHIL % %  --  0.3   IMM GRAN % %  --  0.1   NEUTROS ABS 10*3/mm3  --  5.52   LYMPHS ABS 10*3/mm3  --  2.70   MONOS ABS 10*3/mm3  --  0.47   EOS ABS 10*3/mm3  --  0.18   BASOS ABS 10*3/mm3  --  0.03   IMMATURE GRANS (ABS) 10*3/mm3  --  0.01             Results from last 7 days   Lab Units 12/28/24  0046   PROCALCITONIN ng/mL 0.05   LACTATE mmol/L 1.2                 Results from last 7 days   Lab Units 12/28/24  0032   ADENOVIRUS DETECTION BY PCR  Not Detected   CORONAVIRUS 229E  Not Detected   CORONAVIRUS HKU1  Not Detected   CORONAVIRUS NL63  Not Detected   CORONAVIRUS OC43  Not Detected   HUMAN METAPNEUMOVIRUS  Not Detected   HUMAN RHINOVIRUS/ENTEROVIRUS  Not Detected   INFLUENZA B PCR  Not Detected   PARAINFLUENZA 1  Not Detected   PARAINFLUENZA VIRUS 2  Not Detected   PARAINFLUENZA VIRUS 3  Not Detected   PARAINFLUENZA VIRUS 4  Not Detected   BORDETELLA PERTUSSIS PCR  Not Detected   CHLAMYDOPHILA PNEUMONIAE PCR  Not Detected   MYCOPLAMA PNEUMO PCR  Not Detected   INFLUENZA A PCR  Not Detected   RSV, PCR  Not Detected     Results from last 7 days   Lab Units  12/29/24  0533 12/28/24  1209   NITRITE UA  Negative Negative   WBC UA /HPF  --  0-2   BACTERIA UA /HPF  --  None Seen   SQUAM EPITHEL UA /HPF  --  0-2           Imaging:  Imaging Results (Last 24 Hours)       ** No results found for the last 24 hours. **               I reviewed the patient's new clinical results / labs / tests / procedures      Assessment/Plan     Active Hospital Problems    Diagnosis  POA    **Low back pain [M54.50]  Yes    DDD (degenerative disc disease), cervical [M50.30]  Yes    History of coronary artery disease [Z86.79]  Not Applicable    Chronic systolic CHF (congestive heart failure) [I50.22]  Yes    History of DVT (deep vein thrombosis) [Z86.718]  Not Applicable    Retrolisthesis of vertebrae [M43.10]  Yes    History of pulmonary embolism [Z86.711]  Yes    Hypothyroidism (acquired) [E03.9]  Yes    Essential hypertension [I10]  Yes      Resolved Hospital Problems   No resolved problems to display.           Degenerative disc disease of the lumbar spine leading to acute low back pain.  No myelopathy.  MRI of the lumbar spine revealed severe facet arthropathy at L4-5 L5-S1 with impingement on the L5 nerve root and L5-S1 nerve root.  MRI of the thoracic spine revealed no acute findings with mild degenerative disc disease and no significant spinal canal stenosis and dissecting paralabral cysts.  Neurosurgery saw the patient does not recommend any surgical treatment.  Recommendation of pain control and follow-up as an outpatient with neurosurgery in 2 weeks..  S/p epidural block with some improvement but still symptomatic.  Will switch fentanyl injection to Dilaudid.  Will increase Percocet.  Will continue Lidoderm and Robaxin.  Continue IV Decadron.  Will add scheduled Tylenol and Neurontin.  Physical therapy recommended outpatient physical therapy.  Will discharge on Medrol Dosepak.  Thoracolumbar spine MRI noted.  Will need orthopedic follow-up as an outpatient for possible dissecting labral  cysts noted and the MRI.  Negative UA for UTI.  Hypothyroidism.  Continue current replacement and check TSH.  History of DVT and PE.  Anticoagulation currently on hold secondary to a recent epidural block yesterday.  Will reach out to pain MD to see when is it safe to resume anticoagulation.  Hyperlipidemia.  Continue Lipitor.  LDL is 71.  History of coronary artery disease/hypertension/chronic systolic congestive heart failure.  No clinical evidence of angina or congestive heart failure.  Continue Jardiance/Crestor/Aldactone/Entresto/Demadex.  Good blood pressure control.  Will add low-dose beta-blockers  Hyperglycemia.  Check A1c.  VTE prophylaxis.  Patient anticoagulated and ambulating.      Discussed my findings and plan of treatment with the patient.  Disposition.  Anticipate discharge home tomorrow with outpatient physical therapy.        Daniel Harrison MD  [facility]  01/01/25  16:57 EST

## 2025-01-01 NOTE — THERAPY TREATMENT NOTE
Patient Name: Barbara Tran  : 1955    MRN: [id number]                              Today's Date: 2025       Admit Date: 2024    Visit Dx:     ICD-10-CM ICD-9-CM   1. Retrolisthesis of vertebrae  M43.10 738.4   2. Lumbar back pain  M54.50 724.2   3. Thoracic back pain, unspecified back pain laterality, unspecified chronicity  M54.6 724.1   4. Atelectasis of left lung  J98.11 518.0   5. Displacement of lumbar intervertebral disc with radiculopathy  M51.16 722.10     724.4     Patient Active Problem List   Diagnosis    Benign essential HTN    Cervical pain    Dysthymia    Pedal edema    HLD (hyperlipidemia)    Goiter, non-toxic    Chronic pain of left knee    Chronic coronary artery disease    Hypothyroidism    History of sepsis    Primary insomnia    Chronic diastolic congestive heart failure    S/P drug eluting coronary stent placement    Pulmonic valve regurgitation    Tricuspid valve regurgitation    Osteoporosis    Hyperglycemia    History of pulmonary embolism    Balance problem    Pulmonary embolism    Right ventricular thrombus    Pseudogout of knee    Low back pain    Retrolisthesis of vertebrae     Past Medical History:   Diagnosis Date    Allergic     codeine, morphine, levaquin    Arthritis l5 years or so ago    Asthma 2024    Basal cell carcinoma     CAD (coronary artery disease)     Cataract 6-9 months ago    Need surgery    CHF (congestive heart failure)     Coronary artery disease 2005 stent    COVID-19 virus detected 03/10/2021    Deep vein thrombosis 2021    Pulmonary embolism    Depression     Disease of thyroid gland     Headache Covid 3-6-21    Has continued    History of pulmonary embolism 2021    History of urinary tract infection     HL (hearing loss) Last 4-6 months    Hyperlipidemia     Hypertension since 2005    Hypothyroidism since     Low back pain     Multinodular goiter     Obesity     Osteopenia last few years    Osteoporosis     Pulmonary  embolism 06/03/2021    From Covid    Pulmonic valve regurgitation     Tricuspid valve regurgitation     Unstable angina 04/16/2024     Past Surgical History:   Procedure Laterality Date    BUNIONECTOMY Bilateral     HAMMERT TOE REPAIR/BUNIONECTOMY    CARDIAC CATHETERIZATION  2015, 2018    CARDIAC CATHETERIZATION N/A 04/29/2024    Procedure: Coronary angiography;  Surgeon: Cong Rivera MD;  Location:  [facility];  Service: Cardiovascular;  Laterality: N/A;    CARDIAC CATHETERIZATION N/A 04/29/2024    Procedure: Left Heart Cath;  Surgeon: Cong Rivera MD;  Location:  [facility];  Service: Cardiovascular;  Laterality: N/A;    CARDIAC CATHETERIZATION N/A 04/29/2024    Procedure: Left ventriculography;  Surgeon: Cong Rivera MD;  Location:  [facility];  Service: Cardiovascular;  Laterality: N/A;    CATARACT EXTRACTION      CHOLECYSTECTOMY  1995    COLONOSCOPY N/A 12/19/2016    Procedure: COLONOSCOPY WITH COLD BIOPSIES;  Surgeon: Medina Guillen MD;  Location: [facility] ENDOSCOPY;  Service:     CORONARY STENT PLACEMENT  2005    LAD 80% blockage    CYST REMOVAL      GANGLION CYST EXCISION      R WRIST    SKIN BIOPSY      SUBTOTAL HYSTERECTOMY  2009    THYROID BIOPSY  2014    TONSILLECTOMY  1965    TUBAL ABDOMINAL LIGATION  1985      General Information       Row Name 01/01/25 1036          Physical Therapy Time and Intention    Document Type therapy note (daily note)  -SM     Mode of Treatment individual therapy;physical therapy  -       Row Name 01/01/25 1036          General Information    Patient Profile Reviewed yes  -SM     Existing Precautions/Restrictions fall  -SM       Row Name 01/01/25 1036          Cognition    Orientation Status (Cognition) oriented x 4  -SM       Row Name 01/01/25 1036          Safety Issues/Impairments Affecting Functional Mobility    Impairments Affecting Function (Mobility) endurance/activity tolerance;strength;pain;range of  motion (ROM)  -               User Key  (r) = Recorded By, (t) = Taken By, (c) = Cosigned By      Initials Name Provider Type    Vannessa Rascon PT Physical Therapist                   Mobility       Row Name 01/01/25 1036          Bed Mobility    Comment, (Bed Mobility) Educated on log roll. Patient standing upon entry to room  -       Row Name 01/01/25 1036          Sit-Stand Transfer    Sit-Stand [place] (Transfers) supervision  -     Assistive Device (Sit-Stand Transfers) walker, front-wheeled  -       Row Name 01/01/25 1036          Gait/Stairs (Locomotion)    [place] Level (Gait) supervision  -     Assistive Device (Gait) walker, front-wheeled  -     Distance in Feet (Gait) 40  -     Comment, (Gait/Stairs) Gait slow and mildly antalgic. No overt LOB noted.  -               User Key  (r) = Recorded By, (t) = Taken By, (c) = Cosigned By      Initials Name Provider Type    Vannessa Rascon PT Physical Therapist                   Obj/Interventions       Row Name 01/01/25 1037          Balance    Balance Assessment sitting static balance;sitting dynamic balance;standing static balance;standing dynamic balance  -     Static Sitting Balance independent  -     Dynamic Sitting Balance modified independence  -     Position, Sitting Balance sitting edge of bed  -     Static Standing Balance modified independence  -     Dynamic Standing Balance supervision  -     Position/Device Used, Standing Balance supported;walker, front-wheeled  -     Balance Interventions sitting;standing;sit to stand;supported;dynamic;static  -               User Key  (r) = Recorded By, (t) = Taken By, (c) = Cosigned By      Initials Name Provider Type    Vannessa Rascon PT Physical Therapist                   Goals/Plan    No documentation.                  Clinical Impression       Row Name 01/01/25 1037          Pain    Pretreatment Pain Rating 8/10  -     Posttreatment Pain Rating  8/10  -SM     Pain Location back  -       Row Name 01/01/25 1037          Plan of Care Review    Plan of Care Reviewed With patient  -SM     Outcome Evaluation Patient seen for PT session this AM. Patient ambulating to BR upon arrival. Patient mobilized in room with rwx and SV. Encouraged patient to ambulate in hallway several times per day. Gait slow and mildly antalgic. No overt LOB noted.  Long discussion with patient. Attempted to educate patient on exercises and mobility to assist in alleviating back pain. Patient sitting at EOB at end of session. Patient mostly independent with mobility and knows bed exercises. PT will continue to follow peripherally for mobility needs.  -       Row Name 01/01/25 1037          Therapy Assessment/Plan (PT)    Therapy Frequency (PT) 2 times/wk  -       Row Name 01/01/25 1037          Vital Signs    Pre Patient Position Standing  -SM     Intra Patient Position Standing  -SM     Post Patient Position Sitting  -       Row Name 01/01/25 1037          Positioning and Restraints    Pre-Treatment Position standing in room  -SM     Post Treatment Position bed  -SM     In Bed notified nsg;call light within reach;encouraged to call for assist;sitting EOB  -SM               User Key  (r) = Recorded By, (t) = Taken By, (c) = Cosigned By      Initials Name Provider Type     Vannessa Austin, PT Physical Therapist                   Outcome Measures       Row Name 01/01/25 1040          How much help from another person do you currently need...    Turning from your back to your side while in flat bed without using bedrails? 4  -SM     Moving from lying on back to sitting on the side of a flat bed without bedrails? 3  -SM     Moving to and from a bed to a chair (including a wheelchair)? 4  -SM     Standing up from a chair using your arms (e.g., wheelchair, bedside chair)? 4  -SM     Climbing 3-5 steps with a railing? 3  -SM     To walk in hospital room? 4  -SM     AM-PAC 6 Clicks  Score (PT) 22  -     Highest Level of Mobility Goal 7 --> Walk 25 feet or more  -       Row Name 01/01/25 1040          Functional Assessment    Outcome Measure Options AM-PAC 6 Clicks Basic Mobility (PT)  -               User Key  (r) = Recorded By, (t) = Taken By, (c) = Cosigned By      Initials Name Provider Type     Vannessa Austin, PT Physical Therapist                                 Physical Therapy Education       Title: PT OT SLP Therapies (Done)       Topic: Physical Therapy (Done)       Point: Mobility training (Done)       Learning Progress Summary            Patient Acceptance, E, VU by  at 1/1/2025 1041    Acceptance, E, VU by NM at 1/1/2025 0518    Acceptance, E,TB,D, VU,NR by  at 12/28/2024 1359                      Point: Home exercise program (Done)       Learning Progress Summary            Patient Acceptance, E, VU by  at 1/1/2025 1041                      Point: Body mechanics (Done)       Learning Progress Summary            Patient Acceptance, E, VU by  at 1/1/2025 1041    Acceptance, E, VU by NM at 1/1/2025 0518    Acceptance, E,TB,D, VU,NR by  at 12/28/2024 1359                      Point: Precautions (Done)       Learning Progress Summary            Patient Acceptance, E, VU by  at 1/1/2025 1041    Acceptance, E, VU by NM at 1/1/2025 0518    Acceptance, E,TB,D, VU,NR by  at 12/28/2024 1359                                      User Key       Initials Effective Dates Name Provider Type Discipline     04/08/22 -  Mabel Vega PT Physical Therapist PT     05/02/22 -  Vannessa Austin PT Physical Therapist PT    NM 07/09/24 -  Damien Navas, RN Registered Nurse Nurse                  PT Recommendation and Plan     Outcome Evaluation: Patient seen for PT session this AM. Patient ambulating to  upon arrival. Patient mobilized in room with rwx and SV. Encouraged patient to ambulate in hallway several times per day. Gait slow and mildly antalgic. No overt LOB  noted.  Long discussion with patient. Attempted to educate patient on exercises and mobility to assist in alleviating back pain. Patient sitting at EOB at end of session. Patient mostly independent with mobility and knows bed exercises. PT will continue to follow peripherally for mobility needs.     Time Calculation:         PT Charges       Row Name 01/01/25 1043             Time Calculation    Start Time 0946  -SM      Stop Time 1015  -SM      Time Calculation (min) 29 min  -SM      PT Received On 01/01/25  -      PT - Next Appointment 01/06/25  -         Time Calculation- PT    Total Timed Code Minutes- PT 29 minute(s)  -SM         Timed Charges    92183 - PT Therapeutic Exercise Minutes 29  -SM         Total Minutes    Timed Charges Total Minutes 29  -SM       Total Minutes 29  -SM                User Key  (r) = Recorded By, (t) = Taken By, (c) = Cosigned By      Initials Name Provider Type    Vannessa Rascon PT Physical Therapist                  Therapy Charges for Today       Code Description Service Date Service Provider Modifiers Qty    45046208764 HC PT THER PROC EA 15 MIN 1/1/2025 Vannessa Austin, STUART GP 2            PT G-Codes  Outcome Measure Options: AM-PAC 6 Clicks Basic Mobility (PT)  AM-PAC 6 Clicks Score (PT): 22  PT Discharge Summary  Anticipated Discharge Disposition (PT): home with outpatient therapy services    Vannessa Austin PT  1/1/2025

## 2025-01-01 NOTE — PLAN OF CARE
Goal Outcome Evaluation:  Plan of Care Reviewed With: patient           Outcome Evaluation: Patient seen for PT session this AM. Patient ambulating to BR upon arrival. Patient mobilized in room with rwx and SV. Encouraged patient to ambulate in hallway several times per day. Gait slow and mildly antalgic. No overt LOB noted.  Long discussion with patient. Attempted to educate patient on exercises and mobility to assist in alleviating back pain. Patient sitting at EOB at end of session. Patient mostly independent with mobility and knows bed exercises. PT will continue to follow peripherally for mobility needs.    Anticipated Discharge Disposition (PT): home with outpatient therapy services

## 2025-01-01 NOTE — CONSULTS
CONSULT NOTE    INTERNAL MEDICINE   [facility]       Patient Identification:  Name: Barbara Tran  Age: 69 y.o.  Sex: female  :  1955  MRN: [id number]             Date of Consultation:  24          Primary Care Physician: Millicent Ace MD                               Requesting Physician: dr mantilla  Reason for Consultation:admission/assuming care    Chief Complaint:  69 year old female presented to the ED with back pain; she was seen at [facility] and sent to the observation [facility]; she has been seen by neurosurgery and pain management; she had an epidural injection today with minimal improvement of her symptoms so admission was requested    History of Present Illness:   As above      Past Medical History:  Past Medical History:   Diagnosis Date    Allergic 2015    codeine, morphine, levaquin    Arthritis l5 years or so ago    Asthma 2024    Basal cell carcinoma     CAD (coronary artery disease)     Cataract 6-9 months ago    Need surgery    CHF (congestive heart failure)     Coronary artery disease 2005 stent    COVID-19 virus detected 03/10/2021    Deep vein thrombosis 2021    Pulmonary embolism    Depression     Disease of thyroid gland     Headache Covid 3-6-    Has continued    History of pulmonary embolism 2021    History of urinary tract infection     HL (hearing loss) Last 4-6 months    Hyperlipidemia     Hypertension since     Hypothyroidism since     Low back pain     Multinodular goiter     Obesity     Osteopenia last few years    Osteoporosis     Pulmonary embolism 2021    From Covid    Pulmonic valve regurgitation     Tricuspid valve regurgitation     Unstable angina 2024     Past Surgical History:  Past Surgical History:   Procedure Laterality Date    BUNIONECTOMY Bilateral     HAMMERT TOE REPAIR/BUNIONECTOMY    CARDIAC CATHETERIZATION  ,     CARDIAC CATHETERIZATION N/A 2024    Procedure: Coronary angiography;   Surgeon: Cong Rivera MD;  Location:  [facility];  Service: Cardiovascular;  Laterality: N/A;    CARDIAC CATHETERIZATION N/A 04/29/2024    Procedure: Left Heart Cath;  Surgeon: Cong Rivera MD;  Location:  [facility];  Service: Cardiovascular;  Laterality: N/A;    CARDIAC CATHETERIZATION N/A 04/29/2024    Procedure: Left ventriculography;  Surgeon: Cong Rivera MD;  Location:  [facility];  Service: Cardiovascular;  Laterality: N/A;    CATARACT EXTRACTION      CHOLECYSTECTOMY  1995    COLONOSCOPY N/A 12/19/2016    Procedure: COLONOSCOPY WITH COLD BIOPSIES;  Surgeon: Medina Guillen MD;  [facility];  Service:     CORONARY STENT PLACEMENT  2005    LAD 80% blockage    CYST REMOVAL      GANGLION CYST EXCISION      R WRIST    SKIN BIOPSY      SUBTOTAL HYSTERECTOMY  2009    THYROID BIOPSY  2014    TONSILLECTOMY  1965    TUBAL ABDOMINAL LIGATION  1985      Home Meds:  Medications Prior to Admission   Medication Sig Dispense Refill Last Dose/Taking    apixaban (ELIQUIS) 5 MG tablet tablet Take 1 tablet by mouth Every 12 (Twelve) Hours. Indications: DVT/PE (active thrombosis) 60 tablet 2 Taking    buPROPion XL (WELLBUTRIN XL) 300 MG 24 hr tablet Take 1 tablet by mouth Daily. Indications: Major Depressive Disorder 90 tablet 3 Taking    calcium carbonate (OS-SHAHNAZ) 600 MG tablet Take 1 tablet by mouth 2 (Two) Times a Day With Meals. Indications: Low Amount of Calcium in the Blood   Taking    empagliflozin (JARDIANCE) 10 MG tablet tablet Take 1 tablet by mouth Daily. Indications: Cardiac Failure 90 tablet 3 Taking    HYDROcodone-acetaminophen (NORCO) 5-325 MG per tablet Take 1 tablet by mouth Every 8 (Eight) Hours As Needed for Moderate Pain. Indications: Pain 45 tablet 0 Taking As Needed    ibandronate (Boniva) 150 MG tablet Take 1 tablet by mouth Every 30 (Thirty) Days. Patient taking once a month   Taking    levothyroxine (Synthroid) 50 MCG tablet  Take 1 tablet by mouth Daily. Indications: Underactive Thyroid 90 tablet 0 Taking    magnesium oxide (MAG-OX) 400 MG tablet Take 2 tablets by mouth 2 (two) times a day. Indications: Disorder with Low Magnesium Levels   Taking    rosuvastatin (CRESTOR) 40 MG tablet Take 1 tablet by mouth Every Night. 90 tablet 3 Taking    sacubitril-valsartan (ENTRESTO) 24-26 MG tablet Take 1 tablet by mouth 2 (Two) Times a Day. Indications: Cardiac Failure 28 tablet 0 Taking    spironolactone (ALDACTONE) 25 MG tablet Take 1 tablet by mouth Daily. Indications: Edema 90 tablet 3 Taking    zolpidem (AMBIEN) 5 MG tablet Take 1 tablet by mouth At Night As Needed for Sleep. Indications: Trouble Sleeping 30 tablet 0 Taking As Needed    benzonatate (TESSALON) 200 MG capsule Take 1 capsule by mouth Every 4 (Four) Hours As Needed for Cough. (Patient not taking: Reported on 12/27/2024)        Current Meds:     Current Facility-Administered Medications:     acetaminophen (TYLENOL) tablet 650 mg, 650 mg, Oral, Q4H PRN, Octavio Han PA, 650 mg at 12/29/24 0231    [Held by provider] apixaban (ELIQUIS) tablet 5 mg, 5 mg, Oral, Q12H, Octavio Han PA, 5 mg at 12/28/24 0951    sennosides-docusate (PERICOLACE) 8.6-50 MG per tablet 2 tablet, 2 tablet, Oral, BID PRN **AND** polyethylene glycol (MIRALAX) packet 17 g, 17 g, Oral, Daily PRN **AND** bisacodyl (DULCOLAX) EC tablet 5 mg, 5 mg, Oral, Daily PRN **AND** bisacodyl (DULCOLAX) suppository 10 mg, 10 mg, Rectal, Daily PRN, Octavio Han PA    buPROPion XL (WELLBUTRIN XL) 24 hr tablet 300 mg, 300 mg, Oral, Daily, Octavio Han PA, 300 mg at 12/31/24 0908    Calcium Replacement - Follow Nurse / BPA Driven Protocol, , Not Applicable, PRN, Octavio Han PA    dexAMETHasone (DECADRON) injection 4 mg, 4 mg, Intravenous, Q6H, Glenna Cotton MD, 4 mg at 12/31/24 1609    diphenhydrAMINE (BENADRYL) injection 12.5 mg, 12.5 mg, Intravenous, Once, Ivan Howell APRN    empagliflozin (JARDIANCE) tablet 10 mg, 10 mg,  Oral, Daily, Glenna Cotton MD, 10 mg at 12/31/24 0908    [Held by provider] Enoxaparin Sodium (LOVENOX) syringe 90 mg, 1 mg/kg, Subcutaneous, BID, Glenna Cotton MD, 90 mg at 12/30/24 1134    fentaNYL citrate (PF) (SUBLIMAZE) injection 50 mcg, 50 mcg, Intravenous, PRN, Aldo Velazquez MD, 25 mcg at 12/31/24 1451    HYDROcodone-acetaminophen (NORCO) 7.5-325 MG per tablet 1 tablet, 1 tablet, Oral, Q6H PRN, Octavio Han PA, 1 tablet at 12/31/24 1919    levothyroxine (SYNTHROID, LEVOTHROID) tablet 50 mcg, 50 mcg, Oral, Q AM, Octavio Han PA, 50 mcg at 12/31/24 0508    Lidocaine 4 % 2 patch, 2 patch, Transdermal, Q24H, Octavio Han PA, 2 patch at 12/30/24 0854    magnesium oxide (MAG-OX) tablet 800 mg, 800 mg, Oral, BID, Octavio Han PA, 800 mg at 12/31/24 0908    Magnesium Standard Dose Replacement - Follow Nurse / BPA Driven Protocol, , Not Applicable, PRN, Octvaio Han PA    melatonin tablet 5 mg, 5 mg, Oral, Nightly PRN, Octavio Han PA, 5 mg at 12/30/24 0009    methocarbamol (ROBAXIN) tablet 500 mg, 500 mg, Oral, 4x Daily, Laurence Maxwell, APRN, 500 mg at 12/31/24 1804    ondansetron (ZOFRAN) injection 4 mg, 4 mg, Intravenous, Q6H PRN, Octavio Han PA    Phosphorus Replacement - Follow Nurse / BPA Driven Protocol, , Not Applicable, PRNGuille Ryan, PA    Potassium Replacement - Follow Nurse / BPA Driven Protocol, , Not Applicable, PRNGuille Ryan, PA    rosuvastatin (CRESTOR) tablet 40 mg, 40 mg, Oral, Nightly, Octavio Han PA, 40 mg at 12/30/24 2048    sacubitril-valsartan (ENTRESTO) 24-26 MG tablet 1 tablet, 1 tablet, Oral, BID, Octavio Han PA, 1 tablet at 12/31/24 0908    sodium chloride 0.9 % flush 10 mL, 10 mL, Intravenous, Q12H, Octavio Han PA, 10 mL at 12/31/24 0908    sodium chloride 0.9 % flush 10 mL, 10 mL, Intravenous, PRN, Octavio Han PA    sodium chloride 0.9 % infusion 40 mL, 40 mL, Intravenous, PRN, Octavio Han PA    spironolactone (ALDACTONE) tablet 25 mg, 25 mg, Oral, Daily, Octavio Han PA, 25 mg at  24 0908    torsemide (DEMADEX) tablet 20 mg, 20 mg, Oral, Daily, Andria Palomares, APRN, 20 mg at 24 0908    zolpidem (AMBIEN) tablet 5 mg, 5 mg, Oral, Nightly PRN, Octavio Han PA  Allergies:  Allergies   Allergen Reactions    Dilaudid [Hydromorphone] Nausea Only    Codeine Dizziness     lightheaded    Levofloxacin Itching    Morphine Itching     Social History:   Social History     Socioeconomic History    Marital status: Single   Tobacco Use    Smoking status: Never     Passive exposure: Never    Smokeless tobacco: Never    Tobacco comments:     N/A   Vaping Use    Vaping status: Never Used   Substance and Sexual Activity    Alcohol use: Yes     Alcohol/week: 2.0 standard drinks of alcohol     Types: 1 Glasses of wine, 1 Cans of beer per week     Comment: socially    Drug use: Never    Sexual activity: Not Currently     Partners: Male     Birth control/protection: None     Family History:  Family History   Problem Relation Age of Onset    Heart disease Mother             Heart disease Father             Heart attack Father     Diabetes Sister     Heart disease Sister 69            Heart disease Brother         open heart-bypass    Cancer Brother         Bladder/Rodney    Arthritis Brother     Heart disease Brother 62            Heart attack Brother             Arthritis Son     No Known Problems Maternal Aunt     No Known Problems Maternal Uncle     No Known Problems Paternal Aunt     No Known Problems Paternal Uncle     No Known Problems Maternal Grandmother     No Known Problems Maternal Grandfather     No Known Problems Paternal Grandmother     No Known Problems Paternal Grandfather     Celiac disease Neg Hx     Cirrhosis Neg Hx     Colon cancer Neg Hx     Colon polyps Neg Hx     Crohn's disease Neg Hx     Cystic fibrosis Neg Hx     Esophageal cancer Neg Hx     Hemochromatosis Neg Hx     Inflammatory bowel disease Neg Hx     Irritable bowel syndrome Neg Hx      "Liver cancer Neg Hx     Liver disease Neg Hx     Rectal cancer Neg Hx     Stomach cancer Neg Hx     Ulcerative colitis Neg Hx     Dante's disease Neg Hx     Alcohol abuse Neg Hx     Pancreatitis Neg Hx           Review of Systems  See history of present illness and past medical history.  Patient denies headache, dizziness, syncope, falls, trauma, change in vision, change in hearing, change in taste, changes in weight, changes in appetite, focal weakness, numbness, or paresthesia.  Patient denies chest pain, palpitations, dyspnea, orthopnea, PND, cough, sinus pressure, rhinorrhea, epistaxis, hemoptysis, nausea, vomiting,hematemesis, diarrhea, constipation or hematochezia. Denies cold or heat intolerance, polydipsia, polyuria, polyphagia. Denies hematuria, pyuria, dysuria, hesitancy, frequency or urgency. Denies consumption of raw and under cooked meats foods or change in water source.  Denies fever, chills, sweats, night sweats.        Vitals:   /65 (BP Location: Left arm, Patient Position: Lying)   Pulse 96   Temp 97.7 °F (36.5 °C) (Oral)   Resp 16   Ht 170.2 cm (67.01\")   Wt 92.5 kg (203 lb 14.8 oz)   LMP  (LMP Unknown)   SpO2 (!) 89%   BMI 31.93 kg/m²   I/O: No intake or output data in the 24 hours ending 12/31/24 2210  Exam:  General Appearance:    Alert, cooperative, no distress, appears stated age   Head:    Normocephalic, without obvious abnormality, atraumatic   Eyes:    PERRL, conjunctivae/corneas clear, EOM's intact, both eyes   Ears:    Normal external ear canals, both ears   Nose:   Nares normal, septum midline, mucosa normal, no drainage    or sinus tenderness   Throat:   Lips, tongue, gums normal; oral mucosa pink and moist   Neck:   Supple, symmetrical, trachea midline, no adenopathy;     thyroid:  no enlargement/tenderness/nodules; no carotid    bruit or JVD   Back:     Symmetric, no curvature, ROM normal, no CVA tenderness   Lungs:     Clear to auscultation bilaterally, respirations " "unlabored   Chest Wall:    No tenderness or deformity    Heart:    Regular rate and rhythm, S1 and S2 normal, no murmur, rub   or gallop   Abdomen:     Soft, nontender, bowel sounds active all four quadrants,     no masses, no hepatomegaly, no splenomegaly   Extremities:   Extremities normal, atraumatic, no cyanosis or edema                Data Review:  Labs in chart were reviewed.  No results found for: \"WBC\", \"HGB\", \"HCT\", \"PLT\"  No results found for: \"NA\", \"K\", \"CL\", \"CO2\", \"BUN\", \"CREATININE\", \"GLUCOSE\"  No results found for: \"CALCIUM\", \"MG\", \"PHOS\"  No results found for: \"AST\", \"ALT\", \"ALKPHOS\"            Imaging Results (Last 7 Days)       Procedure Component Value Units Date/Time    [place] Guided Pain [facility] Spine [[id number]] Resulted: 12/31/24 1502     Updated: 12/31/24 1502    Narrative:      This procedure was auto-finalized with no dictation required.    MRI Thoracic Spine Without Contrast [[id number]] Collected: 12/30/24 0817     Updated: 12/30/24 0818    Addenda:        ADDENDUM:  Patient: OLIVE MYERS  Time Out: 08:02  Exam(s): MRI T SPINE Without Contrast      Added by Scooter Lennon MD on 12 28 2024 8:02 AM (-05:00)  ADDENDUM: T2 hyperintense structures adjacent to both shoulders, possibly   dissecting labral cysts.      Signed: 12/28/24 0802 by Scooter Lennon MD            ADDENDUM:  Patient: OLIVE MYERS  Time Out: 08:02  Exam(s): MRI T SPINE Without Contrast      Added by Scooter Lennon MD on 12 28 2024 8:02 AM (-05:00)  ADDENDUM: T2 hyperintense structures adjacent to both shoulders, possibly   dissecting labral cysts.      Signed: 12/28/24 0802 by Scooter Lennon MD            ADDENDUM:  Patient: OLIVE MYERS  Time Out: 08:02  Exam(s): MRI T SPINE Without Contrast      Added by Scooter Lennon MD on 12 28 2024 8:02 AM (-05:00)  ADDENDUM: T2 hyperintense structures adjacent to both shoulders, possibly   dissecting labral cysts.      Signed: 12/28/24 0802 by Scooter Lennon MD            " ADDENDUM:  Patient: OLIVE MYRES  Time Out: 08:02  Exam(s): MRI T SPINE Without Contrast      Added by Scooter Lennon MD on 12 28 2024 8:02 AM (-05:00)  ADDENDUM: T2 hyperintense structures adjacent to both shoulders, possibly   dissecting labral cysts.      Signed: 12/28/24 0802 by Scooter Lennon MD            ADDENDUM:  Patient: OLIVE MYERS  Time Out: 08:02  Exam(s): MRI T SPINE Without Contrast      Added by Scooter Lennon MD on 12 28 2024 8:02 AM (-05:00)  ADDENDUM: T2 hyperintense structures adjacent to both shoulders, possibly   dissecting labral cysts.      Signed: 12/28/24 0802 by Scooter Lennon MD            ADDENDUM:  Patient: OLIVE MYERS  Time Out: 08:02  Exam(s): MRI T SPINE Without Contrast      Added by Scooter Lennon MD on 12 28 2024 8:02 AM (-05:00)  ADDENDUM: T2 hyperintense structures adjacent to both shoulders, possibly   dissecting labral cysts.      Signed: 12/28/24 0802 by Scooter Lennon MD            ADDENDUM:  Patient: OLIVE MYERS  Time Out: 08:02  Exam(s): MRI T SPINE Without Contrast      Added by Scooter Lennon MD on 12 28 2024 8:02 AM (-05:00)  ADDENDUM: T2 hyperintense structures adjacent to both shoulders, possibly   dissecting labral cysts.      Signed: 12/28/24 0802 by Scooter Lennon MD    Narrative:        Patient: OLIVE MYERS  Time Out: 08:02  Exam(s): MRI T SPINE Without Contrast     ADDENDUM - Added by Scooter Lennon MD on 12 28 2024 8:02 AM (-05:00)  ADDENDUM: T2 hyperintense structures adjacent to both shoulders, possibly   dissecting labral cysts.  ----------------------------------------------------------------------    EXAM:    MR Thoracic Spine Without Intravenous Contrast    CLINICAL HISTORY:     Reason for exam: mid back pain.    TECHNIQUE:    Magnetic resonance images of the thoracic spine without intravenous   contrast in multiple planes.    COMPARISON:    CT dated 11 26 24    FINDINGS:    Vertebrae:  Indeterminate T2 hyperintense structures seen adjacent to    the shoulder girdle musculature bilaterally.  Mild endplate edema   centered at what is likely the T11-T12 disc space.  No evidence of   endplate destruction to suggest osteomyelitis.    Discs spinal canal neural foramina:  Unable to provide accurate spinal   numbering due to lack of whole field spine localizer sequences.  Shallow   disc bulge at likely T5-T6.  No spinal canal stenosis.    Spinal cord:  Unremarkable.  Normal signal.    Soft tissues:  Unremarkable.    IMPRESSION:         No acute findings in the thoracic spine.  Mild degenerative disc   disease without significant spinal canal narrowing.        Impression:          Electronically signed by Scooter Lennon MD on 12-28-24 at 0802    MRI Lumbar Spine Without Contrast [[id number]] Collected: 12/28/24 0812     Updated: 12/28/24 0812    Narrative:        Patient: OLIVE MYERS  Time Out: 08:12  Exam(s): MRI L SPINE Without Contrast     EXAM:    MR Lumbar Spine Without Intravenous Contrast    CLINICAL HISTORY:     Reason for exam: low back pain bilaterally.    TECHNIQUE:    Magnetic resonance images of the lumbar spine without intravenous   contrast in multiple planes.    COMPARISON:    No relevant prior studies available.    FINDINGS:    Vertebrae:  Unremarkable.  No acute fracture.    Spinal cord:  Unremarkable.  Normal signal.    Soft tissues:  Unremarkable.     DISCS SPINAL CANAL NEURAL FORAMINA:    L1-L2:  Unremarkable.  No significant disc disease.  No stenosis.    L2-L3:  Unremarkable.  No significant disc disease.  No stenosis.    L3-L4:  Unremarkable.  No significant disc disease.  No stenosis.    L4-L5:  Mild-moderate disc height loss with broad-based posterior disc   bulge.  Severe bilateral facet arthropathy; there are large faceted genic   cysts.  There is impingement of the right L5 nerve root in the lateral   recess.    L5-S1:  Moderate disc height loss and facet arthropathy.  Moderate to   severe left and moderate right neuroforaminal  narrowing.  There is grade   1 retrolisthesis of L5 on S1.    IMPRESSION:       Severe facet arthropathy at L4-L5 and L5-S1.  Impingement of the right L5   nerve root in the lateral recess at L4-L5 and abutment of the left L5   nerve root in L5-S1 neuroforamen.      Impression:          Electronically signed by Scooter Lennon MD on 12-28-24 at 0812    CT Abdomen Pelvis Without Contrast [[id number]] Collected: 12/28/24 0309     Updated: 12/28/24 0309    Narrative:        Patient: OLIVE MYERS  Time Out: 03:09  Exam(s): CT ABDOMEN + PELVIS Without Contrast     EXAM:    CT Abdomen and Pelvis Without Intravenous Contrast    CLINICAL HISTORY:     Reason for exam: low back pain, flank pain.    TECHNIQUE:    Axial computed tomography images of the abdomen and pelvis without   intravenous contrast with coronal and sagittal reformats.  CTDI is 13.89   mGy and DLP is 920.9 mGy-cm.  This CT exam was performed according to the   principle of ALARA (As Low As Reasonably Achievable) by using one or more   of the following dose reduction techniques: automated exposure control,   adjustment of the mA and or kV according to patient size, and or use of   iterative reconstruction technique.    COMPARISON:    No relevant prior studies available.    FINDINGS:    Limitations:  Limited due to lack of IV contrast.     ABDOMEN:    Liver:  Unremarkable.    Gallbladder and bile ducts:  Cholecystectomy.    Pancreas:  Unremarkable.    Spleen:  Unremarkable.    Adrenals:  Unremarkable.    Kidneys and ureters:  Unremarkable.    Stomach and bowel:  Unremarkable.     PELVIS:    Appendix:  No findings to suggest acute appendicitis.    Bladder:  Unremarkable.    Reproductive:  Hysterectomy.     ABDOMEN and PELVIS:    Intraperitoneal space:  No pneumoperitoneum.    Bones joints:  See below.      Soft tissues:  Unremarkable.    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  Atherosclerotic arterial  calcifications: moderate.   No aortic aneurysm or dissection. Arterial structures otherwise   unremarkable.    Lymph nodes:  Unremarkable.    IMPRESSION:       1.  Limited due to lack of IV contrast.  2.  No acute abnormality.  3.  See additional findings above.  4.  No urolithiasis seen.      Impression:          Electronically signed by Aldo Pacheco MD on 12-28-24 at 0309    CT Chest Without Contrast Diagnostic [[id number]] Collected: 12/28/24 0308     Updated: 12/28/24 0308    Narrative:        Patient: OLIVE MYERS  Time Out: 03:07  Exam(s): CT CHEST Without Contrast     EXAM:    CT Chest Without Intravenous Contrast    CLINICAL HISTORY:     Reason for exam: abnormal cxr, cough.    TECHNIQUE:    Axial computed tomography images of the chest without intravenous   contrast with coronal and sagittal reformats.  CTDI is  13.89 mGy and DLP   is 920.9 mGy-cm.  This CT exam was performed according to the principle   of ALARA (As Low As Reasonably Achievable) by using one or more of the   following dose reduction techniques: automated exposure control,   adjustment of the mA and or kV according to patient size, and or use of   iterative reconstruction technique.    COMPARISON:    No relevant prior studies available.    FINDINGS:    Limitations:  Limited due to lack of IV contrast.    Lungs:  Unremarkable.    Pleural space:  Unremarkable.    Heart:  Moderate coronary artery calcifications. Otherwise unremarkable.      Bones joints:  No acute displaced fracture.    Soft tissues:  Bilateral breast implants.    Vasculature:  Atherosclerotic arterial calcifications: mild. No aortic   aneurysm or dissection. Arterial structures otherwise unremarkable.    Vascular structures otherwise unremarkable.    Lymph nodes:  No concerning adenopathy.    IMPRESSION:       1.  No acute abnormality.  2.  Limited due to lack of IV contrast.  3.  See additional findings above.      Impression:          Electronically signed by Aldo  MD Junior on 12-28-24 at 0307    US Venous Doppler Lower Extremity Left (duplex) [[id number]] Collected: 12/27/24 2038     Updated: 12/27/24 2038    Narrative:        Patient: OLIVE MYERS  Time Out: 20:37  Exam(s): US VENOUS LEFT LOWER EXTREMITY     EXAM:    US Duplex Left Lower Extremity Veins    CLINICAL HISTORY:     Reason for exam: left calf pain.    TECHNIQUE:    Real-time duplex ultrasound scan of the left lower extremity veins   integrating B-mode two-dimensional vascular structure, Doppler spectral   analysis, color flow Doppler imaging and compression.    COMPARISON:    No relevant prior studies available.    FINDINGS:    Deep veins:  Unremarkable.  The visualized deep veins of the left lower   extremity are compressible with color flow.  No visualized thrombus.    Superficial veins:  Unremarkable.    Soft tissues:  No acute findings.    IMPRESSION:         No DVT within the left lower extremity.      Impression:          Electronically signed by Ace Angelo MD on 12-27-24 at 2037    XR Spine Thoracic 3 View [[id number]] Collected: 12/27/24 1934     Updated: 12/27/24 1945    Narrative:      XR SPINE LUMBAR COMPLETE 4+VW-, XR SPINE THORACIC 3 VW-, XR CHEST 1 VW-     INDICATIONS: Pain, short of breath.     TECHNIQUE: FRONTAL VIEW OF THE CHEST, 5 VIEWS OF THE LUMBAR SPINE, 3  VIEWS OF THE THORACIC SPINE     COMPARISON: 12/26/2022,  image from CT from 11/26/2024     FINDINGS:     Chest x-ray: The heart size is borderline. Pulmonary vasculature is  unremarkable. Small atelectasis or infiltrate peripherally at the left  base, follow-up suggested. No pleural effusion, or pneumothorax. No  acute osseous process.     Thoracic and lumbar spine: Vertebral body heights appear stable. No  acute fracture is identified. Mild retrolisthesis of L1 on L2, L2 on L3,  mild anterior listhesis of L4 on L5. Multilevel endplate spurring, disc  space narrowing, and facet arthropathy are present. S-shaped  thoracolumbar  scoliosis is evident. If further imaging evaluation spine  is indicated, MRI could be considered.       Impression:      As described.              This report was finalized on 12/27/2024 7:42 PM by Dr. William Mota M.D on Workstation: [id number]       XR Spine Lumbar Complete 4+VW [[id number]] Collected: 12/27/24 1934     Updated: 12/27/24 1945    Narrative:      XR SPINE LUMBAR COMPLETE 4+VW-, XR SPINE THORACIC 3 VW-, XR CHEST 1 VW-     INDICATIONS: Pain, short of breath.     TECHNIQUE: FRONTAL VIEW OF THE CHEST, 5 VIEWS OF THE LUMBAR SPINE, 3  VIEWS OF THE THORACIC SPINE     COMPARISON: 12/26/2022,  image from CT from 11/26/2024     FINDINGS:     Chest x-ray: The heart size is borderline. Pulmonary vasculature is  unremarkable. Small atelectasis or infiltrate peripherally at the left  base, follow-up suggested. No pleural effusion, or pneumothorax. No  acute osseous process.     Thoracic and lumbar spine: Vertebral body heights appear stable. No  acute fracture is identified. Mild retrolisthesis of L1 on L2, L2 on L3,  mild anterior listhesis of L4 on L5. Multilevel endplate spurring, disc  space narrowing, and facet arthropathy are present. S-shaped  thoracolumbar scoliosis is evident. If further imaging evaluation spine  is indicated, MRI could be considered.       Impression:      As described.              This report was finalized on 12/27/2024 7:42 PM by Dr. William Mota M.D on Workstation: [id number]       XR Chest 1 View [[id number]] Collected: 12/27/24 1934     Updated: 12/27/24 1945    Narrative:      XR SPINE LUMBAR COMPLETE 4+VW-, XR SPINE THORACIC 3 VW-, XR CHEST 1 VW-     INDICATIONS: Pain, short of breath.     TECHNIQUE: FRONTAL VIEW OF THE CHEST, 5 VIEWS OF THE LUMBAR SPINE, 3  VIEWS OF THE THORACIC SPINE     COMPARISON: 12/26/2022,  image from CT from 11/26/2024     FINDINGS:     Chest x-ray: The heart size is borderline. Pulmonary vasculature is  unremarkable. Small atelectasis  or infiltrate peripherally at the left  base, follow-up suggested. No pleural effusion, or pneumothorax. No  acute osseous process.     Thoracic and lumbar spine: Vertebral body heights appear stable. No  acute fracture is identified. Mild retrolisthesis of L1 on L2, L2 on L3,  mild anterior listhesis of L4 on L5. Multilevel endplate spurring, disc  space narrowing, and facet arthropathy are present. S-shaped  thoracolumbar scoliosis is evident. If further imaging evaluation spine  is indicated, MRI could be considered.       Impression:      As described.              This report was finalized on 12/27/2024 7:42 PM by Dr. William Mota M.D on Workstation: [employer]             Past Medical History:   Diagnosis Date    Allergic 2015    codeine, morphine, levaquin    Arthritis l5 years or so ago    Asthma 9-    Basal cell carcinoma     CAD (coronary artery disease)     Cataract 6-9 months ago    Need surgery    CHF (congestive heart failure)     Coronary artery disease 2005 stent    COVID-19 virus detected 03/10/2021    Deep vein thrombosis 06/03/2021    Pulmonary embolism    Depression     Disease of thyroid gland     Headache Covid 3-6-21    Has continued    History of pulmonary embolism 06/01/2021    History of urinary tract infection     HL (hearing loss) Last 4-6 months    Hyperlipidemia     Hypertension since 2005    Hypothyroidism since 2012    Low back pain     Multinodular goiter     Obesity     Osteopenia last few years    Osteoporosis     Pulmonary embolism 06/03/2021    From Covid    Pulmonic valve regurgitation     Tricuspid valve regurgitation     Unstable angina 04/16/2024       Assessment:  Active Hospital Problems    Diagnosis  POA    **Low back pain [M54.50]  Yes    Retrolisthesis of vertebrae [M43.10]  Yes      Resolved Hospital Problems   No resolved problems to display.   Chf  Cad  Hypertension  Hypothyroidism  Asthma      Plan:  Will admit the patient  Continue steroids  Lidocaine  patch  Outpatient follow up with neurosurgery and ortho  Dw provider from the observation unit    Christiana William MD   12/31/2024  22:10 EST

## 2025-01-02 ENCOUNTER — TELEPHONE (OUTPATIENT)
Dept: NEUROSURGERY | Facility: CLINIC | Age: 70
End: 2025-01-02
Payer: MEDICARE

## 2025-01-02 ENCOUNTER — DOCUMENTATION (OUTPATIENT)
Dept: PAIN MEDICINE | Facility: HOSPITAL | Age: 70
End: 2025-01-02
Payer: MEDICARE

## 2025-01-02 ENCOUNTER — READMISSION MANAGEMENT (OUTPATIENT)
Dept: CALL CENTER | Facility: HOSPITAL | Age: 70
End: 2025-01-02
Payer: MEDICARE

## 2025-01-02 ENCOUNTER — HOME HEALTH ADMISSION (OUTPATIENT)
Dept: HOME HEALTH SERVICES | Facility: HOME HEALTHCARE | Age: 70
End: 2025-01-02
Payer: MEDICARE

## 2025-01-02 ENCOUNTER — TELEPHONE (OUTPATIENT)
Dept: CARDIOLOGY | Facility: CLINIC | Age: 70
End: 2025-01-02
Payer: MEDICARE

## 2025-01-02 ENCOUNTER — DOCUMENTATION (OUTPATIENT)
Dept: HOME HEALTH SERVICES | Facility: HOME HEALTHCARE | Age: 70
End: 2025-01-02
Payer: MEDICARE

## 2025-01-02 VITALS
BODY MASS INDEX: 32.01 KG/M2 | TEMPERATURE: 98.1 F | HEIGHT: 67 IN | DIASTOLIC BLOOD PRESSURE: 68 MMHG | RESPIRATION RATE: 16 BRPM | HEART RATE: 65 BPM | SYSTOLIC BLOOD PRESSURE: 118 MMHG | OXYGEN SATURATION: 95 % | WEIGHT: 203.93 LBS

## 2025-01-02 PROBLEM — M51.369 DDD (DEGENERATIVE DISC DISEASE), LUMBAR: Status: ACTIVE | Noted: 2025-01-02

## 2025-01-02 PROBLEM — I50.22 CHRONIC HFREF (HEART FAILURE WITH REDUCED EJECTION FRACTION): Status: ACTIVE | Noted: 2025-01-02

## 2025-01-02 LAB
ALBUMIN SERPL-MCNC: 3.6 G/DL (ref 3.5–5.2)
ALP SERPL-CCNC: 64 U/L (ref 39–117)
ALT SERPL W P-5'-P-CCNC: 32 U/L (ref 1–33)
ANION GAP SERPL CALCULATED.3IONS-SCNC: 9.2 MMOL/L (ref 5–15)
AST SERPL-CCNC: 24 U/L (ref 1–32)
BASOPHILS # BLD AUTO: 0.01 10*3/MM3 (ref 0–0.2)
BASOPHILS NFR BLD AUTO: 0.1 % (ref 0–1.5)
BILIRUB CONJ SERPL-MCNC: <0.2 MG/DL (ref 0–0.3)
BILIRUB INDIRECT SERPL-MCNC: NORMAL MG/DL
BILIRUB SERPL-MCNC: 0.2 MG/DL (ref 0–1.2)
BUN SERPL-MCNC: 30 MG/DL (ref 8–23)
BUN/CREAT SERPL: 44.8 (ref 7–25)
CALCIUM SPEC-SCNC: 8.7 MG/DL (ref 8.6–10.5)
CHLORIDE SERPL-SCNC: 104 MMOL/L (ref 98–107)
CO2 SERPL-SCNC: 20.8 MMOL/L (ref 22–29)
CREAT SERPL-MCNC: 0.67 MG/DL (ref 0.57–1)
DEPRECATED RDW RBC AUTO: 41.2 FL (ref 37–54)
EGFRCR SERPLBLD CKD-EPI 2021: 93.6 ML/MIN/1.73
EOSINOPHIL # BLD AUTO: 0 10*3/MM3 (ref 0–0.4)
EOSINOPHIL NFR BLD AUTO: 0 % (ref 0.3–6.2)
ERYTHROCYTE [DISTWIDTH] IN BLOOD BY AUTOMATED COUNT: 12.2 % (ref 12.3–15.4)
GLUCOSE SERPL-MCNC: 135 MG/DL (ref 65–99)
HBA1C MFR BLD: 5.4 % (ref 4.8–5.6)
HCT VFR BLD AUTO: 49.6 % (ref 34–46.6)
HGB BLD-MCNC: 16.1 G/DL (ref 12–15.9)
IMM GRANULOCYTES # BLD AUTO: 0.09 10*3/MM3 (ref 0–0.05)
IMM GRANULOCYTES NFR BLD AUTO: 0.8 % (ref 0–0.5)
LYMPHOCYTES # BLD AUTO: 1.67 10*3/MM3 (ref 0.7–3.1)
LYMPHOCYTES NFR BLD AUTO: 14.2 % (ref 19.6–45.3)
MCH RBC QN AUTO: 29.9 PG (ref 26.6–33)
MCHC RBC AUTO-ENTMCNC: 32.5 G/DL (ref 31.5–35.7)
MCV RBC AUTO: 92 FL (ref 79–97)
MONOCYTES # BLD AUTO: 0.54 10*3/MM3 (ref 0.1–0.9)
MONOCYTES NFR BLD AUTO: 4.6 % (ref 5–12)
NEUTROPHILS NFR BLD AUTO: 80.3 % (ref 42.7–76)
NEUTROPHILS NFR BLD AUTO: 9.46 10*3/MM3 (ref 1.7–7)
NRBC BLD AUTO-RTO: 0 /100 WBC (ref 0–0.2)
PLATELET # BLD AUTO: 377 10*3/MM3 (ref 140–450)
PMV BLD AUTO: 10.2 FL (ref 6–12)
POTASSIUM SERPL-SCNC: 4.7 MMOL/L (ref 3.5–5.2)
PROT SERPL-MCNC: 6 G/DL (ref 6–8.5)
RBC # BLD AUTO: 5.39 10*6/MM3 (ref 3.77–5.28)
SODIUM SERPL-SCNC: 134 MMOL/L (ref 136–145)
WBC NRBC COR # BLD AUTO: 11.77 10*3/MM3 (ref 3.4–10.8)

## 2025-01-02 PROCEDURE — 25010000002 DEXAMETHASONE PER 1 MG: Performed by: EMERGENCY MEDICINE

## 2025-01-02 PROCEDURE — 80048 BASIC METABOLIC PNL TOTAL CA: CPT | Performed by: INTERNAL MEDICINE

## 2025-01-02 PROCEDURE — 83036 HEMOGLOBIN GLYCOSYLATED A1C: CPT | Performed by: INTERNAL MEDICINE

## 2025-01-02 PROCEDURE — 85025 COMPLETE CBC W/AUTO DIFF WBC: CPT | Performed by: INTERNAL MEDICINE

## 2025-01-02 PROCEDURE — 80076 HEPATIC FUNCTION PANEL: CPT | Performed by: INTERNAL MEDICINE

## 2025-01-02 RX ORDER — METHYLPREDNISOLONE 4 MG/1
TABLET ORAL
Qty: 21 TABLET | Refills: 0 | Status: SHIPPED | OUTPATIENT
Start: 2025-01-02

## 2025-01-02 RX ORDER — METOPROLOL TARTRATE 25 MG/1
12.5 TABLET, FILM COATED ORAL EVERY 12 HOURS SCHEDULED
Qty: 60 TABLET | Refills: 3 | Status: SHIPPED | OUTPATIENT
Start: 2025-01-02 | End: 2025-01-06

## 2025-01-02 RX ORDER — AMOXICILLIN 250 MG
2 CAPSULE ORAL 2 TIMES DAILY PRN
Qty: 60 TABLET | Refills: 3 | Status: SHIPPED | OUTPATIENT
Start: 2025-01-02

## 2025-01-02 RX ORDER — LIDOCAINE 4 G/G
2 PATCH TOPICAL
Qty: 30 PATCH | Refills: 3 | Status: SHIPPED | OUTPATIENT
Start: 2025-01-03

## 2025-01-02 RX ORDER — CALCIUM CARBONATE 500 MG/1
2 TABLET, CHEWABLE ORAL 3 TIMES DAILY PRN
Status: DISCONTINUED | OUTPATIENT
Start: 2025-01-02 | End: 2025-01-02 | Stop reason: HOSPADM

## 2025-01-02 RX ORDER — METHOCARBAMOL 500 MG/1
500 TABLET, FILM COATED ORAL 4 TIMES DAILY
Qty: 120 TABLET | Refills: 0 | Status: SHIPPED | OUTPATIENT
Start: 2025-01-02

## 2025-01-02 RX ORDER — HYDROCODONE BITARTRATE AND ACETAMINOPHEN 10; 325 MG/1; MG/1
1 TABLET ORAL EVERY 6 HOURS PRN
Qty: 12 TABLET | Refills: 0 | Status: SHIPPED | OUTPATIENT
Start: 2025-01-02 | End: 2025-01-08 | Stop reason: SDUPTHER

## 2025-01-02 RX ORDER — GABAPENTIN 100 MG/1
100 CAPSULE ORAL EVERY 12 HOURS SCHEDULED
Qty: 10 CAPSULE | Refills: 0 | Status: SHIPPED | OUTPATIENT
Start: 2025-01-02 | End: 2025-01-10

## 2025-01-02 RX ORDER — ACETAMINOPHEN 500 MG
500 TABLET ORAL EVERY 8 HOURS
Qty: 90 TABLET | Refills: 0 | Status: SHIPPED | OUTPATIENT
Start: 2025-01-02

## 2025-01-02 RX ADMIN — EMPAGLIFLOZIN 10 MG: 10 TABLET, FILM COATED ORAL at 08:39

## 2025-01-02 RX ADMIN — METOPROLOL TARTRATE 12.5 MG: 25 TABLET, FILM COATED ORAL at 08:39

## 2025-01-02 RX ADMIN — DEXAMETHASONE SODIUM PHOSPHATE 4 MG: 4 INJECTION, SOLUTION INTRAMUSCULAR; INTRAVENOUS at 15:51

## 2025-01-02 RX ADMIN — HYDROCODONE BITARTRATE AND ACETAMINOPHEN 1 TABLET: 10; 325 TABLET ORAL at 02:48

## 2025-01-02 RX ADMIN — Medication 10 ML: at 08:40

## 2025-01-02 RX ADMIN — GABAPENTIN 100 MG: 100 CAPSULE ORAL at 08:39

## 2025-01-02 RX ADMIN — LIDOCAINE 2 PATCH: 4 PATCH TOPICAL at 08:39

## 2025-01-02 RX ADMIN — LEVOTHYROXINE SODIUM 50 MCG: 0.05 TABLET ORAL at 05:28

## 2025-01-02 RX ADMIN — SPIRONOLACTONE 25 MG: 25 TABLET ORAL at 08:39

## 2025-01-02 RX ADMIN — METHOCARBAMOL TABLETS 500 MG: 500 TABLET, COATED ORAL at 05:30

## 2025-01-02 RX ADMIN — TORSEMIDE 20 MG: 20 TABLET ORAL at 08:39

## 2025-01-02 RX ADMIN — BUPROPION HYDROCHLORIDE 300 MG: 300 TABLET, EXTENDED RELEASE ORAL at 08:39

## 2025-01-02 RX ADMIN — MAGNESIUM OXIDE TAB 400 MG (241.3 MG ELEMENTAL MG) 800 MG: 400 (241.3 MG) TAB at 08:39

## 2025-01-02 RX ADMIN — DEXAMETHASONE SODIUM PHOSPHATE 4 MG: 4 INJECTION, SOLUTION INTRAMUSCULAR; INTRAVENOUS at 05:28

## 2025-01-02 RX ADMIN — ANTACID TABLETS 2 TABLET: 500 TABLET, CHEWABLE ORAL at 05:30

## 2025-01-02 RX ADMIN — SACUBITRIL AND VALSARTAN 1 TABLET: 24; 26 TABLET, FILM COATED ORAL at 08:39

## 2025-01-02 RX ADMIN — ACETAMINOPHEN 500 MG: 500 TABLET, FILM COATED ORAL at 05:28

## 2025-01-02 RX ADMIN — METHOCARBAMOL TABLETS 500 MG: 500 TABLET, COATED ORAL at 12:43

## 2025-01-02 RX ADMIN — ACETAMINOPHEN 500 MG: 500 TABLET, FILM COATED ORAL at 15:37

## 2025-01-02 RX ADMIN — DEXAMETHASONE SODIUM PHOSPHATE 4 MG: 4 INJECTION, SOLUTION INTRAMUSCULAR; INTRAVENOUS at 10:16

## 2025-01-02 NOTE — TELEPHONE ENCOUNTER
Reviewed Kristy's message and recommendations with Barbara Tran.  She verbalized understanding of recommendations, with repeat back.    Thank you,  Destiny RAMIREZ RN  Triage Nurse [facility]  01/02/25   16:18 EST

## 2025-01-02 NOTE — TELEPHONE ENCOUNTER
Barbara Tran called to clarify medications.    Patient was admitted on 12/27/2024 and is being discharged today.  She reports hospitalist has adjusted her cardiac medications and she would like to make Dr. Leon aware of medication changes.  She also wants to make sure Dr. Leon agrees with medication changes.    Patient reports new RX for metoprolol tartrate 25 mg, 0.5 tablet BID.  She is unsure why medications was started.  Chart review shows the following:        Patient is asking if she should  metoprolol tartrate?  She does not want to  medication if it is not necessary.    Please let me know how you would like to proceed.    Thank you,  Destiny RAMIREZ RN  Triage Nurse [facility] [place]  01/02/25  15:46 EST

## 2025-01-02 NOTE — TELEPHONE ENCOUNTER
----- Message from Laurence Maxwell sent at 12/31/2024 12:18 PM EST -----  Regarding: Follow-up  Follow-up with an APC in 2 weeks or EC.

## 2025-01-02 NOTE — DISCHARGE SUMMARY
Patient Name: Barbara Tran  : 1955  MRN: [id number]    Date of Admission: 2024  Date of Discharge:  2025  Primary Care Physician: Millicent Ace MD      Discharge Diagnoses     Active Hospital Problems    Diagnosis  POA    **Low back pain [M54.50]  Yes    DDD (degenerative disc disease), lumbar [M51.369]  Yes    Chronic HFrEF (heart failure with reduced ejection fraction) [I50.22]  Yes    DDD (degenerative disc disease), cervical [M50.30]  Yes    History of coronary artery disease [Z86.79]  Not Applicable    Chronic systolic CHF (congestive heart failure) [I50.22]  Yes    History of DVT (deep vein thrombosis) [Z86.718]  Not Applicable    Retrolisthesis of vertebrae [M43.10]  Yes    History of pulmonary embolism [Z86.711]  Yes    Hypothyroidism (acquired) [E03.9]  Yes    Essential hypertension [I10]  Yes      Resolved Hospital Problems   No resolved problems to display.        Hospital Course     Brief admission history and physical.  Please refer to the H&P for full details.  A pleasant 69 years old female with a past history of hypothyroidism/DVT and PE/dyslipidemia/CAD/HTN/chronic systolic congestive heart failure who presented to the emergency department with acute onset of low back pain more so on the left side than the right.  No trauma.  No radiculopathy or myelopathy.  No urinary complaints.  No fever or chills.  Physical examination on admission was remarkable for an afebrile patient with stable vital signs/tenderness in the lower lumbar spine around the wires no acute abnormalities.  Hospital course.  Evaluation included a CBC that was normal.  Troponin was normal.  D-dimer was negative.  CMP normal except a random blood sugar of 128.  Respiratory PCR panel was negative.  Lactic acid and procalcitonin were normal.  Chest x-ray with no acute disease.  Left lower extremity venous ultrasound revealing no DVT in the left lower extremity.  Thoracic and lumbar spine x-ray revealed  degenerative changes.  CT scan of the chest without contrast revealed no acute abnormalities.  CT scan of the abdomen and pelvis revealed no acute abnormalities..  Hospital course will be summarized in a problem-oriented manner as below.  1.  Degenerative disc disease of the lumbar spine negative for acute low back pain.  There was no evidence of radiculopathy or myelopathy.  An MRI of the thoracolumbar spine revealed degenerative changes.  Lower lumbar spine.  With osteoarthritic changes and evidence of dissecting neck without cyst.  A thoracic spine.  Neurosurgery was consulted and they recommended no surgical intervention.  Her pain clinic was consulted and the patient underwent a lumbar spine epidural block.  She was started on IV Dilaudid//Tylenol./Lidoderm/Robaxin/IV Decadron/Tylenol and Neurontin for pain control and switch to p.o. Medrol Dosepak at the time of discharge to catheter with continuation of the Robaxin/Lidoderm patch//Percocet/Tylenol.  Pain improved but persisted.  She is to follow-up with neurosurgery/orthopedic/pain clinic.  2.  Hypothyroidism.  TSH was slightly decreased during this admission and her Synthroid dose..  She is to follow-up with her endocrinologist to see if any djustmentis needed.  3.  History of DVT and PE.  Her anticoagulation with Eliquis was held because of the epidural block and the pain and PE okayed resumption at the time of discharge.  There was no acute evidence of DVT or PE during this admission  4.  Hyperlipidemia.  She was maintained on Lipitor.  LDL was 71.  5 .History of coronary artery disease/hypertension/chronic systolic congestive heart failure.  No evidence of angina or congestive heart failure.  She was maintained on Jardiance/Crestor/Aldactone/Entresto/Demadex and low-dose of beta-blockers was added.  6.  Hyperglycemia she was noted to have hyperglycemia during the hospital stay.  A1c was normal.Likely secondary to steroids.    Patient is being discharged  home on a hemodynamically stable condition with follow-up with neurosurgery/orthopedics/pain clinic in addition to her regular follow-up.  Endocrine/cardiology/pulmonary/hematology oncology.    Consultants     Consult Orders (all) (From admission, onward)       Start     Ordered    12/31/24 1558  Inpatient Hospitalist Consult  Once        Specialty:  Hospitalist  Provider:  Christiana William MD    12/31/24 1557    12/28/24 1126  Inpatient Anesthesia Pain Management Clinic Consult  Once        Specialty:  Pain Medicine  Provider:  (Not yet assigned)    12/28/24 1126    12/28/24 0702  Inpatient Neurosurgery Consult  IN AM        Specialty:  Neurosurgery  Provider:  Osiel Rich MD    12/27/24 2353                  Procedures     Imaging Results (All)       Procedure Component Value Units Date/Time    FL Guided Pain Management Spine [[id number]] Resulted: 12/31/24 1502     Updated: 12/31/24 1502    Narrative:      This procedure was auto-finalized with no dictation required.    MRI Thoracic Spine Without Contrast [[id number]] Collected: 12/30/24 0817     Updated: 12/30/24 0818    Addenda:        ADDENDUM:  Patient: OLIVE MYERS  Time Out: 08:02  Exam(s): MRI T SPINE Without Contrast      Added by Scooter Lennon MD on 12 28 2024 8:02 AM (-05:00)  ADDENDUM: T2 hyperintense structures adjacent to both shoulders, possibly   dissecting labral cysts.      Signed: 12/28/24 0802 by Scooter Lennon MD            ADDENDUM:  Patient: OLIVE MYERS  Time Out: 08:02  Exam(s): MRI T SPINE Without Contrast      Added by Scooter Lennon MD on 12 28 2024 8:02 AM (-05:00)  ADDENDUM: T2 hyperintense structures adjacent to both shoulders, possibly   dissecting labral cysts.      Signed: 12/28/24 0802 by Scooter Lennon MD            ADDENDUM:  Patient: OLIVE MYERS  Time Out: 08:02  Exam(s): MRI T SPINE Without Contrast      Added by Scooter Lennon MD on 12 28 2024 8:02 AM (-05:00)  ADDENDUM: T2 hyperintense structures adjacent to  both shoulders, possibly   dissecting labral cysts.      Signed: 12/28/24 0802 by Scooter Lennon MD            ADDENDUM:  Patient: OLIVE MYERS  Time Out: 08:02  Exam(s): MRI T SPINE Without Contrast      Added by Scooter Lennon MD on 12 28 2024 8:02 AM (-05:00)  ADDENDUM: T2 hyperintense structures adjacent to both shoulders, possibly   dissecting labral cysts.      Signed: 12/28/24 0802 by Scooter Lennon MD            ADDENDUM:  Patient: OLIVE MYERS  Time Out: 08:02  Exam(s): MRI T SPINE Without Contrast      Added by Scooter Lennon MD on 12 28 2024 8:02 AM (-05:00)  ADDENDUM: T2 hyperintense structures adjacent to both shoulders, possibly   dissecting labral cysts.      Signed: 12/28/24 0802 by Scooter Lennon MD            ADDENDUM:  Patient: OLIVE MYERS  Time Out: 08:02  Exam(s): MRI T SPINE Without Contrast      Added by Scooter Lennon MD on 12 28 2024 8:02 AM (-05:00)  ADDENDUM: T2 hyperintense structures adjacent to both shoulders, possibly   dissecting labral cysts.      Signed: 12/28/24 0802 by Scooter Lennon MD            ADDENDUM:  Patient: OLIVE MYERS  Time Out: 08:02  Exam(s): MRI T SPINE Without Contrast      Added by Scooter Lennon MD on 12 28 2024 8:02 AM (-05:00)  ADDENDUM: T2 hyperintense structures adjacent to both shoulders, possibly   dissecting labral cysts.      Signed: 12/28/24 0802 by Scooter Lennon MD    Narrative:        Patient: OLIVE MYERS  Time Out: 08:02  Exam(s): MRI T SPINE Without Contrast     ADDENDUM - Added by Scooter Lennon MD on 12 28 2024 8:02 AM (-05:00)  ADDENDUM: T2 hyperintense structures adjacent to both shoulders, possibly   dissecting labral cysts.  ----------------------------------------------------------------------    EXAM:    MR Thoracic Spine Without Intravenous Contrast    CLINICAL HISTORY:     Reason for exam: mid back pain.    TECHNIQUE:    Magnetic resonance images of the thoracic spine without intravenous   contrast in multiple  planes.    COMPARISON:    CT dated 11 26 24    FINDINGS:    Vertebrae:  Indeterminate T2 hyperintense structures seen adjacent to   the shoulder girdle musculature bilaterally.  Mild endplate edema   centered at what is likely the T11-T12 disc space.  No evidence of   endplate destruction to suggest osteomyelitis.    Discs spinal canal neural foramina:  Unable to provide accurate spinal   numbering due to lack of whole field spine localizer sequences.  Shallow   disc bulge at likely T5-T6.  No spinal canal stenosis.    Spinal cord:  Unremarkable.  Normal signal.    Soft tissues:  Unremarkable.    IMPRESSION:         No acute findings in the thoracic spine.  Mild degenerative disc   disease without significant spinal canal narrowing.        Impression:          Electronically signed by Scooter Lennon MD on 12-28-24 at 0802    MRI Lumbar Spine Without Contrast [[id number]] Collected: 12/28/24 0812     Updated: 12/28/24 0812    Narrative:        Patient: OLIVE MYERS  Time Out: 08:12  Exam(s): MRI L SPINE Without Contrast     EXAM:    MR Lumbar Spine Without Intravenous Contrast    CLINICAL HISTORY:     Reason for exam: low back pain bilaterally.    TECHNIQUE:    Magnetic resonance images of the lumbar spine without intravenous   contrast in multiple planes.    COMPARISON:    No relevant prior studies available.    FINDINGS:    Vertebrae:  Unremarkable.  No acute fracture.    Spinal cord:  Unremarkable.  Normal signal.    Soft tissues:  Unremarkable.     DISCS SPINAL CANAL NEURAL FORAMINA:    L1-L2:  Unremarkable.  No significant disc disease.  No stenosis.    L2-L3:  Unremarkable.  No significant disc disease.  No stenosis.    L3-L4:  Unremarkable.  No significant disc disease.  No stenosis.    L4-L5:  Mild-moderate disc height loss with broad-based posterior disc   bulge.  Severe bilateral facet arthropathy; there are large faceted genic   cysts.  There is impingement of the right L5 nerve root in the lateral    recess.    L5-S1:  Moderate disc height loss and facet arthropathy.  Moderate to   severe left and moderate right neuroforaminal narrowing.  There is grade   1 retrolisthesis of L5 on S1.    IMPRESSION:       Severe facet arthropathy at L4-L5 and L5-S1.  Impingement of the right L5   nerve root in the lateral recess at L4-L5 and abutment of the left L5   nerve root in L5-S1 neuroforamen.      Impression:          Electronically signed by Scooter Lennon MD on 12-28-24 at 0812    CT Abdomen Pelvis Without Contrast [[id number]] Collected: 12/28/24 0309     Updated: 12/28/24 0309    Narrative:        Patient: OLIVE MYERS  Time Out: 03:09  Exam(s): CT ABDOMEN + PELVIS Without Contrast     EXAM:    CT Abdomen and Pelvis Without Intravenous Contrast    CLINICAL HISTORY:     Reason for exam: low back pain, flank pain.    TECHNIQUE:    Axial computed tomography images of the abdomen and pelvis without   intravenous contrast with coronal and sagittal reformats.  CTDI is 13.89   mGy and DLP is 920.9 mGy-cm.  This CT exam was performed according to the   principle of ALARA (As Low As Reasonably Achievable) by using one or more   of the following dose reduction techniques: automated exposure control,   adjustment of the mA and or kV according to patient size, and or use of   iterative reconstruction technique.    COMPARISON:    No relevant prior studies available.    FINDINGS:    Limitations:  Limited due to lack of IV contrast.     ABDOMEN:    Liver:  Unremarkable.    Gallbladder and bile ducts:  Cholecystectomy.    Pancreas:  Unremarkable.    Spleen:  Unremarkable.    Adrenals:  Unremarkable.    Kidneys and ureters:  Unremarkable.    Stomach and bowel:  Unremarkable.     PELVIS:    Appendix:  No findings to suggest acute appendicitis.    Bladder:  Unremarkable.    Reproductive:  Hysterectomy.     ABDOMEN and PELVIS:    Intraperitoneal space:  No pneumoperitoneum.    Bones joints:  See below.      Soft tissues:   Unremarkable.    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  Atherosclerotic arterial calcifications: moderate.   No aortic aneurysm or dissection. Arterial structures otherwise   unremarkable.    Lymph nodes:  Unremarkable.    IMPRESSION:       1.  Limited due to lack of IV contrast.  2.  No acute abnormality.  3.  See additional findings above.  4.  No urolithiasis seen.      Impression:          Electronically signed by Aldo Pacheco MD on 12-28-24 at 0309    CT Chest Without Contrast Diagnostic [[id number]] Collected: 12/28/24 0308     Updated: 12/28/24 0308    Narrative:        Patient: OLIVE MYERS  Time Out: 03:07  Exam(s): CT CHEST Without Contrast     EXAM:    CT Chest Without Intravenous Contrast    CLINICAL HISTORY:     Reason for exam: abnormal cxr, cough.    TECHNIQUE:    Axial computed tomography images of the chest without intravenous   contrast with coronal and sagittal reformats.  CTDI is  13.89 mGy and DLP   is 920.9 mGy-cm.  This CT exam was performed according to the principle   of ALARA (As Low As Reasonably Achievable) by using one or more of the   following dose reduction techniques: automated exposure control,   adjustment of the mA and or kV according to patient size, and or use of   iterative reconstruction technique.    COMPARISON:    No relevant prior studies available.    FINDINGS:    Limitations:  Limited due to lack of IV contrast.    Lungs:  Unremarkable.    Pleural space:  Unremarkable.    Heart:  Moderate coronary artery calcifications. Otherwise unremarkable.      Bones joints:  No acute displaced fracture.    Soft tissues:  Bilateral breast implants.    Vasculature:  Atherosclerotic arterial calcifications: mild. No aortic   aneurysm or dissection. Arterial structures otherwise unremarkable.    Vascular structures otherwise unremarkable.    Lymph nodes:  No concerning adenopathy.    IMPRESSION:       1.  No acute abnormality.  2.   Limited due to lack of IV contrast.  3.  See additional findings above.      Impression:          Electronically signed by Aldo Pacheco MD on 12-28-24 at 0307    US Venous Doppler Lower Extremity Left (duplex) [[id number]] Collected: 12/27/24 2038     Updated: 12/27/24 2038    Narrative:        Patient: OLIVE MYERS  Time Out: 20:37  Exam(s): US VENOUS LEFT LOWER EXTREMITY     EXAM:    US Duplex Left Lower Extremity Veins    CLINICAL HISTORY:     Reason for exam: left calf pain.    TECHNIQUE:    Real-time duplex ultrasound scan of the left lower extremity veins   integrating B-mode two-dimensional vascular structure, Doppler spectral   analysis, color flow Doppler imaging and compression.    COMPARISON:    No relevant prior studies available.    FINDINGS:    Deep veins:  Unremarkable.  The visualized deep veins of the left lower   extremity are compressible with color flow.  No visualized thrombus.    Superficial veins:  Unremarkable.    Soft tissues:  No acute findings.    IMPRESSION:         No DVT within the left lower extremity.      Impression:          Electronically signed by Ace Angelo MD on 12-27-24 at 2037    XR Spine Thoracic 3 View [[id number]] Collected: 12/27/24 1934     Updated: 12/27/24 1945    Narrative:      XR SPINE LUMBAR COMPLETE 4+VW-, XR SPINE THORACIC 3 VW-, XR CHEST 1 VW-     INDICATIONS: Pain, short of breath.     TECHNIQUE: FRONTAL VIEW OF THE CHEST, 5 VIEWS OF THE LUMBAR SPINE, 3  VIEWS OF THE THORACIC SPINE     COMPARISON: 12/26/2022,  image from CT from 11/26/2024     FINDINGS:     Chest x-ray: The heart size is borderline. Pulmonary vasculature is  unremarkable. Small atelectasis or infiltrate peripherally at the left  base, follow-up suggested. No pleural effusion, or pneumothorax. No  acute osseous process.     Thoracic and lumbar spine: Vertebral body heights appear stable. No  acute fracture is identified. Mild retrolisthesis of L1 on L2, L2 on L3,  mild anterior  listhesis of L4 on L5. Multilevel endplate spurring, disc  space narrowing, and facet arthropathy are present. S-shaped  thoracolumbar scoliosis is evident. If further imaging evaluation spine  is indicated, MRI could be considered.       Impression:      As described.              This report was finalized on 12/27/2024 7:42 PM by Dr. William Mota M.D on Workstation: [id number]       XR Spine Lumbar Complete 4+VW [[id number]] Collected: 12/27/24 1934     Updated: 12/27/24 1945    Narrative:      XR SPINE LUMBAR COMPLETE 4+VW-, XR SPINE THORACIC 3 VW-, XR CHEST 1 VW-     INDICATIONS: Pain, short of breath.     TECHNIQUE: FRONTAL VIEW OF THE CHEST, 5 VIEWS OF THE LUMBAR SPINE, 3  VIEWS OF THE THORACIC SPINE     COMPARISON: 12/26/2022,  image from CT from 11/26/2024     FINDINGS:     Chest x-ray: The heart size is borderline. Pulmonary vasculature is  unremarkable. Small atelectasis or infiltrate peripherally at the left  base, follow-up suggested. No pleural effusion, or pneumothorax. No  acute osseous process.     Thoracic and lumbar spine: Vertebral body heights appear stable. No  acute fracture is identified. Mild retrolisthesis of L1 on L2, L2 on L3,  mild anterior listhesis of L4 on L5. Multilevel endplate spurring, disc  space narrowing, and facet arthropathy are present. S-shaped  thoracolumbar scoliosis is evident. If further imaging evaluation spine  is indicated, MRI could be considered.       Impression:      As described.              This report was finalized on 12/27/2024 7:42 PM by Dr. William Mota M.D on Workstation: [id number]       XR Chest 1 View [[id number]] Collected: 12/27/24 1934     Updated: 12/27/24 1945    Narrative:      XR SPINE LUMBAR COMPLETE 4+VW-, XR SPINE THORACIC 3 VW-, XR CHEST 1 VW-     INDICATIONS: Pain, short of breath.     TECHNIQUE: FRONTAL VIEW OF THE CHEST, 5 VIEWS OF THE LUMBAR SPINE, 3  VIEWS OF THE THORACIC SPINE     COMPARISON: 12/26/2022,  image from CT  from 11/26/2024     FINDINGS:     Chest x-ray: The heart size is borderline. Pulmonary vasculature is  unremarkable. Small atelectasis or infiltrate peripherally at the left  base, follow-up suggested. No pleural effusion, or pneumothorax. No  acute osseous process.     Thoracic and lumbar spine: Vertebral body heights appear stable. No  acute fracture is identified. Mild retrolisthesis of L1 on L2, L2 on L3,  mild anterior listhesis of L4 on L5. Multilevel endplate spurring, disc  space narrowing, and facet arthropathy are present. S-shaped  thoracolumbar scoliosis is evident. If further imaging evaluation spine  is indicated, MRI could be considered.       Impression:      As described.              This report was finalized on 12/27/2024 7:42 PM by Dr. William Mota M.D on Workstation: [id number]               Pertinent Labs     Results from last 7 days   Lab Units 01/02/25  0529 12/28/24  0818 12/27/24  1840   WBC 10*3/mm3 11.77* 6.97 8.91   HEMOGLOBIN g/dL 16.1* 14.4 15.7   PLATELETS 10*3/mm3 377 295 325     Results from last 7 days   Lab Units 01/02/25  0703 12/28/24  0818 12/27/24  1840   SODIUM mmol/L 134* 138 136   POTASSIUM mmol/L 4.7 3.8 3.9   CHLORIDE mmol/L 104 103 99   CO2 mmol/L 20.8* 25.4 26.6   BUN mg/dL 30* 14 18   CREATININE mg/dL 0.67 0.70 0.71   GLUCOSE mg/dL 135* 121* 138*   Estimated Creatinine Clearance: 90 mL/min (by C-G formula based on SCr of 0.67 mg/dL).  Results from last 7 days   Lab Units 01/02/25  0703 12/27/24  1840   ALBUMIN g/dL 3.6 4.3   BILIRUBIN mg/dL 0.2 0.3   ALK PHOS U/L 64 72   AST (SGOT) U/L 24 17   ALT (SGPT) U/L 32 17     Results from last 7 days   Lab Units 01/02/25  0703 12/28/24  0818 12/27/24  1840   CALCIUM mg/dL 8.7 8.5* 9.3   ALBUMIN g/dL 3.6  --  4.3       Results from last 7 days   Lab Units 12/27/24 1957 12/27/24  1840   HSTROP T ng/L 7 7   D DIMER QUANT MCGFEU/mL  --  0.39       Results from last 7 days   Lab Units 12/28/24  0818   CHOLESTEROL mg/dL 134    TRIGLYCERIDES mg/dL 103   HDL CHOL mg/dL 44   LDL CHOL mg/dL 71         Imaging Results (Last 24 Hours)       ** No results found for the last 24 hours. **            Test Results Pending at Discharge         Discharge Exam   Physical Exam  Vitals.  Temperature 98.1 a pulse of 65 respiratory rate of 16 and blood pressure 118/68 and O2 sats of 95% on room air  General.  Elderly female.  Alert and oriented x 4.  In no apparent pain/distress/diaphoresis.  Normal mood and affect.  Eyes.  Pupils equal round and reactive.  Intact extraocular musculature.  No pallor or jaundice  Oral cavity.  Moist mucous membranes  Neck.  Supple.  No JVD.  No lymphadenopathy or thyromegaly  Cardiac vascular.  Regular rate and rhythm with no gallops or murmurs  Chest.  Clear to auscultation bilaterally with no added sounds.  Poor bilateral air entry.  Abdomen.  Soft lax.  No tenderness.  No organomegaly.  No guarding or rebound  Extremities.  No clubbing/cyanosis/edema.  There are no lower extremity neurodeficits  CNS.  No acute focal neurological deficits.  Discharge Details        Discharge Medications        New Medications        Instructions Start Date   acetaminophen 500 MG tablet  Commonly known as: TYLENOL   500 mg, Oral, Every 8 Hours      gabapentin 100 MG capsule  Commonly known as: NEURONTIN   100 mg, Oral, Every 12 Hours Scheduled      HYDROcodone-acetaminophen  MG per tablet  Commonly known as: NORCO  Replaces: HYDROcodone-acetaminophen 5-325 MG per tablet   1 tablet, Oral, Every 6 Hours PRN      Lidocaine 4 %   2 patches, Transdermal, Every 24 Hours Scheduled, Remove & Discard patch within 12 hours or as directed by MD   Start Date: January 3, 2025     methocarbamol 500 MG tablet  Commonly known as: ROBAXIN   500 mg, Oral, 4 Times Daily      methylPREDNISolone 4 MG dose pack  Commonly known as: MEDROL   Take as directed on package instructions.      metoprolol tartrate 25 MG tablet  Commonly known as: LOPRESSOR    12.5 mg, Oral, Every 12 Hours Scheduled      sennosides-docusate 8.6-50 MG per tablet  Commonly known as: PERICOLACE   2 tablets, Oral, 2 Times Daily PRN             Continue These Medications        Instructions Start Date   apixaban 5 MG tablet tablet  Commonly known as: ELIQUIS   5 mg, Oral, Every 12 Hours Scheduled      Boniva 150 MG tablet  Generic drug: ibandronate   150 mg, Every 30 Days      buPROPion  MG 24 hr tablet  Commonly known as: WELLBUTRIN XL   300 mg, Oral, Daily      calcium carbonate 600 MG tablet  Commonly known as: OS-SHAHNAZ   1 tablet, 2 Times Daily With Meals      empagliflozin 10 MG tablet tablet  Commonly known as: JARDIANCE   10 mg, Oral, Daily      levothyroxine 50 MCG tablet  Commonly known as: Synthroid   50 mcg, Oral, Daily      magnesium oxide 400 MG tablet  Commonly known as: MAG-OX   2 tablets, 2 times daily      rosuvastatin 40 MG tablet  Commonly known as: CRESTOR   40 mg, Oral, Nightly      sacubitril-valsartan 24-26 MG tablet  Commonly known as: ENTRESTO   1 tablet, Oral, 2 Times Daily      spironolactone 25 MG tablet  Commonly known as: ALDACTONE   25 mg, Oral, Daily      torsemide 20 MG tablet  Commonly known as: DEMADEX   20 mg, Oral, Daily      zolpidem 5 MG tablet  Commonly known as: AMBIEN   5 mg, Oral, Nightly PRN             Stop These Medications      benzonatate 200 MG capsule  Commonly known as: TESSALON     HYDROcodone-acetaminophen 5-325 MG per tablet  Commonly known as: NORCO  Replaced by: HYDROcodone-acetaminophen  MG per tablet              Allergies   Allergen Reactions    Dilaudid [Hydromorphone] Nausea Only    Codeine Dizziness     lightheaded    Levofloxacin Itching    Morphine Itching         Discharge Disposition:  Condition:     Diet:   Diet Order   Procedures    Diet: Cardiac; Healthy Heart (2-3 Na+); Fluid Consistency: Thin (IDDSI 0)       Activity:   Activity Instructions       Activity as Tolerated      Other Activity Instructions       Activity Instructions: With a walker and help.  Home health physical therapy to evaluate and treat    Up WIth Assist              Counseling : Avoid nonsteroidal anti-inflammatory medication    CODE STATUS:    Code Status and Medical Interventions: CPR (Attempt to Resuscitate); Full Support   Ordered at: 12/27/24 3886     Code Status (Patient has no pulse and is not breathing):    CPR (Attempt to Resuscitate)     Medical Interventions (Patient has pulse or is breathing):    Full Support       Future Appointments   Date Time Provider Department Center   1/13/2025  8:40 AM LAB CHAIR 2 [facility]  [facility]   1/13/2025  9:00 AM Chey Warren APRN [facility]   2/19/2025  2:30 PM Laly Álvarez APRN [facility]   3/14/2025  9:00 AM LAB [facility]   3/14/2025  9:20 AM Sarah Rogel MD [facility]     Additional Instructions for the Follow-ups that You Need to Schedule       Ambulatory Referral to Home Health   As directed      Face to Face Visit Date: 1/2/2025   Follow-up provider for Plan of Care?: I treated the patient in an acute care facility and will not continue treatment after discharge.   Follow-up provider: ANAYELI FELIX [[id number]]   Reason/Clinical Findings: Stenosis of lumbar spine   Describe mobility limitations that make leaving home difficult: As above   Nursing/Therapeutic Services Requested: Physical Therapy   PT orders: Therapeutic exercise Gait Training Transfer training Strengthening   Weight Bearing Status: As Tolerated   Frequency: 1 Week 1        Call MD With Problems / Concerns   As directed      Instructions: Call MD or return to ER if increasing low back pain/fever or chills/lower extremity neurological deficits/chest pain/shortness of breath/nausea or vomiting    Order Comments: Instructions: Call MD or return to ER if increasing low back pain/fever or chills/lower extremity neurological deficits/chest pain/shortness of  breath/nausea or vomiting         Discharge Follow-up with PCP   As directed       Currently Documented PCP:    Millicent Ace MD    PCP Phone Number:    [phone]     Follow Up Details: 1.  Degenerative disc disease of the lumbar spine with low back pain/hypothyroidism/DVT and PE/hyperlipidemia/CAD/HTN/chronic systolic CHF.        Discharge Follow-up with Specialty: Endocrine/pulmonary/hematology oncology/cardiology as scheduled   As directed      Specialty: Endocrine/pulmonary/hematology oncology/cardiology as scheduled        Discharge Follow-up with Specified Provider: Neurosurgery; 2 Weeks   As directed      To: Neurosurgery   Follow Up: 2 Weeks   Follow Up Details: Rectal pain disease of the lumbar spine        Discharge Follow-up with Specified Provider: Orthopedic; 2 Weeks   As directed      To: Orthopedic   Follow Up: 2 Weeks   Follow Up Details: Secondary hyperparathyroid cysts of the spine        Discharge Follow-up with Specified Provider: Pain clinic; 2 Weeks   As directed      To: Pain clinic   Follow Up: 2 Weeks   Follow Up Details: Epistaxis of the lumbar spine               Follow-up Information       Millicent Ace MD .    Specialty: Family Medicine  Why: 1.  Degenerative disc disease of the lumbar spine with low back pain/hypothyroidism/DVT and PE/hyperlipidemia/CAD/HTN/chronic systolic CHF.  Contact information:  [address]  [place] [id number] [phone]                               Time Spent on Discharge:  Greater than 30 minutes      Daniel Harrison MD  [facility]  01/02/25  13:51 EST

## 2025-01-02 NOTE — CASE MANAGEMENT/SOCIAL WORK
Case Management Discharge Note      Final Note: Home with [facility] (accepted). Discussed  with the patient. She is agreeable and requested [facility] who she has used in the past.         Selected Continued Care - Admitted Since 12/27/2024       Destination    No services have been selected for the patient.                Durable Medical Equipment    No services have been selected for the patient.                Dialysis/Infusion    No services have been selected for the patient.                Home Medical Care Coordination complete.      Service Provider Services Address Phone Fax Patient Preferred     [facility] [address] [place] [id number] [phone] --              Therapy    No services have been selected for the patient.                Community Resources    No services have been selected for the patient.                [facility]    No services have been selected for the patient.                    Selected Continued Care - Prior Encounters Includes continued care and service providers with selected services from prior encounters from 9/28/2024 to 1/2/2025      Discharged on 10/1/2024 Admission date: 9/20/2024 - Discharge disposition: Skilled Nursing Facility (DC - External)      Destination       Service Provider Services Address Phone Fax Patient Preferred    [facility] Skilled Nursing [address] [facility] [address] [phone] --                          Transportation Services  Private: Car    Final Discharge Disposition Code: 06 - home with home health care

## 2025-01-02 NOTE — PROGRESS NOTES
[facility] will follow post hospital as requested. Patient agreeable to service. Contact information and PCP confirmed.

## 2025-01-02 NOTE — PLAN OF CARE
Goal Outcome Evaluation:           Progress: improving  Outcome Evaluation: med for pain, tums for indigestion, up with assist and walker to br, lower back band aid intact from epidural, poss d/c today

## 2025-01-03 ENCOUNTER — HOME CARE VISIT (OUTPATIENT)
Dept: HOME HEALTH SERVICES | Facility: HOME HEALTHCARE | Age: 70
End: 2025-01-03
Payer: MEDICARE

## 2025-01-03 ENCOUNTER — TRANSITIONAL CARE MANAGEMENT TELEPHONE ENCOUNTER (OUTPATIENT)
Dept: CALL CENTER | Facility: HOSPITAL | Age: 70
End: 2025-01-03
Payer: MEDICARE

## 2025-01-03 VITALS
SYSTOLIC BLOOD PRESSURE: 128 MMHG | HEART RATE: 56 BPM | OXYGEN SATURATION: 97 % | RESPIRATION RATE: 18 BRPM | DIASTOLIC BLOOD PRESSURE: 76 MMHG | TEMPERATURE: 97 F

## 2025-01-03 PROCEDURE — G0151 HHCP-SERV OF PT,EA 15 MIN: HCPCS

## 2025-01-03 NOTE — OUTREACH NOTE
Call Center TCM Note      Flowsheet Row Responses   [facility] [place] patient discharged from? [place]   Does the patient have one of the following disease processes/diagnoses(primary or secondary)? Other   TCM attempt successful? No   Unsuccessful attempts Attempt 1  [Attempted to reach patient and daughter, Ángela, listed on PCP verbal release.]            Kathi Marino RN    1/3/2025, 11:58 EST

## 2025-01-03 NOTE — HOME HEALTH
"SOC Note:     Home Health ordered for: disciplines PT    Reason for Hosp/Primary Dx/Co-morbidities: at [facility] [id number] with Lumbar Degenerative Disc Disease/lumbar stenosis with Retrolisthesis of vertebrae with severe back pain with lumbar epidural 214006; PMHx: Hypothyroid, Hyperlipidemia, Depression, CHF, HTN, Osteoprosis, DVT/PE on anticoag therapy-Eliquis    Focus of Care: Lumbar disc degenerative disease with skilled PT needed for transfer training/bed mobility to minimize strain on lower back, gait pattern training with rolling walker progress to no device creeping around home, HEP for pain control and ROM/strengthening abdominal/back muscles and generalized leg strengthening/balance as needed, and patient education for pain control measures, risk for skin breakdown, psychosocial issues with hx of depression and home safety, in order to decrease back pain and return to independent household mobility and then progress to community mobility to return back to work    Patient's goal(s): \"I want to walk with minimal pain and be able to get back to work.\"    Current Functional status/mobility/DME: supervised SBA transfers and gait with rolling walker limited by severe back pain; DME: rolling walker, bedside commode x 2 that she uses over toilets    HB status/Living Arrangements: has multiple steps off porch to leave home; lives with daughter and brandkids who assist as needed    Skin Integrity/wound status: intact    Code Status: full    Fall Risk/Safety concerns: high due to severe back pain limiting mobility    Educated on Emergency Plan, steps to take prior to going to the ER and when to Call Home Health First:  yes and understood     Medication issues/Concerns: all medications in home; 1 major drug interaction spironolactone and sacubitril-valsartan (Entresto): The risk of hyperkalemia may be increased when potassium-sparing diuretics are co-administered with angiotensin II receptor antagonists-MD akila rosales " notified    Additional Problems/Concerns: none    SDOH Barriers (i.e. caregiver concerns, social isolation, transportation, food insecurity, environment, income etc.)/Need for MSW: none

## 2025-01-03 NOTE — OUTREACH NOTE
Call Center TCM Note      Flowsheet Row Responses   [facility] patient discharged from? [place]   Does the patient have one of the following disease processes/diagnoses(primary or secondary)? Other   TCM attempt successful? Yes   Call start time 1514   Call end time 1523   Discharge diagnosis Low back pain, DDD (degenerative disc disease), lumbar  [underwent a lumbar spine epidural block]   Person spoke with today (if not patient) and relationship Patient   Meds reviewed with patient/caregiver? Yes   Does the patient have all medications ordered at discharge? Yes   Is the patient taking all medications as directed (includes completed medication regime)? Yes   Medication comments Patient wants PCP to know that she will not get to see pain management before she runs out of the medications ordered at discharge. She would like to discuss with Dr Ace. Message routed to office.   Comments PCP Dr Ace. Message routed to office to please schedule needed PCP Hospital follow up appt.   Does the patient have an appointment with their PCP within 7-14 days of discharge? No appointments available   Nursing Interventions Routed TCM call to PCP office, PCP office requested to make appointment - message sent   What is the Home health agency?  [facility] for PT   Has home health visited the patient within 72 hours of discharge? Yes  [Present at time of call.]   Home health comments Activity Instructions: With a walker and help.  Home health physical therapy to evaluate and treat   Psychosocial issues? No   Did the patient receive a copy of their discharge instructions? Yes   Nursing interventions Reviewed instructions with patient   What is the patient's perception of their health status since discharge? Same  [Patient reports left low back pain 6-7 but low right side remains at a 9-10.]   Is the patient/caregiver able to teach back signs and symptoms related to disease process for when to call PCP? Yes   Is the  patient/caregiver able to teach back signs and symptoms related to disease process for when to call 911? Yes   Is the patient/caregiver able to teach back the hierarchy of who to call/visit for symptoms/problems? PCP, Specialist, Home health nurse, Urgent Care, ED, 911 Yes   If the patient is a current smoker, are they able to teach back resources for cessation? Not a smoker   TCM call completed? Yes   Wrap up additional comments Neurosurgery follow up appt 1/17  1230pm. Patient to follow up with pain clinic. Patient reports that she is awaiting a callback but will not be able to get in before her current meds are gone.   Call end time 1523   Would this patient benefit from a Referral to Amb Social Work? No   Is the patient interested in additional calls from an ambulatory ? No            Kathi Marino RN    1/3/2025, 15:28 EST

## 2025-01-03 NOTE — OUTREACH NOTE
Prep Survey      Flowsheet Row Responses   [facility] patient discharged from? [place]   Is LACE score < 7 ? No   Eligibility [facility]   Date of Admission 12/27/24   Date of Discharge 01/02/25   Discharge Disposition Home-Health Care [facility]   Discharge diagnosis Low back pain   Does the patient have one of the following disease processes/diagnoses(primary or secondary)? Other   Does the patient have Home health ordered? Yes   What is the Home health agency?  [facility]   Is there a DME ordered? No   Prep survey completed? Yes            Tiera PETERSEN - Registered Nurse

## 2025-01-06 ENCOUNTER — HOME CARE VISIT (OUTPATIENT)
Dept: HOME HEALTH SERVICES | Facility: HOME HEALTHCARE | Age: 70
End: 2025-01-06
Payer: MEDICARE
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
## 2025-01-07 ENCOUNTER — HOME CARE VISIT (OUTPATIENT)
Dept: HOME HEALTH SERVICES | Facility: HOME HEALTHCARE | Age: 70
End: 2025-01-07
Payer: MEDICARE

## 2025-01-07 ENCOUNTER — PATIENT MESSAGE (OUTPATIENT)
Dept: FAMILY MEDICINE CLINIC | Facility: CLINIC | Age: 70
End: 2025-01-07
Payer: MEDICARE

## 2025-01-07 DIAGNOSIS — M51.360 DEGENERATION OF INTERVERTEBRAL DISC OF LUMBAR REGION WITH DISCOGENIC BACK PAIN: ICD-10-CM

## 2025-01-07 DIAGNOSIS — M50.30 DDD (DEGENERATIVE DISC DISEASE), CERVICAL: Primary | ICD-10-CM

## 2025-01-08 RX ORDER — HYDROCODONE BITARTRATE AND ACETAMINOPHEN 10; 325 MG/1; MG/1
1 TABLET ORAL EVERY 6 HOURS PRN
Qty: 40 TABLET | Refills: 0 | Status: SHIPPED | OUTPATIENT
Start: 2025-01-08

## 2025-01-08 NOTE — TELEPHONE ENCOUNTER
It looks like she is only taking gabapentin 100 mg twice daily. See if she is actually taking it and I'd recommend increasing the dosage to help reduce the amount of narcotic she needs.

## 2025-01-09 ENCOUNTER — HOME CARE VISIT (OUTPATIENT)
Dept: HOME HEALTH SERVICES | Facility: HOME HEALTHCARE | Age: 70
End: 2025-01-09
Payer: MEDICARE

## 2025-01-09 VITALS
OXYGEN SATURATION: 96 % | HEART RATE: 105 BPM | RESPIRATION RATE: 18 BRPM | TEMPERATURE: 96.8 F | SYSTOLIC BLOOD PRESSURE: 130 MMHG | DIASTOLIC BLOOD PRESSURE: 76 MMHG

## 2025-01-09 PROCEDURE — G0151 HHCP-SERV OF PT,EA 15 MIN: HCPCS

## 2025-01-09 NOTE — TELEPHONE ENCOUNTER
Patient is taking 100mg twice daily what do you want to increase it to and she will need and new script sent in with up dated directions will pend that for you once I know what you want to increase to

## 2025-01-10 ENCOUNTER — TELEPHONE (OUTPATIENT)
Dept: ONCOLOGY | Facility: CLINIC | Age: 70
End: 2025-01-10
Payer: MEDICARE

## 2025-01-10 RX ORDER — GABAPENTIN 100 MG/1
CAPSULE ORAL
Qty: 90 CAPSULE | Refills: 2 | Status: SHIPPED | OUTPATIENT
Start: 2025-01-10 | End: 2025-02-07

## 2025-01-10 NOTE — TELEPHONE ENCOUNTER
Caller: Barbara Tran    Relationship to patient: Self    Best call back number: [phone]    Type of visit: LAB AND F/U    Requested date: PUSH OUT TO WEEK OF 1/20    If rescheduling, when is the original appointment: 1/13

## 2025-01-12 DIAGNOSIS — F51.01 PRIMARY INSOMNIA: ICD-10-CM

## 2025-01-13 RX ORDER — ZOLPIDEM TARTRATE 5 MG/1
5 TABLET ORAL NIGHTLY PRN
Qty: 30 TABLET | Refills: 0 | Status: SHIPPED | OUTPATIENT
Start: 2025-01-13

## 2025-01-14 ENCOUNTER — READMISSION MANAGEMENT (OUTPATIENT)
Dept: CALL CENTER | Facility: HOSPITAL | Age: 70
End: 2025-01-14
Payer: MEDICARE

## 2025-01-14 ENCOUNTER — TELEPHONE (OUTPATIENT)
Dept: FAMILY MEDICINE CLINIC | Facility: CLINIC | Age: 70
End: 2025-01-14
Payer: MEDICARE

## 2025-01-14 ENCOUNTER — HOME CARE VISIT (OUTPATIENT)
Dept: HOME HEALTH SERVICES | Facility: HOME HEALTHCARE | Age: 70
End: 2025-01-14
Payer: MEDICARE

## 2025-01-14 DIAGNOSIS — G89.29 CHRONIC PAIN OF LEFT KNEE: ICD-10-CM

## 2025-01-14 DIAGNOSIS — M50.30 DDD (DEGENERATIVE DISC DISEASE), CERVICAL: ICD-10-CM

## 2025-01-14 DIAGNOSIS — M51.360 DEGENERATION OF INTERVERTEBRAL DISC OF LUMBAR REGION WITH DISCOGENIC BACK PAIN: Primary | ICD-10-CM

## 2025-01-14 DIAGNOSIS — M25.562 CHRONIC PAIN OF LEFT KNEE: ICD-10-CM

## 2025-01-14 NOTE — OUTREACH NOTE
Medical Week 2 Survey      Flowsheet Row Responses   [facility] patient discharged from? [place]   Does the patient have one of the following disease processes/diagnoses(primary or secondary)? Other   Week 2 attempt successful? Yes   Call start time 1525   Discharge diagnosis Low back pain, DDD (degenerative disc disease), lumbar   Call end time 1534   Person spoke with today (if not patient) and relationship Patient   Meds reviewed with patient/caregiver? Yes   Is the patient having any side effects they believe may be caused by any medication additions or changes? No   Does the patient have all medications ordered at discharge? Yes   Is the patient taking all medications as directed (includes completed medication regime)? Yes   Does the patient have a primary care provider?  Yes   Does the patient have an appointment with their PCP within 7 days of discharge? Yes   Comments regarding PCP 1/15/25   Has the patient kept scheduled appointments due by today? N/A   What is the Home health agency?  [facility] for PT   Psychosocial issues? No   What is the patient's perception of their health status since discharge? Same   Is the patient/caregiver able to teach back signs and symptoms related to disease process for when to call PCP? Yes   Is the patient/caregiver able to teach back signs and symptoms related to disease process for when to call 911? Yes   Is the patient/caregiver able to teach back the hierarchy of who to call/visit for symptoms/problems? PCP, Specialist, Home health nurse, Urgent Care, ED, 911 Yes   Week 2 Call Completed? Yes   Is the patient interested in additional calls from an ambulatory ? No   Would this patient benefit from a Referral to [facility] Social Work? No   Wrap up additional comments PT has PCP fu appt tomorrow, 1/15/25. Pt reports ongoing back pain, and will discuss pain management with PCP at visit. Pt does go to pain management clinic. Pt will fu with Neurosurgery for possible  plan for 2nd epidural injection.   Call end time 2203            Jenelle PETERSEN - Registered Nurse

## 2025-01-14 NOTE — TELEPHONE ENCOUNTER
Caller: BAL WITH [facility]    Best call back number: [phone]       What was the call regarding: BAL CAN'T TAKE A VERBAL FOR OUTPATIENT P.T. WITH [facility]. THE ORDER NEEDS TO BE PUT IN EPIC INSTEAD.

## 2025-01-14 NOTE — TELEPHONE ENCOUNTER
BAL WITH  [facility] CALLED FOR VERBAL ORDERS FOR OUT PATIENT PHYSICAL THERAPY. PATIENT  WANTS TO GO TO [facility] FOR LOWER BACK      CALL JOHN NUMBER [phone]

## 2025-01-14 NOTE — PROGRESS NOTES
Barbara Tran is a 69 y.o. female admitted to [facility] and  is seen for follow up of Back Pain    Admission date 12/27/24 D/C date 1/2/25  Discharge summary is available.  Risk for Readmission (LACE) Score: 12 (1/2/2025  6:00 AM)  Current outpatient and discharge medications have been reconciled for the patient.  Reviewed by: Millicent Ace MD    She was admitted for the following reason(s):  Primary admitting diagnosis at discharge was LBP.  Pertinent secondary diagnoses include DDD lumbar, DDD cervical, retrolithesis of lumbar vertebrae.  Course During Hospital Stay:  She was started on IV Dilaudid//Tylenol./Lidoderm/Robaxin/IV Decadron/Tylenol and Neurontin for pain control and switch to p.o. Medrol Dosepak at the time of discharge to catheter with continuation of the Robaxin/Lidoderm patch//Percocet/Tylenol. Pain improved but persisted. She is to follow-up with neurosurgery/orthopedic/pain clinic.   Procedures Performed in Hospital : CT of abdomen and chest, MRI of spine, US of legs, and please see EPIC record for further details of results and finding  Pending tests : none  Pain Control: P  Diet: as tolerated  Activity after Discharge: activity as tolerated    Items to be addressed at first follow up visit include:  1. Medication changes: Gabapentin  2.     She reports her overall condition is sometimes better, sometimes not as much since discharge.  Specific complaints: Continued pain.  Social support is said to be adequate to meet her needs. .      The patient is a 69-year-old female who presents for f/u hospitalization for severe back pain.    She reports intermittent periods of well-being, during which she experiences minimal pain and is able to perform daily activities such as cooking, dressing, and personal hygiene without assistance. However, she encounters difficulty navigating the stairs in her home. She has recently returned to work and was pleasantly surprised by her improved  "mobility, particularly when entering and exiting her vehicle. Her children have been providing assistance with household chores, ensuring safety measures such as removing ice and water that could potentially refreeze. She expresses dissatisfaction with her current pain management at [facility] [place] and is considering switching to Dr. Middleton. She recalls an unpleasant experience with an anesthesiologist who was distracted by his phone just before administering an injection. She also questions the accuracy of the epidural injection site, as she felt misaligned on the table during the procedure. She was informed that a change in physician might be beneficial.    She has been resistant to undergoing an epidural due to a perceived lack of communication regarding the necessity of the procedure.  She was administered a blood thinner on Saturday morning, which delayed the epidural procedure from 12/30/2024 to 12/31/2024. The epidural did not alleviate her pain, which now radiates from left to right and occasionally to the sides. She experiences tingling sensations in her hands and legs, which are exacerbated by sitting and relieved by movement. She was prescribed gabapentin by a hospitalist. She has reduced her muscle relaxant dosage by half, which she tolerates well.    ALLERGIES  The patient has an allergic reaction to DILAUDID.         Objective   Vitals:    01/15/25 1125   BP: 128/80   Pulse: 86   Resp: 18   SpO2: 97%   Weight: 94.3 kg (208 lb)   Height: 170.2 cm (67\")     Body mass index is 32.58 kg/m².    Physical Exam  Vitals reviewed.   Constitutional:       Appearance: Normal appearance. She is well-developed.   Cardiovascular:      Rate and Rhythm: Normal rate and regular rhythm.   Musculoskeletal:      Comments: Walking slowly and using a walker.    Neurological:      Mental Status: She is alert.   Psychiatric:         Behavior: Behavior normal.         Thought Content: Thought content normal.         Judgment: Judgment " normal.             Back pain.  The patient reports persistent back pain that has not improved with previous treatments, including an epidural injection. She experiences pain that moves from the left to the right side and tingling in her hands and legs, especially when sitting. She has been taking gabapentin, which helps mitigate the need for narcotics but causes drowsiness. She has also been using muscle relaxants, which she has cut in half to avoid excessive drowsiness. A referral to Dr. Webb for pain management will be initiated to explore alternative treatment options. She also will begin PT soon. Referral placed today.      {Followup: 23]    This post hospital patient care was coordinated using transitional care management strategy:  I certify that the following are true:  Communication was made within two business days of discharge.  Complexity of MDM is MODERATE  A Face to Face visit occurred within within 14 days    Patient or patient representative verbalized consent for the use of Ambient Listening during the visit with  Millicent Ace MD for chart documentation. 1/15/2025  11:53 EST

## 2025-01-15 ENCOUNTER — OFFICE VISIT (OUTPATIENT)
Dept: FAMILY MEDICINE CLINIC | Facility: CLINIC | Age: 70
End: 2025-01-15
Payer: MEDICARE

## 2025-01-15 VITALS
SYSTOLIC BLOOD PRESSURE: 128 MMHG | DIASTOLIC BLOOD PRESSURE: 80 MMHG | HEIGHT: 67 IN | HEART RATE: 86 BPM | RESPIRATION RATE: 18 BRPM | BODY MASS INDEX: 32.65 KG/M2 | OXYGEN SATURATION: 97 % | WEIGHT: 208 LBS

## 2025-01-15 DIAGNOSIS — M50.30 DDD (DEGENERATIVE DISC DISEASE), CERVICAL: ICD-10-CM

## 2025-01-15 DIAGNOSIS — M51.360 DEGENERATION OF INTERVERTEBRAL DISC OF LUMBAR REGION WITH DISCOGENIC BACK PAIN: Primary | ICD-10-CM

## 2025-01-15 DIAGNOSIS — M43.10 RETROLISTHESIS OF VERTEBRAE: ICD-10-CM

## 2025-01-15 PROBLEM — I26.99 PULMONARY EMBOLISM: Status: RESOLVED | Noted: 2024-09-20 | Resolved: 2025-01-15

## 2025-01-15 PROCEDURE — 3074F SYST BP LT 130 MM HG: CPT | Performed by: FAMILY MEDICINE

## 2025-01-15 PROCEDURE — 1125F AMNT PAIN NOTED PAIN PRSNT: CPT | Performed by: FAMILY MEDICINE

## 2025-01-15 PROCEDURE — 3079F DIAST BP 80-89 MM HG: CPT | Performed by: FAMILY MEDICINE

## 2025-01-15 PROCEDURE — 1111F DSCHRG MED/CURRENT MED MERGE: CPT | Performed by: FAMILY MEDICINE

## 2025-01-15 PROCEDURE — 1160F RVW MEDS BY RX/DR IN RCRD: CPT | Performed by: FAMILY MEDICINE

## 2025-01-15 PROCEDURE — 3044F HG A1C LEVEL LT 7.0%: CPT | Performed by: FAMILY MEDICINE

## 2025-01-15 PROCEDURE — 1159F MED LIST DOCD IN RCRD: CPT | Performed by: FAMILY MEDICINE

## 2025-01-15 PROCEDURE — 99495 TRANSJ CARE MGMT MOD F2F 14D: CPT | Performed by: FAMILY MEDICINE

## 2025-01-16 ENCOUNTER — HOME CARE VISIT (OUTPATIENT)
Dept: HOME HEALTH SERVICES | Facility: HOME HEALTHCARE | Age: 70
End: 2025-01-16
Payer: MEDICARE

## 2025-01-20 ENCOUNTER — HOME CARE VISIT (OUTPATIENT)
Dept: HOME HEALTH SERVICES | Facility: HOME HEALTHCARE | Age: 70
End: 2025-01-20
Payer: MEDICARE

## 2025-01-22 NOTE — TELEPHONE ENCOUNTER
NOV-02/19/25-EE  LOV-11/13/24-RM    Chronic HFpEF, is volume overloaded with leg edema, on torsemide 20 mg daily, prolactin, Entresto and Jardiance.  CAD- h/o LAD stent.  Patent stent on cardiac catheterization 4/2024.  No angina.  COVID-19 infection 3/2021 and 9/2024.  History of DVT and pulmonary embolism 6/2021 and 9/2024 with RV thrombus-both episodes due to COVID.  Repeat echocardiogram looking for RV thrombus and RV function.  Hyperlipidemia, on rosuvastatin.   Hypertension, goal <120/80.   Bilateral carotid artery stenosis.  Cough, seeing Dr. Rodriguez  Sedentary lifestyle, advised exercise daily, she would benefit from pool therapy  Severe fatigue, may be due to meds, may be due to low blood pressure.  I do not want to change her medications though because she has benefited from her current medications.  Thyroid nodules, follows with Dr. Olivares.      Plan:       See Laly in 3 months, no medication changes.  Samples of Jardiance, Entresto and Eliquis given today.  Set up repeat echocardiogram.  She has a repeat CT chest done before December 6.  She will see Dr. Rodriguez December 6.

## 2025-01-23 ENCOUNTER — HOME CARE VISIT (OUTPATIENT)
Dept: HOME HEALTH SERVICES | Facility: HOME HEALTHCARE | Age: 70
End: 2025-01-23
Payer: MEDICARE

## 2025-01-23 NOTE — Clinical Note
This is to notify your office of home PT/agency discharge with patient requesting dc after multiple cancelations and she will set herself up at [facility] for outpatient PT instead

## 2025-01-24 ENCOUNTER — READMISSION MANAGEMENT (OUTPATIENT)
Dept: CALL CENTER | Facility: HOSPITAL | Age: 70
End: 2025-01-24
Payer: MEDICARE

## 2025-01-24 NOTE — OUTREACH NOTE
Medical Week 3 Survey      Flowsheet Row Responses   [facility] patient discharged from? [place]   Does the patient have one of the following disease processes/diagnoses(primary or secondary)? Other   Week 3 attempt successful? No   Unsuccessful attempts Attempt 1            Jenelle Munoz Registered Nurse

## 2025-01-26 NOTE — HOME HEALTH
After multiple missed visits due to medical and personal appointments, she requested discharge at this time without a visit. She reported significant back pain still but has been going into work some days now, so PT informed her that Dr Ace had put in outpatient PT orders with patient to set up at [facility] at her convenience

## 2025-01-27 ENCOUNTER — TELEPHONE (OUTPATIENT)
Dept: ONCOLOGY | Facility: CLINIC | Age: 70
End: 2025-01-27
Payer: MEDICARE

## 2025-01-27 ENCOUNTER — TELEPHONE (OUTPATIENT)
Dept: CARDIOLOGY | Facility: CLINIC | Age: 70
End: 2025-01-27
Payer: MEDICARE

## 2025-01-27 DIAGNOSIS — M51.360 DEGENERATION OF INTERVERTEBRAL DISC OF LUMBAR REGION WITH DISCOGENIC BACK PAIN: ICD-10-CM

## 2025-01-27 NOTE — TELEPHONE ENCOUNTER
Caller: Olive Tran    Relationship: Self    Best call back number: [phone]    What is the best time to reach you: ANY    Who are you requesting to speak with (clinical staff, provider,  specific staff member): CLINICAL     What was the call regarding: OLIVE IS WANTING TO KNOW IF SHE CAN GET SOME SAMPLES OF ELIQUIS 5 MG  SHE CALLED DR GARCIA'S OFFICE AND THEY DID NOT HAVE ANY.   SHE STATES THAT SHE CAN NOT AFFORD TO BUY THEM    PLEASE ADVISE       SHE IS OUT OF THE MEDICATION

## 2025-01-28 ENCOUNTER — READMISSION MANAGEMENT (OUTPATIENT)
Dept: CALL CENTER | Facility: HOSPITAL | Age: 70
End: 2025-01-28
Payer: MEDICARE

## 2025-01-28 ENCOUNTER — TELEPHONE (OUTPATIENT)
Dept: FAMILY MEDICINE CLINIC | Facility: CLINIC | Age: 70
End: 2025-01-28
Payer: MEDICARE

## 2025-01-28 RX ORDER — HYDROCODONE BITARTRATE AND ACETAMINOPHEN 10; 325 MG/1; MG/1
1 TABLET ORAL EVERY 6 HOURS PRN
Qty: 40 TABLET | Refills: 0 | Status: SHIPPED | OUTPATIENT
Start: 2025-01-28

## 2025-01-28 NOTE — TELEPHONE ENCOUNTER
Patient had a dexa scan done with Dr. Peña at [facility] and when she went online and look she seen something that said needs immediate attention and she does not have an appt until mid march and she is worried she is asking if you can look at it and let her know if something is wrong or not please advise

## 2025-01-28 NOTE — OUTREACH NOTE
Medical Week 3 Survey      Flowsheet Row Responses   [facility] patient discharged from? [place]   Does the patient have one of the following disease processes/diagnoses(primary or secondary)? Other   Week 3 attempt successful? Yes   Call start time 1735   Call end time 1736   Discharge diagnosis Low back pain, DDD (degenerative disc disease), lumbar   Person spoke with today (if not patient) and relationship Patient   Meds reviewed with patient/caregiver? Yes   Does the patient have all medications ordered at discharge? Yes   Is the patient taking all medications as directed (includes completed medication regime)? Yes   Does the patient have a primary care provider?  Yes   Has home health visited the patient within 72 hours of discharge? N/A   Psychosocial issues? No   Did the patient receive a copy of their discharge instructions? Yes   Nursing interventions Reviewed instructions with patient   What is the patient's perception of their health status since discharge? Improving   Is the patient/caregiver able to teach back signs and symptoms related to disease process for when to call PCP? Yes   Is the patient/caregiver able to teach back signs and symptoms related to disease process for when to call 911? Yes   Is the patient/caregiver able to teach back the hierarchy of who to call/visit for symptoms/problems? PCP, Specialist, Home health nurse, Urgent Care, ED, 911 Yes   Week 3 Call Completed? Yes   Graduated Yes   Wrap up additional comments Patient reports doing well. Seeing some new specialties upcoming. No current concerns or questions noted.   Call end time 1736            Yamileth SIMS - Registered Nurse

## 2025-02-13 DIAGNOSIS — F51.01 PRIMARY INSOMNIA: ICD-10-CM

## 2025-02-13 RX ORDER — ZOLPIDEM TARTRATE 5 MG/1
5 TABLET ORAL NIGHTLY PRN
Qty: 30 TABLET | Refills: 0 | Status: SHIPPED | OUTPATIENT
Start: 2025-02-13

## 2025-02-18 RX ORDER — IBANDRONATE SODIUM 150 MG/1
150 TABLET, FILM COATED ORAL
Qty: 4 TABLET | Refills: 2 | Status: SHIPPED | OUTPATIENT
Start: 2025-02-18

## 2025-02-19 ENCOUNTER — OFFICE VISIT (OUTPATIENT)
Dept: CARDIOLOGY | Facility: CLINIC | Age: 70
End: 2025-02-19
Payer: MEDICARE

## 2025-02-19 VITALS
BODY MASS INDEX: 32.33 KG/M2 | HEART RATE: 73 BPM | WEIGHT: 206 LBS | OXYGEN SATURATION: 97 % | DIASTOLIC BLOOD PRESSURE: 80 MMHG | HEIGHT: 67 IN | SYSTOLIC BLOOD PRESSURE: 118 MMHG

## 2025-02-19 DIAGNOSIS — E78.2 MIXED HYPERLIPIDEMIA: ICD-10-CM

## 2025-02-19 DIAGNOSIS — I25.10 CHRONIC CORONARY ARTERY DISEASE: ICD-10-CM

## 2025-02-19 DIAGNOSIS — I50.32 CHRONIC DIASTOLIC CONGESTIVE HEART FAILURE: Primary | ICD-10-CM

## 2025-02-19 DIAGNOSIS — I10 ESSENTIAL HYPERTENSION: ICD-10-CM

## 2025-02-19 RX ORDER — OXYCODONE HCL 10 MG/1
10 TABLET, FILM COATED, EXTENDED RELEASE ORAL EVERY 12 HOURS
COMMUNITY

## 2025-02-19 NOTE — PROGRESS NOTES
Date of Office Visit: 2025  Encounter Provider: MANOHAR Dhaliwal  Place of Service: [facility] CARDIOLOGY  Established cardiologist: Lashay Leon MD  Patient Name: Barbara Tran  :1955      Patient ID:  Barbara Tran is a 70 y.o. female is here for  followup    With a pertinent medical history of HFpEF, hyperlipidemia, hypertension, DVT with pulmonary embolism in 2021, CAD with LAD stent done 2015.   Her mom, dad, sister and brothers x2 of all had heart disease-her mom, dad, sister and one of her brothers have already  with coronary artery disease and her living brother has had 5 vessel bypass.  Her sister also has diabetes.  She is single, has 3 children, works as an , has never smoked, has tea and coffee, occasional alcohol and no drugs.     History of Present Illness  She received a proximal LAD stent in .       Last cardiac catheterization done in  showed proximal patent LAD stent patent, 80% septal  stenosis unchanged from prior cath, normal left circumflex and RCA, normal left ventricular systolic function.     She had COVID-19 pneumonia 3/8/2021.    She then presented 6/3/2021 with acute short windedness and was diagnosed with bilateral acute pulmonary embolism without acute cor pulmonale.  She was started on Eliquis.       Echo done 9/15/2021 showed ejection fraction of 46-50% with moderate to severe septal hypokinesis, grade 1 diastolic dysfunction, normal right ventricular size and function with normal saline contrast today, no pericardial effusion and mild left atrial enlargement.       She has followed with Dr. Rodriguez for pulmonary nodules.     Her spironolactone was decreased due to hyperkalemia.  She follows with Dr. Vargas for endocrinology.      Echo done 2022 shows ejection fraction 54% with normal RVSP, grade 1 diastolic dysfunction, no significant valvular abnormalities.       She had  nonischemic stress nuclear perfusion study done 5/4/2022.       CT chest without contrast on 3/13/2023 showed stable micronodules. Dr. Leon reviewed the CT chest images which showed scattered calcification in the aortic arch, calcification throughout the LAD-stent in the LAD, calcification in the RCA and circumflex.     Venous duplex studies of lower extremities showed normal right lower extremity venous duplex, she had mild stenosis of bilateral carotid arteries with otherwise normal vascular screening done 7/14/2023.       She had a nonischemic stress nuclear perfusion study done 1/2/2024      echocardiogram done 1/2/2024 showed ejection fraction 56/60% with normal diastolic function and no valvular abnormalities, normal RVSP.     Cardiac catheterization done 4/29/2024 for exertional dyspnea showed 20% proximal disease, patent mid LAD stent, 80% origin second  stenosis, normal circumflex, 20% proximal RCA stenosis with 40% mid vessel stenosis, risk factor modification recommended.     Ultrasound of the thyroid done 7/25/2024 shows a right lower pole nodule.  She sees Dr. Olivares.      I saw her in August 2024.  She had continued lower extremity edema despite torsemide and was having dizziness so this was decreased and we started her on spironolactone.  She had torn a ligament in her left ankle and was using a walking boot.     She works as a  and her job is demanding and stressful at times.    She then was admitted 9/20/2024 with short windedness. She had tested COVID-positive 9/8/2024. In the emergency department, her troponin was 15, positive for flu and COVID, CTA of her chest showed bilateral pulmonary emboli without evidence of right heart strain. She was started on heparin drip. Her venous Doppler showed acute left lower extremity DVT 9/21/2024. Echocardiogram done 9/21/2024 showed ejection fraction of 57% with thrombus in the right ventricle, normal right ventricular size with low  normal function, normal diastolic function no acute right heart strain, no significant valvular abnormalities.  Hematology saw her for hypercoagulable workup and outpatient follow-up. Pulmonary saw her and recommended CT scan repeat as an outpatient. She was hemodynamically stable and did not require oxygen. She was switched to apixaban and recommended lifelong anticoagulation because was the second second pulmonary embolism that she had sustained from COVID infection.     She saw Dr. Leon 11/13/2024 she had continued to have a cough, but lower extremity edema was greatly improved in the mornings and then would develop as the day progressed.  She was maintained on present therapy.  An echocardiogram was obtained 11/22/2024 That showed an ejection fraction of 49.1% mild MAC, mild MR, trace TR.     Today Barbara presents for follow-up.  She has been dealing with a lot of lumbar back pain she did initially receive an epidural injection for this and it really did not improve her pain.  She is now following with pain management Dr. Middleton, they have discussed may be an ablation.  She continues to follow with endocrinology for history of thyroid nodules, hypothyroidism, osteoporosis on Boniva.  She has chronic ankle edema and this has been stable and unchanged to her.  Because of her back pain her physical activity has been quite limited.  She is ambulating with a walker.  She does not notice any exertional dyspnea.  There is no chest pain or pressure, heart racing, palpitations, lightheadedness, dizziness, presyncope, or syncope.          Current Outpatient Medications on File Prior to Visit   Medication Sig Dispense Refill    apixaban (ELIQUIS) 5 MG tablet tablet Take 1 tablet by mouth Every 12 (Twelve) Hours. Indications: DVT/PE (active thrombosis) 56 tablet 0    buPROPion XL (WELLBUTRIN XL) 300 MG 24 hr tablet Take 1 tablet by mouth Daily. Indications: Major Depressive Disorder 90 tablet 3    calcium carbonate  (OS-SHAHNAZ) 600 MG tablet Take 1 tablet by mouth 2 (Two) Times a Day With Meals. Indications: Low Amount of Calcium in the Blood      empagliflozin (JARDIANCE) 10 MG tablet tablet Take 1 tablet by mouth Daily. Indications: Cardiac Failure 90 tablet 3    ibandronate (Boniva) 150 MG tablet Take 1 tablet by mouth Every 30 (Thirty) Days. Patient taking once a month  Indications: Postmenopausal Osteoporosis 4 tablet 2    levothyroxine (Synthroid) 50 MCG tablet Take 1 tablet by mouth Daily. Indications: Underactive Thyroid 90 tablet 0    magnesium oxide (MAG-OX) 400 MG tablet Take 2 tablets by mouth 2 (two) times a day. Indications: Disorder with Low Magnesium Levels      methocarbamol (ROBAXIN) 500 MG tablet Take 1 tablet by mouth 4 (Four) Times a Day. 120 tablet 0    oxyCODONE (oxyCONTIN) 10 MG 12 hr tablet Take 1 tablet by mouth Every 12 (Twelve) Hours.      rosuvastatin (CRESTOR) 40 MG tablet Take 1 tablet by mouth Every Night. 90 tablet 3    sacubitril-valsartan (ENTRESTO) 24-26 MG tablet Take 1 tablet by mouth 2 (Two) Times a Day. Indications: Cardiac Failure 180 tablet 0    spironolactone (ALDACTONE) 25 MG tablet Take 1 tablet by mouth Daily. Indications: Edema 90 tablet 3    torsemide (DEMADEX) 20 MG tablet Take 1 tablet by mouth Daily. 90 tablet 3    zolpidem (AMBIEN) 5 MG tablet Take 1 tablet by mouth At Night As Needed for Sleep. Indications: Trouble Sleeping 30 tablet 0    gabapentin (NEURONTIN) 100 MG capsule Take 1 capsule by mouth 3 (Three) Times a Day for 7 days, THEN 2 capsules 3 (Three) Times a Day for 7 days, THEN 3 capsules 3 (Three) Times a Day for 14 days. Indications: back pain 90 capsule 2    [DISCONTINUED] acetaminophen (TYLENOL) 500 MG tablet Take 1 tablet by mouth Every 8 (Eight) Hours. (Patient not taking: Reported on 1/15/2025) 90 tablet 0    [DISCONTINUED] HYDROcodone-acetaminophen (NORCO)  MG per tablet Take 1 tablet by mouth Every 6 (Six) Hours As Needed for Moderate Pain. Indications:  "Pain 40 tablet 0     No current facility-administered medications on file prior to visit.         Procedures    ECG 12 Lead    Date/Time: 2/19/2025 3:37 PM  Performed by: Laly Álvarez APRN    Authorized by: Laly Álvarez APRN  Comparison: compared with previous ECG from 12/27/2024  Rhythm: sinus rhythm  BPM: 73  T flattening: III            Objective:      Vitals:    02/19/25 1444   BP: 118/80   Pulse: 73   SpO2: 97%   Weight: 93.4 kg (206 lb)   Height: 170.2 cm (67\")     Body mass index is 32.26 kg/m².  Wt Readings from Last 3 Encounters:   02/19/25 93.4 kg (206 lb)   01/15/25 94.3 kg (208 lb)   12/27/24 92.5 kg (203 lb 14.8 oz)     Constitutional:       General: Not in acute distress.     Appearance: Not diaphoretic.   Neck:      Vascular: No carotid bruit or JVD.   Pulmonary:      Effort: Pulmonary effort is normal.      Breath sounds: Normal breath sounds.   Cardiovascular:      Normal rate. Regular rhythm.      Murmurs: There is no murmur.   Pulses:     Radial: 2+ bilaterally.     Dorsalis pedis: 2+ bilaterally.     Posterior tibial: 2+ bilaterally.  Edema:     Ankle: bilateral 1+ edema of the ankle.     Feet: bilateral 1+ edema of the feet.      Lab Review:   Lipid panel 2/14/2025 total cholesterol 150, , HDL 50, LDL 75, CBC RBC 5.35, hematocrit 49.2 otherwise unremarkable, CMP unremarkable, hemoglobin A1c 5.8, TSH 1.91, free T4 1.59, T3 1.24    Assessment:     1. Chronic diastolic congestive heart failure    2. Chronic coronary artery disease    3. Essential hypertension    4. Mixed hyperlipidemia        Chronic HFmrEF= 49.1%, chronic ankle edema is stable.  GDMT includes torsemide, spironolactone, sacubitril-valsartan, empagliflozin.   CAD- h/o LAD stent.  Patent stent on cardiac catheterization 4/2024.  No angina.  COVID-19 infection 3/2021 and 9/2024.  History of DVT and pulmonary embolism 6/2021 and 9/2024 with RV thrombus-both episodes due to COVID.  Lifelong anticoagulation apixaban. "   Hyperlipidemia, on rosuvastatin.   Hypertension, goal <120/80.   Bilateral carotid artery stenosis.  Cough, seeing Dr. Rodriguez  Sedentary lifestyle, advised exercise daily, she would benefit from pool therapy  Osteoporosis on ibandronate  Thyroid nodules, follows with Dr. Olivares.  Lumbar back pain, has received epidural injection for this without relief now following with pain management    Plan:   No medication changes were made  From a cardiac standpoint she is stable  She will see Dr. Leon in 6 months    Thank you for allowing me to participate in this patient's care. Please call with any questions or concerns.          Dragon dictation device was utilized in this note.

## 2025-03-03 ENCOUNTER — OFFICE VISIT (OUTPATIENT)
Dept: FAMILY MEDICINE CLINIC | Facility: CLINIC | Age: 70
End: 2025-03-03
Payer: MEDICARE

## 2025-03-03 VITALS
RESPIRATION RATE: 18 BRPM | OXYGEN SATURATION: 97 % | DIASTOLIC BLOOD PRESSURE: 70 MMHG | HEIGHT: 67 IN | HEART RATE: 86 BPM | BODY MASS INDEX: 32.96 KG/M2 | SYSTOLIC BLOOD PRESSURE: 130 MMHG | WEIGHT: 210 LBS

## 2025-03-03 DIAGNOSIS — R42 VERTIGO: Primary | ICD-10-CM

## 2025-03-03 DIAGNOSIS — R53.81 MALAISE AND FATIGUE: ICD-10-CM

## 2025-03-03 DIAGNOSIS — R53.83 MALAISE AND FATIGUE: ICD-10-CM

## 2025-03-03 PROCEDURE — 1125F AMNT PAIN NOTED PAIN PRSNT: CPT | Performed by: FAMILY MEDICINE

## 2025-03-03 PROCEDURE — 3044F HG A1C LEVEL LT 7.0%: CPT | Performed by: FAMILY MEDICINE

## 2025-03-03 PROCEDURE — 3075F SYST BP GE 130 - 139MM HG: CPT | Performed by: FAMILY MEDICINE

## 2025-03-03 PROCEDURE — 3078F DIAST BP <80 MM HG: CPT | Performed by: FAMILY MEDICINE

## 2025-03-03 PROCEDURE — 99213 OFFICE O/P EST LOW 20 MIN: CPT | Performed by: FAMILY MEDICINE

## 2025-03-03 NOTE — PROGRESS NOTES
Assessment & Plan          Generalized body pain and fatigue.  She reports experiencing generalized body pain and fatigue. She has been advised against physical therapy until further notice. She has been advised to schedule another appointment with Dr. Santiago to discuss these options.    Dizziness/vertigo  She reports experiencing episodes of dizziness that started approximately 9 months ago and have been increasing in frequency. The dizziness is described as a sensation of the room spinning, lasting 2-3 minutes, and occurring sporadically. It is not associated with nausea. Differential diagnosis includes central vertigo or issues related to the inner ear. A referral to an Ear, Nose, and Throat (ENT) specialist will be initiated for further evaluation and management. If the ENT specialist does not find a cause, a referral to a neurologist will be considered.     Patient was given instructions and counseling regarding her condition or for health maintenance advice. Please see specific information pulled into the AVS if appropriate.          Barbara is a 70 y.o. being seen today for  Back Pain   HISTORY    Back Pain  This is a chronic problem. The current episode started more than 1 month ago. The problem occurs constantly. The problem is unchanged. The pain is present in the lumbar spine. The quality of the pain is described as aching and burning. The pain is at a severity of 8/10. The pain is Worse during the day. The symptoms are aggravated by bending, position, sitting, standing and twisting. Stiffness is present All day. Associated symptoms include numbness, tingling and weakness. Pertinent negatives include no abdominal pain, bladder incontinence, bowel incontinence, chest pain, dysuria, fever, leg pain, paresthesias, pelvic pain, perianal numbness or weight loss. Risk factors include history of osteoporosis, lack of exercise, obesity and sedentary lifestyle.         The patient is a 68-year-old female who  presents for evaluation of dizziness.    She reports experiencing generalized body pain and fatigue, with no recollection of her last visit. She has been advised against physical therapy until further notice. She has consulted with Dr. Santiago, a pain specialist, who suggested that an epidural may not be necessary due to her extensive arthritis. Instead, he proposed two alternative procedures: nerve ablation and ultrasound-directed treatment. She plans to schedule another appointment with him to discuss these options. She is uncertain about Medicare's coverage for these procedures.    Additionally, she experiences sudden episodes of dizziness, described as a sensation of the room spinning, which she suspects may be related to her blood pressure medication. These episodes are not accompanied by nausea but are characterized by a brief visual disturbance, lasting no more than 2 to 3 minutes. The frequency of these episodes is sporadic, occurring twice in one day and then not reappearing for 2 to 3 days. She occasionally experiences mild dizziness when moving her head back and forth quickly. She has not previously consulted an ENT specialist. These episodes began approximately 9 months ago and have been increasing in frequency. She has also consulted with Dr. Holt regarding these symptoms.     Social History  She  reports that she has never smoked. She has never been exposed to tobacco smoke. She has never used smokeless tobacco. She reports current alcohol use of about 2.0 standard drinks of alcohol per week. She reports that she does not use drugs.  EXAM DATA    Vital Signs        BP Readings from Last 1 Encounters:   03/03/25 130/70     Wt Readings from Last 3 Encounters:   03/03/25 95.3 kg (210 lb)   02/19/25 93.4 kg (206 lb)   01/15/25 94.3 kg (208 lb)   Body mass index is 32.89 kg/m².  Physical Exam  Vitals reviewed.   Constitutional:       Appearance: Normal appearance. She is well-developed.   Cardiovascular:       Rate and Rhythm: Normal rate and regular rhythm.      Heart sounds: Normal heart sounds.   Pulmonary:      Effort: Pulmonary effort is normal.      Breath sounds: Normal breath sounds.   Neurological:      Mental Status: She is alert.   Psychiatric:         Behavior: Behavior normal.         Thought Content: Thought content normal.         Judgment: Judgment normal.               Patient or patient representative verbalized consent for the use of Ambient Listening during the visit with  Millicent Ace MD for chart documentation. 3/3/2025  15:56 EST

## 2025-03-14 ENCOUNTER — OFFICE VISIT (OUTPATIENT)
Dept: ONCOLOGY | Facility: CLINIC | Age: 70
End: 2025-03-14
Payer: MEDICARE

## 2025-03-14 ENCOUNTER — LAB (OUTPATIENT)
Dept: LAB | Facility: HOSPITAL | Age: 70
End: 2025-03-14
Payer: MEDICARE

## 2025-03-14 VITALS
HEIGHT: 67 IN | OXYGEN SATURATION: 99 % | TEMPERATURE: 97.6 F | SYSTOLIC BLOOD PRESSURE: 100 MMHG | RESPIRATION RATE: 16 BRPM | BODY MASS INDEX: 32.55 KG/M2 | DIASTOLIC BLOOD PRESSURE: 68 MMHG | HEART RATE: 107 BPM | WEIGHT: 207.4 LBS

## 2025-03-14 DIAGNOSIS — Z79.01 CHRONIC ANTICOAGULATION: ICD-10-CM

## 2025-03-14 DIAGNOSIS — I26.99 BILATERAL PULMONARY EMBOLISM: ICD-10-CM

## 2025-03-14 DIAGNOSIS — I26.99 BILATERAL PULMONARY EMBOLISM: Primary | ICD-10-CM

## 2025-03-14 LAB
BASOPHILS # BLD AUTO: 0.05 10*3/MM3 (ref 0–0.2)
BASOPHILS NFR BLD AUTO: 0.6 % (ref 0–1.5)
DEPRECATED RDW RBC AUTO: 43.3 FL (ref 37–54)
EOSINOPHIL # BLD AUTO: 0.28 10*3/MM3 (ref 0–0.4)
EOSINOPHIL NFR BLD AUTO: 3.2 % (ref 0.3–6.2)
ERYTHROCYTE [DISTWIDTH] IN BLOOD BY AUTOMATED COUNT: 13.2 % (ref 12.3–15.4)
HCT VFR BLD AUTO: 48.5 % (ref 34–46.6)
HGB BLD-MCNC: 15.9 G/DL (ref 12–15.9)
IMM GRANULOCYTES # BLD AUTO: 0.06 10*3/MM3 (ref 0–0.05)
IMM GRANULOCYTES NFR BLD AUTO: 0.7 % (ref 0–0.5)
LYMPHOCYTES # BLD AUTO: 2.52 10*3/MM3 (ref 0.7–3.1)
LYMPHOCYTES NFR BLD AUTO: 28.7 % (ref 19.6–45.3)
MCH RBC QN AUTO: 29.7 PG (ref 26.6–33)
MCHC RBC AUTO-ENTMCNC: 32.8 G/DL (ref 31.5–35.7)
MCV RBC AUTO: 90.5 FL (ref 79–97)
MONOCYTES # BLD AUTO: 0.49 10*3/MM3 (ref 0.1–0.9)
MONOCYTES NFR BLD AUTO: 5.6 % (ref 5–12)
NEUTROPHILS NFR BLD AUTO: 5.37 10*3/MM3 (ref 1.7–7)
NEUTROPHILS NFR BLD AUTO: 61.2 % (ref 42.7–76)
NRBC BLD AUTO-RTO: 0 /100 WBC (ref 0–0.2)
PLATELET # BLD AUTO: 208 10*3/MM3 (ref 140–450)
PMV BLD AUTO: 11.2 FL (ref 6–12)
RBC # BLD AUTO: 5.36 10*6/MM3 (ref 3.77–5.28)
WBC NRBC COR # BLD AUTO: 8.77 10*3/MM3 (ref 3.4–10.8)

## 2025-03-14 PROCEDURE — 85025 COMPLETE CBC W/AUTO DIFF WBC: CPT

## 2025-03-14 PROCEDURE — 36415 COLL VENOUS BLD VENIPUNCTURE: CPT

## 2025-03-14 NOTE — PROGRESS NOTES
REFERRING PROVIDER:    No referring provider defined for this encounter.    REASON FOR FOLLOW UP:    Bilateral PE and left DVT with RV thrombus    HISTORY OF PRESENT ILLNESS:  Barbara Tran is a 70 y.o. female who comes into the office today for follow-up after recent hospitalization with discharge on 10/1/2024.  She has history of hypertension, hyperlipidemia, coronary artery disease, hypothyroidism, congestive heart failure, previous history of pulmonary embolism following COVID-19 infection and was treated with Eliquis for 1 year.  Patient tested positive for COVID-19 on September 8 and has nasal congestion sore throat and cough.  She had progressive worsening of dyspnea over 2 to 3 days and presented to the emergency room.  CT angiogram chest performed 9/20/2024.    We were consulted in the hospital due to findings of acute bilateral PE, left DVT and RV thrombus.  Concerns of pulmonary infarction.  Echocardiogram performed showing no right heart strain.  Patient diagnosed    with influenza and COVID during hospitalization.  She was started on heparin drip.  Plans made to transition to Eliquis 10 mg twice daily for 1 week and then 5 mg twice daily.  Anticipate lifelong anticoagulation due to second episode of pulmonary embolism.    Patient continues on Eliquis 5 mg twice daily.  As of today she has completed 6 months of full dose Eliquis treatment.  She recently saw Dr. Rodriguez for her chronic pulmonary issues.  Today she is very congested, has a cough which has been going on for a week and a half which is productive of beige colored sputum.  She is also reporting dyspnea and is extremely tired.  She has been using Mucinex DM to help with her symptoms but has not seen any significant improvement.  She denies a fever.  She is tachycardic today which is likely secondary to the URI that she is experiencing.  Denies any bleeding or bruising.  No lower extremity edema.  She has been compliant with Eliquis.      Past  Medical History:   Diagnosis Date    Allergic 2015    codeine, morphine, levaquin    Arthritis l5 years or so ago    Asthma 9-    Basal cell carcinoma     CAD (coronary artery disease)     Cataract 6-9 months ago    Need surgery    CHF (congestive heart failure)     Coronary artery disease 2005 stent    COVID-19 virus detected 03/10/2021    Deep vein thrombosis 06/03/2021    Pulmonary embolism    Depression     Disease of thyroid gland     Headache Covid 3-6-21    Has continued    History of pulmonary embolism 06/01/2021    History of urinary tract infection     HL (hearing loss) Last 4-6 months    Hyperlipidemia     Hypertension since 2005    Hypothyroidism since 2012    Low back pain     Multinodular goiter     Obesity     Osteopenia last few years    Osteoporosis     Pulmonary embolism 06/03/2021    From Covid    Pulmonic valve regurgitation     Tricuspid valve regurgitation     Unstable angina 04/16/2024       Past Surgical History:   Procedure Laterality Date    BUNIONECTOMY Bilateral     HAMMERT TOE REPAIR/BUNIONECTOMY    CARDIAC CATHETERIZATION  2015, 2018    CARDIAC CATHETERIZATION N/A 04/29/2024    Procedure: Coronary angiography;  Surgeon: Cong Rivera MD;  Location: [facility] CATH INVASIVE LOCATION;  Service: Cardiovascular;  Laterality: N/A;    CARDIAC CATHETERIZATION N/A 04/29/2024    Procedure: Left Heart Cath;  Surgeon: Cong Rivera MD;  Location: [facility] CATH INVASIVE LOCATION;  Service: Cardiovascular;  Laterality: N/A;    CARDIAC CATHETERIZATION N/A 04/29/2024    Procedure: Left ventriculography;  Surgeon: Cong Rivera MD;  Location: [facility] CATH INVASIVE LOCATION;  Service: Cardiovascular;  Laterality: N/A;    CATARACT EXTRACTION      CHOLECYSTECTOMY  1995    COLONOSCOPY N/A 12/19/2016    Procedure: COLONOSCOPY WITH COLD BIOPSIES;  Surgeon: Medina Guillen MD;  Location: [facility] ENDOSCOPY;  Service:     CORONARY STENT PLACEMENT  2005    LAD 80% blockage    CYST REMOVAL      GANGLION  "CYST EXCISION      R WRIST    SKIN BIOPSY      SUBTOTAL HYSTERECTOMY  2009    THYROID BIOPSY  2014    TONSILLECTOMY  1965    TUBAL ABDOMINAL LIGATION  1985       SOCIAL HISTORY:   reports that she has never smoked. She has never been exposed to tobacco smoke. She has never used smokeless tobacco. She reports current alcohol use of about 2.0 standard drinks of alcohol per week. She reports that she does not use drugs.    FAMILY HISTORY:  family history includes Arthritis in her brother and son; Cancer in her brother; Diabetes in her sister; Heart attack in her brother and father; Heart disease in her brother, father, and mother; Heart disease (age of onset: 62) in her brother; Heart disease (age of onset: 69) in her sister; No Known Problems in her maternal aunt, maternal grandfather, maternal grandmother, maternal uncle, paternal aunt, paternal grandfather, paternal grandmother, and paternal uncle.    ALLERGIES:  Allergies   Allergen Reactions    Dilaudid [Hydromorphone] Other (See Comments)     Confusion    Codeine Dizziness     lightheaded    Levofloxacin Itching    Morphine Itching       MEDICATIONS:  The medication list has been reviewed with the patient by the medical assistant, and the list has been updated in the electronic medical record, which I reviewed.  Medication dosages and frequencies were confirmed to be accurate.    Review of Systems    PHYSICAL EXAMINATION:  Vitals:    03/14/25 0937   BP: 100/68   Pulse: 107   Resp: 16   Temp: 97.6 °F (36.4 °C)   TempSrc: Oral   SpO2: 99%   Weight: 94.1 kg (207 lb 6.4 oz)   Height: 170.2 cm (67.01\")   PainSc: 5    PainLoc: Back  Comment: left knee as well       Physical Exam  Eyes:      Extraocular Movements: Extraocular movements intact.      Conjunctiva/sclera: Conjunctivae normal.   Pulmonary:      Effort: Pulmonary effort is normal.      Breath sounds: Normal breath sounds.   Musculoskeletal:      Cervical back: Normal range of motion.      Comments: Using " walker for assistance   Skin:     General: Skin is warm and dry.   Neurological:      General: No focal deficit present.      Mental Status: She is alert and oriented to person, place, and time.   Psychiatric:         Behavior: Behavior normal.         DIAGNOSTIC DATA:  Lab Results   Component Value Date    WBC 8.77 03/14/2025    HGB 15.9 03/14/2025    HCT 48.5 (H) 03/14/2025    MCV 90.5 03/14/2025     03/14/2025             ASSESSMENT:  This is a 70 y.o. female with:    **Recurrent pulmonary embolism  In both instances the pulmonary embolism was followed by COVID-19 infection.  Although there has been a precipitating event each time given the fact that this is a second event with bilateral pulmonary embolism I will proceed with thrombophilia workup.  No family history of PE or DVT.  Recommend anticoagulation with Eliquis 10 mg twice daily for 1 week and subsequently 5 mg twice daily.  Patient will benefit from lifelong anticoagulation due to the second episode of pulmonary embolism.  Factor V Leiden, factor II DNA analysis, protein S , beta-2 glycoprotein, lupus anticoagulant, anticardiolipin antibody unremarkable with lupus anticoagulant skewed due to patient being on anticoagulant at the time.  3/14/2025-completed 6 months of full dose of Eliquis.  We discussed today about decreasing the dose to 2.5 mg twice daily however patient is very reluctant given her previous history of blood clots every time she had a URI.  She is currently experiencing a URI.  We could possibly continue the full dose Eliquis for another 6 months and subsequently decrease the dose to 2.5 mg twice daily.  She is tolerating the full dose well without any aches or bleeding or bruising.  CBC reviewed and stable.    *COVID-19 infection as well as flu A  Patient with another URI currently.  Lungs are clear on exam.  She does have some cough but no fevers.  Continue follow-up with her primary care physician  Continue symptomatic  treatment, recommend Flonase, Zyrtec and continue Mucinex.     *Hypertension, hyperlipidemia, coronary artery disease and CHF-management per cardiology.        PLAN:   Continue Eliquis 5 mg twice daily for another 6 months which would be September 2025  Follow-up with APRN in 6 months and MD in 1 year.  After 6 months of full dose Eliquis will decrease the treatment to 2.5 mg twice daily  We discussed symptomatic treatment for URI today.  If she has persistent symptoms she is to contact her primary care physician..

## 2025-03-18 DIAGNOSIS — F51.01 PRIMARY INSOMNIA: ICD-10-CM

## 2025-03-19 RX ORDER — ZOLPIDEM TARTRATE 5 MG/1
5 TABLET ORAL NIGHTLY PRN
Qty: 30 TABLET | Refills: 0 | Status: SHIPPED | OUTPATIENT
Start: 2025-03-19

## 2025-03-19 RX ORDER — ZOLPIDEM TARTRATE 5 MG/1
5 TABLET ORAL NIGHTLY PRN
Qty: 30 TABLET | Refills: 0 | OUTPATIENT
Start: 2025-03-19

## 2025-03-19 RX ORDER — OXYCODONE HCL 10 MG/1
10 TABLET, FILM COATED, EXTENDED RELEASE ORAL EVERY 12 HOURS
OUTPATIENT
Start: 2025-03-19

## 2025-03-20 DIAGNOSIS — E78.2 MIXED HYPERLIPIDEMIA: ICD-10-CM

## 2025-03-20 RX ORDER — ROSUVASTATIN CALCIUM 40 MG/1
40 TABLET, COATED ORAL NIGHTLY
Qty: 90 TABLET | Refills: 1 | Status: SHIPPED | OUTPATIENT
Start: 2025-03-20

## 2025-03-20 RX ORDER — OXYCODONE HCL 10 MG/1
10 TABLET, FILM COATED, EXTENDED RELEASE ORAL EVERY 12 HOURS
OUTPATIENT
Start: 2025-03-20

## 2025-04-01 ENCOUNTER — TELEPHONE (OUTPATIENT)
Age: 70
End: 2025-04-01
Payer: MEDICARE

## 2025-04-08 DIAGNOSIS — M50.30 DDD (DEGENERATIVE DISC DISEASE), CERVICAL: ICD-10-CM

## 2025-04-08 DIAGNOSIS — M51.360 DEGENERATION OF INTERVERTEBRAL DISC OF LUMBAR REGION WITH DISCOGENIC BACK PAIN: ICD-10-CM

## 2025-04-09 RX ORDER — SPIRONOLACTONE 25 MG/1
25 TABLET ORAL DAILY
Qty: 90 TABLET | Refills: 3 | Status: SHIPPED | OUTPATIENT
Start: 2025-04-09

## 2025-04-09 RX ORDER — TORSEMIDE 20 MG/1
20 TABLET ORAL DAILY
Qty: 90 TABLET | Refills: 3 | Status: SHIPPED | OUTPATIENT
Start: 2025-04-09

## 2025-04-11 RX ORDER — GABAPENTIN 100 MG/1
100 CAPSULE ORAL 3 TIMES DAILY
Qty: 90 CAPSULE | Refills: 2 | Status: SHIPPED | OUTPATIENT
Start: 2025-04-11

## 2025-04-16 DIAGNOSIS — F34.1 DYSTHYMIA: ICD-10-CM

## 2025-04-16 DIAGNOSIS — F51.01 PRIMARY INSOMNIA: ICD-10-CM

## 2025-04-16 DIAGNOSIS — E03.9 HYPOTHYROIDISM, UNSPECIFIED TYPE: ICD-10-CM

## 2025-04-16 RX ORDER — BUPROPION HYDROCHLORIDE 300 MG/1
300 TABLET ORAL DAILY
Qty: 90 TABLET | Refills: 3 | Status: SHIPPED | OUTPATIENT
Start: 2025-04-16

## 2025-04-16 RX ORDER — SPIRONOLACTONE 25 MG/1
25 TABLET ORAL DAILY
Qty: 90 TABLET | Refills: 3 | Status: SHIPPED | OUTPATIENT
Start: 2025-04-16

## 2025-04-17 RX ORDER — ZOLPIDEM TARTRATE 5 MG/1
5 TABLET ORAL NIGHTLY PRN
Qty: 30 TABLET | Refills: 0 | Status: SHIPPED | OUTPATIENT
Start: 2025-04-17

## 2025-04-17 RX ORDER — LEVOTHYROXINE SODIUM 50 UG/1
50 TABLET ORAL DAILY
Qty: 90 TABLET | Refills: 0 | Status: SHIPPED | OUTPATIENT
Start: 2025-04-17

## 2025-04-23 ENCOUNTER — TELEPHONE (OUTPATIENT)
Dept: CARDIOLOGY | Age: 70
End: 2025-04-23
Payer: MEDICARE

## 2025-04-23 NOTE — TELEPHONE ENCOUNTER
Patient is needing the RSV and Shingles vaccine.  [facility] pharmacy is wanting to know if it is ok for her to get?  Please advise.    CB: [phone]    Christine

## 2025-04-24 NOTE — TELEPHONE ENCOUNTER
Yes from a cardiac standpoint she may receive these  If there is any other concerns regarding them could also review with PCP thank you

## 2025-04-25 NOTE — TELEPHONE ENCOUNTER
Called and left a message on voicemail with the above and advised that they call back with any further questions.    Christine

## 2025-05-06 ENCOUNTER — TELEPHONE (OUTPATIENT)
Dept: CARDIOLOGY | Facility: CLINIC | Age: 70
End: 2025-05-06
Payer: MEDICARE

## 2025-05-06 RX ORDER — VALSARTAN 160 MG/1
160 TABLET ORAL NIGHTLY
Qty: 90 TABLET | Refills: 3 | Status: SHIPPED | OUTPATIENT
Start: 2025-05-06

## 2025-05-06 NOTE — TELEPHONE ENCOUNTER
When patient came in today to  her sample of Entresto she expressed that she wanted to see if RM could out her on another medication due to the fact the Entresto is $1100 a month. She didn't qualify for the medicare subsidy program and didn't want to try the patient assistance because she is afraid she'll get denied there too. Wants to know if there is any other medication you would recommend.

## 2025-05-07 NOTE — TELEPHONE ENCOUNTER
Called and spoke with the patient told her the above.  She is concerned and wanted to know if she will still be getting the same benefit from just taking the Valsartan.  Please advise.    Christine

## 2025-05-07 NOTE — TELEPHONE ENCOUNTER
Likely not but according to the guidelines if somebody cannot afford Entresto, this is what is used instead.  Will have to follow to make sure that you remain stable.

## 2025-05-12 ENCOUNTER — TELEPHONE (OUTPATIENT)
Dept: FAMILY MEDICINE CLINIC | Facility: CLINIC | Age: 70
End: 2025-05-12

## 2025-05-12 ENCOUNTER — OFFICE VISIT (OUTPATIENT)
Dept: FAMILY MEDICINE CLINIC | Facility: CLINIC | Age: 70
End: 2025-05-12
Payer: MEDICARE

## 2025-05-12 VITALS
BODY MASS INDEX: 32.49 KG/M2 | WEIGHT: 207 LBS | HEIGHT: 67 IN | OXYGEN SATURATION: 98 % | SYSTOLIC BLOOD PRESSURE: 118 MMHG | HEART RATE: 78 BPM | DIASTOLIC BLOOD PRESSURE: 74 MMHG

## 2025-05-12 DIAGNOSIS — N30.01 ACUTE CYSTITIS WITH HEMATURIA: Primary | ICD-10-CM

## 2025-05-12 LAB
BILIRUB BLD-MCNC: NEGATIVE MG/DL
CLARITY, POC: CLEAR
COLOR UR: YELLOW
GLUCOSE UR STRIP-MCNC: ABNORMAL MG/DL
KETONES UR QL: NEGATIVE
LEUKOCYTE EST, POC: ABNORMAL
NITRITE UR-MCNC: NEGATIVE MG/ML
PH UR: 6.5 [PH] (ref 5–8)
PROT UR STRIP-MCNC: ABNORMAL MG/DL
RBC # UR STRIP: ABNORMAL /UL
SP GR UR: 1 (ref 1–1.03)
UROBILINOGEN UR QL: NORMAL

## 2025-05-12 PROCEDURE — 3074F SYST BP LT 130 MM HG: CPT | Performed by: NURSE PRACTITIONER

## 2025-05-12 PROCEDURE — 3044F HG A1C LEVEL LT 7.0%: CPT | Performed by: NURSE PRACTITIONER

## 2025-05-12 PROCEDURE — 99213 OFFICE O/P EST LOW 20 MIN: CPT | Performed by: NURSE PRACTITIONER

## 2025-05-12 PROCEDURE — 3078F DIAST BP <80 MM HG: CPT | Performed by: NURSE PRACTITIONER

## 2025-05-12 PROCEDURE — 1160F RVW MEDS BY RX/DR IN RCRD: CPT | Performed by: NURSE PRACTITIONER

## 2025-05-12 PROCEDURE — 1125F AMNT PAIN NOTED PAIN PRSNT: CPT | Performed by: NURSE PRACTITIONER

## 2025-05-12 PROCEDURE — 81002 URINALYSIS NONAUTO W/O SCOPE: CPT | Performed by: NURSE PRACTITIONER

## 2025-05-12 PROCEDURE — 1159F MED LIST DOCD IN RCRD: CPT | Performed by: NURSE PRACTITIONER

## 2025-05-12 RX ORDER — PHENAZOPYRIDINE HYDROCHLORIDE 100 MG/1
100 TABLET, FILM COATED ORAL 3 TIMES DAILY PRN
Qty: 6 TABLET | Refills: 0 | Status: SHIPPED | OUTPATIENT
Start: 2025-05-12 | End: 2025-05-14

## 2025-05-12 RX ORDER — SULFAMETHOXAZOLE AND TRIMETHOPRIM 800; 160 MG/1; MG/1
1 TABLET ORAL 2 TIMES DAILY
Qty: 10 TABLET | Refills: 0 | Status: SHIPPED | OUTPATIENT
Start: 2025-05-12

## 2025-05-12 NOTE — TELEPHONE ENCOUNTER
Caller: Barbara Tran    Relationship: Self    Best call back number: [phone]     What was the call regarding:     PATIENT IS REQUESTING TO SPEAK TO ISMA.     [facility] SCHEDULED A SAME DAY APPT WITH CELESTINO VILLELA @[facility] FOR PAINFUL URINATION AND PATIENT ASKED TO HAVE A MESSAGE SENT IN AS WELL AND WOULD ONLY SAY SHE WANTED ISMA TO CALL.

## 2025-05-12 NOTE — PROGRESS NOTES
Subjective   Barbara Tran is a 70 y.o. female.     History of Present Illness     The following portions of the patient's history were reviewed and updated as appropriate: allergies, current medications, past family history, past medical history, past social history, past surgical history and problem list.       The patient is a 70-year-old female who presents for evaluation of dysuria and urinary frequency.    Acute UTI symptoms, dysuria, blood in urine , frequency x 1 days. She reports experiencing discomfort since 11:00 AM today.  There has been no recent use of antibiotics, and she confirms an allergy to levofloxacin. No recent UTI. She denies any fever or chills. The pain is localized to the anterior aspect of her abdomen and does not radiate to her back.  Currently, she manages her chronic pain with gabapentin, oxycodone, methocarbamol.          Review of Systems        Current Outpatient Medications:     apixaban (ELIQUIS) 5 MG tablet tablet, Take 1 tablet by mouth Every 12 (Twelve) Hours. Indications: DVT/PE (active thrombosis), Disp: 56 tablet, Rfl: 0    buPROPion XL (WELLBUTRIN XL) 300 MG 24 hr tablet, Take 1 tablet by mouth Daily. Indications: Major Depressive Disorder, Disp: 90 tablet, Rfl: 3    calcium carbonate (OS-SHAHNAZ) 600 MG tablet, Take 1 tablet by mouth 2 (Two) Times a Day With Meals. Indications: Low Amount of Calcium in the Blood, Disp: , Rfl:     empagliflozin (JARDIANCE) 10 MG tablet tablet, Take 1 tablet by mouth Daily. Indications: Cardiac Failure, Disp: 90 tablet, Rfl: 3    gabapentin (NEURONTIN) 100 MG capsule, Take 1 capsule by mouth 3 (Three) Times a Day. Indications: back pain, Disp: 90 capsule, Rfl: 2    ibandronate (Boniva) 150 MG tablet, Take 1 tablet by mouth Every 30 (Thirty) Days. Patient taking once a month  Indications: Postmenopausal Osteoporosis, Disp: 4 tablet, Rfl: 2    levothyroxine (Synthroid) 50 MCG tablet, Take 1 tablet by mouth Daily. Indications: Underactive Thyroid,  Disp: 90 tablet, Rfl: 0    magnesium oxide (MAG-OX) 400 MG tablet, Take 2 tablets by mouth 2 (two) times a day. Indications: Disorder with Low Magnesium Levels, Disp: , Rfl:     methocarbamol (ROBAXIN) 500 MG tablet, Take 1 tablet by mouth 4 (Four) Times a Day., Disp: 120 tablet, Rfl: 0    oxyCODONE (oxyCONTIN) 10 MG 12 hr tablet, Take 1 tablet by mouth Every 12 (Twelve) Hours., Disp: , Rfl:     rosuvastatin (CRESTOR) 40 MG tablet, Take 1 tablet by mouth Every Night. Indications: High Amount of Fats in the Blood, Disp: 90 tablet, Rfl: 1    spironolactone (ALDACTONE) 25 MG tablet, Take 1 tablet by mouth Daily. Indications: Edema, Disp: 90 tablet, Rfl: 3    torsemide (DEMADEX) 20 MG tablet, Take 1 tablet by mouth Daily. Indications: Cardiac Failure, Edema, Disp: 90 tablet, Rfl: 3    zolpidem (AMBIEN) 5 MG tablet, Take 1 tablet by mouth At Night As Needed for Sleep., Disp: 30 tablet, Rfl: 0    phenazopyridine (Pyridium) 100 MG tablet, Take 1 tablet by mouth 3 (Three) Times a Day As Needed for Bladder Spasms for up to 2 days., Disp: 6 tablet, Rfl: 0    sulfamethoxazole-trimethoprim (Bactrim DS) 800-160 MG per tablet, Take 1 tablet by mouth 2 (Two) Times a Day., Disp: 10 tablet, Rfl: 0    valsartan (DIOVAN) 160 MG tablet, Take 1 tablet by mouth Every Night. (Patient not taking: Reported on 5/12/2025), Disp: 90 tablet, Rfl: 3    Objective   Physical Exam  Vitals reviewed.   Constitutional:       Appearance: Normal appearance.      Comments: In pain   Cardiovascular:      Rate and Rhythm: Normal rate and regular rhythm.      Pulses: Normal pulses.   Pulmonary:      Effort: Pulmonary effort is normal.   Abdominal:      Tenderness: There is abdominal tenderness. There is guarding. There is no right CVA tenderness or left CVA tenderness.   Skin:     General: Skin is warm.   Neurological:      General: No focal deficit present.      Mental Status: She is alert.         Vitals:    05/12/25 1438   BP: 118/74   Pulse: 78   SpO2:  98%     Body mass index is 32.41 kg/m².    Procedures    TSH   Date Value Ref Range Status   01/01/2025 0.263 (L) 0.270 - 4.200 uIU/mL Final     CBC   Date Value Ref Range Status   06/19/2019 0.40 0.4 - 1.0 10*3/uL Final     Hemoglobin A1C   Date Value Ref Range Status   01/02/2025 5.40 4.80 - 5.60 % Final            Over the past 2 weeks, how often have you been bothered by any of the following problems?  Little interest or pleasure in doing things: Not at all  Feeling down, depressed, or hopeless: Not at all      Assessment & Plan   Problems Addressed this Visit    None  Visit Diagnoses         Acute cystitis with hematuria    -  Primary    Relevant Medications    phenazopyridine (Pyridium) 100 MG tablet    Other Relevant Orders    POC Urinalysis Dipstick (Completed)          Diagnoses         Codes Comments      Acute cystitis with hematuria    -  Primary ICD-10-CM: N30.01  ICD-9-CM: 595.0                1. Dysuria and urinary frequency.  - Renal function remains stable, indicating that the use of Pyridium is safe for a duration of 2 days.  - Prescription for Pyridium 100 mg, to be taken 3 times daily for 2 days, has been provided. Informed that this medication may cause urine to appear bright orange in color.  - Prescription for BactrimDS, 1 tablet to be taken twice daily for 5 days, has been issued. A urine culture will be conducted to ensure the appropriateness of the antibiotic treatment.  - Advised to consume cranberry juice and ample water, maintain proper hygiene by wiping from front to back, and avoid constipation and diarrhea. Should systemic symptoms such as fever, chills, inability to urinate, or worsening pain w hematuria occur, an immediate visit to the emergency room is recommended.     Reviewed last labs : GFR 93, creat 0.65         Education provided in AVS   No follow-ups on file.    Patient or patient representative verbalized consent for the use of Ambient Listening during the visit with   MANOHAR Kebede for chart documentation. 5/12/2025  15:11 EDT

## 2025-05-13 DIAGNOSIS — F51.01 PRIMARY INSOMNIA: ICD-10-CM

## 2025-05-13 NOTE — TELEPHONE ENCOUNTER
LAST REFILL - 04/17/25  LAST VISIT - 03/03/25  NEXT VISIT - not scheduled    Refill requested too soon. Postponed until 5/14/25

## 2025-05-15 DIAGNOSIS — F51.01 PRIMARY INSOMNIA: ICD-10-CM

## 2025-05-15 LAB
BACTERIA UR CULT: ABNORMAL
BACTERIA UR CULT: ABNORMAL
OTHER ANTIBIOTIC SUSC ISLT: ABNORMAL

## 2025-05-15 RX ORDER — ZOLPIDEM TARTRATE 5 MG/1
5 TABLET ORAL NIGHTLY PRN
Qty: 30 TABLET | Refills: 0 | OUTPATIENT
Start: 2025-05-15

## 2025-05-15 RX ORDER — ZOLPIDEM TARTRATE 5 MG/1
5 TABLET ORAL NIGHTLY PRN
Qty: 30 TABLET | Refills: 0 | Status: SHIPPED | OUTPATIENT
Start: 2025-05-15

## 2025-05-20 DIAGNOSIS — M51.360 DEGENERATION OF INTERVERTEBRAL DISC OF LUMBAR REGION WITH DISCOGENIC BACK PAIN: ICD-10-CM

## 2025-05-20 DIAGNOSIS — M50.30 DDD (DEGENERATIVE DISC DISEASE), CERVICAL: ICD-10-CM

## 2025-05-21 RX ORDER — OXYCODONE HCL 10 MG/1
10 TABLET, FILM COATED, EXTENDED RELEASE ORAL EVERY 12 HOURS
OUTPATIENT
Start: 2025-05-21

## 2025-05-22 RX ORDER — GABAPENTIN 100 MG/1
100 CAPSULE ORAL 3 TIMES DAILY
Qty: 90 CAPSULE | Refills: 2 | Status: SHIPPED | OUTPATIENT
Start: 2025-05-22

## 2025-05-22 RX ORDER — IBANDRONATE SODIUM 150 MG/1
150 TABLET, FILM COATED ORAL
Qty: 4 TABLET | Refills: 2 | Status: SHIPPED | OUTPATIENT
Start: 2025-05-22

## 2025-06-02 ENCOUNTER — TELEPHONE (OUTPATIENT)
Dept: ONCOLOGY | Facility: CLINIC | Age: 70
End: 2025-06-02
Payer: MEDICARE

## 2025-06-02 DIAGNOSIS — D58.2 ELEVATED HEMOGLOBIN: Primary | ICD-10-CM

## 2025-06-02 NOTE — TELEPHONE ENCOUNTER
Caller: Barbara Tran    Relationship: Self    Best call back number: [phone], LEAVE DETAILED VM IF NO ANSWER    Who are you requesting to speak with (clinical staff, provider,  specific staff member): CLINICAL      What was the call regarding: PT WOULD LIKE DR BOOTH TO REVIEW HER CURRENT LABS.  PT'S ENDOCRINOLOGIST IS CONCERNED ABOUT PT'S HIGH HEMOGLOBIN    PLEASE ADVISE IF PT NEEDS TO BE SEEN

## 2025-06-03 NOTE — TELEPHONE ENCOUNTER
Returned call to lakeshia Jimenez voicemail that Dr Rogel is recommending additional lab work to evaluate her elevated hgb.  Message sent to scheduling to make appt.

## 2025-06-09 ENCOUNTER — LAB (OUTPATIENT)
Dept: LAB | Facility: HOSPITAL | Age: 70
End: 2025-06-09
Payer: MEDICARE

## 2025-06-09 DIAGNOSIS — D58.2 ELEVATED HEMOGLOBIN: Primary | ICD-10-CM

## 2025-06-09 LAB
BASOPHILS # BLD AUTO: 0.07 10*3/MM3 (ref 0–0.2)
BASOPHILS NFR BLD AUTO: 0.9 % (ref 0–1.5)
DEPRECATED RDW RBC AUTO: 46.2 FL (ref 37–54)
EOSINOPHIL # BLD AUTO: 0.32 10*3/MM3 (ref 0–0.4)
EOSINOPHIL NFR BLD AUTO: 4.3 % (ref 0.3–6.2)
ERYTHROCYTE [DISTWIDTH] IN BLOOD BY AUTOMATED COUNT: 13.6 % (ref 12.3–15.4)
HCT VFR BLD AUTO: 47.1 % (ref 34–46.6)
HGB BLD-MCNC: 15.4 G/DL (ref 12–15.9)
IMM GRANULOCYTES # BLD AUTO: 0.01 10*3/MM3 (ref 0–0.05)
IMM GRANULOCYTES NFR BLD AUTO: 0.1 % (ref 0–0.5)
LYMPHOCYTES # BLD AUTO: 2.44 10*3/MM3 (ref 0.7–3.1)
LYMPHOCYTES NFR BLD AUTO: 32.5 % (ref 19.6–45.3)
MCH RBC QN AUTO: 29.9 PG (ref 26.6–33)
MCHC RBC AUTO-ENTMCNC: 32.7 G/DL (ref 31.5–35.7)
MCV RBC AUTO: 91.5 FL (ref 79–97)
MONOCYTES # BLD AUTO: 0.44 10*3/MM3 (ref 0.1–0.9)
MONOCYTES NFR BLD AUTO: 5.9 % (ref 5–12)
NEUTROPHILS NFR BLD AUTO: 4.23 10*3/MM3 (ref 1.7–7)
NEUTROPHILS NFR BLD AUTO: 56.3 % (ref 42.7–76)
NRBC BLD AUTO-RTO: 0 /100 WBC (ref 0–0.2)
PLATELET # BLD AUTO: 286 10*3/MM3 (ref 140–450)
PMV BLD AUTO: 9.6 FL (ref 6–12)
RBC # BLD AUTO: 5.15 10*6/MM3 (ref 3.77–5.28)
WBC NRBC COR # BLD AUTO: 7.51 10*3/MM3 (ref 3.4–10.8)

## 2025-06-09 PROCEDURE — 85025 COMPLETE CBC W/AUTO DIFF WBC: CPT

## 2025-06-09 PROCEDURE — 36415 COLL VENOUS BLD VENIPUNCTURE: CPT

## 2025-06-12 LAB — JAK2 V617F RESULT: NORMAL

## 2025-06-20 RX ORDER — SULFAMETHOXAZOLE AND TRIMETHOPRIM 800; 160 MG/1; MG/1
1 TABLET ORAL 2 TIMES DAILY
Qty: 14 TABLET | Refills: 0 | Status: SHIPPED | OUTPATIENT
Start: 2025-06-20

## 2025-06-22 DIAGNOSIS — M51.360 DEGENERATION OF INTERVERTEBRAL DISC OF LUMBAR REGION WITH DISCOGENIC BACK PAIN: ICD-10-CM

## 2025-06-22 DIAGNOSIS — F51.01 PRIMARY INSOMNIA: ICD-10-CM

## 2025-06-22 DIAGNOSIS — M50.30 DDD (DEGENERATIVE DISC DISEASE), CERVICAL: ICD-10-CM

## 2025-06-23 ENCOUNTER — TELEPHONE (OUTPATIENT)
Dept: FAMILY MEDICINE CLINIC | Facility: CLINIC | Age: 70
End: 2025-06-23

## 2025-06-23 ENCOUNTER — CLINICAL SUPPORT (OUTPATIENT)
Dept: FAMILY MEDICINE CLINIC | Facility: CLINIC | Age: 70
End: 2025-06-23
Payer: MEDICARE

## 2025-06-23 ENCOUNTER — OFFICE VISIT (OUTPATIENT)
Dept: ONCOLOGY | Facility: CLINIC | Age: 70
End: 2025-06-23
Payer: MEDICARE

## 2025-06-23 VITALS
HEART RATE: 72 BPM | WEIGHT: 211.2 LBS | BODY MASS INDEX: 33.15 KG/M2 | OXYGEN SATURATION: 94 % | DIASTOLIC BLOOD PRESSURE: 74 MMHG | TEMPERATURE: 98.2 F | SYSTOLIC BLOOD PRESSURE: 112 MMHG | HEIGHT: 67 IN

## 2025-06-23 DIAGNOSIS — R39.9 UTI SYMPTOMS: Primary | ICD-10-CM

## 2025-06-23 DIAGNOSIS — Z79.01 CHRONIC ANTICOAGULATION: ICD-10-CM

## 2025-06-23 DIAGNOSIS — D58.2 ELEVATED HEMOGLOBIN: Primary | ICD-10-CM

## 2025-06-23 DIAGNOSIS — I26.99 BILATERAL PULMONARY EMBOLISM: ICD-10-CM

## 2025-06-23 LAB
BILIRUB BLD-MCNC: NEGATIVE MG/DL
CLARITY, POC: CLEAR
COLOR UR: ABNORMAL
GLUCOSE UR STRIP-MCNC: NEGATIVE MG/DL
KETONES UR QL: NEGATIVE
LEUKOCYTE EST, POC: ABNORMAL
NITRITE UR-MCNC: POSITIVE MG/ML
PH UR: 6.5 [PH] (ref 5–8)
PROT UR STRIP-MCNC: NEGATIVE MG/DL
RBC # UR STRIP: ABNORMAL /UL
SP GR UR: 1.01 (ref 1–1.03)
UROBILINOGEN UR QL: NORMAL

## 2025-06-23 PROCEDURE — 3078F DIAST BP <80 MM HG: CPT | Performed by: INTERNAL MEDICINE

## 2025-06-23 PROCEDURE — G2211 COMPLEX E/M VISIT ADD ON: HCPCS | Performed by: INTERNAL MEDICINE

## 2025-06-23 PROCEDURE — 81002 URINALYSIS NONAUTO W/O SCOPE: CPT | Performed by: FAMILY MEDICINE

## 2025-06-23 PROCEDURE — 99214 OFFICE O/P EST MOD 30 MIN: CPT | Performed by: INTERNAL MEDICINE

## 2025-06-23 PROCEDURE — 1125F AMNT PAIN NOTED PAIN PRSNT: CPT | Performed by: INTERNAL MEDICINE

## 2025-06-23 PROCEDURE — 3074F SYST BP LT 130 MM HG: CPT | Performed by: INTERNAL MEDICINE

## 2025-06-23 RX ORDER — GABAPENTIN 100 MG/1
100 CAPSULE ORAL 3 TIMES DAILY
Qty: 90 CAPSULE | Refills: 2 | Status: SHIPPED | OUTPATIENT
Start: 2025-06-23

## 2025-06-23 RX ORDER — AMOXICILLIN 500 MG/1
500 CAPSULE ORAL 3 TIMES DAILY
COMMUNITY
Start: 2025-06-21

## 2025-06-23 RX ORDER — ZOLPIDEM TARTRATE 5 MG/1
5 TABLET ORAL NIGHTLY PRN
Qty: 30 TABLET | Refills: 0 | Status: SHIPPED | OUTPATIENT
Start: 2025-06-23

## 2025-06-23 RX ORDER — PHENAZOPYRIDINE HYDROCHLORIDE 100 MG/1
100 TABLET, FILM COATED ORAL 3 TIMES DAILY PRN
COMMUNITY
Start: 2025-06-21

## 2025-06-23 NOTE — PROGRESS NOTES
REFERRING PROVIDER:    No referring provider defined for this encounter.    REASON FOR FOLLOW UP:    Bilateral PE and left DVT with RV thrombus    HISTORY OF PRESENT ILLNESS:  Barbara Tran is a 70 y.o. female who comes into the office today for follow-up after recent hospitalization with discharge on 10/1/2024.  She has history of hypertension, hyperlipidemia, coronary artery disease, hypothyroidism, congestive heart failure, previous history of pulmonary embolism following COVID-19 infection and was treated with Eliquis for 1 year.  Patient tested positive for COVID-19 on September 8 and has nasal congestion sore throat and cough.  She had progressive worsening of dyspnea over 2 to 3 days and presented to the emergency room.  CT angiogram chest performed 9/20/2024.    We were consulted in the hospital due to findings of acute bilateral PE, left DVT and RV thrombus.  Concerns of pulmonary infarction.  Echocardiogram performed showing no right heart strain.  Patient diagnosed    with influenza and COVID during hospitalization.  She was started on heparin drip.  Plans made to transition to Eliquis 10 mg twice daily for 1 week and then 5 mg twice daily.  Anticipate lifelong anticoagulation due to second episode of pulmonary embolism.    Patient continues on Eliquis 5 mg twice daily.  As of today she has completed 6 months of full dose Eliquis treatment.  She recently saw Dr. Rodriguez for her chronic pulmonary issues.  Today she is very congested, has a cough which has been going on for a week and a half which is productive of beige colored sputum.  She is also reporting dyspnea and is extremely tired.  She has been using Mucinex DM to help with her symptoms but has not seen any significant improvement.  She denies a fever.  She is tachycardic today which is likely secondary to the URI that she is experiencing.  Denies any bleeding or bruising.  No lower extremity edema.  She has been compliant with Eliquis.    After  her last visit patient called the clinic and reported that her primary care physician was concerned about elevated hemoglobin levels.  We had her come back to the lab and obtain a JAK2 mutation analysis which came back negative.    Patient reports that she underwent testing for obstructive sleep apnea about 5 years ago which came back negative.    She denies any other new complaints at this time.      Past Medical History:   Diagnosis Date    Abnormal ECG 2005    Stent    Allergic 2015    codeine, morphine, levaquin    Arthritis l5 years or so ago    Arthritis of back     Have had it for several years    Asthma 9-    Basal cell carcinoma     CAD (coronary artery disease)     Cataract 6-9 months ago    Need surgery    CHF (congestive heart failure)     Coronary artery disease 2005 stent    COVID-19 virus detected 03/10/2021    Deep vein thrombosis 06/03/2021    Pulmonary embolism    Depression     Disease of thyroid gland     Headache Covid 3-6-21    Has continued    Hip arthrosis     From a fall    History of pulmonary embolism 06/01/2021    History of urinary tract infection     HL (hearing loss) Last 4-6 months    Hyperlipidemia     Hypertension since 2005    Hypothyroidism since 2012    Knee swelling     Had for several years    Low back pain     Multinodular goiter     Obesity     Osteopenia last few years    Osteoporosis     Pulmonary embolism 06/03/2021    From Covid    Pulmonic valve regurgitation     Tricuspid valve regurgitation     Unstable angina 04/16/2024       Past Surgical History:   Procedure Laterality Date    BUNIONECTOMY Bilateral     HAMMERT TOE REPAIR/BUNIONECTOMY    CARDIAC CATHETERIZATION  2015, 2018    CARDIAC CATHETERIZATION N/A 04/29/2024    Procedure: Coronary angiography;  Surgeon: Cong Rivera MD;  Location: [facility] CATH INVASIVE LOC[facility];  Service: Cardiovascular;  Laterality: N/A;    CARDIAC CATHETERIZATION N/A 04/29/2024    Procedure: Left Heart Cath;  Surgeon:  Cong Rivera MD;  Location:  [facility] CATH INVASIVE LOCATION;  Service: Cardiovascular;  Laterality: N/A;    CARDIAC CATHETERIZATION N/A 04/29/2024    Procedure: Left ventriculography;  Surgeon: Cong Rivera MD;  Location:  [facility] CATH INVASIVE LOCATION;  Service: Cardiovascular;  Laterality: N/A;    CATARACT EXTRACTION      CHOLECYSTECTOMY  1995    COLONOSCOPY N/A 12/19/2016    Procedure: COLONOSCOPY WITH COLD BIOPSIES;  Surgeon: Medina Guillen MD;  Location: [facility];  Service:     CORONARY STENT PLACEMENT  2005    LAD 80% blockage    CYST REMOVAL      GANGLION CYST EXCISION      R WRIST    HAND SURGERY      Several years ago    SKIN BIOPSY      SUBTOTAL HYSTERECTOMY  2009    THYROID BIOPSY  2014    TONSILLECTOMY  1965    TUBAL ABDOMINAL LIGATION  1985    WRIST SURGERY      Several years ago       SOCIAL HISTORY:   reports that she has never smoked. She has never been exposed to tobacco smoke. She has never used smokeless tobacco. She reports current alcohol use of about 2.0 standard drinks of alcohol per week. She reports that she does not use drugs.    FAMILY HISTORY:  family history includes Arthritis in her brother and son; Cancer in her brother; Diabetes in her sister; Heart attack in her brother, father, mother, and sister; Heart disease in her brother, father, and mother; Heart disease (age of onset: 62) in her brother; Heart disease (age of onset: 69) in her sister; Hyperlipidemia in her brother, brother, father, mother, and sister; Hypertension in her brother, brother, father, mother, and sister; No Known Problems in her maternal aunt, maternal grandfather, maternal grandmother, maternal uncle, paternal aunt, paternal grandfather, paternal grandmother, and paternal uncle; Vision loss in her brother.    ALLERGIES:  Allergies   Allergen Reactions    Dilaudid [Hydromorphone] Other (See Comments)     Confusion    Codeine Dizziness     lightheaded    Levofloxacin Itching    Morphine Itching  "      MEDICATIONS:  The medication list has been reviewed with the patient by the medical assistant, and the list has been updated in the electronic medical record, which I reviewed.  Medication dosages and frequencies were confirmed to be accurate.    Review of Systems  Review of systems as mentioned HPI otherwise negative    PHYSICAL EXAMINATION:  Vitals:    06/23/25 1610   BP: 112/74   Pulse: 72   Temp: 98.2 °F (36.8 °C)   TempSrc: Oral   SpO2: 94%   Weight: 95.8 kg (211 lb 3.2 oz)   Height: 170.2 cm (67.01\")   PainSc: 8        Physical Exam  Eyes:      Extraocular Movements: Extraocular movements intact.      Conjunctiva/sclera: Conjunctivae normal.   Pulmonary:      Effort: Pulmonary effort is normal.      Breath sounds: Normal breath sounds.   Musculoskeletal:      Cervical back: Normal range of motion.      Comments: Using walker for assistance   Skin:     General: Skin is warm and dry.   Neurological:      General: No focal deficit present.      Mental Status: She is alert and oriented to person, place, and time.   Psychiatric:         Behavior: Behavior normal.       I have reexamined the patient and the results are consistent with the previously documented exam. Sarah Rogel MD      DIAGNOSTIC DATA:  Lab Results   Component Value Date    WBC 7.51 06/09/2025    HGB 15.4 06/09/2025    HCT 47.1 (H) 06/09/2025    MCV 91.5 06/09/2025     06/09/2025             ASSESSMENT:  This is a 70 y.o. female with:    **Recurrent pulmonary embolism  In both instances the pulmonary embolism was followed by COVID-19 infection.  Although there has been a precipitating event each time given the fact that this is a second event with bilateral pulmonary embolism I will proceed with thrombophilia workup.  No family history of PE or DVT.  Recommend anticoagulation with Eliquis 10 mg twice daily for 1 week and subsequently 5 mg twice daily.  Patient will benefit from lifelong anticoagulation due to the second episode of " pulmonary embolism.  Factor V Leiden, factor II DNA analysis, protein S , beta-2 glycoprotein, lupus anticoagulant, anticardiolipin antibody unremarkable with lupus anticoagulant skewed due to patient being on anticoagulant at the time.  3/14/2025-completed 6 months of full dose of Eliquis.  We discussed today about decreasing the dose to 2.5 mg twice daily however patient is very reluctant given her previous history of blood clots every time she had a URI.  She is currently experiencing a URI.  We could possibly continue the full dose Eliquis for another 6 months and subsequently decrease the dose to 2.5 mg twice daily.  No evidence of recurrent DVT or PE    *Erythrocytosis  Since 2022 hemoglobin has ranged between 15-16.4.  JAK2 mutation analysis negative.  She sees Dr. Rodriguez and recommend that she undergo a sleep study to rule out obstructive sleep apnea  At her next visit we will obtain erythropoietin level.  Encouraged her to increase hydration    *COVID-19 infection as well as flu A  Patient with another URI currently.  Lungs are clear on exam.  She does have some cough but no fevers.  Continue follow-up with her primary care physician  Continue symptomatic treatment, recommend Flonase, Zyrtec and continue Mucinex.  Improved     *Hypertension, hyperlipidemia, coronary artery disease and CHF-management per cardiology.        PLAN:   Continue Eliquis 5 mg twice daily for another 6 months which would be September 2025  Follow-up with MANOHAR in 6 months and MD in 1 year.  After 6 months of full dose Eliquis will decrease the treatment to 2.5 mg twice daily  Sleep study  Entry point level at next visit  Continue follow-up with Dr. Rodriguez

## 2025-06-23 NOTE — TELEPHONE ENCOUNTER
Patient called stating the antibiotic had not helped at all ask to come leave a urine for culture I also dipped it as well she is aware it takes around 3 days for the culture to come back

## 2025-06-26 ENCOUNTER — TELEPHONE (OUTPATIENT)
Dept: FAMILY MEDICINE CLINIC | Facility: CLINIC | Age: 70
End: 2025-06-26

## 2025-06-26 DIAGNOSIS — F51.01 PRIMARY INSOMNIA: Primary | ICD-10-CM

## 2025-06-26 LAB
BACTERIA UR CULT: ABNORMAL
BACTERIA UR CULT: ABNORMAL
OTHER ANTIBIOTIC SUSC ISLT: ABNORMAL

## 2025-06-26 NOTE — TELEPHONE ENCOUNTER
Caller: Barbara Tran    Relationship: Self    Best call back number: [phone]    What test was performed: UTI TEST     When was the test performed: 06/23    Where was the test performed: OFFICE    Additional notes: PATIENT IS IN A LOT OF PAIN AND IS FEELING WEAKER. PLEASE ADVISE PATIENT ASAP.

## 2025-06-27 DIAGNOSIS — N39.0 FREQUENT UTI: Primary | ICD-10-CM

## 2025-06-27 RX ORDER — NITROFURANTOIN 25; 75 MG/1; MG/1
100 CAPSULE ORAL 2 TIMES DAILY
Qty: 10 CAPSULE | Refills: 0 | Status: SHIPPED | OUTPATIENT
Start: 2025-06-27

## 2025-07-07 DIAGNOSIS — E03.9 HYPOTHYROIDISM, UNSPECIFIED TYPE: ICD-10-CM

## 2025-07-07 RX ORDER — LEVOTHYROXINE SODIUM 50 UG/1
50 TABLET ORAL DAILY
Qty: 90 TABLET | Refills: 0 | Status: SHIPPED | OUTPATIENT
Start: 2025-07-07

## 2025-07-07 RX ORDER — TORSEMIDE 20 MG/1
20 TABLET ORAL DAILY
Qty: 90 TABLET | Refills: 3 | Status: SHIPPED | OUTPATIENT
Start: 2025-07-07

## 2025-07-07 RX ORDER — SPIRONOLACTONE 25 MG/1
25 TABLET ORAL DAILY
Qty: 90 TABLET | Refills: 3 | Status: SHIPPED | OUTPATIENT
Start: 2025-07-07

## 2025-07-09 ENCOUNTER — TELEPHONE (OUTPATIENT)
Dept: FAMILY MEDICINE CLINIC | Facility: CLINIC | Age: 70
End: 2025-07-09

## 2025-07-17 RX ORDER — NITROFURANTOIN 25; 75 MG/1; MG/1
100 CAPSULE ORAL 2 TIMES DAILY
Qty: 10 CAPSULE | Refills: 0 | Status: SHIPPED | OUTPATIENT
Start: 2025-07-17 | End: 2025-07-22

## 2025-07-20 DIAGNOSIS — F51.01 PRIMARY INSOMNIA: ICD-10-CM

## 2025-07-21 RX ORDER — ZOLPIDEM TARTRATE 5 MG/1
5 TABLET ORAL NIGHTLY PRN
Qty: 30 TABLET | Refills: 0 | Status: SHIPPED | OUTPATIENT
Start: 2025-07-21

## 2025-08-10 DIAGNOSIS — M50.30 DDD (DEGENERATIVE DISC DISEASE), CERVICAL: ICD-10-CM

## 2025-08-10 DIAGNOSIS — M51.360 DEGENERATION OF INTERVERTEBRAL DISC OF LUMBAR REGION WITH DISCOGENIC BACK PAIN: ICD-10-CM

## 2025-08-11 RX ORDER — GABAPENTIN 100 MG/1
100 CAPSULE ORAL 3 TIMES DAILY
Qty: 90 CAPSULE | Refills: 2 | Status: SHIPPED | OUTPATIENT
Start: 2025-08-11

## 2025-08-12 ENCOUNTER — OFFICE VISIT (OUTPATIENT)
Dept: CARDIOLOGY | Age: 70
End: 2025-08-12
Payer: MEDICARE

## 2025-08-12 VITALS
HEIGHT: 67 IN | HEART RATE: 76 BPM | WEIGHT: 204 LBS | BODY MASS INDEX: 32.02 KG/M2 | DIASTOLIC BLOOD PRESSURE: 60 MMHG | SYSTOLIC BLOOD PRESSURE: 90 MMHG

## 2025-08-12 DIAGNOSIS — I65.23 BILATERAL CAROTID ARTERY STENOSIS: ICD-10-CM

## 2025-08-12 DIAGNOSIS — E04.1 THYROID NODULE: ICD-10-CM

## 2025-08-12 DIAGNOSIS — I50.32 CHRONIC DIASTOLIC CONGESTIVE HEART FAILURE: Primary | ICD-10-CM

## 2025-08-12 DIAGNOSIS — I10 ESSENTIAL HYPERTENSION: ICD-10-CM

## 2025-08-12 DIAGNOSIS — I25.10 CHRONIC CORONARY ARTERY DISEASE: ICD-10-CM

## 2025-08-12 DIAGNOSIS — E78.2 MIXED HYPERLIPIDEMIA: ICD-10-CM

## 2025-08-12 RX ORDER — OXYCODONE AND ACETAMINOPHEN 5; 325 MG/1; MG/1
1 TABLET ORAL 3 TIMES DAILY
COMMUNITY
Start: 2025-08-01

## 2025-08-14 DIAGNOSIS — F34.1 DYSTHYMIA: ICD-10-CM

## 2025-08-14 RX ORDER — BUPROPION HYDROCHLORIDE 300 MG/1
300 TABLET ORAL DAILY
Qty: 90 TABLET | Refills: 3 | Status: SHIPPED | OUTPATIENT
Start: 2025-08-14

## 2025-08-14 RX ORDER — VALSARTAN 160 MG/1
160 TABLET ORAL NIGHTLY
Qty: 90 TABLET | Refills: 1 | Status: SHIPPED | OUTPATIENT
Start: 2025-08-14

## 2025-08-18 ENCOUNTER — TELEPHONE (OUTPATIENT)
Dept: INTERNAL MEDICINE | Facility: CLINIC | Age: 70
End: 2025-08-18
Payer: MEDICARE

## 2025-08-20 DIAGNOSIS — F51.01 PRIMARY INSOMNIA: ICD-10-CM

## 2025-08-21 RX ORDER — ZOLPIDEM TARTRATE 5 MG/1
5 TABLET ORAL NIGHTLY PRN
Qty: 30 TABLET | Refills: 0 | Status: SHIPPED | OUTPATIENT
Start: 2025-08-21

## 2025-08-26 DIAGNOSIS — M50.30 DDD (DEGENERATIVE DISC DISEASE), CERVICAL: ICD-10-CM

## 2025-08-26 DIAGNOSIS — M51.360 DEGENERATION OF INTERVERTEBRAL DISC OF LUMBAR REGION WITH DISCOGENIC BACK PAIN: ICD-10-CM

## 2025-08-26 RX ORDER — GABAPENTIN 100 MG/1
100 CAPSULE ORAL 3 TIMES DAILY
Qty: 90 CAPSULE | Refills: 2 | OUTPATIENT
Start: 2025-08-26

## 2025-08-26 RX ORDER — OXYCODONE HCL 10 MG/1
10 TABLET, FILM COATED, EXTENDED RELEASE ORAL EVERY 12 HOURS
OUTPATIENT
Start: 2025-08-26

## 2025-08-26 RX ORDER — OXYCODONE AND ACETAMINOPHEN 5; 325 MG/1; MG/1
1 TABLET ORAL 3 TIMES DAILY
OUTPATIENT
Start: 2025-08-26

## (undated) DEVICE — CATH DIAG CARD PERFORMA JL3.5 BT 4F100CM

## (undated) DEVICE — GLIDESHEATH BASIC HYDROPHILIC COATED INTRODUCER SHEATH: Brand: GLIDESHEATH

## (undated) DEVICE — PK CATH CARD 40

## (undated) DEVICE — RADIFOCUS GLIDEWIRE: Brand: GLIDEWIRE

## (undated) DEVICE — DGW .035 FC J3MM 260CM TEF: Brand: EMERALD

## (undated) DEVICE — KT MANIFLD CARDIAC

## (undated) DEVICE — CATH VENT MIV RADL PIG ST TIP 4F 110CM

## (undated) DEVICE — CATH DIAG CARD PERFORM JR4.0 BT 4F100CM